# Patient Record
Sex: MALE | Race: WHITE | Employment: OTHER | URBAN - NONMETROPOLITAN AREA
[De-identification: names, ages, dates, MRNs, and addresses within clinical notes are randomized per-mention and may not be internally consistent; named-entity substitution may affect disease eponyms.]

---

## 2017-07-28 ENCOUNTER — DOCTOR'S OFFICE (OUTPATIENT)
Dept: URBAN - NONMETROPOLITAN AREA CLINIC 1 | Facility: CLINIC | Age: 72
Setting detail: OPHTHALMOLOGY
End: 2017-07-28
Payer: COMMERCIAL

## 2017-07-28 DIAGNOSIS — H01.001: ICD-10-CM

## 2017-07-28 DIAGNOSIS — H43.812: ICD-10-CM

## 2017-07-28 DIAGNOSIS — H01.002: ICD-10-CM

## 2017-07-28 DIAGNOSIS — H01.004: ICD-10-CM

## 2017-07-28 DIAGNOSIS — Z96.1: ICD-10-CM

## 2017-07-28 DIAGNOSIS — H35.363: ICD-10-CM

## 2017-07-28 DIAGNOSIS — H26.493: ICD-10-CM

## 2017-07-28 DIAGNOSIS — H01.005: ICD-10-CM

## 2017-07-28 PROCEDURE — 92014 COMPRE OPH EXAM EST PT 1/>: CPT | Performed by: OPHTHALMOLOGY

## 2017-07-28 PROCEDURE — 92134 CPTRZ OPH DX IMG PST SGM RTA: CPT | Performed by: OPHTHALMOLOGY

## 2017-07-28 PROCEDURE — 92250 FUNDUS PHOTOGRAPHY W/I&R: CPT | Performed by: OPHTHALMOLOGY

## 2017-07-28 ASSESSMENT — REFRACTION_MANIFEST
OS_VA3: 20/
OD_SPHERE: +0.75
OS_SPHERE: +1.75
OD_VA3: 20/
OD_VA3: 20/
OS_CYLINDER: -2.00
OS_ADD: +2.50
OD_VA1: 20/
OS_VA3: 20/
OS_AXIS: 085
OS_VA2: 20/
OD_VA2: 20/30+2
OD_VA2: 20/
OU_VA: 20/
OU_VA: 20/
OD_ADD: +2.50
OD_CYLINDER: -2.00
OS_VA1: 20/
OU_VA: 20/
OD_VA1: 20/
OS_VA1: 20/
OD_AXIS: 096
OS_VA2: 20/30+2
OD_VA1: 20/30+2
OD_VA3: 20/
OS_VA3: 20/
OD_VA2: 20/
OS_VA2: 20/
OS_VA1: 20/30+2

## 2017-07-28 ASSESSMENT — REFRACTION_CURRENTRX
OS_SPHERE: +1.75
OD_ADD: +2.75
OD_VPRISM_DIRECTION: BF
OD_OVR_VA: 20/
OD_OVR_VA: 20/
OS_OVR_VA: 20/
OD_CYLINDER: -2.25
OS_CYLINDER: -2.00
OS_OVR_VA: 20/
OD_AXIS: 93
OS_VPRISM_DIRECTION: BF
OS_OVR_VA: 20/
OD_OVR_VA: 20/
OS_AXIS: 84
OS_ADD: +2.75
OD_SPHERE: +0.75

## 2017-07-28 ASSESSMENT — SPHEQUIV_DERIVED
OS_SPHEQUIV: 0.75
OD_SPHEQUIV: -0.125
OS_SPHEQUIV: 0.375
OD_SPHEQUIV: -0.25

## 2017-07-28 ASSESSMENT — REFRACTION_AUTOREFRACTION
OD_CYLINDER: -1.75
OD_AXIS: 96
OS_SPHERE: +1.75
OS_AXIS: 85
OS_CYLINDER: -2.75
OD_SPHERE: +0.75

## 2017-07-28 ASSESSMENT — VISUAL ACUITY
OD_BCVA: 20/20
OS_BCVA: 20/20-1

## 2017-07-28 ASSESSMENT — CONFRONTATIONAL VISUAL FIELD TEST (CVF)
OD_FINDINGS: FULL
OS_FINDINGS: FULL

## 2017-08-09 ENCOUNTER — DOCTOR'S OFFICE (OUTPATIENT)
Dept: URBAN - NONMETROPOLITAN AREA CLINIC 1 | Facility: CLINIC | Age: 72
Setting detail: OPHTHALMOLOGY
End: 2017-08-09
Payer: COMMERCIAL

## 2017-08-09 DIAGNOSIS — Z96.1: ICD-10-CM

## 2017-08-09 DIAGNOSIS — H43.813: ICD-10-CM

## 2017-08-09 PROCEDURE — 92014 COMPRE OPH EXAM EST PT 1/>: CPT | Performed by: OPHTHALMOLOGY

## 2017-08-09 ASSESSMENT — REFRACTION_MANIFEST
OD_SPHERE: +0.75
OS_ADD: +2.50
OS_VA1: 20/
OS_VA3: 20/
OD_VA1: 20/
OU_VA: 20/
OD_VA2: 20/
OS_VA3: 20/
OD_CYLINDER: -2.00
OS_VA2: 20/
OD_VA3: 20/
OU_VA: 20/
OS_AXIS: 085
OD_VA3: 20/
OD_ADD: +2.50
OS_VA1: 20/30+2
OS_VA3: 20/
OS_VA2: 20/30+2
OD_VA1: 20/
OD_VA2: 20/
OD_VA3: 20/
OD_AXIS: 096
OS_CYLINDER: -2.00
OD_VA2: 20/30+2
OS_VA1: 20/
OU_VA: 20/
OD_VA1: 20/30+2
OS_VA2: 20/
OS_SPHERE: +1.75

## 2017-08-09 ASSESSMENT — REFRACTION_AUTOREFRACTION
OS_AXIS: 85
OS_CYLINDER: -2.75
OS_SPHERE: +1.75
OD_SPHERE: +0.75
OD_CYLINDER: -1.75
OD_AXIS: 96

## 2017-08-09 ASSESSMENT — SPHEQUIV_DERIVED
OD_SPHEQUIV: -0.125
OS_SPHEQUIV: 0.75
OD_SPHEQUIV: -0.25
OS_SPHEQUIV: 0.375

## 2017-08-09 ASSESSMENT — REFRACTION_CURRENTRX
OS_OVR_VA: 20/
OD_OVR_VA: 20/
OD_CYLINDER: -2.25
OD_OVR_VA: 20/
OS_VPRISM_DIRECTION: BF
OS_OVR_VA: 20/
OD_ADD: +2.75
OS_OVR_VA: 20/
OS_SPHERE: +1.75
OD_AXIS: 93
OS_CYLINDER: -2.00
OD_VPRISM_DIRECTION: BF
OD_SPHERE: +0.75
OS_AXIS: 84
OS_ADD: +2.75
OD_OVR_VA: 20/

## 2017-08-09 ASSESSMENT — VISUAL ACUITY
OD_BCVA: 20/25
OS_BCVA: 20/20

## 2017-08-09 ASSESSMENT — CONFRONTATIONAL VISUAL FIELD TEST (CVF)
OD_FINDINGS: FULL
OS_FINDINGS: FULL

## 2017-12-02 ENCOUNTER — OFFICE VISIT (OUTPATIENT)
Dept: URGENT CARE | Facility: CLINIC | Age: 72
End: 2017-12-02
Payer: MEDICARE

## 2017-12-02 ENCOUNTER — TRANSCRIBE ORDERS (OUTPATIENT)
Dept: ADMINISTRATIVE | Facility: HOSPITAL | Age: 72
End: 2017-12-02

## 2017-12-02 ENCOUNTER — APPOINTMENT (OUTPATIENT)
Dept: LAB | Facility: HOSPITAL | Age: 72
End: 2017-12-02
Attending: FAMILY MEDICINE
Payer: MEDICARE

## 2017-12-02 DIAGNOSIS — R19.7 DIARRHEA, UNSPECIFIED TYPE: ICD-10-CM

## 2017-12-02 DIAGNOSIS — R19.7 DIARRHEA, UNSPECIFIED TYPE: Primary | ICD-10-CM

## 2017-12-02 PROCEDURE — 99203 OFFICE O/P NEW LOW 30 MIN: CPT

## 2017-12-02 PROCEDURE — G0463 HOSPITAL OUTPT CLINIC VISIT: HCPCS

## 2017-12-02 PROCEDURE — 87505 NFCT AGENT DETECTION GI: CPT

## 2017-12-03 LAB
CAMPYLOBACTER DNA SPEC NAA+PROBE: NORMAL
SALMONELLA DNA SPEC QL NAA+PROBE: NORMAL
SHIGA TOXIN STX GENE SPEC NAA+PROBE: NORMAL
SHIGELLA DNA SPEC QL NAA+PROBE: NORMAL

## 2017-12-26 ENCOUNTER — GENERIC CONVERSION - ENCOUNTER (OUTPATIENT)
Dept: OTHER | Facility: OTHER | Age: 72
End: 2017-12-26

## 2018-01-23 VITALS
DIASTOLIC BLOOD PRESSURE: 70 MMHG | HEIGHT: 70 IN | RESPIRATION RATE: 20 BRPM | SYSTOLIC BLOOD PRESSURE: 122 MMHG | WEIGHT: 177 LBS | BODY MASS INDEX: 25.34 KG/M2 | HEART RATE: 92 BPM | OXYGEN SATURATION: 99 % | TEMPERATURE: 98.6 F

## 2018-01-23 NOTE — RESULT NOTES
Verified Results  (1) STOOL ENTERIC BACTERIAL PATHOGENS PANEL BY PCR [id number] 12:58PM [facility]     Test Name Result Flag Reference   SHIGA TOXIN 1/SHIGA TOXIN 2 GENES PCR None Detected  None Detected   CAMPYLOBACTER SP (JEJUNI AND COLI) PCR None Detected  None Detected   SHIGELLA SP /ENTEROINVASIVE E  COLI (EIEC) PCR None Detected  None Detected   SALMONELLA SP PCR None Detected  None Detected

## 2018-01-24 NOTE — PROGRESS NOTES
Assessment   1  Diarrhea (787 91) (R19 7)  2  Diarrhea of presumed infectious origin (009 3) (A09)    Plan  Diarrhea of presumed infectious origin    · Start: Ciprofloxacin HCl - 500 MG Oral Tablet; TAKE 1 TABLET TWICE DAILY    Discussion/Summary  Discussion Summary:   Patient will be treated for presumptive infectious diarrhea due to his history he will have stool for PCR for infectious pathogens and will get the specimen today  He was asked to stay off no products he is continuing on Gatorade  He will present to the emergency room if symptoms continue into the morning  ADDENDUM; STOOL FOR PCR RESULTED AND IS NEGATIVE, I CALLED PT AT 10:50 TO MAKE HIM AWARE OF THE NEGATIVE PCR, HE STILL CONTINUES WITH WATERY DIARRHEA X5-6 DAILY (THIS IS DAY #7 PER PATIENT)  I ASKED PATIENT TO PRESENT TO ED (HE CHOSE [facility]) AND WILL GO THERE NOW,  HE ASKED ME TO SPEAK TO ED DR LETTING HIM KNOW HX AND WHAT CULTURES WERE    I CALLED [facility] AND SPOKE TO PHYSICAN ABOUT SAME    [email or web address]    Counseling Documentation With Imm: The patient was counseled regarding diagnostic results, instructions for management, patient and family education  Follow Up Instructions: Follow Up with your Primary Care Provider in 1-2 days  If your symptoms worsen, go to the nearest [place] [address] Emergency Department  1 Amended By: Yessenia Callaway; Dec 04 2017 10:54 AM EST    Chief Complaint   1  Diarrhea  Chief Complaint Free Text Note Form: Reports eating ring bologna on Monday and since then has had loose stools  History of Present Illness  HPI: Patient is a 25-year-old gentleman who 8 ring below-knee and by the end of the day I will had diarrhea and vomiting  He continues with diarrhea for the last 5 days  And at this time he is able to keep up with his fluids only by taking Gatorade  His appetite is decreased  He initially had a low-grade fever but does not have any fever at the present time   There is no blood in his stools per se other than 5-6 bouts of watery diarrhea the patient feels relatively well  Hospital Based Practices Required Assessment:   Pain Assessment   the patient states they do not have pain  (on a scale of 0 to 10, the patient rates the pain at 0 )   Abuse And Domestic Violence Screen    Yes, the patient is safe at home  The patient states no one is hurting them  Depression And Suicide Screen  No, the patient has not had thoughts of hurting themself  No, the patient has not felt depressed in the past 7 days  Prefered Language is  [place]  Primary Language is  English  Readiness To Learn: Receptive  Barriers To Learning: none  Preferred Learning: verbal   Education Completed: equipment/supplies   Teaching Method: verbal   Person Taught: patient   Evaluation Of Learning: verbalized/demonstrated understanding      Review of Systems  Focused-Male:   Constitutional: as noted in HPI  Cardiovascular: no complaints of slow or fast heart rate, no chest pain, no palpitations, no leg claudication or lower extremity edema  Respiratory: no complaints of shortness of breath, no wheezing or cough, no dyspnea on exertion, no orthopnea or PND  Gastrointestinal: as noted in HPI  Musculoskeletal: no complaints of arthralgia, no myalgia, no joint swelling or stiffness, no limb pain or swelling  Integumentary: no complaints of skin rash or lesion, no itching or dry skin, no skin wounds  Neurological: no complaints of headache, no confusion, no numbness or tingling, no dizziness or fainting  ROS Reviewed:   ROS reviewed  Past Medical History   1  History of depression (V11 8) (Z86 59)  2  History of Spinal stenosis, multilevel (724 00) (M48 00)  Active Problems And Past Medical History Reviewed: The active problems and past medical history were reviewed and updated today  Family History  Mother   1  No pertinent family history  Family History Reviewed: The family history was reviewed and updated today  Social History    · Denied: History of Drug use   · Former smoker (V15 82) (O95 415)   · Denied: History of Social alcohol use  Social History Reviewed: The social history was reviewed and updated today  Surgical History  Surgical History Reviewed: The surgical history was reviewed and updated today  Current Meds  1  Advil TABS; Therapy: (Recorded:[id number]) to Recorded  2  MS Contin 60 MG Oral Tablet Extended Release; Therapy: (Recorded:[id number]) to Recorded  3  OxyCONTIN 20 MG TB12;   Therapy: (Recorded:[id number]) to Recorded  4  Sertraline HCl - 50 MG Oral Tablet; Therapy: (Recorded:33Mls4135) to Recorded    Allergies   1  No Known Drug Allergies    Vitals  Signs   Recorded: 02Dec2017 10:13AM   Temperature: 98 6 F, Tympanic  Heart Rate: 92  Respiration: 20  Systolic: 993  Diastolic: 70  Height: 5 ft 10 in  Weight: 177 lb   BMI Calculated: 25 4  BSA Calculated: 1 98  O2 Saturation: 99, RA  Pain Scale: 0/10    Physical Exam    Constitutional   General appearance: Abnormal   Patient appears pale  Eyes   Conjunctiva and lids: No swelling, erythema, or discharge  Ears, Nose, Mouth, and Throat   External inspection of ears and nose: Normal     Otoscopic examination: Tympanic membrance translucent with normal light reflex  Canals patent without erythema  Nasal mucosa, septum, and turbinates: Normal without edema or erythema  Oropharynx: Normal with no erythema, edema, exudate or lesions  Pulmonary   Auscultation of lungs: Clear to auscultation  Cardiovascular   Auscultation of heart: Normal rate and rhythm, normal S1 and S2, without murmurs  Abdomen   Abdomen: Non-tender, no masses  patient examined in the supine position shows mild bloating but no tenderness guarding rigidity bowel sounds are hyperactive there are no masses appreciated  Skin   Skin and subcutaneous tissue: Normal without rashes or lesions      Psychiatric   Orientation to person, place and time: Normal  Mood and affect: Normal        Signatures   Electronically signed by : Mark Wayne DO; Dec  2 2017  5:15PM EST                       (Author)    Electronically signed by : Mark Wayne DO; Dec  4 2017 10:56AM EST                       (Author)

## 2018-03-29 LAB
ALBUMIN SERPL BCP-MCNC: 3.6 G/DL (ref 3.5–5.7)
ALP SERPL-CCNC: 90 IU/L (ref 55–165)
ALT SERPL W P-5'-P-CCNC: 20 IU/L (ref 7–29)
ANION GAP SERPL CALCULATED.3IONS-SCNC: 12.4 MM/L
AST SERPL W P-5'-P-CCNC: 23 U/L (ref 8–27)
BACTERIA UR QL AUTO: ABNORMAL
BASOPHILS # BLD AUTO: 0.1 X3/UL (ref 0–0.3)
BASOPHILS # BLD AUTO: 0.8 % (ref 0–2)
BILIRUB SERPL-MCNC: 0.4 MG/DL (ref 0.3–1)
BILIRUB UR QL STRIP: NEGATIVE
BUN SERPL-MCNC: 28 MG/DL (ref 7–25)
CALCIUM SERPL-MCNC: 9.2 MG/DL (ref 8.6–10.5)
CHLORIDE SERPL-SCNC: 100 MM/L (ref 98–107)
CK SERPL-CCNC: 183 IU/L (ref 30–223)
CK-MB (HISTORICAL): 6.6 NG/ML (ref 0.6–6.3)
CLARITY UR: CLEAR
CO2 SERPL-SCNC: 28 MM/L (ref 21–31)
COLOR UR: YELLOW
CREAT SERPL-MCNC: 1.3 MG/DL (ref 0.7–1.3)
CREATININE, RANDOM URINE (HISTORICAL): 93.4 MG/DL
DEPRECATED RDW RBC AUTO: 13.6 % (ref 11.5–14.5)
EGFR (HISTORICAL): 54 GFR
EGFR AFRICAN AMERICAN (HISTORICAL): > 60 GFR
EOSINOPHIL # BLD AUTO: 0.2 X3/UL (ref 0–0.5)
EOSINOPHIL NFR BLD AUTO: 2 % (ref 0–5)
GLUCOSE (HISTORICAL): 115 MG/DL (ref 65–99)
GLUCOSE UR STRIP-MCNC: NEGATIVE MG/DL
HCT VFR BLD AUTO: 32.5 % (ref 42–52)
HGB BLD-MCNC: 11.1 G/DL (ref 14–18)
HGB UR QL STRIP.AUTO: ABNORMAL
KETONES UR STRIP-MCNC: NEGATIVE MG/DL
LEUKOCYTE ESTERASE UR QL STRIP: ABNORMAL
LYMPHOCYTES # BLD AUTO: 1.7 X3/UL (ref 1.2–4.2)
LYMPHOCYTES NFR BLD AUTO: 19.5 % (ref 20.5–51.1)
MCH RBC QN AUTO: 31.7 PG (ref 26–34)
MCHC RBC AUTO-ENTMCNC: 34.1 G/DL (ref 31–36)
MCV RBC AUTO: 92.8 FL (ref 81–99)
MONOCYTES # BLD AUTO: 0.8 X3/UL (ref 0–1)
MONOCYTES NFR BLD AUTO: 8.7 % (ref 1.7–12)
MUCUS THREADS (HISTORICAL): ABNORMAL /HPF
NEUTROPHILS # BLD AUTO: 6 X3/UL (ref 1.4–6.5)
NEUTS SEG NFR BLD AUTO: 69 % (ref 42.2–75.2)
NITRITE UR QL STRIP: NEGATIVE
NON-SQ EPI CELLS URNS QL MICRO: ABNORMAL /HPF
OSMOLALITY, SERUM (HISTORICAL): 278 MOSM (ref 262–291)
OTHER CELLS (HISTORICAL): ABNORMAL
PH UR STRIP.AUTO: 6 [PH] (ref 4.5–8)
PLATELET # BLD AUTO: 350 X3/UL (ref 130–400)
PMV BLD AUTO: 8.5 FL (ref 8.6–11.7)
POTASSIUM SERPL-SCNC: 4.4 MM/L (ref 3.5–5.5)
PROT UR STRIP-MCNC: NEGATIVE MG/DL
PROT UR-MCNC: 23 MG/DL
PROT/CREAT UR: 0.2 MG/MG
RBC # BLD AUTO: 3.5 X6/UL (ref 4.3–5.9)
RBC #/AREA URNS AUTO: ABNORMAL /HPF
SODIUM SERPL-SCNC: 136 MM/L (ref 134–143)
SP GR UR STRIP.AUTO: 1.01 (ref 1–1.03)
TOTAL PROTEIN (HISTORICAL): 6.2 G/DL (ref 6.4–8.9)
URIC ACID (HISTORICAL): 4.4 MG/DL (ref 2.3–7.6)
UROBILINOGEN UR QL STRIP.AUTO: 0.2 EU/DL (ref 0.2–8)
WBC # BLD AUTO: 8.7 X3/UL (ref 4.8–10.8)
WBC #/AREA URNS AUTO: ABNORMAL /HPF

## 2018-04-02 LAB — BETA-2 MICROGLOBULIN, URINE (HISTORICAL): 96 UG/L (ref 0–300)

## 2018-04-03 LAB
CK BB (HISTORICAL): 0 %
CK MM (HISTORICAL): 100 % (ref 97–100)
CK SERPL-CCNC: 195 U/L (ref 24–204)
CK-MB (HISTORICAL): 0 % (ref 0–3)
Lab: 0 %
Lab: 0 %

## 2018-04-04 ENCOUNTER — APPOINTMENT (OUTPATIENT)
Dept: RADIOLOGY | Facility: CLINIC | Age: 73
End: 2018-04-04
Payer: MEDICARE

## 2018-04-04 ENCOUNTER — OFFICE VISIT (OUTPATIENT)
Dept: URGENT CARE | Facility: CLINIC | Age: 73
End: 2018-04-04
Payer: MEDICARE

## 2018-04-04 VITALS
DIASTOLIC BLOOD PRESSURE: 75 MMHG | TEMPERATURE: 98.1 F | HEART RATE: 68 BPM | RESPIRATION RATE: 18 BRPM | OXYGEN SATURATION: 95 % | SYSTOLIC BLOOD PRESSURE: 153 MMHG

## 2018-04-04 DIAGNOSIS — M25.571 ACUTE RIGHT ANKLE PAIN: Primary | ICD-10-CM

## 2018-04-04 DIAGNOSIS — M25.571 ACUTE RIGHT ANKLE PAIN: ICD-10-CM

## 2018-04-04 PROCEDURE — 73610 X-RAY EXAM OF ANKLE: CPT

## 2018-04-04 PROCEDURE — G0463 HOSPITAL OUTPT CLINIC VISIT: HCPCS | Performed by: NURSE PRACTITIONER

## 2018-04-04 PROCEDURE — 99213 OFFICE O/P EST LOW 20 MIN: CPT | Performed by: NURSE PRACTITIONER

## 2018-04-04 RX ORDER — MORPHINE SULFATE 15 MG/1
15 TABLET ORAL 4 TIMES DAILY
Refills: 0 | Status: ON HOLD | COMMUNITY
Start: 2018-03-24 | End: 2019-09-06 | Stop reason: CLARIF

## 2018-04-04 RX ORDER — GABAPENTIN 300 MG/1
400 CAPSULE ORAL 2 TIMES DAILY
Refills: 0 | Status: ON HOLD | COMMUNITY
Start: 2018-03-23 | End: 2019-09-06 | Stop reason: CLARIF

## 2018-04-04 RX ORDER — SULFAMETHOXAZOLE AND TRIMETHOPRIM 800; 160 MG/1; MG/1
TABLET ORAL
Refills: 0 | COMMUNITY
Start: 2018-03-22 | End: 2018-08-17

## 2018-04-04 RX ORDER — AMLODIPINE BESYLATE 5 MG/1
5 TABLET ORAL DAILY
Refills: 0 | COMMUNITY
Start: 2018-03-14 | End: 2018-08-17 | Stop reason: CLARIF

## 2018-04-04 NOTE — PATIENT INSTRUCTIONS

## 2018-04-04 NOTE — PROGRESS NOTES
[facility]        NAME: Cathy Mcgill is a 74 y o  male  : 1945    MRN: [id number]  DATE: 2018  TIME: 8:25 PM    Assessment and Plan   Acute right ankle pain [M25 571]  1  Acute right ankle pain  XR ankle 3+ vw right         Patient Instructions       Follow up with PCP in 3-5 days  Proceed to  ER if symptoms worsen  Chief Complaint     Chief Complaint   Patient presents with    Ankle Pain         History of Present Illness       Ankle Pain    The incident occurred 6 to 12 hours ago  The injury mechanism was a fall, an inversion injury and a twisting injury  The pain is present in the right ankle  The pain is at a severity of 7/10  The pain is moderate  The pain has been constant since onset  Associated symptoms include a loss of motion  Associated symptoms comments: Pain with weight bearing          Review of Systems   Review of Systems   Constitutional: Negative  HENT: Negative  Eyes: Negative  Respiratory: Negative  Cardiovascular: Negative  Gastrointestinal: Negative  Endocrine: Negative  Genitourinary: Negative  Musculoskeletal: Positive for arthralgias (right ankle  )  Neurological: Negative  Psychiatric/Behavioral: Negative            Current Medications       Current Outpatient Prescriptions:     amLODIPine (NORVASC) 5 mg tablet, Take 5 mg by mouth daily, Disp: , Rfl: 0    gabapentin (NEURONTIN) 300 mg capsule, Take 300 mg by mouth 2 (two) times a day, Disp: , Rfl: 0    morphine (MSIR) 15 mg tablet, , Disp: , Rfl: 0    sertraline (ZOLOFT) 50 mg tablet, , Disp: , Rfl: 0    sulfamethoxazole-trimethoprim (BACTRIM DS) 800-160 mg per tablet, take 1 tablet by mouth twice a day --STARTING 3/27/18 IN THE EVENING, Disp: , Rfl: 0    Current Allergies     Allergies as of 2018    (No Known Allergies)            The following portions of the patient's history were reviewed and updated as appropriate: allergies, current medications, past family history, past medical history, past social history, past surgical history and problem list      No past medical history on file  No past surgical history on file  No family history on file  Medications have been verified  Objective   /75 (BP Location: Left arm, Patient Position: Sitting)   Pulse 68   Temp 98 1 °F (36 7 °C) (Tympanic)   Resp 18   SpO2 95%        Physical Exam     Physical Exam   Constitutional: He is oriented to person, place, and time  He appears well-developed and well-nourished  No distress  HENT:   Head: Normocephalic and atraumatic  Right Ear: External ear normal    Left Ear: External ear normal    Nose: Nose normal    Mouth/Throat: Oropharynx is clear and moist    Eyes: Conjunctivae and EOM are normal  Pupils are equal, round, and reactive to light  Neck: Normal range of motion  Neck supple  Cardiovascular: Normal rate, regular rhythm and normal heart sounds  Pulmonary/Chest: Effort normal and breath sounds normal    Abdominal: Soft  Bowel sounds are normal    Musculoskeletal:        Right ankle: He exhibits decreased range of motion and swelling  He exhibits no deformity  Tenderness  Lateral malleolus and AITFL tenderness found  Lymphadenopathy:     He has no cervical adenopathy  Neurological: He is alert and oriented to person, place, and time  He has normal reflexes  Skin: Skin is warm and dry  No rash noted  He is not diaphoretic  Psychiatric: He has a normal mood and affect  Nursing note and vitals reviewed  I advised him to follow the RICE protocol regarding the right ankle pain  He may use ibuprofen as needed

## 2018-06-22 ENCOUNTER — OFFICE VISIT (OUTPATIENT)
Dept: URGENT CARE | Facility: CLINIC | Age: 73
End: 2018-06-22
Payer: MEDICARE

## 2018-06-22 VITALS
DIASTOLIC BLOOD PRESSURE: 60 MMHG | HEART RATE: 82 BPM | WEIGHT: 177 LBS | TEMPERATURE: 100 F | OXYGEN SATURATION: 95 % | BODY MASS INDEX: 25.4 KG/M2 | SYSTOLIC BLOOD PRESSURE: 117 MMHG | RESPIRATION RATE: 16 BRPM

## 2018-06-22 DIAGNOSIS — T83.098A BLOCKED URINARY CATHETER, INITIAL ENCOUNTER (HCC): Primary | ICD-10-CM

## 2018-06-22 PROCEDURE — 99211 OFF/OP EST MAY X REQ PHY/QHP: CPT | Performed by: FAMILY MEDICINE

## 2018-06-22 PROCEDURE — G0463 HOSPITAL OUTPT CLINIC VISIT: HCPCS | Performed by: FAMILY MEDICINE

## 2018-07-13 ENCOUNTER — TRANSCRIBE ORDERS (OUTPATIENT)
Dept: ADMINISTRATIVE | Facility: HOSPITAL | Age: 73
End: 2018-07-13

## 2018-07-13 DIAGNOSIS — M54.14 THORACIC NEURITIS: Primary | ICD-10-CM

## 2018-07-19 ENCOUNTER — HOSPITAL ENCOUNTER (OUTPATIENT)
Dept: MRI IMAGING | Facility: HOSPITAL | Age: 73
Discharge: HOME | End: 2018-07-19
Attending: ANESTHESIOLOGY
Payer: MEDICARE

## 2018-07-19 DIAGNOSIS — M54.14 THORACIC NEURITIS: ICD-10-CM

## 2018-07-19 PROCEDURE — 72146 MRI CHEST SPINE W/O DYE: CPT

## 2018-08-09 ENCOUNTER — TRANSCRIBE ORDERS (OUTPATIENT)
Dept: ADMINISTRATIVE | Facility: HOSPITAL | Age: 73
End: 2018-08-09

## 2018-08-09 ENCOUNTER — LAB (OUTPATIENT)
Dept: LAB | Facility: HOSPITAL | Age: 73
End: 2018-08-09
Attending: UROLOGY
Payer: MEDICARE

## 2018-08-09 DIAGNOSIS — R31.9 HEMATURIA, UNSPECIFIED TYPE: ICD-10-CM

## 2018-08-09 DIAGNOSIS — N39.0 URINARY TRACT INFECTION WITHOUT HEMATURIA, SITE UNSPECIFIED: ICD-10-CM

## 2018-08-09 DIAGNOSIS — I10 HYPERTENSION, UNSPECIFIED TYPE: ICD-10-CM

## 2018-08-09 DIAGNOSIS — R53.83 OTHER FATIGUE: ICD-10-CM

## 2018-08-09 DIAGNOSIS — N39.0 URINARY TRACT INFECTION WITHOUT HEMATURIA, SITE UNSPECIFIED: Primary | ICD-10-CM

## 2018-08-09 LAB
ALBUMIN SERPL BCP-MCNC: 4.2 G/DL (ref 3.5–5.7)
ALP SERPL-CCNC: 83 U/L (ref 55–165)
ALT SERPL W P-5'-P-CCNC: 18 U/L (ref 7–52)
ANION GAP SERPL CALCULATED.3IONS-SCNC: 9 MMOL/L (ref 4–13)
APTT PPP: 29 SECONDS (ref 24–36)
AST SERPL W P-5'-P-CCNC: 21 U/L (ref 13–39)
ATRIAL RATE: 63 BPM
BACTERIA UR QL AUTO: ABNORMAL /HPF
BILIRUB SERPL-MCNC: 0.4 MG/DL (ref 0.2–1)
BILIRUB UR QL STRIP: NEGATIVE
BUN SERPL-MCNC: 22 MG/DL (ref 7–25)
CALCIUM SERPL-MCNC: 9.3 MG/DL (ref 8.6–10.5)
CHLORIDE SERPL-SCNC: 108 MMOL/L (ref 98–107)
CHOLEST SERPL-MCNC: 138 MG/DL (ref 0–200)
CLARITY UR: ABNORMAL
CO2 SERPL-SCNC: 24 MMOL/L (ref 21–31)
COLOR UR: YELLOW
CREAT SERPL-MCNC: 0.97 MG/DL (ref 0.7–1.3)
ERYTHROCYTE [DISTWIDTH] IN BLOOD BY AUTOMATED COUNT: 13 % (ref 11.5–14.5)
GFR SERPL CREATININE-BSD FRML MDRD: 76 ML/MIN/1.73SQ M
GLUCOSE P FAST SERPL-MCNC: 113 MG/DL (ref 65–99)
GLUCOSE UR STRIP-MCNC: NEGATIVE MG/DL
HCT VFR BLD AUTO: 35 % (ref 36.5–49.3)
HDLC SERPL-MCNC: 46 MG/DL (ref 40–60)
HGB BLD-MCNC: 11.9 G/DL (ref 14–18)
HGB UR QL STRIP.AUTO: ABNORMAL
INR PPP: 1.08 (ref 0.9–1.5)
KETONES UR STRIP-MCNC: NEGATIVE MG/DL
LDLC SERPL CALC-MCNC: 82 MG/DL (ref 75–193)
LEUKOCYTE ESTERASE UR QL STRIP: ABNORMAL
MCH RBC QN AUTO: 32.2 PG (ref 26–34)
MCHC RBC AUTO-ENTMCNC: 34 G/DL (ref 31–37)
MCV RBC AUTO: 95 FL (ref 81–99)
MUCOUS THREADS UR QL AUTO: ABNORMAL
NITRITE UR QL STRIP: POSITIVE
NON-SQ EPI CELLS URNS QL MICRO: ABNORMAL /HPF
NONHDLC SERPL-MCNC: 92 MG/DL
P AXIS: 89 DEGREES
PH UR STRIP.AUTO: 8.5 [PH] (ref 5–8)
PLATELET # BLD AUTO: 251 THOUSANDS/UL (ref 149–390)
PMV BLD AUTO: 9 FL (ref 8.6–11.7)
POTASSIUM SERPL-SCNC: 3.6 MMOL/L (ref 3.5–5.5)
PR INTERVAL: 184 MS
PROT SERPL-MCNC: 6.6 G/DL (ref 6.4–8.9)
PROT UR STRIP-MCNC: ABNORMAL MG/DL
PROTHROMBIN TIME: 12.6 SECONDS (ref 10.1–12.9)
QRS AXIS: 26 DEGREES
QRSD INTERVAL: 88 MS
QT INTERVAL: 376 MS
QTC INTERVAL: 384 MS
RBC # BLD AUTO: 3.7 MILLION/UL (ref 4.3–5.9)
RBC #/AREA URNS AUTO: ABNORMAL /HPF
SODIUM SERPL-SCNC: 141 MMOL/L (ref 134–143)
SP GR UR STRIP.AUTO: 1.02 (ref 1–1.03)
T WAVE AXIS: 13 DEGREES
TRIGL SERPL-MCNC: 51 MG/DL (ref 44–166)
UROBILINOGEN UR QL STRIP.AUTO: 0.2 E.U./DL
VENTRICULAR RATE: 63 BPM
WBC # BLD AUTO: 6.5 THOUSAND/UL (ref 4.8–10.8)
WBC #/AREA URNS AUTO: ABNORMAL /HPF

## 2018-08-09 PROCEDURE — 85610 PROTHROMBIN TIME: CPT

## 2018-08-09 PROCEDURE — 36415 COLL VENOUS BLD VENIPUNCTURE: CPT

## 2018-08-09 PROCEDURE — 87186 SC STD MICRODIL/AGAR DIL: CPT

## 2018-08-09 PROCEDURE — 80053 COMPREHEN METABOLIC PANEL: CPT

## 2018-08-09 PROCEDURE — 87086 URINE CULTURE/COLONY COUNT: CPT

## 2018-08-09 PROCEDURE — 87147 CULTURE TYPE IMMUNOLOGIC: CPT

## 2018-08-09 PROCEDURE — 81001 URINALYSIS AUTO W/SCOPE: CPT | Performed by: UROLOGY

## 2018-08-09 PROCEDURE — 85730 THROMBOPLASTIN TIME PARTIAL: CPT

## 2018-08-09 PROCEDURE — 93005 ELECTROCARDIOGRAM TRACING: CPT

## 2018-08-09 PROCEDURE — 85027 COMPLETE CBC AUTOMATED: CPT

## 2018-08-09 PROCEDURE — 80061 LIPID PANEL: CPT

## 2018-08-09 PROCEDURE — 93010 ELECTROCARDIOGRAM REPORT: CPT | Performed by: INTERNAL MEDICINE

## 2018-08-13 LAB
BACTERIA UR CULT: ABNORMAL

## 2018-08-17 ENCOUNTER — HOSPITAL ENCOUNTER (EMERGENCY)
Facility: HOSPITAL | Age: 73
Discharge: HOME | End: 2018-08-17
Attending: EMERGENCY MEDICINE | Admitting: EMERGENCY MEDICINE
Payer: MEDICARE

## 2018-08-17 ENCOUNTER — ANESTHESIA EVENT (OUTPATIENT)
Dept: PERIOP | Facility: HOSPITAL | Age: 73
End: 2018-08-17
Payer: MEDICARE

## 2018-08-17 VITALS
HEIGHT: 70 IN | SYSTOLIC BLOOD PRESSURE: 118 MMHG | DIASTOLIC BLOOD PRESSURE: 82 MMHG | RESPIRATION RATE: 20 BRPM | BODY MASS INDEX: 26.63 KG/M2 | TEMPERATURE: 98.3 F | WEIGHT: 186 LBS | OXYGEN SATURATION: 100 % | HEART RATE: 76 BPM

## 2018-08-17 DIAGNOSIS — Z46.6 URINARY CATHETER (FOLEY) CHANGE REQUIRED: ICD-10-CM

## 2018-08-17 DIAGNOSIS — T83.9XXA PROBLEM WITH FOLEY CATHETER, INITIAL ENCOUNTER (HCC): Primary | ICD-10-CM

## 2018-08-17 PROCEDURE — 99283 EMERGENCY DEPT VISIT LOW MDM: CPT

## 2018-08-17 RX ORDER — NITROFURANTOIN MACROCRYSTALS 100 MG/1
100 CAPSULE ORAL 2 TIMES DAILY
Status: ON HOLD | COMMUNITY
End: 2019-08-16 | Stop reason: CLARIF

## 2018-08-17 RX ORDER — IBUPROFEN 200 MG
200 TABLET ORAL
Status: ON HOLD | COMMUNITY
End: 2018-08-20

## 2018-08-17 RX ORDER — FINASTERIDE 5 MG/1
5 TABLET, FILM COATED ORAL DAILY
COMMUNITY

## 2018-08-17 NOTE — ED PROVIDER NOTES
History  Chief Complaint   Patient presents with    Urinary Catheter Problem     "cath blocked" needs to be changed  Was placed by Dr Tommie Garza about 6 weeks ago  Told me to come to ER to have it changed because its not draining     Patient with history of chronic indwelling Diallo catheter follows with Dr Tommie Garza for urology has appointment with him for Diallo care coming up this week presents to the ED due to obstruction of Diallo catheter requesting to be changed out he reports he did contact Dr Tommie Garza her advised he come to the ED to have his catheter exchanged  Patient denies any fever chills or blood in the urine  Prior to Admission Medications   Prescriptions Last Dose Informant Patient Reported? Taking?   finasteride (PROSCAR) 5 mg tablet   Yes No   Sig: Take 5 mg by mouth daily   gabapentin (NEURONTIN) 300 mg capsule Unknown at Unknown time  Yes No   Sig: Take 300 mg by mouth daily at bedtime     ibuprofen (MOTRIN) 200 mg tablet Unknown at Unknown time  Yes No   Sig: Take 200 mg by mouth daily at bedtime   morphine (MSIR) 15 mg tablet Unknown at Unknown time  Yes No   Sig: Take 15 mg by mouth 4 (four) times a day     nitrofurantoin (MACRODANTIN) 100 mg capsule Unknown at Unknown time  Yes No   Sig: Take 100 mg by mouth 2 (two) times a day   sertraline (ZOLOFT) 50 mg tablet Unknown at Unknown time  Yes No   Sig: Take 50 mg by mouth daily        Facility-Administered Medications: None       Past Medical History:   Diagnosis Date    Depression     Diallo catheter in place        Past Surgical History:   Procedure Laterality Date    ARTHROSCOPY KNEE      BACK SURGERY      L 4 or 5    CATARACT EXTRACTION Bilateral        History reviewed  No pertinent family history  I have reviewed and agree with the history as documented      Social History   Substance Use Topics    Smoking status: Former Smoker    Smokeless tobacco: Never Used    Alcohol use No        Review of Systems   Constitutional: Negative for activity change, appetite change, chills, fatigue and fever  HENT: Negative for congestion, ear pain, rhinorrhea and sore throat  Eyes: Negative for discharge, redness and visual disturbance  Respiratory: Negative for cough, chest tightness, shortness of breath and wheezing  Cardiovascular: Negative for chest pain and palpitations  Gastrointestinal: Negative for abdominal pain, constipation, diarrhea, nausea and vomiting  Endocrine: Negative for polydipsia and polyuria  Genitourinary: Positive for difficulty urinating  Negative for dysuria, frequency, hematuria and urgency  Diallo catheter obstruction   Musculoskeletal: Negative for arthralgias and myalgias  Skin: Negative for color change, pallor and rash  Neurological: Negative for dizziness, weakness, light-headedness, numbness and headaches  Hematological: Negative for adenopathy  Does not bruise/bleed easily  All other systems reviewed and are negative  Physical Exam  Physical Exam   Constitutional: He is oriented to person, place, and time  He appears well-developed and well-nourished  HENT:   Head: Normocephalic and atraumatic  Right Ear: External ear normal    Left Ear: External ear normal    Nose: Nose normal    Mouth/Throat: Oropharynx is clear and moist    Eyes: Conjunctivae and EOM are normal  Pupils are equal, round, and reactive to light  Neck: Normal range of motion  Neck supple  Cardiovascular: Normal rate, regular rhythm, normal heart sounds and intact distal pulses  Pulmonary/Chest: Effort normal and breath sounds normal  No respiratory distress  He has no wheezes  He has no rales  He exhibits no tenderness  Abdominal: Soft  Bowel sounds are normal  He exhibits no distension  There is no tenderness  There is no guarding  Musculoskeletal: Normal range of motion  Neurological: He is alert and oriented to person, place, and time  No cranial nerve deficit or sensory deficit     Skin: Skin is warm and dry  Psychiatric: He has a normal mood and affect  Nursing note and vitals reviewed  Vital Signs  ED Triage Vitals [08/17/18 1949]   Temperature Pulse Respirations Blood Pressure SpO2   98 3 °F (36 8 °C) 76 20 118/82 100 %      Temp Source Heart Rate Source Patient Position - Orthostatic VS BP Location FiO2 (%)   Temporal Monitor Lying Left arm --      Pain Score       No Pain           Vitals:    08/17/18 1949   BP: 118/82   Pulse: 76   Patient Position - Orthostatic VS: Lying       Visual Acuity      ED Medications  Medications - No data to display    Diagnostic Studies  Results Reviewed     None                 No orders to display              Procedures  Procedures       Phone Contacts  ED Phone Contact    ED Course                               MDM  Number of Diagnoses or Management Options  Problem with Diallo catheter, initial encounter [facility]): new and does not require workup  Urinary catheter (Diallo) change required: new and does not require workup  Diagnosis management comments: Patient is stable nontoxic well-appearing in the emergency department after catheters exchanged is draining normally any feels well advised prompt follow-up with primary care physician and urologist for further evaluation treatment return precautions and anticipatory guidance discussed  Risk of Complications, Morbidity, and/or Mortality  Presenting problems: low  Management options: low    Patient Progress  Patient progress: stable    CritCare Time    Disposition  Final diagnoses:   Problem with Diallo catheter, [facility] )   Urinary catheter (Diallo) change required     Time reflects when diagnosis was documented in both MDM as applicable and the Disposition within this note     Time User Action Codes Description Comment    8/17/2018  8:12 PM Dilip Schmidt Add [T83  9XXA] Problem with Diallo catheter, initial encounter ([facility] )     8/17/2018  8:12 PM Angelina Newsome Add [Z46 6] Urinary catheter (Diallo) change required       ED Disposition     ED Disposition Condition Comment    Discharge  Li Sanchez discharge to home/self care  Condition at discharge: Stable        Follow-up Information     Follow up With Specialties Details Why [address] [facility], Emergency Medicine Schedule an appointment as soon as possible for a visit in 3 days  Chetan Moreau [address] [facility]      Margaret Castellanos MD Urology Call in 1 day  [address] [place] [address]  [phone]            Patient's Medications   Discharge Prescriptions    No medications on file     No discharge procedures on file      ED Provider  Electronically Signed by           Josephine Arevalo DO  08/17/18 2023

## 2018-08-18 NOTE — ED NOTES
Patient came to ER at advice of Dr Kurt Garcia who placed cantor about 6 weeks ago to have cantor changed because "it wasn't draining  No drainage in cantor patient arrived with at this time  Removed old 16F cantor without difficulty and inserted new 16F cantor  Patient tolerated well  Immediate return of slightly pink tinged urine, cloudy  Patient states he is on an antibiotic already that was started by Dr Kurt Garcia  ED dr jones, patient to continue same       Aubrie Gamboa RN  08/17/18 2029

## 2018-08-20 ENCOUNTER — ANESTHESIA (OUTPATIENT)
Dept: PERIOP | Facility: HOSPITAL | Age: 73
End: 2018-08-20
Payer: MEDICARE

## 2018-08-20 ENCOUNTER — HOSPITAL ENCOUNTER (OUTPATIENT)
Facility: HOSPITAL | Age: 73
Setting detail: OUTPATIENT SURGERY
Discharge: HOME | End: 2018-08-22
Attending: UROLOGY | Admitting: UROLOGY
Payer: MEDICARE

## 2018-08-20 DIAGNOSIS — N40.0 ENLARGED PROSTATE WITHOUT LOWER URINARY TRACT SYMPTOMS (LUTS): ICD-10-CM

## 2018-08-20 DIAGNOSIS — R33.9 RETENTION OF URINE: ICD-10-CM

## 2018-08-20 PROBLEM — N21.0 BLADDER STONES: Status: ACTIVE | Noted: 2018-08-20

## 2018-08-20 PROCEDURE — 82360 CALCULUS ASSAY QUANT: CPT | Performed by: UROLOGY

## 2018-08-20 PROCEDURE — 88300 SURGICAL PATH GROSS: CPT | Performed by: PATHOLOGY

## 2018-08-20 PROCEDURE — 88305 TISSUE EXAM BY PATHOLOGIST: CPT | Performed by: PATHOLOGY

## 2018-08-20 RX ORDER — SODIUM CHLORIDE 9 MG/ML
100 INJECTION, SOLUTION INTRAVENOUS CONTINUOUS
Status: DISCONTINUED | OUTPATIENT
Start: 2018-08-20 | End: 2018-08-20

## 2018-08-20 RX ORDER — ONDANSETRON 2 MG/ML
4 INJECTION INTRAMUSCULAR; INTRAVENOUS ONCE AS NEEDED
Status: DISCONTINUED | OUTPATIENT
Start: 2018-08-20 | End: 2018-08-20 | Stop reason: HOSPADM

## 2018-08-20 RX ORDER — SODIUM CHLORIDE, SODIUM LACTATE, POTASSIUM CHLORIDE, CALCIUM CHLORIDE 600; 310; 30; 20 MG/100ML; MG/100ML; MG/100ML; MG/100ML
100 INJECTION, SOLUTION INTRAVENOUS CONTINUOUS
Status: DISCONTINUED | OUTPATIENT
Start: 2018-08-20 | End: 2018-08-21

## 2018-08-20 RX ORDER — PROPOFOL 10 MG/ML
INJECTION, EMULSION INTRAVENOUS AS NEEDED
Status: DISCONTINUED | OUTPATIENT
Start: 2018-08-20 | End: 2018-08-20 | Stop reason: SURG

## 2018-08-20 RX ORDER — GABAPENTIN 300 MG/1
300 CAPSULE ORAL
Status: DISCONTINUED | OUTPATIENT
Start: 2018-08-20 | End: 2018-08-22 | Stop reason: HOSPADM

## 2018-08-20 RX ORDER — SODIUM CHLORIDE, SODIUM LACTATE, POTASSIUM CHLORIDE, CALCIUM CHLORIDE 600; 310; 30; 20 MG/100ML; MG/100ML; MG/100ML; MG/100ML
60 INJECTION, SOLUTION INTRAVENOUS CONTINUOUS
Status: DISCONTINUED | OUTPATIENT
Start: 2018-08-20 | End: 2018-08-21

## 2018-08-20 RX ORDER — GLYCINE 1.5 G/100ML
SOLUTION IRRIGATION AS NEEDED
Status: DISCONTINUED | OUTPATIENT
Start: 2018-08-20 | End: 2018-08-20 | Stop reason: HOSPADM

## 2018-08-20 RX ORDER — ONDANSETRON 2 MG/ML
INJECTION INTRAMUSCULAR; INTRAVENOUS AS NEEDED
Status: DISCONTINUED | OUTPATIENT
Start: 2018-08-20 | End: 2018-08-20 | Stop reason: SURG

## 2018-08-20 RX ORDER — FENTANYL CITRATE/PF 50 MCG/ML
25 SYRINGE (ML) INJECTION
Status: DISCONTINUED | OUTPATIENT
Start: 2018-08-20 | End: 2018-08-20 | Stop reason: HOSPADM

## 2018-08-20 RX ORDER — MAGNESIUM HYDROXIDE 1200 MG/15ML
LIQUID ORAL AS NEEDED
Status: DISCONTINUED | OUTPATIENT
Start: 2018-08-20 | End: 2018-08-20 | Stop reason: HOSPADM

## 2018-08-20 RX ORDER — FUROSEMIDE 10 MG/ML
INJECTION INTRAMUSCULAR; INTRAVENOUS AS NEEDED
Status: DISCONTINUED | OUTPATIENT
Start: 2018-08-20 | End: 2018-08-20 | Stop reason: SURG

## 2018-08-20 RX ORDER — MORPHINE SULFATE 15 MG/1
15 TABLET ORAL 4 TIMES DAILY
Status: DISCONTINUED | OUTPATIENT
Start: 2018-08-20 | End: 2018-08-22 | Stop reason: HOSPADM

## 2018-08-20 RX ORDER — SODIUM CHLORIDE, SODIUM LACTATE, POTASSIUM CHLORIDE, CALCIUM CHLORIDE 600; 310; 30; 20 MG/100ML; MG/100ML; MG/100ML; MG/100ML
125 INJECTION, SOLUTION INTRAVENOUS CONTINUOUS
Status: DISCONTINUED | OUTPATIENT
Start: 2018-08-20 | End: 2018-08-20

## 2018-08-20 RX ORDER — MIDAZOLAM HYDROCHLORIDE 1 MG/ML
INJECTION INTRAMUSCULAR; INTRAVENOUS AS NEEDED
Status: DISCONTINUED | OUTPATIENT
Start: 2018-08-20 | End: 2018-08-20 | Stop reason: SURG

## 2018-08-20 RX ORDER — FINASTERIDE 5 MG/1
5 TABLET, FILM COATED ORAL DAILY
Status: DISCONTINUED | OUTPATIENT
Start: 2018-08-20 | End: 2018-08-22 | Stop reason: HOSPADM

## 2018-08-20 RX ORDER — BUPIVACAINE HYDROCHLORIDE 7.5 MG/ML
INJECTION, SOLUTION INTRASPINAL AS NEEDED
Status: DISCONTINUED | OUTPATIENT
Start: 2018-08-20 | End: 2018-08-20 | Stop reason: SURG

## 2018-08-20 RX ORDER — FENTANYL CITRATE 50 UG/ML
INJECTION, SOLUTION INTRAMUSCULAR; INTRAVENOUS AS NEEDED
Status: DISCONTINUED | OUTPATIENT
Start: 2018-08-20 | End: 2018-08-20 | Stop reason: SURG

## 2018-08-20 RX ORDER — LEVOFLOXACIN 5 MG/ML
500 INJECTION, SOLUTION INTRAVENOUS ONCE
Status: COMPLETED | OUTPATIENT
Start: 2018-08-20 | End: 2018-08-20

## 2018-08-20 RX ORDER — NITROFURANTOIN 25; 75 MG/1; MG/1
100 CAPSULE ORAL 2 TIMES DAILY WITH MEALS
Status: DISCONTINUED | OUTPATIENT
Start: 2018-08-20 | End: 2018-08-22 | Stop reason: HOSPADM

## 2018-08-20 RX ADMIN — PROPOFOL 20 MG: 10 INJECTION, EMULSION INTRAVENOUS at 12:56

## 2018-08-20 RX ADMIN — FUROSEMIDE 10 MG: 10 INJECTION, SOLUTION INTRAVENOUS at 13:40

## 2018-08-20 RX ADMIN — SODIUM CHLORIDE, POTASSIUM CHLORIDE, SODIUM LACTATE AND CALCIUM CHLORIDE 125 ML/HR: 600; 310; 30; 20 INJECTION, SOLUTION INTRAVENOUS at 15:29

## 2018-08-20 RX ADMIN — PROPOFOL 20 MG: 10 INJECTION, EMULSION INTRAVENOUS at 13:45

## 2018-08-20 RX ADMIN — GABAPENTIN 300 MG: 300 CAPSULE ORAL at 21:01

## 2018-08-20 RX ADMIN — SODIUM CHLORIDE, POTASSIUM CHLORIDE, SODIUM LACTATE AND CALCIUM CHLORIDE 60 ML/HR: 600; 310; 30; 20 INJECTION, SOLUTION INTRAVENOUS at 16:10

## 2018-08-20 RX ADMIN — ONDANSETRON HYDROCHLORIDE 4 MG: 2 INJECTION, SOLUTION INTRAVENOUS at 12:20

## 2018-08-20 RX ADMIN — FENTANYL CITRATE 35 MCG: 50 INJECTION INTRAMUSCULAR; INTRAVENOUS at 12:32

## 2018-08-20 RX ADMIN — PROPOFOL 20 MG: 10 INJECTION, EMULSION INTRAVENOUS at 13:49

## 2018-08-20 RX ADMIN — LEVOFLOXACIN: 5 INJECTION, SOLUTION INTRAVENOUS at 12:09

## 2018-08-20 RX ADMIN — SERTRALINE HYDROCHLORIDE 50 MG: 50 TABLET ORAL at 16:10

## 2018-08-20 RX ADMIN — PROPOFOL 20 MG: 10 INJECTION, EMULSION INTRAVENOUS at 13:57

## 2018-08-20 RX ADMIN — PROPOFOL 30 MG: 10 INJECTION, EMULSION INTRAVENOUS at 12:43

## 2018-08-20 RX ADMIN — NITROFURANTOIN (MONOHYDRATE/MACROCRYSTALS) 100 MG: 75; 25 CAPSULE ORAL at 17:56

## 2018-08-20 RX ADMIN — PROPOFOL 20 MG: 10 INJECTION, EMULSION INTRAVENOUS at 13:34

## 2018-08-20 RX ADMIN — FENTANYL CITRATE 15 MCG: 50 INJECTION INTRAMUSCULAR; INTRAVENOUS at 12:40

## 2018-08-20 RX ADMIN — SODIUM CHLORIDE, POTASSIUM CHLORIDE, SODIUM LACTATE AND CALCIUM CHLORIDE: 600; 310; 30; 20 INJECTION, SOLUTION INTRAVENOUS at 12:41

## 2018-08-20 RX ADMIN — MORPHINE SULFATE 15 MG: 15 TABLET ORAL at 21:01

## 2018-08-20 RX ADMIN — SODIUM CHLORIDE, POTASSIUM CHLORIDE, SODIUM LACTATE AND CALCIUM CHLORIDE 125 ML/HR: 600; 310; 30; 20 INJECTION, SOLUTION INTRAVENOUS at 09:15

## 2018-08-20 RX ADMIN — MORPHINE SULFATE 15 MG: 15 TABLET ORAL at 17:56

## 2018-08-20 RX ADMIN — CEFAZOLIN SODIUM 2000 MG: 2 SOLUTION INTRAVENOUS at 12:17

## 2018-08-20 RX ADMIN — FENTANYL CITRATE 50 MCG: 50 INJECTION INTRAMUSCULAR; INTRAVENOUS at 12:26

## 2018-08-20 RX ADMIN — PROPOFOL 20 MG: 10 INJECTION, EMULSION INTRAVENOUS at 13:26

## 2018-08-20 RX ADMIN — LIDOCAINE HYDROCHLORIDE 60 MG: 20 INJECTION, SOLUTION INTRAVENOUS at 12:43

## 2018-08-20 RX ADMIN — FINASTERIDE 5 MG: 5 TABLET, FILM COATED ORAL at 16:10

## 2018-08-20 RX ADMIN — PROPOFOL 20 MG: 10 INJECTION, EMULSION INTRAVENOUS at 13:17

## 2018-08-20 RX ADMIN — MIDAZOLAM HYDROCHLORIDE 2 MG: 1 INJECTION, SOLUTION INTRAMUSCULAR; INTRAVENOUS at 12:13

## 2018-08-20 RX ADMIN — BUPIVACAINE HYDROCHLORIDE IN DEXTROSE 1.65 ML: 7.5 INJECTION, SOLUTION SUBARACHNOID at 12:40

## 2018-08-20 NOTE — ANESTHESIA POSTPROCEDURE EVALUATION
Post-Op Assessment Note      CV Status:  Stable    Mental Status:  Alert and awake    Hydration Status:  Stable    PONV Controlled:  Controlled    Airway Patency:  Patent    Post Op Vitals Reviewed: Yes          Staff: CRNA           BP   99/57   Temp   97 2   Pulse  50   Resp   16   SpO2   98

## 2018-08-20 NOTE — OP NOTE
OPERATIVE REPORT  PATIENT NAME: Lam Hutson    :  1945  MRN: [id number]  Pt Location: [facility] OR ROOM 03    SURGERY DATE: 2018    Surgeon(s) and Role:     * George Guerrero MD - Primary    Preop Diagnosis:  Retention of urine [R33 9]  Enlarged prostate without lower urinary tract symptoms (luts) [N40 0]    Post-Op Diagnosis Codes:     * Retention of urine [R33 9]     * Enlarged prostate without lower urinary tract symptoms (luts) [N40 0]    Procedure(s) (LRB):  CYSTOSCOPY; TURP (N/A)  LITHOTRISPY HOLMIUM LASER of bladder stones (N/A)    Specimen(s):  ID Type Source Tests Collected by Time Destination   1 : smooth bladder stones for chemical analysis Tissue Urinary Bladder TISSUE EXAM George Guerrero MD 2018 1305    2 : bladder stones jacks for chemical analysis Tissue Urinary Bladder TISSUE EXAM George Guerrero MD 2018 1307    3 : prostate tissue Tissue Prostate TISSUE EXAM George Guerrero MD 2018 1311        Estimated Blood Loss:   Minimal    Drains:  Urethral Catheter Latex; Three way 24 Fr  (Active)   Site Assessment Clean;Skin intact 2018  1:55 PM   Collection Container Standard drainage bag 2018  1:55 PM   Securement Method Securing device (Describe) 2018  1:55 PM   Number of days: 0       Anesthesia Type:   General    Operative Indications:  Retention of urine [R33 9]  Enlarged prostate without lower urinary tract symptoms (luts) [N40 0]  The patient has a history of urinary retention which has failed conservative management  He also had 2 small bladder stones seen on outpatient cystoscopy  Options, procedures, benefits and risks were discussed and he agreed to the planned procedures  Operative Findings:    Enlarged and obstructing prostate  Two small bladder stones, 1 smooth and 1 a jackstone      Complications:   None    Procedure and Technique:    The patient was properly identified in the operating room and after adequate spinal anesthesia,  He was placed in the lithotomy position  The lower abdomen and genitalia were prepped and draped in the usual fashion  A 28 Angolan resectoscope was inserted with a 27 Western Kimmy cutting loop and a 30 degree lens  The anterior urethra was normal   The prostate had moderate trilobar enlargement with visual obstruction  There was a prominent posterior median lobe  The ureteral orifices were normal bilaterally and well away from the bladder neck  The bladder had large capacity but was smooth without masses  There was a small smooth stone as well as a small black Greg stone  The small smooth stone was removed through the resectoscope  However, the jackstone was too irregular  Therefore, a holmium laser was utilized with a 365 micron fiber  The arms of the jackstone were removed with the laser and then all pieces were flushed free with an Ellik evacuator  No residual stones remained  The prostate was then resected between the level of the bladder neck and veru circumferentially beginning with the median lobe and then progressing to the left lateral lobe, anterior lobe and right lateral lobe  All obstructing adenoma was resected down to a smooth base  All prostate tissue was irrigated free with an Ellik evacuator  Hemostasis was meticulously achieved with electrocautery  Upon completion, the external sphincter, ureteral orifices and veru were intact  The resectoscope was removed  A 3 way 24 Angolan Diallo catheter was placed with 60 cc of water in the balloon  The catheter irrigated well with clear drainage  The patient tolerated the procedure well and left the operating room in good condition     I was present for the entire procedure    Patient Disposition:  PACU     SIGNATURE: Jessica Chairez MD  DATE: August 20, 2018  TIME: 2:40 PM

## 2018-08-20 NOTE — INTERIM OP NOTE
CYSTOSCOPY; TURP, LITHOTRISPY HOLMIUM LASER of bladder stones  Postoperative Note  PATIENT NAME: Lashaun Huang  : 1945  MRN: [id number]  [facility] OR ROOM 03    Surgery Date: 2018    Preop Diagnosis:  Retention of urine [R33 9]  Enlarged prostate without lower urinary tract symptoms (luts) [N40 0]    Post-Op Diagnosis Codes:     * Retention of urine [R33 9]     * Enlarged prostate without lower urinary tract symptoms (luts) [N40 0]    Procedure(s) (LRB):  CYSTOSCOPY; TURP (N/A)  LITHOTRISPY HOLMIUM LASER of bladder stones    Surgeon(s) and Role:     * Vickie Neewll MD - Primary    Specimens:  ID Type Source Tests Collected by Time Destination   1 : smooth bladder stones for chemical analysis Tissue Urinary Bladder TISSUE EXAM Vickie Newell MD 2018 1305    2 : bladder stones jacks for chemical analysis Tissue Urinary Bladder TISSUE EXAM Vickie Newell MD 2018 1307    3 : prostate tissue Tissue Prostate TISSUE EXAM Vickie Newell MD 2018 1311        Estimated Blood Loss:   * Case end time is documented earlier than case start time so the value cannot be calculated  *    Anesthesia Type:   General     Findings:    BPH withenlarged and obstructing prostate    Small smooth bladder stone and small Greg bladder stone  Complications:   None    SIGNATURE: Vickie Newell MD   DATE: 2018   TIME: 2:14 PM

## 2018-08-20 NOTE — ANESTHESIA PROCEDURE NOTES
Spinal Block    Patient location during procedure: OR  Start time: 8/20/2018 12:20 PM  End time: 8/20/2018 12:40 PM  Staffing  Anesthesiologist: Maximo Castellano  Other anesthesia staff: Herberth Arechiga  Performed: anesthesiologist   Preanesthetic Checklist  Completed: patient identified, site marked, surgical consent, pre-op evaluation, timeout performed, IV checked and risks and benefits discussed  Spinal Block  Patient position: sitting  Prep: Betadine  Patient monitoring: heart rate, cardiac monitor, continuous pulse ox and frequent blood pressure checks  Location: L2-3  Injection technique: single-shot  Needle  Needle type: Quincke   Needle gauge: 25 G  Needle length: 10 cm  Assessment  Sensory level: T10  Events: paresthesia  Injection Assessment:  positive aspiration for clear CSF and negative aspiration for heme  Additional Notes  Mild paresthesia which resolved immediately    Left para median L2-L3

## 2018-08-21 ENCOUNTER — LAB REQUISITION (OUTPATIENT)
Dept: LAB | Facility: HOSPITAL | Age: 73
End: 2018-08-21
Payer: MEDICARE

## 2018-08-21 DIAGNOSIS — R33.9 RETENTION OF URINE: ICD-10-CM

## 2018-08-21 LAB
ANION GAP SERPL CALCULATED.3IONS-SCNC: 6 MMOL/L (ref 4–13)
BUN SERPL-MCNC: 16 MG/DL (ref 7–25)
CALCIUM SERPL-MCNC: 8.8 MG/DL (ref 8.6–10.5)
CHLORIDE SERPL-SCNC: 105 MMOL/L (ref 98–107)
CO2 SERPL-SCNC: 29 MMOL/L (ref 21–31)
CREAT SERPL-MCNC: 0.96 MG/DL (ref 0.7–1.3)
ERYTHROCYTE [DISTWIDTH] IN BLOOD BY AUTOMATED COUNT: 13.3 % (ref 11.5–14.5)
GFR SERPL CREATININE-BSD FRML MDRD: 77 ML/MIN/1.73SQ M
GLUCOSE SERPL-MCNC: 92 MG/DL (ref 65–99)
HCT VFR BLD AUTO: 30.7 % (ref 36.5–49.3)
HGB BLD-MCNC: 10.7 G/DL (ref 14–18)
MCH RBC QN AUTO: 32.8 PG (ref 26–34)
MCHC RBC AUTO-ENTMCNC: 34.8 G/DL (ref 31–37)
MCV RBC AUTO: 94 FL (ref 81–99)
PLATELET # BLD AUTO: 207 THOUSANDS/UL (ref 149–390)
PMV BLD AUTO: 8.4 FL (ref 8.6–11.7)
POTASSIUM SERPL-SCNC: 3.8 MMOL/L (ref 3.5–5.5)
RBC # BLD AUTO: 3.25 MILLION/UL (ref 4.3–5.9)
SODIUM SERPL-SCNC: 140 MMOL/L (ref 134–143)
WBC # BLD AUTO: 7.7 THOUSAND/UL (ref 4.8–10.8)

## 2018-08-21 PROCEDURE — 80048 BASIC METABOLIC PNL TOTAL CA: CPT | Performed by: UROLOGY

## 2018-08-21 PROCEDURE — 85027 COMPLETE CBC AUTOMATED: CPT | Performed by: UROLOGY

## 2018-08-21 RX ADMIN — SERTRALINE HYDROCHLORIDE 50 MG: 50 TABLET ORAL at 08:35

## 2018-08-21 RX ADMIN — MORPHINE SULFATE 15 MG: 15 TABLET ORAL at 11:52

## 2018-08-21 RX ADMIN — NITROFURANTOIN (MONOHYDRATE/MACROCRYSTALS) 100 MG: 75; 25 CAPSULE ORAL at 08:35

## 2018-08-21 RX ADMIN — FINASTERIDE 5 MG: 5 TABLET, FILM COATED ORAL at 08:34

## 2018-08-21 RX ADMIN — CEFAZOLIN SODIUM 1000 MG: 1 SOLUTION INTRAVENOUS at 11:52

## 2018-08-21 RX ADMIN — CEFAZOLIN SODIUM 1000 MG: 1 SOLUTION INTRAVENOUS at 23:30

## 2018-08-21 RX ADMIN — MORPHINE SULFATE 15 MG: 15 TABLET ORAL at 18:16

## 2018-08-21 RX ADMIN — GABAPENTIN 300 MG: 300 CAPSULE ORAL at 22:00

## 2018-08-21 RX ADMIN — CEFAZOLIN SODIUM 1000 MG: 1 SOLUTION INTRAVENOUS at 00:15

## 2018-08-21 RX ADMIN — MORPHINE SULFATE 15 MG: 15 TABLET ORAL at 08:34

## 2018-08-21 RX ADMIN — SODIUM CHLORIDE, POTASSIUM CHLORIDE, SODIUM LACTATE AND CALCIUM CHLORIDE 60 ML/HR: 600; 310; 30; 20 INJECTION, SOLUTION INTRAVENOUS at 05:27

## 2018-08-21 RX ADMIN — MORPHINE SULFATE 15 MG: 15 TABLET ORAL at 22:01

## 2018-08-21 RX ADMIN — NITROFURANTOIN (MONOHYDRATE/MACROCRYSTALS) 100 MG: 75; 25 CAPSULE ORAL at 16:10

## 2018-08-21 NOTE — PLAN OF CARE
DISCHARGE PLANNING     Discharge to home or other facility with appropriate resources Progressing        DISCHARGE PLANNING - CARE MANAGEMENT     Discharge to post-acute care or home with appropriate resources Progressing        GENITOURINARY - ADULT     Maintains or returns to baseline urinary function Progressing     Absence of urinary retention Progressing     Urinary catheter remains patent Progressing        INFECTION - ADULT     Absence or prevention of progression during hospitalization Progressing     Absence of fever/infection during neutropenic period Progressing        Knowledge Deficit     Patient/family/caregiver demonstrates understanding of disease process, treatment plan, medications, and discharge instructions Progressing        PAIN - ADULT     Verbalizes/displays adequate comfort level or baseline comfort level Progressing        Potential for Falls     Patient will remain free of falls Progressing        Prexisting or High Potential for Compromised Skin Integrity     Skin integrity is maintained or improved Progressing        SAFETY ADULT     Maintain or return to baseline ADL function Progressing     Maintain or return mobility status to optimal level Progressing

## 2018-08-21 NOTE — CASE MANAGEMENT
Spoke with patient in his hospital room  Aware of outpatient status as overnight spu  States he expects to return home Wednesday afternoon  Daughter will transport him home  Lives in 1 story home with 3 steps to enter  Uses a cane to ambulate and a walker around home at times  He is a retired , now at home, daughter lives with him  Does drive  Has cantor catheter since April and follows with Dr Jasmine Zapien  Does not have and does not want home care  CM reviewed d/c planning process including the following: identifying help at home, patient preference for d/c planning needs, availability of treatment team to discuss questions or concerns patient and/or family may have regarding understanding medications and recognizing signs and symptoms once discharged  CM also encouraged patient to follow up with all recommended appointments after discharge  Patient advised of importance for patient and family to participate in managing patients medical well being

## 2018-08-21 NOTE — SOCIAL WORK
Case discussed at discharge planning meeting  Plan remains to go home with daughter Florentino Vieira

## 2018-08-21 NOTE — PROGRESS NOTES
Progress Note - Sheila Donis 74 y o  male MRN: [id number]    Unit/Bed#: -01 Encounter: [id number]      Assessment:  Postop day 1 status post TURP  Feels well  Urine is clear on slow continuous irrigation  Low-grade temperature 100 2°  Hemoglobin 10 7  Plan:  Discontinue the CBI  Hand irrigate as necessary  Out of bed to chair  Continue current antibiotics  Hep-Lock IV  Follow temperature  Repeat CBC in the morning  Subjective:   Feels well without complaints  Sitting up in bed eating breakfast well this morning  Objective:     Vitals: Blood pressure 116/66, pulse (!) 49, temperature 100 2 °F (37 9 °C), temperature source Tympanic, resp  rate 16, height 5' 10" (1 778 m), weight 84 4 kg (186 lb), SpO2 96 %  ,Body mass index is 26 69 kg/m²  Intake/Output Summary (Last 24 hours) at 08/21/18 0813  Last data filed at 08/21/18 2232   Gross per 24 hour   Intake             3340 ml   Output             1900 ml   Net             1440 ml       Physical Exam: Male genitalia: normal   Abdomen soft nontender  Extremities nontender  Diallo draining clear urine with slow continuous irrigation  Invasive Devices     Peripheral Intravenous Line            Peripheral IV 08/20/18 Left Forearm less than 1 day          Drain            Continuous Bladder Irrigation -- days    Urethral Catheter Latex; Three way 24 Fr  less than 1 day                Lab, Imaging and other studies: I have personally reviewed pertinent reports      VTE Pharmacologic Prophylaxis: Sequential compression device (Venodyne)   VTE Mechanical Prophylaxis: sequential compression device

## 2018-08-21 NOTE — PLAN OF CARE
Problem: DISCHARGE PLANNING - CARE MANAGEMENT  Goal: Discharge to post-acute care or home with appropriate resources  INTERVENTIONS:  - Conduct assessment to determine patient/family and health care team treatment goals, and need for post-acute services based on payer coverage, community resources, and patient preferences, and barriers to discharge  - Address psychosocial, clinical, and financial barriers to discharge as identified in assessment in conjunction with the patient/family and health care team  - Arrange appropriate level of post-acute services according to patient's   needs and preference and payer coverage in collaboration with the physician and health care team  - Communicate with and update the patient/family, physician, and health care team regarding progress on the discharge plan  - Arrange appropriate transportation to post-acute venues  Denies need for services on discharge   Daughter assists as needed, will transport patient home  Outcome: Progressing

## 2018-08-21 NOTE — PLAN OF CARE
Problem: Nutrition/Hydration-ADULT  Goal: Nutrient/Hydration intake appropriate for improving, restoring or maintaining nutritional needs  Monitor and assess patient's nutrition/hydration status for malnutrition (ex- brittle hair, bruises, dry skin, pale skin and conjunctiva, muscle wasting, smooth red tongue, and disorientation)  Collaborate with interdisciplinary team and initiate plan and interventions as ordered  Monitor patient's weight and dietary intake as ordered or per policy  Utilize nutrition screening tool and intervene per policy  Determine patient's food preferences and provide high-protein, high-caloric foods as appropriate  INTERVENTIONS:  - Monitor oral intake, urinary output, labs, and treatment plans  - Assess nutrition and hydration status and recommend course of action  - Evaluate amount of meals eaten  - Assist patient with eating if necessary   - Allow adequate time for meals  - Recommend/ encourage appropriate diets, oral nutritional supplements, and vitamin/mineral supplements  - Order, calculate, and assess calorie counts as needed  - Recommend, monitor, and adjust tube feedings and TPN/PPN based on assessed needs  - Assess need for intravenous fluids  - Provide specific nutrition/hydration education as appropriate  - Include patient/family/caregiver in decisions related to nutrition  Outcome: Adequate for Discharge  Pt is consuming 100% of meals and reports good appetite  Pt BMI normal for age  Pt has no PMHx that warrants dietary restrictions  Nutrition to sign off

## 2018-08-21 NOTE — SOCIAL WORK
Patient wants rehab for benzo abuse  Per patient , she came to er for medical clearance to go to [facility]  Same called, no record in admissions, they transferred me to someone else and message left  Dr Dhaval Saab aware

## 2018-08-22 ENCOUNTER — APPOINTMENT (OUTPATIENT)
Dept: ULTRASOUND IMAGING | Facility: HOSPITAL | Age: 73
End: 2018-08-22
Payer: MEDICARE

## 2018-08-22 VITALS
WEIGHT: 186 LBS | DIASTOLIC BLOOD PRESSURE: 80 MMHG | HEART RATE: 64 BPM | HEIGHT: 70 IN | TEMPERATURE: 98.6 F | BODY MASS INDEX: 26.63 KG/M2 | RESPIRATION RATE: 16 BRPM | OXYGEN SATURATION: 97 % | SYSTOLIC BLOOD PRESSURE: 128 MMHG

## 2018-08-22 LAB
BASOPHILS # BLD AUTO: 0.1 THOUSANDS/ΜL (ref 0–0.1)
BASOPHILS NFR BLD AUTO: 1 % (ref 0–2)
EOSINOPHIL # BLD AUTO: 0.4 THOUSAND/ΜL (ref 0–0.61)
EOSINOPHIL NFR BLD AUTO: 4 % (ref 0–5)
ERYTHROCYTE [DISTWIDTH] IN BLOOD BY AUTOMATED COUNT: 13.1 % (ref 11.5–14.5)
HCT VFR BLD AUTO: 32 % (ref 36.5–49.3)
HGB BLD-MCNC: 11.2 G/DL (ref 14–18)
LYMPHOCYTES # BLD AUTO: 2.2 THOUSANDS/ΜL (ref 0.6–4.47)
LYMPHOCYTES NFR BLD AUTO: 23 % (ref 21–51)
MCH RBC QN AUTO: 33.3 PG (ref 26–34)
MCHC RBC AUTO-ENTMCNC: 35 G/DL (ref 31–37)
MCV RBC AUTO: 95 FL (ref 81–99)
MONOCYTES # BLD AUTO: 1 THOUSAND/ΜL (ref 0.17–1.22)
MONOCYTES NFR BLD AUTO: 11 % (ref 2–12)
NEUTROPHILS # BLD AUTO: 5.8 THOUSANDS/ΜL (ref 1.4–6.5)
NEUTS SEG NFR BLD AUTO: 61 % (ref 42–75)
NRBC BLD AUTO-RTO: 0 /100 WBCS
PLATELET # BLD AUTO: 220 THOUSANDS/UL (ref 149–390)
PMV BLD AUTO: 8.7 FL (ref 8.6–11.7)
RBC # BLD AUTO: 3.37 MILLION/UL (ref 4.3–5.9)
WBC # BLD AUTO: 9.5 THOUSAND/UL (ref 4.8–10.8)

## 2018-08-22 PROCEDURE — 85025 COMPLETE CBC W/AUTO DIFF WBC: CPT | Performed by: UROLOGY

## 2018-08-22 PROCEDURE — 51798 US URINE CAPACITY MEASURE: CPT

## 2018-08-22 RX ADMIN — CEFAZOLIN: 1 INJECTION, POWDER, FOR SOLUTION INTRAMUSCULAR; INTRAVENOUS at 12:23

## 2018-08-22 RX ADMIN — FINASTERIDE 5 MG: 5 TABLET, FILM COATED ORAL at 08:00

## 2018-08-22 RX ADMIN — MORPHINE SULFATE 15 MG: 15 TABLET ORAL at 08:00

## 2018-08-22 RX ADMIN — MORPHINE SULFATE 15 MG: 15 TABLET ORAL at 12:23

## 2018-08-22 RX ADMIN — NITROFURANTOIN (MONOHYDRATE/MACROCRYSTALS) 100 MG: 75; 25 CAPSULE ORAL at 08:00

## 2018-08-22 RX ADMIN — NITROFURANTOIN (MONOHYDRATE/MACROCRYSTALS) 100 MG: 75; 25 CAPSULE ORAL at 15:34

## 2018-08-22 RX ADMIN — SERTRALINE HYDROCHLORIDE 50 MG: 50 TABLET ORAL at 08:00

## 2018-08-22 NOTE — SOCIAL WORK
Discussed at discharge planning meeting  Visit withpatient in his hospital room  Patient sitting in chair  Diallo now out  Waiting to void  Denies need for home care  Family will transport him home

## 2018-08-22 NOTE — DISCHARGE INSTRUCTIONS
Benign Prostatic Hypertrophy   WHAT YOU NEED TO KNOW:   Benign prostatic hypertrophy (BPH) is a condition that causes your prostate gland to grow larger than normal  The prostate gland is the male sex gland that produces a fluid that is part of semen  It is about the size of a walnut and it is located under the bladder  As the prostate grows, it can squeeze the urethra  This can block urine flow and cause urinary problems  DISCHARGE INSTRUCTIONS:   Medicines:   · Alpha blockers: This medicine relaxes the muscles in your prostate and bladder  It may help you urinate more easily  · 5 alpha reductase inhibitors: These medicines block the production of a hormone that causes the prostate to get larger  It may help slow the growth of the prostate or shrink the prostate  · Take your medicine as directed  Contact your healthcare provider if you think your medicine is not helping or if you have side effects  Tell him or her if you are allergic to any medicine  Keep a list of the medicines, vitamins, and herbs you take  Include the amounts, and when and why you take them  Bring the list or the pill bottles to follow-up visits  Carry your medicine list with you in case of an emergency  Follow up with your healthcare provider as directed:  Write down your questions so you remember to ask them during your visits  Manage BPH:   · Do not let your bladder get too full before you empty it  Urinate when you feel the urge  · Limit alcohol  Do not drink large amounts of any liquid at one time  · Decrease the amount of salt you eat  Examples of salty foods are chips, cured meats, and canned soups  Do not use table salt  · Healthcare providers may tell you not to eat spicy foods such as chilli peppers  This may help you find out if spicy food makes your BPH symptoms worse  · You may have sex if you feel well  Contact your healthcare provider if:   · There is a large amount of blood in your urine  · Your signs and symptoms get worse  · You have a fever  · You have questions or concerns about your condition or care  Seek care immediately if:   · You are unable to urinate  · Your bladder feels very full and painful  © 2017 [employer] Information is for End User's use only and may not be sold, redistributed or otherwise used for commercial purposes  All illustrations and images included in CareNotes® are the copyrighted property of [employer] M [employer]  or [employer]baum  The above information is an  only  It is not intended as medical advice for individual conditions or treatments  Talk to your doctor, nurse or pharmacist before following any medical regimen to see if it is safe and effective for you

## 2018-08-22 NOTE — PROGRESS NOTES
Pt continue to have issues with void, voiding very small amounts unable to measure in urinal  Blood tinge color  PT Schedule for US after 2 PM will inform Rigoberto Whiting of results

## 2018-08-22 NOTE — PLAN OF CARE
Problem: Potential for Falls  Goal: Patient will remain free of falls  INTERVENTIONS:  - Assess patient frequently for physical needs  -  Identify cognitive and physical deficits and behaviors that affect risk of falls    -  [place] fall precautions as indicated by assessment   - Educate patient/family on patient safety including physical limitations  - Instruct patient to call for assistance with activity based on assessment  - Modify environment to reduce risk of injury  - Consider OT/PT consult to assist with strengthening/mobility   Outcome: Progressing

## 2018-08-22 NOTE — PLAN OF CARE
Problem: GENITOURINARY - ADULT  Goal: Urinary catheter remains patent  INTERVENTIONS:  - Assess patency of urinary catheter  - If patient has a chronic cantor, consider changing catheter if non-functioning  - Follow guidelines for intermittent irrigation of non-functioning urinary catheter   Outcome: Progressing

## 2018-08-22 NOTE — PLAN OF CARE
Problem: GENITOURINARY - ADULT  Goal: Absence of urinary retention  INTERVENTIONS:  - Assess patient's ability to void and empty bladder  - Monitor I/O  - Bladder scan as needed  - Discuss with physician/AP medications to alleviate retention as needed  - Discuss catheterization for long term situations as appropriate   Outcome: Progressing

## 2018-08-22 NOTE — CASE MANAGEMENT
Initial Clinical Review    Age/Sex: 74 y o  male    Surgery Date: 8/20    Procedure: CYSTOSCOPY; TURP (N/A)  LITHOTRISPY HOLMIUM LASER of bladder stones (N/A)    Anesthesia: general     Admission Orders: Date/Time/Statement: 8/20/18 1543 Outpatient no charge bed        Vital Signs: /60 (BP Location: Left arm)   Pulse 60   Temp 97 9 °F (36 6 °C) (Tympanic)   Resp 18   Ht 5' 10" (1 778 m)   Wt 84 4 kg (186 lb)   SpO2 97%   BMI 26 69 kg/m²      08/21/18 1115  100 2 °F   49  --  116/96  96 %   --    08/21/18 0737  100 2 °F   49  16  116/66  96 %             Diet:        Diet Orders            Start     Ordered    08/20/18 1544  Diet Regular; Regular House  Diet effective now     Question Answer Comment   Diet Type Regular    Regular Regular House    RD to adjust diet per protocol? No        08/20/18 1543          Mobility: OOB     DVT Prophylaxis: SCD's    Pain Control:   Pain Medications             gabapentin (NEURONTIN) 300 mg capsule Take 300 mg by mouth daily at bedtime      morphine (MSIR) 15 mg tablet Take 15 mg by mouth 4 (four) times a day      sertraline (ZOLOFT) 50 mg tablet Take 50 mg by mouth daily          8/22 H&H 11 2/32 0 (from 11 9/35 0)     Temp 100 2 yesterday  Afebrile today  Patient will be discharged home this afternoon

## 2018-08-22 NOTE — NURSING NOTE
PT DC home today, IV removed, Dc instructions reviewed and given to PT  PT instructed to continue to take all meds as prescibed and to follow up with all MD appointments, pt verbalized understanding

## 2018-08-22 NOTE — PLAN OF CARE
DISCHARGE PLANNING     Discharge to home or other facility with appropriate resources Progressing        DISCHARGE PLANNING - CARE MANAGEMENT     Discharge to post-acute care or home with appropriate resources Progressing        GENITOURINARY - ADULT     Maintains or returns to baseline urinary function Progressing     Absence of urinary retention Progressing     Urinary catheter remains patent Progressing        INFECTION - ADULT     Absence or prevention of progression during hospitalization Progressing        Knowledge Deficit     Patient/family/caregiver demonstrates understanding of disease process, treatment plan, medications, and discharge instructions Progressing        Nutrition/Hydration-ADULT     Nutrient/Hydration intake appropriate for improving, restoring or maintaining nutritional needs Progressing        PAIN - ADULT     Verbalizes/displays adequate comfort level or baseline comfort level Progressing        Potential for Falls     Patient will remain free of falls Progressing        Prexisting or High Potential for Compromised Skin Integrity     Skin integrity is maintained or improved Progressing        SAFETY ADULT     Maintain or return to baseline ADL function Progressing     Maintain or return mobility status to optimal level Progressing

## 2018-08-22 NOTE — PLAN OF CARE
Problem: GENITOURINARY - ADULT  Goal: Maintains or returns to baseline urinary function  INTERVENTIONS:  - Assess urinary function  - Encourage oral fluids to ensure adequate hydration  - Administer IV fluids as ordered to ensure adequate hydration  - Administer ordered medications as needed  - Offer frequent toileting  - Follow urinary retention protocol if ordered   Outcome: Progressing  CBI  DISCONTINUED  TODAY  AND  IS  ONLY  ALMONTE  TO  STRAIGHT  DRAINAGE, WHICH IS CLEAR , WITH ADEQ  AMTS

## 2018-08-28 LAB
CA PHOS MFR STONE: 10 %
CA PHOS MFR STONE: 5 %
CALCIUM OXALATE DIHYDRATE MFR STONE IR: 10 %
CALCIUM OXALATE DIHYDRATE MFR STONE IR: 20 %
COLOR STONE: NORMAL
COLOR STONE: NORMAL
COM MFR STONE: 75 %
COM MFR STONE: 80 %
COMMENT-STONE3: NORMAL
COMMENT-STONE3: NORMAL
COMPOSITION: NORMAL
COMPOSITION: NORMAL
LABORATORY COMMENT REPORT: NORMAL
LABORATORY COMMENT REPORT: NORMAL
NIDUS STONE QL: NORMAL
NIDUS STONE QL: NORMAL
PHOTO: NORMAL
PHOTO: NORMAL
SIZE STONE: NORMAL MM
SIZE STONE: NORMAL MM
STONE ANALYSIS-IMP: NORMAL
WT STONE: 269.5 MG
WT STONE: 402.1 MG

## 2018-09-08 ENCOUNTER — OFFICE VISIT (OUTPATIENT)
Dept: URGENT CARE | Facility: CLINIC | Age: 73
End: 2018-09-08
Payer: MEDICARE

## 2018-09-08 ENCOUNTER — APPOINTMENT (OUTPATIENT)
Dept: RADIOLOGY | Facility: CLINIC | Age: 73
End: 2018-09-08
Payer: MEDICARE

## 2018-09-08 VITALS
DIASTOLIC BLOOD PRESSURE: 72 MMHG | TEMPERATURE: 99.7 F | OXYGEN SATURATION: 97 % | SYSTOLIC BLOOD PRESSURE: 135 MMHG | RESPIRATION RATE: 16 BRPM | HEART RATE: 77 BPM

## 2018-09-08 DIAGNOSIS — S62.174A CLOSED NONDISPLACED FRACTURE OF TRAPEZIUM OF RIGHT WRIST, INITIAL ENCOUNTER: Primary | ICD-10-CM

## 2018-09-08 DIAGNOSIS — S69.91XA INJURY OF RIGHT THUMB, INITIAL ENCOUNTER: ICD-10-CM

## 2018-09-08 PROCEDURE — 73130 X-RAY EXAM OF HAND: CPT

## 2018-09-08 PROCEDURE — 99213 OFFICE O/P EST LOW 20 MIN: CPT | Performed by: PHYSICIAN ASSISTANT

## 2018-09-08 PROCEDURE — G0463 HOSPITAL OUTPT CLINIC VISIT: HCPCS | Performed by: PHYSICIAN ASSISTANT

## 2018-09-08 PROCEDURE — 73110 X-RAY EXAM OF WRIST: CPT

## 2018-09-08 RX ORDER — ASPIRIN 325 MG
325 TABLET ORAL DAILY
COMMUNITY
End: 2019-09-07 | Stop reason: HOSPADM

## 2018-09-08 NOTE — PATIENT INSTRUCTIONS
Rest, ice, elevate the affected limb  Take over-the-counter pain medication for symptom relief  Follow up with Orthopedics this week  Call Shilo Caputo to schedule an appointment: [phone]  Go to ER if new or worsening symptoms occur  Hand Fracture   WHAT YOU NEED TO KNOW:   A hand fracture is a break in one of the bones in your hand  This includes the bones in the wrist and fingers, and those that connect the wrist to the fingers  A hand fracture may be caused by twisting or bending the hand in the wrong way  It may also be caused by a fall, a crush injury, or a sports injury  DISCHARGE INSTRUCTIONS:   Return to the emergency department if:   · Your have severe pain that does not get better, even with pain medicine  · Your injured hand or forearm is cold, numb, or pale  · Your cast or splint gets wet, damaged, or comes off  · Your arm feels warm, tender, and painful  It may look swollen and red  Contact your healthcare provider if:   · You have new sores around your brace, cast, or splint  · You notice a bad smell coming from under your cast     · You have questions or concerns about your condition or care  Medicines:   · NSAIDs , such as ibuprofen, help decrease swelling, pain, and fever  This medicine is available with or without a doctor's order  NSAIDs can cause stomach bleeding or kidney problems in certain people  If you take blood thinner medicine, always ask your healthcare provider if NSAIDs are safe for you  Always read the medicine label and follow directions  · Acetaminophen  decreases pain and fever  It is available without a doctor's order  Ask how much to take and how often to take it  Follow directions  Acetaminophen can cause liver damage if not taken correctly  · Prescription pain medicine  may be given  Ask how to take this medicine safely  · Take your medicine as directed    Contact your healthcare provider if you think your medicine is not helping or if you have side effects  Tell him or her if you are allergic to any medicine  Keep a list of the medicines, vitamins, and herbs you take  Include the amounts, and when and why you take them  Bring the list or the pill bottles to follow-up visits  Carry your medicine list with you in case of an emergency  Follow up with your healthcare provider or hand specialist as directed: You may need to return to have your cast, splint, or stitches removed  Write down your questions so you remember to ask them during your visits  Manage your symptoms:   · Wear your splint as directed  Do not remove the splint until you follow up with your healthcare provider or hand specialist      · Apply ice  on your hand for 15 to 20 minutes every hour or as directed  Use an ice pack, or put crushed ice in a plastic bag  Cover it with a towel before you apply it to your skin  Ice helps prevent tissue damage and decreases swelling and pain  · Elevate  your hand above the level of his or her heart as often as you can  This will help decrease swelling and pain  Prop your hand on pillows or blankets to keep it elevated comfortably  · Go to physical therapy as directed  A physical therapist teaches you exercises to help improve movement and strength and to decrease pain  Bathing with a cast or splint:  Do not let your cast or splint get wet  Before bathing, cover the cast or splint with a plastic bag  Tape the bag to your skin above the cast or splint to seal out the water  Keep your hand out of the water in case the bag leaks  Follow instructions about when it is okay to take a bath or shower  Cast or splint care:   · Check the skin around the cast or splint for redness or sores every day  · Do not push down or lean on any part of the cast or splint because it may break  · Do not use a sharp or pointed object to scratch your skin under the cast or splint  Activity:  You may not be able to drive for up to 2 weeks   Ask when it is safe for you to drive and return to other activities such as sports  © 2017 [employer] Information is for End User's use only and may not be sold, redistributed or otherwise used for commercial purposes  All illustrations and images included in CareNotes® are the copyrighted property of [employer]  or [employer] Angel Rosenbaum  The above information is an  only  It is not intended as medical advice for individual conditions or treatments  Talk to your doctor, nurse or pharmacist before following any medical regimen to see if it is safe and effective for you

## 2018-09-08 NOTE — PROGRESS NOTES
[facility]        NAME: Sher Higginbotham is a 74 y o  male  : 1945    MRN: [id number]  DATE: 2018  TIME: 11:33 AM    Assessment and Plan   Closed nondisplaced fracture of trapezium of right wrist, initial encounter [S62 174A]  1  Closed nondisplaced fracture of trapezium of right wrist, initial encounter  XR hand 3+ vw right    XR wrist 3+ vw right    Ambulatory referral to Orthopedic Surgery     Thumb spica applied  Referred to Ortho  Patient agrees to follow up and understands the importance of follow-up  Patient Instructions       Rest, ice, elevate the affected limb  Take over-the-counter pain medication for symptom relief  Follow up with Orthopedics this week  Call Norma Hill to schedule an appointment: [phone]  Go to ER if new or worsening symptoms occur  Chief Complaint     Chief Complaint   Patient presents with    Hand Pain     right hand around thumb  P/s he "jammed it last week on a banister coming downstairs"         History of Present Illness       Hand Pain    Incident onset: One week ago patient jammed right thumb on the banister  Patient has been resting and icing it but swelling and pain have gotten worse  The incident occurred at home  The injury mechanism was a direct blow  Pain location: Left thumb  First metacarpal  The quality of the pain is described as aching  The pain does not radiate  The pain is at a severity of 7/10  The pain is moderate  The pain has been worsening since the incident  Pertinent negatives include no chest pain, muscle weakness, numbness or tingling  The symptoms are aggravated by movement, lifting and palpation  He has tried ice for the symptoms  The treatment provided mild relief  Review of Systems   Review of Systems   Constitutional: Negative  HENT: Negative  Eyes: Negative  Respiratory: Negative  Cardiovascular: Negative  Negative for chest pain and palpitations     Gastrointestinal: Negative  Endocrine: Negative  Genitourinary: Negative  Negative for dysuria  Musculoskeletal: Negative for back pain and neck pain  Skin: Negative  Negative for color change, rash and wound  Neurological: Negative for dizziness, tingling, weakness, light-headedness, numbness and headaches  Hematological: Negative  Psychiatric/Behavioral: Negative            Current Medications       Current Outpatient Prescriptions:     aspirin 325 mg tablet, Take 325 mg by mouth daily, Disp: , Rfl:     finasteride (PROSCAR) 5 mg tablet, Take 5 mg by mouth daily, Disp: , Rfl:     gabapentin (NEURONTIN) 300 mg capsule, Take 300 mg by mouth daily at bedtime  , Disp: , Rfl: 0    morphine (MSIR) 15 mg tablet, Take 15 mg by mouth 4 (four) times a day  , Disp: , Rfl: 0    sertraline (ZOLOFT) 50 mg tablet, Take 50 mg by mouth daily  , Disp: , Rfl: 0    nitrofurantoin (MACRODANTIN) 100 mg capsule, Take 100 mg by mouth 2 (two) times a day, Disp: , Rfl:     Current Allergies     Allergies as of 09/08/2018    (No Known Allergies)            The following portions of the patient's history were reviewed and updated as appropriate: allergies, current medications, past family history, past medical history, past social history, past surgical history and problem list      Past Medical History:   Diagnosis Date    Depression     Diallo catheter in place     Hypertension     Prostate enlargement     Urinary tract infection        Past Surgical History:   Procedure Laterality Date    ARTHROSCOPY KNEE      BACK SURGERY      L 4 or 5    CATARACT EXTRACTION Bilateral     CYSTOSCOPY W/ LASER LITHOTRIPSY N/A 8/20/2018    Procedure: LITHOTRISPY HOLMIUM LASER of bladder stones;  Surgeon: Saintclair Collier, MD;  Location: [address] [place] [address];  Service: Urology    AK TRANSURETHRAL ELEC-SURG PROSTATECTOM N/A 8/20/2018    Procedure: CYSTOSCOPY; TURP;  Surgeon: Saintclair Collier, MD;  Location: [address] [place] [address];  Service: Urology       No family history on file       Medications have been verified  Objective   /72   Pulse 77   Temp 99 7 °F (37 6 °C) (Tympanic)   Resp 16   SpO2 97%        Physical Exam     Physical Exam   Constitutional: He appears well-developed and well-nourished  No distress  HENT:   Head: Normocephalic and atraumatic  Cardiovascular: Normal rate, regular rhythm, normal heart sounds and intact distal pulses  Pulmonary/Chest: Effort normal and breath sounds normal  No respiratory distress  He has no wheezes  He has no rales  Musculoskeletal:        Right hand: He exhibits tenderness and swelling  He exhibits normal capillary refill and no deformity  Normal sensation noted  Normal strength noted  Hands:  Skin: Skin is warm  No rash noted  He is not diaphoretic  Nursing note and vitals reviewed

## 2018-09-08 NOTE — DISCHARGE SUMMARY
Discharge Summary - Zev Hung 74 y o  male MRN: [id number]    Unit/Bed#: -01 Encounter: [id number]    Admission Date:     Admitting Diagnosis: Retention of urine [R33 9]  Enlarged prostate without lower urinary tract symptoms (luts) [N40 0]    HPI:   The patient presented with urinary retention which was refractory to medical management  He also had 2 small bladder stones discovered on outpatient cystoscopy  He has been managed with an indwelling Diallo catheter  Options and risks were discussed and he had elected to undergo trans urethral resection of the prostate with removal of the small bladder stones  Procedures Performed: No orders of the defined types were placed in this encounter  Summary of Hospital Course: The patient was taken to the operating room where he underwent cystoscopy with trans urethral resection of the prostate and removal of 1 small bladder stone and laser lithotripsy of another small bladder stone  Postoperatively he was managed with an indwelling Diallo catheter on continuous bladder irrigation  The irrigation was stopped and the urine remained clear  He did have a low-grade fever of 100 2  He was maintained on Ancef and Levaquin  He became afebrile  Laboratory studies were stable  The Diallo catheter was removed and he voided well with clear urine  Ultrasound showed very minimal residual urine  He was then discharged home in good condition with complete instructions detail  He was to continue his Macrodantin as an outpatient  Significant Findings, Care, Treatment and Services Provided:   Urinary retention secondary to BPH, bladder stones, cystoscopy with trans urethral resection of the prostate, removal of bladder stones and laser lithotripsy      Complications:   None    Discharge Diagnosis:   Urinary retention, BPH, bladder stones    Resolved Problems  Date Reviewed: 8/20/2018    None          Condition at Discharge: good         Discharge instructions/Information to patient and family:   See after visit summary for information provided to patient and family  Provisions for Follow-Up Care:  See after visit summary for information related to follow-up care and any pertinent home health orders  PCP: Bashir Cristina DO    Disposition: Home    Planned Readmission: No    Discharge Statement   I spent 30 minutes discharging the patient  This time was spent on the day of discharge  I had direct contact with the patient on the day of discharge  Additional documentation is required if more than 30 minutes were spent on discharge  Discharge Medications:  See after visit summary for reconciled discharge medications provided to patient and family

## 2018-09-12 ENCOUNTER — LAB REQUISITION (OUTPATIENT)
Dept: LAB | Facility: HOSPITAL | Age: 73
End: 2018-09-12
Payer: MEDICARE

## 2018-09-12 DIAGNOSIS — N39.0 URINARY TRACT INFECTION: ICD-10-CM

## 2018-09-12 LAB
BACTERIA UR QL AUTO: ABNORMAL /HPF
BILIRUB UR QL STRIP: NEGATIVE
CAOX CRY URNS QL MICRO: ABNORMAL /HPF
CLARITY UR: ABNORMAL
COLOR UR: YELLOW
GLUCOSE UR STRIP-MCNC: NEGATIVE MG/DL
HGB UR QL STRIP.AUTO: ABNORMAL
KETONES UR STRIP-MCNC: NEGATIVE MG/DL
LEUKOCYTE ESTERASE UR QL STRIP: ABNORMAL
NITRITE UR QL STRIP: NEGATIVE
NON-SQ EPI CELLS URNS QL MICRO: ABNORMAL /HPF
PH UR STRIP.AUTO: 5.5 [PH] (ref 5–8)
PROT UR STRIP-MCNC: ABNORMAL MG/DL
RBC #/AREA URNS AUTO: ABNORMAL /HPF
SP GR UR STRIP.AUTO: 1.02 (ref 1–1.03)
UROBILINOGEN UR QL STRIP.AUTO: 0.2 E.U./DL
WBC #/AREA URNS AUTO: ABNORMAL /HPF

## 2018-09-12 PROCEDURE — 81003 URINALYSIS AUTO W/O SCOPE: CPT | Performed by: UROLOGY

## 2018-09-12 PROCEDURE — 87086 URINE CULTURE/COLONY COUNT: CPT | Performed by: UROLOGY

## 2018-09-12 PROCEDURE — 81001 URINALYSIS AUTO W/SCOPE: CPT | Performed by: UROLOGY

## 2018-09-13 LAB — BACTERIA UR CULT: NORMAL

## 2018-09-14 ENCOUNTER — OFFICE VISIT (OUTPATIENT)
Dept: OBGYN CLINIC | Facility: CLINIC | Age: 73
End: 2018-09-14
Payer: MEDICARE

## 2018-09-14 VITALS
WEIGHT: 175 LBS | DIASTOLIC BLOOD PRESSURE: 70 MMHG | BODY MASS INDEX: 25.92 KG/M2 | HEIGHT: 69 IN | SYSTOLIC BLOOD PRESSURE: 120 MMHG | HEART RATE: 76 BPM

## 2018-09-14 DIAGNOSIS — M18.11 ARTHRITIS OF CARPOMETACARPAL (CMC) JOINT OF RIGHT THUMB: ICD-10-CM

## 2018-09-14 DIAGNOSIS — S63.641A SPRAIN OF METACARPOPHALANGEAL JOINT OF RIGHT THUMB, INITIAL ENCOUNTER: Primary | ICD-10-CM

## 2018-09-14 PROCEDURE — 99204 OFFICE O/P NEW MOD 45 MIN: CPT | Performed by: FAMILY MEDICINE

## 2018-09-14 RX ORDER — NAPROXEN 500 MG/1
500 TABLET ORAL 2 TIMES DAILY WITH MEALS
Qty: 15 TABLET | Refills: 0 | Status: ON HOLD | OUTPATIENT
Start: 2018-09-14 | End: 2019-08-16 | Stop reason: CLARIF

## 2018-09-14 NOTE — PROGRESS NOTES
Assessment/Plan:  Assessment/Plan   Diagnoses and all orders for this visit:    Sprain of metacarpophalangeal joint of right thumb, initial encounter  -     naproxen (NAPROSYN) 500 mg tablet; Take 1 tablet (500 mg total) by mouth 2 (two) times a day with meals    Arthritis of carpometacarpal (CMC) joint of right thumb      67-year-old right-hand-dominant male with right thumb pain after injury 3 weeks ago  Discussed with patient physical exam, radiographs, impression, and plan  X-rays of the right hand and wrist are noted for significant CMC arthritis of the right thumb and chronic deformity of the head of the 2nd metacarpal   His physical exam is noted for tenderness along the dorsal and ulnar aspect of the MCP joint and there is no reproducible pain with varus and valgus testing of the MCP joint  There is no CMC tenderness or pain with CMC grind  Clinical impression is sprain of the right thumb  I recommend that he continue with wearing thumb spica brace intermittently, particularly when outdoors, but when inside he may remove the splint  He is to take naproxen 500 mg twice daily with food consistently for 7 days and take tumeric supplement once daily  He will follow up with me in 4 weeks at which point he will be re-evaluated  Subjective:   Patient ID: Bettie Sites is a 74 y o  male  Chief Complaint   Patient presents with    Right Hand - Pain         67-year-old right-hand-dominant male presents for evaluation of right thumb pain of 3 weeks duration  While walking outside his home he jammed his thumb with axial load on the railing  He immediately had pain that was described as sudden in onset, sharp, localized to the base of the thumb, radiating to the distal aspect of the thumb, associated swelling, and worse with movement of the thumb  He iced the thumb and took Advil intermittently which he states would help with the swelling    Pain persisted so he presented to Urgent Care 6 days ago at which point x-rays are noted for significant CMC joint arthritis of the right thumb and old deformity of the head of the 2nd metacarpal  He was given thumb spica splint and advised to follow up with orthopedic care  Today reports still having pain with movement thumb and swelling, which is improved with ice and Advil  He does report history of repetitive injury to the right hand with repeatedly punching the wall after watching football games  Hand Injury   This is a new problem  The current episode started 1 to 4 weeks ago  The problem occurs constantly  The problem has been gradually improving  Associated symptoms include arthralgias and joint swelling  Pertinent negatives include no abdominal pain, chest pain, chills, fever, numbness, rash, sore throat or weakness  Exacerbated by: Thumb use, gripping  He has tried rest, NSAIDs, ice and immobilization for the symptoms  The treatment provided mild relief  The following portions of the patient's history were reviewed and updated as appropriate: He  has a past medical history of Depression; Diallo catheter in place; Hypertension; Prostate enlargement; and Urinary tract infection  He  has a past surgical history that includes Cataract extraction (Bilateral); ARTHROSCOPY KNEE; Back surgery; pr transurethral elec-surg prostatectom (N/A, 8/20/2018); and Cystoscopy w/ laser lithotripsy (N/A, 8/20/2018)  His family history is not on file  He  reports that he has quit smoking  He has never used smokeless tobacco  He reports that he does not drink alcohol or use drugs  He has No Known Allergies       Review of Systems   Constitutional: Negative for chills and fever  HENT: Negative for sore throat  Eyes: Negative for visual disturbance  Respiratory: Negative for shortness of breath  Cardiovascular: Negative for chest pain  Gastrointestinal: Negative for abdominal pain  Genitourinary: Negative for flank pain     Musculoskeletal: Positive for arthralgias and joint swelling  Skin: Negative for rash and wound  Neurological: Negative for weakness and numbness  Hematological: Does not bruise/bleed easily  Psychiatric/Behavioral: Negative for self-injury  Objective:  Vitals:    09/14/18 0821   BP: 120/70   Pulse: 76   Weight: 79 4 kg (175 lb)   Height: 5' 9" (1 753 m)     Right Hand Exam     Muscle Strength   The patient has normal right wrist strength  Tests   Sampson Bodily: negative          Observations     Additional Observation Details    Right hand  -generalized swelling dorsal aspect of the hand    Tenderness     Right Wrist/Hand   No tenderness in the first dorsal compartment, carpometacarpal joint, scaphoid, lunate and trapezium  Additional Tenderness Details   Right thumb  -dorsal and ulnar aspects of MCP joint    Active Range of Motion     Right Wrist   Wrist flexion: 30 degrees   Wrist extension: 60 degrees     Additional Active Range of Motion Details   Right hand  -normal range of motion of fingers    Strength/Myotome Testing     Right Wrist/Hand   Normal wrist strength    Additional Strength Details    Right hand  -generalized swelling dorsal aspect of the hand    Tests     Right Wrist/Hand   Negative CMC grind, Finkelstein's, RCL varus stress and UCL valgus stress  Physical Exam   Constitutional: He is oriented to person, place, and time  He appears well-developed  No distress  HENT:   Head: Normocephalic and atraumatic  Eyes: Conjunctivae are normal    Neck: No tracheal deviation present  Cardiovascular: Normal rate  Pulmonary/Chest: Effort normal  No respiratory distress  Abdominal: He exhibits no distension  Neurological: He is alert and oriented to person, place, and time  Skin: Skin is warm and dry  Psychiatric: He has a normal mood and affect  His behavior is normal    Nursing note and vitals reviewed        I have personally reviewed pertinent films in PACS and my interpretation is Significant CMC joint arthritis of the right thumb, old deformity of the head of the 2nd metatarsal

## 2018-10-05 ENCOUNTER — OFFICE VISIT (OUTPATIENT)
Dept: OBGYN CLINIC | Facility: CLINIC | Age: 73
End: 2018-10-05
Payer: MEDICARE

## 2018-10-05 VITALS
SYSTOLIC BLOOD PRESSURE: 125 MMHG | HEART RATE: 89 BPM | HEIGHT: 69 IN | DIASTOLIC BLOOD PRESSURE: 75 MMHG | BODY MASS INDEX: 25.62 KG/M2 | WEIGHT: 173 LBS

## 2018-10-05 DIAGNOSIS — M18.11 ARTHRITIS OF CARPOMETACARPAL (CMC) JOINT OF RIGHT THUMB: Primary | ICD-10-CM

## 2018-10-05 DIAGNOSIS — M19.031 ARTHRITIS OF WRIST, RIGHT: ICD-10-CM

## 2018-10-05 PROCEDURE — 20600 DRAIN/INJ JOINT/BURSA W/O US: CPT | Performed by: FAMILY MEDICINE

## 2018-10-05 PROCEDURE — 99214 OFFICE O/P EST MOD 30 MIN: CPT | Performed by: FAMILY MEDICINE

## 2018-10-05 RX ORDER — NAPROXEN 500 MG/1
TABLET ORAL
COMMUNITY
End: 2019-01-11 | Stop reason: SDUPTHER

## 2018-10-05 RX ORDER — MELOXICAM 15 MG/1
15 TABLET ORAL DAILY
Qty: 30 TABLET | Refills: 2 | Status: ON HOLD | OUTPATIENT
Start: 2018-10-05 | End: 2019-08-16 | Stop reason: CLARIF

## 2018-10-05 RX ORDER — TRIAMCINOLONE ACETONIDE 40 MG/ML
20 INJECTION, SUSPENSION INTRA-ARTICULAR; INTRAMUSCULAR
Status: COMPLETED | OUTPATIENT
Start: 2018-10-05 | End: 2018-10-05

## 2018-10-05 RX ORDER — BUPIVACAINE HYDROCHLORIDE 2.5 MG/ML
1 INJECTION, SOLUTION INFILTRATION; PERINEURAL
Status: COMPLETED | OUTPATIENT
Start: 2018-10-05 | End: 2018-10-05

## 2018-10-05 RX ORDER — BUPIVACAINE HYDROCHLORIDE 2.5 MG/ML
0.5 INJECTION, SOLUTION INFILTRATION; PERINEURAL
Status: COMPLETED | OUTPATIENT
Start: 2018-10-05 | End: 2018-10-05

## 2018-10-05 RX ADMIN — BUPIVACAINE HYDROCHLORIDE 0.5 ML: 2.5 INJECTION, SOLUTION INFILTRATION; PERINEURAL at 09:19

## 2018-10-05 RX ADMIN — BUPIVACAINE HYDROCHLORIDE 1 ML: 2.5 INJECTION, SOLUTION INFILTRATION; PERINEURAL at 09:19

## 2018-10-05 RX ADMIN — TRIAMCINOLONE ACETONIDE 20 MG: 40 INJECTION, SUSPENSION INTRA-ARTICULAR; INTRAMUSCULAR at 09:19

## 2018-10-05 NOTE — PROGRESS NOTES
Assessment/Plan:  Assessment/Plan   Diagnoses and all orders for this visit:    Arthritis of carpometacarpal [place]) joint of right thumb  -     Small joint arthrocentesis  -     meloxicam (MOBIC) 15 mg tablet; Take 1 tablet (15 mg total) by mouth daily  -     Turmeric Curcumin 500 MG CAPS; Take 1 capsule (500 mg total) by mouth 2 (two) times a day    Arthritis of wrist, right  -     meloxicam (MOBIC) 15 mg tablet; Take 1 tablet (15 mg total) by mouth daily  -     Turmeric Curcumin 500 MG CAPS; Take 1 capsule (500 mg total) by mouth 2 (two) times a day    Other orders  -     naproxen (NAPROSYN) 500 mg tablet; naproxen 500 mg tablet  -     Cancel: Hand/upper extremity injection        47-year-old right-hand-dominant male with persistent right thumb pain  Discussed with patient physical exam, review of imaging, impression, and plan  X-rays noted for significant CMC arthritis of the right thumb, with also radiocarpal arthritis and chondrocalcinosis of the TFCC  He is noted to have tenderness of the [facility] joint, radiocarpal joint, and limited range of motion with extension to 20° and normal flexion  I discussed treatment option of corticosteroid injection to which he agreed  I administered mixture of 0 5 cc 0 25% bupivacaine and 0 5 cc Kenalog to the right thumb CMC joint without complication, and upon reassessment demonstrated range of motion of the [facility] joint of thumb without pain  He has [facility] brace at home, and I recommend he discontinue thumb spica splint and resume wearing CMC brace  He is to start taking meloxicam 15 mg once daily with food, start taking tumeric 500 mg twice daily  He will follow up with me in 3 months in this regard  Subjective:   Patient ID: Selene Garcia is a 74 y o  male  Chief Complaint   Patient presents with    Right Hand - Pain       47-year-old right-hand-dominant male following up for right thumb pain from injury at home 6 weeks ago    He was last seen 3 weeks ago at which point clinical impression was sprain of the MCP joint of the thumb and was also assessed to have Aia 16 joint arthritis of the right thumb  He was advised to continue with wearing thumb spica splint and was prescribed course of naproxen  Today reports still having pain at the base of the right thumb described as constant, achy and sometimes sharp, radiates from the base of the thumb to the distal aspect, worse with movement of the thumb and gripping activities, and improved with rest and immobilization  He completed course naproxen as directed and has been wearing thumb spica splint  Hand Pain   This is a new problem  The current episode started more than 1 month ago  The problem occurs constantly  The problem has been unchanged  Associated symptoms include arthralgias and joint swelling  Pertinent negatives include no numbness or weakness  Exacerbated by: Thumb use, hand gripping  He has tried rest, NSAIDs and immobilization for the symptoms  The treatment provided no relief  Review of Systems   Musculoskeletal: Positive for arthralgias and joint swelling  Neurological: Negative for weakness and numbness  Objective:  Vitals:    10/05/18 0849   BP: 125/75   Pulse: 89   Weight: 78 5 kg (173 lb)   Height: 5' 9" (1 753 m)     Right Hand Exam     Muscle Strength   Wrist Extension: 5/5   Wrist Flexion: 5/5           Observations     Additional Observation Details  Right thumb  -CMC joint swelling    Tenderness     Right Wrist/Hand   Tenderness in the first dorsal compartment and carpometacarpal joint (Thumb)  No tenderness in the scaphoid and lunate       Additional Tenderness Details  Right wrist  -radiocarpal joint    Active Range of Motion     Right Wrist   Wrist flexion: WFL and with pain  Wrist extension: 20 degrees with pain    Additional Active Range of Motion Details  Normal range of motion right hand of fingers    Strength/Myotome Testing     Right Wrist/Hand   Wrist extension: 5  Wrist flexion: 5     (2nd hand position)     Trial 1: 5    Additional Strength Details  Right thumb  -CMC joint swelling    Tests     Right Wrist/Hand   Positive CMC grind  Physical Exam   Constitutional: He is oriented to person, place, and time  He appears well-developed  No distress  HENT:   Head: Normocephalic and atraumatic  Eyes: Conjunctivae are normal    Neck: No tracheal deviation present  Cardiovascular: Normal rate  Pulmonary/Chest: Effort normal  No respiratory distress  Abdominal: He exhibits no distension  Musculoskeletal: He exhibits tenderness  He exhibits no edema or deformity  Decreased range of motion of right wrist   Neurological: He is alert and oriented to person, place, and time  He exhibits normal muscle tone  Coordination normal    Skin: Skin is warm and dry  No rash noted  No pallor  Psychiatric: He has a normal mood and affect  His behavior is normal  Thought content normal    Nursing note and vitals reviewed  I have personally reviewed pertinent films in PACS and my interpretation is Significant arthritis of the right thumb CMC joint      Small joint arthrocentesis  Date/Time: 10/5/2018 9:19 AM  Consent given by: patient  Site marked: site marked  Timeout: Immediately prior to procedure a time out was called to verify the correct patient, procedure, equipment, support staff and site/side marked as required   Supporting Documentation  Indications: pain and joint swelling   Procedure Details  Location: thumb - R thumb CMC  Preparation: Patient was prepped and draped in the usual sterile fashion  Needle size: 25 G  Ultrasound guidance: no  Approach: dorsal  Medications administered: 0 5 mL bupivacaine 0 25 %; 1 mL bupivacaine 0 25 %; 20 mg triamcinolone acetonide 40 mg/mL    Patient tolerance: patient tolerated the procedure well with no immediate complications  Dressing:  Sterile dressing applied

## 2018-12-13 ENCOUNTER — OFFICE VISIT (OUTPATIENT)
Dept: URGENT CARE | Facility: CLINIC | Age: 73
End: 2018-12-13
Payer: MEDICARE

## 2018-12-13 ENCOUNTER — APPOINTMENT (OUTPATIENT)
Dept: RADIOLOGY | Facility: CLINIC | Age: 73
End: 2018-12-13
Payer: MEDICARE

## 2018-12-13 VITALS
HEART RATE: 92 BPM | RESPIRATION RATE: 16 BRPM | TEMPERATURE: 99.6 F | SYSTOLIC BLOOD PRESSURE: 134 MMHG | OXYGEN SATURATION: 97 % | DIASTOLIC BLOOD PRESSURE: 92 MMHG

## 2018-12-13 DIAGNOSIS — R07.81 RIB PAIN ON RIGHT SIDE: ICD-10-CM

## 2018-12-13 DIAGNOSIS — S20.211A CONTUSION OF RIB ON RIGHT SIDE, INITIAL ENCOUNTER: Primary | ICD-10-CM

## 2018-12-13 PROCEDURE — 71101 X-RAY EXAM UNILAT RIBS/CHEST: CPT

## 2018-12-13 PROCEDURE — G0463 HOSPITAL OUTPT CLINIC VISIT: HCPCS | Performed by: PHYSICIAN ASSISTANT

## 2018-12-13 PROCEDURE — 99213 OFFICE O/P EST LOW 20 MIN: CPT | Performed by: PHYSICIAN ASSISTANT

## 2019-01-11 ENCOUNTER — OFFICE VISIT (OUTPATIENT)
Dept: OBGYN CLINIC | Facility: CLINIC | Age: 74
End: 2019-01-11
Payer: MEDICARE

## 2019-01-11 ENCOUNTER — APPOINTMENT (OUTPATIENT)
Dept: RADIOLOGY | Facility: CLINIC | Age: 74
End: 2019-01-11
Payer: MEDICARE

## 2019-01-11 VITALS
WEIGHT: 173 LBS | HEART RATE: 59 BPM | SYSTOLIC BLOOD PRESSURE: 168 MMHG | HEIGHT: 69 IN | DIASTOLIC BLOOD PRESSURE: 74 MMHG | RESPIRATION RATE: 14 BRPM | BODY MASS INDEX: 25.62 KG/M2

## 2019-01-11 DIAGNOSIS — R29.898 WEAKNESS OF SHOULDER: ICD-10-CM

## 2019-01-11 DIAGNOSIS — G89.29 CHRONIC RIGHT SHOULDER PAIN: ICD-10-CM

## 2019-01-11 DIAGNOSIS — M75.121 COMPLETE TEAR OF RIGHT ROTATOR CUFF: ICD-10-CM

## 2019-01-11 DIAGNOSIS — M75.41 SUBACROMIAL IMPINGEMENT OF RIGHT SHOULDER: Primary | ICD-10-CM

## 2019-01-11 DIAGNOSIS — M25.611 SHOULDER STIFFNESS, RIGHT: ICD-10-CM

## 2019-01-11 DIAGNOSIS — M25.511 CHRONIC RIGHT SHOULDER PAIN: ICD-10-CM

## 2019-01-11 DIAGNOSIS — M19.011 GLENOHUMERAL ARTHRITIS, RIGHT: ICD-10-CM

## 2019-01-11 PROCEDURE — 99214 OFFICE O/P EST MOD 30 MIN: CPT | Performed by: FAMILY MEDICINE

## 2019-01-11 PROCEDURE — 73030 X-RAY EXAM OF SHOULDER: CPT

## 2019-01-11 RX ORDER — MELOXICAM 15 MG/1
15 TABLET ORAL DAILY
Qty: 30 TABLET | Refills: 2 | Status: ON HOLD | OUTPATIENT
Start: 2019-01-11 | End: 2019-08-16 | Stop reason: CLARIF

## 2019-01-11 NOTE — PROGRESS NOTES
Assessment/Plan:  Assessment/Plan   Diagnoses and all orders for this visit:    Subacromial impingement of right shoulder  -     Ambulatory referral to Pain Management; Future    Complete tear of right rotator cuff  -     Ambulatory referral to Pain Management; Future    Glenohumeral arthritis, right  -     meloxicam (MOBIC) 15 mg tablet; Take 1 tablet (15 mg total) by mouth daily  -     Ambulatory referral to Pain Management; Future    Weakness of shoulder    Shoulder stiffness, right    Chronic right shoulder pain  -     XR shoulder 2+ vw right  -     Ambulatory referral to Pain Management; Future      13-year-old right-hand-dominant male with right shoulder pain of more than 1 year duration  Discussed with patient physical exam, radiographs, impression and plan  X-rays of the shoulder noted for significant proximal migration of the humeral head abutting the undersurface of the acromion causing arthritic changes of the acromial humeral articulation  Physical exam noted for tenderness anterior aspect the shoulder as well as lateral aspect of the subacromial area  He has limited range of motion with active forward flexion limited to 60° and passively to 90°, with abduction limited to 50° and passively 90°, with internal rotation to the sacrum  He has 3/5 strength external rotation, 4/5 strength supraspinatus, 4+/5 strength internal rotation, and 4/5 strength abduction  I discussed with patient that given the severity of his impingement and chronic rotator cuff tear, he likely has muscle atrophy  I discussed that he may likely need shoulder replacement to regain range of motion and function of the arm  He declines surgical intervention so I discussed with him option of corticosteroid injection  Given that there is significant subacromial impingement I will refer her for fluoroscopic guided injection    He will follow up me after receiving injection at which time we will consider referral to physical therapy  Subjective:   Patient ID: Sanna Brock is a 74 y o  male  Chief Complaint   Patient presents with    Right Shoulder - Pain       68-year-old right-hand-dominant male presents for evaluation of right shoulder pain of more than 1 year duration  He denies any trauma or inciting event  Pain described as generalized to the shoulder but worse at the anterior and lateral aspects, constant, achy and sometimes sharp, nonradiating, fluctuating in intensity, worse with movement of the shoulder, and associated with weakness and limited range of motion  He has difficulty with elevating the arm even to the level of the shoulder  He reports being previously evaluated by orthopedic, however had not followed up for treatment  He denies any numbness/tingling  Shoulder Pain   This is a chronic problem  The current episode started more than 1 year ago  The problem occurs constantly  The problem has been gradually worsening  Associated symptoms include arthralgias and weakness  Pertinent negatives include no numbness  Exacerbated by: Arm movement  He has tried rest for the symptoms  The treatment provided mild relief  Review of Systems   Musculoskeletal: Positive for arthralgias  Neurological: Positive for weakness  Negative for numbness  Objective:    Vitals:    01/11/19 0855   BP: 168/74   BP Location: Left arm   Patient Position: Sitting   Cuff Size: Standard   Pulse: 59   Resp: 14   Weight: 78 5 kg (173 lb)   Height: 5' 9" (1 753 m)        Right Elbow Exam     Tenderness   The patient is experiencing no tenderness  Range of Motion   The patient has normal right elbow ROM  Muscle Strength   The patient has normal right elbow strength        Right Shoulder Exam     Tenderness   Right shoulder tenderness location: Anterior glenohumeral aspect, lateral subacromial     Range of Motion   Active Abduction: 50   Passive Abduction: 90   Forward Flexion: 60 (Passive 90°)   Internal Rotation 0 degrees: Sacrum     Muscle Strength   Abduction: 4/5   External Rotation: 3/5   Supraspinatus: 4/5     Tests   Hawkin's test: positive    Comments:  4+/5 strength internal rotation  Unable to do push-off            Physical Exam   Constitutional: He is oriented to person, place, and time  He appears well-developed  No distress  HENT:   Head: Normocephalic and atraumatic  Eyes: Conjunctivae are normal    Neck: No tracheal deviation present  Cardiovascular: Normal rate  Pulmonary/Chest: Effort normal  No respiratory distress  Abdominal: He exhibits no distension  Musculoskeletal: He exhibits tenderness  He exhibits no edema or deformity  Decreased range of motion of right shoulder   Neurological: He is alert and oriented to person, place, and time  He exhibits normal muscle tone  Coordination normal    Skin: Skin is warm and dry  No rash noted  No pallor  Psychiatric: He has a normal mood and affect  His behavior is normal  Judgment and thought content normal    Nursing note and vitals reviewed

## 2019-01-28 ENCOUNTER — TELEPHONE (OUTPATIENT)
Dept: PAIN MEDICINE | Facility: CLINIC | Age: 74
End: 2019-01-28

## 2019-01-28 NOTE — TELEPHONE ENCOUNTER
I called and spoke with patient, he confirmed that he did want to cancel his procedure at [facility] on 2/7/19   I cancelled in EPIC

## 2019-01-28 NOTE — TELEPHONE ENCOUNTER
Patient left voice message on Toppr machine, wants to confirm his appt on 2/7/19 procedure has been cancelled; please call pt

## 2019-02-07 ENCOUNTER — APPOINTMENT (OUTPATIENT)
Dept: LAB | Facility: HOSPITAL | Age: 74
End: 2019-02-07
Attending: ANESTHESIOLOGY
Payer: MEDICARE

## 2019-02-07 ENCOUNTER — TRANSCRIBE ORDERS (OUTPATIENT)
Dept: ADMINISTRATIVE | Facility: HOSPITAL | Age: 74
End: 2019-02-07

## 2019-02-07 DIAGNOSIS — M54.17 LUMBOSACRAL RADICULOPATHY: Primary | ICD-10-CM

## 2019-02-07 DIAGNOSIS — M54.17 LUMBOSACRAL RADICULOPATHY: ICD-10-CM

## 2019-02-07 LAB
ALBUMIN SERPL BCP-MCNC: 3.9 G/DL (ref 3.5–5.7)
ALP SERPL-CCNC: 93 U/L (ref 55–165)
ALT SERPL W P-5'-P-CCNC: 10 U/L (ref 7–52)
ANION GAP SERPL CALCULATED.3IONS-SCNC: 8 MMOL/L (ref 4–13)
AST SERPL W P-5'-P-CCNC: 19 U/L (ref 13–39)
BILIRUB SERPL-MCNC: 0.4 MG/DL (ref 0.2–1)
BUN SERPL-MCNC: 26 MG/DL (ref 7–25)
CALCIUM SERPL-MCNC: 9.2 MG/DL (ref 8.6–10.5)
CHLORIDE SERPL-SCNC: 102 MMOL/L (ref 98–107)
CO2 SERPL-SCNC: 28 MMOL/L (ref 21–31)
CREAT SERPL-MCNC: 1.09 MG/DL (ref 0.7–1.3)
ERYTHROCYTE [DISTWIDTH] IN BLOOD BY AUTOMATED COUNT: 12.8 % (ref 11.5–14.5)
GFR SERPL CREATININE-BSD FRML MDRD: 66 ML/MIN/1.73SQ M
GLUCOSE P FAST SERPL-MCNC: 125 MG/DL (ref 65–99)
HCT VFR BLD AUTO: 37.5 % (ref 36.5–49.3)
HGB BLD-MCNC: 12.7 G/DL (ref 14–18)
MCH RBC QN AUTO: 32.7 PG (ref 26–34)
MCHC RBC AUTO-ENTMCNC: 33.8 G/DL (ref 31–37)
MCV RBC AUTO: 97 FL (ref 81–99)
PLATELET # BLD AUTO: 239 THOUSANDS/UL (ref 149–390)
PMV BLD AUTO: 9 FL (ref 8.6–11.7)
POTASSIUM SERPL-SCNC: 4 MMOL/L (ref 3.5–5.5)
PROT SERPL-MCNC: 6.7 G/DL (ref 6.4–8.9)
RBC # BLD AUTO: 3.87 MILLION/UL (ref 4.3–5.9)
SODIUM SERPL-SCNC: 138 MMOL/L (ref 134–143)
WBC # BLD AUTO: 6.9 THOUSAND/UL (ref 4.8–10.8)

## 2019-02-07 PROCEDURE — 80053 COMPREHEN METABOLIC PANEL: CPT

## 2019-02-07 PROCEDURE — 85027 COMPLETE CBC AUTOMATED: CPT

## 2019-02-07 PROCEDURE — 36415 COLL VENOUS BLD VENIPUNCTURE: CPT

## 2019-04-02 ENCOUNTER — HOSPITAL ENCOUNTER (OUTPATIENT)
Dept: RADIOLOGY | Facility: HOSPITAL | Age: 74
Discharge: HOME | End: 2019-04-02
Attending: ANESTHESIOLOGY
Payer: MEDICARE

## 2019-04-02 ENCOUNTER — TRANSCRIBE ORDERS (OUTPATIENT)
Dept: ADMINISTRATIVE | Facility: HOSPITAL | Age: 74
End: 2019-04-02

## 2019-04-02 DIAGNOSIS — M25.551 RIGHT HIP PAIN: ICD-10-CM

## 2019-04-02 DIAGNOSIS — M25.551 RIGHT HIP PAIN: Primary | ICD-10-CM

## 2019-04-02 PROCEDURE — 73502 X-RAY EXAM HIP UNI 2-3 VIEWS: CPT

## 2019-07-06 ENCOUNTER — HOSPITAL ENCOUNTER (EMERGENCY)
Facility: HOSPITAL | Age: 74
Discharge: HOME | End: 2019-07-06
Attending: EMERGENCY MEDICINE | Admitting: EMERGENCY MEDICINE
Payer: MEDICARE

## 2019-07-06 VITALS
BODY MASS INDEX: 25.92 KG/M2 | TEMPERATURE: 97.5 F | RESPIRATION RATE: 16 BRPM | WEIGHT: 175 LBS | OXYGEN SATURATION: 96 % | HEART RATE: 64 BPM | HEIGHT: 69 IN

## 2019-07-06 DIAGNOSIS — T83.9XXA PROBLEM WITH FOLEY CATHETER, INITIAL ENCOUNTER (HCC): Primary | ICD-10-CM

## 2019-07-06 PROCEDURE — 99283 EMERGENCY DEPT VISIT LOW MDM: CPT

## 2019-07-06 NOTE — ED PROVIDER NOTES
History  Chief Complaint   Patient presents with    Urinary Catheter Problem     catheter is not draining a lot today      Patient has a Diallo catheter and chronically, states it has been 4 weeks and has been changed and there was minimal drainage since last night  Patient states that usually has 700 or so cc to dispose of in the morning however there has been minimal drainage overnight into today  Patient also complaining of suprapubic pressure that is moderate to severe  History provided by:  Patient  Urinary Catheter Problem   Location:  Diallo  Quality:  Blocked  Severity:  Severe  Onset quality:  Gradual  Duration:  1 day  Timing:  Constant  Progression:  Worsening  Chronicity:  Recurrent  Associated symptoms: abdominal pain    Associated symptoms: no chest pain, no congestion, no cough, no diarrhea, no fever, no headaches, no nausea, no rash, no rhinorrhea, no shortness of breath, no sore throat and no vomiting        Prior to Admission Medications   Prescriptions Last Dose Informant Patient Reported? Taking?    Turmeric Curcumin 500 MG CAPS   No No   Sig: Take 1 capsule (500 mg total) by mouth 2 (two) times a day   aspirin 325 mg tablet   Yes No   Sig: Take 325 mg by mouth daily   finasteride (PROSCAR) 5 mg tablet   Yes No   Sig: Take 5 mg by mouth daily   gabapentin (NEURONTIN) 300 mg capsule   Yes No   Sig: Take 300 mg by mouth daily at bedtime     meloxicam (MOBIC) 15 mg tablet   No No   Sig: Take 1 tablet (15 mg total) by mouth daily   meloxicam (MOBIC) 15 mg tablet   No No   Sig: Take 1 tablet (15 mg total) by mouth daily   morphine (MSIR) 15 mg tablet   Yes No   Sig: Take 15 mg by mouth 4 (four) times a day     naproxen (NAPROSYN) 500 mg tablet   No No   Sig: Take 1 tablet (500 mg total) by mouth 2 (two) times a day with meals   nitrofurantoin (MACRODANTIN) 100 mg capsule   Yes No   Sig: Take 100 mg by mouth 2 (two) times a day   sertraline (ZOLOFT) 50 mg tablet   Yes No   Sig: Take 50 mg by mouth daily        Facility-Administered Medications: None       Past Medical History:   Diagnosis Date    Depression     Diallo catheter in place     Hypertension     Prostate enlargement     Urinary tract infection        Past Surgical History:   Procedure Laterality Date    ARTHROSCOPY KNEE      BACK SURGERY      L 4 or 5    CATARACT EXTRACTION Bilateral     CYSTOSCOPY W/ LASER LITHOTRIPSY N/A 8/20/2018    Procedure: LITHOTRISPY HOLMIUM LASER of bladder stones;  Surgeon: Mulugeta Currie MD;  Location: [facility] MAIN OR;  Service: Urology    DC TRANSURETHRAL ELEC-SURG [address]use N/A 8/20/2018    Procedure: [place]; TURP;  Surgeon: Mulugeta Currie MD;  Location: [facility] MAIN OR;  Service: Urology       History reviewed  No pertinent family history  I have reviewed and agree with the history as documented  Social History     Tobacco Use    Smoking status: Former Smoker    Smokeless tobacco: Never Used   Substance Use Topics    Alcohol use: No    Drug use: No        Review of Systems   Constitutional: Negative for chills and fever  HENT: Negative for congestion, rhinorrhea and sore throat  Eyes: Negative for visual disturbance  Respiratory: Negative for cough and shortness of breath  Cardiovascular: Negative for chest pain  Gastrointestinal: Positive for abdominal distention and abdominal pain  Negative for diarrhea, nausea and vomiting  Genitourinary: Positive for difficulty urinating and dysuria  Negative for flank pain and hematuria  Musculoskeletal: Negative for back pain  Skin: Negative for rash  Neurological: Negative for headaches  Physical Exam  Physical Exam   Constitutional: He is oriented to person, place, and time  He appears well-developed  HENT:   Head: Normocephalic and atraumatic     Right Ear: External ear normal    Left Ear: External ear normal    Nose: Nose normal    Mouth/Throat: Oropharynx is clear and moist    Eyes: Conjunctivae and EOM are normal    Neck: Normal range of motion  Neck supple  Cardiovascular: Normal rate, regular rhythm and normal heart sounds  Pulmonary/Chest: Effort normal and breath sounds normal    Abdominal: Soft  Bowel sounds are normal  He exhibits distension  There is tenderness (suprapubic)  Neurological: He is alert and oriented to person, place, and time  Skin: Skin is warm and dry  Capillary refill takes less than 2 seconds  Psychiatric: He has a normal mood and affect  His behavior is normal    Nursing note and vitals reviewed  Vital Signs  ED Triage Vitals [07/06/19 1733]   Temperature Pulse Respirations BP SpO2   97 5 °F (36 4 °C) 64 16 -- 96 %      Temp Source Heart Rate Source Patient Position - Orthostatic VS BP Location FiO2 (%)   Temporal Monitor -- -- --      Pain Score       8           Vitals:    07/06/19 1733   Pulse: 64         Visual Acuity      ED Medications  Medications - No data to display    Diagnostic Studies  Results Reviewed     None                 No orders to display              Procedures  Procedures       ED Course  ED Course as of Jul 06 1924   Sat Jul 06, 2019 1922  Nursing replace Diallo and reported 800 cc of drainage almost immediately  MDM    Disposition  Final diagnoses:   Problem with Diallo catheter, initial encounter [facility])     Time reflects when diagnosis was documented in both MDM as applicable and the Disposition within this note     Time User Action Codes Description Comment    7/6/2019  7:24 PM Giovana White Add [T83  9XXA] Problem with Diallo catheter, initial encounter [facility])       ED Disposition     ED Disposition Condition Date/Time Comment    Discharge Stable Sat Jul 6, 2019  7:24 PM Erven Layman discharge to home/self care              Follow-up Information     Follow up With Specialties Details Why Robert Medeiros MD Urology Call   [address] [place] [address]  [phone]            Patient's Medications   Discharge Prescriptions    No medications on file     No discharge procedures on file      ED Provider  Electronically Signed by           Cleveland Alvarez PA-C  07/06/19 1492

## 2019-08-02 ENCOUNTER — HOSPITAL ENCOUNTER (EMERGENCY)
Facility: HOSPITAL | Age: 74
Discharge: HOME | End: 2019-08-02
Payer: MEDICARE

## 2019-08-02 VITALS
HEIGHT: 69 IN | SYSTOLIC BLOOD PRESSURE: 126 MMHG | BODY MASS INDEX: 26.66 KG/M2 | TEMPERATURE: 98.3 F | RESPIRATION RATE: 18 BRPM | DIASTOLIC BLOOD PRESSURE: 73 MMHG | HEART RATE: 73 BPM | OXYGEN SATURATION: 97 % | WEIGHT: 180 LBS

## 2019-08-02 DIAGNOSIS — Z46.6 URINARY CATHETER (FOLEY) CHANGE REQUIRED: ICD-10-CM

## 2019-08-02 DIAGNOSIS — T83.091D OBSTRUCTION OF FOLEY CATHETER, SUBSEQUENT ENCOUNTER: Primary | ICD-10-CM

## 2019-08-02 PROBLEM — T83.091A OBSTRUCTION OF FOLEY CATHETER (HCC): Status: ACTIVE | Noted: 2019-08-02

## 2019-08-02 PROCEDURE — 99283 EMERGENCY DEPT VISIT LOW MDM: CPT

## 2019-08-02 NOTE — ED NOTES
15 CC WATER REMOVED FROM ALMONTE THAT WAS PRESENT ON ARRIVAL  NEW 16 FR ALMONTE KIT USED FRO NEW PLACEMENT WITH EASY INSERTION AND URINE RETURN        Magdaleno Armstrong RN  08/02/19 0824

## 2019-08-02 NOTE — ED PROVIDER NOTES
History  Chief Complaint   Patient presents with    Urinary Catheter Problem     Arturo Calvo is a 57-year-old male who came to the emergency department due to blocked Diallo catheter noted by the patient today  Patient denies hematuria  He denies fever or chills  Patient called his urologist who subsequently requested him to proceed to the emergency department for replacement of his Diallo catheter  History provided by:  Patient   used: No    Urinary Catheter Problem   Quality:  Blocked Diallo catheter  Severity:  Severe  Onset quality:  Sudden  Duration: Today  Timing:  Constant  Progression:  Unchanged  Chronicity:  Recurrent  Associated symptoms: no abdominal pain, no chest pain, no congestion, no cough, no diarrhea, no ear pain, no fatigue, no fever, no headaches, no loss of consciousness, no myalgias, no nausea, no rash, no rhinorrhea, no shortness of breath, no sore throat, no vomiting and no wheezing        Prior to Admission Medications   Prescriptions Last Dose Informant Patient Reported? Taking?    Turmeric Curcumin 500 MG CAPS 8/2/2019 at Unknown time  No Yes   Sig: Take 1 capsule (500 mg total) by mouth 2 (two) times a day   aspirin 325 mg tablet 8/2/2019 at Unknown time  Yes Yes   Sig: Take 325 mg by mouth daily   finasteride (PROSCAR) 5 mg tablet 8/2/2019 at Unknown time  Yes Yes   Sig: Take 5 mg by mouth daily   gabapentin (NEURONTIN) 300 mg capsule 8/2/2019 at Unknown time  Yes Yes   Sig: Take 300 mg by mouth daily at bedtime     meloxicam (MOBIC) 15 mg tablet 8/2/2019 at Unknown time  No Yes   Sig: Take 1 tablet (15 mg total) by mouth daily   meloxicam (MOBIC) 15 mg tablet 8/2/2019 at Unknown time  No Yes   Sig: Take 1 tablet (15 mg total) by mouth daily   morphine (MSIR) 15 mg tablet 8/2/2019 at Unknown time  Yes Yes   Sig: Take 15 mg by mouth 4 (four) times a day     naproxen (NAPROSYN) 500 mg tablet 8/2/2019 at Unknown time  No Yes   Sig: Take 1 tablet (500 mg total) by mouth 2 (two) times a day with meals   nitrofurantoin (MACRODANTIN) 100 mg capsule 8/2/2019 at Unknown time  Yes Yes   Sig: Take 100 mg by mouth 2 (two) times a day   sertraline (ZOLOFT) 50 mg tablet 8/2/2019 at Unknown time  Yes Yes   Sig: Take 50 mg by mouth daily        Facility-Administered Medications: None       Past Medical History:   Diagnosis Date    Depression     Diallo catheter in place     Hypertension     Prostate enlargement     Psychiatric disorder     Urinary tract infection        Past Surgical History:   Procedure Laterality Date    ARTHROSCOPY KNEE      BACK SURGERY      L 4 or 5    CATARACT EXTRACTION Bilateral     CYSTOSCOPY W/ LASER LITHOTRIPSY N/A 8/20/2018    Procedure: LITHOTRISPY HOLMIUM LASER of bladder stones;  Surgeon: Freddy Fairchild MD;  Location: [address] [place] [address] OR;  Service: Urology    [facility] TRANSURETHRAL ELEC-SURG 8 [facility] N/A 8/20/2018    Procedure: Gary Alaniz; TURP;  Surgeon: Freddy Fairchild MD;  Location: [address] [place] [address] OR;  Service: Urology       History reviewed  No pertinent family history  I have reviewed and agree with the history as documented  Social History     Tobacco Use    Smoking status: Former Smoker    Smokeless tobacco: Never Used   Substance Use Topics    Alcohol use: No    Drug use: No        Review of Systems   Constitutional: Negative for fatigue and fever  HENT: Negative for congestion, ear pain, rhinorrhea and sore throat  Eyes: Negative  Respiratory: Negative for cough, shortness of breath and wheezing  Cardiovascular: Negative for chest pain  Gastrointestinal: Negative for abdominal pain, diarrhea, nausea and vomiting  Endocrine: Negative  Genitourinary: Negative  Musculoskeletal: Negative for myalgias  Skin: Negative for rash  Allergic/Immunologic: Negative  Neurological: Negative for loss of consciousness and headaches  Hematological: Negative  Psychiatric/Behavioral: Negative          Physical Exam  Physical Exam Constitutional: He is oriented to person, place, and time  He appears well-developed and well-nourished  No distress  HENT:   Head: Normocephalic and atraumatic  Right Ear: External ear normal    Left Ear: External ear normal    Nose: Nose normal    Mouth/Throat: Oropharynx is clear and moist  No oropharyngeal exudate  Eyes: Pupils are equal, round, and reactive to light  Conjunctivae and EOM are normal  Right eye exhibits no discharge  Left eye exhibits no discharge  No scleral icterus  Neck: Normal range of motion  Neck supple  No tracheal deviation present  No thyromegaly present  Cardiovascular: Normal rate, regular rhythm and normal heart sounds  Pulmonary/Chest: Effort normal and breath sounds normal  No stridor  No respiratory distress  Abdominal: Soft  Bowel sounds are normal  He exhibits no distension  There is no tenderness  Musculoskeletal: Normal range of motion  He exhibits no edema, tenderness or deformity  Lymphadenopathy:     He has no cervical adenopathy  Neurological: He is alert and oriented to person, place, and time  No cranial nerve deficit or sensory deficit  He exhibits normal muscle tone  Coordination normal    Skin: Skin is warm and dry  No rash noted  He is not diaphoretic  No erythema  No pallor  Psychiatric: He has a normal mood and affect  His behavior is normal  Judgment and thought content normal    Nursing note and vitals reviewed        Vital Signs  ED Triage Vitals   Temperature Pulse Respirations Blood Pressure SpO2   08/02/19 1914 08/02/19 1914 08/02/19 1914 08/02/19 1916 08/02/19 1914   98 3 °F (36 8 °C) 73 18 126/73 97 %      Temp Source Heart Rate Source Patient Position - Orthostatic VS BP Location FiO2 (%)   08/02/19 1914 08/02/19 1914 08/02/19 1914 08/02/19 1914 --   Temporal Monitor Sitting Left arm       Pain Score       08/02/19 1914       No Pain           Vitals:    08/02/19 1914 08/02/19 1916   BP:  126/73   Pulse: 73    Patient Position - Orthostatic VS: Sitting          Visual Acuity      ED Medications  Medications - No data to display    Diagnostic Studies  Results Reviewed     None                 No orders to display              Procedures  Procedures       ED Course  ED Course as of Aug 02 1929   Fri Aug 02, 2019   1926 Diallo catheter was replaced successfully by the nurse  Patient felt better  Urine is draining to the urine collection bag effectively  MDM  Number of Diagnoses or Management Options  Obstruction of Diallo catheter, subsequent encounter: minor  Urinary catheter (Diallo) change required: minor     Amount and/or Complexity of Data Reviewed  Decide to obtain previous medical records or to obtain history from someone other than the patient: yes  Review and summarize past medical records: yes    Risk of Complications, Morbidity, and/or Mortality  Presenting problems: minimal  Management options: minimal    Patient Progress  Patient progress: improved      Disposition  Final diagnoses:   Obstruction of Diallo catheter, subsequent encounter   Urinary catheter (Diallo) change required     Time reflects when diagnosis was documented in both MDM as applicable and the Disposition within this note     Time User Action Codes Description Comment    8/2/2019  7:27 PM Mary Carmen Jiang Add [T83 091D] Obstruction of Diallo catheter, subsequent encounter     8/2/2019  7:27 PM Mary Carmen Jiang Add [T83 091A] Obstruction of Diallo catheter, initial encounter ([facility] )     8/2/2019  7:27 PM Mary Carmen Jiang Remove [Z81 813L] Obstruction of Diallo catheter, initial encounter ([facility] )     8/2/2019  7:27 PM Mary Carmen Jiang Add [Z46 6] Urinary catheter (Diallo) change required       ED Disposition     ED Disposition Condition Date/Time Comment    Discharge Stable Fri Aug 2, 2019  7:27 PM Lashaun Huang discharge to home/self care              Follow-up Information     Follow up With Specialties Details Why Josue Rand MD Urology Schedule an appointment as soon as possible for a visit   [address] [place] [address]  [phone]            Patient's Medications   Discharge Prescriptions    No medications on file     No discharge procedures on file      ED Provider  Electronically Signed by           Lobo Campos MD  08/02/19 0182

## 2019-08-02 NOTE — ED TRIAGE NOTES
Patient has a chronic cantor due to retention  Patient has episodes where his catheter becomes blocked, he called his urologist unable to see him today instructed him to go to the ED

## 2019-08-02 NOTE — ED NOTES
1800 CC OUTPUT  CHANGED TO HIS OWN LEG BACK  DENIED QUESTIONS OR NEEDS        Renea Juarez RN  08/02/19 1739

## 2019-08-16 ENCOUNTER — APPOINTMENT (INPATIENT)
Dept: ULTRASOUND IMAGING | Facility: HOSPITAL | Age: 74
DRG: 698 | End: 2019-08-16
Payer: MEDICARE

## 2019-08-16 ENCOUNTER — APPOINTMENT (EMERGENCY)
Dept: RADIOLOGY | Facility: HOSPITAL | Age: 74
DRG: 698 | End: 2019-08-16
Payer: MEDICARE

## 2019-08-16 ENCOUNTER — HOSPITAL ENCOUNTER (INPATIENT)
Facility: HOSPITAL | Age: 74
LOS: 5 days | DRG: 698 | End: 2019-08-21
Admitting: INTERNAL MEDICINE
Payer: MEDICARE

## 2019-08-16 ENCOUNTER — APPOINTMENT (EMERGENCY)
Dept: CT IMAGING | Facility: HOSPITAL | Age: 74
DRG: 698 | End: 2019-08-16
Payer: MEDICARE

## 2019-08-16 DIAGNOSIS — A41.9 SEPSIS (HCC): Primary | ICD-10-CM

## 2019-08-16 DIAGNOSIS — R77.8 ELEVATED TROPONIN: ICD-10-CM

## 2019-08-16 DIAGNOSIS — Z97.8 CHRONIC INDWELLING FOLEY CATHETER: Chronic | ICD-10-CM

## 2019-08-16 DIAGNOSIS — N17.9 AKI (ACUTE KIDNEY INJURY) (HCC): ICD-10-CM

## 2019-08-16 PROBLEM — R79.89 ELEVATED TROPONIN: Chronic | Status: ACTIVE | Noted: 2019-08-16

## 2019-08-16 PROBLEM — N30.01 ACUTE CYSTITIS WITH HEMATURIA: Chronic | Status: ACTIVE | Noted: 2019-08-16

## 2019-08-16 PROBLEM — R41.82 ALTERED MENTAL STATUS: Chronic | Status: ACTIVE | Noted: 2019-08-16
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
## 2019-08-16 PROBLEM — G89.4 CHRONIC PAIN SYNDROME: Chronic | Status: ACTIVE | Noted: 2019-08-16

## 2019-08-16 PROBLEM — N30.01 ACUTE CYSTITIS WITH HEMATURIA: Status: ACTIVE | Noted: 2019-08-16

## 2019-08-16 PROBLEM — R79.89 ELEVATED TROPONIN: Status: ACTIVE | Noted: 2019-08-16
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
## 2019-08-16 PROBLEM — G92.8 TOXIC METABOLIC ENCEPHALOPATHY: Status: ACTIVE | Noted: 2019-08-16

## 2019-08-16 PROBLEM — R41.82 ALTERED MENTAL STATUS: Status: ACTIVE | Noted: 2019-08-16

## 2019-08-16 LAB
ALBUMIN SERPL BCP-MCNC: 3.1 G/DL (ref 3.5–5.7)
ALP SERPL-CCNC: 76 U/L (ref 55–165)
ALT SERPL W P-5'-P-CCNC: 62 U/L (ref 7–52)
AMYLASE SERPL-CCNC: 22 IU/L (ref 29–103)
ANION GAP SERPL CALCULATED.3IONS-SCNC: 8 MMOL/L (ref 4–13)
APTT PPP: 34 SECONDS (ref 23–37)
AST SERPL W P-5'-P-CCNC: 171 U/L (ref 13–39)
BACTERIA UR QL AUTO: ABNORMAL /HPF
BASOPHILS # BLD AUTO: 0.1 THOUSANDS/ΜL (ref 0–0.1)
BASOPHILS NFR BLD AUTO: 0 % (ref 0–2)
BILIRUB SERPL-MCNC: 0.5 MG/DL (ref 0.2–1)
BILIRUB UR QL STRIP: NEGATIVE
BUN SERPL-MCNC: 40 MG/DL (ref 7–25)
CALCIUM SERPL-MCNC: 8.3 MG/DL (ref 8.6–10.5)
CAOX CRY URNS QL MICRO: ABNORMAL /HPF
CHLORIDE SERPL-SCNC: 103 MMOL/L (ref 98–107)
CLARITY UR: ABNORMAL
CO2 SERPL-SCNC: 24 MMOL/L (ref 21–31)
COLOR UR: YELLOW
CREAT SERPL-MCNC: 2.17 MG/DL (ref 0.7–1.3)
EOSINOPHIL # BLD AUTO: 0 THOUSAND/ΜL (ref 0–0.61)
EOSINOPHIL NFR BLD AUTO: 0 % (ref 0–5)
ERYTHROCYTE [DISTWIDTH] IN BLOOD BY AUTOMATED COUNT: 13.5 % (ref 11.5–14.5)
EST. AVERAGE GLUCOSE BLD GHB EST-MCNC: 108 MG/DL
GFR SERPL CREATININE-BSD FRML MDRD: 29 ML/MIN/1.73SQ M
GLUCOSE SERPL-MCNC: 132 MG/DL (ref 65–99)
GLUCOSE SERPL-MCNC: 147 MG/DL (ref 65–140)
GLUCOSE UR STRIP-MCNC: NEGATIVE MG/DL
HBA1C MFR BLD: 5.4 % (ref 4.2–6.3)
HCT VFR BLD AUTO: 33.5 % (ref 42–47)
HGB BLD-MCNC: 11.1 G/DL (ref 14–18)
HGB UR QL STRIP.AUTO: ABNORMAL
HYALINE CASTS #/AREA URNS LPF: ABNORMAL /LPF
INR PPP: 1.37 (ref 0.9–1.5)
KETONES UR STRIP-MCNC: ABNORMAL MG/DL
LACTATE SERPL-SCNC: 1.7 MMOL/L (ref 0.5–2)
LACTATE SERPL-SCNC: 2.6 MMOL/L (ref 0.5–2)
LACTATE SERPL-SCNC: 5.2 MMOL/L (ref 0.5–2)
LEUKOCYTE ESTERASE UR QL STRIP: ABNORMAL
LIPASE SERPL-CCNC: <10 U/L (ref 11–82)
LYMPHOCYTES # BLD AUTO: 1.2 THOUSANDS/ΜL (ref 0.6–4.47)
LYMPHOCYTES NFR BLD AUTO: 6 % (ref 21–51)
MCH RBC QN AUTO: 31.9 PG (ref 26–34)
MCHC RBC AUTO-ENTMCNC: 33.2 G/DL (ref 31–37)
MCV RBC AUTO: 96 FL (ref 81–99)
MONOCYTES # BLD AUTO: 1 THOUSAND/ΜL (ref 0.17–1.22)
MONOCYTES NFR BLD AUTO: 5 % (ref 2–12)
NEUTROPHILS # BLD AUTO: 17.2 THOUSANDS/ΜL (ref 1.4–6.5)
NEUTS SEG NFR BLD AUTO: 89 % (ref 42–75)
NITRITE UR QL STRIP: NEGATIVE
NON-SQ EPI CELLS URNS QL MICRO: ABNORMAL /HPF
OSMOLALITY UR: 454 MMOL/KG
PH UR STRIP.AUTO: 6 [PH]
PLATELET # BLD AUTO: 172 THOUSANDS/UL (ref 149–390)
PLATELET # BLD AUTO: 214 THOUSANDS/UL (ref 149–390)
PMV BLD AUTO: 9 FL (ref 8.6–11.7)
POTASSIUM SERPL-SCNC: 4.4 MMOL/L (ref 3.5–5.5)
PROCALCITONIN SERPL-MCNC: 169.09 NG/ML
PROCALCITONIN SERPL-MCNC: 87.94 NG/ML
PROT SERPL-MCNC: 5.7 G/DL (ref 6.4–8.9)
PROT UR STRIP-MCNC: ABNORMAL MG/DL
PROTHROMBIN TIME: 16 SECONDS (ref 10.2–13)
RBC # BLD AUTO: 3.49 MILLION/UL (ref 4.3–5.9)
RBC #/AREA URNS AUTO: ABNORMAL /HPF
SODIUM 24H UR-SCNC: 23 MOL/L
SODIUM SERPL-SCNC: 135 MMOL/L (ref 134–143)
SP GR UR STRIP.AUTO: 1.02 (ref 1–1.03)
TROPONIN I SERPL-MCNC: 2.19 NG/ML
TROPONIN I SERPL-MCNC: 2.5 NG/ML
TROPONIN I SERPL-MCNC: 4.69 NG/ML
UROBILINOGEN UR QL STRIP.AUTO: 0.2 E.U./DL
WBC # BLD AUTO: 19.4 THOUSAND/UL (ref 4.8–10.8)
WBC #/AREA URNS AUTO: ABNORMAL /HPF

## 2019-08-16 PROCEDURE — 96365 THER/PROPH/DIAG IV INF INIT: CPT

## 2019-08-16 PROCEDURE — 80053 COMPREHEN METABOLIC PANEL: CPT | Performed by: INTERNAL MEDICINE

## 2019-08-16 PROCEDURE — 85730 THROMBOPLASTIN TIME PARTIAL: CPT | Performed by: INTERNAL MEDICINE

## 2019-08-16 PROCEDURE — 93005 ELECTROCARDIOGRAM TRACING: CPT

## 2019-08-16 PROCEDURE — 82043 UR ALBUMIN QUANTITATIVE: CPT | Performed by: INTERNAL MEDICINE

## 2019-08-16 PROCEDURE — 84484 ASSAY OF TROPONIN QUANT: CPT | Performed by: INTERNAL MEDICINE

## 2019-08-16 PROCEDURE — 76770 US EXAM ABDO BACK WALL COMP: CPT

## 2019-08-16 PROCEDURE — 96361 HYDRATE IV INFUSION ADD-ON: CPT

## 2019-08-16 PROCEDURE — 83036 HEMOGLOBIN GLYCOSYLATED A1C: CPT | Performed by: NURSE PRACTITIONER

## 2019-08-16 PROCEDURE — 82948 REAGENT STRIP/BLOOD GLUCOSE: CPT

## 2019-08-16 PROCEDURE — 84145 PROCALCITONIN (PCT): CPT | Performed by: NURSE PRACTITIONER

## 2019-08-16 PROCEDURE — 83605 ASSAY OF LACTIC ACID: CPT | Performed by: INTERNAL MEDICINE

## 2019-08-16 PROCEDURE — 81003 URINALYSIS AUTO W/O SCOPE: CPT | Performed by: INTERNAL MEDICINE

## 2019-08-16 PROCEDURE — 83690 ASSAY OF LIPASE: CPT | Performed by: NURSE PRACTITIONER

## 2019-08-16 PROCEDURE — 70450 CT HEAD/BRAIN W/O DYE: CPT

## 2019-08-16 PROCEDURE — 87040 BLOOD CULTURE FOR BACTERIA: CPT | Performed by: INTERNAL MEDICINE

## 2019-08-16 PROCEDURE — 84145 PROCALCITONIN (PCT): CPT | Performed by: INTERNAL MEDICINE

## 2019-08-16 PROCEDURE — 87186 SC STD MICRODIL/AGAR DIL: CPT | Performed by: INTERNAL MEDICINE

## 2019-08-16 PROCEDURE — 83605 ASSAY OF LACTIC ACID: CPT | Performed by: PHYSICIAN ASSISTANT

## 2019-08-16 PROCEDURE — 87086 URINE CULTURE/COLONY COUNT: CPT | Performed by: INTERNAL MEDICINE

## 2019-08-16 PROCEDURE — 85049 AUTOMATED PLATELET COUNT: CPT | Performed by: NURSE PRACTITIONER

## 2019-08-16 PROCEDURE — 82570 ASSAY OF URINE CREATININE: CPT | Performed by: INTERNAL MEDICINE

## 2019-08-16 PROCEDURE — 99223 1ST HOSP IP/OBS HIGH 75: CPT | Performed by: PHYSICIAN ASSISTANT

## 2019-08-16 PROCEDURE — 84484 ASSAY OF TROPONIN QUANT: CPT | Performed by: NURSE PRACTITIONER

## 2019-08-16 PROCEDURE — 87077 CULTURE AEROBIC IDENTIFY: CPT | Performed by: INTERNAL MEDICINE

## 2019-08-16 PROCEDURE — 36415 COLL VENOUS BLD VENIPUNCTURE: CPT | Performed by: INTERNAL MEDICINE

## 2019-08-16 PROCEDURE — 84484 ASSAY OF TROPONIN QUANT: CPT | Performed by: PHYSICIAN ASSISTANT

## 2019-08-16 PROCEDURE — 84156 ASSAY OF PROTEIN URINE: CPT | Performed by: INTERNAL MEDICINE

## 2019-08-16 PROCEDURE — 71045 X-RAY EXAM CHEST 1 VIEW: CPT

## 2019-08-16 PROCEDURE — 94760 N-INVAS EAR/PLS OXIMETRY 1: CPT

## 2019-08-16 PROCEDURE — 84300 ASSAY OF URINE SODIUM: CPT | Performed by: NURSE PRACTITIONER

## 2019-08-16 PROCEDURE — 94664 DEMO&/EVAL PT USE INHALER: CPT

## 2019-08-16 PROCEDURE — 81001 URINALYSIS AUTO W/SCOPE: CPT | Performed by: INTERNAL MEDICINE

## 2019-08-16 PROCEDURE — 85025 COMPLETE CBC W/AUTO DIFF WBC: CPT | Performed by: INTERNAL MEDICINE

## 2019-08-16 PROCEDURE — 85610 PROTHROMBIN TIME: CPT | Performed by: INTERNAL MEDICINE

## 2019-08-16 PROCEDURE — 83935 ASSAY OF URINE OSMOLALITY: CPT | Performed by: NURSE PRACTITIONER

## 2019-08-16 PROCEDURE — 99285 EMERGENCY DEPT VISIT HI MDM: CPT

## 2019-08-16 PROCEDURE — 82150 ASSAY OF AMYLASE: CPT | Performed by: NURSE PRACTITIONER

## 2019-08-16 RX ORDER — FERROUS SULFATE 325(65) MG
325 TABLET ORAL 2 TIMES DAILY
Status: DISCONTINUED | OUTPATIENT
Start: 2019-08-16 | End: 2019-08-21 | Stop reason: HOSPADM

## 2019-08-16 RX ORDER — MELATONIN
2000 DAILY
Status: DISCONTINUED | OUTPATIENT
Start: 2019-08-17 | End: 2019-08-21 | Stop reason: HOSPADM

## 2019-08-16 RX ORDER — ACETAMINOPHEN 325 MG/1
975 TABLET ORAL ONCE
Status: COMPLETED | OUTPATIENT
Start: 2019-08-16 | End: 2019-08-16

## 2019-08-16 RX ORDER — VANCOMYCIN HYDROCHLORIDE 1 G/200ML
1000 INJECTION, SOLUTION INTRAVENOUS ONCE
Status: COMPLETED | OUTPATIENT
Start: 2019-08-16 | End: 2019-08-16

## 2019-08-16 RX ORDER — HEPARIN SODIUM 5000 [USP'U]/ML
5000 INJECTION, SOLUTION INTRAVENOUS; SUBCUTANEOUS EVERY 8 HOURS SCHEDULED
Status: DISCONTINUED | OUTPATIENT
Start: 2019-08-16 | End: 2019-08-21 | Stop reason: HOSPADM

## 2019-08-16 RX ORDER — FERROUS SULFATE 325(65) MG
325 TABLET ORAL 2 TIMES DAILY
Status: ON HOLD | COMMUNITY
End: 2019-09-06 | Stop reason: CLARIF

## 2019-08-16 RX ORDER — DOCUSATE SODIUM 100 MG/1
400 CAPSULE, LIQUID FILLED ORAL DAILY
Status: ON HOLD | COMMUNITY
End: 2019-09-06 | Stop reason: CLARIF

## 2019-08-16 RX ORDER — ONDANSETRON 2 MG/ML
4 INJECTION INTRAMUSCULAR; INTRAVENOUS EVERY 6 HOURS PRN
Status: DISCONTINUED | OUTPATIENT
Start: 2019-08-16 | End: 2019-08-21 | Stop reason: HOSPADM

## 2019-08-16 RX ORDER — MELATONIN
2000 DAILY
Status: ON HOLD | COMMUNITY
End: 2019-09-06 | Stop reason: CLARIF

## 2019-08-16 RX ORDER — VANCOMYCIN HYDROCHLORIDE 1 G/200ML
1000 INJECTION, SOLUTION INTRAVENOUS EVERY 24 HOURS
Status: DISCONTINUED | OUTPATIENT
Start: 2019-08-17 | End: 2019-08-16

## 2019-08-16 RX ORDER — FINASTERIDE 5 MG/1
5 TABLET, FILM COATED ORAL DAILY
Status: DISCONTINUED | OUTPATIENT
Start: 2019-08-17 | End: 2019-08-21 | Stop reason: HOSPADM

## 2019-08-16 RX ORDER — ACETAMINOPHEN 325 MG/1
650 TABLET ORAL EVERY 6 HOURS PRN
Status: DISCONTINUED | OUTPATIENT
Start: 2019-08-16 | End: 2019-08-17

## 2019-08-16 RX ORDER — GABAPENTIN 300 MG/1
300 CAPSULE ORAL
Status: DISCONTINUED | OUTPATIENT
Start: 2019-08-16 | End: 2019-08-16

## 2019-08-16 RX ORDER — ACETAMINOPHEN 325 MG/1
1300 TABLET ORAL DAILY
Status: ON HOLD | COMMUNITY
End: 2019-09-06 | Stop reason: CLARIF

## 2019-08-16 RX ORDER — DOCUSATE SODIUM 100 MG/1
400 CAPSULE, LIQUID FILLED ORAL DAILY
Status: DISCONTINUED | OUTPATIENT
Start: 2019-08-17 | End: 2019-08-21 | Stop reason: HOSPADM

## 2019-08-16 RX ORDER — CEFEPIME HYDROCHLORIDE 2 G/50ML
2000 INJECTION, SOLUTION INTRAVENOUS ONCE
Status: COMPLETED | OUTPATIENT
Start: 2019-08-16 | End: 2019-08-16

## 2019-08-16 RX ORDER — ASCORBIC ACID 500 MG
500 TABLET ORAL DAILY
Status: ON HOLD | COMMUNITY
End: 2019-09-06 | Stop reason: CLARIF

## 2019-08-16 RX ORDER — CEFEPIME HYDROCHLORIDE 2 G/50ML
2000 INJECTION, SOLUTION INTRAVENOUS EVERY 24 HOURS
Status: DISCONTINUED | OUTPATIENT
Start: 2019-08-17 | End: 2019-08-19

## 2019-08-16 RX ORDER — BISACODYL 10 MG
10 SUPPOSITORY, RECTAL RECTAL DAILY PRN
Status: DISCONTINUED | OUTPATIENT
Start: 2019-08-16 | End: 2019-08-21 | Stop reason: HOSPADM

## 2019-08-16 RX ORDER — ASPIRIN 325 MG
325 TABLET ORAL DAILY
Status: DISCONTINUED | OUTPATIENT
Start: 2019-08-17 | End: 2019-08-21 | Stop reason: HOSPADM

## 2019-08-16 RX ORDER — SODIUM CHLORIDE 9 MG/ML
75 INJECTION, SOLUTION INTRAVENOUS CONTINUOUS
Status: DISCONTINUED | OUTPATIENT
Start: 2019-08-16 | End: 2019-08-21 | Stop reason: HOSPADM

## 2019-08-16 RX ORDER — MORPHINE SULFATE 15 MG/1
15 TABLET ORAL 4 TIMES DAILY
Status: DISCONTINUED | OUTPATIENT
Start: 2019-08-16 | End: 2019-08-21 | Stop reason: HOSPADM

## 2019-08-16 RX ORDER — FOLIC ACID/MULTIVIT,IRON,MINER .4-18-35
1 TABLET,CHEWABLE ORAL DAILY
Status: ON HOLD | COMMUNITY
End: 2019-09-06 | Stop reason: CLARIF

## 2019-08-16 RX ORDER — CHLORHEXIDINE GLUCONATE 0.12 MG/ML
15 RINSE ORAL EVERY 12 HOURS SCHEDULED
Status: DISCONTINUED | OUTPATIENT
Start: 2019-08-16 | End: 2019-08-21 | Stop reason: HOSPADM

## 2019-08-16 RX ORDER — ASCORBIC ACID 500 MG
500 TABLET ORAL DAILY
Status: DISCONTINUED | OUTPATIENT
Start: 2019-08-17 | End: 2019-08-21 | Stop reason: HOSPADM

## 2019-08-16 RX ADMIN — HEPARIN SODIUM 5000 UNITS: 5000 INJECTION, SOLUTION INTRAVENOUS; SUBCUTANEOUS at 21:01

## 2019-08-16 RX ADMIN — SODIUM CHLORIDE 125 ML/HR: 9 INJECTION, SOLUTION INTRAVENOUS at 20:03

## 2019-08-16 RX ADMIN — CHLORHEXIDINE GLUCONATE 0.12% ORAL RINSE 15 ML: 1.2 LIQUID ORAL at 21:00

## 2019-08-16 RX ADMIN — ACETAMINOPHEN 975 MG: 325 TABLET ORAL at 18:13

## 2019-08-16 RX ADMIN — SODIUM CHLORIDE 1500 ML: 0.9 INJECTION, SOLUTION INTRAVENOUS at 17:39

## 2019-08-16 RX ADMIN — FAMOTIDINE 20 MG: 10 INJECTION, SOLUTION INTRAVENOUS at 21:00

## 2019-08-16 RX ADMIN — MORPHINE SULFATE 15 MG: 15 TABLET ORAL at 21:01

## 2019-08-16 RX ADMIN — SODIUM CHLORIDE 1000 ML: 0.9 INJECTION, SOLUTION INTRAVENOUS at 16:51

## 2019-08-16 RX ADMIN — GABAPENTIN 300 MG: 300 CAPSULE ORAL at 21:01

## 2019-08-16 RX ADMIN — FERROUS SULFATE TAB 325 MG (65 MG ELEMENTAL FE) 325 MG: 325 (65 FE) TAB at 22:09

## 2019-08-16 RX ADMIN — CEFEPIME HYDROCHLORIDE 2000 MG: 2 INJECTION, SOLUTION INTRAVENOUS at 17:39

## 2019-08-16 RX ADMIN — VANCOMYCIN HYDROCHLORIDE 1000 MG: 1 INJECTION, SOLUTION INTRAVENOUS at 18:38

## 2019-08-16 NOTE — ED NOTES
Weston Hollins NP hospitalist  Made aware of pts vital signs  Dr Hanna Xiong made aware of pts status and vital signs        Sachi Velez RN  08/16/19 1924

## 2019-08-16 NOTE — ASSESSMENT & PLAN NOTE
· Present time is turned secondary to UTI with chronic Diallo  · Noted by fever of 101, heart rate 132, respiratory rate 29, leukocytosis 19 K, lactic acidosis

## 2019-08-16 NOTE — ASSESSMENT & PLAN NOTE
· Renal ultrasound ordered  · Will request Diallo change in the a m    · Urology consult has been requested

## 2019-08-16 NOTE — ED CARE HANDOFF
Emergency Department Sign Out Note        Sign out and transfer of care from Dr Yesenia Davis  See Separate Emergency Department note  The patient, Janice Gordon, was evaluated by the previous provider for urosepsis  Workup Completed:  Labs Reviewed   CBC AND DIFFERENTIAL - Abnormal       Result Value Ref Range Status    WBC 19 40 (*) 4 80 - 10 80 Thousand/uL Final    RBC 3 49 (*) 4 30 - 5 90 Million/uL Final    Hemoglobin 11 1 (*) 14 0 - 18 0 g/dL Final    Hematocrit 33 5 (*) 42 0 - 47 0 % Final    MCV 96  81 - 99 fL Final    MCH 31 9  26 0 - 34 0 pg Final    MCHC 33 2  31 0 - 37 0 g/dL Final    RDW 13 5  11 5 - 14 5 % Final    MPV 9 0  8 6 - 11 7 fL Final    Platelets 482  539 - 390 Thousands/uL Final    Neutrophils Relative 89 (*) 42 - 75 % Final    Lymphocytes Relative 6 (*) 21 - 51 % Final    Monocytes Relative 5  2 - 12 % Final    Eosinophils Relative 0  0 - 5 % Final    Basophils Relative 0  0 - 2 % Final    Neutrophils Absolute 17 20 (*) 1 40 - 6 50 Thousands/µL Final    Lymphocytes Absolute 1 20  0 60 - 4 47 Thousands/µL Final    Monocytes Absolute 1 00  0 17 - 1 22 Thousand/µL Final    Eosinophils Absolute 0 00  0 00 - 0 61 Thousand/µL Final    Basophils Absolute 0 10  0 00 - 0 10 Thousands/µL Final   COMPREHENSIVE METABOLIC PANEL - Abnormal    Sodium 135  134 - 143 mmol/L Final    Potassium 4 4  3 5 - 5 5 mmol/L Final    Chloride 103  98 - 107 mmol/L Final    CO2 24  21 - 31 mmol/L Final    ANION GAP 8  4 - 13 mmol/L Final    BUN 40 (*) 7 - 25 mg/dL Final    Creatinine 2 17 (*) 0 70 - 1 30 mg/dL Final    Comment: Standardized to IDMS reference method    Glucose 132 (*) 65 - 99 mg/dL Final    Comment:   If the patient is fasting, the ADA then defines impaired fasting glucose as > 100 mg/dL and diabetes as > or equal to 123 mg/dL  Specimen collection should occur prior to Sulfasalazine administration due to the potential for falsely depressed results   Specimen collection should occur prior to Sulfapyridine administration due to the potential for falsely elevated results  Calcium 8 3 (*) 8 6 - 10 5 mg/dL Final     (*) 13 - 39 U/L Final    Comment:   Specimen collection should occur prior to Sulfasalazine administration due to the potential for falsely depressed results  ALT 62 (*) 7 - 52 U/L Final    Comment:   Specimen collection should occur prior to Sulfasalazine administration due to the potential for falsely depressed results  Alkaline Phosphatase 76  55 - 165 U/L Final    Total Protein 5 7 (*) 6 4 - 8 9 g/dL Final    Albumin 3 1 (*) 3 5 - 5 7 g/dL Final    Total Bilirubin 0 50  0 20 - 1 00 mg/dL Final    eGFR 29  ml/min/1 73sq m Final    Narrative:     [place] guidelines for Chronic Kidney Disease (CKD):     Stage 1 with normal or high GFR (GFR > 90 mL/min/1 73 square meters)    Stage 2 Mild CKD (GFR = 60-89 mL/min/1 73 square meters)    Stage 3A Moderate CKD (GFR = 45-59 mL/min/1 73 square meters)    Stage 3B Moderate CKD (GFR = 30-44 mL/min/1 73 square meters)    Stage 4 Severe CKD (GFR = 15-29 mL/min/1 73 square meters)    Stage 5 End Stage CKD (GFR <15 mL/min/1 73 square meters)  Note: GFR calculation is accurate only with a steady state creatinine   LACTIC ACID, PLASMA - Abnormal    LACTIC ACID 2 6 (*) 0 5 - 2 0 mmol/L Final    Narrative:     Result may be elevated if tourniquet was used during collection  Result may be elevated if tourniquet was used during collection     PROTIME-INR - Abnormal    Protime 16 0 (*) 10 2 - 13 0 seconds Final    Comment:      INR 1 37  0 90 - 1 50 Final    Comment:     UA W REFLEX TO MICROSCOPIC WITH REFLEX TO CULTURE - Abnormal    Color, UA Yellow  Yellow, Straw Final    Clarity, UA Cloudy (*) Hazy, Clear Final    Specific Gravity, UA 1 025  >1 005-<1 030 Final    pH, UA 6 0  5 0, 5 5, 6 0, 6 5, 7 0, 7 5 Final    Leukocytes, UA 2+ (*) Negative Final    Nitrite, UA Negative  Negative Final    Protein, UA 2+ (*) Negative, Interference- unable to analyze mg/dl Final    Glucose, UA Negative  Negative mg/dl Final    Ketones, UA Trace (*) Negative mg/dl Final    Urobilinogen, UA 0 2  0 2, 1 0 E U /dl E U /dl Final    Bilirubin, UA Negative  Negative Final    Blood, UA 3+ (*) Negative Final   TROPONIN I - Abnormal    Troponin I 2 19 (*) <=0 03 ng/mL Final   URINE MICROSCOPIC - Abnormal    RBC, UA 4-10 (*) None Seen, 0-5 /hpf Final    WBC, UA Innumerable (*) None Seen, 0-5, 5-55, 5-65 /hpf Final    Epithelial Cells Occasional  None Seen, Occasional /hpf Final    Bacteria, UA Innumerable (*) None Seen, Occasional /hpf Final    Hyaline Casts, UA 1-2 (*) (none) /lpf Final    Ca Oxalate Celia, UA Occasional (*) None Seen /hpf Final   APTT - Normal    PTT 34  23 - 37 seconds Final    Comment: Therapeutic Heparin Range =  60-90 seconds   BLOOD CULTURE   BLOOD CULTURE   URINE CULTURE   LACTIC ACID, PLASMA   PROCALCITONIN TEST   TROPONIN I         ED Course / Workup Pending (followup):    1900 - care assumed from Dr Geraline Goodell pending patient transferred to the floor for urosepsis  My only involvement in this case was abuse for review of the patient's clinical status and workup in the ED prior to his transfer to the  ICU due to nursing and hospitalist concerns  Care in the ED was reviewed patient has been treated with broad-spectrum antibiotics and has received 2 5 L of normal saline in the ED which equals greater than 30 cc/kilos bolus  Patient is breathing normally in the ED although does have persistent rigors and chills he is  Alert and does not require emergent intubation at this time  hospitalist requested a discussion with Cardiology given elevated troponin secondary to sepsis  7:26 PM   spoke with Dr Ricardo Dominguez  Covering for cardiology reviewed case and findings in the emergency department he route recommends admitting the patient here in treating for sepsis and is aware of the elevated troponin results  ED Course as of Aug 16 1926   Fri Aug 16, 2019   1900 Hospitalist requesting cardiology consult for elevated troponin results in the setting of sepsis without chest pain or acute ischemia on EKG  Voicemail left for Dr Brie Soriano on-call for Cardiology  Procedures  MDM    Disposition  Final diagnoses:   Sepsis ([facility] )   [facility]   Elevated troponin     Time reflects when diagnosis was documented in both MDM as applicable and the Disposition within this note     Time User Action Codes Description Comment    8/16/2019  6:17 PM Louanna Figures Add [A41 9] Sepsis ([facility] )     8/16/2019  6:17 PM Roldan Shafferer Add [N17 9] RUTHANN (acute kidney injury) ([facility] )     8/16/2019  6:17 PM Louanna Figures Add [R74 8] Elevated troponin     8/16/2019  6:37 PM Brittnee Holloway Add [Z96 0] Chronic indwelling Diallo catheter     8/16/2019  6:37 PM Racheal Encinas Modify [Z96 0] Chronic indwelling Diallo catheter       ED Disposition     ED Disposition Condition Date/Time Comment    Admit Stable Fri Aug 16, 2019  6:16 PM Case was discussed with the Hospitalist on call and the patient's admission status was agreed to be Admission Status: inpatient status to the service of Dr Nathalia Juarez   Follow-up Information    None       Patient's Medications   Discharge Prescriptions    No medications on file     No discharge procedures on file         ED Provider  Electronically Signed by     Zofia Lerner DO  08/16/19 1926

## 2019-08-16 NOTE — ED PROCEDURE NOTE
PROCEDURE  CriticalCare Time  Performed by: Sanju Hester MD  Authorized by: Sanju Hester MD     Critical care provider statement:     Critical care time (minutes):  65    Critical care start time:  8/16/2019 5:10 PM    Critical care end time:  8/16/2019 6:19 PM    Critical care time was exclusive of:  Separately billable procedures and treating other patients    Critical care was necessary to treat or prevent imminent or life-threatening deterioration of the following conditions:  Sepsis    Critical care was time spent personally by me on the following activities:  Blood draw for specimens, obtaining history from patient or surrogate, development of treatment plan with patient or surrogate, evaluation of patient's response to treatment, examination of patient, ordering and performing treatments and interventions, ordering and review of laboratory studies, ordering and review of radiographic studies, re-evaluation of patient's condition and review of old charts    I assumed direction of critical care for this patient from another provider in my specialty: yes           Sanju Hester MD  08/16/19 5654

## 2019-08-16 NOTE — ED PROVIDER NOTES
History  No chief complaint on file  57-year-old white male significant past medical history for advanced degenerative joint disease with multiple surgeries on spine and shoulders presents after a fall and weakness  Describes generalized weakness over last 24-48 hours  A low-grade fever as well  Denies any diarrhea or vomiting  Denies chest pain  Feels a little winded  Poor appetite  Has had dark stools but does take iron  He has chronic urinary retention and chronic indwelling Diallo which was manipulated 3 days ago  He apparently felt weak, was walking outside, when his right leg gave way (chronic orthopedic issues) and he fell to the ground  He was unable to get himself up which is unusual for him, his daughter was able to get him to his feet, and bring him to the hospital   He is also describes some change in mental status with some confusion and disorientation  Cannot display prior to admission medications because the patient has not been admitted in this contact  Past Medical History:   Diagnosis Date    Depression     Diallo catheter in place     Hypertension     Prostate enlargement     Psychiatric disorder     Urinary tract infection        Past Surgical History:   Procedure Laterality Date    ARTHROSCOPY KNEE      BACK SURGERY      L 4 or 5    CATARACT EXTRACTION Bilateral     CYSTOSCOPY W/ LASER LITHOTRIPSY N/A 8/20/2018    Procedure: LITHOTRISPY HOLMIUM LASER of bladder stones;  Surgeon: Humaira Valentin MD;  Location: [address] [place] [address] OR;  Service: Urology    [place] TRANSURETHRAL ELEC-SURG [facility] N/A 8/20/2018    Procedure: Francia Al; TURP;  Surgeon: Humaira Valentin MD;  Location: [address] [place] [address] OR;  Service: Urology       No family history on file  I have reviewed and agree with the history as documented      Social History     Tobacco Use    Smoking status: Former Smoker    Smokeless tobacco: Never Used   Substance Use Topics    Alcohol use: No    Drug use: No        Review of Systems   Constitutional: Positive for fatigue and fever  Negative for chills  HENT: Negative for rhinorrhea and sore throat  Eyes: Negative for visual disturbance  Respiratory: Positive for shortness of breath  Negative for cough  Cardiovascular: Negative for chest pain and leg swelling  Gastrointestinal: Negative for abdominal pain, diarrhea, nausea and vomiting  Dark stools   Genitourinary: Negative for dysuria  Musculoskeletal: Positive for back pain and myalgias  Skin: Positive for pallor  Negative for rash  Neurological: Positive for dizziness and light-headedness  Negative for headaches  Psychiatric/Behavioral: Negative for confusion  All other systems reviewed and are negative  Physical Exam  Physical Exam   Constitutional: He is oriented to person, place, and time  He appears lethargic  He appears ill  HENT:   Nose: Nose normal    Mouth/Throat: No oropharyngeal exudate  Dry mucosa   Eyes: Pupils are equal, round, and reactive to light  Conjunctivae and EOM are normal  No scleral icterus  Pale conjunctiva   Neck: Normal range of motion  Neck supple  No JVD present  No tracheal deviation present  Cardiovascular: Normal rate, regular rhythm and normal heart sounds  No murmur heard  Pulmonary/Chest: Effort normal  No respiratory distress  He has no wheezes  He has no rales  Few basilar crackles   Abdominal: Soft  Bowel sounds are normal  There is no tenderness  There is no guarding  Musculoskeletal: He exhibits no edema or tenderness  Advanced degenerative disease of both shoulders hips and knees  Diminished range of motion of all joints splint on the right ankle   Neurological: He is oriented to person, place, and time  He appears lethargic  No cranial nerve deficit or sensory deficit  He exhibits normal muscle tone  5/5 motor, nl sens   Skin: Skin is warm  There is pallor  Poor skin turgor   Psychiatric: He has a normal mood and affect   His behavior is normal    Nursing note and vitals reviewed  Vital Signs  ED Triage Vitals   Temp Pulse Resp BP SpO2   -- -- -- -- --      Temp src Heart Rate Source Patient Position - Orthostatic VS BP Location FiO2 (%)   -- -- -- -- --      Pain Score       --           There were no vitals filed for this visit  Visual Acuity      ED Medications  Medications - No data to display    Diagnostic Studies  Results Reviewed     None                 No orders to display              Procedures  ECG 12 Lead Documentation Only  Date/Time: 8/16/2019 4:46 PM  Performed by: Cordell Meek DO  Authorized by: Cordell Meek DO     Indications / Diagnosis:  Weakness  ECG reviewed by me, the ED Provider: yes    Patient location:  ED  Previous ECG:     Previous ECG:  Unavailable  Interpretation:     Interpretation: normal    Rate:     ECG rate:  76    ECG rate assessment: normal    Rhythm:     Rhythm: sinus rhythm    Ectopy:     Ectopy: PAC    QRS:     QRS axis:  Normal  Conduction:     Conduction: normal    ST segments:     ST segments:  Normal  T waves:     T waves: normal             ED Course  ED Course as of Aug 18 1401   Fri Aug 16, 2019   1712 Patient signed out to Dr Yamile Butt                                  MDM    Disposition  Final diagnoses:   None     ED Disposition     None      Follow-up Information    None         Patient's Medications   Discharge Prescriptions    No medications on file     No discharge procedures on file      ED Provider  Electronically Signed by           Cordell Meek DO  08/16/19 [address] [place] [address],   08/16/19 [address] [place] [address],   08/18/19 1401

## 2019-08-16 NOTE — ED NOTES
Pt found to have rigors and a hr of 140  Pt placed on o2  Dr Piter Theodore made aware  EKG repeated        Isauro Taevras RN  08/16/19 1922

## 2019-08-16 NOTE — ASSESSMENT & PLAN NOTE
· Will request cardiology consult  · Reviewed w/ ER attending and cardiology, ok to keep, likely from infection

## 2019-08-17 ENCOUNTER — APPOINTMENT (INPATIENT)
Dept: RADIOLOGY | Facility: HOSPITAL | Age: 74
DRG: 698 | End: 2019-08-17
Payer: MEDICARE

## 2019-08-17 PROBLEM — R78.81 BACTEREMIA DUE TO GRAM-NEGATIVE BACTERIA: Status: ACTIVE | Noted: 2019-08-17

## 2019-08-17 LAB
ALBUMIN SERPL BCP-MCNC: 2.7 G/DL (ref 3.5–5.7)
ALP SERPL-CCNC: 77 U/L (ref 55–165)
ALT SERPL W P-5'-P-CCNC: 57 U/L (ref 7–52)
ANION GAP SERPL CALCULATED.3IONS-SCNC: 7 MMOL/L (ref 4–13)
AST SERPL W P-5'-P-CCNC: 130 U/L (ref 13–39)
BACTERIA UR CULT: NORMAL
BILIRUB SERPL-MCNC: 0.4 MG/DL (ref 0.2–1)
BUN SERPL-MCNC: 36 MG/DL (ref 7–25)
CALCIUM SERPL-MCNC: 7.5 MG/DL (ref 8.6–10.5)
CHLORIDE SERPL-SCNC: 110 MMOL/L (ref 98–107)
CO2 SERPL-SCNC: 20 MMOL/L (ref 21–31)
CREAT SERPL-MCNC: 1.8 MG/DL (ref 0.7–1.3)
CREAT UR-MCNC: 298 MG/DL
CREAT UR-MCNC: 298 MG/DL
ERYTHROCYTE [DISTWIDTH] IN BLOOD BY AUTOMATED COUNT: 13.8 % (ref 11.5–14.5)
GFR SERPL CREATININE-BSD FRML MDRD: 36 ML/MIN/1.73SQ M
GLUCOSE SERPL-MCNC: 119 MG/DL (ref 65–140)
GLUCOSE SERPL-MCNC: 120 MG/DL (ref 65–140)
GLUCOSE SERPL-MCNC: 120 MG/DL (ref 65–99)
GLUCOSE SERPL-MCNC: 122 MG/DL (ref 65–140)
GLUCOSE SERPL-MCNC: 127 MG/DL (ref 65–140)
HCT VFR BLD AUTO: 28.3 % (ref 42–47)
HGB BLD-MCNC: 9.2 G/DL (ref 14–18)
LYMPHOCYTES # BLD AUTO: 0.8 THOUSAND/UL (ref 0.6–4.47)
LYMPHOCYTES # BLD AUTO: 4 % (ref 20–51)
MAGNESIUM SERPL-MCNC: 1.6 MG/DL (ref 1.9–2.7)
MCH RBC QN AUTO: 31.7 PG (ref 26–34)
MCHC RBC AUTO-ENTMCNC: 32.4 G/DL (ref 31–37)
MCV RBC AUTO: 98 FL (ref 81–99)
MICROALBUMIN UR-MCNC: 870 MG/L (ref 0–20)
MICROALBUMIN/CREAT 24H UR: 292 MG/G CREATININE (ref 0–30)
MONOCYTES # BLD AUTO: 0.6 THOUSAND/UL (ref 0–1.22)
MONOCYTES NFR BLD AUTO: 3 % (ref 4–12)
NEUTS SEG # BLD: 18.51 THOUSAND/UL (ref 1.81–6.82)
NEUTS SEG NFR BLD AUTO: 93 % (ref 42–75)
PLATELET # BLD AUTO: 157 THOUSANDS/UL (ref 149–390)
PLATELET BLD QL SMEAR: ADEQUATE
PMV BLD AUTO: 9.5 FL (ref 8.6–11.7)
POTASSIUM SERPL-SCNC: 3.7 MMOL/L (ref 3.5–5.5)
PROCALCITONIN SERPL-MCNC: 125.83 NG/ML
PROT SERPL-MCNC: 5.1 G/DL (ref 6.4–8.9)
PROT UR-MCNC: 218 MG/DL
PROT/CREAT UR: 0.73 MG/G{CREAT} (ref 0–0.1)
RBC # BLD AUTO: 2.9 MILLION/UL (ref 4.3–5.9)
RBC MORPH BLD: NORMAL
SODIUM SERPL-SCNC: 137 MMOL/L (ref 134–143)
TOTAL CELLS COUNTED SPEC: 100
TROPONIN I SERPL-MCNC: 3.08 NG/ML
WBC # BLD AUTO: 19.9 THOUSAND/UL (ref 4.8–10.8)

## 2019-08-17 PROCEDURE — G8978 MOBILITY CURRENT STATUS: HCPCS

## 2019-08-17 PROCEDURE — G8987 SELF CARE CURRENT STATUS: HCPCS

## 2019-08-17 PROCEDURE — 84484 ASSAY OF TROPONIN QUANT: CPT | Performed by: PHYSICIAN ASSISTANT

## 2019-08-17 PROCEDURE — G8979 MOBILITY GOAL STATUS: HCPCS

## 2019-08-17 PROCEDURE — 73522 X-RAY EXAM HIPS BI 3-4 VIEWS: CPT

## 2019-08-17 PROCEDURE — 97163 PT EVAL HIGH COMPLEX 45 MIN: CPT

## 2019-08-17 PROCEDURE — 80053 COMPREHEN METABOLIC PANEL: CPT | Performed by: NURSE PRACTITIONER

## 2019-08-17 PROCEDURE — 85027 COMPLETE CBC AUTOMATED: CPT | Performed by: NURSE PRACTITIONER

## 2019-08-17 PROCEDURE — G8988 SELF CARE GOAL STATUS: HCPCS

## 2019-08-17 PROCEDURE — 99232 SBSQ HOSP IP/OBS MODERATE 35: CPT | Performed by: HOSPITALIST

## 2019-08-17 PROCEDURE — 85007 BL SMEAR W/DIFF WBC COUNT: CPT | Performed by: NURSE PRACTITIONER

## 2019-08-17 PROCEDURE — 84145 PROCALCITONIN (PCT): CPT | Performed by: NURSE PRACTITIONER

## 2019-08-17 PROCEDURE — 83735 ASSAY OF MAGNESIUM: CPT | Performed by: NURSE PRACTITIONER

## 2019-08-17 PROCEDURE — 82948 REAGENT STRIP/BLOOD GLUCOSE: CPT

## 2019-08-17 PROCEDURE — 97167 OT EVAL HIGH COMPLEX 60 MIN: CPT

## 2019-08-17 PROCEDURE — 93005 ELECTROCARDIOGRAM TRACING: CPT

## 2019-08-17 RX ORDER — ACETAMINOPHEN 325 MG/1
650 TABLET ORAL EVERY 4 HOURS PRN
Status: DISCONTINUED | OUTPATIENT
Start: 2019-08-17 | End: 2019-08-21 | Stop reason: HOSPADM

## 2019-08-17 RX ADMIN — Medication 1 TABLET: at 08:28

## 2019-08-17 RX ADMIN — FAMOTIDINE 20 MG: 10 INJECTION, SOLUTION INTRAVENOUS at 17:45

## 2019-08-17 RX ADMIN — SODIUM CHLORIDE 75 ML/HR: 9 INJECTION, SOLUTION INTRAVENOUS at 06:30

## 2019-08-17 RX ADMIN — OXYCODONE HYDROCHLORIDE AND ACETAMINOPHEN 500 MG: 500 TABLET ORAL at 08:31

## 2019-08-17 RX ADMIN — ASPIRIN 325 MG: 325 TABLET, FILM COATED ORAL at 08:28

## 2019-08-17 RX ADMIN — FERROUS SULFATE TAB 325 MG (65 MG ELEMENTAL FE) 325 MG: 325 (65 FE) TAB at 17:43

## 2019-08-17 RX ADMIN — SODIUM CHLORIDE 75 ML/HR: 9 INJECTION, SOLUTION INTRAVENOUS at 19:02

## 2019-08-17 RX ADMIN — ACETAMINOPHEN 650 MG: 325 TABLET ORAL at 21:06

## 2019-08-17 RX ADMIN — MORPHINE SULFATE 15 MG: 15 TABLET ORAL at 17:43

## 2019-08-17 RX ADMIN — HEPARIN SODIUM 5000 UNITS: 5000 INJECTION, SOLUTION INTRAVENOUS; SUBCUTANEOUS at 21:03

## 2019-08-17 RX ADMIN — MORPHINE SULFATE 15 MG: 15 TABLET ORAL at 08:28

## 2019-08-17 RX ADMIN — MORPHINE SULFATE 15 MG: 15 TABLET ORAL at 11:44

## 2019-08-17 RX ADMIN — ACETAMINOPHEN 650 MG: 325 TABLET ORAL at 11:44

## 2019-08-17 RX ADMIN — MORPHINE SULFATE 15 MG: 15 TABLET ORAL at 21:03

## 2019-08-17 RX ADMIN — CEFEPIME HYDROCHLORIDE 2000 MG: 2 INJECTION, SOLUTION INTRAVENOUS at 08:27

## 2019-08-17 RX ADMIN — GABAPENTIN 400 MG: 300 CAPSULE ORAL at 17:44

## 2019-08-17 RX ADMIN — FINASTERIDE 5 MG: 5 TABLET, FILM COATED ORAL at 08:28

## 2019-08-17 RX ADMIN — HEPARIN SODIUM 5000 UNITS: 5000 INJECTION, SOLUTION INTRAVENOUS; SUBCUTANEOUS at 16:39

## 2019-08-17 RX ADMIN — DOCUSATE SODIUM 400 MG: 100 CAPSULE, LIQUID FILLED ORAL at 08:27

## 2019-08-17 RX ADMIN — VITAMIN D, TAB 1000IU (100/BT) 2000 UNITS: 25 TAB at 08:28

## 2019-08-17 RX ADMIN — FAMOTIDINE 20 MG: 10 INJECTION, SOLUTION INTRAVENOUS at 08:27

## 2019-08-17 RX ADMIN — GABAPENTIN 400 MG: 300 CAPSULE ORAL at 08:28

## 2019-08-17 RX ADMIN — CHLORHEXIDINE GLUCONATE 0.12% ORAL RINSE 15 ML: 1.2 LIQUID ORAL at 08:27

## 2019-08-17 RX ADMIN — FERROUS SULFATE TAB 325 MG (65 MG ELEMENTAL FE) 325 MG: 325 (65 FE) TAB at 08:28

## 2019-08-17 RX ADMIN — CHLORHEXIDINE GLUCONATE 0.12% ORAL RINSE 15 ML: 1.2 LIQUID ORAL at 20:43

## 2019-08-17 RX ADMIN — HEPARIN SODIUM 5000 UNITS: 5000 INJECTION, SOLUTION INTRAVENOUS; SUBCUTANEOUS at 06:14

## 2019-08-17 RX ADMIN — ACETAMINOPHEN 650 MG: 325 TABLET ORAL at 16:31

## 2019-08-17 RX ADMIN — SERTRALINE HYDROCHLORIDE 50 MG: 50 TABLET ORAL at 08:28

## 2019-08-17 NOTE — H&P
H&P- Arturo Calvo 1945, 66 y o  male MRN: [id number]    Unit/Bed#: ICU 06 Encounter: [id number]    Primary Care Provider: Jonathan Alves DO   Date and time admitted to hospital: 8/16/2019  4:31 PM        * Toxic metabolic encephalopathy  Assessment & Plan  · Secondary to UTI/sepsis  · Patient with increased confusion, weakness, fall  · Supportive care    Acute cystitis with hematuria  Assessment & Plan  · Continue vanco and cefepime  · IVF  · Follow up blood and urine cultures    Sepsis ([facility] )  Assessment & Plan  · Present time is turned secondary to UTI with chronic Diallo  · Noted by fever of 101, heart rate 132, respiratory rate 29, leukocytosis 19 K, lactic acidosis    Chronic pain syndrome  Assessment & Plan  · With continuous opioid dependence  · Continue home meds    Fall  Assessment & Plan  · PT/OT evals for d/c needs    Elevated troponin  Assessment & Plan  · Will request cardiology consult  · Reviewed w/ ER attending and cardiology, ok to keep, likely from infection    RUTHANN (acute kidney injury) ([facility] )  Assessment & Plan  · IV hydration  · Nephrology consult  · Renal US  · Creat baseline 1 1    Chronic indwelling Diallo catheter  Assessment & Plan  · Renal ultrasound ordered  · Will request Diallo change in the a m  · Urology consult has been requested        VTE Prophylaxis: Heparin  Code Status: full code  POLST: There is no POLST form on file for this patient (pre-hospital)  Discussion with family: daughter and son in law    Anticipated Length of Stay:  Patient will be admitted on an Inpatient basis with an anticipated length of stay of  > 2 midnights  Justification for Hospital Stay: IV antibiotics, follow up cultures    Chief Complaint:   Confusion, fever, fall    History of Present Illness:    Arturo Calvo is a 66 y o  male who presents with confusion, fever, fall    Patient with PMHx of shoulder surgery in May 2019 with stay in hospital and at Short term rehab, HTN, chronic pain with continuous opioid use, constipation,  Chronic cantor, sees Dr Webster Mildred, daughter reports cantor was changed on Monday reports that yesterday he started with weakness and leg and back pain  His daughter noted increased confusion today and had a fall at home  He had difficulty getting up and daughter brought him to the hospital   In the ER he was noted to be lethargic, when I saw him he was awake and alert and responsive to questions  In the ER he was given 975 mg Tylenol, Cefepime 2000 mg, 2500 ml IVF, vancomycin 1000 mg  Review of Systems:  Review of Systems   Constitutional: Positive for chills and fever  HENT: Negative for sore throat and trouble swallowing  Eyes: Negative for discharge and redness  Respiratory: Positive for shortness of breath  Negative for cough  Cardiovascular: Negative for chest pain and leg swelling  Gastrointestinal: Negative for abdominal pain, diarrhea, nausea and vomiting  Genitourinary:        Chronic cantor, decreased output, foul smelling   Musculoskeletal: Positive for back pain and myalgias  Skin: Positive for wound (right thigh)  Negative for rash  Neurological: Positive for weakness  Negative for dizziness and headaches  Psychiatric/Behavioral: Positive for confusion         Past Medical and Surgical History:   Past Medical History:   Diagnosis Date    Depression     Cantor catheter in place     Hypertension     Prostate enlargement     Psychiatric disorder     Urinary tract infection        Past Surgical History:   Procedure Laterality Date    ARTHROSCOPY KNEE      BACK SURGERY      L 4 or 5    CATARACT EXTRACTION Bilateral     CYSTOSCOPY W/ LASER LITHOTRIPSY N/A 8/20/2018    Procedure: LITHOTRISPY HOLMIUM LASER of bladder stones;  Surgeon: Estefany Boo MD;  Location: [facility] MAIN OR;  Service: Urology    [place] TRANSURETHRAL ELEC-SURG 508 Evelyne Kruse N/A 8/20/2018    Procedure: Purvi Banda; TURP;  Surgeon: Estefany Boo MD;  Location: [facility] MAIN OR;  Service: Urology  SHOULDER SURGERY Right 05/31/2019       Meds/Allergies:  Prior to Admission medications    Medication Sig Start Date End Date Taking?  Authorizing Provider   acetaminophen (TYLENOL) 325 mg tablet Take 1,300 mg by mouth daily   Yes Historical Provider, MD   ascorbic acid (VITAMIN C) 500 mg tablet Take 500 mg by mouth daily   Yes Historical Provider, MD   b complex vitamins tablet Take 1 tablet by mouth daily   Yes Historical Provider, MD   cholecalciferol (VITAMIN D3) 1,000 units tablet Take 2,000 Units by mouth daily   Yes Historical Provider, MD   docusate sodium (COLACE) 100 mg capsule Take 400 mg by mouth daily   Yes Historical Provider, MD   ferrous sulfate 325 (65 Fe) mg tablet Take 325 mg by mouth 2 (two) times a day   Yes Historical Provider, MD   multivitamin-iron-minerals-folic acid (CENTRUM) chewable tablet Chew 1 tablet daily   Yes Historical Provider, MD   aspirin 325 mg tablet Take 325 mg by mouth daily    Historical Provider, MD   finasteride (PROSCAR) 5 mg tablet Take 5 mg by mouth daily    Historical Provider, MD   gabapentin (NEURONTIN) 300 mg capsule Take 400 mg by mouth 2 (two) times a day  3/23/18   Historical Provider, MD   morphine (MSIR) 15 mg tablet Take 15 mg by mouth 4 (four) times a day   3/24/18   Historical Provider, MD   sertraline (ZOLOFT) 50 mg tablet Take 50 mg by mouth daily   1/26/18   Historical Provider, MD   meloxicam (MOBIC) 15 mg tablet Take 1 tablet (15 mg total) by mouth daily  Patient not taking: Reported on 8/16/2019 10/5/18 8/16/19  Taisha Cartagena DO   meloxicam (MOBIC) 15 mg tablet Take 1 tablet (15 mg total) by mouth daily  Patient not taking: Reported on 8/16/2019 1/11/19 8/16/19  Taisha Cartagena DO   naproxen (NAPROSYN) 500 mg tablet Take 1 tablet (500 mg total) by mouth 2 (two) times a day with meals  Patient not taking: Reported on 8/16/2019 9/14/18 8/16/19  Taisha Cartagena DO   nitrofurantoin (MACRODANTIN) 100 mg capsule Take 100 mg by mouth 2 (two) times a day  8/16/19  Historical Provider, MD   Turmeric Curcumin 500 MG CAPS Take 1 capsule (500 mg total) by mouth 2 (two) times a day  Patient not taking: Reported on 8/16/2019 10/5/18 8/16/19  Taisha Cartagena DO     I have reviewed home medications with patient family member  Allergies: No Known Allergies    Social History:  Marital Status:    Occupation: retired  Patient Pre-hospital Living Situation: home with daughter  Patient Pre-hospital Level of Mobility: cane as needed  Patient Pre-hospital Diet Restrictions: none  Substance Use History:     Social History     Substance and Sexual Activity   Alcohol Use No     Social History     Tobacco Use   Smoking Status Former Smoker    Packs/day: 1 00   Smokeless Tobacco Never Used   Tobacco Comment    quit 50 years ago     Social History     Substance and Sexual Activity   Drug Use No       Family History:  I have reviewed the patients family history    Physical Exam:   Vitals:   Blood Pressure: 134/85 (08/16/19 1900)  Pulse: (!) 132 (08/16/19 1900)  Temperature: (!) 103 5 °F (39 7 °C) (08/16/19 2011)  Temp Source: Temporal (08/16/19 2011)  Respirations: (!) 29 (08/16/19 1900)  Height: 5' 9" (175 3 cm) (08/16/19 1632)  Weight - Scale: 82 6 kg (182 lb 1 6 oz) (08/16/19 1948)  SpO2: 94 % (08/16/19 1900)    Physical Exam   Constitutional: He is oriented to person, place, and time  He appears well-developed and well-nourished  Pleasant elderly male   HENT:   Head: Normocephalic and atraumatic  Dry mucosa   Eyes: Conjunctivae and EOM are normal  Right eye exhibits no discharge  Left eye exhibits no discharge  Glasses in place   Neck: Normal range of motion  No tracheal deviation present  Cardiovascular: Normal rate, regular rhythm and normal heart sounds  Exam reveals no gallop and no friction rub  No murmur heard  Pulmonary/Chest: Effort normal and breath sounds normal  No respiratory distress  He has no wheezes  He has no rales     Decreased BS lateral bases   Abdominal: Soft  Bowel sounds are normal  He exhibits distension  He exhibits no mass  There is no tenderness  There is no guarding  Musculoskeletal: Normal range of motion  He exhibits tenderness (right knee)  He exhibits no edema or deformity  Neurological: He is alert and oriented to person, place, and time  Skin: Skin is warm and dry  Capillary refill takes less than 2 seconds  No rash noted  No erythema  There is pallor  Scab on right thigh   Psychiatric: He has a normal mood and affect  His behavior is normal    Nursing note and vitals reviewed  Additional Data:   Lab Results: I have personally reviewed pertinent reports  Results from last 7 days   Lab Units 08/16/19 1958 08/16/19  1643   WBC Thousand/uL  --  19 40*   HEMOGLOBIN g/dL  --  11 1*   HEMATOCRIT %  --  33 5*   PLATELETS Thousands/uL 172 214   NEUTROS PCT %  --  89*   LYMPHS PCT %  --  6*   MONOS PCT %  --  5   EOS PCT %  --  0     Results from last 7 days   Lab Units 08/16/19  1740   POTASSIUM mmol/L 4 4   CHLORIDE mmol/L 103   CO2 mmol/L 24   BUN mg/dL 40*   CREATININE mg/dL 2 17*   CALCIUM mg/dL 8 3*   ALK PHOS U/L 76   ALT U/L 62*   AST U/L 171*     Results from last 7 days   Lab Units 08/16/19  1643   INR  1 37     Results from last 7 days   Lab Units 08/16/19  2018   POC GLUCOSE mg/dl 147*       Imaging: I have personally reviewed pertinent reports  US kidney and bladder   Final Result by Zain Esquivel MD (08/16 2128)         1  9 mm nonobstructing calculus within the lower pole the right kidney  2  Simple cyst within the left kidney  No hydronephrosis  Workstation performed: [id number]         CT head without contrast   Final Result by Armida Felix MD (08/16 1753)      No acute intracranial abnormality                    Workstation performed: [id number]         XR chest 1 view   ED Interpretation by Guille Paige MD (08/16 1728)   No acute disease       by Suly Henson (27/60 1932) [employer]/[facility] Records Reviewed: Yes     ** Please Note: This note has been constructed using a voice recognition system   **

## 2019-08-17 NOTE — SOCIAL WORK
Chart reviewed by case management, pt remains in ICU, I did place a call to his daughter, pt lives with his daughter, she states that he is independnet, she helps with the cooking ,cleaning and his medications, the pt drives, he has [facility] after his reab stay at [facility], she is nit sure about rehab, she feels he is too sick to make that decision now, pt does not have home oxygen, pt has a quad cane, walker and commode at home, he lives in a 1 story home, basement steps and he does not use them, pt has 4 outside steps, pt has [facility] rx plan at [facility] and he goes for outpt therapy at the [address] [place] [address] in the [place], cm will continue to follow and assess for any additional d/c needs, Patient/caregiver received discharge checklist   Content reviewed  Patient/caregiver encouraged to participate in discharge plan of care prior to discharge home  CM reviewed d/c planning process including the following: identifying help at home, patient preference for d/c planning needs, availability of treatment team to discuss questions or concerns patient and/or family may have regarding understanding medications and recognizing signs and symptoms once discharged  CM also encouraged patient to follow up with all recommended appointments after discharge  Patient advised of importance for patient and family to participate in managing patients medical well being

## 2019-08-17 NOTE — CONSULTS
The patient's vancomycin therapy has been discontinued  Pharmacy will sign off  now  Thank you for this consult

## 2019-08-17 NOTE — UTILIZATION REVIEW
Initial Clinical Review    Admission: Date/Time/Statement: 8/16/19 @ 1818     Orders Placed This Encounter   Procedures    Inpatient Admission (expected length of stay for this patient Order details is greater than two midnights)     Standing Status:   Standing     Number of Occurrences:   1     Order Specific Question:   Admitting Physician     Answer:   Nomi Lynch [[id number]]     Order Specific Question:   Level of Care     Answer:   Critical Care [15]     Order Specific Question:   Estimated length of stay     Answer:   More than 2 Midnights     Order Specific Question:   Certification     Answer:   I certify that inpatient services are medically necessary for this patient for a duration of greater than two midnights  See H&P and MD Progress Notes for additional information about the patient's course of treatment  ED Arrival Information     Expected Arrival Acuity Means of Arrival Escorted By Service Admission Type    - 8/16/2019 16:27 Emergent Walk-In Family [facility] General Medicine Emergency    Arrival Complaint    weakness, dizziness, confusion        Chief Complaint   Patient presents with    Altered Mental Status    Weakness - Generalized     Assessment/Plan:   Supriya Condon is a 66 y o  male who presents with confusion, fever, fall  Patient with PMHx of shoulder surgery in May 2019 with stay in hospital and at Short term rehab, HTN, chronic pain with continuous opioid use, constipation,  Chronic devaughn, [employer], daughter reports cantor was changed on Monday reports that yesterday he started with weakness and leg and back pain  His daughter noted increased confusion today and had a fall at home  He had difficulty getting up and daughter brought him to the hospital   In the ER he was noted to be lethargic, when I saw him he was awake and alert and responsive to questions  In the ER he was given 975 mg Tylenol, Cefepime 2000 mg, 2500 ml IVF, vancomycin 1000 mg    HE IS ADMITTED TO INPATIENT STATUS WITH ACUTE TOXIC METABOLIC ENCEPHALOPATHY  PT REMAINS FEBRILE TODAY 8/17      * Toxic metabolic encephalopathy  Assessment & Plan  · Secondary to UTI/sepsis  · Patient with increased confusion, weakness, fall  · Supportive care     Acute cystitis with hematuria  Assessment & Plan  · Continue vanco and cefepime  · IVF  · Follow up blood and urine cultures     Sepsis ([facility] [address] )  Assessment & Plan  · Present time is turned secondary to UTI with chronic Diallo  · Noted by fever of 101, heart rate 132, respiratory rate 29, leukocytosis 19 K, lactic acidosis     Chronic pain syndrome  Assessment & Plan  · With continuous opioid dependence  · Continue home meds     Fall  Assessment & Plan  · PT/OT evals for d/c needs     Elevated troponin  Assessment & Plan  · Will request cardiology consult  · Reviewed w/ ER attending and cardiology, ok to keep, likely from infection     RUTHANN (acute kidney injury) ([facility] [address] )  Assessment & Plan  · IV hydration  · Nephrology consult  · Renal US  · Creat baseline 1 1     Chronic indwelling Diallo catheter  Assessment & Plan  · Renal ultrasound ordered  · Will request Diallo change in the a m    · Urology consult has been requested      VTE Prophylaxis: Heparin  Code Status: full code    ED Triage Vitals   Temperature Pulse Respirations Blood Pressure SpO2   08/16/19 1632 08/16/19 1632 08/16/19 1632 08/16/19 1636 08/16/19 1632   99 3 °F (37 4 °C) 89 18 109/67 92 %      Temp Source Heart Rate Source Patient Position - Orthostatic VS BP Location FiO2 (%)   08/16/19 1632 08/16/19 1930 08/16/19 1636 08/16/19 1636 --   Temporal Monitor Lying Left arm       Pain Score       08/16/19 1632       5        Wt Readings from Last 1 Encounters:   08/17/19 82 kg (180 lb 12 4 oz)     Additional Vital Signs:   08/17/19 1631  102 8 °F (39 3 °C)Abnormal                08/17/19 1300  101 3 °F (38 5 °C)Abnormal   89  25Abnormal   113/55  73  94 %  Nasal cannula   08/17/19 1200    83  27Abnormal 126/58  83  95 %  Nasal cannula   08/17/19 1147  101 7 °F (38 7 °C)Abnormal   80  26Abnormal   117/64  84  96 %  Nasal cannula   08/17/19 1100    87  23Abnormal   109/59  82  94 %  Nasal cannula   08/17/19 1000    83  21  111/65  81  94 %  Nasal cannula   08/17/19 0800  100 °F (37 8 °C)  75  19  118/63  86  96 %  Nasal cannula   08/17/19 0700    72  25Abnormal   107/58  77  95 %  Nasal cannula   08/17/19 0600    75  17  109/57  77  94 %  Nasal cannula   08/17/19 0500    74  18  131/70  92  96 %  Nasal cannula   08/17/19 0430    78  20  136/69  95  92 %     08/17/19 0400    70  16  125/60  82  94 %  Nasal cannula   08/17/19 0330    69  14  109/58  77  95 %     08/17/19 0300    69  15  100/59  75  94 %  Nasal cannula   08/17/19 0230    72  15  94/55  69  94 %     08/17/19 0200    73  15  92/50  66  94 %  Nasal cannula   08/17/19 0130    76  16  93/54  68  93 %     08/17/19 0100    78  16  88/50Abnormal   63  93 %  Nasal cannula   08/17/19 0030    82  15  92/51  68  92 %     08/17/19 0018  99 5 °F (37 5 °C)               08/17/19 0000    82  17  82/50Abnormal   60  84 %Abnormal   Nasal cannula   08/16/19 2330    84  17  81/51Abnormal   61  93 %  Nasal cannula   08/16/19 2300    87  24Abnormal   89/54Abnormal   69  94 %  Nasal cannula   08/16/19 2258  100 9 °F (38 3 °C)Abnormal                08/16/19 2230    94  23Abnormal   105/51  70  94 %  Nasal cannula   08/16/19 2200    91  22  81/47Abnormal   61  92 %  Nasal cannula   08/16/19 2130    89  27Abnormal   96/52  69  91 %  Nasal cannula   08/16/19 2100    95    108/62  73       08/16/19 2045    99    104/51  74  91 %  Nasal cannula   08/16/19 2034    100  18      92 %  Nasal cannula   08/16/19 2030    103  32Abnormal   118/65  86  90 %  Nasal cannula   08/16/19 2015    107Abnormal   25Abnormal   111/56  79  90 %  Nasal cannula   08/16/19 2011  103 5 °F (39 7 °C)Abnormal                08/16/19 2000    112Abnormal 25Abnormal   123/60  86  90 %  Nasal cannula   08/16/19 1945    112Abnormal   25Abnormal   137/60  87       08/16/19 1930  105 9 °F (41 1 °C)Abnormal   118Abnormal   24Abnormal            08/16/19 1900  101 6 °F (38 7 °C)Abnormal   132Abnormal   29Abnormal   134/85    94 %  Nasal cannula   08/16/19 1830    120Abnormal   36Abnormal   119/81    70 %Abnormal      08/16/19 1745  102 8 °F (39 3 °C)Abnormal   92  18  111/63           Pertinent Labs/Diagnostic Test Results:     8/16 CXR - Mild pulmonary vascular congestion  No consolidation or pneumothorax  8/16 CT HEAD - NO ACUTE DISEASE    8/16 US KIDNEY, BLADDER - 1  9 mm nonobstructing calculus within the lower pole the right kidney  2  Simple cyst within the left kidney  No hydronephrosis       8/17 XRAY BILAT HIPS, PELVIS - PENDING     8/16 ECG - NSR    Results from last 7 days   Lab Units 08/17/19  0430 08/16/19  1958 08/16/19  1643   WBC Thousand/uL 19 90*  --  19 40*   HEMOGLOBIN g/dL 9 2*  --  11 1*   HEMATOCRIT % 28 3*  --  33 5*   PLATELETS Thousands/uL 157 172 214   NEUTROS ABS Thousands/µL  --   --  17 20*   TOTAL NEUT ABS Thousand/uL 18 51*  --   --      Results from last 7 days   Lab Units 08/17/19  0430 08/16/19  1740   SODIUM mmol/L 137 135   POTASSIUM mmol/L 3 7 4 4   CHLORIDE mmol/L 110* 103   CO2 mmol/L 20* 24   ANION GAP mmol/L 7 8   BUN mg/dL 36* 40*   CREATININE mg/dL 1 80* 2 17*   EGFR ml/min/1 73sq m 36 29   CALCIUM mg/dL 7 5* 8 3*   MAGNESIUM mg/dL 1 6*  --      Results from last 7 days   Lab Units 08/17/19  0430 08/16/19  1740   AST U/L 130* 171*   ALT U/L 57* 62*   ALK PHOS U/L 77 76   TOTAL PROTEIN g/dL 5 1* 5 7*   ALBUMIN g/dL 2 7* 3 1*   TOTAL BILIRUBIN mg/dL 0 40 0 50     Results from last 7 days   Lab Units 08/17/19  1139 08/17/19  0809 08/16/19  2018   POC GLUCOSE mg/dl 120 122 147*     Results from last 7 days   Lab Units 08/17/19  0430 08/16/19  1740   GLUCOSE RANDOM mg/dL 120* 132*     Results from last 7 days   Lab Units 08/16/19 1958   HEMOGLOBIN A1C % 5 4   EAG mg/dl 108     Results from last 7 days   Lab Units 08/17/19  0430 08/16/19  2317 08/16/19  1948 08/16/19  1643   TROPONIN I ng/mL 3 08* 4 69* 2 50* 2 19*     Results from last 7 days   Lab Units 08/16/19  1643   PROTIME seconds 16 0*   INR  1 37   PTT seconds 34     Results from last 7 days   Lab Units 08/16/19 1958 08/16/19  1643   PROCALCITONIN ng/ml 169 09* 87 94*     Results from last 7 days   Lab Units 08/16/19  2206 08/16/19  1856 08/16/19  1643   LACTIC ACID mmol/L 1 7 5 2* 2 6*     Results from last 7 days   Lab Units 08/16/19 1958   LIPASE u/L <10*   AMYLASE IU/L 22*     Results from last 7 days   Lab Units 08/16/19 1958 08/16/19  1643   CLARITY UA   --  Cloudy*   COLOR UA   --  Yellow   SPEC GRAV UA   --  1 025   PH UA   --  6 0   GLUCOSE UA mg/dl  --  Negative   KETONES UA mg/dl  --  Trace*   BLOOD UA   --  3+*   PROTEIN UA mg/dl  --  2+*   NITRITE UA   --  Negative   BILIRUBIN UA   --  Negative   UROBILINOGEN UA E U /dl  --  0 2   LEUKOCYTES UA   --  2+*   WBC UA /hpf  --  Innumerable*   RBC UA /hpf  --  4-10*   BACTERIA UA /hpf  --  Innumerable*   EPITHELIAL CELLS WET PREP /hpf  --  Occasional   SODIUM UR  23  --    CREATININE UR mg/dL 298 0  298 0  --    PROTEIN UR mg/dL 218  --    PROT/CREAT RATIO UR  0 73*  --      Results from last 7 days   Lab Units 08/16/19  1643   GRAM STAIN RESULT  Gram negative rods*  Gram negative rods*   URINE CULTURE  >100,000 cfu/ml      Results from last 7 days   Lab Units 08/17/19  0430   TOTAL COUNTED  100     ED Treatment:   Medication Administration from 08/16/2019 1627 to 08/16/2019 1933    Date/Time Order Dose Route Action   08/16/2019 1651 sodium chloride 0 9 % bolus 1,000 mL 1,000 mL Intravenous New Bag   08/16/2019 1739 sodium chloride 0 9 % bolus 1,500 mL 1,500 mL Intravenous New Bag   08/16/2019 1838 vancomycin (VANCOCIN) IVPB (premix) 1,000 mg 1,000 mg Intravenous New Bag   08/16/2019 1730 cefepime (MAXIPIME) IVPB (premix) 2,000 mg 2,000 mg Intravenous New Bag   08/16/2019 1813 acetaminophen (TYLENOL) tablet 975 mg 975 mg Oral Given        Past Medical History:   Diagnosis Date    Depression     Diallo catheter in place     Hypertension     Prostate enlargement     Psychiatric disorder     Urinary tract infection      Present on Admission:   Sepsis ([facility] [address] )   RUTHANN (acute kidney injury) ([facility] )   Elevated troponin   Toxic metabolic encephalopathy   Fall   Acute cystitis with hematuria   Chronic pain syndrome    Admitting Diagnosis: Altered mental status [R41 82]  Elevated troponin [R74 8]  RUTHANN (acute kidney injury) ([facility] [address] ) [N17 9]  Chronic indwelling Diallo catheter [Z96 0]  [facility] [A41 9]     Age/Sex: 66 y o  male     Admission Orders:    Current Facility-Administered Medications:  acetaminophen 650 mg Oral Q4H PRN  X 2  8/17   ascorbic acid 500 mg Oral Daily     aspirin 325 mg Oral Daily     bisacodyl 10 mg Rectal Daily PRN     cefepime 2,000 mg Intravenous Q24H Last Rate: 2,000 mg (08/17/19 0827)    chlorhexidine 15 mL Swish & Spit Q12H [facility]     cholecalciferol 2,000 Units Oral Daily     docusate sodium 400 mg Oral Daily     famotidine 20 mg Intravenous BID     ferrous sulfate 325 mg Oral BID     finasteride 5 mg Oral Daily     gabapentin 400 mg Oral BID     heparin (porcine) 5,000 Units Subcutaneous Q8H [facility]     insulin lispro 1-5 Units Subcutaneous TID AC     insulin lispro 1-5 Units Subcutaneous HS     morphine 15 mg Oral 4x Daily     multivitamin-minerals 1 tablet Oral Daily     ondansetron 4 mg Intravenous Q6H PRN     sertraline 50 mg Oral Daily     sodium chloride 75 mL/hr Intravenous Continuous Last Rate: 75 mL/hr (08/17/19 0630) INFUSING         IP CONSULT TO CASE MANAGEMENT  IP CONSULT TO NEPHROLOGY  IP CONSULT TO UROLOGY  IP CONSULT TO PHARMACY  IP CONSULT TO CARDIOLOGY    Network Utilization Review Department  Phone: [phone];  Fax [phone]  [email or web address]  ATTENTION: Please call with any questions or concerns to [phone]  and carefully listen to the prompts so that you are directed to the right person  Send all requests for admission clinical reviews, approved or denied determinations and any other requests to fax [phone]   All voicemails are confidential

## 2019-08-17 NOTE — NURSING NOTE
Patient awakened for assessment  Remains orientated to person only  Low grade temp; 99 6 temporal  Denying any pain but did express that he had some back discomfort  Patient repositioned to right side lying position which helped in alleviating back discomfort  Will monitor  Patient denying need for pain medication at present  Ice packs previously placed on admission to assist in bringing down temperature were removed at this time

## 2019-08-17 NOTE — PLAN OF CARE
Problem: PHYSICAL THERAPY ADULT  Goal: Performs mobility at highest level of function for planned discharge setting  See evaluation for individualized goals  Description  Treatment/Interventions: Functional transfer training, LE strengthening/ROM, Elevations, Therapeutic exercise, Endurance training, Bed mobility, Gait training  Equipment Recommended: Hassan Shone       See flowsheet documentation for full assessment, interventions and recommendations  Outcome: Progressing  Note:   Prognosis: Fair  Problem List: Decreased strength, Decreased endurance, Impaired balance, Decreased mobility, Impaired judgement, Decreased safety awareness, Pain  Assessment: Pt is 66 y o  male seen for PT evaluation s/p admit to [address] on 9/29/6530 w/ Toxic metabolic encephalopathy  PT consulted to assess pt's functional mobility and d/c needs  Order placed for PT eval and tx, w/ up as tolerated order  Comorbidities affecting pt's physical performance at time of assessment include: sepsis, chronic R shoulder pain, fall and depression  PTA, pt was independent w/ all functional mobility w/ cane, has 3 NOEMY, lives w/ daughter in 1 level house and retired  Personal factors affecting pt at time of IE include: ambulating w/ assistive device, stairs to enter home, decreased cognition, positive fall history and depression  Please find objective findings from PT assessment regarding body systems outlined above with impairments and limitations including weakness, impaired balance, decreased endurance, pain, decreased functional mobility tolerance, decreased safety awareness, impaired judgement and fall risk  The following objective measures performed on IE also reveal limitations: Barthel Index: 35/100  Pt's clinical presentation is currently unstable/unpredictable seen in pt's presentation of pain dropping O2 with activity/talking and decreased memroy   Pt to benefit from continued PT tx to address deficits as defined above and maximize level of functional independent mobility and consistency  From PT/mobility standpoint, recommendation at time of d/c would be STR pending progress in order to facilitate return to PLOF  Barriers to Discharge: Inaccessible home environment     Recommendation: Short-term skilled PT     PT - OK to Discharge: No    See flowsheet documentation for full assessment        Edra Olszewski, PT

## 2019-08-17 NOTE — PROGRESS NOTES
Vancomycin Assessment    Martin Tang is a 66 y o  male who is currently receiving vancomycin 1G IV Q24 HOURS for other UTI   Relevant clinical data and objective history reviewed:  Creatinine   Date Value Ref Range Status   08/16/2019 2 17 (H) 0 70 - 1 30 mg/dL Final     Comment:     Standardized to IDMS reference method   02/07/2019 1 09 0 70 - 1 30 mg/dL Final     Comment:     Standardized to IDMS reference method   08/21/2018 0 96 0 70 - 1 30 mg/dL Final     Comment:     Standardized to IDMS reference method   06/22/2018 1 58 (H) 0 7 - 1 3 MG/DL Final     Comment:     IDMS method performed  The drugs Metamizole, Sulfasalazine, and Sulfapyridine may interfere and  result in false low results  03/29/2018 1 30 0 7 - 1 3 MG/DL Final     Comment:     IDMS method performed  The drugs Metamizole, Sulfasalazine, and Sulfapyridine may interfere and  result in false low results  /85   Pulse (!) 132   Temp (!) 103 5 °F (39 7 °C) (Temporal)   Resp (!) 29   Ht 5' 9" (1 753 m)   Wt 82 6 kg (182 lb 1 6 oz)   SpO2 94%   BMI 26 89 kg/m²   I/O last 3 completed shifts:   In: 1050 [IV Piggyback:1050]  Out: -   Lab Results   Component Value Date/Time    BUN 40 (H) 08/16/2019 05:40 PM    BUN 26 (H) 06/22/2018 06:53 PM    WBC 19 40 (H) 08/16/2019 04:43 PM    WBC 11 9 (H) 06/22/2018 06:53 PM    HGB 11 1 (L) 08/16/2019 04:43 PM    HGB 11 4 (L) 06/22/2018 06:53 PM    HCT 33 5 (L) 08/16/2019 04:43 PM    HCT 34 0 (L) 06/22/2018 06:53 PM    MCV 96 08/16/2019 04:43 PM    MCV 94 1 06/22/2018 06:53 PM     08/16/2019 07:58 PM     06/22/2018 06:53 PM     Temp Readings from Last 3 Encounters:   08/16/19 (!) 103 5 °F (39 7 °C) (Temporal)   08/02/19 98 3 °F (36 8 °C) (Temporal)   07/06/19 97 5 °F (36 4 °C) (Temporal)     Vancomycin Days of Therapy: 1    Assessment/Plan  The patient is currently on vancomycin utilizing scheduled dosing based on actual body weight  Baseline risks associated with therapy include: pre-existing renal impairment and advanced age  The patient is currently receiving 1G IV Q24 HOURS and after clinical evaluation will be changed to 1250MG IV Q24 HOURS  Pharmacy will also follow closely for s/sx of nephrotoxicity, infusion reactions and appropriateness of therapy  BMP and CBC will be ordered per protocol  Plan for trough as patient approaches steady state, prior to the 4th  dose at approximately 1800 on 8/19/19  Due to infection severity, will target a trough of 15-20 (appropriate for most indications)   Pharmacy will continue to follow the patients culture results and clinical progress daily      Shaye Astudillo

## 2019-08-17 NOTE — ASSESSMENT & PLAN NOTE
· Renal ultrasound results reviewed - results as follows:1  9 mm nonobstructing calculus within the lower pole the right kidney  2  Simple cyst within the left kidney   No hydronephrosis  · Urology services are following-further management as per Urology

## 2019-08-17 NOTE — PROGRESS NOTES
Vancomycin IV Pharmacy-to-Dose Consultation    Supriya Condon is a 66 y o  male who is currently receiving Vancomycin IV with management by the Pharmacy Consult service  Assessment/Plan:  The patient was reviewed  Renal function is improving and no signs or symptoms of nephrotoxicity and/or infusion reactions were documented in the chart  Based on todays assessment, continue current vancomycin (day # 2) dosing of 1250mg iv q24h, with a plan for trough to be drawn at 1800 on 8/19/19  We will continue to follow the patients culture results and clinical progress daily      Yulissa Yi, Pharmacist

## 2019-08-17 NOTE — PHYSICAL THERAPY NOTE
Physical Therapy Evaluation     Patient's Name: Martin Tang    Admitting Diagnosis  Altered mental status [R41 82]  Elevated troponin [R74 8]  RUTHANN (acute kidney injury) ([facility] [address] ) [N17 9]  Chronic indwelling Diallo catheter [Z96 0]  Sepsis ([address] ) [A41 9]    Problem List  Patient Active Problem List   Diagnosis    Bladder stones    Impingement syndrome of right shoulder    Complete tear of right rotator cuff    Chronic right shoulder pain    Arthritis of right shoulder region    Obstruction of Diallo catheter ([facility] )    Chronic indwelling Diallo catheter    Sepsis ([facility] )    RUTHANN (acute kidney injury) ([facility] [address] )    Elevated troponin    Toxic metabolic encephalopathy    Fall    Acute cystitis with hematuria    Chronic pain syndrome       Past Medical History  Past Medical History:   Diagnosis Date    Depression     Diallo catheter in place     Hypertension     Prostate enlargement     Psychiatric disorder     Urinary tract infection        Past Surgical History  Past Surgical History:   Procedure Laterality Date    ARTHROSCOPY KNEE      BACK SURGERY      L 4 or 5    CATARACT EXTRACTION Bilateral     CYSTOSCOPY W/ LASER LITHOTRIPSY N/A 8/20/2018    Procedure: LITHOTRISPY HOLMIUM LASER of bladder stones;  Surgeon: Mulugeta Currie MD;  Location: [facility] MAIN OR;  Service: Urology    NE TRANSURETHRAL ELEC-SURG [facility] N/A 8/20/2018    Procedure: Saulo Santiago; TURP;  Surgeon: Mulugeta Currie MD;  Location: [facility] MAIN OR;  Service: Urology    SHOULDER SURGERY Right 05/31/2019 08/17/19 1005   Note Type   Note type Eval only   Pain Assessment   Pain Assessment 0-10   Pain Score 5   Pain Location Back; Abdomen  (low back and B sides)   Pain Descriptors Aching   Home Living   Type of [address] [facility] One level;Stairs to enter with rails  (3 NOEMY)   Bathroom Shower/Tub Tub/shower unit   Bathroom Toilet Raised   Bathroom Equipment Shower chair; Toilet raiser   [address] [facility] [place]   Prior Function   Level of [place] Independent with ADLs and functional mobility   Lives With Daughter   Receives Help From Family   ADL Assistance Independent   IADLs Needs assistance   Falls in the last 6 months 1 to 4   Vocational Retired   Restrictions/Precautions   Other Precautions Contact/isolation;O2;Fall Risk;Pain;Telemetry;Multiple lines   General   Family/Caregiver Present No   Cognition   Overall Cognitive Status WFL   Arousal/Participation Arousable   Orientation Level Oriented X4   Memory Decreased short term memory   Following Commands Follows one step commands without difficulty   Comments focused on talking about previous rehab stay   RLE Assessment   RLE Assessment X   Strength RLE   RLE Overall Strength 3+/5   LLE Assessment   LLE Assessment X   Strength LLE   LLE Overall Strength 3+/5   Coordination   Sensation WFL   Bed Mobility   Supine to Sit 4  Minimal assistance   Additional items Assist x 1;HOB elevated;Verbal cues;LE management   Sit to Supine 4  Minimal assistance   Additional items Assist x 1;LE management;Verbal cues;HOB elevated   Transfers   Sit to Stand 4  Minimal assistance   Additional items Assist x 2; Increased time required;Verbal cues   Stand to Sit 4  Minimal assistance   Additional items Assist x 2; Increased time required;Verbal cues   Additional Comments stood side of bed x 6 minutes with BUE support and Min A   Balance   Static Sitting Fair +   Dynamic Sitting Fair   Static Standing Fair   Dynamic Standing Fair -   Endurance Deficit   Endurance Deficit Yes   Endurance Deficit Description with talking SaO2 decreased to ~ 86% and required correct breathign technique cues to increase SaO2 to in the 90%   Activity Tolerance   Activity Tolerance Patient limited by fatigue   Assessment   Prognosis Fair   Problem List Decreased strength;Decreased endurance; Impaired balance;Decreased mobility; Impaired judgement;Decreased safety awareness;Pain   Assessment Pt is 66 y o  male seen for PT evaluation s/p admit to [address] [facility] on 4/55/5599 w/ Toxic metabolic encephalopathy  PT consulted to assess pt's functional mobility and d/c needs  Order placed for PT eval and tx, w/ up as tolerated order  Comorbidities affecting pt's physical performance at time of assessment include: sepsis, chronic R shoulder pain, fall and depression  PTA, pt was independent w/ all functional mobility w/ cane, has 3 NOEMY, lives w/ daughter in 1 level house and retired  Personal factors affecting pt at time of IE include: ambulating w/ assistive device, stairs to enter home, decreased cognition, positive fall history and depression  Please find objective findings from PT assessment regarding body systems outlined above with impairments and limitations including weakness, impaired balance, decreased endurance, pain, decreased functional mobility tolerance, decreased safety awareness, impaired judgement and fall risk  The following objective measures performed on IE also reveal limitations: Barthel Index: 35/100  Pt's clinical presentation is currently unstable/unpredictable seen in pt's presentation of pain dropping O2 with activity/talking and decreased memroy  Pt to benefit from continued PT tx to address deficits as defined above and maximize level of functional independent mobility and consistency  From PT/mobility standpoint, recommendation at time of d/c would be STR pending progress in order to facilitate return to PLOF  Barriers to Discharge Inaccessible home environment   Goals   Patient Goals tog o to rehab   LTG Expiration Date 08/24/19   Long Term Goal #1 Pt will: Perform bed mobility tasks to modified I to improve ease of bed mobility  Perform transfers to modified I to improve ease of transfers  Perform ambulation with Supervision and RW for 125 ft to    increase Indep in home environment  Increase BLE strength to 4/5 to improve functional mobility    Increase OOB activity tolerance to 10 mins without s/s of exertion to decrease fall risk  Increase dynamic standing balance to F to decrease fall risk  Navigate up and down 3 steps so pt can get into and out of his home   Treatment Day 0   Plan   Treatment/Interventions Functional transfer training;LE strengthening/ROM; Elevations; Therapeutic exercise; Endurance training;Bed mobility;Gait training   PT Frequency 5x/wk   Recommendation   Recommendation Short-term skilled PT   Equipment Recommended Walker   PT - OK to Discharge No   Barthel Index   Feeding 10   Bathing 0   Grooming Score 0   Dressing Score 0   Bladder Score 5   Bowels Score 5   Toilet Use Score 5   Transfers (Bed/Chair) Score 10   Mobility (Level Surface) Score 0   Stairs Score 0   Barthel Index Score 35           Janay Carcamo, PT

## 2019-08-17 NOTE — ASSESSMENT & PLAN NOTE
· Most likely pre renal in etiology  · Creatinine is down from 2 17 to 1 80  · Continue IV fluids  · Formal nephrology consultation is pending

## 2019-08-17 NOTE — PROGRESS NOTES
Progress Note - Arjun Castellanos 1945, 66 y o  male MRN: [id number]    Unit/Bed#: ICU 06 Encounter: [id number]    Primary Care Provider: Aziza Lawton DO   Date and time admitted to hospital: 8/16/2019  4:31 PM        * Toxic metabolic encephalopathy  Assessment & Plan  · Secondary to UTI/sepsis  · Significantly improved  · Patient is currently alert awake oriented x2 almost 3  · CT brain was grossly within normal limits  · Anticipate further improvement with continued antibiotic therapy    Sepsis ([facility] )  Assessment & Plan  · Present on admission  · Secondary to a urinary tract infection in the setting of having a chronic indwelling Diallo catheter was  · Initial sepsis parameters included a fever of 101, heart rate 132, respiratory rate 29, leukocytosis 19 K, and a lactic acidosis  · T-max since admission has been 105 9, heart rate in respiratory rate are now normal, leukocytosis is about status quo at 19 90  · 2/2 blood cultures are positive for gram-negative rods  · DC vancomycin  · Continue cefepime optimize accordingly in regards to the final culture results with subsequent susceptibilities    Acute cystitis with hematuria  Assessment & Plan  · Continue IV antibiotics-IV cefepime  · See the outline above    Chronic indwelling Diallo catheter  Assessment & Plan  · Renal ultrasound results reviewed - results as follows:1  9 mm nonobstructing calculus within the lower pole the right kidney  2  Simple cyst within the left kidney   No hydronephrosis  · Urology services are following-further management as per Urology    Elevated troponin  Assessment & Plan  · Most likely  non myocardial infarction elevated troponins secondary to sepsis  · Formal cardiology evaluation is pending for medication optimization    RUTHANN (acute kidney injury) ([facility] [address] [address] )  Assessment & Plan  · Most likely pre renal in etiology  · Creatinine is down from 2 17 to 1 80  · Continue IV fluids  · Formal nephrology consultation is pending    Fall  Assessment & Plan  · PT/OT evals for d/c needs    Chronic pain syndrome  Assessment & Plan  · With continuous opioid dependence  · Continue home meds        VTE Pharmacologic Prophylaxis: Pharmacologic: Heparin    Patient Centered Rounds: I have performed bedside rounds with nursing staff today  Discussions with Specialists or Other Care Team Provider:  Case management, nursing and pharmacy  Education and Discussions with Family / Patient:  Patient was brought up to par with the plan of care for today, all questions answered to his satisfaction    Current Length of Stay: 1 day(s)    Current Patient Status: Inpatient   Certification Statement: The patient will continue to require additional inpatient hospital stay due to The need for continued IV antibiotics    Discharge Plan:  Discharge planning will commence once the patient is medically stable    Code Status: Level 1 - Full Code    Subjective:   Patient seen and examined  Currently the patient is alert awake oriented x2 almost 3  Patient complains of bilateral hip pain  Is otherwise comfortable  Patient does not recall the full details as to how he ended up  here in the hospital     Objective:     Vitals:   Temp (24hrs), Av 9 °F (38 8 °C), Min:99 3 °F (37 4 °C), Max:105 9 °F (41 1 °C)    Temp:  [99 3 °F (37 4 °C)-105 9 °F (41 1 °C)] 99 5 °F (37 5 °C)  HR:  [] 75  Resp:  [14-36] 17  BP: ()/(47-85) 109/57  SpO2:  [70 %-96 %] 94 %  Body mass index is 26 7 kg/m²  Input and Output Summary (last 24 hours): Intake/Output Summary (Last 24 hours) at 2019 0934  Last data filed at 2019 0630  Gross per 24 hour   Intake 1987 08 ml   Output 775 ml   Net 1212 08 ml       Physical Exam:     Physical Exam   Constitutional: He appears well-developed and well-nourished  HENT:   Head: Normocephalic and atraumatic     Nose: Nose normal    Mouth/Throat: Oropharynx is clear and moist    Eyes: Pupils are equal, round, and reactive to light  Conjunctivae and EOM are normal    Neck: Normal range of motion  Neck supple  No JVD present  No thyromegaly present  Cardiovascular: Normal rate, regular rhythm and intact distal pulses  Exam reveals no gallop and no friction rub  No murmur heard  Pulmonary/Chest: Effort normal and breath sounds normal  No respiratory distress  Abdominal: Soft  Bowel sounds are normal  He exhibits no distension and no mass  There is no tenderness  There is no guarding  Musculoskeletal: Normal range of motion  He exhibits no edema  Lymphadenopathy:     He has no cervical adenopathy  Neurological: He is alert  No cranial nerve deficit  AAO x2 almost 3-person and place slightly off to time  No gross focal neurological deficits are identified   Skin: Skin is warm  No rash noted  No erythema  Psychiatric: He has a normal mood and affect  His behavior is normal    Vitals reviewed  Additional Data:     Labs:    Results from last 7 days   Lab Units 08/17/19  0430  08/16/19  1643   WBC Thousand/uL 19 90*  --  19 40*   HEMOGLOBIN g/dL 9 2*  --  11 1*   HEMATOCRIT % 28 3*  --  33 5*   PLATELETS Thousands/uL 157   < > 214   NEUTROS PCT %  --   --  89*   LYMPHS PCT %  --   --  6*   LYMPHO PCT % 4*  --   --    MONOS PCT %  --   --  5   MONO PCT % 3*  --   --    EOS PCT %  --   --  0    < > = values in this interval not displayed  Results from last 7 days   Lab Units 08/17/19  0430   POTASSIUM mmol/L 3 7   CHLORIDE mmol/L 110*   CO2 mmol/L 20*   BUN mg/dL 36*   CREATININE mg/dL 1 80*   CALCIUM mg/dL 7 5*   ALK PHOS U/L 77   ALT U/L 57*   AST U/L 130*     Results from last 7 days   Lab Units 08/16/19  1643   INR  1 37     Results from last 7 days   Lab Units 08/17/19  0809 08/16/19  2018   POC GLUCOSE mg/dl 122 147*     Results from last 7 days   Lab Units 08/16/19  1958   HEMOGLOBIN A1C % 5 4       * I Have Reviewed All Lab Data Listed Above  * Additional Pertinent Lab Tests Reviewed:  All Labs For Current Hospital Admission  Reviewed    Imaging:  Imaging Reports Reviewed Today Include:  CT head - no acute pathology    Recent Cultures (last 7 days):     Results from last 7 days   Lab Units 08/16/19  1643   GRAM STAIN RESULT  Gram negative rods*  Gram negative rods*       Last 24 Hours Medication List:     Current Facility-Administered Medications:  acetaminophen 650 mg Oral Q6H PRN UBALDO Harkins    ascorbic acid 500 mg Oral Daily Lilly Alvarado PA-C    aspirin 325 mg Oral Daily UBALDO Harkins    bisacodyl 10 mg Rectal Daily PRN Llily Alvarado PA-C    cefepime 2,000 mg Intravenous Q24H UBALDO Harkins Last Rate: 2,000 mg (08/17/19 0827)   chlorhexidine 15 mL Swish & Spit Q12H [address] UBALDO Harkins    cholecalciferol 2,000 Units Oral Daily Lilly Alvarado PA-C    docusate sodium 400 mg Oral Daily Lilly Alvarado PA-C    famotidine 20 mg Intravenous BID UBALDO Harkins    ferrous sulfate 325 mg Oral BID Lilly Alvarado PA-C    finasteride 5 mg Oral Daily Noemy Holloway, UBALDO    gabapentin 400 mg Oral BID Lilly Alvarado PA-C    heparin (porcine) 5,000 Units Subcutaneous Q8H [address] Noemy oHlloway, UBALDO    insulin lispro 1-5 Units Subcutaneous TID AC Noemy Holloway, CRNP    insulin lispro 1-5 Units Subcutaneous HS UBALDO Harkins    morphine 15 mg Oral 4x Daily UBALDO Harkins    multivitamin-minerals 1 tablet Oral Daily Korin Zuleta PA-C    ondansetron 4 mg Intravenous Q6H PRN Noemy Holloway, UBALDO    sertraline 50 mg Oral Daily UBALDO Harkins    sodium chloride 75 mL/hr Intravenous Continuous Lilly Alvarado PA-C Last Rate: 75 mL/hr (08/17/19 0630)   vancomycin 15 mg/kg Intravenous Q24H Héctor Woodruff MD         Today, Patient Was Seen By: Héctor Woodruff MD    ** Please Note: Dictation voice to text software may have been used in the creation of this document   **

## 2019-08-17 NOTE — OCCUPATIONAL THERAPY NOTE
Occupational Therapy Evaluation      Cathy Mcgill    8/17/2019    Patient Active Problem List   Diagnosis    Bladder stones    Impingement syndrome of right shoulder    Complete tear of right rotator cuff    Chronic right shoulder pain    Arthritis of right shoulder region    Obstruction of Diallo catheter (HCC)    Chronic indwelling Diallo catheter    Sepsis ([facility] )    RUTHANN (acute kidney injury) ([facility] [address] )    Elevated troponin    Toxic metabolic encephalopathy    Fall    Acute cystitis with hematuria    Chronic pain syndrome       Past Medical History:   Diagnosis Date    Depression     Diallo catheter in place     Hypertension     Prostate enlargement     Psychiatric disorder     Urinary tract infection        Past Surgical History:   Procedure Laterality Date    ARTHROSCOPY KNEE      BACK SURGERY      L 4 or 5    CATARACT EXTRACTION Bilateral     CYSTOSCOPY W/ LASER LITHOTRIPSY N/A 8/20/2018    Procedure: LITHOTRISPY HOLMIUM LASER of bladder stones;  Surgeon: Mian Paula MD;  Location: [facility] MAIN OR;  Service: Urology    MI TRANSURETHRAL ELEC-SURG [address] Kruse N/A 8/20/2018    Procedure: Jeison Hobbs; TURP;  Surgeon: Mian Paula MD;  Location: [facility] MAIN OR;  Service: Urology    SHOULDER SURGERY Right 05/31/2019 08/17/19 1031   Note Type   Note type Eval only   Restrictions/Precautions   Other Precautions Contact/isolation;O2;Fall Risk;Pain;Telemetry;Multiple lines   Pain Assessment   Pain Assessment 0-10   Pain Score 5   Pain Type Chronic pain   Pain Location Back; Abdomen   Pain Descriptors Aching   Pain Frequency Constant/continuous   Pain Onset Ongoing   Hospital Pain Intervention(s) Medication (See MAR); Repositioned; Ambulation/increased activity   Home Living   Type of [address] [place] [address] One level;Stairs to enter with rails; Performs ADLs on one level; Able to live on main level with bedroom/bathroom  (3 NOEMY)   Bathroom Shower/Tub Tub/shower unit   [employer] Raised   Bathroom Equipment Shower chair; Toilet raiser   Bathroom Accessibility Accessible   Home Equipment Cane   Additional Comments Ambulated with cane    Prior Function   Level of [place] Independent with ADLs and functional mobility   Lives With Daughter   Receives Help From Family   ADL Assistance Independent   IADLs Needs assistance   Falls in the last 6 months 1 to 4  (1- in living room )   Vocational Retired   Lifestyle   Autonomy patient reporting independent with ADLs, ambulatory with SPC, and lives with daughter in a one story house with 3 NOEMY  Reciprocal Relationships family    ADL   Eating Assistance 5  Supervision/Setup   Grooming Assistance 5  Supervision/Setup   UB Bathing Assistance 4  Minimal Assistance   LB Bathing Assistance 3  Moderate Assistance   UB Dressing Assistance 3  Moderate Assistance   LB Dressing Assistance 2  Maximal [address]  3  Moderate Assistance   Bed Mobility   Supine to Sit 4  Minimal assistance   Additional items Assist x 1;HOB elevated; Bedrails; Increased time required;Verbal cues;LE management   Sit to Supine 4  Minimal assistance   Additional items Assist x 1;HOB elevated; Bedrails; Increased time required;Verbal cues;LE management   Transfers   Sit to Stand 4  Minimal assistance   Additional items Assist x 2; Increased time required;Verbal cues   Stand to Sit 4  Minimal assistance   Additional items Increased time required;Verbal cues; Assist x 1   Functional Mobility   Functional Mobility 4  Minimal assistance  (with RW)   Balance   Static Sitting Fair +   Dynamic Sitting Fair   Static Standing Fair   Dynamic Standing Fair -   Activity Tolerance   Activity Tolerance Patient limited by pain   RUE Assessment   RUE Assessment WFL  (3/5)   LUE Assessment   LUE Assessment WFL  (3/5)   Hand Function   Gross Motor Coordination Functional   Fine Motor Coordination Functional   Cognition   Overall Cognitive Status Impaired   Arousal/Participation Alert; Cooperative Attention Attends with cues to redirect   Orientation Level Oriented X4   Memory Within functional limits   Following Commands Follows one step commands without difficulty   Comments Mini-Cog assessment performed today  Version 1 was given  Patient able to recall 2/3 words  Patient able to draw an abnormal clock with the incorrect time  Total score of test was 2/5 indicating  further screening of cognition is recommended  Cognition Assessment Tools   (mini-cog)   Score 2   Assessment   Limitation Decreased ADL status; Decreased UE strength;Decreased Safe judgement during ADL;Decreased cognition;Decreased endurance;Decreased self-care trans;Decreased high-level ADLs   Assessment Pt is a 66 y o  male who was admitted to [address] [place] [address] on 8/16/2019 with Toxic metabolic encephalopathy  At this time, patient is also affected by the comorbidities of: shoulder surgery in May 2019, HTN, chronic pain with continuous opioid use, constipation, and chronic cantor  Additionally, patient is affected by the following personal factors:steps to enter environment, difficulty performing ADLS and difficulty performing IADLS   Mitzy Lightning Prior to admission, patient reporting independent with ADLs, ambulatory with SPC, and lives with daughter in a one story house with 3 NOEMY  Upon OT evaluation, patient requires minimum assist and moderate assist for UB ADLs and moderate assist and maximum assist for LB ADLs  Occupational performance is affected by the following deficits: decreased strength, decreased balance, decreased tolerance, impaired attention, impaired sequencing, decreased safety awareness and increased pain  Based on the following information, patient would benefit from continued skilled OT treatment while in the hospital to address deficits and maximize level of functional independence with ADL's and functional mobility   Occupational Performance areas to address include: grooming, bathing/shower, toilet hygiene, dressing, functional mobility and clothing management  From OT standpoint, recommendation at time of d/c would be short term rehab   Goals   Patient Goals to have less pain    Plan   Treatment Interventions ADL retraining;Functional transfer training;UE strengthening/ROM; Endurance training;Cognitive reorientation;Equipment evaluation/education; Compensatory technique education; Energy conservation; Activityengagement   Goal Expiration Date 08/27/19   Treatment Day 0   OT Frequency 3-5x/wk   Recommendation   OT Discharge Recommendation Short Term Rehab   OT - OK to Discharge Yes  (Once medically cleared )   Barthel Index   Feeding 5   Bathing 0   Grooming Score 0   Dressing Score 5   Bladder Score 0   Bowels Score 5   Toilet Use Score 5   Transfers (Bed/Chair) Score 10   Mobility (Level Surface) Score 0   Stairs Score 0   Barthel Index Score 30     GOALS:    Pt will achieve the following within specified time frame: STG  Pt will achieve the following goals within 5 days    *ADL transfers with (S) for inc'd independence with ADLs/purposeful tasks    *UB ADL with (S) for inc'd independence with self cares    *LB ADL with (S) using AE prn for inc'd independence with self cares    *Toileting with (S) for clothing management and hygiene for return to PLOF with personal care    *Increase static stand balance to fair+ and dyn stand balance to fair+ for inc'd safety with standing purposeful tasks    *Increase stand tolerance x8 m for inc'd tolerance with standing purposeful tasks    *Participate in 10m UE therex to increase overall stamina/activity tolerance for purposeful tasks    *Bed mobility- (S) for inc'd independence to manage own comfort and initiate EOB & OOB purposeful tasks    *Patient will verbalize 3 safety awareness/ principles to prevent falls in the home setting       *Patient will verbalize and demonstrate use of energy conservation/deep breathing techniques and work simplification skills during functional activities with no verbal cues  Pt will achieve the following within specified time frame: LTG  Pt will achieve the following goals within 10 days    *ADL transfers with (I) for inc'd independence with ADLs/purposeful tasks    *Self Feeding- (I) for inc'd independence with providing self nourishment    *UB ADL with (I) for inc'd independence with self cares    *LB ADL with (I) using AE prn for inc'd independence with self cares    *Toileting with (I) for clothing management and hygiene for return to PLOF with personal care    *Increase static stand balance to normal and dyn stand balance to good for inc'd safety with standing purposeful tasks    *Increase stand tolerance x10 m for inc'd tolerance with standing purposeful tasks    *Bed mobility- (I) for inc'd independence to manage own comfort and initiate EOB & OOB purposeful tasks    *Patient will increase OOB/sitting tolerance to 2-4 hours per day to increase participation in self-care and leisure tasks with no s/s of exertion           Shola Shirley MS, OTR/L

## 2019-08-17 NOTE — NURSING NOTE
Flow of O2 increased from 2 liters to 4 liters via n/c r/t oxygenation level being 88% on the 2 liters while the patient was sleeping  Will monitor

## 2019-08-17 NOTE — ASSESSMENT & PLAN NOTE
· Most likely  non myocardial infarction elevated troponins secondary to sepsis  · Formal cardiology evaluation is pending for medication optimization

## 2019-08-17 NOTE — ASSESSMENT & PLAN NOTE
· Present on admission  · Secondary to a urinary tract infection in the setting of having a chronic indwelling Diallo catheter was  · Initial sepsis parameters included a fever of 101, heart rate 132, respiratory rate 29, leukocytosis 19 K, and a lactic acidosis  · T-max since admission has been 105 9, heart rate in respiratory rate are now normal, leukocytosis is about status quo at 19 90  · 2/2 blood cultures are positive for gram-negative rods  · DC vancomycin  · Continue cefepime optimize accordingly in regards to the final culture results with subsequent susceptibilities

## 2019-08-17 NOTE — SEPSIS NOTE
Sepsis Note   Zev Hung 66 y o  male MRN: [id number]  Unit/Bed#: ICU 06 Encounter: [id number]      qSOFA     Row Name 08/17/19 0600 08/17/19 0500 08/17/19 0445 08/17/19 0430 08/17/19 0400    Altered mental status GCS < 15      1        Respiratory Rate > / =22  0  0    0  0    Systolic BP < / =144  0  0    0  0    Q Sofa Score  1  1  1  0  0    Row Name 08/17/19 0330 08/17/19 0300 08/17/19 0230 08/17/19 0200 08/17/19 0130    Altered mental status GCS < 15              Respiratory Rate > / =22  0  0  0  0  0    Systolic BP < / =961  0  1  1  1  1    Q Sofa Score  1  2  2  2  2    Row Name 08/17/19 0100 08/17/19 0030 08/17/19 0000 08/16/19 2333 08/16/19 2330    Altered mental status GCS < 15        1      Respiratory Rate > / =22  0  0  0    0    Systolic BP < / =528  1  1  1    1    Q Sofa Score  2  2  2  2  1    Row Name 08/16/19 2300 08/16/19 2230 08/16/19 2200 08/16/19 2130 08/16/19 2100    Altered mental status GCS < 15              Respiratory Rate > / =22  1  1  1  1      Systolic BP < / =187  1  0  1  1  0    Q Sofa Score  3  2  3  3  2    Row Name 08/16/19 2045 08/16/19 2030 08/16/19 2015 08/16/19 2000 08/16/19 1945    Altered mental status GCS < 15              Respiratory Rate > / =22    1  1  1  1    Systolic BP < / =656  0  0  0  0  0    Q Sofa Score  2  2  2  2  2    Row Name 08/16/19 1930 08/16/19 1920 08/16/19 1900 08/16/19 1830 08/16/19 1745    Altered mental status GCS < 15    1          Respiratory Rate > / =22  1    1  1  0    Systolic BP < / =868      0  0  0    Q Sofa Score  2  2  1  1  0    Row Name 08/16/19 1636 08/16/19 1635 08/16/19 1632          Altered mental status GCS < 15    0        Respiratory Rate > / =22      0      Systolic BP < / =322  0          Q Sofa Score  0              Initial Sepsis Screening     Row Name 08/16/19 1951 08/16/19 5320             Is the patient's history suggestive of a new or worsening infection?   (!) Yes (Proceed)  -AI  (!) Yes (Proceed)  -NE       Suspected source of infection  urinary tract infection  -AI  urinary tract infection  -NE       Are two or more of the following signs & symptoms of infection both present and new to the patient? (!) Yes (Proceed)  -AI  (!) Yes (Proceed)  -NE       Indicate SIRS criteria  Tachycardia > 90 bpm;Hyperthemia > 38 3C (100 9F); Tachypnea > 20 resp per min;Leukocytosis (WBC > 97699 IJL); Altered mental status  -AI  Hyperthemia > 38 3C (100 9F); Leukocytosis (WBC > 22642 IJL); Altered mental status  -NE       If the answer is yes to both questions, suspicion of sepsis is present  [de-identified]         If severe sepsis is present AND tissue hypoperfusion perists in the hour after fluid resuscitation or lactate > 4, the patient meets criteria for SEPTIC SHOCK           Are any of the following organ dysfunction criteria present within 6 hours of suspected infection and SIRS criteria that are NOT considered to be chronic conditions?   (!) Yes  -NE       Organ dysfunction  Lactate >/equal 4 0 mmol/L;Creatinine > 2 0 mg/dL  -AI  Creatinine > 0 5 mg/dl ABOVE BASELINE;Lactate > 2 0 mmol/L  -NE       Date of presentation of severe sepsis  08/16/19  -AI  08/16/19  -NE       Time of presentation of severe sepsis  1957  -AI         Tissue hypoperfusion persists in the hour after crystalloid fluid administration, evidenced, by either:  * OR * Lactate level is greater to or equal 4 mmol/dL ( ___ mmol/dL in comment field)  -AI         Was hypotension present within one hour of the conclusion of crystalloid fluid administration?   Yes  -AI         Date of presentation of septic shock           Time of presentation of septic shock             User Key  (r) = Recorded By, (t) = Taken By, (c) = Cosigned By    [address] Name Provider Ceci Ordonez PA-C Physician Assistant    Robbi Portillo MD Physician               Default Flowsheet Data (last 720 hours)      Sepsis Reassess     Row Name 08/17/19 0155 08/16/19 2257                Repeat Volume Status and Tissue Perfusion Assessment Performed    Repeat Volume Status and Tissue Perfusion Assessment Performed  Yes  -AI  Yes  -AI          Volume Status and Tissue Perfusion Post Fluid Resuscitation * Must Document All *    Vital Signs Reviewed (HR, RR, BP, T)  Yes HR 73, 93/54 rr15, temp 99 5  -AI  Yes HR 88, RR 22, 96/54 T100 9  -AI       Shock Index Reviewed           Arterial Oxygen Saturation Reviewed (POx, SaO2 or SpO2)  Yes (comment %) 92% 2L  -AI   92% on 2L  -AI       Cardio  Normal S1/S2  -AI  Normal S1/S2  -AI       Pulmonary  Normal effort  -AI  Normal effort  -AI       Capillary Refill  Brisk  -AI  Brisk  -AI       Peripheral Pulses    Radial;Dorsalis Pedis  -AI       Peripheral Pulse  +2  -AI  +2  -AI       Dorsalis Pedis  +2  -AI  +2  -AI       Skin  Warm  -AI  Warm  -AI       Urine output assessed  Adequate  -AI  Adequate  -AI          *OR*   Intensive Monitoring- Must Document One of the Following Four *:    Vital Signs Reviewed           * Central Venous Pressure (CVP or RAP)           * Central Venous Oxygen (SVO2, ScvO2 or Oxygen saturation via central catheter)           * Bedside Cardiovascular US in IVC diameter and % collapse           * Passive Leg Raise OR Crystalloid Challenge             User Key  (r) = Recorded By, (t) = Taken By, (c) = Cosigned By    Initials Name Provider Type    GRACIE Jaime PA-C Physician Assistant

## 2019-08-17 NOTE — CONSULTS
Consultation - Urology   Linda Rosales 66 y o  male MRN: [id number]  Unit/Bed#: ICU 06 Encounter: [id number]      Assessment/Plan      Assessment:  Urosepsis secondary to recent change of chronic Diallo catheter  Plan:  Continue current antibiotics pending culture results  As long as he continues to improve, the current Diallo catheter may remain in place, as it was just changed a few days ago  History of Present Illness   Attending: Geovani An MD  Reason for Consult / Principal Problem:  Urosepsis with chronic Diallo  HPI: Linda Rosales is a 66y o  year old male who presents with increasing confusion and fall at home  He has a chronic Diallo catheter that was changed emergently in the office on August 12th 2019 due to blockage  He was treated with several days of Macrobid afterward  However, he became increasingly confused with lethargy and fell at home today  He was admitted through the emergency room and begun on cefepime and vancomycin  He currently is improving and fever was as high as 105 9 and currently is down to 99 5  He is awake and alert but still somewhat confused  The Diallo catheter has been draining well with slightly cloudy yellow urine  He denies any abdominal or flank pain  He had a renal ultrasound that showed no hydronephrosis  There was a nonobstructing stone in the right kidney  He also has acute renal injury with creatinine up to 2 17      Consults    Review of Systems    Historical Information   Past Medical History:   Diagnosis Date    Depression     Diallo catheter in place     Hypertension     Prostate enlargement     Psychiatric disorder     Urinary tract infection      Past Surgical History:   Procedure Laterality Date    ARTHROSCOPY KNEE      BACK SURGERY      L 4 or 5    CATARACT EXTRACTION Bilateral     CYSTOSCOPY W/ LASER LITHOTRIPSY N/A 8/20/2018    Procedure: LITHOTRISPY HOLMIUM LASER of bladder stones;  Surgeon: Chantal Victor MD;  Location: [address] [place] [address] OR;  Service: Urology    MN TRANSURETHRAL ELEC-SURG PROSTATECTOM N/A 8/20/2018    Procedure: CYSTOSCOPY; TURP;  Surgeon: Amy Gleason MD;  Location: [address] [place] [address] MAIN OR;  Service: Urology    SHOULDER SURGERY Right 05/31/2019     Social History   Social History     Substance and Sexual Activity   Alcohol Use No     Social History     Substance and Sexual Activity   Drug Use No     Social History     Tobacco Use   Smoking Status Former Smoker    Packs/day: 1 00   Smokeless Tobacco Never Used   Tobacco Comment    quit 50 years ago     Family History: non-contributory    Meds/Allergies   current meds:   Current Facility-Administered Medications   Medication Dose Route Frequency    acetaminophen (TYLENOL) tablet 650 mg  650 mg Oral Q6H PRN    ascorbic acid (VITAMIN C) tablet 500 mg  500 mg Oral Daily    aspirin tablet 325 mg  325 mg Oral Daily    bisacodyl (DULCOLAX) rectal suppository 10 mg  10 mg Rectal Daily PRN    cefepime (MAXIPIME) IVPB (premix) 2,000 mg  2,000 mg Intravenous Q24H    chlorhexidine (PERIDEX) 0 12 % oral rinse 15 mL  15 mL Swish & Spit Q12H [facility]    cholecalciferol (VITAMIN D3) tablet 2,000 Units  2,000 Units Oral Daily    docusate sodium (COLACE) capsule 400 mg  400 mg Oral Daily    famotidine (PEPCID) injection 20 mg  20 mg Intravenous BID    ferrous sulfate tablet 325 mg  325 mg Oral BID    finasteride (PROSCAR) tablet 5 mg  5 mg Oral Daily    gabapentin (NEURONTIN) capsule 400 mg  400 mg Oral BID    heparin (porcine) subcutaneous injection 5,000 Units  5,000 Units Subcutaneous Q8H [facility]    insulin lispro (HumaLOG) 100 units/mL subcutaneous injection 1-5 Units  1-5 Units Subcutaneous TID AC    insulin lispro (HumaLOG) 100 units/mL subcutaneous injection 1-5 Units  1-5 Units Subcutaneous HS    morphine (MSIR) IR tablet 15 mg  15 mg Oral 4x Daily    multivitamin-minerals (CENTRUM) tablet 1 tablet  1 tablet Oral Daily    ondansetron (ZOFRAN) injection 4 mg  4 mg Intravenous Q6H PRN    sertraline (ZOLOFT) tablet 50 mg  50 mg Oral Daily    sodium chloride 0 9 % infusion  75 mL/hr Intravenous Continuous    vancomycin (VANCOCIN) 1,250 mg in sodium chloride 0 9 % 250 mL IVPB  15 mg/kg Intravenous Q24H     No Known Allergies    Objective   Vitals: Blood pressure (!) 81/51, pulse 84, temperature 99 5 °F (37 5 °C), temperature source Temporal, resp  rate 17, height 5' 9" (1 753 m), weight 82 6 kg (182 lb 1 6 oz), SpO2 93 %  I/O last 24 hours: In: 1050 [IV Piggyback:1050]  Out: 500 [Urine:500]    Invasive Devices     Peripheral Intravenous Line            Peripheral IV 08/16/19 Left Forearm less than 1 day    Peripheral IV 08/16/19 Left;Ventral (anterior) Forearm less than 1 day          Drain            Urethral Catheter Latex 16 Fr  41 days                Physical Exam   Abdominal: Soft  He exhibits no distension  There is no tenderness  Genitourinary: Penis normal     Normal testicles and spermatic cords bilaterally with no swelling or tenderness  Diallo catheter in place draining slightly cloudy yellow urine      Lab Results:   CBC:   Lab Results   Component Value Date    WBC 19 40 (H) 08/16/2019    HGB 11 1 (L) 08/16/2019    HCT 33 5 (L) 08/16/2019    MCV 96 08/16/2019     08/16/2019    MCH 31 9 08/16/2019    MCHC 33 2 08/16/2019    RDW 13 5 08/16/2019    MPV 9 0 08/16/2019     CMP:   Lab Results   Component Value Date    SODIUM 135 08/16/2019     08/16/2019    CO2 24 08/16/2019    BUN 40 (H) 08/16/2019    CREATININE 2 17 (H) 08/16/2019    CALCIUM 8 3 (L) 08/16/2019     (H) 08/16/2019    ALT 62 (H) 08/16/2019    ALKPHOS 76 08/16/2019    EGFR 29 08/16/2019     Urinalysis:   Lab Results   Component Value Date    COLORU Yellow 08/16/2019    CLARITYU Cloudy (A) 08/16/2019    SPECGRAV 1 025 08/16/2019    PHUR 6 0 08/16/2019    LEUKOCYTESUR 2+ (A) 08/16/2019    NITRITE Negative 08/16/2019    GLUCOSEU Negative 08/16/2019    KETONESU Trace (A) 08/16/2019    BILIRUBINUR Negative 08/16/2019    BLOODU 3+ (A) 08/16/2019     Urine Culture: No results found for: URINECX  Imaging Studies: I have personally reviewed pertinent reports  EKG, Pathology, and Other Studies: I have personally reviewed pertinent reports  VTE Prophylaxis: Sequential compression device (Venodyne)     Code Status: Level 1 - Full Code  Advance Directive and Living Will:      Power of :    POLST:      Counseling / Coordination of Care  Total floor / unit time spent today 30 minutes  Greater than 50% of total time was spent with the patient and / or family counseling and / or coordination of care

## 2019-08-17 NOTE — PLAN OF CARE
Problem: OCCUPATIONAL THERAPY ADULT  Goal: Performs self-care activities at highest level of function for planned discharge setting  See evaluation for individualized goals  Description  Treatment Interventions: ADL retraining, Functional transfer training, UE strengthening/ROM, Endurance training, Cognitive reorientation, Equipment evaluation/education, Compensatory technique education, Energy conservation, Activityengagement          See flowsheet documentation for full assessment, interventions and recommendations  Note:   Limitation: Decreased ADL status, Decreased UE strength, Decreased Safe judgement during ADL, Decreased cognition, Decreased endurance, Decreased self-care trans, Decreased high-level ADLs     Assessment: Pt is a 66 y o  male who was admitted to [address] [place] [address] on 8/16/2019 with Toxic metabolic encephalopathy  At this time, patient is also affected by the comorbidities of: shoulder surgery in May 2019, HTN, chronic pain with continuous opioid use, constipation, and chronic cantor  Additionally, patient is affected by the following personal factors:steps to enter environment, difficulty performing ADLS and difficulty performing IADLS   Snyderjosette Aguilera Prior to admission, patient reporting independent with ADLs, ambulatory with SPC, and lives with daughter in a one story house with 3 NOEMY  Upon OT evaluation, patient requires minimum assist and moderate assist for UB ADLs and moderate assist and maximum assist for LB ADLs  Occupational performance is affected by the following deficits: decreased strength, decreased balance, decreased tolerance, impaired attention, impaired sequencing, decreased safety awareness and increased pain  Based on the following information, patient would benefit from continued skilled OT treatment while in the hospital to address deficits and maximize level of functional independence with ADL's and functional mobility   Occupational Performance areas to address include: grooming, bathing/shower, toilet hygiene, dressing, functional mobility and clothing management  From OT standpoint, recommendation at time of d/c would be short term rehab        OT Discharge Recommendation: Short Term Rehab  OT - OK to Discharge: Yes(Once medically cleared )     Concepción Gutierrez, OT

## 2019-08-17 NOTE — ASSESSMENT & PLAN NOTE
· Secondary to UTI/sepsis  · Significantly improved  · Patient is currently alert awake oriented x2 almost 3  · CT brain was grossly within normal limits  · Anticipate further improvement with continued antibiotic therapy

## 2019-08-17 NOTE — CONSULTS
Consultation - Nephrology   Erica Hutchins 66 y o  male MRN: [id number]  Unit/Bed#: ICU 06 Encounter: [id number]      A/P:  1  Acute kidney injury: due to volume depletion and acute febrile illness with a temp to 101  Will check urine electrolytes  However, his urine specific gravity was concentrated and he is responding to IVF  He has normal kidneys on ultrasound without evidence for obstructive uropathy  He has a small nonobstructing stone in the left kidney  Continue IV cefepime and IVF  He has an indwelling cantor  I have reviewed his outpatient medications for potential nephrotoxins  2  Sepsis: due to urinary source  Continue IVF as above  3  Chronic pain syndrome: he states his back and legs hurt  He is receiving opioids  4  DM- insulin requiring  Check a urine micro albumin:creatinine ratio  ,         Thank you for allowing us to participate in the care of your patient  Please feel free to contact us regarding the care of this patient, or any other questions/concerns that may be applicable  Patient Active Problem List   Diagnosis    Bladder stones    Impingement syndrome of right shoulder    Complete tear of right rotator cuff    Chronic right shoulder pain    Arthritis of right shoulder region    Obstruction of Cantor catheter (HCC)    Chronic indwelling Cantor catheter    Sepsis ([address]    RUTHANN (acute kidney injury) ([address] )    Elevated troponin    Toxic metabolic encephalopathy    Fall    Acute cystitis with hematuria    Chronic pain syndrome       History of Present Illness   Physician Requesting Consult: Jose J Pizarro MD  Reason for Consult / Principal Problem: acute kidney injury  Hx and PE limited by:   HPI: Erica Hutchins is a 66y o  year old male who presents with an elevated creatinine  He has a chronic indwelling cantor and yesterday he began to be weak and had increasing leg and back pain  He had a fall at home   He was found to have acute kidney injury with a creatinine of 2 17  He had decreased urine output and was given IVF  And cefepime and vancomycin with improvement  He had a baseline creatinine of 1 09 in February  He had his cantor changed on Monday  He has chronic back and leg pain and opioid dependence    History obtained from chart review and the patient    Review of Systems - Negative except as mentioned above in HPI, more specifics as mentioned below    Review of Systems - General ROS: positive for  - fatigue  Ophthalmic ROS: negative  ENT ROS: negative  Respiratory ROS: no cough, shortness of breath, or wheezing  Cardiovascular ROS: no chest pain or dyspnea on exertion  Gastrointestinal ROS: no abdominal pain, change in bowel habits, or black or bloody stools  Genito-Urinary ROS: positive for - indwelling cantor and foul smelling urine with dec output  Musculoskeletal ROS: positive for - gait disturbance, muscle pain and muscular weakness  Neurological ROS: no TIA or stroke symptoms  Dermatological ROS: negative    Historical Information   Past Medical History:   Diagnosis Date    Depression     Cantor catheter in place     Hypertension     Prostate enlargement     Psychiatric disorder     Urinary tract infection      Past Surgical History:   Procedure Laterality Date    ARTHROSCOPY KNEE      BACK SURGERY      L 4 or 5    CATARACT EXTRACTION Bilateral     CYSTOSCOPY W/ LASER LITHOTRIPSY N/A 8/20/2018    Procedure: LITHOTRISPY HOLMIUM LASER of bladder stones;  Surgeon: George Guerrero MD;  Location: [facility] MAIN OR;  Service: Urology    NV TRANSURETHRAL ELEC-SURG PROSTATECTOM N/A 8/20/2018    Procedure: CYSTOSCOPY; TURP;  Surgeon: George Guerrero MD;  Location: [facility] MAIN OR;  Service: Urology    SHOULDER SURGERY Right 05/31/2019     Social History   Social History     Substance and Sexual Activity   Alcohol Use No     Social History     Substance and Sexual Activity   Drug Use No     Social History     Tobacco Use   Smoking Status Former Smoker    Packs/day: 1 00 Smokeless Tobacco Never Used   Tobacco Comment    quit 50 years ago     Family History   Problem Relation Age of Onset    Cancer Mother     Diabetes Father        Meds/Allergies   all current active meds have been reviewed, current meds:   Current Facility-Administered Medications   Medication Dose Route Frequency    acetaminophen (TYLENOL) tablet 650 mg  650 mg Oral Q6H PRN    ascorbic acid (VITAMIN C) tablet 500 mg  500 mg Oral Daily    aspirin tablet 325 mg  325 mg Oral Daily    bisacodyl (DULCOLAX) rectal suppository 10 mg  10 mg Rectal Daily PRN    cefepime (MAXIPIME) IVPB (premix) 2,000 mg  2,000 mg Intravenous Q24H    chlorhexidine (PERIDEX) 0 12 % oral rinse 15 mL  15 mL Swish & Spit Q12H [facility]    cholecalciferol (VITAMIN D3) tablet 2,000 Units  2,000 Units Oral Daily    docusate sodium (COLACE) capsule 400 mg  400 mg Oral Daily    famotidine (PEPCID) injection 20 mg  20 mg Intravenous BID    ferrous sulfate tablet 325 mg  325 mg Oral BID    finasteride (PROSCAR) tablet 5 mg  5 mg Oral Daily    gabapentin (NEURONTIN) capsule 400 mg  400 mg Oral BID    heparin (porcine) subcutaneous injection 5,000 Units  5,000 Units Subcutaneous Q8H [facility]    insulin lispro (HumaLOG) 100 units/mL subcutaneous injection 1-5 Units  1-5 Units Subcutaneous TID AC    insulin lispro (HumaLOG) 100 units/mL subcutaneous injection 1-5 Units  1-5 Units Subcutaneous HS    morphine (MSIR) IR tablet 15 mg  15 mg Oral 4x Daily    multivitamin-minerals (CENTRUM) tablet 1 tablet  1 tablet Oral Daily    ondansetron (ZOFRAN) injection 4 mg  4 mg Intravenous Q6H PRN    sertraline (ZOLOFT) tablet 50 mg  50 mg Oral Daily    sodium chloride 0 9 % infusion  75 mL/hr Intravenous Continuous    and PTA meds:    Medications Prior to Admission   Medication    acetaminophen (TYLENOL) 325 mg tablet    ascorbic acid (VITAMIN C) 500 mg tablet    b complex vitamins tablet    cholecalciferol (VITAMIN D3) 1,000 units tablet    docusate sodium (COLACE) 100 mg capsule    ferrous sulfate 325 (65 Fe) mg tablet    multivitamin-iron-minerals-folic acid (CENTRUM) chewable tablet    aspirin 325 mg tablet    finasteride (PROSCAR) 5 mg tablet    gabapentin (NEURONTIN) 300 mg capsule    morphine (MSIR) 15 mg tablet    sertraline (ZOLOFT) 50 mg tablet         No Known Allergies    Objective     Intake/Output Summary (Last 24 hours) at 8/17/2019 1102  Last data filed at 8/17/2019 0630  Gross per 24 hour   Intake 1987 08 ml   Output 775 ml   Net 1212 08 ml       Invasive Devices:   Urethral Catheter Latex 16 Fr  (Active)       Urethral Catheter (Active)   Output (mL) 275 mL 8/17/2019  6:30 AM       Physical Exam      I/O last 3 completed shifts: In: 1987 1 [I V :937 1; IV Piggyback:1050]  Out: 775 [Urine:775]    Vitals:    08/17/19 1000   BP: 111/65   Pulse: 83   Resp: 21   Temp:    SpO2: 94%       Gen: in NAD, Alert/Awake  HEENT: no sclerous icterus, MMM, neck supple  CV: +S1/S2, RRR  Lungs: CTA bilaterally  Abd: +BS, soft NT/ND  Ext: all four extremities are warm  Skin: no rashes noted  Neuro: CN II-XII intact    Current Weight: Weight - Scale: 82 kg (180 lb 12 4 oz)  First Weight: Weight - Scale: 81 6 kg (179 lb 14 3 oz)    Lab Results:  I have personally reviewed pertinent labs      CBC:   Lab Results   Component Value Date    WBC 19 90 (H) 08/17/2019    HGB 9 2 (L) 08/17/2019    HCT 28 3 (L) 08/17/2019    MCV 98 08/17/2019     08/17/2019    MCH 31 7 08/17/2019    MCHC 32 4 08/17/2019    RDW 13 8 08/17/2019    MPV 9 5 08/17/2019     CMP:   Lab Results   Component Value Date    K 3 7 08/17/2019     (H) 08/17/2019    CO2 20 (L) 08/17/2019    BUN 36 (H) 08/17/2019    CREATININE 1 80 (H) 08/17/2019    CALCIUM 7 5 (L) 08/17/2019     (H) 08/17/2019    ALT 57 (H) 08/17/2019    ALKPHOS 77 08/17/2019    EGFR 36 08/17/2019     Phosphorus: No results found for: PHOS  Magnesium:   Lab Results   Component Value Date    MG 1 6 (L) 08/17/2019 Urinalysis:   Lab Results   Component Value Date    COLORU Yellow 08/16/2019    CLARITYU Cloudy (A) 08/16/2019    SPECGRAV 1 025 08/16/2019    PHUR 6 0 08/16/2019    LEUKOCYTESUR 2+ (A) 08/16/2019    NITRITE Negative 08/16/2019    GLUCOSEU Negative 08/16/2019    KETONESU Trace (A) 08/16/2019    BILIRUBINUR Negative 08/16/2019    BLOODU 3+ (A) 08/16/2019     Ionized Calcium: No results found for: CAION  Coagulation:   Lab Results   Component Value Date    INR 1 37 08/16/2019     Troponin:   Lab Results   Component Value Date    TROPONINI 3 08 (H) 08/17/2019     ABG: No results found for: PHART, YOW4YTF, PO2ART, HCC3SJW, V8NZBPYZ, BEART, SOURCE    Results from last 7 days   Lab Units 08/17/19  0430 08/16/19  1740   POTASSIUM mmol/L 3 7 4 4   CHLORIDE mmol/L 110* 103   CO2 mmol/L 20* 24   BUN mg/dL 36* 40*   CREATININE mg/dL 1 80* 2 17*   CALCIUM mg/dL 7 5* 8 3*   ALK PHOS U/L 77 76   ALT U/L 57* 62*   AST U/L 130* 171*       Radiology review:  Procedure: Ct Head Without Contrast    Result Date: 8/16/2019  Narrative: CT BRAIN - WITHOUT CONTRAST INDICATION:   Confusion/delirium, altered LOC, unexplained  COMPARISON:  3/9/2018  TECHNIQUE:  CT examination of the brain was performed  In addition to axial images, coronal 2D reformatted images were created and submitted for interpretation  Radiation dose length product (DLP) for this visit:  1008 6 mGy-cm   This examination, like all CT scans performed in the [facility], was performed utilizing techniques to minimize radiation dose exposure, including the use of iterative  reconstruction and automated exposure control  IMAGE QUALITY:  Diagnostic  FINDINGS: PARENCHYMA: Decreased attenuation is noted in periventricular and subcortical white matter demonstrating an appearance that is statistically most likely to represent mild microangiopathic change  No CT signs of acute infarction  No intracranial mass, mass effect or midline shift    No acute parenchymal hemorrhage  VENTRICLES AND EXTRA-AXIAL SPACES:  Normal for the patient's age  VISUALIZED ORBITS AND PARANASAL SINUSES:  Unremarkable  CALVARIUM AND EXTRACRANIAL SOFT TISSUES:  Normal      Impression: No acute intracranial abnormality  Workstation performed: [id number]     Procedure: Xr Chest 1 View    Result Date: 8/17/2019  Narrative: CHEST INDICATION:   Fever and confusion  COMPARISON:  December 13, 2018 EXAM PERFORMED/VIEWS:  XR CHEST 1 VIEW FINDINGS: Cardiomediastinal silhouette appears unremarkable  Mild pulmonary vascular congestion  Osseous structures appear within normal limits for patient age  Impression: Mild pulmonary vascular congestion  No consolidation or pneumothorax  Workstation performed: [id number]     Procedure: Us Kidney And Bladder    Result Date: 8/16/2019  Narrative: RENAL ULTRASOUND INDICATION:   , acute renal failue  COMPARISON: None TECHNIQUE:   Ultrasound of the retroperitoneum was performed with a curvilinear transducer utilizing volumetric sweeps and still imaging techniques  FINDINGS: KIDNEYS: Symmetric and normal size  Right kidney: cm  Left kidney: cm  Right kidney Normal echogenicity and contour  No suspicious masses detected  No hydronephrosis  9 mm calculus is present in the lower pole  No perinephric fluid collections  Left kidney Normal echogenicity and contour  No suspicious masses detected  2 0 x 1 6 x 1 9 cm simple cyst is present  No hydronephrosis  No shadowing calculi  No perinephric fluid collections  URETERS: Nonvisualized  BLADDER: A cantor catheter is in place, decompressing the bladder and limiting its evaluation  Impression: 1  9 mm nonobstructing calculus within the lower pole the right kidney  2  Simple cyst within the left kidney  No hydronephrosis  Workstation performed: [id number]         EKG, Pathology, and Other Studies: reviewed      Counseling / Coordination of Care  Total ADDITIONAL floor / unit time spent today 30 minutes   Greater than 50% of total time was spent with the patient and / or family counseling and / or coordination of care   A description of the counseling / coordination of care: follows    Eileen Christie MD

## 2019-08-17 NOTE — PLAN OF CARE
Problem: DISCHARGE PLANNING  Goal: Discharge to home or other facility with appropriate resources  Description  INTERVENTIONS:  - Identify barriers to discharge w/patient and caregiver  - Arrange for needed discharge resources and transportation as appropriate  - Identify discharge learning needs (meds, wound care, etc )  - Arrange for interpretive services to assist at discharge as needed  - Refer to Case Management Department for coordinating discharge planning if the patient needs post-hospital services based on physician/advanced practitioner order or complex needs related to functional status, cognitive ability, or social support system  Outcome: Progressing

## 2019-08-18 LAB
ANION GAP SERPL CALCULATED.3IONS-SCNC: 7 MMOL/L (ref 4–13)
ATRIAL RATE: 92 BPM
BASOPHILS # BLD AUTO: 0 THOUSANDS/ΜL (ref 0–0.1)
BASOPHILS NFR BLD AUTO: 0 % (ref 0–2)
BUN SERPL-MCNC: 35 MG/DL (ref 7–25)
CALCIUM SERPL-MCNC: 8.3 MG/DL (ref 8.6–10.5)
CHLORIDE SERPL-SCNC: 108 MMOL/L (ref 98–107)
CO2 SERPL-SCNC: 21 MMOL/L (ref 21–31)
CREAT SERPL-MCNC: 1.61 MG/DL (ref 0.7–1.3)
EOSINOPHIL # BLD AUTO: 0 THOUSAND/ΜL (ref 0–0.61)
EOSINOPHIL NFR BLD AUTO: 0 % (ref 0–5)
ERYTHROCYTE [DISTWIDTH] IN BLOOD BY AUTOMATED COUNT: 13.7 % (ref 11.5–14.5)
GFR SERPL CREATININE-BSD FRML MDRD: 41 ML/MIN/1.73SQ M
GLUCOSE SERPL-MCNC: 104 MG/DL (ref 65–140)
GLUCOSE SERPL-MCNC: 107 MG/DL (ref 65–140)
GLUCOSE SERPL-MCNC: 112 MG/DL (ref 65–99)
HCT VFR BLD AUTO: 27.8 % (ref 42–47)
HGB BLD-MCNC: 9.2 G/DL (ref 14–18)
LYMPHOCYTES # BLD AUTO: 0.3 THOUSANDS/ΜL (ref 0.6–4.47)
LYMPHOCYTES NFR BLD AUTO: 3 % (ref 21–51)
MAGNESIUM SERPL-MCNC: 2.4 MG/DL (ref 1.9–2.7)
MCH RBC QN AUTO: 31.8 PG (ref 26–34)
MCHC RBC AUTO-ENTMCNC: 32.9 G/DL (ref 31–37)
MCV RBC AUTO: 97 FL (ref 81–99)
MONOCYTES # BLD AUTO: 0.3 THOUSAND/ΜL (ref 0.17–1.22)
MONOCYTES NFR BLD AUTO: 4 % (ref 2–12)
NEUTROPHILS # BLD AUTO: 8.8 THOUSANDS/ΜL (ref 1.4–6.5)
NEUTS SEG NFR BLD AUTO: 93 % (ref 42–75)
P AXIS: 48 DEGREES
PLATELET # BLD AUTO: 144 THOUSANDS/UL (ref 149–390)
PMV BLD AUTO: 9.2 FL (ref 8.6–11.7)
POTASSIUM SERPL-SCNC: 3.9 MMOL/L (ref 3.5–5.5)
PR INTERVAL: 160 MS
PROCALCITONIN SERPL-MCNC: 99.32 NG/ML
QRS AXIS: 20 DEGREES
QRSD INTERVAL: 90 MS
QT INTERVAL: 364 MS
QTC INTERVAL: 450 MS
RBC # BLD AUTO: 2.88 MILLION/UL (ref 4.3–5.9)
SODIUM SERPL-SCNC: 136 MMOL/L (ref 134–143)
T WAVE AXIS: 16 DEGREES
VENTRICULAR RATE: 92 BPM
WBC # BLD AUTO: 9.5 THOUSAND/UL (ref 4.8–10.8)

## 2019-08-18 PROCEDURE — 93010 ELECTROCARDIOGRAM REPORT: CPT | Performed by: INTERNAL MEDICINE

## 2019-08-18 PROCEDURE — 83735 ASSAY OF MAGNESIUM: CPT | Performed by: PHYSICIAN ASSISTANT

## 2019-08-18 PROCEDURE — 82948 REAGENT STRIP/BLOOD GLUCOSE: CPT

## 2019-08-18 PROCEDURE — 85025 COMPLETE CBC W/AUTO DIFF WBC: CPT | Performed by: PHYSICIAN ASSISTANT

## 2019-08-18 PROCEDURE — 99221 1ST HOSP IP/OBS SF/LOW 40: CPT | Performed by: INTERNAL MEDICINE

## 2019-08-18 PROCEDURE — 87040 BLOOD CULTURE FOR BACTERIA: CPT | Performed by: PHYSICIAN ASSISTANT

## 2019-08-18 PROCEDURE — 99233 SBSQ HOSP IP/OBS HIGH 50: CPT | Performed by: HOSPITALIST

## 2019-08-18 PROCEDURE — 80048 BASIC METABOLIC PNL TOTAL CA: CPT | Performed by: PHYSICIAN ASSISTANT

## 2019-08-18 PROCEDURE — 84145 PROCALCITONIN (PCT): CPT | Performed by: PHYSICIAN ASSISTANT

## 2019-08-18 RX ORDER — METHOCARBAMOL 500 MG/1
1000 TABLET, FILM COATED ORAL ONCE
Status: COMPLETED | OUTPATIENT
Start: 2019-08-18 | End: 2019-08-18

## 2019-08-18 RX ORDER — MAGNESIUM SULFATE HEPTAHYDRATE 40 MG/ML
2 INJECTION, SOLUTION INTRAVENOUS ONCE
Status: COMPLETED | OUTPATIENT
Start: 2019-08-18 | End: 2019-08-18

## 2019-08-18 RX ORDER — OXYCODONE HYDROCHLORIDE AND ACETAMINOPHEN 5; 325 MG/1; MG/1
1 TABLET ORAL EVERY 4 HOURS PRN
Status: DISCONTINUED | OUTPATIENT
Start: 2019-08-18 | End: 2019-08-21 | Stop reason: HOSPADM

## 2019-08-18 RX ADMIN — MORPHINE SULFATE 15 MG: 15 TABLET ORAL at 13:12

## 2019-08-18 RX ADMIN — MORPHINE SULFATE 15 MG: 15 TABLET ORAL at 08:35

## 2019-08-18 RX ADMIN — FINASTERIDE 5 MG: 5 TABLET, FILM COATED ORAL at 08:35

## 2019-08-18 RX ADMIN — SERTRALINE HYDROCHLORIDE 50 MG: 50 TABLET ORAL at 08:34

## 2019-08-18 RX ADMIN — HEPARIN SODIUM 5000 UNITS: 5000 INJECTION, SOLUTION INTRAVENOUS; SUBCUTANEOUS at 21:07

## 2019-08-18 RX ADMIN — MORPHINE SULFATE 15 MG: 15 TABLET ORAL at 21:03

## 2019-08-18 RX ADMIN — SODIUM CHLORIDE 75 ML/HR: 9 INJECTION, SOLUTION INTRAVENOUS at 08:36

## 2019-08-18 RX ADMIN — GABAPENTIN 400 MG: 300 CAPSULE ORAL at 08:35

## 2019-08-18 RX ADMIN — Medication 1 TABLET: at 08:35

## 2019-08-18 RX ADMIN — DOCUSATE SODIUM 400 MG: 100 CAPSULE, LIQUID FILLED ORAL at 08:35

## 2019-08-18 RX ADMIN — MORPHINE SULFATE 15 MG: 15 TABLET ORAL at 17:06

## 2019-08-18 RX ADMIN — FERROUS SULFATE TAB 325 MG (65 MG ELEMENTAL FE) 325 MG: 325 (65 FE) TAB at 08:35

## 2019-08-18 RX ADMIN — MORPHINE SULFATE 2 MG: 2 INJECTION, SOLUTION INTRAMUSCULAR; INTRAVENOUS at 01:18

## 2019-08-18 RX ADMIN — ACETAMINOPHEN 650 MG: 325 TABLET ORAL at 13:15

## 2019-08-18 RX ADMIN — FERROUS SULFATE TAB 325 MG (65 MG ELEMENTAL FE) 325 MG: 325 (65 FE) TAB at 17:06

## 2019-08-18 RX ADMIN — FAMOTIDINE 20 MG: 10 INJECTION, SOLUTION INTRAVENOUS at 17:05

## 2019-08-18 RX ADMIN — VITAMIN D, TAB 1000IU (100/BT) 2000 UNITS: 25 TAB at 08:35

## 2019-08-18 RX ADMIN — OXYCODONE HYDROCHLORIDE AND ACETAMINOPHEN 500 MG: 500 TABLET ORAL at 08:35

## 2019-08-18 RX ADMIN — CEFEPIME HYDROCHLORIDE 2000 MG: 2 INJECTION, SOLUTION INTRAVENOUS at 08:34

## 2019-08-18 RX ADMIN — SODIUM CHLORIDE 75 ML/HR: 9 INJECTION, SOLUTION INTRAVENOUS at 20:08

## 2019-08-18 RX ADMIN — HEPARIN SODIUM 5000 UNITS: 5000 INJECTION, SOLUTION INTRAVENOUS; SUBCUTANEOUS at 06:21

## 2019-08-18 RX ADMIN — HEPARIN SODIUM 5000 UNITS: 5000 INJECTION, SOLUTION INTRAVENOUS; SUBCUTANEOUS at 13:13

## 2019-08-18 RX ADMIN — OXYCODONE HYDROCHLORIDE AND ACETAMINOPHEN 1 TABLET: 5; 325 TABLET ORAL at 01:44

## 2019-08-18 RX ADMIN — GABAPENTIN 400 MG: 300 CAPSULE ORAL at 17:05

## 2019-08-18 RX ADMIN — MAGNESIUM SULFATE IN WATER 2 G: 40 INJECTION, SOLUTION INTRAVENOUS at 01:34

## 2019-08-18 RX ADMIN — CHLORHEXIDINE GLUCONATE 0.12% ORAL RINSE 15 ML: 1.2 LIQUID ORAL at 20:08

## 2019-08-18 RX ADMIN — CHLORHEXIDINE GLUCONATE 0.12% ORAL RINSE 15 ML: 1.2 LIQUID ORAL at 08:34

## 2019-08-18 RX ADMIN — FAMOTIDINE 20 MG: 10 INJECTION, SOLUTION INTRAVENOUS at 08:35

## 2019-08-18 RX ADMIN — ASPIRIN 325 MG: 325 TABLET, FILM COATED ORAL at 08:35

## 2019-08-18 RX ADMIN — METHOCARBAMOL TABLETS 1000 MG: 500 TABLET, COATED ORAL at 01:43

## 2019-08-18 RX ADMIN — ACETAMINOPHEN 650 MG: 325 TABLET ORAL at 08:39

## 2019-08-18 NOTE — ASSESSMENT & PLAN NOTE
Likely type 2 spill  Not c/w primary ACS event  No angina  In SR  No evidence for CHF  Troponinemia likely due to sepsis  Agree with ASA  Echo ordered to assess EF  Borderline BP prohibits empiric BB use  Lipids around 1 year ago excellent, will hold off on statins-fear shock liver  Tele reviewed, artifact not VT suspected  Will follow up after Echo and likely arrange out patient ischemic workup after recovers from acute event here

## 2019-08-18 NOTE — ASSESSMENT & PLAN NOTE
· Resolved - with antibiotic use  · Secondary to UTI/sepsis  · Patient is currently AAO x3  · CT brain was grossly within normal limits

## 2019-08-18 NOTE — PROGRESS NOTES
Progress Note - Nephrology   Cathy Mcgill 66 y o  male MRN: [id number]  Unit/Bed#: ICU 06 Encounter: [id number]    A/P:  1  Acute kidney injury: resolving with IVF  His fractional excretion of sodium was 0 12% indicating volume depletion and he has improved with IVF  He was febrile and septic with increased insensible water loss  2  Gram negative bacteremia: on IV antibiotics with improvement  3  Chronic indwelling cantor: has chronic cantor and developed sepsis after cantor change  4  Chronic pain syndrome: on opioids at home and here        Follow up reason for today's visit: Acute kidney injury    Toxic metabolic encephalopathy    Patient Active Problem List   Diagnosis    Bladder stones    Impingement syndrome of right shoulder    Complete tear of right rotator cuff    Chronic right shoulder pain    Arthritis of right shoulder region    Obstruction of Cantor catheter (HCC)    Chronic indwelling Cantor catheter    [address] [address]    RUTHANN (acute kidney injury) ([facility] [address]    Elevated troponin    Toxic metabolic encephalopathy    Fall    Acute cystitis with hematuria    Chronic pain syndrome    Bacteremia due to Gram-negative bacteria         Subjective:   Feels better - no dyspnea, chest pain or abdominal pain    Objective:     Vitals: Blood pressure 109/56, pulse 71, temperature 99 2 °F (37 3 °C), temperature source Tympanic, resp  rate (!) 25, height 5' 9" (1 753 m), weight 85 kg (187 lb 6 3 oz), SpO2 94 %  ,Body mass index is 27 67 kg/m²  Weight (last 2 days)     Date/Time   Weight    08/18/19 0600   85 (187 39)    08/17/19 0600   82 (180 78)    08/16/19 1948   82 6 (182 1)    08/16/19 1632   81 6 (179 9)                Intake/Output Summary (Last 24 hours) at 8/18/2019 1024  Last data filed at 8/18/2019 0836  Gross per 24 hour   Intake 3007 5 ml   Output 550 ml   Net 2457 5 ml     I/O last 3 completed shifts: In: 4200 8 [P O :1300; I V :2700 8;  IV Piggyback:200]  Out: 1325 [Urine:1325]    Urethral Catheter (Active)   Output (mL) 275 mL 8/17/2019  6:30 AM       Physical Exam: /56 (BP Location: Right arm)   Pulse 71   Temp 99 2 °F (37 3 °C) (Tympanic)   Resp (!) 25   Ht 5' 9" (1 753 m)   Wt 85 kg (187 lb 6 3 oz)   SpO2 94%   BMI 27 67 kg/m²     General Appearance:    Alert, cooperative, no distress, appears stated age   Head:    Normocephalic, without obvious abnormality, atraumatic   Eyes:    Conjunctiva/corneas clear   Ears:    Normal external ears   Nose:   Nares normal, septum midline, mucosa normal, no drainage    or sinus tenderness   Throat:   Lips, mucosa, and tongue normal; teeth and gums normal   Neck:   Supple, symmetrical, trachea midline, no adenopathy;        thyroid:  No enlargement/tenderness/nodules; no carotid    bruit or JVD   Back:     Symmetric, no curvature, ROM normal, no CVA tenderness   Lungs:      Decr to auscultation bilaterally, respirations unlabored   Chest wall:    No tenderness or deformity   Heart:    Regular rate and rhythm, S1 and S2 normal, no murmur, rub   or gallop   Abdomen:     Soft, non-tender, bowel sounds active   Extremities:   Extremities normal, atraumatic, no cyanosis or edema   Skin:   Skin color, texture, turgor normal, no rashes or lesions   Lymph nodes:   Cervical normal   Neurologic:   CNII-XII intact            Lab, Imaging and other studies: I have personally reviewed pertinent labs  CBC:   Lab Results   Component Value Date    WBC 9 50 08/18/2019    HGB 9 2 (L) 08/18/2019    HCT 27 8 (L) 08/18/2019    MCV 97 08/18/2019     (L) 08/18/2019    MCH 31 8 08/18/2019    MCHC 32 9 08/18/2019    RDW 13 7 08/18/2019    MPV 9 2 08/18/2019     CMP:   Lab Results   Component Value Date    K 3 9 08/18/2019     (H) 08/18/2019    CO2 21 08/18/2019    BUN 35 (H) 08/18/2019    CREATININE 1 61 (H) 08/18/2019    CALCIUM 8 3 (L) 08/18/2019    EGFR 41 08/18/2019           Results from last 7 days   Lab Units 08/18/19  0438 08/17/19  0430 08/16/19  6120 POTASSIUM mmol/L 3 9 3 7 4 4   CHLORIDE mmol/L 108* 110* 103   CO2 mmol/L 21 20* 24   BUN mg/dL 35* 36* 40*   CREATININE mg/dL 1 61* 1 80* 2 17*   CALCIUM mg/dL 8 3* 7 5* 8 3*   ALK PHOS U/L  --  77 76   ALT U/L  --  57* 62*   AST U/L  --  130* 171*         Phosphorus: No results found for: PHOS  Magnesium:   Lab Results   Component Value Date    MG 2 4 08/18/2019     Urinalysis: No results found for: COLORU, CLARITYU, SPECGRAV, PHUR, LEUKOCYTESUR, NITRITE, PROTEINUA, GLUCOSEU, KETONESU, BILIRUBINUR, BLOODU  Ionized Calcium: No results found for: CAION  Coagulation: No results found for: PT, INR, APTT  Troponin: No results found for: TROPONINI  ABG: No results found for: PHART, SJN9TUT, PO2ART, SLH8NLE, J4VIEALL, BEART, SOURCE  Radiology review:     IMAGING  Procedure: Ct Head Without Contrast    Result Date: 8/16/2019  Narrative: CT BRAIN - WITHOUT CONTRAST INDICATION:   Confusion/delirium, altered LOC, unexplained  COMPARISON:  3/9/2018  TECHNIQUE:  CT examination of the brain was performed  In addition to axial images, coronal 2D reformatted images were created and submitted for interpretation  Radiation dose length product (DLP) for this visit:  1008 6 mGy-cm   This examination, like all CT scans performed in the [facility], was performed utilizing techniques to minimize radiation dose exposure, including the use of iterative  reconstruction and automated exposure control  IMAGE QUALITY:  Diagnostic  FINDINGS: PARENCHYMA: Decreased attenuation is noted in periventricular and subcortical white matter demonstrating an appearance that is statistically most likely to represent mild microangiopathic change  No CT signs of acute infarction  No intracranial mass, mass effect or midline shift  No acute parenchymal hemorrhage  VENTRICLES AND EXTRA-AXIAL SPACES:  Normal for the patient's age  VISUALIZED ORBITS AND PARANASAL SINUSES:  Unremarkable   CALVARIUM AND EXTRACRANIAL SOFT TISSUES:  Normal  Impression: No acute intracranial abnormality  Workstation performed: [id number]     Procedure: Xr Chest 1 View    Result Date: 8/17/2019  Narrative: CHEST INDICATION:   Fever and confusion  COMPARISON:  December 13, 2018 EXAM PERFORMED/VIEWS:  XR CHEST 1 VIEW FINDINGS: Cardiomediastinal silhouette appears unremarkable  Mild pulmonary vascular congestion  Osseous structures appear within normal limits for patient age  Impression: Mild pulmonary vascular congestion  No consolidation or pneumothorax  Workstation performed: [id number]     Procedure: Us Kidney And Bladder    Result Date: 8/16/2019  Narrative: RENAL ULTRASOUND INDICATION:   , acute renal failue  COMPARISON: None TECHNIQUE:   Ultrasound of the retroperitoneum was performed with a curvilinear transducer utilizing volumetric sweeps and still imaging techniques  FINDINGS: KIDNEYS: Symmetric and normal size  Right kidney: cm  Left kidney: cm  Right kidney Normal echogenicity and contour  No suspicious masses detected  No hydronephrosis  9 mm calculus is present in the lower pole  No perinephric fluid collections  Left kidney Normal echogenicity and contour  No suspicious masses detected  2 0 x 1 6 x 1 9 cm simple cyst is present  No hydronephrosis  No shadowing calculi  No perinephric fluid collections  URETERS: Nonvisualized  BLADDER: A cantor catheter is in place, decompressing the bladder and limiting its evaluation  Impression: 1  9 mm nonobstructing calculus within the lower pole the right kidney  2  Simple cyst within the left kidney  No hydronephrosis  Workstation performed: [id number]     Procedure: Xr Hips Bilateral 3-4 Vw W Pelvis If Performed    Result Date: 8/18/2019  Narrative: BILATERAL HIPS AND PELVIS INDICATION:   Status post fall with hip pain  COMPARISON:  None VIEWS:  XR HIPS BILATERAL 3-4 VW W PELVIS IF PERFORMED  FINDINGS: The bony pelvis appears intact  Degenerative changes visualized lower lumbar spine   LEFT HIP: Mild left hip osteoarthritis is seen  Joint space alignment is maintained  Soft tissues are unremarkable  RIGHT HIP: Moderate right hip osteoarthritis is seen  Joint space alignment is maintained  Soft tissues are unremarkable  Impression: No acute osseous abnormality  Degenerative changes as described   Workstation performed: [id number]       Current Facility-Administered Medications   Medication Dose Route Frequency    acetaminophen (TYLENOL) tablet 650 mg  650 mg Oral Q4H PRN    ascorbic acid (VITAMIN C) tablet 500 mg  500 mg Oral Daily    aspirin tablet 325 mg  325 mg Oral Daily    bisacodyl (DULCOLAX) rectal suppository 10 mg  10 mg Rectal Daily PRN    cefepime (MAXIPIME) IVPB (premix) 2,000 mg  2,000 mg Intravenous Q24H    chlorhexidine (PERIDEX) 0 12 % oral rinse 15 mL  15 mL Swish & Spit Q12H [facility]    cholecalciferol (VITAMIN D3) tablet 2,000 Units  2,000 Units Oral Daily    docusate sodium (COLACE) capsule 400 mg  400 mg Oral Daily    famotidine (PEPCID) injection 20 mg  20 mg Intravenous BID    ferrous sulfate tablet 325 mg  325 mg Oral BID    finasteride (PROSCAR) tablet 5 mg  5 mg Oral Daily    gabapentin (NEURONTIN) capsule 400 mg  400 mg Oral BID    heparin (porcine) subcutaneous injection 5,000 Units  5,000 Units Subcutaneous Q8H [facility]    insulin lispro (HumaLOG) 100 units/mL subcutaneous injection 1-5 Units  1-5 Units Subcutaneous TID AC    insulin lispro (HumaLOG) 100 units/mL subcutaneous injection 1-5 Units  1-5 Units Subcutaneous HS    morphine (MSIR) IR tablet 15 mg  15 mg Oral 4x Daily    multivitamin-minerals (CENTRUM) tablet 1 tablet  1 tablet Oral Daily    ondansetron (ZOFRAN) injection 4 mg  4 mg Intravenous Q6H PRN    oxyCODONE-acetaminophen (PERCOCET) 5-325 mg per tablet 1 tablet  1 tablet Oral Q4H PRN    sertraline (ZOLOFT) tablet 50 mg  50 mg Oral Daily    sodium chloride 0 9 % infusion  75 mL/hr Intravenous Continuous     Medications Discontinued During This Encounter Medication Reason    vancomycin (VANCOCIN) IVPB (premix) 1,000 mg     meloxicam (MOBIC) 15 mg tablet Error    meloxicam (MOBIC) 15 mg tablet Error    naproxen (NAPROSYN) 500 mg tablet Error    nitrofurantoin (MACRODANTIN) 100 mg capsule Error    Turmeric Curcumin 500 MG CAPS Error    gabapentin (NEURONTIN) capsule 300 mg     b complex vitamins tablet 1 tablet     vancomycin (VANCOCIN) 1,250 mg in sodium chloride 0 9 % 250 mL IVPB     acetaminophen (TYLENOL) tablet 650 mg        Sofie Durand MD

## 2019-08-18 NOTE — NURSING NOTE
@ [phone], patient alarming  Patient noted to be in ventricular tachycardia with rates in the 180-200's  Patient sleeping at the time  Patient awakened  Denied any chest pain/chest discomfort  Patient offered no complaints  VS otherwise stable  Song MARTÍNEZ made aware of same  Instructed top attempt to get an EKG should it happen again   No other new orders received

## 2019-08-18 NOTE — CONSULTS
Consult- [facility] 1945, 66 y o  male MRN: [id number]    Unit/Bed#: ICU 06 Encounter: [id number]    Primary Care Provider: Digna Porras DO   Date and time admitted to hospital: 8/16/2019  4:31 PM      Inpatient consult to Cardiology  Consult performed by: Andra Morrell DO  Consult ordered by: Anais Mckeon PA-C        Chief Complaint:  Elevated troponin   History of Present Illness:  20-year-old white male with HTN, Psychiatric disorder, chronic pain, UTIs, indwelling cantor admitted with UTI/GNR sepsis, troponins incidentally found to be elevated and cardiac consult ordered  Documented temps were over 104  Family present at bedside in ICU  No reports of any cardiac symptoms of late  Family reports confusion, lethargy, shaking, sweats, mental status change preceeding admission  No reports of CP, SOB, palpitations  No prior cardiac history  No h/o rheumatic fever, MI, CHF, arrhythmia known  Patient currently sleeping comfortably  Review of Systems: a 12 point review of systems was conducted and is negative except for as mentioned in the HPI or as below  Review of Systems   Reason unable to perform ROS: Patient sleeping  See H&P  See HPI per family at bedside         Past Medical and Surgical History:  Past Medical History:   Diagnosis Date    Depression     Cantor catheter in place     Hypertension     Prostate enlargement     Psychiatric disorder     Urinary tract infection      Past Surgical History:   Procedure Laterality Date    ARTHROSCOPY KNEE      BACK SURGERY      L 4 or 5    CATARACT EXTRACTION Bilateral     CYSTOSCOPY W/ LASER LITHOTRIPSY N/A 8/20/2018    Procedure: LITHOTRISPY HOLMIUM LASER of bladder stones;  Surgeon: Luca Wood MD;  Location: [facility] MAIN OR;  Service: Urology    MA TRANSURETHRAL ELEC-SURG [address] N/A 8/20/2018    Procedure: Thurnell Livers; TURP;  Surgeon: Luca Wood MD;  Location: [facility] MAIN OR;  Service: Urology   Rosalino Elizabeth SHOULDER SURGERY Right 05/31/2019     Social History     Substance and Sexual Activity   Alcohol Use Yes    Frequency: Monthly or less    Drinks per session: 1 or 2    Binge frequency: Less than monthly     Social History     Substance and Sexual Activity   Drug Use No     Social History     Tobacco Use   Smoking Status Former Smoker    Packs/day: 1 00   Smokeless Tobacco Never Used   Tobacco Comment    quit 50 years ago       Family History:  Family History   Problem Relation Age of Onset    Cancer Mother     Diabetes Father        Medication:  Medications Prior to Admission   Medication    acetaminophen (TYLENOL) 325 mg tablet    ascorbic acid (VITAMIN C) 500 mg tablet    b complex vitamins tablet    cholecalciferol (VITAMIN D3) 1,000 units tablet    docusate sodium (COLACE) 100 mg capsule    ferrous sulfate 325 (65 Fe) mg tablet    multivitamin-iron-minerals-folic acid (CENTRUM) chewable tablet    aspirin 325 mg tablet    finasteride (PROSCAR) 5 mg tablet    gabapentin (NEURONTIN) 300 mg capsule    morphine (MSIR) 15 mg tablet    sertraline (ZOLOFT) 50 mg tablet      Current Facility-Administered Medications   Medication Dose Route Frequency Provider Last Rate Last Dose    acetaminophen (TYLENOL) tablet 650 mg  650 mg Oral Q4H PRN Jose J Pizarro MD   650 mg at 08/18/19 0839    ascorbic acid (VITAMIN C) tablet 500 mg  500 mg Oral Daily Canelo Montoya PA-C   500 mg at 08/18/19 0835    aspirin tablet 325 mg  325 mg Oral Daily UBALDO Harkins   325 mg at 08/18/19 0835    bisacodyl (DULCOLAX) rectal suppository 10 mg  10 mg Rectal Daily PRN Canelo Montoya PA-C        cefepime (MAXIPIME) IVPB (premix) 2,000 mg  2,000 mg Intravenous Q24H UBALDO Harkins 100 mL/hr at 08/18/19 0834 2,000 mg at 08/18/19 0834    chlorhexidine (PERIDEX) 0 12 % oral rinse 15 mL  15 mL Swish & Spit Q12H [address] UBALDO Harkins   15 mL at 08/18/19 0834    cholecalciferol (VITAMIN D3) tablet 2,000 Units 2,000 Units Oral Daily Home Cazares PA-C   2,000 Units at 08/18/19 1079    docusate sodium (COLACE) capsule 400 mg  400 mg Oral Daily Home Cazares PA-C   400 mg at 08/18/19 0835    famotidine (PEPCID) injection 20 mg  20 mg Intravenous BID UBALDO Harkins   20 mg at 08/18/19 1220    ferrous sulfate tablet 325 mg  325 mg Oral BID Home Cazares PA-C   325 mg at 08/18/19 0835    finasteride (PROSCAR) tablet 5 mg  5 mg Oral Daily UBALDO Harkins   5 mg at 08/18/19 0835    gabapentin (NEURONTIN) capsule 400 mg  400 mg Oral BID Home Cazares PA-C   400 mg at 08/18/19 0835    heparin (porcine) subcutaneous injection 5,000 Units  5,000 Units Subcutaneous [facility] UBALDO Harkins   5,000 Units at 08/18/19 6265    insulin lispro (HumaLOG) 100 units/mL subcutaneous injection 1-5 Units  1-5 Units Subcutaneous TID AC UBALDO Harkins        insulin lispro (HumaLOG) 100 units/mL subcutaneous injection 1-5 Units  1-5 Units Subcutaneous HS UBALDO Harkins        morphine (MSIR) IR tablet 15 mg  15 mg Oral 4x Daily UBALDO Harkins   15 mg at 08/18/19 0835    multivitamin-minerals (CENTRUM) tablet 1 tablet  1 tablet Oral Daily Home Cazares PA-C   1 tablet at 08/18/19 0835    ondansetron (ZOFRAN) injection 4 mg  4 mg Intravenous Q6H PRN UBALDO Harkins        oxyCODONE-acetaminophen (PERCOCET) 5-325 mg per tablet 1 tablet  1 tablet Oral Q4H PRN Home Cazares PA-C   1 tablet at 08/18/19 0144    sertraline (ZOLOFT) tablet 50 mg  50 mg Oral Daily UBALDO Harkins   50 mg at 08/18/19 0834    sodium chloride 0 9 % infusion  75 mL/hr Intravenous Continuous Home Cazares PA-C 75 mL/hr at 08/18/19 0836 75 mL/hr at 08/18/19 0836       Allergies:  No Known Allergies    Physical Exam:  Vitals: Blood pressure 109/56, pulse 71, temperature 99 2 °F (37 3 °C), temperature source Tympanic, resp   rate (!) 25, height 5' 9" (1 753 m), weight 85 kg (187 lb 6 3 oz), SpO2 94 %  , Body mass index is 27 67 kg/m² ,   Orthostatic Blood Pressures      Most Recent Value   Blood Pressure  109/56 filed at 08/18/2019 1000   Patient Position - Orthostatic VS  Lying filed at 08/18/2019 9364        Systolic (29PPZ), CVR:738 , Min:104 , KPS:509   , Diastolic (93DUH), TCR:02, Min:55, Max:85    Physical Exam   Constitutional: He is oriented to person, place, and time  He appears well-developed and well-nourished  No distress  HENT:   Head: Normocephalic and atraumatic  Eyes: Pupils are equal, round, and reactive to light  EOM are normal  No scleral icterus  Neck: Normal range of motion  Neck supple  No JVD present  No tracheal deviation present  No thyromegaly present  Cardiovascular: Normal rate, regular rhythm and normal heart sounds  Exam reveals no gallop and no friction rub  No murmur heard  Pulmonary/Chest: Effort normal and breath sounds normal  No stridor  No respiratory distress  He has no wheezes  He has no rales  Abdominal: Soft  Bowel sounds are normal  He exhibits no distension and no mass  There is no tenderness  Musculoskeletal: Normal range of motion  He exhibits no edema or deformity  Neurological: He is alert and oriented to person, place, and time  Coordination normal    Skin: Skin is warm  He is diaphoretic  No erythema  No pallor  Psychiatric:   Sleeping, not assessed  It nursing reports appropriate answers when awake  Most Recent Cardiac Imaging: None    EKG: Sinus rhythm with Premature supraventricular complexes  RSR' pattern in V1  Otherwise normal ECG  When compared with ECG of 09-AUG-2018 08:43,  Premature supraventricular complexes are now Present  QT has lengthened  Confirmed by Albert Neff ([id number]) on 8/18/2019 11:25:12 AM    "NSVT" reported  Tele reviewed in detail, highly suspicious for artifact as SR seemed to march thru suspected VT  Doubt VT      Lab Results:   Troponins:   Results from last 7 days   Lab Units 08/17/19  0370 08/16/19  2317 08/16/19  1948   TROPONIN I ng/mL 3 08* 4 69* 2 50*     CBC with diff:   Results from last 7 days   Lab Units 08/18/19 0439 08/17/19  0430 08/16/19 1958 08/16/19  1643   WBC Thousand/uL 9 50 19 90*  --  19 40*   HEMOGLOBIN g/dL 9 2* 9 2*  --  11 1*   HEMATOCRIT % 27 8* 28 3*  --  33 5*   MCV fL 97 98  --  96   PLATELETS Thousands/uL 144* 157 172 214   MCH pg 31 8 31 7  --  31 9   MCHC g/dL 32 9 32 4  --  33 2   RDW % 13 7 13 8  --  13 5   MPV fL 9 2 9 5  --  9 0     CMP:  Results from last 7 days   Lab Units 08/18/19 0438 08/17/19 0430 08/16/19  1740   POTASSIUM mmol/L 3 9 3 7 4 4   CHLORIDE mmol/L 108* 110* 103   CO2 mmol/L 21 20* 24   BUN mg/dL 35* 36* 40*   CREATININE mg/dL 1 61* 1 80* 2 17*   CALCIUM mg/dL 8 3* 7 5* 8 3*   AST U/L  --  130* 171*   ALT U/L  --  57* 62*   ALK PHOS U/L  --  77 76   EGFR ml/min/1 73sq m 41 36 29     Lipid Profile:       Elevated troponin  Assessment & Plan  Likely type 2 spill  Not c/w primary ACS event  No angina  In SR  No evidence for CHF  Troponinemia likely due to sepsis  Agree with ASA  Echo ordered to assess EF  Borderline BP prohibits empiric BB use  Lipids around 1 year ago excellent, will hold off on statins-fear shock liver  Tele reviewed, artifact not VT suspected  Will follow up after Echo and likely arrange out patient ischemic workup after recovers from acute event here  Bacteremia due to Gram-negative bacteria  Assessment & Plan  Per SLIM  RUTHANN (acute kidney injury) [facility])  Assessment & Plan  Per Nephro  Likely confounding Troponin specificity as well

## 2019-08-18 NOTE — NURSING NOTE
MAURICIO lord de aware that patient has had no relief in his pain with the morphine administration  Order for muscle relaxer received and IV magnesium infusing

## 2019-08-18 NOTE — SPEECH THERAPY NOTE
Speech Language/Pathology  Speech/Language Pathology  Assessment    Patient Name: Pasha Hollins  Today's Date: 8/18/2019     Problem List  Patient Active Problem List   Diagnosis    Bladder stones    Impingement syndrome of right shoulder    Complete tear of right rotator cuff    Chronic right shoulder pain    Arthritis of right shoulder region    Obstruction of Diallo catheter (HCC)    Chronic indwelling Diallo catheter    Sepsis ([facility] )    RUTHANN (acute kidney injury) ([facility] )    Elevated troponin    Toxic metabolic encephalopathy    Fall    Acute cystitis with hematuria    Chronic pain syndrome    Bacteremia due to Gram-negative bacteria     Past Medical History  Past Medical History:   Diagnosis Date    Depression     Diallo catheter in place     Hypertension     Prostate enlargement     Psychiatric disorder     Urinary tract infection      Past Surgical History  Past Surgical History:   Procedure Laterality Date    ARTHROSCOPY KNEE      BACK SURGERY      L 4 or 5    CATARACT EXTRACTION Bilateral     CYSTOSCOPY W/ LASER LITHOTRIPSY N/A 8/20/2018    Procedure: LITHOTRISPY HOLMIUM LASER of bladder stones;  Surgeon: Leanne Rivero MD;  Location: [facility] MAIN OR;  Service: Urology    [place] TRANSURETHRAL ELEC-SURG [address] [place] [address] N/A 8/20/2018    Procedure: CYSTOSCOPY; TURP;  Surgeon: Leanne Rivero MD;  Location: [facility] MAIN OR;  Service: Urology    SHOULDER SURGERY Right 05/31/2019   Order received and appreciated  Pt placed on regular solids/thin liquids by physician  Called placed to [facility]) and discussed via telephone  She reports patient is tolerating diet well and taking meds whole with liquid without difficulty  This service will f/u for full evaluation 8/19

## 2019-08-18 NOTE — ASSESSMENT & PLAN NOTE
· Present on admission  · Secondary to a urinary tract infection in the setting of having a chronic indwelling Diallo catheter  · T-max over 24 hours was 104°, currently he is afebrile  · White blood cell count has resolved-were WBC today is 9 50  · Sepsis parameters have currently resolved  · Initially the patient received vancomycin and cefepime  · Blood cultures positive 2/2 gram negative rods - vancomycin was discontinued  · Continue cefepime-day 2, source is most likely a UTI, will consult ID  · Continue present management here in the ICU for now

## 2019-08-18 NOTE — ASSESSMENT & PLAN NOTE
· Most likely pre renal in etiology  · Creatinine is further down to 1 61  · Improved with IV fluids  · Appreciate Nephrology input

## 2019-08-18 NOTE — PROGRESS NOTES
Progress Note - Grazyna Moeller 1945, 66 y o  male MRN: [id number]    Unit/Bed#: ICU 06 Encounter: [id number]    Primary Care Provider: Marni Wallis DO   Date and time admitted to hospital: 8/16/2019  4:31 PM        * Toxic metabolic encephalopathy  Assessment & Plan  · Resolved - with antibiotic use  · Secondary to UTI/sepsis  · Patient is currently AAO x3  · CT brain was grossly within normal limits      Sepsis [facility])  Assessment & Plan  · Present on admission  · Secondary to a urinary tract infection in the setting of having a chronic indwelling Diallo catheter  · T-max over 24 hours was 104°, currently he is afebrile  · White blood cell count has resolved-were WBC today is 9 50  · Sepsis parameters have currently resolved  · Initially the patient received vancomycin and cefepime  · Blood cultures positive 2/2 gram negative rods - vancomycin was discontinued  · Continue cefepime-day 2, source is most likely a UTI, will consult ID  · Continue present management here in the ICU for now    Acute cystitis with hematuria  Assessment & Plan  · Continue IV antibiotics-IV cefepime  · See the outline above    Chronic indwelling Diallo catheter  Assessment & Plan  · Renal ultrasound results reviewed - results as follows:1  9 mm nonobstructing calculus within the lower pole the right kidney  2  Simple cyst within the left kidney   No hydronephrosis  · Urology services are following-further management as per Urology    Elevated troponin  Assessment & Plan  · Most likely  non myocardial infarction elevated troponins secondary to sepsis  · Formal cardiology evaluation is pending for medication optimization    RUTHANN (acute kidney injury) ([facility] )  Assessment & Plan  · Most likely pre renal in etiology  · Creatinine is further down to 1 61  · Improved with IV fluids  · Appreciate Nephrology input    [address]  · PT/OT evals for d/c needs    Chronic pain syndrome  Assessment & Plan  · With continuous opioid dependence  · Continue home meds    Bacteremia due to Gram-negative bacteria  Assessment & Plan  · Continue IV antibiotics  · Follow up final cultures  VTE Pharmacologic Prophylaxis: Pharmacologic: Heparin    Patient Centered Rounds: I have performed bedside rounds with nursing staff today  Discussions with Specialists or Other Care Team Provider:  Nephrology, case management, nursing and pharmacy  Education and Discussions with Family / Patient:  Patient was brought up to par with the plan of care for today, all questions answered to his satisfaction    Current Length of Stay: 2 day(s)    Current Patient Status: Inpatient   Certification Statement: The patient will continue to require additional inpatient hospital stay due to The need for continued IV antibiotics    Discharge Plan:  Discharge planning will commence once the patient is medically stable    Code Status: Level 1 - Full Code    Subjective:   Patient seen and examined  Looks and feels a lot better than prior to admission  Denies pain or discomfort  Feels tired  Is remarkably alert awake oriented x3    Objective:     Vitals:   Temp (24hrs), Av °F (38 3 °C), Min:99 °F (37 2 °C), Max:104 °F (40 °C)    Temp:  [99 °F (37 2 °C)-104 °F (40 °C)] 99 2 °F (37 3 °C)  HR:  [] 67  Resp:  [15-41] 17  BP: (104-178)/(55-85) 113/70  SpO2:  [73 %-96 %] 73 %  Body mass index is 27 67 kg/m²  Input and Output Summary (last 24 hours): Intake/Output Summary (Last 24 hours) at 2019 0824  Last data filed at 2019 0601  Gross per 24 hour   Intake 3263 75 ml   Output 550 ml   Net 2713 75 ml       Physical Exam:     Physical Exam   Constitutional: He is oriented to person, place, and time  He appears well-developed and well-nourished  HENT:   Head: Normocephalic and atraumatic  Nose: Nose normal    Mouth/Throat: Oropharynx is clear and moist    Eyes: Pupils are equal, round, and reactive to light   Conjunctivae and EOM are normal  Neck: Normal range of motion  Neck supple  No JVD present  No thyromegaly present  Cardiovascular: Normal rate, regular rhythm and intact distal pulses  Exam reveals no gallop and no friction rub  No murmur heard  Pulmonary/Chest: Effort normal and breath sounds normal  No respiratory distress  Abdominal: Soft  Bowel sounds are normal  He exhibits no distension and no mass  There is no tenderness  There is no guarding  Musculoskeletal: Normal range of motion  He exhibits no edema  Lymphadenopathy:     He has no cervical adenopathy  Neurological: He is alert and oriented to person, place, and time  No cranial nerve deficit  Skin: Skin is warm  No rash noted  No erythema  Psychiatric: He has a normal mood and affect  His behavior is normal    Vitals reviewed  Additional Data:     Labs:    Results from last 7 days   Lab Units 08/18/19  0439   WBC Thousand/uL 9 50   HEMOGLOBIN g/dL 9 2*   HEMATOCRIT % 27 8*   PLATELETS Thousands/uL 144*   NEUTROS PCT % 93*   LYMPHS PCT % 3*   MONOS PCT % 4   EOS PCT % 0     Results from last 7 days   Lab Units 08/18/19  0438 08/17/19  0430   POTASSIUM mmol/L 3 9 3 7   CHLORIDE mmol/L 108* 110*   CO2 mmol/L 21 20*   BUN mg/dL 35* 36*   CREATININE mg/dL 1 61* 1 80*   CALCIUM mg/dL 8 3* 7 5*   ALK PHOS U/L  --  77   ALT U/L  --  57*   AST U/L  --  130*     Results from last 7 days   Lab Units 08/16/19  1643   INR  1 37     Results from last 7 days   Lab Units 08/18/19  0656 08/17/19  2102 08/17/19  1642 08/17/19  1139 08/17/19  0809 08/16/19  2018   POC GLUCOSE mg/dl 104 127 119 120 122 147*     Results from last 7 days   Lab Units 08/16/19  1958   HEMOGLOBIN A1C % 5 4       * I Have Reviewed All Lab Data Listed Above  * Additional Pertinent Lab Tests Reviewed:  [address] Admission  Reviewed    Imaging:  Imaging Reports Reviewed Today Include:  None    Recent Cultures (last 7 days):     Results from last 7 days   Lab Units 08/16/19  [facility] STAIN RESULT  Gram negative rods*  Gram negative rods*   URINE CULTURE  >100,000 cfu/ml        Last 24 Hours Medication List:     Current Facility-Administered Medications:  acetaminophen 650 mg Oral Q4H PRN Billy Martinez MD    ascorbic acid 500 mg Oral Daily Robb MARIAN Lee    aspirin 325 mg Oral Daily UBALDO Harkins    bisacodyl 10 mg Rectal Daily PRN Robbaurelia Lee PA-C    cefepime 2,000 mg Intravenous Q24H UBALDO Harkins Last Rate: 2,000 mg (08/17/19 0827)   chlorhexidine 15 mL Swish & Spit Q12H [address] UBALDO Harkins    cholecalciferol 2,000 Units Oral Daily Robb MARIAN Lee    docusate sodium 400 mg Oral Daily Robb MARIAN Lee    famotidine 20 mg Intravenous BID UBALDO Harkins    ferrous sulfate 325 mg Oral BID Robb Lee PA-C    finasteride 5 mg Oral Daily UBALDO Harkins    gabapentin 400 mg Oral BID Robbaurelia Lee PA-C    heparin (porcine) 5,000 Units Subcutaneous Q8H [address] UBALDO Harkins    insulin lispro 1-5 Units Subcutaneous TID AC UBALDO Harkins    insulin lispro 1-5 Units Subcutaneous HS UBALDO Harkins    morphine 15 mg Oral 4x Daily UBALDO Harkins    multivitamin-minerals 1 tablet Oral Daily Korin Zuleta PA-C    ondansetron 4 mg Intravenous Q6H PRN UBALDO Harkins    oxyCODONE-acetaminophen 1 tablet Oral Q4H PRN Robb Lee PA-C    sertraline 50 mg Oral Daily UBALDO Harkins    sodium chloride 75 mL/hr Intravenous Continuous Robb Lee PA-C Last Rate: 75 mL/hr (08/17/19 1902)        Today, Patient Was Seen By: Billy Martinez MD    ** Please Note: Dictation voice to text software may have been used in the creation of this document   **

## 2019-08-18 NOTE — NURSING NOTE
Patient awoke with excruciating pain which he is rating a 10/10  Difficult to obtain any information regarding his pain other than it is in both lower extremities and that "he wouldn't wish it on anyone"  PA made aware of same  Order for morphine 2 mg IV was received and administered @ 0118  Patient is expressing that he has not had any relief in his pain since receiving the 2 mg of morphine  Labs reviewed by PA and order for 2 gram of Magnesium was received  Awaiting medication

## 2019-08-19 ENCOUNTER — APPOINTMENT (INPATIENT)
Dept: NON INVASIVE DIAGNOSTICS | Facility: HOSPITAL | Age: 74
DRG: 698 | End: 2019-08-19
Payer: MEDICARE

## 2019-08-19 LAB
ANION GAP SERPL CALCULATED.3IONS-SCNC: 5 MMOL/L (ref 4–13)
ATRIAL RATE: 51 BPM
ATRIAL RATE: 78 BPM
BACTERIA BLD CULT: ABNORMAL
BACTERIA BLD CULT: ABNORMAL
BASOPHILS # BLD AUTO: 0 THOUSANDS/ΜL (ref 0–0.1)
BASOPHILS NFR BLD AUTO: 0 % (ref 0–2)
BUN SERPL-MCNC: 29 MG/DL (ref 7–25)
CALCIUM SERPL-MCNC: 8.6 MG/DL (ref 8.6–10.5)
CHLORIDE SERPL-SCNC: 108 MMOL/L (ref 98–107)
CO2 SERPL-SCNC: 24 MMOL/L (ref 21–31)
CREAT SERPL-MCNC: 1.45 MG/DL (ref 0.7–1.3)
EOSINOPHIL # BLD AUTO: 0.1 THOUSAND/ΜL (ref 0–0.61)
EOSINOPHIL NFR BLD AUTO: 1 % (ref 0–5)
ERYTHROCYTE [DISTWIDTH] IN BLOOD BY AUTOMATED COUNT: 13.9 % (ref 11.5–14.5)
GFR SERPL CREATININE-BSD FRML MDRD: 46 ML/MIN/1.73SQ M
GLUCOSE SERPL-MCNC: 98 MG/DL (ref 65–99)
GRAM STN SPEC: ABNORMAL
GRAM STN SPEC: ABNORMAL
HCT VFR BLD AUTO: 28.5 % (ref 42–47)
HGB BLD-MCNC: 9.6 G/DL (ref 14–18)
LYMPHOCYTES # BLD AUTO: 0.6 THOUSANDS/ΜL (ref 0.6–4.47)
LYMPHOCYTES NFR BLD AUTO: 10 % (ref 21–51)
MCH RBC QN AUTO: 32.2 PG (ref 26–34)
MCHC RBC AUTO-ENTMCNC: 33.6 G/DL (ref 31–37)
MCV RBC AUTO: 96 FL (ref 81–99)
MONOCYTES # BLD AUTO: 0.6 THOUSAND/ΜL (ref 0.17–1.22)
MONOCYTES NFR BLD AUTO: 10 % (ref 2–12)
NEUTROPHILS # BLD AUTO: 4.6 THOUSANDS/ΜL (ref 1.4–6.5)
NEUTS SEG NFR BLD AUTO: 78 % (ref 42–75)
P AXIS: 55 DEGREES
PLATELET # BLD AUTO: 155 THOUSANDS/UL (ref 149–390)
PMV BLD AUTO: 9.5 FL (ref 8.6–11.7)
POTASSIUM SERPL-SCNC: 4 MMOL/L (ref 3.5–5.5)
PR INTERVAL: 184 MS
QRS AXIS: 37 DEGREES
QRS AXIS: 61 DEGREES
QRSD INTERVAL: 74 MS
QRSD INTERVAL: 92 MS
QT INTERVAL: 324 MS
QT INTERVAL: 392 MS
QTC INTERVAL: 446 MS
QTC INTERVAL: 489 MS
RBC # BLD AUTO: 2.97 MILLION/UL (ref 4.3–5.9)
SODIUM SERPL-SCNC: 137 MMOL/L (ref 134–143)
T WAVE AXIS: 33 DEGREES
T WAVE AXIS: 34 DEGREES
VENTRICULAR RATE: 137 BPM
VENTRICULAR RATE: 78 BPM
WBC # BLD AUTO: 5.8 THOUSAND/UL (ref 4.8–10.8)

## 2019-08-19 PROCEDURE — 85025 COMPLETE CBC W/AUTO DIFF WBC: CPT | Performed by: HOSPITALIST

## 2019-08-19 PROCEDURE — 93010 ELECTROCARDIOGRAM REPORT: CPT | Performed by: INTERNAL MEDICINE

## 2019-08-19 PROCEDURE — 80048 BASIC METABOLIC PNL TOTAL CA: CPT | Performed by: HOSPITALIST

## 2019-08-19 PROCEDURE — G0427 INPT/ED TELECONSULT70: HCPCS | Performed by: INTERNAL MEDICINE

## 2019-08-19 PROCEDURE — G8996 SWALLOW CURRENT STATUS: HCPCS

## 2019-08-19 PROCEDURE — 93306 TTE W/DOPPLER COMPLETE: CPT | Performed by: INTERNAL MEDICINE

## 2019-08-19 PROCEDURE — 97530 THERAPEUTIC ACTIVITIES: CPT

## 2019-08-19 PROCEDURE — G8997 SWALLOW GOAL STATUS: HCPCS

## 2019-08-19 PROCEDURE — 93306 TTE W/DOPPLER COMPLETE: CPT

## 2019-08-19 PROCEDURE — G8998 SWALLOW D/C STATUS: HCPCS

## 2019-08-19 PROCEDURE — 99232 SBSQ HOSP IP/OBS MODERATE 35: CPT | Performed by: INTERNAL MEDICINE

## 2019-08-19 PROCEDURE — 92610 EVALUATE SWALLOWING FUNCTION: CPT

## 2019-08-19 PROCEDURE — NC001 PR NO CHARGE: Performed by: INTERNAL MEDICINE

## 2019-08-19 PROCEDURE — 97116 GAIT TRAINING THERAPY: CPT

## 2019-08-19 RX ORDER — CEFDINIR 300 MG/1
300 CAPSULE ORAL EVERY 12 HOURS SCHEDULED
Status: DISCONTINUED | OUTPATIENT
Start: 2019-08-19 | End: 2019-08-20

## 2019-08-19 RX ORDER — CEFEPIME HYDROCHLORIDE 2 G/50ML
2000 INJECTION, SOLUTION INTRAVENOUS EVERY 12 HOURS
Status: DISCONTINUED | OUTPATIENT
Start: 2019-08-19 | End: 2019-08-19

## 2019-08-19 RX ADMIN — OXYCODONE HYDROCHLORIDE AND ACETAMINOPHEN 1 TABLET: 5; 325 TABLET ORAL at 05:40

## 2019-08-19 RX ADMIN — CHLORHEXIDINE GLUCONATE 0.12% ORAL RINSE 15 ML: 1.2 LIQUID ORAL at 22:02

## 2019-08-19 RX ADMIN — FAMOTIDINE 20 MG: 10 INJECTION, SOLUTION INTRAVENOUS at 17:49

## 2019-08-19 RX ADMIN — GABAPENTIN 400 MG: 300 CAPSULE ORAL at 17:21

## 2019-08-19 RX ADMIN — CEFDINIR 300 MG: 300 CAPSULE ORAL at 16:25

## 2019-08-19 RX ADMIN — Medication 1 TABLET: at 08:41

## 2019-08-19 RX ADMIN — SERTRALINE HYDROCHLORIDE 50 MG: 50 TABLET ORAL at 08:41

## 2019-08-19 RX ADMIN — ASPIRIN 325 MG: 325 TABLET, FILM COATED ORAL at 08:41

## 2019-08-19 RX ADMIN — CEFEPIME HYDROCHLORIDE 2000 MG: 2 INJECTION, SOLUTION INTRAVENOUS at 08:41

## 2019-08-19 RX ADMIN — MORPHINE SULFATE 15 MG: 15 TABLET ORAL at 11:53

## 2019-08-19 RX ADMIN — GABAPENTIN 400 MG: 300 CAPSULE ORAL at 08:40

## 2019-08-19 RX ADMIN — SODIUM CHLORIDE 75 ML/HR: 9 INJECTION, SOLUTION INTRAVENOUS at 21:59

## 2019-08-19 RX ADMIN — HEPARIN SODIUM 5000 UNITS: 5000 INJECTION, SOLUTION INTRAVENOUS; SUBCUTANEOUS at 14:37

## 2019-08-19 RX ADMIN — HEPARIN SODIUM 5000 UNITS: 5000 INJECTION, SOLUTION INTRAVENOUS; SUBCUTANEOUS at 05:40

## 2019-08-19 RX ADMIN — CHLORHEXIDINE GLUCONATE 0.12% ORAL RINSE 15 ML: 1.2 LIQUID ORAL at 08:42

## 2019-08-19 RX ADMIN — DOCUSATE SODIUM 400 MG: 100 CAPSULE, LIQUID FILLED ORAL at 08:40

## 2019-08-19 RX ADMIN — HEPARIN SODIUM 5000 UNITS: 5000 INJECTION, SOLUTION INTRAVENOUS; SUBCUTANEOUS at 22:02

## 2019-08-19 RX ADMIN — FERROUS SULFATE TAB 325 MG (65 MG ELEMENTAL FE) 325 MG: 325 (65 FE) TAB at 17:21

## 2019-08-19 RX ADMIN — OXYCODONE HYDROCHLORIDE AND ACETAMINOPHEN 500 MG: 500 TABLET ORAL at 08:41

## 2019-08-19 RX ADMIN — MORPHINE SULFATE 15 MG: 15 TABLET ORAL at 08:41

## 2019-08-19 RX ADMIN — MORPHINE SULFATE 15 MG: 15 TABLET ORAL at 17:21

## 2019-08-19 RX ADMIN — FERROUS SULFATE TAB 325 MG (65 MG ELEMENTAL FE) 325 MG: 325 (65 FE) TAB at 08:41

## 2019-08-19 RX ADMIN — FAMOTIDINE 20 MG: 10 INJECTION, SOLUTION INTRAVENOUS at 10:21

## 2019-08-19 RX ADMIN — VITAMIN D, TAB 1000IU (100/BT) 2000 UNITS: 25 TAB at 08:41

## 2019-08-19 RX ADMIN — MORPHINE SULFATE 15 MG: 15 TABLET ORAL at 22:02

## 2019-08-19 RX ADMIN — FINASTERIDE 5 MG: 5 TABLET, FILM COATED ORAL at 08:40

## 2019-08-19 NOTE — OCCUPATIONAL THERAPY NOTE
Occupational Therapy Treatment Note        Patient Name: Pasha Hollins  NUPMX'Y Date: 8/19/2019 08/19/19 1013   Pain Assessment   Pain Assessment 0-10   Pain Score 5   Pain Location Hip   Pain Orientation Left   Pain Radiating Towards down towards thigh   Pain Descriptors Aching   Pain Frequency Constant/continuous   Hospital Pain Intervention(s) Medication (See MAR); Ambulation/increased activity;Repositioned   Bed Mobility   Supine to Sit 2  Maximal assistance   Additional items Assist x 1;HOB elevated; Bedrails; Increased time required;LE management;Verbal cues   Sit to Supine 2  Maximal assistance   Additional items Assist x 1;HOB elevated; Increased time required;Verbal cues;LE management; Bedrails   Transfers   Sit to Stand 3  Moderate assistance   Additional items Assist x 2; Increased time required;Verbal cues   Stand to Sit 3  Moderate assistance   Additional items Assist x 2; Increased time required;Verbal cues   Additional Comments Pt required verbal cues for hand placement on RW prior to sit>stand transfer   Cognition   Overall Cognitive Status Impaired   Arousal/Participation Alert; Cooperative   Attention Attends with cues to redirect   Orientation Level Oriented to person;Oriented to place; Disoriented to time;Disoriented to situation   Memory Within functional limits   Following Commands Follows one step commands without difficulty   Comments pt agreeable to OT session    Assessment   Assessment Patient participated in Skilled OT session this date with interventions consisting of  therapeutic activities to: increase activity tolerance, elicit righting and equilibrium reactions for improved postural control and alignment during transitional movements, increase dynamic sit/ stand balance during functional activity  and increase OOB/ sitting tolerance   Patient agreeable to OT treatment session, upon arrival patient was found supine in bed and in no apparent distress   Patient requiring verbal cues for safety, verbal cues for correct technique, one step directives and frequent rest periods  Patient continues to be functioning below baseline level, occupational performance remains limited secondary to factors listed above and increased risk for falls and injury  From OT standpoint, recommendation at time of d/c would be Short Term Rehab  Patient to benefit from continued Occupational Therapy treatment while in the hospital to address deficits as defined above and maximize level of functional independence with ADLs and functional mobility  Plan   Treatment Interventions ADL retraining;Functional transfer training;UE strengthening/ROM; Endurance training;Cognitive reorientation;Equipment evaluation/education; Compensatory technique education; Energy conservation; Activityengagement   Goal Expiration Date 08/27/19   Treatment Day 1   OT Frequency 3-5x/wk   Recommendation   OT Discharge Recommendation Short Term Rehab   OT - OK to Discharge Yes  (Once medically cleared)     Guanaco Chang, OT

## 2019-08-19 NOTE — ASSESSMENT & PLAN NOTE
· Most likely  non myocardial infarction elevated troponins secondary to sepsis  · Appreciate Cardiology input  · Echocardiogram read pending

## 2019-08-19 NOTE — TELEMEDICINE
REQUIRED DOCUMENTATION:     1  This service was provided via Telemedicine  2  Provider located at [facility]  3  TeleMed provider: Agus Partida MD   4  Identify all parties in room with patient during tele consult:  Angelic HONG and patient's family   5  After connecting through [employer], patient was identified by name and date of birth and assistant checked wristband  Patient was then informed that this was a Telemedicine visit and that the exam was being conducted confidentially over secure lines  Patient acknowledged consent and understanding of privacy and security of the Telemedicine visit, and gave us permission to have the assistant stay in the room in order to assist with the history and to conduct the exam   I informed the patient that I have reviewed their record in Epic and presented the opportunity for them to ask any questions regarding the visit today  The patient agreed to participate  TeleConsultation - Infectious Disease   Grazyna Moeller 66 y o  male MRN: [id number]  Unit/Bed#: ICU 06 Encounter: [id number]      IMPRESSION & RECOMMENDATIONS:   1  Sepsis, POA  Patient presented on admission with high fever along with leukocytosis and tachycardia  This is likely due to problems 2/3  No other obvious sources after review of his chart and on exam   Patient has also clinically improved on current therapy  Antibiotic therapy as below  Continue to trend fever curve/vitals  Repeat CBC and chemistry tomorrow  Follow up pending culture data  Additional care as per primary    2  Gram-negative bacteremia  Blood cultures later returned positive with E coli likely from urinary source given recent catheter exchange and suprapubic pain on initial evaluations  Patient has clinically improved on current antibiotic therapy  Repeat cultures pending    Will transition patient to oral cefdinir  Continue to trend fever curve/vitals  Repeat CBC/chemistry tomorrow  Follow up pending blood culture, assure without growth for 24 hours  Plan to treat with antibiotic for total of 10 days through 8/26  Additional care as per primary    3  Complicated urinary tract infection  Patient with chronic Diallo catheter and recent exchange leading to the above  Renal ultrasound with nonobstructing stones and no signs of obstruction otherwise  Urine cultures with multiple organisms  Antibiotics as above  Continue to trend fever curve/vitals  Ongoing follow-up by urology  Additional care as per primary    4  Acute kidney injury  Patient presented with acute elevation in his creatinine likely due to the above  Creatinine has since down trended  Dose adjust antibiotics as needed  Ongoing follow-up by Nephrology  Additional care as per primary    5  Toxic metabolic encephalopathy  Patient presented acutely confused on admission  It seems his mentation is slowly improving  Reports of recent hallucinations may be related to cefepime and prolonged hospital stay  Change antibiotics  Continue to monitor mental status  Additional care as per primary      6  Chronic indwelling Diallo catheter  Ongoing follow-up and outpatient follow-up as per Urology  7   Lower extremity weakness and reported hip pain  Patient without any hardware in his back however he is chronically on pain medication for his back  Hip x-ray unremarkable  Suspect overall weakness is due to patient's infection and hospitalization  He is without spinal tenderness to palpation  Does not seem to have new pain from baseline  Serial back and leg exams  Formal evaluation by physical therapy  Antibiotics as above  Additional care as per primary    Above plan was discussed in detail earlier with primary team     Above plan discussed in detail with the patient and his family at bedside  ID consult service will follow peripherally  Please call if any additional questions or concerns      HISTORY OF PRESENT ILLNESS:  Reason for Consult:  Gram-negative bacteremia    HPI: Sigrid Simmons is a 66y o  year old male with past medical history significant for depression, Diallo catheter, prostate enlargement along with previously documented surgeries in the knee as well as the back and shoulder  Patient presented to the emergency department on 08/16 for confusion along with fever and fall  The patient chronically follows with Urology for ongoing chronic Diallo catheter needs and apparently had his catheter changed on Monday  It was subsequently after this change that he started to developed weakness along with leg and back pain  Daughter also noted increasing confusion and then episode of fall at home  Patient was evaluated in the ER and was given broad-spectrum antibiotics  On initial evaluations he was noted to have decreased urine output along with foul-smelling urine  He was noted to be tachycardic as well as febrile and tachypneic in the ER  White count was elevated to 19 4  Creatinine was elevated at 2 1  Liver enzymes mildly elevated as well  Patient had CT of the head which was negative and also had x-ray of the chest which was unremarkable  Renal ultrasound showed a simple cyst on the left kidney and nonobstructing stone in the right kidney  Patient was ultimately admitted for metabolic encephalopathy due to acute cystitis with hematuria  He was left on broad-spectrum antibiotics  He was also noted to have acute kidney injury which was thought to be due to obstruction  Urology was also consulted  Patient was seen by Urology the next day and recommended to maintain current Diallo catheter  Patient was also seen by Nephrology  Blood cultures later returned positive  On review from labs today the patient's white blood cell count has normalized  His creatinine continues to improve  As per primary service note the patient has clinically also improved  He has had no fevers overnight  Blood cultures from 08/16 noted    Blood cultures from 08/18 pending  Urine cultures with mixed contaminant x3 greater than 100,000  X-ray of the hip done recently unremarkable  Patient's other vitals are stable  Procalcitonin has also down trended  We are consulted at this time for further assistance in management given this patient's presentation of gram-negative bacteremia in the setting of a presumed urinary source  On evaluation, patient is a very pleasant 57-year-old gentleman  On discussing with his family he reportedly seemed more confused when he was being admitted and he reported having lower abdominal pain and pressure on presentation  At baseline he is able to drive a car and he is able to take care of himself at home even though he recently had shoulder surgery  He does not have any prosthetic material in his back after his previous back surgery  At baseline however he has chronic back pain but this does not seem to have changed from his baseline  The patient overall however is debilitated after this episode of coming into the hospital   He seems to be unsteady on his feet and is planned to be working with physical therapy today  He has no other obvious focal deficits to report  Diallo catheter remains in place and is draining you light yellow urine  He continues to be followed by Urology  He currently denies having any nausea, vomiting, chest pain or shortness of breath  He is currently tolerating antibiotic without acute issue  He is plan for possible placement rehab  REVIEW OF SYSTEMS:  A complete 12 point system-based review of systems is negative other than that noted in the HPI      PAST MEDICAL HISTORY:  Past Medical History:   Diagnosis Date    Depression     Diallo catheter in place     Hypertension     Prostate enlargement     Psychiatric disorder     Urinary tract infection      Past Surgical History:   Procedure Laterality Date    ARTHROSCOPY KNEE      BACK SURGERY      L 4 or 5    CATARACT EXTRACTION Bilateral     CYSTOSCOPY W/ LASER LITHOTRIPSY N/A 2018    Procedure: LITHOTRISPY HOLMIUM LASER of bladder stones;  Surgeon: Mulugeta Currie MD;  Location: [facility] OR;  Service: Urology    NC TRANSURETHRAL ELEC-SURG PROSTATECTOM N/A 2018    Procedure: Saulo Santiago; TURP;  Surgeon: Mulugeta Currie MD;  Location: [facility] MAIN OR;  Service: Urology    SHOULDER SURGERY Right 2019       FAMILY HISTORY:  Non-contributory    SOCIAL HISTORY:  Social History   Social History     Substance and Sexual Activity   Alcohol Use Yes    Frequency: Monthly or less    Drinks per session: 1 or 2    Binge frequency: Less than monthly     Social History     Substance and Sexual Activity   Drug Use No     Social History     Tobacco Use   Smoking Status Former Smoker    Packs/day: 1 00   Smokeless Tobacco Never Used   Tobacco Comment    quit 50 years ago       ALLERGIES:  No Known Allergies    MEDICATIONS:  All current active medications have been reviewed  PHYSICAL EXAM:  Temp:  [98 °F (36 7 °C)-100 3 °F (37 9 °C)] 98 6 °F (37 °C)  HR:  [56-81] 76  Resp:  [8-32] 17  BP: (103-154)/(55-94) 111/79  SpO2:  [92 %-98 %] 93 %  Temp (24hrs), Av 2 °F (37 3 °C), Min:98 °F (36 7 °C), Max:100 3 °F (37 9 °C)  Current: Temperature: 98 6 °F (37 °C)    Intake/Output Summary (Last 24 hours) at 2019 1257  Last data filed at 2019 0545  Gross per 24 hour   Intake 2036 25 ml   Output 1450 ml   Net 586 25 ml        Physical exam has been done by Angelic HONG    General Appearance:  Appearing well, nontoxic, and in no distress; elderly appearing gentleman  Chronically debilitated     Head:  Normocephalic, without obvious abnormality, atraumatic   Eyes:  Conjunctiva pink and sclera anicteric, both eyes   Neck: Supple, symmetrical, no adenopathy   Back:   Symmetric, no curvature, patient has difficulty sitting forward due to ongoing weakness, no CVA tenderness; he has no spinal or paraspinal muscle tenderness to palpation on exam   Lungs:   Decreased breath sounds throughout, respirations unlabored on nasal cannula   Chest Wall:  No tenderness or deformity   Heart:  RRR; no murmur, rub or gallop appreciated   Abdomen:   Soft, non-tender, non-distended, positive bowel sounds, suprapubic tenderness on exam   Extremities: No cyanosis, clubbing or edema appreciated   Skin: No rashes or lesions  No draining wounds noted  Lymph nodes: Cervical, supraclavicular nodes normal   Neurologic: Patient is awake and alert and oriented to person, place and partially to time  He is able to recognize his family present in the room and is able to carry on a conversation  He seems to have full range of motion his upper extremities  He is able to flex and extend at the knee very easily in the left leg but some limitations in the right  He does seem to have good range of motion from the hip  LABS, IMAGING, & OTHER STUDIES:  Lab Results:  I have personally reviewed pertinent labs  Results from last 7 days   Lab Units 08/19/19  0451 08/18/19  0439 08/17/19  0430   WBC Thousand/uL 5 80 9 50 19 90*   HEMOGLOBIN g/dL 9 6* 9 2* 9 2*   PLATELETS Thousands/uL 155 144* 157     Results from last 7 days   Lab Units 08/19/19  0451  08/17/19  0430 08/16/19  1740   POTASSIUM mmol/L 4 0   < > 3 7 4 4   CHLORIDE mmol/L 108*   < > 110* 103   CO2 mmol/L 24   < > 20* 24   BUN mg/dL 29*   < > 36* 40*   CREATININE mg/dL 1 45*   < > 1 80* 2 17*   EGFR ml/min/1 73sq m 46   < > 36 29   CALCIUM mg/dL 8 6   < > 7 5* 8 3*   AST U/L  --   --  130* 171*   ALT U/L  --   --  57* 62*   ALK PHOS U/L  --   --  77 76    < > = values in this interval not displayed  Results from last 7 days   Lab Units 08/16/19  1643   BLOOD CULTURE  Escherichia coli*  Escherichia coli*   GRAM STAIN RESULT  Gram negative rods*  Gram negative rods*   URINE CULTURE  >100,000 cfu/ml        Imaging Studies:   I have personally reviewed pertinent imaging study reports and images in PACS        Other Studies:   I have personally reviewed pertinent reports

## 2019-08-19 NOTE — PHYSICAL THERAPY NOTE
Physical Therapy Treatment Session Note     Patient's Name: Cathy Mcgill    Admitting Diagnosis  Altered mental status [R41 82]  Elevated troponin [R74 8]  RUTHANN (acute kidney injury) ([facility] [address] ) [N17 9]  Chronic indwelling Diallo catheter [Z96 0]  Sepsis ([address] ) [A41 9]    Problem List  Patient Active Problem List   Diagnosis    Bladder stones    Impingement syndrome of right shoulder    Complete tear of right rotator cuff    Chronic right shoulder pain    Arthritis of right shoulder region    Obstruction of Diallo catheter ([facility] [address] )    Chronic indwelling Diallo catheter    Sepsis ([facility] [address] )    RUTHANN (acute kidney injury) ([facility] [address] )    Elevated troponin    Toxic metabolic encephalopathy    Fall    Acute cystitis with hematuria    Chronic pain syndrome    Bacteremia due to Gram-negative bacteria       Past Medical History  Past Medical History:   Diagnosis Date    Depression     Diallo catheter in place     Hypertension     Prostate enlargement     Psychiatric disorder     Urinary tract infection        Past Surgical History  Past Surgical History:   Procedure Laterality Date    ARTHROSCOPY KNEE      BACK SURGERY      L 4 or 5    CATARACT EXTRACTION Bilateral     CYSTOSCOPY W/ LASER LITHOTRIPSY N/A 8/20/2018    Procedure: LITHOTRISPY HOLMIUM LASER of bladder stones;  Surgeon: Mian Paula MD;  Location: [address] [place] [address] OR;  Service: Urology    MS TRANSURETHRAL ELEC-SURG PROSTATECTOM N/A 8/20/2018    Procedure: Jeison Hobbs; TURP;  Surgeon: Mian Paula MD;  Location: [address] [place] [address] OR;  Service: Urology    SHOULDER SURGERY Right 05/31/2019 08/19/19 1051   Pain Assessment   Pain Assessment No/denies pain   Pain Score 8   Pain Type Chronic pain   Pain Location Hip   Pain Orientation Left   Pain Descriptors Aching   Pain Frequency Constant/continuous   Pain Onset Ongoing   Hospital Pain Intervention(s) Medication (See MAR); Repositioned; Ambulation/increased activity   Restrictions/Precautions   Weight Bearing Precautions Per Order No   Other Precautions Contact/isolation;Cognitive;Multiple lines;Telemetry;O2;Fall Risk   General   Chart Reviewed Yes   Response to Previous Treatment Patient unable to report, no changes reported from family or staff   Family/Caregiver Present Yes  (daughter upon session conclusion)   Cognition   Overall Cognitive Status Impaired   Arousal/Participation Alert; Cooperative   Attention Attends with cues to redirect   Orientation Level Oriented to person;Oriented to place; Disoriented to time;Disoriented to situation   Memory Decreased recall of precautions;Decreased recall of recent events   Following Commands Follows one step commands with increased time or repetition   Subjective   Subjective "everything is messed up today"   Bed Mobility   Supine to Sit 2  Maximal assistance   Additional items Assist x 1;HOB elevated; Bedrails; Increased time required;Verbal cues;LE management   Sit to Supine 2  Maximal assistance   Additional items Assist x 2;HOB elevated; Bedrails; Increased time required;Verbal cues;LE management   Additional Comments Pt  required consistent verbal & tactile cues of 1-2 to achieve static sitting balance at bedside  Transfers   Sit to Stand 3  Moderate assistance   Additional items Assist x 2; Increased time required;Verbal cues  (reiterated VC's for appropriate hand placement)   Stand to Sit 3  Moderate assistance   Additional items Assist x 2; Increased time required;Verbal cues   Stand pivot Unable to assess  (2/2 safety concerns)   Ambulation/Elevation   Gait pattern Improper Weight shift; Forward Flexion;Decreased foot clearance; Inconsistent stephen; Short stride   Gait Assistance 3  Moderate assist   Additional items Assist x 2;Verbal cues; Tactile cues  (consistently )   Assistive Device Rolling walker   Distance 5 feet toward [facility]   Stair Management Assistance Not tested   Balance   Static Sitting Fair   Dynamic Sitting Fair -   Static Standing Poor +   Dynamic Standing Poor   Ambulatory Poor   Endurance Deficit   Endurance Deficit Yes   Endurance Deficit Description Pt  easily fatigued w/WB tasks  Activity Tolerance   Activity Tolerance Patient limited by pain; Patient limited by fatigue  (GELY Atwood present for tx session)   Assessment   Prognosis Fair   Problem List Decreased strength;Decreased endurance; Impaired balance;Decreased mobility; Decreased cognition; Impaired judgement;Decreased safety awareness;Pain   Assessment Pt seen for PT treatment session this date with interventions consisting of gait training w/ emphasis on improving pt's ability to ambulate level surfaces x 5 feet with mod A of 2 provided by therapist with RW and therapeutic activity consisting of training: bed mobility, supine<>sit transfers, sit<>stand transfers, static sitting tolerance at EOB for 5 minutes w/ B UE support, static standing tolerance for 3 minutes w/ B UE support, vc and tactile cues for static sitting posture faciliation and vc and tactile cues for static standing posture faciliation  Pt agreeable to PT treatment session upon arrival, pt found supine in bed w/ HOB elevated, in no apparent distress and A&O x 2  In comparison to previous session, pt with no improvements as evidenced by required assist level, balance, pain  Post session: pt returned BTB, all needs in reach and RN notified of session findings/recommendations Continue to recommend STR at time of d/c in order to maximize pt's functional independence and safety w/ mobility  Pt continues to be functioning below baseline level, and remains limited 2* factors listed above and including weakness, impaired balance, gait dysfunction, decreased endurance, activity intolerance, decline from PLOF  PT will continue to see pt while here in order to address the deficits listed above and provide interventions consistent w/ POC in effort to achieve LTGs     Barriers to Discharge Inaccessible home environment   Goals   Patient Goals have less hip pain   LTG Expiration Date 08/24/19   Long Term Goal #1 LTGs remain appropriate   Treatment Day 1   Plan   Treatment/Interventions Functional transfer training;LE strengthening/ROM; Therapeutic exercise; Endurance training;Patient/family training;Equipment eval/education; Bed mobility;Gait training;Spoke to nursing;OT  (&neuro re-education w/balance training)   Progress No functional improvements   PT Frequency 5x/wk   Recommendation   Recommendation Short-term skilled PT   Equipment Recommended Walker   PT - OK to Discharge Yes  (if medically stable to STR)   Additional Comments Upon conclusion, pt  was resting in bed w/all needs within reach & SCD's active         Domingo Hurd PT

## 2019-08-19 NOTE — PROGRESS NOTES
Progress Note - Pasha Hollins 1945, 66 y o  male MRN: [id number]    Unit/Bed#: ICU 06 Encounter: [id number]    Primary Care Provider: Piedad Briscoe DO   Date and time admitted to hospital: 8/16/2019  4:31 PM        Bacteremia due to Gram-negative bacteria  Assessment & Plan  · Continue IV antibiotics  · Follow up final cultures  Acute cystitis with hematuria  Assessment & Plan  · Continue IV antibiotics-IV cefepime  · See the outline above    Elevated troponin  Assessment & Plan  · Most likely  non myocardial infarction elevated troponins secondary to sepsis  · Appreciate Cardiology input  · Echocardiogram read pending    RUTHANN (acute kidney injury) ([facility] [address] )  Assessment & Plan  · Most likely pre renal in etiology  · Creatinine trending down  · Improved with IV fluids  · Appreciate Nephrology input    Sepsis [facility])  Assessment & Plan  · Present on admission  · Secondary to a urinary tract infection in the setting of having a chronic indwelling Diallo catheter  · T-max over 24 hours was 104°, currently he is afebrile  · White blood cell count has resolved-were WBC today is 9 50  · Sepsis parameters have currently resolved  · Initially the patient received vancomycin and cefepime  · Blood cultures positive 2/2 gram negative rods - vancomycin was discontinued  · Continue cefepime-day 3, source is most likely a UTI, will consult ID  · Continue present management here in the ICU for now    Chronic indwelling Diallo catheter  Assessment & Plan  · Renal ultrasound results reviewed - results as follows:1  9 mm nonobstructing calculus within the lower pole the right kidney  2  Simple cyst within the left kidney   No hydronephrosis  · Urology services are following-further management as per Urology    * Toxic metabolic encephalopathy  Assessment & Plan  · Resolved - with antibiotic use  · Secondary to UTI/sepsis  · Patient is currently at baseline  · CT brain unremarkable      VTE Pharmacologic Prophylaxis: Pharmacologic: Heparin    Patient Centered Rounds: I have performed bedside rounds with nursing staff today  Discussions with Specialists or Other Care Team Provider: nephrology  Education and Discussions with Family / Patient: patient    Current Length of Stay: 3 day(s)    Current Patient Status: Inpatient   Certification Statement: The patient will continue to require additional inpatient hospital stay due to sepsis    Discharge Plan: pending hospital course    Code Status: Level 1 - Full Code    Subjective:   Patient seen examined  No acute events overnight  Feeling much better    Objective:     Vitals:   Temp (24hrs), Av 2 °F (37 3 °C), Min:97 9 °F (36 6 °C), Max:100 3 °F (37 9 °C)    Temp:  [97 9 °F (36 6 °C)-100 3 °F (37 9 °C)] 100 1 °F (37 8 °C)  HR:  [56-81] 76  Resp:  [8-32] 32  BP: (103-154)/(55-94) 154/94  SpO2:  [92 %-97 %] 95 %  Body mass index is 27 84 kg/m²  Input and Output Summary (last 24 hours): Intake/Output Summary (Last 24 hours) at 2019 0834  Last data filed at 2019 0545  Gross per 24 hour   Intake 2930 ml   Output 1450 ml   Net 1480 ml       Physical Exam:     Physical Exam   Constitutional: He is oriented to person, place, and time  He appears well-developed and well-nourished  HENT:   Head: Normocephalic and atraumatic  Poor dentition   Eyes: Pupils are equal, round, and reactive to light  EOM are normal    Neck: Normal range of motion  Neck supple  Cardiovascular: Normal rate and regular rhythm  Pulmonary/Chest: Effort normal and breath sounds normal    Abdominal: Soft  Bowel sounds are normal    Genitourinary:   Genitourinary Comments: Diallo in place   Musculoskeletal: Normal range of motion  Neurological: He is alert and oriented to person, place, and time  Skin: Skin is warm  Capillary refill takes less than 2 seconds  Psychiatric: He has a normal mood and affect   His behavior is normal  Judgment and thought content normal    Nursing note and vitals reviewed  Additional Data:     Labs:    Results from last 7 days   Lab Units 08/19/19  0451   WBC Thousand/uL 5 80   HEMOGLOBIN g/dL 9 6*   HEMATOCRIT % 28 5*   PLATELETS Thousands/uL 155   NEUTROS PCT % 78*   LYMPHS PCT % 10*   MONOS PCT % 10   EOS PCT % 1     Results from last 7 days   Lab Units 08/19/19  0451  08/17/19  0430   POTASSIUM mmol/L 4 0   < > 3 7   CHLORIDE mmol/L 108*   < > 110*   CO2 mmol/L 24   < > 20*   BUN mg/dL 29*   < > 36*   CREATININE mg/dL 1 45*   < > 1 80*   CALCIUM mg/dL 8 6   < > 7 5*   ALK PHOS U/L  --   --  77   ALT U/L  --   --  57*   AST U/L  --   --  130*    < > = values in this interval not displayed  Results from last 7 days   Lab Units 08/16/19  1643   INR  1 37     Results from last 7 days   Lab Units 08/18/19  1121 08/18/19  0656 08/17/19  2102 08/17/19  1642 08/17/19  1139 08/17/19  0809 08/16/19  2018   POC GLUCOSE mg/dl 107 104 127 119 120 122 147*     Results from last 7 days   Lab Units 08/16/19  1958   HEMOGLOBIN A1C % 5 4       * I Have Reviewed All Lab Data Listed Above  * Additional Pertinent Lab Tests Reviewed:  [address] Admission  Reviewed    Imaging:  Imaging Reports Reviewed Today Include: n/a    Recent Cultures (last 7 days):     Results from last 7 days   Lab Units 08/16/19  1643   BLOOD CULTURE  Escherichia coli*  Escherichia coli*   GRAM STAIN RESULT  Gram negative rods*  Gram negative rods*   URINE CULTURE  >100,000 cfu/ml        Last 24 Hours Medication List:     Current Facility-Administered Medications:  acetaminophen 650 mg Oral Q4H PRN Justyna Hood MD    ascorbic acid 500 mg Oral Daily Yuli Wray PA-C    aspirin 325 mg Oral Daily UBALDO Harkins    bisacodyl 10 mg Rectal Daily PRN Yuli Wray PA-C    cefepime 2,000 mg Intravenous Q24H UBALDO Harkins Last Rate: 2,000 mg (08/18/19 0834)   chlorhexidine 15 mL Swish & Spit Q12H [facility] UBALDO Harkins    cholecalciferol 2,000 Units Oral Daily Yuli Wray PA-C    docusate sodium 400 mg Oral Daily Yuli Wray PA-C    famotidine 20 mg Intravenous BID UBALDO Harkins    ferrous sulfate 325 mg Oral BID Yuli Wray PA-C    finasteride 5 mg Oral Daily Noemy Holloway, UBALDO    gabapentin 400 mg Oral BID Yuli Wray PA-C    heparin (porcine) 5,000 Units Subcutaneous Q8H [address] NoemyUBALDO Pickering    morphine 15 mg Oral 4x Daily UBALDO Harkins    multivitamin-minerals 1 tablet Oral Daily Korin Zuleta PA-C    ondansetron 4 mg Intravenous Q6H PRN UBALDO Harkins    oxyCODONE-acetaminophen 1 tablet Oral Q4H PRN Yuli Wray PA-C    sertraline 50 mg Oral Daily UBALDO Harkins    sodium chloride 75 mL/hr Intravenous Continuous Yuli Wray PA-C Last Rate: 75 mL/hr (08/18/19 2008)        Today, Patient Was Seen By: Ced Melissa MD    ** Please Note: Dictation voice to text software may have been used in the creation of this document   **

## 2019-08-19 NOTE — PLAN OF CARE
Problem: PHYSICAL THERAPY ADULT  Goal: Performs mobility at highest level of function for planned discharge setting  See evaluation for individualized goals  Description  Treatment/Interventions: Functional transfer training, LE strengthening/ROM, Elevations, Therapeutic exercise, Endurance training, Bed mobility, Gait training  Equipment Recommended: Jyoti Hawley       See flowsheet documentation for full assessment, interventions and recommendations  Note:   Prognosis: Fair  Problem List: Decreased strength, Decreased endurance, Impaired balance, Decreased mobility, Decreased cognition, Impaired judgement, Decreased safety awareness, Pain  Assessment: Pt seen for PT treatment session this date with interventions consisting of gait training w/ emphasis on improving pt's ability to ambulate level surfaces x 5 feet with mod A of 2 provided by therapist with RW and therapeutic activity consisting of training: bed mobility, supine<>sit transfers, sit<>stand transfers, static sitting tolerance at EOB for 5 minutes w/ B UE support, static standing tolerance for 3 minutes w/ B UE support, vc and tactile cues for static sitting posture faciliation and vc and tactile cues for static standing posture faciliation  Pt agreeable to PT treatment session upon arrival, pt found supine in bed w/ HOB elevated, in no apparent distress and A&O x 2  In comparison to previous session, pt with no improvements as evidenced by required assist level, balance, pain  Post session: pt returned BTB, all needs in reach and RN notified of session findings/recommendations Continue to recommend STR at time of d/c in order to maximize pt's functional independence and safety w/ mobility  Pt continues to be functioning below baseline level, and remains limited 2* factors listed above and including weakness, impaired balance, gait dysfunction, decreased endurance, activity intolerance, decline from PLOF   PT will continue to see pt while here in order to address the deficits listed above and provide interventions consistent w/ POC in effort to achieve LTGs  Barriers to Discharge: Inaccessible home environment     Recommendation: Short-term skilled PT     PT - OK to Discharge: Yes(if medically stable to STR)    See flowsheet documentation for full assessment     Jessica Raines, PT

## 2019-08-19 NOTE — ASSESSMENT & PLAN NOTE
· Resolved - with antibiotic use  · Secondary to UTI/sepsis  · Patient is currently at baseline  · CT brain unremarkable

## 2019-08-19 NOTE — PLAN OF CARE
Problem: OCCUPATIONAL THERAPY ADULT  Goal: Performs self-care activities at highest level of function for planned discharge setting  See evaluation for individualized goals  Description  Treatment Interventions: ADL retraining, Functional transfer training, UE strengthening/ROM, Endurance training, Cognitive reorientation, Equipment evaluation/education, Compensatory technique education, Energy conservation, Activityengagement          See flowsheet documentation for full assessment, interventions and recommendations  Note:   Limitation: Decreased ADL status, Decreased UE strength, Decreased Safe judgement during ADL, Decreased cognition, Decreased endurance, Decreased self-care trans, Decreased high-level ADLs     Assessment: Patient participated in Skilled OT session this date with interventions consisting of  therapeutic activities to: increase activity tolerance, elicit righting and equilibrium reactions for improved postural control and alignment during transitional movements, increase dynamic sit/ stand balance during functional activity  and increase OOB/ sitting tolerance   Patient agreeable to OT treatment session, upon arrival patient was found supine in bed and in no apparent distress  Patient requiring verbal cues for safety, verbal cues for correct technique, one step directives and frequent rest periods  Patient continues to be functioning below baseline level, occupational performance remains limited secondary to factors listed above and increased risk for falls and injury  From OT standpoint, recommendation at time of d/c would be Short Term Rehab  Patient to benefit from continued Occupational Therapy treatment while in the hospital to address deficits as defined above and maximize level of functional independence with ADLs and functional mobility        OT Discharge Recommendation: Short Term Rehab  OT - OK to Discharge: Yes(Once medically cleared)     El Maxwell OT

## 2019-08-19 NOTE — ASSESSMENT & PLAN NOTE
· Present on admission  · Secondary to a urinary tract infection in the setting of having a chronic indwelling Diallo catheter  · T-max over 24 hours was 104°, currently he is afebrile  · White blood cell count has resolved-were WBC today is 9 50  · Sepsis parameters have currently resolved  · Initially the patient received vancomycin and cefepime  · Blood cultures positive 2/2 gram negative rods - vancomycin was discontinued  · Continue cefepime-day 3, source is most likely a UTI, will consult ID  · Continue present management here in the ICU for now

## 2019-08-19 NOTE — PROGRESS NOTES
Progress Note - Dena Brooks 66 y o  male MRN: [id number]    Unit/Bed#: ICU 06 Encounter: [id number]      Assessment:  Urinary tract infections status post Diallo catheter change  Currently improving with intravenous antibiotics  Renal function improved to creatinine 1 45  Maximum temperature 100 3° and currently afebrile  Plan:  Continue Diallo catheter  Continue antibiotics per Infectious Disease consult  Subjective:   Feels better  No complaints  Tolerating Diallo well  Objective:     Vitals: Blood pressure 117/59, pulse 66, temperature 98 °F (36 7 °C), temperature source Tympanic, resp  rate (!) 25, height 5' 9" (1 753 m), weight 85 5 kg (188 lb 7 9 oz), SpO2 95 %  ,Body mass index is 27 84 kg/m²  Intake/Output Summary (Last 24 hours) at 8/19/2019 1539  Last data filed at 8/19/2019 0545  Gross per 24 hour   Intake 2036 25 ml   Output 1450 ml   Net 586 25 ml       Physical Exam: Male genitalia: normal   Diallo draining clear urine  Invasive Devices     Peripheral Intravenous Line            Peripheral IV 08/16/19 Left Forearm 2 days    Peripheral IV 08/16/19 Left;Ventral (anterior) Forearm 2 days          Drain            Urethral Catheter 2 days                Lab, Imaging and other studies: I have personally reviewed pertinent reports      VTE Pharmacologic Prophylaxis: Sequential compression device (Venodyne)   VTE Mechanical Prophylaxis: sequential compression device

## 2019-08-19 NOTE — ASSESSMENT & PLAN NOTE
· Most likely pre renal in etiology  · Creatinine trending down  · Improved with IV fluids  · Appreciate Nephrology input

## 2019-08-19 NOTE — NURSING NOTE
Patient had both legs out of bed, confused at this time  Oriented to self only, questioned where patient was going patient states "to answer the phone "  Redirected patient at this time  Bed alarm applied

## 2019-08-19 NOTE — NURSING NOTE
Report received from ICU RN Jesse  Pt received to med surg  Pt drowsy, oriented to self only  Ate 10% dinner, pocketing food  Denies pain, denies shortness of breath, on 5L NC, vss  Personal needs and call bell within reach, will continue to monitor

## 2019-08-19 NOTE — SPEECH THERAPY NOTE
Speech-Language Pathology Bedside Swallow Evaluation      Patient Name: Lam Hutson    Today's Date: 8/19/2019     Problem List  Patient Active Problem List   Diagnosis    Bladder stones    Impingement syndrome of right shoulder    Complete tear of right rotator cuff    Chronic right shoulder pain    Arthritis of right shoulder region    Obstruction of Diallo catheter (HCC)    Chronic indwelling Diallo catheter    Sepsis ([facility] [address] )    RUTHANN (acute kidney injury) ([facility] [address] )    Elevated troponin    Toxic metabolic encephalopathy    Fall    Acute cystitis with hematuria    Chronic pain syndrome    Bacteremia due to Gram-negative bacteria       Past Medical History  Past Medical History:   Diagnosis Date    Depression     Diallo catheter in place     Hypertension     Prostate enlargement     Psychiatric disorder     Urinary tract infection        Past Surgical History  Past Surgical History:   Procedure Laterality Date    ARTHROSCOPY KNEE      BACK SURGERY      L 4 or 5    CATARACT EXTRACTION Bilateral     CYSTOSCOPY W/ LASER LITHOTRIPSY N/A 8/20/2018    Procedure: LITHOTRISPY HOLMIUM LASER of bladder stones;  Surgeon: George Guerrero MD;  Location: [facility] MAIN OR;  Service: Urology    OR TRANSURETHRAL ELEC-SURG [address] [place] [address] N/A 8/20/2018    Procedure: CYSTOSCOPY; TURP;  Surgeon: George Guerrero MD;  Location: [facility] MAIN OR;  Service: Urology    SHOULDER SURGERY Right 05/31/2019       Summary   Pt presented with functional appearing oral and pharyngeal stage swallowing skills with all materials administered today      Risk for Aspiration: not observed today     Recommendations: regular diet and thin liquids      Intermittent supervision/assistance w/ meals as needed d/t confusion  Recommended Form of Meds: whole with puree     Aspiration precautions / intermittent supervision d/t confusion    Current Medical Status per admitting H&p:  Pt is a 66 y o  male who presented to [employer] with confusion, fever, fall   Patient with PMHx of shoulder surgery in May 2019 with stay in hospital and at Short term rehab, HTN, chronic pain with continuous opioid use, constipation,  Chronic devaughn, salma Mercado  Jose Juan Thomas, daughter reports cantor was changed on Monday reports that yesterday he started with weakness and leg and back pain  His daughter noted increased confusion today and had a fall at home  He had difficulty getting up and daughter brought him to the hospital   In the ER he was noted to be lethargic, when I saw him he was awake and alert and responsive to questions  Swallow consult requested d/t confusion to assess swallow function  And  determine aspiration risk  Pt had a throat clear at baseline    Current Precautions:  Fall and contact, on 2 liters O2  Past medical history:  Please see H&P for details    Special Studies:  CT head 8/16/19: No acute intracranial abnormality      Social/Education/Vocational Hx:  Pt lives with family    Swallow Information   Current Risks for Dysphagia & Aspiration: AMS     Current Symptoms/Concerns: r/o dysphagia/ aspiration risk    Current Diet: regular diet and thin liquids      Baseline Diet: regular diet and thin liquids      Baseline Assessment   Behavior/Cognition: alert and disoriented to place  And time  Speech/Language Status: able to participate in basic conversation and able to follow commands    Patient Positioning: upright in bed    Pain Status/Interventions/Response to Interventions:  Reports of top of leg and back pain  Swallow Mechanism Exam   Oral-motor structures and function are WNL for symmetry, strength, ROM & coordination  Pt wears new upper and lower dentures, voice and cough/throat clear are adequate  Consistencies Assessed and Performance   Consistencies Administered: thin liquids, puree and soft solids  Materials administered included eggs, oatmeal, coffee and juice via cup and straw      Oral Stage: WFL  Mastication was adequate with the materials administered today  Bolus formation and transfer were functional with no significant oral residue noted  No overt s/s reduced oral control  Pharyngeal Stage: WFL    Swallow Mechanics:  Swallowing initiation appeared prompt  Laryngeal rise was palpated and judged to be within functional limits  No coughing, throat clearing, change in vocal quality or respiratory status noted today  Throat clear at times, but not related to boluses being swallowed, suspect PND versus possible GERD  Esophageal Concerns: none reported    Strategies and Efficacy: Swallow appears w [facility]      Summary and Recommendations (see above)    Results Reviewed with: patient and RN     Jonnie Dawson, CCC/SLP  [id number]  [email or web address]  Available via tiger text

## 2019-08-20 LAB
ALBUMIN SERPL BCP-MCNC: 2.6 G/DL (ref 3.5–5.7)
ALP SERPL-CCNC: 154 U/L (ref 55–165)
ALT SERPL W P-5'-P-CCNC: 70 U/L (ref 7–52)
ANION GAP SERPL CALCULATED.3IONS-SCNC: 6 MMOL/L (ref 4–13)
AST SERPL W P-5'-P-CCNC: 62 U/L (ref 13–39)
BILIRUB SERPL-MCNC: 0.5 MG/DL (ref 0.2–1)
BUN SERPL-MCNC: 25 MG/DL (ref 7–25)
CALCIUM SERPL-MCNC: 8.5 MG/DL (ref 8.6–10.5)
CHLORIDE SERPL-SCNC: 109 MMOL/L (ref 98–107)
CO2 SERPL-SCNC: 22 MMOL/L (ref 21–31)
CREAT SERPL-MCNC: 1.32 MG/DL (ref 0.7–1.3)
ERYTHROCYTE [DISTWIDTH] IN BLOOD BY AUTOMATED COUNT: 13.8 % (ref 11.5–14.5)
GFR SERPL CREATININE-BSD FRML MDRD: 52 ML/MIN/1.73SQ M
GLUCOSE SERPL-MCNC: 95 MG/DL (ref 65–99)
HCT VFR BLD AUTO: 29.3 % (ref 42–47)
HGB BLD-MCNC: 9.8 G/DL (ref 14–18)
INR PPP: 1.14 (ref 0.9–1.5)
MCH RBC QN AUTO: 32 PG (ref 26–34)
MCHC RBC AUTO-ENTMCNC: 33.6 G/DL (ref 31–37)
MCV RBC AUTO: 95 FL (ref 81–99)
PLATELET # BLD AUTO: 172 THOUSANDS/UL (ref 149–390)
PMV BLD AUTO: 8.7 FL (ref 8.6–11.7)
POTASSIUM SERPL-SCNC: 3.9 MMOL/L (ref 3.5–5.5)
PROCALCITONIN SERPL-MCNC: 32.7 NG/ML
PROT SERPL-MCNC: 5.1 G/DL (ref 6.4–8.9)
PROTHROMBIN TIME: 13.3 SECONDS (ref 10.2–13)
RBC # BLD AUTO: 3.08 MILLION/UL (ref 4.3–5.9)
SODIUM SERPL-SCNC: 137 MMOL/L (ref 134–143)
WBC # BLD AUTO: 7.5 THOUSAND/UL (ref 4.8–10.8)

## 2019-08-20 PROCEDURE — 97116 GAIT TRAINING THERAPY: CPT

## 2019-08-20 PROCEDURE — 84145 PROCALCITONIN (PCT): CPT | Performed by: INTERNAL MEDICINE

## 2019-08-20 PROCEDURE — 97530 THERAPEUTIC ACTIVITIES: CPT

## 2019-08-20 PROCEDURE — 85610 PROTHROMBIN TIME: CPT | Performed by: INTERNAL MEDICINE

## 2019-08-20 PROCEDURE — 97110 THERAPEUTIC EXERCISES: CPT

## 2019-08-20 PROCEDURE — 97535 SELF CARE MNGMENT TRAINING: CPT

## 2019-08-20 PROCEDURE — 80053 COMPREHEN METABOLIC PANEL: CPT | Performed by: INTERNAL MEDICINE

## 2019-08-20 PROCEDURE — 99232 SBSQ HOSP IP/OBS MODERATE 35: CPT | Performed by: INTERNAL MEDICINE

## 2019-08-20 PROCEDURE — 85027 COMPLETE CBC AUTOMATED: CPT | Performed by: INTERNAL MEDICINE

## 2019-08-20 RX ORDER — CEFDINIR 300 MG/1
300 CAPSULE ORAL EVERY 12 HOURS SCHEDULED
Status: DISCONTINUED | OUTPATIENT
Start: 2019-08-20 | End: 2019-08-21 | Stop reason: HOSPADM

## 2019-08-20 RX ORDER — FAMOTIDINE 20 MG/1
20 TABLET, FILM COATED ORAL 2 TIMES DAILY
Status: DISCONTINUED | OUTPATIENT
Start: 2019-08-20 | End: 2019-08-21 | Stop reason: HOSPADM

## 2019-08-20 RX ADMIN — FAMOTIDINE 20 MG: 20 TABLET ORAL at 17:28

## 2019-08-20 RX ADMIN — SODIUM CHLORIDE 75 ML/HR: 9 INJECTION, SOLUTION INTRAVENOUS at 09:38

## 2019-08-20 RX ADMIN — GABAPENTIN 400 MG: 300 CAPSULE ORAL at 09:44

## 2019-08-20 RX ADMIN — CHLORHEXIDINE GLUCONATE 0.12% ORAL RINSE 15 ML: 1.2 LIQUID ORAL at 09:44

## 2019-08-20 RX ADMIN — OXYCODONE HYDROCHLORIDE AND ACETAMINOPHEN 500 MG: 500 TABLET ORAL at 09:45

## 2019-08-20 RX ADMIN — FERROUS SULFATE TAB 325 MG (65 MG ELEMENTAL FE) 325 MG: 325 (65 FE) TAB at 17:28

## 2019-08-20 RX ADMIN — FINASTERIDE 5 MG: 5 TABLET, FILM COATED ORAL at 09:45

## 2019-08-20 RX ADMIN — MORPHINE SULFATE 15 MG: 15 TABLET ORAL at 17:28

## 2019-08-20 RX ADMIN — HEPARIN SODIUM 5000 UNITS: 5000 INJECTION, SOLUTION INTRAVENOUS; SUBCUTANEOUS at 05:31

## 2019-08-20 RX ADMIN — FERROUS SULFATE TAB 325 MG (65 MG ELEMENTAL FE) 325 MG: 325 (65 FE) TAB at 09:45

## 2019-08-20 RX ADMIN — MORPHINE SULFATE 15 MG: 15 TABLET ORAL at 21:46

## 2019-08-20 RX ADMIN — GABAPENTIN 400 MG: 300 CAPSULE ORAL at 17:28

## 2019-08-20 RX ADMIN — CEFDINIR 300 MG: 300 CAPSULE ORAL at 10:53

## 2019-08-20 RX ADMIN — HEPARIN SODIUM 5000 UNITS: 5000 INJECTION, SOLUTION INTRAVENOUS; SUBCUTANEOUS at 13:17

## 2019-08-20 RX ADMIN — CEFDINIR 300 MG: 300 CAPSULE ORAL at 21:46

## 2019-08-20 RX ADMIN — VITAMIN D, TAB 1000IU (100/BT) 2000 UNITS: 25 TAB at 09:45

## 2019-08-20 RX ADMIN — SERTRALINE HYDROCHLORIDE 50 MG: 50 TABLET ORAL at 09:45

## 2019-08-20 RX ADMIN — CHLORHEXIDINE GLUCONATE 0.12% ORAL RINSE 15 ML: 1.2 LIQUID ORAL at 21:46

## 2019-08-20 RX ADMIN — DOCUSATE SODIUM 400 MG: 100 CAPSULE, LIQUID FILLED ORAL at 09:45

## 2019-08-20 RX ADMIN — HEPARIN SODIUM 5000 UNITS: 5000 INJECTION, SOLUTION INTRAVENOUS; SUBCUTANEOUS at 21:47

## 2019-08-20 RX ADMIN — ASPIRIN 325 MG: 325 TABLET, FILM COATED ORAL at 09:45

## 2019-08-20 RX ADMIN — MORPHINE SULFATE 15 MG: 15 TABLET ORAL at 09:44

## 2019-08-20 RX ADMIN — FAMOTIDINE 20 MG: 20 TABLET ORAL at 09:45

## 2019-08-20 RX ADMIN — MORPHINE SULFATE 15 MG: 15 TABLET ORAL at 13:18

## 2019-08-20 RX ADMIN — Medication 1 TABLET: at 09:45

## 2019-08-20 NOTE — ASSESSMENT & PLAN NOTE
· Present on admission  · Secondary to a urinary tract infection in the setting of having a chronic indwelling Diallo catheter  · T-max over 24 hours was 104°, currently he is afebrile  · Leukocytosis resolved  · Sepsis parameters have currently resolved  · Initially the patient received vancomycin and cefepime  · Blood cultures positive 2/2 gram negative rods - vancomycin was discontinued  · Appreciate ID input, switched to cefdinir, anticipate antibiotic course through August 26th

## 2019-08-20 NOTE — PLAN OF CARE
Problem: Potential for Falls  Goal: Patient will remain free of falls  Description  INTERVENTIONS:  - Assess patient frequently for physical needs  -  Identify cognitive and physical deficits and behaviors that affect risk of falls    -  [place] fall precautions as indicated by assessment   - Educate patient/family on patient safety including physical limitations  - Instruct patient to call for assistance with activity based on assessment  - Modify environment to reduce risk of injury  - Consider OT/PT consult to assist with strengthening/mobility  Outcome: Progressing     Problem: Prexisting or High Potential for Compromised Skin Integrity  Goal: Skin integrity is maintained or improved  Description  INTERVENTIONS:  - Identify patients at risk for skin breakdown  - Assess and monitor skin integrity  - Assess and monitor nutrition and hydration status  - Monitor labs   - Assess for incontinence   - Turn and reposition patient  - Assist with mobility/ambulation  - Relieve pressure over bony prominences  - Avoid friction and shearing  - Provide appropriate hygiene as needed including keeping skin clean and dry  - Evaluate need for skin moisturizer/barrier cream  - Collaborate with interdisciplinary team   - Patient/family teaching  - Consider wound care consult   Outcome: Progressing     Problem: INFECTION - ADULT  Goal: Absence or prevention of progression during hospitalization  Description  INTERVENTIONS:  - Assess and monitor for signs and symptoms of infection  - Monitor lab/diagnostic results  - Monitor all insertion sites, i e  indwelling lines, tubes, and drains  - [place] appropriate cooling/warming therapies per order  - Administer medications as ordered  - Instruct and encourage patient and family to use good hand hygiene technique  - Identify and instruct in appropriate isolation precautions for identified infection/condition   Outcome: Progressing     Problem: DISCHARGE PLANNING  Goal: Discharge to home or other facility with appropriate resources  Description  INTERVENTIONS:  - Identify barriers to discharge w/patient and caregiver  - Arrange for needed discharge resources and transportation as appropriate  - Identify discharge learning needs (meds, wound care, etc )  - Arrange for interpretive services to assist at discharge as needed  - Refer to Case Management Department for coordinating discharge planning if the patient needs post-hospital services based on physician/advanced practitioner order or complex needs related to functional status, cognitive ability, or social support system  Outcome: Progressing     Problem: Knowledge Deficit  Goal: Patient/family/caregiver demonstrates understanding of disease process, treatment plan, medications, and discharge instructions  Description  Complete learning assessment and assess knowledge base    Interventions:  - Provide teaching at level of understanding  - Provide teaching via preferred learning methods  Outcome: Progressing     Problem: DISCHARGE PLANNING - CARE MANAGEMENT  Goal: Discharge to post-acute care or home with appropriate resources  Description  INTERVENTIONS:  - Conduct assessment to determine patient/family and health care team treatment goals, and need for post-acute services based on payer coverage, community resources, and patient preferences, and barriers to discharge  - Address psychosocial, clinical, and financial barriers to discharge as identified in assessment in conjunction with the patient/family and health care team  - Arrange appropriate level of post-acute services according to patient's   needs and preference and payer coverage in collaboration with the physician and health care team  - Communicate with and update the patient/family, physician, and health care team regarding progress on the discharge plan  - Arrange appropriate transportation to post-acute venues    Need to reassess d/c plan   Outcome: Progressing     Problem: Nutrition/Hydration-ADULT  Goal: Nutrient/Hydration intake appropriate for improving, restoring or maintaining nutritional needs  Description  Monitor and assess patient's nutrition/hydration status for malnutrition  Collaborate with interdisciplinary team and initiate plan and interventions as ordered  Monitor patient's weight and dietary intake as ordered or per policy  Utilize nutrition screening tool and intervene as necessary  Determine patient's food preferences and provide high-protein, high-caloric foods as appropriate  INTERVENTIONS:  - Monitor oral intake, urinary output, labs, and treatment plans  - Assess nutrition and hydration status and recommend course of action  - Evaluate amount of meals eaten  - Assist patient with eating if necessary   - Allow adequate time for meals  - Recommend/ encourage appropriate diets, oral nutritional supplements, and vitamin/mineral supplements  - Order, calculate, and assess calorie counts as needed  - Recommend, monitor, and adjust tube feedings and TPN/PPN based on assessed needs  - Assess need for intravenous fluids  - Provide specific nutrition/hydration education as appropriate  - Include patient/family/caregiver in decisions related to nutrition  Outcome: Progressing     Problem: NEUROSENSORY - ADULT  Goal: Achieves stable or improved neurological status  Description  INTERVENTIONS  - Monitor and report changes in neurological status  - Monitor vital signs such as temperature, blood pressure, glucose, and any other labs ordered    Outcome: Progressing  Goal: Achieves maximal functionality and self care  Description  INTERVENTIONS  - Monitor swallowing and airway patency with patient fatigue and changes in neurological status  - Encourage and assist patient to increase activity and self care     - Encourage visually impaired, hearing impaired and aphasic patients to use assistive/communication devices  Outcome: Progressing     Problem: CARDIOVASCULAR - ADULT  Goal: Maintains optimal cardiac output and hemodynamic stability  Description  INTERVENTIONS:  - Monitor I/O, vital signs and rhythm  - Monitor for S/S and trends of decreased cardiac output  - Assess quality of pulses, skin color and temperature  - Assess for signs of decreased coronary artery perfusion  - Instruct patient to report change in severity of symptoms   Outcome: Progressing  Goal: Absence of cardiac dysrhythmias or at baseline rhythm  Description  INTERVENTIONS:  - Continuous cardiac monitoring, vital signs, obtain 12 lead EKG if ordered  - Administer antiarrhythmic and heart rate control medications as ordered  - Monitor electrolytes and administer replacement therapy as ordered  Outcome: Progressing     Problem: METABOLIC, FLUID AND ELECTROLYTES - ADULT  Goal: Electrolytes maintained within normal limits  Description  INTERVENTIONS:  - Monitor labs and assess patient for signs and symptoms of electrolyte imbalances  - Administer electrolyte replacement as ordered  - Monitor response to electrolyte replacements, including repeat lab results as appropriate  - Instruct patient on fluid and nutrition as appropriate  Outcome: Progressing  Goal: Fluid balance maintained  Description  INTERVENTIONS:  - Monitor labs   - Monitor I/O and WT  - Instruct patient on fluid and nutrition as appropriate  - Assess for signs & symptoms of volume excess or deficit  Outcome: Progressing     Problem: SKIN/TISSUE INTEGRITY - ADULT  Goal: Skin integrity remains intact  Description  INTERVENTIONS  - Identify patients at risk for skin breakdown  - Assess and monitor skin integrity  - Assess and monitor nutrition and hydration status  - Monitor labs (i e  albumin)  - Assess for incontinence   - Turn and reposition patient  - Assist with mobility/ambulation  - Relieve pressure over bony prominences  - Avoid friction and shearing  - Provide appropriate hygiene as needed including keeping skin clean and dry  - Evaluate need for skin moisturizer/barrier cream  - Collaborate with interdisciplinary team (i e  Nutrition, Rehabilitation, etc )   - Patient/family teaching  Outcome: Progressing     Problem: MUSCULOSKELETAL - ADULT  Goal: Maintain or return mobility to safest level of function  Description  INTERVENTIONS:  - Assess patient's ability to carry out ADLs; assess patient's baseline for ADL function and identify physical deficits which impact ability to perform ADLs (bathing, care of mouth/teeth, toileting, grooming, dressing, etc )  - Assess/evaluate cause of self-care deficits   - Assess range of motion  - Assess patient's mobility  - Assess patient's need for assistive devices and provide as appropriate  - Encourage maximum independence but intervene and supervise when necessary  - Involve family in performance of ADLs  - Assess for home care needs following discharge   - Consider OT consult to assist with ADL evaluation and planning for discharge  - Provide patient education as appropriate  Outcome: Progressing     Problem: GENITOURINARY - ADULT  Goal: Urinary catheter remains patent  Description  INTERVENTIONS:  - Assess patency of urinary catheter  - If patient has a chronic cantor, consider changing catheter if non-functioning  - Follow guidelines for intermittent irrigation of non-functioning urinary catheter  Outcome: Progressing

## 2019-08-20 NOTE — ASSESSMENT & PLAN NOTE
· Most likely  non myocardial infarction elevated troponins secondary to sepsis  · Appreciate Cardiology input  · Echocardiogram showed no regional wall motion abnormalities, it did demonstrate diastolic dysfunction

## 2019-08-20 NOTE — PROGRESS NOTES
Progress Note - Sher Higginbotham 1945, 66 y o  male MRN: [id number]    Unit/Bed#: -02 Encounter: [id number]    Primary Care Provider: Mackenzie Hui DO   Date and time admitted to hospital: 8/16/2019  4:31 PM        Bacteremia due to Gram-negative bacteria  Assessment & Plan  · Continue antibiotics per ID recommendations    Chronic pain syndrome  Assessment & Plan  · With continuous opioid dependence  · Continue home meds    Acute cystitis with hematuria  Assessment & Plan  · Continue antibiotics as recommended by ID    Elevated troponin  Assessment & Plan  · Most likely  non myocardial infarction elevated troponins secondary to sepsis  · Appreciate Cardiology input  · Echocardiogram showed no regional wall motion abnormalities, it did demonstrate diastolic dysfunction    RUTHANN (acute kidney injury) ([facility] [address] )  Assessment & Plan  · Most likely pre renal in etiology  · Creatinine trending down  · Improved with IV fluids  · Appreciate Nephrology input    Sepsis [facility])  Assessment & Plan  · Present on admission  · Secondary to a urinary tract infection in the setting of having a chronic indwelling Diallo catheter  · T-max over 24 hours was 104°, currently he is afebrile  · Leukocytosis resolved  · Sepsis parameters have currently resolved  · Initially the patient received vancomycin and cefepime  · Blood cultures positive 2/2 gram negative rods - vancomycin was discontinued  · Appreciate ID input, switched to cefdinir, anticipate antibiotic course through August 26th    Chronic indwelling Diallo catheter  Assessment & Plan  · Renal ultrasound results reviewed - results as follows:1  9 mm nonobstructing calculus within the lower pole the right kidney  2  Simple cyst within the left kidney   No hydronephrosis  · Urology services are following-further management as per Urology    * Toxic metabolic encephalopathy  Assessment & Plan  · Resolved - with antibiotic use  · Secondary to UTI/sepsis  · Patient is currently at baseline  · CT brain unremarkable        VTE Pharmacologic Prophylaxis: Pharmacologic: Heparin    Patient Centered Rounds: I have performed bedside rounds with nursing staff today  Discussions with Specialists or Other Care Team Provider: ID  Education and Discussions with Family / Patient: patient    Current Length of Stay: 4 day(s)    Current Patient Status: Inpatient   Certification Statement: The patient will continue to require additional inpatient hospital stay due to sepsis    Discharge Plan:  Anticipate discharge tomorrow    Code Status: Level 1 - Full Code    Subjective:   Patient seen examined  No acute events overnight  Feels much better    Objective:     Vitals:   Temp (24hrs), Av 2 °F (36 8 °C), Min:96 4 °F (35 8 °C), Max:99 6 °F (37 6 °C)    Temp:  [96 4 °F (35 8 °C)-99 6 °F (37 6 °C)] 96 4 °F (35 8 °C)  HR:  [62-76] 74  Resp:  [16-25] 20  BP: (107-158)/(54-83) 158/83  SpO2:  [93 %-100 %] 98 %  Body mass index is 28 26 kg/m²  Input and Output Summary (last 24 hours): Intake/Output Summary (Last 24 hours) at 2019 0932  Last data filed at 2019 1966  Gross per 24 hour   Intake 1360 ml   Output 2875 ml   Net -1515 ml       Physical Exam:     Physical Exam   Constitutional: He is oriented to person, place, and time  He appears well-developed and well-nourished  HENT:   Head: Normocephalic and atraumatic  Eyes: Pupils are equal, round, and reactive to light  EOM are normal    Neck: Normal range of motion  Neck supple  Cardiovascular: Normal rate and regular rhythm  Pulmonary/Chest: Effort normal and breath sounds normal    Abdominal: Soft  Bowel sounds are normal    Genitourinary:   Genitourinary Comments: Diallo   Musculoskeletal: Normal range of motion  He exhibits no edema  Neurological: He is alert and oriented to person, place, and time  Skin: Skin is warm  Capillary refill takes less than 2 seconds  Psychiatric: He has a normal mood and affect   His behavior is normal  Judgment and thought content normal    Nursing note and vitals reviewed  Additional Data:     Labs:    Results from last 7 days   Lab Units 08/20/19  0534 08/19/19  0451   WBC Thousand/uL 7 50 5 80   HEMOGLOBIN g/dL 9 8* 9 6*   HEMATOCRIT % 29 3* 28 5*   PLATELETS Thousands/uL 172 155   NEUTROS PCT %  --  78*   LYMPHS PCT %  --  10*   MONOS PCT %  --  10   EOS PCT %  --  1     Results from last 7 days   Lab Units 08/20/19  0534   POTASSIUM mmol/L 3 9   CHLORIDE mmol/L 109*   CO2 mmol/L 22   BUN mg/dL 25   CREATININE mg/dL 1 32*   CALCIUM mg/dL 8 5*   ALK PHOS U/L 154   ALT U/L 70*   AST U/L 62*     Results from last 7 days   Lab Units 08/20/19  0534   INR  1 14     Results from last 7 days   Lab Units 08/18/19  1121 08/18/19  0656 08/17/19  2102 08/17/19  1642 08/17/19  1139 08/17/19  0809 08/16/19  2018   POC GLUCOSE mg/dl 107 104 127 119 120 122 147*     Results from last 7 days   Lab Units 08/16/19  1958   HEMOGLOBIN A1C % 5 4       * I Have Reviewed All Lab Data Listed Above  * Additional Pertinent Lab Tests Reviewed: [address] Admission  Reviewed    Imaging:  Imaging Reports Reviewed Today Include: n/a    Recent Cultures (last 7 days):     Results from last 7 days   Lab Units 08/18/19  0440 08/18/19  0439 08/16/19  1643   BLOOD CULTURE  No Growth at 24 hrs  No Growth at 24 hrs   Escherichia coli*  Escherichia coli*   GRAM STAIN RESULT   --   --  Gram negative rods*  Gram negative rods*   URINE CULTURE   --   --  >100,000 cfu/ml        Last 24 Hours Medication List:     Current Facility-Administered Medications:  acetaminophen 650 mg Oral Q4H PRN Devonte Holguin MD    ascorbic acid 500 mg Oral Daily Devonte Holguin MD    aspirin 325 mg Oral Daily Devonte Holguin MD    bisacodyl 10 mg Rectal Daily PRN Devonte Holguin MD    cefdinir 300 mg Oral Q12H Roxy Parks MD    chlorhexidine 15 mL Swish & Spit Q12H Roxy Parks MD    cholecalciferol 2,000 Units Oral Daily Devonte Holguin MD docusate sodium 400 mg Oral Daily Tri Vallejo MD    famotidine 20 mg Oral BID Tri Vallejo MD    ferrous sulfate 325 mg Oral BID Tri Vallejo MD    finasteride 5 mg Oral Daily Tri Vallejo MD    gabapentin 400 mg Oral BID Tri Vallejo MD    heparin (porcine) 5,000 Units Subcutaneous [facility] Tri Vallejo MD    morphine 15 mg Oral 4x Daily Tri Vallejo MD    multivitamin-minerals 1 tablet Oral Daily Tri Vallejo MD    ondansetron 4 mg Intravenous Q6H PRN Tri Vallejo MD    oxyCODONE-acetaminophen 1 tablet Oral Q4H PRN Tri Vallejo MD    sertraline 50 mg Oral Daily Tri Vallejo MD    sodium chloride 75 mL/hr Intravenous Continuous Tri Vallejo MD Last Rate: 75 mL/hr (08/19/19 6575)        Today, Patient Was Seen By: Tri Vallejo MD    ** Please Note: Dictation voice to text software may have been used in the creation of this document   **

## 2019-08-20 NOTE — PROGRESS NOTES
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   Avoid nonsteroidal anti-inflammatory medications in the outpatient as well as inpatient setting  4  Urinary retention   Continue management according to urology  5  E coli septicemia    Continue treatment according to Infectious Disease specialist      Follow up reason for today's visit:  Acute kidney injury/chronic kidney disease    Toxic metabolic encephalopathy    Patient Active Problem List   Diagnosis    Bladder stones    Impingement syndrome of right shoulder    Complete tear of right rotator cuff    Chronic right shoulder pain    Arthritis of right shoulder region    Obstruction of Diallo catheter (HCC)    Chronic indwelling Diallo catheter    Sepsis ([facility] [address] )    RUTHANN (acute kidney injury) ([facility] [address] )    Elevated troponin    Toxic metabolic encephalopathy    Fall    Acute cystitis with hematuria    Chronic pain syndrome    Bacteremia due to Gram-negative bacteria         Subjective:   No Acute events overnight    Objective:     Vitals: Blood pressure 158/83, pulse 74, temperature (!) 96 4 °F (35 8 °C), temperature source Tympanic, resp  rate 20, height 5' 9" (1 753 m), weight 86 8 kg (191 lb 5 8 oz), SpO2 98 %  ,Body mass index is 28 26 kg/m²  Weight (last 2 days)     Date/Time   Weight    08/20/19 0600   86 8 (191 36)    08/19/19 0546   85 5 (188 49)    08/18/19 0600   85 (187 39)                Intake/Output Summary (Last 24 hours) at 8/20/2019 0815  Last data filed at 8/20/2019 0610  Gross per 24 hour   Intake 1000 ml   Output 2875 ml   Net -1875 ml     I/O last 3 completed shifts:   In: 2018 8 [I V :2018 8]  Out: 3725 [Urine:3725]    Urethral Catheter (Active)   Output (mL) 1200 mL 8/20/2019  6:10 AM       Physical Exam: /83 (BP Location: Left arm)   Pulse 74   Temp (!) 96 4 °F (35 8 °C) (Tympanic)   Resp 20   Ht 5' 9" (1 753 m)   Wt 86 8 kg (191 lb 5 8 oz)   SpO2 98%   BMI 28 26 kg/m²     General Appearance:    Alert, cooperative, no distress, appears stated age   Head:    Normocephalic, without obvious abnormality, atraumatic   Eyes:    Conjunctiva/corneas clear   Ears:    Normal external ears   Nose:   Nares normal, septum midline, mucosa normal, no drainage    or sinus tenderness   Throat:   Lips, mucosa, and tongue normal; teeth and gums normal   Neck:   Supple   Back:     Symmetric, no curvature, ROM normal, no CVA tenderness   Lungs:     Clear to auscultation bilaterally, respirations unlabored   Chest wall:    No tenderness or deformity   Heart:    Regular rate and rhythm, S1 and S2 normal, no murmur, rub   or gallop   Abdomen:     Soft, non-tender, bowel sounds active   Extremities:   Extremities normal, atraumatic, no cyanosis or edema   Skin:   Skin color, texture, turgor normal, no rashes or lesions   Lymph nodes:   Cervical normal   Neurologic:   CNII-XII intact            Lab, Imaging and other studies: I have personally reviewed pertinent labs  CBC:   Lab Results   Component Value Date    WBC 7 50 08/20/2019    HGB 9 8 (L) 08/20/2019    HCT 29 3 (L) 08/20/2019    MCV 95 08/20/2019     08/20/2019    MCH 32 0 08/20/2019    MCHC 33 6 08/20/2019    RDW 13 8 08/20/2019    MPV 8 7 08/20/2019     CMP:   Lab Results   Component Value Date    K 3 9 08/20/2019     (H) 08/20/2019    CO2 22 08/20/2019    BUN 25 08/20/2019    CREATININE 1 32 (H) 08/20/2019    CALCIUM 8 5 (L) 08/20/2019    AST 62 (H) 08/20/2019    ALT 70 (H) 08/20/2019    ALKPHOS 154 08/20/2019    EGFR 52 08/20/2019           Results from last 7 days   Lab Units 08/20/19  0534 08/19/19  0459 08/18/19  0438 08/17/19  0430 08/16/19  1740   POTASSIUM mmol/L 3 9 4 0 3 9 3 7 4 4   CHLORIDE mmol/L 109* 108* 108* 110* 103   CO2 mmol/L 22 24 21 20* 24   BUN mg/dL 25 29* 35* 36* 40*   CREATININE mg/dL 1 32* 1 45* 1 61* 1 80* 2 17*   CALCIUM mg/dL 8 5* 8 6 8 3* 7 5* 8 3*   ALK PHOS U/L 154  --   --  77 76   ALT U/L 70*  --   --  57* 62*   AST U/L 62*  --   --  130* 171*         Phosphorus: No results found for: PHOS  Magnesium: No results found for: MG  Urinalysis: No results found for: COLORU, CLARITYU, SPECGRAV, PHUR, LEUKOCYTESUR, NITRITE, PROTEINUA, GLUCOSEU, KETONESU, BILIRUBINUR, BLOODU  Ionized Calcium: No results found for: CAION  Coagulation:   Lab Results   Component Value Date    INR 1 14 08/20/2019     Troponin: No results found for: TROPONINI  ABG: No results found for: PHART, SZA8POB, PO2ART, KSJ2ZOS, D7ZMUGVH, BEART, SOURCE  Radiology review:     IMAGING  Procedure: Xr Hips Bilateral 3-4 Vw W Pelvis If Performed    Result Date: 8/18/2019  Narrative: BILATERAL HIPS AND PELVIS INDICATION:   Status post fall with hip pain  COMPARISON:  None VIEWS:  XR HIPS BILATERAL 3-4 VW W PELVIS IF PERFORMED  FINDINGS: The bony pelvis appears intact  Degenerative changes visualized lower lumbar spine  LEFT HIP: Mild left hip osteoarthritis is seen  Joint space alignment is maintained  Soft tissues are unremarkable  RIGHT HIP: Moderate right hip osteoarthritis is seen  Joint space alignment is maintained  Soft tissues are unremarkable  Impression: No acute osseous abnormality  Degenerative changes as described   Workstation performed: [id number]       Current Facility-Administered Medications   Medication Dose Route Frequency    acetaminophen (TYLENOL) tablet 650 mg  650 mg Oral Q4H PRN    ascorbic acid (VITAMIN C) tablet 500 mg  500 mg Oral Daily    aspirin tablet 325 mg  325 mg Oral Daily    bisacodyl (DULCOLAX) rectal suppository 10 mg  10 mg Rectal Daily PRN    [MAR Hold] cefdinir (OMNICEF) capsule 300 mg 300 mg Oral Q12H [facility]    chlorhexidine (PERIDEX) 0 12 % oral rinse 15 mL  15 mL Swish & Spit Q12H [facility]    cholecalciferol (VITAMIN D3) tablet 2,000 Units  2,000 Units Oral Daily    docusate sodium (COLACE) capsule 400 mg  400 mg Oral Daily    famotidine (PEPCID) injection 20 mg  20 mg Intravenous BID    ferrous sulfate tablet 325 mg  325 mg Oral BID    finasteride (PROSCAR) tablet 5 mg  5 mg Oral Daily    gabapentin (NEURONTIN) capsule 400 mg  400 mg Oral BID    heparin (porcine) subcutaneous injection 5,000 Units  5,000 Units Subcutaneous Q8H [facility]    morphine (MSIR) IR tablet 15 mg  15 mg Oral 4x Daily    multivitamin-minerals (CENTRUM) tablet 1 tablet  1 tablet Oral Daily    ondansetron (ZOFRAN) injection 4 mg  4 mg Intravenous Q6H PRN    oxyCODONE-acetaminophen (PERCOCET) 5-325 mg per tablet 1 tablet  1 tablet Oral Q4H PRN    sertraline (ZOLOFT) tablet 50 mg  50 mg Oral Daily    sodium chloride 0 9 % infusion  75 mL/hr Intravenous Continuous     Medications Discontinued During This Encounter   Medication Reason    vancomycin (VANCOCIN) IVPB (premix) 1,000 mg     meloxicam (MOBIC) 15 mg tablet Error    meloxicam (MOBIC) 15 mg tablet Error    naproxen (NAPROSYN) 500 mg tablet Error    nitrofurantoin (MACRODANTIN) 100 mg capsule Error    Turmeric Curcumin 500 MG CAPS Error    gabapentin (NEURONTIN) capsule 300 mg     b complex vitamins tablet 1 tablet     vancomycin (VANCOCIN) 1,250 mg in sodium chloride 0 9 % 250 mL IVPB     acetaminophen (TYLENOL) tablet 650 mg     insulin lispro (HumaLOG) 100 units/mL subcutaneous injection 1-5 Units     insulin lispro (HumaLOG) 100 units/mL subcutaneous injection 1-5 Units     cefepime (MAXIPIME) IVPB (premix) 2,000 mg     cefepime (MAXIPIME) IVPB (premix) 2,000 mg        Lori Correia DO

## 2019-08-20 NOTE — PLAN OF CARE
Problem: PHYSICAL THERAPY ADULT  Goal: Performs mobility at highest level of function for planned discharge setting  See evaluation for individualized goals  Description  Treatment/Interventions: Functional transfer training, LE strengthening/ROM, Elevations, Therapeutic exercise, Endurance training, Bed mobility, Gait training  Equipment Recommended: Hassan Shone       See flowsheet documentation for full assessment, interventions and recommendations  Outcome: Progressing  Note:   Prognosis: Fair  Problem List: Decreased strength, Decreased endurance, Impaired balance, Decreased mobility, Decreased coordination, Impaired judgement, Decreased safety awareness  Assessment: Pt  found sitting in stationary chair with Minna Simon assisting pt  with needs  Pt  agreeable to participate with therapy as long as he can use the commode  Pt  assisted from sit to stand with min Ax1 and cues to push up from the arms of the chair and SPS to bedside commode with RW and min Ax1 with cues required to reach back for the arms of the commode prior to sitting  Pt  then had a BM and was dependent with posterior hygiene  Pt  then transfered back into the stationary chair where he performed ther ex on BLE x 15 reps AROM as per treatment flow sheet  Pt tolerated ther ex without difficulty  Pt  then agreeable to ambulate with therapy  Pt  transfered from sit to stand with min Ax1 again needing cues to push up from the chair to help improve sit to stand transfer  Pt  then ambulated 40 ft with min Ax1 and RW with cues for safety and direction  Pt  then assisted back into chair and positioned for comfort with all needs in reach  SCD's re-applied  Pt  denied any pain with this session and tolerated treatment well  Pt  would benifit from further PT treatment to help improve functional mobility and endurance to be able to safely return home with family support and home health care  Family very appreciative of the therapy session today  Barriers to Discharge: Inaccessible home environment     Recommendation: Short-term skilled PT     PT - OK to Discharge: Yes    See flowsheet documentation for full assessment     Jen Kirkpatrick, PTA

## 2019-08-20 NOTE — PLAN OF CARE
Problem: Potential for Falls  Goal: Patient will remain free of falls  Description  INTERVENTIONS:  - Assess patient frequently for physical needs  -  Identify cognitive and physical deficits and behaviors that affect risk of falls    -  Elizabeth fall precautions as indicated by assessment   - Educate patient/family on patient safety including physical limitations  - Instruct patient to call for assistance with activity based on assessment  - Modify environment to reduce risk of injury  - Consider OT/PT consult to assist with strengthening/mobility  Outcome: Progressing     Problem: Prexisting or High Potential for Compromised Skin Integrity  Goal: Skin integrity is maintained or improved  Description  INTERVENTIONS:  - Identify patients at risk for skin breakdown  - Assess and monitor skin integrity  - Assess and monitor nutrition and hydration status  - Monitor labs   - Assess for incontinence   - Turn and reposition patient  - Assist with mobility/ambulation  - Relieve pressure over bony prominences  - Avoid friction and shearing  - Provide appropriate hygiene as needed including keeping skin clean and dry  - Evaluate need for skin moisturizer/barrier cream  - Collaborate with interdisciplinary team   - Patient/family teaching  - Consider wound care consult   Outcome: Progressing     Problem: INFECTION - ADULT  Goal: Absence or prevention of progression during hospitalization  Description  INTERVENTIONS:  - Assess and monitor for signs and symptoms of infection  - Monitor lab/diagnostic results  - Monitor all insertion sites, i e  indwelling lines, tubes, and drains  - Monitor endotracheal if appropriate and nasal secretions for changes in amount and color  - Elizabeth appropriate cooling/warming therapies per order  - Administer medications as ordered  - Instruct and encourage patient and family to use good hand hygiene technique  - Identify and instruct in appropriate isolation precautions for identified infection/condition  8/20/2019 0410 by Wm Maguire RN  Outcome: Progressing  8/20/2019 0409 by Wm Maguire RN  Outcome: Not Progressing     Problem: DISCHARGE PLANNING  Goal: Discharge to home or other facility with appropriate resources  Description  INTERVENTIONS:  - Identify barriers to discharge w/patient and caregiver  - Arrange for needed discharge resources and transportation as appropriate  - Identify discharge learning needs (meds, wound care, etc )  - Arrange for interpretive services to assist at discharge as needed  - Refer to Case Management Department for coordinating discharge planning if the patient needs post-hospital services based on physician/advanced practitioner order or complex needs related to functional status, cognitive ability, or social support system  Outcome: Progressing     Problem: Knowledge Deficit  Goal: Patient/family/caregiver demonstrates understanding of disease process, treatment plan, medications, and discharge instructions  Description  Complete learning assessment and assess knowledge base    Interventions:  - Provide teaching at level of understanding  - Provide teaching via preferred learning methods  Outcome: Progressing     Problem: DISCHARGE PLANNING - CARE MANAGEMENT  Goal: Discharge to post-acute care or home with appropriate resources  Description  INTERVENTIONS:  - Conduct assessment to determine patient/family and health care team treatment goals, and need for post-acute services based on payer coverage, community resources, and patient preferences, and barriers to discharge  - Address psychosocial, clinical, and financial barriers to discharge as identified in assessment in conjunction with the patient/family and health care team  - Arrange appropriate level of post-acute services according to patient's   needs and preference and payer coverage in collaboration with the physician and health care team  - Communicate with and update the patient/family, physician, and health care team regarding progress on the discharge plan  - Arrange appropriate transportation to post-acute venues    Need to reassess d/c plan   Outcome: Progressing     Problem: Nutrition/Hydration-ADULT  Goal: Nutrient/Hydration intake appropriate for improving, restoring or maintaining nutritional needs  Description  Monitor and assess patient's nutrition/hydration status for malnutrition  Collaborate with interdisciplinary team and initiate plan and interventions as ordered  Monitor patient's weight and dietary intake as ordered or per policy  Utilize nutrition screening tool and intervene as necessary  Determine patient's food preferences and provide high-protein, high-caloric foods as appropriate       INTERVENTIONS:  - Monitor oral intake, urinary output, labs, and treatment plans  - Assess nutrition and hydration status and recommend course of action  - Evaluate amount of meals eaten  - Assist patient with eating if necessary   - Allow adequate time for meals  - Recommend/ encourage appropriate diets, oral nutritional supplements, and vitamin/mineral supplements  - Order, calculate, and assess calorie counts as needed  - Recommend, monitor, and adjust tube feedings and TPN/PPN based on assessed needs  - Assess need for intravenous fluids  - Provide specific nutrition/hydration education as appropriate  - Include patient/family/caregiver in decisions related to nutrition  Outcome: Progressing

## 2019-08-20 NOTE — OCCUPATIONAL THERAPY NOTE
08/20/19 1122   Restrictions/Precautions   Weight Bearing Precautions Per Order No   Other Precautions Contact/isolation;Multiple lines; Fall Risk   Pain Assessment   Pain Assessment No/denies pain  (did report some discomfort of legs )   ADL   UB Bathing Assistance 4  Minimal Assistance   UB Bathing Deficit Setup;Verbal cueing;Supervision/safety; Increased time to complete   LB Bathing Assistance 3  Moderate Assistance   LB Bathing Deficit Setup;Verbal cueing;Supervision/safety; Increased time to complete   UB Dressing Comments required Assist with doff/don hosp  gown   LB Dressing Comments patient able to reach to feet for self care   Transfers   Additional Comments please see PT note   Coordination   Gross Motor WFL  (* recent R shoulder replacement / current OP therapy)   Dexterity WFL   Cognition   Overall Cognitive Status Impaired   Arousal/Participation Responsive; Cooperative   Comments required prompts, one-step direction throughout session   Activity Tolerance   Activity Tolerance Patient tolerated treatment well  (treatment shortened secondary to family and lunch arriving)   Assessment   Assessment Patient participated in Skilled OT session this date with interventions consisting of ADL re training with the use of correct body mechnaics   Patient agreeable to OT treatment session, upon arrival patient was found seated OOB to Chair  (NOTE: he had not remembered being bathed by CNA earlier/mid-morning today )  Patient requiring cognitive assistance to anticipate next step, one step directives and ocassional safety reminders, and Assistance as noted in flow sheet  Patient continues to be functioning below baseline level, occupational performance remains limited secondary to factors listed above and increased risk for falls and injury  From OT standpoint, recommendation at time of d/c would be Short Term Rehab to maximize Indep  And safety and further assess cognition    Patient to benefit from continued Occupational Therapy treatment while in the hospital to address deficits as defined above and maximize level of functional independence with ADLs and functional mobility  Plan   Treatment Interventions ADL retraining; Activityengagement   Goal Expiration Date 08/27/19   Treatment Day [address] DACIA [place]/L

## 2019-08-20 NOTE — PLAN OF CARE
Problem: OCCUPATIONAL THERAPY ADULT  Goal: Performs self-care activities at highest level of function for planned discharge setting  See evaluation for individualized goals  Description  Treatment Interventions: ADL retraining, Functional transfer training, UE strengthening/ROM, Endurance training, Cognitive reorientation, Equipment evaluation/education, Compensatory technique education, Energy conservation, Activityengagement          See flowsheet documentation for full assessment, interventions and recommendations  Outcome: Progressing  Note:   Limitation: Decreased ADL status, Decreased UE strength, Decreased Safe judgement during ADL, Decreased cognition, Decreased endurance, Decreased self-care trans, Decreased high-level ADLs     Assessment: Patient participated in Skilled OT session this date with interventions consisting of ADL re training with the use of correct body mechnaics   Patient agreeable to OT treatment session, upon arrival patient was found seated OOB to Chair  (NOTE: he had not remembered being bathed by CNA earlier/mid-morning today )  Patient requiring cognitive assistance to anticipate next step, one step directives and ocassional safety reminders, and Assistance as noted in flow sheet  Patient continues to be functioning below baseline level, occupational performance remains limited secondary to factors listed above and increased risk for falls and injury  From OT standpoint, recommendation at time of d/c would be Short Term Rehab to maximize Indep  And safety and further assess cognition  Patient to benefit from continued Occupational Therapy treatment while in the hospital to address deficits as defined above and maximize level of functional independence with ADLs and functional mobility        OT Discharge Recommendation: Short Term Rehab  OT - OK to Discharge: Yes(Once medically cleared)     DACIA Cabrera/SAVAGE

## 2019-08-20 NOTE — SOCIAL WORK
Pt was assessed by [facility] and they will make a decision tomorrow, pt was evelyn a list of snf's, pt does not want to go to a skilled facility for rehab, if he is accepted tomorrow to [facility] he will go there and if not accepted he wants to go home with [facility], A post acute care recommendation was made by your care team for [facility] rehab and the pt wants [facility]   Discussed Freedom of Choice with patient  Choice is to make a new referral to [facility], pt had [facility] in the past and he requested [facility], referral was made , cm will continue to follow, d/c plan was discussed at care coordination rounds today, tentative d/c for tomorrow, I did meet with the pt's son this morning to discuss d/c plan

## 2019-08-20 NOTE — PHYSICAL THERAPY NOTE
08/20/19 1051   Pain Assessment   Pain Assessment No/denies pain   Pain Score No Pain   Restrictions/Precautions   Weight Bearing Precautions Per Order No   Other Precautions Contact/isolation; Chair Alarm;Multiple lines; Fall Risk   General   Chart Reviewed Yes   Additional Pertinent History R shoulder replacement and back surgery in past   Response to Previous Treatment Patient with no complaints from previous session  Family/Caregiver Present Yes   Cognition   Overall Cognitive Status WFL   Arousal/Participation Alert; Cooperative   Attention Attends with cues to redirect   Orientation Level Oriented to person;Oriented to place   Memory Decreased short term memory   Following Commands Follows one step commands without difficulty   Subjective   Subjective " I want to get better"  Transfers   Sit to Stand 4  Minimal assistance   Additional items Assist x 1; Impulsive;Verbal cues;Armrests   Stand to Sit 4  Minimal assistance   Additional items Armrests; Verbal cues; Increased time required   Stand pivot 4  Minimal assistance   Additional items Assist x 1;Verbal cues; Increased time required   Toilet transfer 4  Minimal assistance   Additional items Assist x 1; Increased time required;Armrests;Commode;Verbal cues   Ambulation/Elevation   Gait pattern Improper Weight shift; Forward Flexion;Decreased foot clearance; Short stride; Inconsistent stephen   Gait Assistance 4  Minimal assist   Additional items Assist x 1;Verbal cues; Tactile cues   Assistive Device Rolling walker   Distance 40 ft   Balance   Static Sitting Fair +   Dynamic Sitting Fair   Static Standing Fair   Dynamic Standing Fair -   Ambulatory Poor   Endurance Deficit   Endurance Deficit Yes   Endurance Deficit Description generalized weakness   Activity Tolerance   Activity Tolerance Patient limited by fatigue   Exercises   Quad Sets Sitting;15 reps;AROM; Bilateral   Hip Flexion Sitting;15 reps;AROM; Bilateral   Hip Abduction Sitting;AROM; Bilateral;15 reps Hip Adduction Sitting;15 reps;AROM; Bilateral   Knee AROM Long Arc Quad Sitting;15 reps;AROM; Bilateral   Ankle Pumps Sitting;15 reps;AROM; Bilateral   Assessment   Prognosis Fair   Problem List Decreased strength;Decreased endurance; Impaired balance;Decreased mobility; Decreased coordination; Impaired judgement;Decreased safety awareness   Assessment Pt  found sitting in stationary chair with [facility] assisting pt  with needs  Pt  agreeable to participate with therapy as long as he can use the commode  Pt  assisted from sit to stand with min Ax1 and cues to push up from the arms of the chair and SPS to bedside commode with RW and min Ax1 with cues required to reach back for the arms of the commode prior to sitting  Pt  then had a BM and was dependent with posterior hygiene  Pt  then transfered back into the stationary chair where he performed ther ex on BLE x 15 reps AROM as per treatment flow sheet  Pt tolerated ther ex without difficulty  Pt  then agreeable to ambulate with therapy  Pt  transfered from sit to stand with min Ax1 again needing cues to push up from the chair to help improve sit to stand transfer  Pt  then ambulated 40 ft with min Ax1 and RW with cues for safety and direction  Pt  then assisted back into chair and positioned for comfort with all needs in reach  SCD's re-applied  Pt  denied any pain with this session and tolerated treatment well  Pt  would benifit from further PT treatment to help improve functional mobility and endurance to be able to safely return home with family support and home health care  Family very appreciative of the therapy session today  Goals   Patient Goals to get moving better   Plan   Treatment/Interventions Functional transfer training;LE strengthening/ROM; Therapeutic exercise; Endurance training;Patient/family training;Bed mobility; Equipment eval/education;Gait training   Progress Progressing toward goals   PT Frequency 5x/wk   Recommendation   Recommendation Short-term skilled PT   Equipment Recommended Walker   PT - OK to Discharge Yes   Additional Comments when medically stable    Elnita Iron, PTA

## 2019-08-21 ENCOUNTER — HOSPITAL ENCOUNTER (INPATIENT)
Facility: HOSPITAL | Age: 74
LOS: 17 days | Discharge: HOME HEALTH | DRG: 947 | End: 2019-09-07
Attending: PHYSICAL MEDICINE & REHABILITATION | Admitting: PHYSICAL MEDICINE & REHABILITATION
Payer: MEDICARE

## 2019-08-21 VITALS
BODY MASS INDEX: 28.77 KG/M2 | WEIGHT: 194.22 LBS | DIASTOLIC BLOOD PRESSURE: 66 MMHG | RESPIRATION RATE: 16 BRPM | SYSTOLIC BLOOD PRESSURE: 124 MMHG | HEIGHT: 69 IN | HEART RATE: 80 BPM | OXYGEN SATURATION: 94 % | TEMPERATURE: 98.4 F

## 2019-08-21 DIAGNOSIS — B99.9 INFECTION: Primary | ICD-10-CM

## 2019-08-21 DIAGNOSIS — R78.81 BACTEREMIA: ICD-10-CM

## 2019-08-21 DIAGNOSIS — G89.4 CHRONIC PAIN SYNDROME: Chronic | ICD-10-CM

## 2019-08-21 DIAGNOSIS — R79.89 ELEVATED LFTS: ICD-10-CM

## 2019-08-21 DIAGNOSIS — D75.839 THROMBOCYTOSIS: Chronic | ICD-10-CM

## 2019-08-21 DIAGNOSIS — D64.9 ANEMIA: Chronic | ICD-10-CM

## 2019-08-21 PROBLEM — R77.8 ELEVATED TROPONIN: Chronic | Status: RESOLVED | Noted: 2019-08-16 | Resolved: 2019-08-21

## 2019-08-21 PROBLEM — N17.9 AKI (ACUTE KIDNEY INJURY) (HCC): Chronic | Status: RESOLVED | Noted: 2019-08-16 | Resolved: 2019-08-21

## 2019-08-21 PROBLEM — F32.A DEPRESSION: Status: ACTIVE | Noted: 2019-08-21

## 2019-08-21 PROBLEM — M54.9 CHRONIC BACK PAIN: Status: ACTIVE | Noted: 2019-08-16

## 2019-08-21 PROBLEM — N18.9 CKD (CHRONIC KIDNEY DISEASE): Status: ACTIVE | Noted: 2019-08-21

## 2019-08-21 PROBLEM — R74.01 TRANSAMINITIS: Status: ACTIVE | Noted: 2019-08-21

## 2019-08-21 PROBLEM — N30.01 ACUTE CYSTITIS WITH HEMATURIA: Chronic | Status: RESOLVED | Noted: 2019-08-16 | Resolved: 2019-08-21

## 2019-08-21 PROBLEM — R77.8 ELEVATED TROPONIN: Status: ACTIVE | Noted: 2019-08-21

## 2019-08-21 PROBLEM — G92.8 TOXIC METABOLIC ENCEPHALOPATHY: Status: RESOLVED | Noted: 2019-08-16 | Resolved: 2019-08-21

## 2019-08-21 PROBLEM — A41.9 SEPSIS (HCC): Chronic | Status: RESOLVED | Noted: 2019-08-16 | Resolved: 2019-08-21

## 2019-08-21 PROBLEM — G89.29 CHRONIC BACK PAIN: Status: ACTIVE | Noted: 2019-08-16

## 2019-08-21 LAB
ANION GAP SERPL CALCULATED.3IONS-SCNC: 7 MMOL/L (ref 4–13)
BUN SERPL-MCNC: 21 MG/DL (ref 7–25)
CALCIUM SERPL-MCNC: 8.4 MG/DL (ref 8.6–10.5)
CHLORIDE SERPL-SCNC: 110 MMOL/L (ref 98–107)
CO2 SERPL-SCNC: 23 MMOL/L (ref 21–31)
CREAT SERPL-MCNC: 1.21 MG/DL (ref 0.7–1.3)
ERYTHROCYTE [DISTWIDTH] IN BLOOD BY AUTOMATED COUNT: 13.4 % (ref 11.5–14.5)
GFR SERPL CREATININE-BSD FRML MDRD: 58 ML/MIN/1.73SQ M
GLUCOSE SERPL-MCNC: 100 MG/DL (ref 65–99)
HCT VFR BLD AUTO: 29.7 % (ref 42–47)
HGB BLD-MCNC: 9.9 G/DL (ref 14–18)
MCH RBC QN AUTO: 31.6 PG (ref 26–34)
MCHC RBC AUTO-ENTMCNC: 33.4 G/DL (ref 31–37)
MCV RBC AUTO: 95 FL (ref 81–99)
PLATELET # BLD AUTO: 195 THOUSANDS/UL (ref 149–390)
PMV BLD AUTO: 9.1 FL (ref 8.6–11.7)
POTASSIUM SERPL-SCNC: 3.5 MMOL/L (ref 3.5–5.5)
PROCALCITONIN SERPL-MCNC: 17.84 NG/ML
RBC # BLD AUTO: 3.14 MILLION/UL (ref 4.3–5.9)
SODIUM SERPL-SCNC: 140 MMOL/L (ref 134–143)
WBC # BLD AUTO: 7.5 THOUSAND/UL (ref 4.8–10.8)

## 2019-08-21 PROCEDURE — 99239 HOSP IP/OBS DSCHRG MGMT >30: CPT | Performed by: INTERNAL MEDICINE

## 2019-08-21 PROCEDURE — 99223 1ST HOSP IP/OBS HIGH 75: CPT | Performed by: PHYSICAL MEDICINE & REHABILITATION

## 2019-08-21 PROCEDURE — 84145 PROCALCITONIN (PCT): CPT | Performed by: INTERNAL MEDICINE

## 2019-08-21 PROCEDURE — 97530 THERAPEUTIC ACTIVITIES: CPT

## 2019-08-21 PROCEDURE — 97110 THERAPEUTIC EXERCISES: CPT

## 2019-08-21 PROCEDURE — 1124F ACP DISCUSS-NO DSCNMKR DOCD: CPT | Performed by: PHYSICAL MEDICINE & REHABILITATION

## 2019-08-21 PROCEDURE — 80048 BASIC METABOLIC PNL TOTAL CA: CPT | Performed by: INTERNAL MEDICINE

## 2019-08-21 PROCEDURE — 97535 SELF CARE MNGMENT TRAINING: CPT

## 2019-08-21 PROCEDURE — 85027 COMPLETE CBC AUTOMATED: CPT | Performed by: INTERNAL MEDICINE

## 2019-08-21 RX ORDER — MELATONIN
2000 DAILY
Status: CANCELLED | OUTPATIENT
Start: 2019-08-22

## 2019-08-21 RX ORDER — GABAPENTIN 300 MG/1
300 CAPSULE ORAL
Status: DISCONTINUED | OUTPATIENT
Start: 2019-08-21 | End: 2019-09-07 | Stop reason: HOSPADM

## 2019-08-21 RX ORDER — ASCORBIC ACID 500 MG
500 TABLET ORAL DAILY
Status: CANCELLED | OUTPATIENT
Start: 2019-08-22

## 2019-08-21 RX ORDER — CEFDINIR 300 MG/1
300 CAPSULE ORAL EVERY 12 HOURS SCHEDULED
Status: CANCELLED | OUTPATIENT
Start: 2019-08-21 | End: 2019-08-26

## 2019-08-21 RX ORDER — ACETAMINOPHEN 325 MG/1
650 TABLET ORAL EVERY 4 HOURS PRN
Status: CANCELLED | OUTPATIENT
Start: 2019-08-21

## 2019-08-21 RX ORDER — CEFDINIR 300 MG/1
300 CAPSULE ORAL EVERY 12 HOURS SCHEDULED
Status: COMPLETED | OUTPATIENT
Start: 2019-08-21 | End: 2019-08-26

## 2019-08-21 RX ORDER — ONDANSETRON 2 MG/ML
4 INJECTION INTRAMUSCULAR; INTRAVENOUS EVERY 6 HOURS PRN
Status: CANCELLED | OUTPATIENT
Start: 2019-08-21

## 2019-08-21 RX ORDER — HEPARIN SODIUM 5000 [USP'U]/ML
5000 INJECTION, SOLUTION INTRAVENOUS; SUBCUTANEOUS EVERY 8 HOURS SCHEDULED
Status: DISCONTINUED | OUTPATIENT
Start: 2019-08-21 | End: 2019-09-07 | Stop reason: HOSPADM

## 2019-08-21 RX ORDER — DOCUSATE SODIUM 100 MG/1
400 CAPSULE, LIQUID FILLED ORAL DAILY
Status: CANCELLED | OUTPATIENT
Start: 2019-08-22

## 2019-08-21 RX ORDER — CHLORHEXIDINE GLUCONATE 0.12 MG/ML
15 RINSE ORAL EVERY 12 HOURS SCHEDULED
Status: CANCELLED | OUTPATIENT
Start: 2019-08-21

## 2019-08-21 RX ORDER — OXYCODONE HYDROCHLORIDE AND ACETAMINOPHEN 5; 325 MG/1; MG/1
1 TABLET ORAL EVERY 4 HOURS PRN
Status: CANCELLED | OUTPATIENT
Start: 2019-08-21

## 2019-08-21 RX ORDER — FERROUS SULFATE 325(65) MG
325 TABLET ORAL 2 TIMES DAILY
Status: DISCONTINUED | OUTPATIENT
Start: 2019-08-21 | End: 2019-09-06

## 2019-08-21 RX ORDER — ASPIRIN 325 MG
325 TABLET ORAL DAILY
Status: CANCELLED | OUTPATIENT
Start: 2019-08-22

## 2019-08-21 RX ORDER — ASPIRIN 325 MG
325 TABLET ORAL DAILY
Status: DISCONTINUED | OUTPATIENT
Start: 2019-08-22 | End: 2019-08-21

## 2019-08-21 RX ORDER — MORPHINE SULFATE 15 MG/1
15 TABLET ORAL EVERY 6 HOURS
Status: DISCONTINUED | OUTPATIENT
Start: 2019-08-21 | End: 2019-09-07 | Stop reason: HOSPADM

## 2019-08-21 RX ORDER — MELATONIN
2000 DAILY
Status: DISCONTINUED | OUTPATIENT
Start: 2019-08-22 | End: 2019-09-06

## 2019-08-21 RX ORDER — BISACODYL 10 MG
10 SUPPOSITORY, RECTAL RECTAL DAILY PRN
Status: CANCELLED | OUTPATIENT
Start: 2019-08-21

## 2019-08-21 RX ORDER — MORPHINE SULFATE 15 MG/1
15 TABLET ORAL 4 TIMES DAILY
Status: CANCELLED | OUTPATIENT
Start: 2019-08-21

## 2019-08-21 RX ORDER — FINASTERIDE 5 MG/1
5 TABLET, FILM COATED ORAL DAILY
Status: CANCELLED | OUTPATIENT
Start: 2019-08-22

## 2019-08-21 RX ORDER — DOCUSATE SODIUM 100 MG/1
400 CAPSULE, LIQUID FILLED ORAL DAILY
Status: DISCONTINUED | OUTPATIENT
Start: 2019-08-22 | End: 2019-08-25

## 2019-08-21 RX ORDER — CHLORHEXIDINE GLUCONATE 0.12 MG/ML
15 RINSE ORAL EVERY 12 HOURS SCHEDULED
Status: DISCONTINUED | OUTPATIENT
Start: 2019-08-21 | End: 2019-09-06

## 2019-08-21 RX ORDER — BISACODYL 10 MG
10 SUPPOSITORY, RECTAL RECTAL DAILY PRN
Status: DISCONTINUED | OUTPATIENT
Start: 2019-08-21 | End: 2019-09-06

## 2019-08-21 RX ORDER — FERROUS SULFATE 325(65) MG
325 TABLET ORAL 2 TIMES DAILY
Status: CANCELLED | OUTPATIENT
Start: 2019-08-21

## 2019-08-21 RX ORDER — GABAPENTIN 400 MG/1
400 CAPSULE ORAL 2 TIMES DAILY
Status: DISCONTINUED | OUTPATIENT
Start: 2019-08-21 | End: 2019-08-21

## 2019-08-21 RX ORDER — FINASTERIDE 5 MG/1
5 TABLET, FILM COATED ORAL DAILY
Status: DISCONTINUED | OUTPATIENT
Start: 2019-08-22 | End: 2019-09-07 | Stop reason: HOSPADM

## 2019-08-21 RX ORDER — HEPARIN SODIUM 5000 [USP'U]/ML
5000 INJECTION, SOLUTION INTRAVENOUS; SUBCUTANEOUS EVERY 8 HOURS SCHEDULED
Status: CANCELLED | OUTPATIENT
Start: 2019-08-21

## 2019-08-21 RX ADMIN — CEFDINIR 300 MG: 300 CAPSULE ORAL at 21:17

## 2019-08-21 RX ADMIN — DOCUSATE SODIUM 400 MG: 100 CAPSULE, LIQUID FILLED ORAL at 08:21

## 2019-08-21 RX ADMIN — FERROUS SULFATE TAB 325 MG (65 MG ELEMENTAL FE) 325 MG: 325 (65 FE) TAB at 08:21

## 2019-08-21 RX ADMIN — FAMOTIDINE 20 MG: 20 TABLET ORAL at 08:21

## 2019-08-21 RX ADMIN — OXYCODONE HYDROCHLORIDE AND ACETAMINOPHEN 500 MG: 500 TABLET ORAL at 08:21

## 2019-08-21 RX ADMIN — GABAPENTIN 400 MG: 300 CAPSULE ORAL at 08:21

## 2019-08-21 RX ADMIN — FERROUS SULFATE TAB 325 MG (65 MG ELEMENTAL FE) 325 MG: 325 (65 FE) TAB at 17:38

## 2019-08-21 RX ADMIN — SERTRALINE HYDROCHLORIDE 50 MG: 50 TABLET ORAL at 08:21

## 2019-08-21 RX ADMIN — MORPHINE SULFATE 15 MG: 15 TABLET ORAL at 08:21

## 2019-08-21 RX ADMIN — MORPHINE SULFATE 15 MG: 15 TABLET ORAL at 11:52

## 2019-08-21 RX ADMIN — MORPHINE SULFATE 15 MG: 15 TABLET ORAL at 17:38

## 2019-08-21 RX ADMIN — HEPARIN SODIUM 5000 UNITS: 5000 INJECTION, SOLUTION INTRAVENOUS; SUBCUTANEOUS at 05:01

## 2019-08-21 RX ADMIN — SODIUM CHLORIDE 75 ML/HR: 9 INJECTION, SOLUTION INTRAVENOUS at 05:00

## 2019-08-21 RX ADMIN — CHLORHEXIDINE GLUCONATE 0.12% ORAL RINSE 15 ML: 1.2 LIQUID ORAL at 21:17

## 2019-08-21 RX ADMIN — ASPIRIN 325 MG: 325 TABLET, FILM COATED ORAL at 08:21

## 2019-08-21 RX ADMIN — CHLORHEXIDINE GLUCONATE 0.12% ORAL RINSE 15 ML: 1.2 LIQUID ORAL at 08:22

## 2019-08-21 RX ADMIN — CEFDINIR 300 MG: 300 CAPSULE ORAL at 08:22

## 2019-08-21 RX ADMIN — MORPHINE SULFATE 15 MG: 15 TABLET ORAL at 23:23

## 2019-08-21 RX ADMIN — FINASTERIDE 5 MG: 5 TABLET, FILM COATED ORAL at 08:21

## 2019-08-21 RX ADMIN — VITAMIN D, TAB 1000IU (100/BT) 2000 UNITS: 25 TAB at 08:21

## 2019-08-21 RX ADMIN — GABAPENTIN 300 MG: 300 CAPSULE ORAL at 21:17

## 2019-08-21 RX ADMIN — Medication 1 TABLET: at 08:22

## 2019-08-21 RX ADMIN — HEPARIN SODIUM 5000 UNITS: 5000 INJECTION, SOLUTION INTRAVENOUS; SUBCUTANEOUS at 21:17

## 2019-08-21 NOTE — H&P
PHYSICAL MEDICINE AND REHABILITATION H&P/ADMISSION NOTE  Scott Mcintosh 66 y o  male MRN: [id number]  Unit/Bed#: [facility] 213-01 Encounter: [id number]     Rehab Diagnosis: debility     History of Present Illness:   Scott Mcintosh is a 66 y o  male who presented to the [facility] [address] with AMS 2/2 to urosepsis which was treated with abx  Course complicated by RF which was treated with IVF and elevated troponins which cardiology attributed to sepsis  Subjective: patient without complaint currently     Review of Systems: A 10-point review of systems was performed  Negative except as listed above      Plan:     Transaminitis  Assessment & Plan  - likely 2/2 to sepsis   - AST trending down and ALT stable  - will d/w IM     Anemia  Assessment & Plan  -appears to be chronic going back to at least 3/2018 per EMR with a baseline of 10-11  - Hg currently 9 9  -IM monitoring     CKD (chronic kidney disease)  Assessment & Plan  - per renal baseline likely 1 0-1 1  -Cr currently 1 21  - patient appears to have been taking meloxicam and naproxen as OP, advised patient to stop all NSAIDs  -IM monitoring     Depression  Assessment & Plan  - on home zoloft 50 mg qd     Elevated troponin  Assessment & Plan  - evaluated by cardiology and felt to be 2/2 to sepsis rather than a true coronary event and they recommend FU as OP for ischemic SEGUNDO     Bacteremia  Assessment & Plan  - in acute care felt to be 2/2 to urinary tract infection in the setting of recent change of chronic indwelling cantor catheter  - abx will be completed on 8/26 per ID recs  - blood cx x 2 of 8/18 not yet finalized   - current cantor to remain in place per urology recs     Chronic indwelling Cantor catheter  Assessment & Plan  - h/o TURP  - on home proscar 5 mg qd   - changed once per month (recently changed)   - current cantor to remain in place per urology recs   - follows with Dr Gal Covington     * Chronic pain  Assessment & Plan  - on home neurontin 300 HS    - on home MS IR 15 mg q6  - per PAPDMP fairly regular prescription for MS IR 15 mg last filled on 8/1/19 for 120 tabs with 0 refills        Drug regimen reviewed, all potential adverse effects identified and addressed:    Scheduled Meds:  Current Facility-Administered Medications:  bisacodyl 10 mg Rectal Daily PRN Curtis Zhang MD   cefdinir 300 mg Oral Q12H Елена Sanchez MD   chlorhexidine 15 mL Swish & Spit Q12H Елена Sanchez MD   [START ON 8/22/2019] cholecalciferol 2,000 Units Oral Daily MD Yusef Parish ON 8/22/2019] docusate sodium 400 mg Oral Daily Curtis Zhang MD   ferrous sulfate 325 mg Oral BID MD Yusef Parish ON 8/22/2019] finasteride 5 mg Oral Daily Curtis Zhang MD   gabapentin 300 mg Oral HS Curtis Zhang MD   heparin (porcine) 5,000 Units Subcutaneous Q8H [address] Curtis Zhang MD   morphine 15 mg Oral Q6H Curtis Zhang MD   [START ON 8/22/2019] sertraline 50 mg Oral Daily Curtis Zhang MD        Incidental findings:  1) low Amylase/lipase: per IM unlikely to be of clinical significance and recommend OP FU with PCP with further testing/treatment and/or specialist referral at PCP's discretion   2) grade 1 DD: OP FU with cardiology  3) sinus arrhythmia: OP FU with cardiology   4) right non-obstructing renal stone: OP FU with Dr Clancy Session  5) left kidney cyst: OP FU with Dr Clancy Session    Note: per patient and family he is on  mg qd at home not because he was advised to take ASA by a physician but rather because he decided on his own to take ASA    DVT ppx: HSQ                Functional History - Prior to Admission:      I PTA     Functional Status Upon Admission to [facility]:  Mobility: min  Transfers: min  ADLs: brenda Baeza lives with their daughter    He lives in a(n) single family home  The living area: can live on one level    There 3 steps to enter the home          Physical Exam:    Vitals:    08/21/19 1537   BP: 130/66   Pulse: 68   Resp: 16   Temp: 97 8 °F (36 6 °C) SpO2: 93%         General: alert, no apparent distress, cooperative and comfortable  HEENT:  Head: Normal, normocephalic, atraumatic  CARDIAC:  +S1/2  LUNGS:  respirations unlabored   ABDOMEN:  soft NT   EXTREMITIES:  volume status currently stable   NEURO:   cranial nerves 2-12 intact, muscle tone and strength normal and symmetric, sensation grossly normal and finger to nose and cerebellar exam normal  PSYCH:  mood/affect currently stable       Laboratory:    Results from last 7 days   Lab Units 08/21/19  0501 08/20/19  0534 08/19/19  0451   HEMOGLOBIN g/dL 9 9* 9 8* 9 6*   HEMATOCRIT % 29 7* 29 3* 28 5*   WBC Thousand/uL 7 50 7 50 5 80     Results from last 7 days   Lab Units 08/21/19  0501 08/20/19  0534 08/19/19  0451  08/17/19  0430 08/16/19  1740   BUN mg/dL 21 25 29*   < > 36* 40*   SODIUM mmol/L 140 137 137   < > 137 135   POTASSIUM mmol/L 3 5 3 9 4 0   < > 3 7 4 4   CHLORIDE mmol/L 110* 109* 108*   < > 110* 103   CREATININE mg/dL 1 21 1 32* 1 45*   < > 1 80* 2 17*   AST U/L  --  62*  --   --  130* 171*   ALT U/L  --  70*  --   --  57* 62*    < > = values in this interval not displayed       Results from last 7 days   Lab Units 08/20/19  0534 08/16/19  1643   PROTIME seconds 13 3* 16 0*   INR  1 14 1 37                Past Medical History:   Past Surgical History:   Family History:   Social history:   Past Medical History:   Diagnosis Date    Depression     Diallo catheter in place     Hypertension     Prostate enlargement     Psychiatric disorder     Urinary tract infection     Past Surgical History:   Procedure Laterality Date    ARTHROSCOPY KNEE      BACK SURGERY      L 4 or 5    CATARACT EXTRACTION Bilateral     CYSTOSCOPY W/ LASER LITHOTRIPSY N/A 8/20/2018    Procedure: LITHOTRISPY HOLMIUM LASER of bladder stones;  Surgeon: Farhad Rios MD;  Location: [facility] MAIN OR;  Service: Urology    MN TRANSURETHRAL ELEC-SURG [facility] Evelyne Kruse N/A 8/20/2018    Procedure: Neil Gonsales; TURP;  Surgeon: Farhad Rios MD;  Location: [address] [place] [address] MAIN OR;  Service: Urology    SHOULDER SURGERY Right 05/31/2019     Family History   Problem Relation Age of Onset    Cancer Mother     Diabetes Father       Social History     Socioeconomic History    Marital status:       Spouse name: Not on file    Number of children: Not on file    Years of education: Not on file    Highest education level: Not on file   Occupational History    Not on file   Social Needs    Financial resource strain: Not on file    Food insecurity:     Worry: Not on file     Inability: Not on file    Transportation needs:     Medical: Not on file     Non-medical: Not on file   Tobacco Use    Smoking status: Former Smoker     Packs/day: 1 00    Smokeless tobacco: Never Used    Tobacco comment: quit 50 years ago   Substance and Sexual Activity    Alcohol use: Yes     Frequency: Monthly or less     Drinks per session: 1 or 2     Binge frequency: Less than monthly    Drug use: No    Sexual activity: Not on file   Lifestyle    Physical activity:     Days per week: Not on file     Minutes per session: Not on file    Stress: Not on file   Relationships    Social connections:     Talks on phone: Not on file     Gets together: Not on file     Attends Anabaptist service: Not on file     Active member of club or organization: Not on file     Attends meetings of clubs or organizations: Not on file     Relationship status: Not on file    Intimate partner violence:     Fear of current or ex partner: Not on file     Emotionally abused: Not on file     Physically abused: Not on file     Forced sexual activity: Not on file   Other Topics Concern    Not on file   Social History Narrative    Not on file          Current Medical Diagnosis Allergies   Patient Active Problem List   Diagnosis    Chronic indwelling Diallo catheter    Chronic pain    Bacteremia    Elevated troponin    CKD (chronic kidney disease)    Anemia    Transaminitis    Depression    No Known Allergies        Medical Necessity Criteria for [facility] Admission: CKD  In addition, the preadmission screen, post-admission physical evaluation, overall plan of care and admissions order demonstrate a reasonable expectation that the following criteria were met at the time of admission to the [facility]  1  The patient requires active and ongoing therapeutic intervention of multiple therapy disciplines (physical therapy, occupational therapy, speech-language pathology, or prosthetics/orthotics), one of which is physical or occupational therapy  2  Patient requires an intensive rehabilitation therapy program, as defined in Chapter 1, section 110 2 2 of the CMS Medicare Policy Manual  This intensive rehabilitation therapy program will consist of at least 3 hours of therapy per day at least 5 days per week or at least 15 hours of intensive rehabilitation therapy within a 7 consecutive day period, beginning with the date of admission to the [facility]  3  The patient is reasonably expected to actively participate in, and benefit significantly from, the intensive rehabilitation therapy program as defined in Chapter 1, section 110 2 2 of the CMS Medicare Policy Manual at this time of admission to the [facility]  He can reasonably be expected to make measurable improvement (that will be of practical value to improve the patients functional capacity or adaptation to impairments) as a result of the rehabilitation treatment, as defined in section 110 3, and such improvement can be expected to be made within the prescribed period of time  As noted in the CMS Medicare Policy Manual, the patient need not be expected to achieve complete independence in the domain of self-care nor be expected to return to his or her prior level of functioning in order to meet this standard  4  The patient must require physician supervision by a rehabilitation physician   As such, a rehabilitation physician will conduct face-to-face visits with the patient at least 3 days per week throughout the patients stay in the [facility] to assess the patient both medically and functionally, as well as to modify the course of treatment as needed to maximize the patients capacity to benefit from the rehabilitation process  5  The patient requires an intensive and coordinated interdisciplinary approach to providing rehabilitation, as defined in Chapter 1, section 110 2 5 of the CMS Medicare Policy Manual  This will be achieved through periodic team conferences, conducted at least once in a 7-day period, and comprising of an interdisciplinary team of medical professionals consisting of: a rehabilitation physician, registered nurse,  and/or , and a licensed/certified therapist from each therapy discipline involved in treating the patient  Changes Since Pre-admission Assessment: None -This patient's participation in rehab continues to be reasonable, necessary and appropriate  CMS Required Post-Admission Physician Evaluation Elements  History and Physical, including medical history, functional history and active comorbidities as in above text      PostAdmission Physician Evaluation:  The patient has the potential to make improvement and is in need of physical, occupational, and/or therapy services  The patient may also need nutritional services  Given the patient's complex medical condition and risk of further medical complications, rehabilitative services cannot be safely provided at a lower level of care, such as a skilled nursing facility  I have reviewed the patient's functional and medical status at the time of the preadmission screening and they are the same as on the day of this admission  I acknowledge that I have personally performed a full physical examination on this patient within 24 hours of admission   The patient and/or family demonstrated understanding the rehabilitation program and the discharge process after we discussed them      Agree in entirety: yes  Minor adaptions: none    Major changes: none     Anil Garrison MD  Physical Medicine and Rehabilitation

## 2019-08-21 NOTE — ASSESSMENT & PLAN NOTE
- on home neurontin 300 HS    - on home MS IR 15 mg q6  - per PAPDMP fairly regular prescription for MS IR 15 mg last filled on 8/1/19 for 120 tabs with 0 refills

## 2019-08-21 NOTE — ASSESSMENT & PLAN NOTE
- per renal baseline likely 1 0-1 1  -Cr currently 1 02  - patient appears to have been taking meloxicam and naproxen as OP, advised patient to stop all NSAIDs  -IM monitoring and have cleared patient for dc

## 2019-08-21 NOTE — ASSESSMENT & PLAN NOTE
- evaluated by cardiology and felt to be 2/2 to sepsis rather than a true coronary event and they recommend FU as OP for ischemic SEGUNDO

## 2019-08-21 NOTE — PHYSICAL THERAPY NOTE
08/21/19 0810   Pain Assessment   Pain Assessment No/denies pain   Pain Score No Pain   Restrictions/Precautions   Weight Bearing Precautions Per Order No   Other Precautions Contact/isolation   General   Chart Reviewed Yes   Additional Pertinent History history of R shoulder repalcement and back surgery   Response to Previous Treatment Patient with no complaints from previous session  Family/Caregiver Present No   Cognition   Overall Cognitive Status Impaired   Arousal/Participation Alert; Cooperative   Attention Attends with cues to redirect   Orientation Level Oriented to person;Oriented to place; Disoriented to time;Disoriented to situation   Memory Decreased short term memory   Following Commands Follows one step commands with increased time or repetition   Subjective   Subjective "I need new batteries for my hearinds, I can't hear"   Bed Mobility   Supine to Sit 3  Moderate assistance   Additional items Assist x 1;HOB elevated;Verbal cues;LE management   Additional Comments Pt sat at EOB with UE support to perform ther ex   Transfers   Sit to Stand 4  Minimal assistance   Additional items Assist x 1; Increased time required;Verbal cues;Armrests   Stand to Sit 4  Minimal assistance   Additional items Assist x 1;Verbal cues; Increased time required   Stand pivot 4  Minimal assistance   Additional items Assist x 1; Increased time required;Verbal cues   Ambulation/Elevation   Gait pattern Improper Weight shift; Short stride; Inconsistent stephen   Gait Assistance 4  Minimal assist   Additional items Assist x 1;Verbal cues; Tactile cues   Assistive Device Rolling walker   Distance 5ft to chair   Balance   Static Sitting Fair +   Dynamic Sitting Fair   Static Standing Fair   Dynamic Standing Fair   Ambulatory Fair -   Endurance Deficit   Endurance Deficit Yes   Endurance Deficit Description fatigue post treatment   Activity Tolerance   Activity Tolerance Patient limited by fatigue   Exercises   Quad Sets Sitting;15 reps;AROM; Bilateral   Hip Flexion Sitting;15 reps;AROM; Bilateral   Hip Abduction Sitting;15 reps;AROM; Bilateral   Hip Adduction Sitting;15 reps;AROM; Bilateral   Knee AROM Long Arc Quad Sitting;15 reps;AROM; Bilateral   Ankle Pumps Sitting;15 reps;AROM; Bilateral   Marching Sitting;15 reps;AROM; Bilateral   Assessment   Prognosis Fair   Problem List Decreased strength;Decreased endurance; Impaired balance;Decreased mobility; Decreased coordination; Impaired judgement;Decreased safety awareness; Impaired hearing   Assessment Pt seen for PT treatment session this date with interventions consisting of Therapeutic exercise consisting of: AROM 15 reps B LE in sitting position and therapeutic activity consisting of training: bed mobility, supine<>sit transfers, sit<>stand transfers and static sitting tolerance at EOB for 12 minutes w/ min UE support  Pt agreeable to PT treatment session upon arrival, pt found supine in bed w/ HOB elevated, in no apparent distress and responsive  In comparison to previous session, pt with improvements in decreased assistance required for bed mobility  Post session: chair alarm engaged and all needs in reach Continue to recommend STR at time of d/c in order to maximize pt's functional independence and safety w/ mobility  Pt continues to be functioning below baseline level, and remains limited 2* factors listed above and including decreased strength, endurance and balance, fall risk    PT will continue to see pt while here in order to address the deficits listed above and provide interventions consistent w/ POC in effort to achieve STGs  Barriers to Discharge Inaccessible home environment   Goals   Patient Goals to go to rehab   Treatment Day 2   Plan   Treatment/Interventions Functional transfer training;LE strengthening/ROM; Therapeutic exercise; Endurance training;Cognitive reorientation; Bed mobility;Gait training   Progress Slow progress, decreased activity tolerance   PT Frequency 5x/wk Recommendation   Recommendation Short-term skilled PT   Equipment Recommended Walker   PT - OK to Discharge Yes   Additional Comments Upon conclusion, pt is oob in chair with PCA present to take vitals   Delmi Pinedo, PTA

## 2019-08-21 NOTE — PLAN OF CARE
Problem: PHYSICAL THERAPY ADULT  Goal: Performs mobility at highest level of function for planned discharge setting  See evaluation for individualized goals  Description  Treatment/Interventions: Functional transfer training, LE strengthening/ROM, Elevations, Therapeutic exercise, Endurance training, Bed mobility, Gait training  Equipment Recommended: Alejandra Herrera       See flowsheet documentation for full assessment, interventions and recommendations  Note:   Prognosis: Fair  Problem List: Decreased strength, Decreased endurance, Impaired balance, Decreased mobility, Decreased coordination, Impaired judgement, Decreased safety awareness, Impaired hearing  Assessment: Pt seen for PT treatment session this date with interventions consisting of Therapeutic exercise consisting of: AROM 15 reps B LE in sitting position and therapeutic activity consisting of training: bed mobility, supine<>sit transfers, sit<>stand transfers and static sitting tolerance at EOB for 12 minutes w/ min UE support  Pt agreeable to PT treatment session upon arrival, pt found supine in bed w/ HOB elevated, in no apparent distress and responsive  In comparison to previous session, pt with improvements in decreased assistance required for bed mobility  Post session: chair alarm engaged and all needs in reach Continue to recommend STR at time of d/c in order to maximize pt's functional independence and safety w/ mobility  Pt continues to be functioning below baseline level, and remains limited 2* factors listed above and including decreased strength, endurance and balance, fall risk    PT will continue to see pt while here in order to address the deficits listed above and provide interventions consistent w/ POC in effort to achieve STGs  Barriers to Discharge: Inaccessible home environment     Recommendation: Short-term skilled PT     PT - OK to Discharge: Yes  Delmi Xiong, PTA    See flowsheet documentation for full assessment

## 2019-08-21 NOTE — PROGRESS NOTES
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   Avoid nonsteroidal anti-inflammatory medications in the outpatient as well as inpatient setting  4  Urinary retention   Continue management according to urology  5  E coli septicemia    Continue treatment according to Infectious Disease specialist   Patient remains afebrile    Follow up reason for today's visit:  Acute kidney injury/chronic kidney disease    Toxic metabolic encephalopathy    Patient Active Problem List   Diagnosis    Bladder stones    Impingement syndrome of right shoulder    Complete tear of right rotator cuff    Chronic right shoulder pain    Arthritis of right shoulder region    Obstruction of Diallo catheter (HCC)    Chronic indwelling Diallo catheter    [facility]epsis ([facility] [address] [facility]    RUTHANN (acute kidney injury) ([facility] [address] )    Elevated troponin    Toxic metabolic encephalopathy    Fall    Acute cystitis with hematuria    Chronic pain syndrome    Bacteremia due to Gram-negative bacteria         Subjective:   No Acute events overnight    Objective:     Vitals: Blood pressure 134/76, pulse 71, temperature 99 3 °F (37 4 °C), temperature source Temporal, resp  rate 18, height 5' 9" (1 753 m), weight 88 1 kg (194 lb 3 6 oz), SpO2 90 %  ,Body mass index is 28 68 kg/m²      Weight (last 2 days)     Date/Time   Weight    08/21/19 0600   88 1 (194 23)    08/20/19 0600   86 8 (191 36)    08/19/19 0546   85 5 (188 49)                Intake/Output Summary (Last 24 hours) at 8/21/2019 0734  Last data filed at 8/21/2019 0643  Gross per 24 hour   Intake 720 ml   Output 2575 ml   Net -1855 ml     I/O last 3 completed shifts: In: 8573 [P O :720; I V :1000]  Out: 4450 [Urine:4450]    Urethral Catheter (Active)   Output (mL) 1200 mL 8/20/2019  6:10 AM       Physical Exam: /76 (BP Location: Left arm)   Pulse 71   Temp 99 3 °F (37 4 °C) (Temporal)   Resp 18   Ht 5' 9" (1 753 m)   Wt 88 1 kg (194 lb 3 6 oz)   SpO2 90%   BMI 28 68 kg/m²     General Appearance:    Alert, cooperative, no distress, appears stated age   Head:    Normocephalic, without obvious abnormality, atraumatic   Eyes:    Conjunctiva/corneas clear   Ears:    Normal external ears   Nose:   Nares normal, septum midline, mucosa normal, no drainage    or sinus tenderness   Throat:   Lips, mucosa, and tongue normal; teeth and gums normal   Neck:   Supple   Back:     Symmetric, no curvature, ROM normal, no CVA tenderness   Lungs:     Clear to auscultation bilaterally, respirations unlabored   Chest wall:    No tenderness or deformity   Heart:    Regular rate and rhythm, S1 and S2 normal, no murmur, rub   or gallop   Abdomen:     Soft, non-tender, bowel sounds active   Extremities:   Extremities normal, atraumatic, no cyanosis or edema   Skin:   Skin color, texture, turgor normal, no rashes or lesions   Lymph nodes:   Cervical normal   Neurologic:   CNII-XII intact            Lab, Imaging and other studies: I have personally reviewed pertinent labs  CBC:   Lab Results   Component Value Date    WBC 7 50 08/21/2019    HGB 9 9 (L) 08/21/2019    HCT 29 7 (L) 08/21/2019    MCV 95 08/21/2019     08/21/2019    MCH 31 6 08/21/2019    MCHC 33 4 08/21/2019    RDW 13 4 08/21/2019    MPV 9 1 08/21/2019     CMP:   No results found for: NA, K, CL, CO2, ANIONGAP, BUN, CREATININE, GLUCOSE, CALCIUM, AST, ALT, ALKPHOS, PROT, BILITOT, EGFR        Results from last 7 days   Lab Units 08/20/19  0534 08/19/19  0451 08/18/19  0438 08/17/19  0430 08/16/19  1740   POTASSIUM mmol/L 3 9 4 0 3 9 3 7 4 4   CHLORIDE mmol/L 109* 108* 108* 110* 103   CO2 mmol/L 22 24 21 20* 24   BUN mg/dL 25 29* 35* 36* 40*   CREATININE mg/dL 1 32* 1 45* 1 61* 1 80* 2 17*   CALCIUM mg/dL 8 5* 8 6 8 3* 7 5* 8 3*   ALK PHOS U/L 154  --   --  77 76   ALT U/L 70*  --   --  57* 62*   AST U/L 62*  --   --  130* 171*         Phosphorus: No results found for: PHOS  Magnesium: No results found for: MG  Urinalysis: No results found for: COLORU, CLARITYU, SPECGRAV, PHUR, LEUKOCYTESUR, NITRITE, PROTEINUA, GLUCOSEU, KETONESU, BILIRUBINUR, BLOODU  Ionized Calcium: No results found for: CAION  Coagulation:   No results found for: PT, INR, APTT  Troponin: No results found for: TROPONINI  ABG: No results found for: PHART, BCL2HXT, PO2ART, NZR9GKW, K0QAUHJD, BEART, SOURCE  Radiology review:     IMAGING  Procedure: Xr Hips Bilateral 3-4 Vw W Pelvis If Performed    Result Date: 8/18/2019  Narrative: BILATERAL HIPS AND PELVIS INDICATION:   Status post fall with hip pain  COMPARISON:  None VIEWS:  XR HIPS BILATERAL 3-4 VW W PELVIS IF PERFORMED  FINDINGS: The bony pelvis appears intact  Degenerative changes visualized lower lumbar spine  LEFT HIP: Mild left hip osteoarthritis is seen  Joint space alignment is maintained  Soft tissues are unremarkable  RIGHT HIP: Moderate right hip osteoarthritis is seen  Joint space alignment is maintained  Soft tissues are unremarkable  Impression: No acute osseous abnormality  Degenerative changes as described   Workstation performed: [id number]       Current Facility-Administered Medications   Medication Dose Route Frequency    acetaminophen (TYLENOL) tablet 650 mg  650 mg Oral Q4H PRN    ascorbic acid (VITAMIN C) tablet 500 mg  500 mg Oral Daily    aspirin tablet 325 mg  325 mg Oral Daily    bisacodyl (DULCOLAX) rectal suppository 10 mg  10 mg Rectal Daily PRN    cefdinir (OMNICEF) capsule 300 mg  300 mg Oral Q12H [address]    chlorhexidine (PERIDEX) 0 12 % oral rinse 15 mL  15 mL Swish & Spit Q12H [address]    cholecalciferol (VITAMIN D3) tablet 2,000 Units  2,000 Units Oral Daily    docusate sodium (COLACE) capsule 400 mg  400 mg Oral Daily    famotidine (PEPCID) tablet 20 mg  20 mg Oral BID    ferrous sulfate tablet 325 mg  325 mg Oral BID    finasteride (PROSCAR) tablet 5 mg  5 mg Oral Daily    gabapentin (NEURONTIN) capsule 400 mg  400 mg Oral BID    heparin (porcine) subcutaneous injection 5,000 Units  5,000 Units Subcutaneous Q8H [address]    morphine (MSIR) IR tablet 15 mg  15 mg Oral 4x Daily    multivitamin-minerals (CENTRUM) tablet 1 tablet  1 tablet Oral Daily    ondansetron (ZOFRAN) injection 4 mg  4 mg Intravenous Q6H PRN    oxyCODONE-acetaminophen (PERCOCET) 5-325 mg per tablet 1 tablet  1 tablet Oral Q4H PRN    sertraline (ZOLOFT) tablet 50 mg  50 mg Oral Daily    sodium chloride 0 9 % infusion  75 mL/hr Intravenous Continuous     Medications Discontinued During This Encounter   Medication Reason    vancomycin (VANCOCIN) IVPB (premix) 1,000 mg     meloxicam (MOBIC) 15 mg tablet Error    meloxicam (MOBIC) 15 mg tablet Error    naproxen (NAPROSYN) 500 mg tablet Error    nitrofurantoin (MACRODANTIN) 100 mg capsule Error    Turmeric Curcumin 500 MG CAPS Error    gabapentin (NEURONTIN) capsule 300 mg     b complex vitamins tablet 1 tablet     vancomycin (VANCOCIN) 1,250 mg in sodium chloride 0 9 % 250 mL IVPB     acetaminophen (TYLENOL) tablet 650 mg     insulin lispro (HumaLOG) 100 units/mL subcutaneous injection 1-5 Units     insulin lispro (HumaLOG) 100 units/mL subcutaneous injection 1-5 Units     cefepime (MAXIPIME) IVPB (premix) 2,000 mg     cefepime (MAXIPIME) IVPB (premix) 2,000 mg     famotidine (PEPCID) injection 20 mg Route change    cefdinir (OMNICEF) capsule 300 mg        Melony Friend, DO

## 2019-08-21 NOTE — ASSESSMENT & PLAN NOTE
-appears to be chronic going back to at least 3/2018 per EMR with a baseline of 10-11  - Hg trending up to 9 6  -IM monitoring and have cleared patient for dc with scrip for CBC with results to PCP

## 2019-08-21 NOTE — OCCUPATIONAL THERAPY NOTE
08/21/19 0913   Restrictions/Precautions   Weight Bearing Precautions Per Order No   Other Precautions Contact/isolation; Chair Alarm; Fall Risk;Hard of hearing   Pain Assessment   Pain Assessment No/denies pain  (but states "i'm so weak" )   ADL   Where Assessed Chair   Grooming Comments patient deferred mouth care at this time  (made cell phone call to daughter re hearing aide batteries)   UB Bathing Assistance 5  Supervision/Setup   UB Bathing Deficit Setup   UB Bathing Comments applied deodorant   LB Bathing Assistance 3  Moderate Assistance   LB Bathing Deficit Setup;Verbal cueing; Increased time to complete;Perineal area; Buttocks;Right lower leg including foot; Left lower leg including foot   UB Dressing Comments assisted with fasteners of hospital gown   Transfers   Additional Comments NOTE:  patient was unable to raise self from chair At All, utilizing both arms and legs   Coordination   Gross Motor WFL  (he uses R shoulder as able )   Dexterity WFL   Cognition   Overall Cognitive Status Impaired   Arousal/Participation Responsive; Cooperative   Activity Tolerance   Activity Tolerance Patient tolerated treatment well   Assessment   Assessment Patient participated in Skilled OT session this date with interventions consisting of ADL re training with the use of correct body mechnaics and  therapeutic activities to: increase activity tolerance   Patient agreeable to OT treatment session, upon arrival patient was found seated OOB to Chair  In comparison to previous session, patient with minimal improvements in self-initiation and sequencing in bathing process  Patient requiring cognitive assistance to anticipate next step and ocassional safety reminders, and ADL assistance as noted in flow sheet  Patient continues to be functioning below baseline level, occupational performance remains limited secondary to factors listed above and increased risk for falls and injury     From OT standpoint, recommendation at time of d/c would be Short Term Rehab  Patient to benefit from continued Occupational Therapy treatment while in the hospital to address deficits as defined above and maximize level of functional independence with ADLs and functional mobility  Plan   Treatment Interventions ADL retraining; Activityengagement   Goal Expiration Date 08/27/19   Treatment Day [address] Lebron/SAVAGE

## 2019-08-21 NOTE — PLAN OF CARE
Problem: Potential for Falls  Goal: Patient will remain free of falls  Description  INTERVENTIONS:  - Assess patient frequently for physical needs  -  Identify cognitive and physical deficits and behaviors that affect risk of falls    -  [place] fall precautions as indicated by assessment   - Educate patient/family on patient safety including physical limitations  - Instruct patient to call for assistance with activity based on assessment  - Modify environment to reduce risk of injury  - Consider OT/PT consult to assist with strengthening/mobility  Outcome: Progressing     Problem: Prexisting or High Potential for Compromised Skin Integrity  Goal: Skin integrity is maintained or improved  Description  INTERVENTIONS:  - Identify patients at risk for skin breakdown  - Assess and monitor skin integrity  - Assess and monitor nutrition and hydration status  - Monitor labs   - Assess for incontinence   - Turn and reposition patient  - Assist with mobility/ambulation  - Relieve pressure over bony prominences  - Avoid friction and shearing  - Provide appropriate hygiene as needed including keeping skin clean and dry  - Evaluate need for skin moisturizer/barrier cream  - Collaborate with interdisciplinary team   - Patient/family teaching  - Consider wound care consult   Outcome: Progressing     Problem: INFECTION - ADULT  Goal: Absence or prevention of progression during hospitalization  Description  INTERVENTIONS:  - Assess and monitor for signs and symptoms of infection  - Monitor lab/diagnostic results  - Monitor all insertion sites, i e  indwelling lines, tubes, and drains  - [place] appropriate cooling/warming therapies per order  - Administer medications as ordered  - Instruct and encourage patient and family to use good hand hygiene technique  - Identify and instruct in appropriate isolation precautions for identified infection/condition   Outcome: Progressing     Problem: DISCHARGE PLANNING  Goal: Discharge to home or other facility with appropriate resources  Description  INTERVENTIONS:  - Identify barriers to discharge w/patient and caregiver  - Arrange for needed discharge resources and transportation as appropriate  - Identify discharge learning needs (meds, wound care, etc )  - Arrange for interpretive services to assist at discharge as needed  - Refer to Case Management Department for coordinating discharge planning if the patient needs post-hospital services based on physician/advanced practitioner order or complex needs related to functional status, cognitive ability, or social support system  Outcome: Progressing     Problem: Knowledge Deficit  Goal: Patient/family/caregiver demonstrates understanding of disease process, treatment plan, medications, and discharge instructions  Description  Complete learning assessment and assess knowledge base    Interventions:  - Provide teaching at level of understanding  - Provide teaching via preferred learning methods  Outcome: Progressing     Problem: DISCHARGE PLANNING - CARE MANAGEMENT  Goal: Discharge to post-acute care or home with appropriate resources  Description  INTERVENTIONS:  - Conduct assessment to determine patient/family and health care team treatment goals, and need for post-acute services based on payer coverage, community resources, and patient preferences, and barriers to discharge  - Address psychosocial, clinical, and financial barriers to discharge as identified in assessment in conjunction with the patient/family and health care team  - Arrange appropriate level of post-acute services according to patient's   needs and preference and payer coverage in collaboration with the physician and health care team  - Communicate with and update the patient/family, physician, and health care team regarding progress on the discharge plan  - Arrange appropriate transportation to post-acute venues    Need to reassess d/c plan   Outcome: Progressing     Problem: Nutrition/Hydration-ADULT  Goal: Nutrient/Hydration intake appropriate for improving, restoring or maintaining nutritional needs  Description  Monitor and assess patient's nutrition/hydration status for malnutrition  Collaborate with interdisciplinary team and initiate plan and interventions as ordered  Monitor patient's weight and dietary intake as ordered or per policy  Utilize nutrition screening tool and intervene as necessary  Determine patient's food preferences and provide high-protein, high-caloric foods as appropriate  INTERVENTIONS:  - Monitor oral intake, urinary output, labs, and treatment plans  - Assess nutrition and hydration status and recommend course of action  - Evaluate amount of meals eaten  - Assist patient with eating if necessary   - Allow adequate time for meals  - Recommend/ encourage appropriate diets, oral nutritional supplements, and vitamin/mineral supplements  - Order, calculate, and assess calorie counts as needed  - Recommend, monitor, and adjust tube feedings and TPN/PPN based on assessed needs  - Assess need for intravenous fluids  - Provide specific nutrition/hydration education as appropriate  - Include patient/family/caregiver in decisions related to nutrition  Outcome: Progressing     Problem: NEUROSENSORY - ADULT  Goal: Achieves stable or improved neurological status  Description  INTERVENTIONS  - Monitor and report changes in neurological status  - Monitor vital signs such as temperature, blood pressure, glucose, and any other labs ordered    Outcome: Progressing  Goal: Achieves maximal functionality and self care  Description  INTERVENTIONS  - Monitor swallowing and airway patency with patient fatigue and changes in neurological status  - Encourage and assist patient to increase activity and self care     - Encourage visually impaired, hearing impaired and aphasic patients to use assistive/communication devices  Outcome: Progressing     Problem: CARDIOVASCULAR - ADULT  Goal: Maintains optimal cardiac output and hemodynamic stability  Description  INTERVENTIONS:  - Monitor I/O, vital signs and rhythm  - Monitor for S/S and trends of decreased cardiac output  - Assess quality of pulses, skin color and temperature  - Assess for signs of decreased coronary artery perfusion  - Instruct patient to report change in severity of symptoms   Outcome: Progressing  Goal: Absence of cardiac dysrhythmias or at baseline rhythm  Description  INTERVENTIONS:  - Continuous cardiac monitoring, vital signs, obtain 12 lead EKG if ordered  - Administer antiarrhythmic and heart rate control medications as ordered  - Monitor electrolytes and administer replacement therapy as ordered  Outcome: Progressing     Problem: METABOLIC, FLUID AND ELECTROLYTES - ADULT  Goal: Electrolytes maintained within normal limits  Description  INTERVENTIONS:  - Monitor labs and assess patient for signs and symptoms of electrolyte imbalances  - Administer electrolyte replacement as ordered  - Monitor response to electrolyte replacements, including repeat lab results as appropriate  - Instruct patient on fluid and nutrition as appropriate  Outcome: Progressing  Goal: Fluid balance maintained  Description  INTERVENTIONS:  - Monitor labs   - Monitor I/O and WT  - Instruct patient on fluid and nutrition as appropriate  - Assess for signs & symptoms of volume excess or deficit  Outcome: Progressing     Problem: SKIN/TISSUE INTEGRITY - ADULT  Goal: Skin integrity remains intact  Description  INTERVENTIONS  - Identify patients at risk for skin breakdown  - Assess and monitor skin integrity  - Assess and monitor nutrition and hydration status  - Monitor labs (i e  albumin)  - Assess for incontinence   - Turn and reposition patient  - Assist with mobility/ambulation  - Relieve pressure over bony prominences  - Avoid friction and shearing  - Provide appropriate hygiene as needed including keeping skin clean and dry  - Evaluate need for skin moisturizer/barrier cream  - Collaborate with interdisciplinary team (i e  Nutrition, Rehabilitation, etc )   - Patient/family teaching  Outcome: Progressing     Problem: MUSCULOSKELETAL - ADULT  Goal: Maintain or return mobility to safest level of function  Description  INTERVENTIONS:  - Assess patient's ability to carry out ADLs; assess patient's baseline for ADL function and identify physical deficits which impact ability to perform ADLs (bathing, care of mouth/teeth, toileting, grooming, dressing, etc )  - Assess/evaluate cause of self-care deficits   - Assess range of motion  - Assess patient's mobility  - Assess patient's need for assistive devices and provide as appropriate  - Encourage maximum independence but intervene and supervise when necessary  - Involve family in performance of ADLs  - Assess for home care needs following discharge   - Consider OT consult to assist with ADL evaluation and planning for discharge  - Provide patient education as appropriate  Outcome: Progressing     Problem: GENITOURINARY - ADULT  Goal: Urinary catheter remains patent  Description  INTERVENTIONS:  - Assess patency of urinary catheter  - If patient has a chronic cantor, consider changing catheter if non-functioning  - Follow guidelines for intermittent irrigation of non-functioning urinary catheter  Outcome: Progressing

## 2019-08-21 NOTE — ASSESSMENT & PLAN NOTE
· Most likely  non myocardial infarction elevated troponins secondary to sepsis  · Appreciate Cardiology input  · Echocardiogram showed no regional wall motion abnormalities, it did demonstrate diastolic dysfunction  · Follow up with Cardiology as an outpatient

## 2019-08-21 NOTE — ASSESSMENT & PLAN NOTE
· Renal ultrasound results reviewed - results as follows:1  9 mm nonobstructing calculus within the lower pole the right kidney  2  Simple cyst within the left kidney   No hydronephrosis  · Follow-up with Urology as an outpatient

## 2019-08-21 NOTE — ASSESSMENT & PLAN NOTE
- in acute care felt to be 2/2 to urinary tract infection in the setting of recent change of chronic indwelling cantor catheter  - abx will be completed on 8/26 per ID recs  - blood cx x 2 of 8/18 not yet finalized   - current cantor to remain in place per urology recs

## 2019-08-21 NOTE — PLAN OF CARE
Problem: OCCUPATIONAL THERAPY ADULT  Goal: Performs self-care activities at highest level of function for planned discharge setting  See evaluation for individualized goals  Description  Treatment Interventions: ADL retraining, Functional transfer training, UE strengthening/ROM, Endurance training, Cognitive reorientation, Equipment evaluation/education, Compensatory technique education, Energy conservation, Activityengagement          See flowsheet documentation for full assessment, interventions and recommendations  Outcome: Progressing  Note:   Limitation: Decreased ADL status, Decreased UE strength, Decreased Safe judgement during ADL, Decreased cognition, Decreased endurance, Decreased self-care trans, Decreased high-level ADLs     Assessment: Patient participated in Skilled OT session this date with interventions consisting of ADL re training with the use of correct body mechnaics and  therapeutic activities to: increase activity tolerance   Patient agreeable to OT treatment session, upon arrival patient was found seated OOB to Chair  In comparison to previous session, patient with minimal improvements in self-initiation and sequencing in bathing process  Patient requiring cognitive assistance to anticipate next step and ocassional safety reminders, and ADL assistance as noted in flow sheet  Patient continues to be functioning below baseline level, occupational performance remains limited secondary to factors listed above and increased risk for falls and injury  From OT standpoint, recommendation at time of d/c would be Short Term Rehab  Patient to benefit from continued Occupational Therapy treatment while in the hospital to address deficits as defined above and maximize level of functional independence with ADLs and functional mobility        OT Discharge Recommendation: Short Term Rehab  OT - OK to Discharge: Yes(Once medically cleared)  KEZIA Holloway

## 2019-08-21 NOTE — PLAN OF CARE
Problem: DISCHARGE PLANNING - CARE MANAGEMENT  Goal: Discharge to post-acute care or home with appropriate resources  Description  INTERVENTIONS:  - Conduct assessment to determine patient/family and health care team treatment goals, and need for post-acute services based on payer coverage, community resources, and patient preferences, and barriers to discharge  - Address psychosocial, clinical, and financial barriers to discharge as identified in assessment in conjunction with the patient/family and health care team  - Arrange appropriate level of post-acute services according to patient's   needs and preference and payer coverage in collaboration with the physician and health care team  - Communicate with and update the patient/family, physician, and health care team regarding progress on the discharge plan  - Arrange appropriate transportation to post-acute venues    D/c to aru   Outcome: Completed

## 2019-08-21 NOTE — PLAN OF CARE
Problem: PAIN - ADULT  Goal: Verbalizes/displays adequate comfort level or baseline comfort level  Description  Interventions:  - Encourage patient to monitor pain and request assistance  - Assess pain using appropriate pain scale  - Administer analgesics based on type and severity of pain and evaluate response  - Implement non-pharmacological measures as appropriate and evaluate response  - Consider cultural and social influences on pain and pain management  - Notify physician/advanced practitioner if interventions unsuccessful or patient reports new pain  Outcome: Progressing     Problem: INFECTION - ADULT  Goal: Absence or prevention of progression during hospitalization  Description  INTERVENTIONS:  - Assess and monitor for signs and symptoms of infection  - Monitor lab/diagnostic results  - Monitor all insertion sites, i e  indwelling lines  - [place] appropriate cooling/warming therapies per order  - Administer medications as ordered  - Instruct and encourage patient and family to use good hand hygiene technique  - Identify and instruct in appropriate isolation precautions for identified infection/condition   Outcome: Progressing     Problem: SAFETY ADULT  Goal: Patient will remain free of falls  Description  INTERVENTIONS:  - Assess patient frequently for physical needs  -  Identify cognitive and physical deficits and behaviors that affect risk of falls    -  [place] fall precautions as indicated by assessment   - Educate patient/family on patient safety including physical limitations  - Instruct patient to call for assistance with activity based on assessment  - Modify environment to reduce risk of injury  -OT/PT consult to assist with strengthening/mobility   Outcome: Progressing  Goal: Maintain or return mobility status to optimal level  Description  INTERVENTIONS:  - Assess patient's baseline mobility status (ambulation, transfers, stairs, etc )    - Identify cognitive and physical deficits and behaviors that affect mobility  - Identify mobility aids required to assist with transfers and/or ambulation (gait belt, sit-to-stand, lift, walker, cane, etc )  - [place] fall precautions as indicated by assessment  - Record patient progress and toleration of activity level on Mobility SBAR; progress patient to next Phase/Stage  - Instruct patient to call for assistance with activity based on assessment     Outcome: Progressing     Problem: DISCHARGE PLANNING  Goal: Discharge to home or other facility with appropriate resources  Description  INTERVENTIONS:  - Identify barriers to discharge w/patient and caregiver  - Arrange for needed discharge resources and transportation as appropriate  - Identify discharge learning needs (meds, wound care, etc )  - Arrange for interpretive services to assist at discharge as needed  - Refer to Case Management Department for coordinating discharge planning if the patient needs post-hospital services based on physician/advanced practitioner order or complex needs related to functional status, cognitive ability, or social support system  Outcome: Progressing     Problem: GASTROINTESTINAL - ADULT  Goal: Maintains or returns to baseline bowel function  Description  INTERVENTIONS:  - Assess bowel function  - Encourage oral fluids to ensure adequate hydration  - Administer IV fluids if ordered to ensure adequate hydration  - Administer ordered medications as needed  - Encourage mobilization and activity  - Consider nutritional services referral to assist patient with adequate nutrition and appropriate food choices  Outcome: Progressing  Goal: Maintains adequate nutritional intake  Description  INTERVENTIONS:  - Monitor percentage of each meal consumed  - Identify factors contributing to decreased intake, treat as appropriate  - Assist with meals as needed  - Monitor I&O, weight, and lab values if indicated  - Obtain nutrition services referral as needed  Outcome: Progressing Problem: GENITOURINARY - ADULT  Goal: Urinary catheter remains patent  Description  INTERVENTIONS:  - Assess patency of urinary catheter  - If patient has a chronic cantor, consider changing catheter if non-functioning  - Follow guidelines for intermittent irrigation of non-functioning urinary catheter  Outcome: Progressing     Problem: METABOLIC, FLUID AND ELECTROLYTES - ADULT  Goal: Electrolytes maintained within normal limits  Description  INTERVENTIONS:  - Monitor labs and assess patient for signs and symptoms of electrolyte imbalances  - Administer electrolyte replacement as ordered  - Monitor response to electrolyte replacements, including repeat lab results as appropriate  - Instruct patient on fluid and nutrition as appropriate  Outcome: Progressing     Problem: SKIN/TISSUE INTEGRITY - ADULT  Goal: Incision(s), wounds(s) or drain site(s) healing without S/S of infection  Description  INTERVENTIONS  - Assess and document risk factors for skin impairment   - Assess and document dressing, incision, wound bed, drain sites and surrounding tissue  - Consider nutrition services referral as needed  - Oral mucous membranes remain intact  - Provide patient/ family education  Outcome: Progressing     Problem: HEMATOLOGIC - ADULT  Goal: Maintains hematologic stability  Description  INTERVENTIONS  - Assess for signs and symptoms of bleeding or hemorrhage  - Monitor labs  - Administer supportive blood products/factors as ordered and appropriate  Outcome: Progressing     Problem: MUSCULOSKELETAL - ADULT  Goal: Maintain or return mobility to safest level of function  Description  INTERVENTIONS:  - Assess patient's ability to carry out ADLs; assess patient's baseline for ADL function and identify physical deficits which impact ability to perform ADLs (bathing, care of mouth/teeth, toileting, grooming, dressing, etc )  - Assess/evaluate cause of self-care deficits   - Assess range of motion  - Assess patient's mobility  - Assess patient's need for assistive devices and provide as appropriate  - Encourage maximum independence but intervene and supervise when necessary  - Involve family in performance of ADLs  - Assess for home care needs following discharge   - OT consult to assist with ADL evaluation and planning for discharge  - Provide patient education as appropriate   Outcome: Progressing

## 2019-08-21 NOTE — SOCIAL WORK
Chart reviewed by case management, d/c order written,pt was seen by therapy, pt was accepted to [facility], I met with the pt and he is in agreement wit [facility] and he does not want a snf, I met with the son yesterday and he was in agreement with [facility], I called Candice his daughter at [phone] and made her aware that the pt was accepted to [facility], she is in agreement with the d/c plan, pt to be d/c today, rn will call report to 80, daughter was given the phone# to the unit, pt and family are in agreement with the d/c and d/c plan to [facility] today

## 2019-08-21 NOTE — ASSESSMENT & PLAN NOTE
- h/o TURP  - on home proscar 5 mg qd   - changed once per month at Dr Candice Garcia office (recently changed PTA)   - current cantor to remain in place per urology recs   - follows with Dr Lavinia Rene

## 2019-08-21 NOTE — DISCHARGE SUMMARY
Discharge- Mehdi Rodriguez 1945, 66 y o  male MRN: [id number]    Unit/Bed#: -02 Encounter: [id number]    Primary Care Provider: Kellie Mcwilliams DO   Date and time admitted to hospital: 8/16/2019  4:31 PM        Chronic pain syndrome  Assessment & Plan  · With continuous opioid dependence  · Continue home meds    Chronic indwelling Diallo catheter  Assessment & Plan  · Renal ultrasound results reviewed - results as follows:1  9 mm nonobstructing calculus within the lower pole the right kidney  2  Simple cyst within the left kidney   No hydronephrosis  · Follow-up with Urology as an outpatient    Bacteremia due to Gram-negative bacteriaresolved as of 8/21/2019  Assessment & Plan  · E coli bacteremia  · Continue Omnicef per ID recommendations through August 26th    Acute cystitis with hematuriaresolved as of 8/21/2019  Assessment & Plan  · Continue antibiotics as recommended by ID    Elevated troponinresolved as of 8/21/2019  Assessment & Plan  · Most likely  non myocardial infarction elevated troponins secondary to sepsis  · Appreciate Cardiology input  · Echocardiogram showed no regional wall motion abnormalities, it did demonstrate diastolic dysfunction  · Follow up with Cardiology as an outpatient    RUTHANN (acute kidney injury) (HCC)resolved as of 8/21/2019  Assessment & Plan  · Most likely pre renal in etiology  · Creatinine trending down  · Improved with IV fluids  · Appreciate Nephrology input    Sepsis (HCC)resolved as of 8/21/2019  Assessment & Plan  · Present on admission  · Secondary to a urinary tract infection in the setting of having a chronic indwelling Diallo catheter  · T-max over 24 hours was 104°, currently he is afebrile  · Leukocytosis resolved  · Sepsis parameters have currently resolved  · Initially the patient received vancomycin and cefepime  · E coli bacteremia  · Appreciate ID input, switched to cefdinir, anticipate antibiotic course through August 26th    * Toxic metabolic encephalopathyresolved as of 8/21/2019  Assessment & Plan  · Resolved - with antibiotic use  · Secondary to UTI/sepsis  · Patient is currently at baseline  · CT brain unremarkable          Discharging Physician / Practitioner: Catrachita Madden MD  PCP: Melisa Lua DO  Admission Date:   Admission Orders (From admission, onward)     Ordered        08/16/19 1818  Inpatient Admission (expected length of stay for this patient Order details is greater than two midnights)  Once                   Discharge Date: 08/21/19    Disposition:      [address]  at [address] [place] [address]    For Discharges to [facility] SNF:   · Not Applicable to this Patient - Not Applicable to this Patient    Reason for Admission:  Fever and confusion    Discharge Diagnoses:     Please see assessment and plan section above for further details regarding discharge diagnoses  Resolved Problems  Date Reviewed: 8/21/2019          Resolved    Sepsis ([facility] [address] ) 8/21/2019     Resolved by  Catrachita Madden MD    RUTHANN (acute kidney injury) ([facility] [address] ) 8/21/2019     Resolved by  Catrachita Madden MD    Elevated troponin 8/21/2019     Resolved by  Catrachita Madden MD    * (Principal) Toxic metabolic encephalopathy 2/32/0766     Resolved by  Catrachita Madden MD    Acute cystitis with hematuria 8/21/2019     Resolved by  Catrachita Madden MD    Bacteremia due to Gram-negative bacteria 8/21/2019     Resolved by  Catrachita Madden MD          Consultations During Hospital Stay:  · Urology  · ID  · Cardiology    Procedures Performed:   · n/a     Medication Adjustments and Discharge Medications:  · Summary of Medication Adjustments made as a result of this hospitalization: Antibiotics  · Medication Dosing Tapers - Please refer to Discharge Medication List for details on any medication dosing tapers (if applicable to patient)  · Medications being temporarily held (include recommended restart time): n/a  · Discharge Medication List: See after visit summary for reconciled discharge medications  Wound Care Recommendations:  When applicable, please see wound care section of After Visit Summary  Diet Recommendations at Discharge:  Diet -        Diet Orders   (From admission, onward)             Start     Ordered    08/16/19 2254  Diet Regular; Regular House  Diet effective now     Question Answer Comment   Diet Type Regular    Regular Regular House    RD to adjust diet per protocol? Yes        08/16/19 2254                Instructions for any Catheters / Lines Present at Discharge (including removal date, if applicable): Diallo management per Urology    Significant Findings / Test Results:     XR hips bilateral 3-4 vw w pelvis if performed   Final Result by Julia Mcclelland DO (08/18 4788)      No acute osseous abnormality  Degenerative changes as described  Workstation performed: [id number]         US kidney and bladder   Final Result by Javan Coleman MD (08/16 2128)         1  9 mm nonobstructing calculus within the lower pole the right kidney  2  Simple cyst within the left kidney  No hydronephrosis  Workstation performed: [id number]         CT head without contrast   Final Result by Alina Najera MD (08/16 0634)      No acute intracranial abnormality  Workstation performed: [id number]         XR chest 1 view   ED Interpretation by Presley William MD (08/16 1728)   No acute disease      Final Result by Julia Mcclelland DO (08/17 1599)      Mild pulmonary vascular congestion  No consolidation or pneumothorax  Workstation performed: [id number]             Incidental Findings:   · n/a     Test Results Pending at Discharge (will require follow up):   n/a     Outpatient Tests Requested:  · n/a    Complications:    · none    Hospital Course:     Adi Urrutia is a 66 y o  male patient who originally presented to the hospital on 8/16/2019 due to fever and confusion    Patient was found to be septic and bacteremic, and found to have E coli bacteremia likely with urinary source  Patient was treated empirically with broad-spectrum antibiotics, seen by Infectious Disease and transitioned to [address]  with a course to finish on August 26th  Patient was also seen in consultation by Urology for his chronic Diallo which was changed, along with Cardiology for a modestly elevated troponin  As the patient's chronic problems appear to be well controlled, he was deemed stable for discharge to acute rehab unit with close outpatient follow-up with Urology, his PCP, and Cardiology    Condition at Discharge: fair     Discharge Day Visit / Exam:     Subjective:  Patient seen examined  No acute events were noted  Feels well  Vitals: Blood Pressure: (!) 177/86 (08/21/19 0806)  Pulse: 64 (08/21/19 0806)  Temperature: 98 9 °F (37 2 °C) (08/21/19 0806)  Temp Source: Tympanic (08/21/19 0806)  Respirations: 16 (08/21/19 0806)  Height: 5' 9" (175 3 cm) (08/16/19 1632)  Weight - Scale: 88 1 kg (194 lb 3 6 oz) (08/21/19 0600)  SpO2: 96 % (08/21/19 0806)  Exam:   Physical Exam   Constitutional: He is oriented to person, place, and time  He appears well-developed and well-nourished  HENT:   Head: Normocephalic and atraumatic  Eyes: Pupils are equal, round, and reactive to light  EOM are normal    Neck: Normal range of motion  Neck supple  Cardiovascular: Normal rate and regular rhythm  Pulmonary/Chest: Effort normal and breath sounds normal    Abdominal: Soft  Bowel sounds are normal    Genitourinary:   Genitourinary Comments: Diallo in place   Musculoskeletal: Normal range of motion  He exhibits no edema  Neurological: He is alert and oriented to person, place, and time  Skin: Skin is warm  Capillary refill takes less than 2 seconds  Psychiatric: He has a normal mood and affect  His behavior is normal  Judgment and thought content normal    Nursing note and vitals reviewed        Discussion with Family: n/a    Goals of Care Discussions:  · Code Status at Discharge: Level 1 - Full Code  · Were there any Goals of Care Discussions during Hospitalization?: Yes  · Results of any General Goals of Care Discussions: Full code   · POLST Completed: No  · If POLST Completed, Summary of POLST Agreement Provided Here: n/a   · OK to Rehospitalize if Needed? Yes    Discharge instructions/Information to patient and family:   See after visit summary section titled Discharge Instructions for information provided to patient and family  Planned Readmission: [facility]      Discharge Statement:  I spent 36 minutes discharging the patient  This time was spent on the day of discharge  I had direct contact with the patient on the day of discharge  Greater than 50% of the total time was spent examining patient, answering all patient questions, arranging and discussing plan of care with patient as well as directly providing post-discharge instructions  Additional time then spent on discharge activities      ** Please Note: This note has been constructed using a voice recognition system **

## 2019-08-21 NOTE — ASSESSMENT & PLAN NOTE
· Present on admission  · Secondary to a urinary tract infection in the setting of having a chronic indwelling Diallo catheter  · T-max over 24 hours was 104°, currently he is afebrile  · Leukocytosis resolved  · Sepsis parameters have currently resolved  · Initially the patient received vancomycin and cefepime  · E coli bacteremia  · Appreciate ID input, switched to cefdinir, anticipate antibiotic course through August 26th

## 2019-08-22 PROCEDURE — 97530 THERAPEUTIC ACTIVITIES: CPT

## 2019-08-22 PROCEDURE — 97110 THERAPEUTIC EXERCISES: CPT

## 2019-08-22 PROCEDURE — 99232 SBSQ HOSP IP/OBS MODERATE 35: CPT | Performed by: PHYSICAL MEDICINE & REHABILITATION

## 2019-08-22 PROCEDURE — 97535 SELF CARE MNGMENT TRAINING: CPT

## 2019-08-22 PROCEDURE — 97163 PT EVAL HIGH COMPLEX 45 MIN: CPT

## 2019-08-22 PROCEDURE — 97116 GAIT TRAINING THERAPY: CPT

## 2019-08-22 PROCEDURE — 97167 OT EVAL HIGH COMPLEX 60 MIN: CPT

## 2019-08-22 RX ADMIN — MORPHINE SULFATE 15 MG: 15 TABLET ORAL at 11:40

## 2019-08-22 RX ADMIN — SERTRALINE HYDROCHLORIDE 50 MG: 50 TABLET ORAL at 09:38

## 2019-08-22 RX ADMIN — CHLORHEXIDINE GLUCONATE 0.12% ORAL RINSE 15 ML: 1.2 LIQUID ORAL at 09:39

## 2019-08-22 RX ADMIN — FINASTERIDE 5 MG: 5 TABLET, FILM COATED ORAL at 09:39

## 2019-08-22 RX ADMIN — DOCUSATE SODIUM 400 MG: 100 CAPSULE, LIQUID FILLED ORAL at 09:38

## 2019-08-22 RX ADMIN — HEPARIN SODIUM 5000 UNITS: 5000 INJECTION, SOLUTION INTRAVENOUS; SUBCUTANEOUS at 05:42

## 2019-08-22 RX ADMIN — GABAPENTIN 300 MG: 300 CAPSULE ORAL at 21:55

## 2019-08-22 RX ADMIN — VITAMIN D, TAB 1000IU (100/BT) 2000 UNITS: 25 TAB at 09:38

## 2019-08-22 RX ADMIN — CHLORHEXIDINE GLUCONATE 0.12% ORAL RINSE 15 ML: 1.2 LIQUID ORAL at 21:55

## 2019-08-22 RX ADMIN — CEFDINIR 300 MG: 300 CAPSULE ORAL at 09:37

## 2019-08-22 RX ADMIN — FERROUS SULFATE TAB 325 MG (65 MG ELEMENTAL FE) 325 MG: 325 (65 FE) TAB at 17:36

## 2019-08-22 RX ADMIN — MORPHINE SULFATE 15 MG: 15 TABLET ORAL at 17:35

## 2019-08-22 RX ADMIN — HEPARIN SODIUM 5000 UNITS: 5000 INJECTION, SOLUTION INTRAVENOUS; SUBCUTANEOUS at 21:55

## 2019-08-22 RX ADMIN — FERROUS SULFATE TAB 325 MG (65 MG ELEMENTAL FE) 325 MG: 325 (65 FE) TAB at 09:38

## 2019-08-22 RX ADMIN — CEFDINIR 300 MG: 300 CAPSULE ORAL at 21:55

## 2019-08-22 RX ADMIN — MORPHINE SULFATE 15 MG: 15 TABLET ORAL at 23:31

## 2019-08-22 RX ADMIN — MORPHINE SULFATE 15 MG: 15 TABLET ORAL at 05:41

## 2019-08-22 RX ADMIN — HEPARIN SODIUM 5000 UNITS: 5000 INJECTION, SOLUTION INTRAVENOUS; SUBCUTANEOUS at 14:26

## 2019-08-22 NOTE — TREATMENT PLAN
Individualized Plan of [address] 66 y o  male MRN: [id number]  Unit/Bed#: -01 Encounter: [id number]     PATIENT INFORMATION  ADMISSION DATE: 8/21/2019  3:10 PM FERNY CATEGORY: debility    ADMISSION DIAGNOSIS: Debility [R53 81]  EXPECTED LOS: 1-2 wks      MEDICAL/FUNCTIONAL PROGNOSIS  Based on my assessment of the patient's medical conditions and current functional status, the prognosis for attaining medical and functional goals or the IRF stay is:  Fair     Medical Goals: infxn control     ANTICIPATED DISCHARGE DISPOSITION AND SERVICES  COMMUNITY SETTING: home     ANTICIPATED FOLLOW-UP SERVICE:   Outpatient Therapy Services: PT/OT     DISCIPLINE SPECIFIC PLANS:  Required Disciplines & Services: PT/OT     REQUIRED THERAPY:  Therapy Hours per Day Days per Week Total Days   Physical Therapy 1 5 5 10   Occupational Therapy 1 5 5 10   Speech/Language Therapy 0 0 0   NOTE: Additional therapy time(s) may be added as appropriate to meet patient needs and to achieve functional goals        ANTICIPATED FUNCTIONAL OUTCOMES:  ADL:   Patient will maximize level for ADLs with least restrictive device upon completion of the rehab program     Bladder/Bowel: Patient will maximize level for bladder/bowel management and educate family/caregiver to decrease burden of care   Transfers:   Patient will maximize level for transfers with least restrictive device upon completion of the rehab program     Mobility:   Patient will maximize level for mobility with least restrictive device upon completion of the rehab program     Cognitive:   Patient will maximize level for cognitive tasks upon completion of the rehab program       DISCHARGE PLANNING NEEDS  Equipment needs: tbd       REHAB ANTICIPATED PARTICIPATION RESTRICTIONS:  None

## 2019-08-22 NOTE — PCC CARE MANAGEMENT
Met with Pt & phone contact with daughter, whom resides with Pt  Pt's daughter works [employer] & will not be able to provide 24 hr supervision  One story home, 3 steps in with 2 HR  SW will continue to monitor & assist as needed with [address]     8/27/19 - Tx team rec reviewed with Pt & Pt's daughter  Target DC date 9/4 with [facility] (PT, OT, Nsg)  Pt anxious to be [address] home  SW will continue to monitor & assist as needed with Tx & DC planning  9/3/19 - Tx team recommendations reviewed with patient & family   Pt's DC to be moved from 9/4/19 to 9/7/19 with [facility] (Nsg, PT, OT)  SW will continue to monitor & assist as needed with Tx & DC planning

## 2019-08-22 NOTE — PROGRESS NOTES
08/22/19 1205   Patient Data   Rehab Impairment Debility with weakness   Etiologic Diagnosis urosepsis   Support System   Name Johanna Ríos Daughter   Home Setup   Type of Home Single Level   Method of Entry Stairs;Hand Rail Bilateral   Number of Stairs   (3)   First Floor Bathroom Combo; Full   First Floor Bathroom Accessibility Shower chair;Raised toilet seat   First Floor Setup Available Yes   Available Equipment Single Point Cane;Roller Rosaura Less; Shower Chair   Baseline Information   Prior Device(s) Used Roller Walker;Single Point Cane   Prior IADL Participation   Meal Preparation Partial Participation   Laundry   (daughter completes )   Home Cleaning   (daughter completes )   Prior Level of Function   Self-Care 3  Independent - Patient completed the activities by him/herself, with or without an assistive device, with no assistance from a helper  Indoor-Mobility (Ambulation) 3  Independent - Patient completed the activities by him/herself, with or without an assistive device, with no assistance from a helper  Stairs 3  Independent - Patient completed the activities by him/herself, with or without an assistive device, with no assistance from a helper  Functional Cognition 3  Independent - Patient completed the activities by him/herself, with or without an assistive device, with no assistance from a helper  Prior Device Used D   Walker  [facility])   Patient Preference   Nickname (Patient Preference) Elver   Psychosocial   Psychosocial (WDL) WDL   Restrictions/Precautions   Precautions Fall Risk;Contact/isolation;Pain;Limb alert  (Recent R shoulder sx, 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)   Braces or Orthoses AFO  (Pt has AFO for R LE)   Pain Assessment   Pain Assessment 0-10   Pain Score 5   Pain Type Acute pain   Pain Location Shoulder   Pain Orientation Left   QI: Roll Left and Right   Assistance Needed Physical assistance   Assistance Provided by [place] 50%-74%   Roll Left and Right CARE Score 2   QI: Sit to Lying Assistance Needed Physical assistance   Assistance Provided by [place] 50%-74%   Sit to Lying CARE Score 2   QI: Lying to Sitting on Side of Bed   Assistance Needed Physical assistance   Assistance Provided by [place] 50%-74%   Lying to Sitting on Side of Bed CARE Score 2   QI: Sit to Stand   Assistance Needed Physical assistance   Assistance Provided by [place] 50%-74%   Sit to Stand CARE Score 2   QI: Chair/Bed-to-Chair Transfer   Assistance Needed Physical assistance   Assistance Provided by [place] 50%-74%   Chair/Bed-to-Chair Transfer CARE Score 2   QI: Car Transfer   Reason if not Attempted Safety concerns   Car Transfer CARE Score 88   Transfer Bed/Chair/Wheelchair   Limitations Noted In Balance; Coordination; Endurance;LE Strength;UE Strength   Adaptive Equipment Roller Walker   Stand Pivot Moderate Assist;Assist x 1   Sit to Stand Moderate Assist;Assist x 1   Stand to Sit Moderate Assist;Assist x 1   Supine to Sit Moderate Assist;Assist x 1   Sit to Supine Moderate Assist;Assist x 1   Bed, Chair, Wheelchair Transfer (FIM) 2 - Patient completes 25 - 49% of all tasks   QI: Walk 10 Feet   Assistance Needed Incidental touching   Walk 10 Feet CARE Score 4   QI: Walk 50 Feet with Two Turns   Reason if not Attempted Safety concerns   Walk 50 Feet with Two Turns CARE Score 88   QI: Walk 150 Feet   Reason if not Attempted Safety concerns   Walk 150 Feet CARE Score 88   QI: Walking 10 Feet on Uneven Surfaces   Reason if not Attempted Safety concerns   Walking 10 Feet on Uneven Surfaces CARE Score 88   Ambulation   Does the patient walk? 2  Yes   Primary Discharge Mode of Locomotion Walk   Walk Assist Level Contact Guard   Gait Pattern Slow [place]; Inconsistant [place];Decreased foot clearance; Forward Flexion   Assist Device Roller Walker   Distance Walked (feet)   (10' 11')   Limitations Noted In Balance; Endurance;Posture; Safety;Speed;Strength   Walking (FIM) 1 - Patient ambulates less than 49 feet regardless of assist/device/set up   QI: Wheel 50 Feet with Two [address] Needed Physical assistance   Assistance Provided by [place] Less than 25%   Wheel 50 Feet with Two Turns CARE Score 3   QI: Wheel 150 Feet   Assistance Needed Physical assistance   Assistance Provided by [place] Less than 25%   Wheel 150 Feet CARE Score 3   Wheelchair mobility   QI: Does the patient use a wheelchair? 1  Yes   Wheelchair Assist Level Minimum Assist   Method Right lower extremity; Left lower extremity   Needs Assist With Obstacles; Locking Brakes   Distance Level Surface (feet)   (150')   Distance Wheeled 3% Grade   (30')   Wheelchair (FIM) 3 - Patient completes 50 - 74% of all tasks, needs more than incidental assist AND wheels distance 150 feet or more, no rest   QI: 1 Step (Curb)   Reason if not Attempted Safety concerns   1 Step (Curb) CARE Score 88   QI: 4 Steps   Reason if not Attempted Safety concerns   4 Steps CARE Score 88   QI: 12 Steps   Reason if not Attempted Safety concerns   12 Steps CARE Score 88   Comprehension   Comprehension (FIM) 5 - Understands basic directions and conversation   Expression   Expression (FIM) 5 - Needs help/cues only RARELY (< 10% of the time)   Social Interaction   Social Interaction (FIM) 6 - Interacts appropriately with others BUT requires extra  time   Problem Solving   Problem solving (FIM) 5 - Solves basic problems 90% of time   Memory   Memory (FIM) 5 - Needs cueing reminders <10%   RLE Assessment   RLE Assessment X  (decreased hip flexion/  ankle DF AROM)   Strength RLE   R Hip Flexion 2+/5   R Hip ABduction 3+/5   R Hip ADduction 3+/5   R Knee Flexion 3+/5   R Knee Extension 3+/5   R Ankle Dorsiflexion 2+/5   R Ankle Plantar Flexion 3+/5   LLE Assessment   LLE Assessment   (decreased hip flexion/  ankle DF AROM)   Strength LLE   L Hip Flexion 2+/5   L Hip ABduction 3+/5   L Hip ADduction 3+/5   L Knee Flexion 3+/5   L Knee Extension 3+/5   L Ankle Dorsiflexion 2+/5   L Ankle Plantar Flexion 3+/5   Coordination   Movements are Fluid and Coordinated 1   Sensation   Light Touch No apparent deficits   Cognition   Overall Cognitive Status WFL   Orientation Level Oriented X4   Discharge Information   Vocational Plan Retired/not working  (goal: to get to the bathroom by myself)   Patient's Rehab Expectations " to go home"    Impressions Pt presents to [facility] due to urosepsis with debility  Pt requires assistance with functional mobility tasks secondary to impaired balance, strength, endurance, increased pain, and poor safety  Pt will benefit from skilled PT services to maximize independence and safety with functional mobility tasks, and meet Pt's goal of returning home with his daughter      PT Therapy Minutes   PT Time In 6560   PT Time Out 1243   PT Total Time (minutes) 38   PT Mode of treatment - Individual (minutes) 38   PT Mode of treatment - Concurrent (minutes) 0   PT Mode of treatment - Group (minutes) 0   PT Mode of treatment - Co-treat (minutes) 0   PT Mode of Teatment - Total time(minutes) 38 minutes

## 2019-08-22 NOTE — PROGRESS NOTES
Physical Medicine and Rehabilitation Progress Note  Dorothea Campa 66 y o  male MRN: [id number]  Unit/Bed#: -01 Encounter: [id number]    HPI: Dorothea Campa is a 66 y o  male who presented to the [facility] with AMS 2/2 to urosepsis which was treated with abx  Course complicated by RF which was treated with IVF and elevated troponins which cardiology attributed to sepsis         Chief Complaint: sepsis     Interval/subjective: d/w patient rehab plan     ROS: A 10 point ROS was performed; negative except as noted above       Assessment/Plan:      Transaminitis  Assessment & Plan  - likely 2/2 to sepsis   - AST trending down and ALT stable  - will d/w IM     Anemia  Assessment & Plan  -appears to be chronic going back to at least 3/2018 per EMR with a baseline of 10-11  - Hg currently 9 9  -IM monitoring     CKD (chronic kidney disease)  Assessment & Plan  - per renal baseline likely 1 0-1 1  -Cr currently 1 21  - patient appears to have been taking meloxicam and naproxen as OP, advised patient to stop all NSAIDs  -IM monitoring     Depression  Assessment & Plan  - on home zoloft 50 mg qd     Elevated troponin  Assessment & Plan  - evaluated by cardiology and felt to be 2/2 to sepsis rather than a true coronary event and they recommend FU as OP for ischemic SEGUNDO     Bacteremia  Assessment & Plan  - in acute care felt to be 2/2 to urinary tract infection in the setting of recent change of chronic indwelling cantor catheter  - abx will be completed on 8/26 per ID recs  - blood cx x 2 of 8/18 not yet finalized   - current cantor to remain in place per urology recs     Chronic indwelling Cantor catheter  Assessment & Plan  - h/o TURP  - on home proscar 5 mg qd   - changed once per month (recently changed)   - current cantor to remain in place per urology recs   - follows with Dr Mulugeta Patel     * Chronic pain  Assessment & Plan  - on home neurontin 300 HS    - on home MS IR 15 mg q6  - per PAPD[facility] fairly regular prescription for MS IR 15 mg last filled on 8/1/19 for 120 tabs with 0 refills        Scheduled Meds:  Current Facility-Administered Medications:  bisacodyl 10 mg Rectal Daily PRN Charles Sr MD   cefdinir 300 mg Oral Q12H [address] Charles Sr MD   chlorhexidine 15 mL Swish & Spit Q12H [address] Charles Sr MD   cholecalciferol 2,000 Units Oral Daily Charles Sr MD   docusate sodium 400 mg Oral Daily Charles Sr MD   ferrous sulfate 325 mg Oral BID Charles Sr MD   finasteride 5 mg Oral Daily Charles Sr MD   gabapentin 300 mg Oral HS Charles Sr MD   heparin (porcine) 5,000 Units Subcutaneous Q8H [address] Charles Sr MD   morphine 15 mg Oral Q6H Charles Sr MD   sertraline 50 mg Oral Daily Charles Sr MD          Incidental findings:  1) low Amylase/lipase: per IM unlikely to be of clinical significance and recommend OP FU with PCP with further testing/treatment and/or specialist referral at PCP's discretion   2) grade 1 DD: OP FU with cardiology  3) sinus arrhythmia: OP FU with cardiology   4) right non-obstructing renal stone: OP FU with Dr Jefry Gloria  5) left kidney cyst: OP FU with Dr Jefry Gloria     Note: per patient and family he is on  mg qd at home not because he was advised to take ASA by a physician but rather because he decided on his own to take ASA     DVT ppx: HSQ        Objective:    Functional Update:  Mobility: PENDING  Transfers: PENDING  ADLs: mod-max         Physical Exam:          General: alert, no apparent distress, cooperative and comfortable  HEENT:  Head: Normal, normocephalic, atraumatic    CARDIAC:  +S1/2   LUNGS:  respirations unlabored   ABDOMEN:  soft NT   EXTREMITIES:  volume status currently stable   NEURO:   awake, alert, appropriately answering questions   PSYCH:  mood/affect currently stable       Laboratory:   Results from last 7 days   Lab Units 08/21/19  0501 08/20/19  0534 08/19/19  0451   HEMOGLOBIN g/dL 9 9* 9 8* 9 6*   HEMATOCRIT % 29 7* 29 3* 28 5*   WBC Thousand/uL 7 50 7 50 5 80     Results from last 7 days   Lab Units 08/21/19  0501 08/20/19  0534 08/19/19  0451  08/17/19  0430 08/16/19  1740   BUN mg/dL 21 25 29*   < > 36* 40*   SODIUM mmol/L 140 137 137   < > 137 135   POTASSIUM mmol/L 3 5 3 9 4 0   < > 3 7 4 4   CHLORIDE mmol/L 110* 109* 108*   < > 110* 103   CREATININE mg/dL 1 21 1 32* 1 45*   < > 1 80* 2 17*   AST U/L  --  62*  --   --  130* 171*   ALT U/L  --  70*  --   --  57* 62*    < > = values in this interval not displayed       Results from last 7 days   Lab Units 08/20/19  0534 08/16/19  1643   PROTIME seconds 13 3* 16 0*   INR  1 14 1 37        Patient Active Problem List   Diagnosis    Chronic indwelling Diallo catheter    Chronic pain    Bacteremia    Elevated troponin    CKD (chronic kidney disease)    Anemia    Transaminitis    Depression

## 2019-08-22 NOTE — PROGRESS NOTES
08/21/19 2200   Charting Type   Charting Type Shift assessment   Neurological   Level of Consciousness Alert/awake   Orientation Level Oriented X4   Cognition Follows commands   Speech Clear   Swallow Able to swallow solids and liquids without difficulty   R Hand  Strong   L Hand  Strong   RUE Muscle Strength 5- Normal strength   LUE Muscle Strength 5- Normal strength   RLE Muscle Strength 5- Normal strength   LLE Muscle Strength 5- Normal strength   Neuro Symptoms Forgetful   [place] Coma Scale   Eye Opening 4   Best Verbal Response 5   Best Motor Response 6   Abhijit Coma Scale Score 15   HEENT   Vision - Corrective Lenses (at bedside) Glasses   R Ear Hearing aid   L Ear Hearing aid   Teeth Dentures upper;Dentures lower   Respiratory   Respiratory (WDL) WDL   Cardiac   Cardiac (WDL) WDL   Pain Assessment   Pain Score No Pain   Peripheral Vascular   Cyanosis None   Edema Right lower extremity; Left lower extremity   RLE Edema +1   LLE Edema +1   Integumentary   Skin Color Appropriate for ethnicity   Skin Condition/Temp Warm;Dry   Skin Integrity Bruising   Skin Location Right hip/ribs   David Scale   Sensory Perceptions 4   Moisture 4   Activity 3   Mobility 3   Nutrition 3   Friction and Shear 2   David Scale Score 19   Wound 08/21/19 Pressure Injury Sacrum   Date First Assessed/Time First Assessed: 08/21/19 1600   Pre-Existing Wound: Yes  Primary Wound Type: Pressure Injury  Location: Sacrum   Wound Description GAL   Dressing Foam, Silicon (eg   Allevyn, etc)   Dressing Status Intact   Musculoskeletal   RUE Full movement   LUE Full movement   RLE Full movement   LLE Full movement   Gastrointestinal   Gastrointestinal (WDL) WDL   Genitourinary   Genitourinary (WDL) X   Genitourinary Symptoms Indwelling catheter   Urine Assessment   Urine Color Yellow/straw   Urine Appearance Cloudy   Psychosocial   Psychosocial (WDL) WDL

## 2019-08-22 NOTE — PROGRESS NOTES
Before dinner pt was found  By CNA's with cantor intact but tubing disconnected   When asked pt not knowledgeable of taking apart  Reconnected aseptic technique  Confused to time and who is president and oriented to person and place but not sure why he is here  Explained reason why he is here and reason for cantor and will be taken care of by staff  Reinforced not to disconnect same  Reorient prn  Non complaining  Reviewed safety and medication teaching with side effects reinforced  Callbell within reach, safety tab maintained and frequent checks

## 2019-08-22 NOTE — PROGRESS NOTES
08/22/19 1400   Pain Assessment   Pain Assessment 0-10   Pain Score 5   Pain Type Chronic pain   Pain Location Shoulder   Pain Orientation Left;Posterior   Restrictions/Precautions   Precautions Fall Risk;Contact/isolation;Pain;Limb alert  (recent R shoulder sx, Hx VRE urine)   Cognition   Arousal/Participation Alert; Cooperative   Orientation Level Oriented X4   Following Commands Follows one step commands with increased time or repetition   Transfer Bed/Chair/Wheelchair   Limitations Noted In Balance; Endurance;LE Strength;UE Strength;Pain Management   Adaptive Equipment Roller Walker   Sit to Stand Minimal Assist;Moderate Assist;Assist x 1   Stand to Sit Minimal Assist;Moderate Assist;Assist x 1   All Transfer Moderate Assist;Assist x 1   Bed, Chair, Wheelchair Transfer (FIM) 3 - Patient completes 50 - 74% of all tasks   Ambulation   Primary Discharge Mode of Locomotion Walk   Walk Assist Level Contact Guard   Assist Device Roller Walker   Distance Walked (feet)   (15' 10')   Limitations Noted In Balance; Endurance;Posture; Safety;Speed;Strength   Walking (FIM) 1 - Patient ambulates less than 49 feet regardless of assist/device/set up   Toilet Transfer   Surface Assessed Raised Toilet   Toilet Transfer (FIM) 3 - Patient completes 50 - 74% of all tasks   Therapeutic Interventions   Strengthening Seated TE 3 x 10    Other Transfer training, gait training    Assessment   Treatment Assessment Pt agreeable to Pt session  At start if therapy assisted Pt with ambulating into the bathroom for toilet transfer  Pt was mod A  X 1 to complete sit/stand transfer from the toilet, and min A x 1 from the w/c  Completed seated TE for remaining time in therapy with good tolerance  Needs cueing to maintain focus on exercises  Pt participated in concurrent therapy to increase encouragement, motivation, and socialization during  therapy session   Pt will benefit from continued skilled PT to increased independence with functional mobility tasks  PT Barriers   Physical Impairment Decreased strength;Decreased endurance; Impaired balance;Decreased mobility;Pain   Functional Limitation Standing;Transfers; Walking; Wheelchair management   Plan   Treatment/Interventions Functional transfer training;LE strengthening/ROM; Therapeutic exercise; Endurance training;Patient/family training;Bed mobility;Gait training; Compensatory technique education   Progress Progressing toward goals   PT Therapy Minutes   PT Time In 1400   PT Time Out 1501   PT Total Time (minutes) 61   PT Mode of treatment - Individual (minutes) 17   PT Mode of treatment - Concurrent (minutes) 44   PT Mode of treatment - Group (minutes) 0   PT Mode of treatment - Co-treat (minutes) 0   PT Mode of Teatment - Total time(minutes) 61 minutes   Therapy Time missed   Time missed?  No

## 2019-08-22 NOTE — CASE MANAGEMENT
Initial assessment & orientation to ARC with Pt & phone contact with daughter, whom resides with Pt  Pt's daughter works FT & will not be able to provide 24 hr supervision  One story home, 3 steps in with 2 HR  SW will continue to monitor & assist as needed with [address]  IMM reviewed, signed & submitted

## 2019-08-22 NOTE — PROGRESS NOTES
OT eval and ADL note     08/22/19 9774   Patient Data   Rehab Impairment Debility with weakness   Etiologic Diagnosis urosepsis   Support System   Name Johanna Ríos Daughter   Home Setup   Type of Home Single Level   Method of Entry Stairs   Number of Stairs 3   First Floor Bathroom Combo; Full   First Floor Bathroom Accessibility Shower chair;Raised toilet seat   Available Equipment Single Point Cane;Roller Sidney Erichsen; Shower Chair   Baseline Information   Prior Device(s) Used Escobar Olivares   Prior IADL Participation   Meal Preparation Partial Participation   Laundry   (daughter completes)   Home Cleaning   (daughter completes)   Prior Level of Function   Self-Care 3  Independent - Patient completed the activities by him/herself, with or without an assistive device, with no assistance from a helper  Functional Cognition 3  Independent - Patient completed the activities by him/herself, with or without an assistive device, with no assistance from a helper  Patient Preference   Nickname (Patient Preference) Elver   Restrictions/Precautions   Precautions Contact/isolation;Limb alert; Fall Risk  (hx VRE urine, R shoulder sx recently)   Pain Assessment   Pain Assessment 0-10   Pain Score 5   Pain Type Chronic pain   Pain Location Shoulder   Pain Orientation Left   QI: Eating   Assistance Needed Independent   Eating CARE Score 6   QI: Oral Hygiene   Assistance Needed Set-up / clean-up   Oral Hygiene CARE Score 5   Grooming   Able To Initiate Tasks;Comb/Brush Hair;Wash/Dry Face;Brush/Clean Teeth;Wash/Dry Hands   Limitation Noted In Safety;Strength   Grooming (FIM) 5 - [place] sets up supplies or applies device   QI: Shower/Bathe Self   Assistance Needed Physical assistance   Assistance Provided by [place] 25%-49%   Shower/Bathe Self CARE Score 3   Bathing   Assessed Bath Style Sponge Bath   Anticipated D/C Bath Style Sponge Bath; Shower   Able to [place] No   Able to [employer] Temperature No   Able to Wash/Rinse/Dry (body part) Left Arm;Right Arm;L Upper Leg;R Upper Leg;Chest;Abdomen;Perineal Area   Limitations Noted in Balance; Endurance; Safety;Strength   Positioning Seated;Standing   Bathing (FIM) 3 - Patient completes 5/10  6/10 or 7/10 parts   QI: Upper Body Dressing   Assistance Needed Set-up / clean-up   Upper Body Dressing CARE Score 5   QI: Lower Body Dressing   Assistance Needed Physical assistance   Assistance Provided by Bernard 50%-74%   Lower Body Dressing CARE Score 2   QI: Putting On/Taking Off Footwear   Assistance Needed Physical assistance   Assistance Provided by [place] 75% or more   Putting On/Taking Off Footwear CARE Score 2   QI: Picking Up Object   Assistance Needed Physical assistance   Assistance Provided by Bernard 75% or more   Picking Up Object CARE Score 2   Dressing/Undressing Clothing   Remove UB Clothes Pullover Shirt   Remove LB Clothes Undergarment;Socks   [address]; Undergarment;Socks; Shoes   Limitations Noted In Balance; Endurance; Safety;Strength;ROM   Positioning Supported Sit;Standing   UB Dressing (FIM) 3 - Patient completes  50-74% of all tasks   LB Dressing (FIM) 2 - Patient completes 25-49% of all tasks   QI: [address] Needed Physical assistance   Assistance Provided by [place] 75% or more   Toileting Hygiene CARE Score 2   Transfer Bed/Chair/Wheelchair   Limitations Noted In Balance; Endurance;LE Strength;UE Strength   Stand Pivot Minimal Assist   Sit to Stand Minimal   Stand to Sit Minimal   Bed, Chair, Wheelchair Transfer (FIM) 4 - Patient completes 75% of all tasks   QI: Toilet Transfer   Reason if not Attempted Refused to perform   Toilet Transfer CARE Score 7   Toilet Transfer   Findings transfers NT due to catheter and no present need to have a BM   Tub/Shower Transfer   Not Assessed Sponge Bath   RUE Assessment   RUE Assessment X  (ROM and strength WFL except R sh 75% AROM, WFL PROM)   LUE Assessment LUE Assessment WFL  (4/5 MMT sh to hand grossly)   Coordination   Movements are Fluid and Coordinated 1   Sensation   Light Touch No apparent deficits   Propioception No apparent deficits   Cognition   Overall Cognitive Status WFL   Arousal/Participation Alert; Responsive; Cooperative   Attention Within functional limits   Orientation Level Oriented to person;Oriented to place;Oriented to time;Oriented to situation   Discharge [address] Retired/not working   Patient's Rehab Expectations "To get home "   Impressions Pt presenst following hospitalization due to Urosepsis with debility  Pt requires assist due to decreased balance, safety, endurance, generalized strength and pain back and Right shoulder  Pt will benefit from skilled OT services to increase independence with daily tasks with plan to return home with daughter     OT Therapy Minutes   OT Time In 0831   OT Time Out 0930   OT Total Time (minutes) 59   OT Mode of treatment - Individual (minutes) 59

## 2019-08-22 NOTE — PROGRESS NOTES
08/22/19 1133   Pain Assessment   Pain Assessment 0-10   Pain Score 4   Pain Type Chronic pain   Pain Location Back   Restrictions/Precautions   Precautions Fall Risk;Contact/isolation;Pain;Limb alert  (recent R wsh sx, hx VRE urine)   Exercise Tools   Exercise Tools Yes   Other Exercise Tool 1 gripper with pegs B hands   Other Exercise Tool 2 functional reacher use LUE for retrieval of pegs from floor   Other Exercise Tool 3 1# wt elbow flexion and extension BUE   Cognition   Overall Cognitive Status WFL   Assessment   Treatment Assessment Pt agreeable to OT for UB therex during 31 concurrent treatment focusing on similar goals as another to increase independence with daily tasks  Pt will benefit from continued skilled OT services to increase independence with daily tasks  Problem List Decreased strength;Decreased range of motion;Decreased endurance; Impaired balance;Decreased safety awareness;Pain   Plan   Treatment/Interventions ADL retraining;Functional transfer training; Therapeutic exercise; Endurance training;Patient/family training; Compensatory technique education   Progress Progressing toward goals   OT Therapy Minutes   OT Time In 1133   OT Time Out 1204   OT Total Time (minutes) 31   OT Mode of treatment - Concurrent (minutes) 31   Therapy Time missed   Time missed?  No

## 2019-08-23 LAB
BACTERIA BLD CULT: NORMAL
BACTERIA BLD CULT: NORMAL

## 2019-08-23 PROCEDURE — 97530 THERAPEUTIC ACTIVITIES: CPT

## 2019-08-23 PROCEDURE — 99232 SBSQ HOSP IP/OBS MODERATE 35: CPT | Performed by: PHYSICAL MEDICINE & REHABILITATION

## 2019-08-23 PROCEDURE — 97110 THERAPEUTIC EXERCISES: CPT

## 2019-08-23 PROCEDURE — 97542 WHEELCHAIR MNGMENT TRAINING: CPT

## 2019-08-23 PROCEDURE — 97116 GAIT TRAINING THERAPY: CPT

## 2019-08-23 RX ADMIN — MORPHINE SULFATE 15 MG: 15 TABLET ORAL at 05:51

## 2019-08-23 RX ADMIN — FINASTERIDE 5 MG: 5 TABLET, FILM COATED ORAL at 08:53

## 2019-08-23 RX ADMIN — FERROUS SULFATE TAB 325 MG (65 MG ELEMENTAL FE) 325 MG: 325 (65 FE) TAB at 08:53

## 2019-08-23 RX ADMIN — CHLORHEXIDINE GLUCONATE 0.12% ORAL RINSE 15 ML: 1.2 LIQUID ORAL at 08:53

## 2019-08-23 RX ADMIN — HEPARIN SODIUM 5000 UNITS: 5000 INJECTION, SOLUTION INTRAVENOUS; SUBCUTANEOUS at 13:43

## 2019-08-23 RX ADMIN — CEFDINIR 300 MG: 300 CAPSULE ORAL at 22:11

## 2019-08-23 RX ADMIN — HEPARIN SODIUM 5000 UNITS: 5000 INJECTION, SOLUTION INTRAVENOUS; SUBCUTANEOUS at 22:11

## 2019-08-23 RX ADMIN — SERTRALINE HYDROCHLORIDE 50 MG: 50 TABLET ORAL at 08:52

## 2019-08-23 RX ADMIN — FERROUS SULFATE TAB 325 MG (65 MG ELEMENTAL FE) 325 MG: 325 (65 FE) TAB at 18:26

## 2019-08-23 RX ADMIN — MORPHINE SULFATE 15 MG: 15 TABLET ORAL at 18:26

## 2019-08-23 RX ADMIN — VITAMIN D, TAB 1000IU (100/BT) 2000 UNITS: 25 TAB at 08:53

## 2019-08-23 RX ADMIN — GABAPENTIN 300 MG: 300 CAPSULE ORAL at 22:11

## 2019-08-23 RX ADMIN — HEPARIN SODIUM 5000 UNITS: 5000 INJECTION, SOLUTION INTRAVENOUS; SUBCUTANEOUS at 05:51

## 2019-08-23 RX ADMIN — CHLORHEXIDINE GLUCONATE 0.12% ORAL RINSE 15 ML: 1.2 LIQUID ORAL at 22:11

## 2019-08-23 RX ADMIN — CEFDINIR 300 MG: 300 CAPSULE ORAL at 08:52

## 2019-08-23 RX ADMIN — DOCUSATE SODIUM 400 MG: 100 CAPSULE, LIQUID FILLED ORAL at 08:52

## 2019-08-23 RX ADMIN — MORPHINE SULFATE 15 MG: 15 TABLET ORAL at 12:31

## 2019-08-23 NOTE — PROGRESS NOTES
08/23/19 0954   Pain Assessment   Pain Assessment 0-10   Pain Score 6   Pain Type Chronic pain   Pain Location Shoulder   Pain Orientation Bilateral   Restrictions/Precautions   Precautions Fall Risk;Contact/isolation;Limb alert;Pain   Weight Bearing Restrictions No   ROM Restrictions   (shoulder replacement RUE, use precautions)   Braces or Orthoses AFO   Eating Assessment   Findings drank water during session   Transfer Bed/Chair/Wheelchair   Limitations Noted In Balance; Endurance;Pain Management;UE Strength;LE Strength   Stand Pivot Minimal Assist   Sit to Stand Minimal   Stand to Sit Minimal   Sit to Supine Moderate Assist   Bed, Chair, Wheelchair Transfer (FIM) 3 - Patient completes 50 - 74% of all tasks   Functional Standing Tolerance   Time approx 6m30s during clothespin pinching activity   Therapeutic Exercise - ROM   UE-ROM Yes   ROM- Right Upper Extremities   RUE ROM Comment pushed weighted wooden box x10 shoulder protraction/retraction; reached to hang and remove clothespins from rods   ROM - Left Upper Extremities    LUE ROM Comment pushed weighted wooden box x10 shoulder protraction/retraction; reached to hang and remove clothespins from rods   Therapeutic Excerise-Strength   UE Strength Yes   Right Upper Extremity- Strength   RUE Strength Comment searched for and retrieved small objects in yellow theraputty; pinched clothespins to hang and remove from rods   Left Upper Extremity-Strength   LUE Strength Comment searched for and retrieved small objects in yellow theraputty; pinched clothespins to hang and remove from rods   Cognition   Overall Cognitive Status Impaired   Arousal/Participation Alert; Cooperative;Lethargic   Attention Attends with cues to redirect   Orientation Level Oriented X4   Memory Decreased short term memory;Decreased recall of recent events   Following Commands Follows one step commands with increased time or repetition   Activity Tolerance   Activity Tolerance Patient limited by fatigue   Assessment   Treatment Assessment Pt required encouragement to participate in OT session this AM, reporting feeling extremely tired; agreeable to session after OT's explanation of rehab necessity  Received sitting upright in w/c  UE ROM/strength exercise details listed in respective sections  Rest breaks taken frequently 2* fatigue and bilat shoulder pain  Pt required consistent encouragment and physical and verbal cues during the session to keep attending to each task  Transferred sit to stand with Preethi and verbal cues for safety and hand placement, stand pivot Preethi with verbal cues for sequencing  ModA sit to supine  Pt would benefit from continued skilled OT services in order to maximize functional transfers  ROM/strength, and activity tolerance  Prognosis Good   Problem List Decreased strength;Decreased range of motion;Decreased endurance; Impaired balance;Decreased mobility; Decreased cognition;Pain   Plan   Treatment/Interventions ADL retraining;Functional transfer training;LE strengthening/ROM; Therapeutic exercise; Endurance training;Patient/family training;Equipment eval/education; Compensatory technique education;OT   Progress Slow progress, decreased activity tolerance   OT Therapy Minutes   OT Time In 0954   OT Time Out 1133   OT Total Time (minutes) 99   OT Mode of treatment - Individual (minutes) 99   Therapy Time missed   Time missed? No     Pt with 23 minutes of concurrent therapy and 76 minutes of individual, NOT 99  Minutes of individual as listed in this note

## 2019-08-23 NOTE — PROGRESS NOTES
08/22/19 2218   Charting Type   Charting Type Shift assessment   Neurological   Level of Consciousness Alert/awake   Orientation Level Oriented X4   Cognition Follows commands   Speech Clear   Swallow Able to swallow solids and liquids without difficulty   R Hand  Strong   L Hand  Strong   RUE Muscle Strength 5- Normal strength   LUE Muscle Strength 5- Normal strength   RLE Muscle Strength 5- Normal strength   LLE Muscle Strength 5- Normal strength   Neuro Symptoms Forgetful   [place] Coma Scale   Eye Opening 4   Best Verbal Response 5   Best Motor Response 6   Abhijit Coma Scale Score 15   HEENT   Vision - Corrective Lenses (at bedside) Glasses   R Ear Hearing aid   L Ear Hearing aid   Teeth Dentures upper;Dentures lower   Respiratory   Respiratory (WDL) WDL   Cardiac   Cardiac (WDL) WDL   Peripheral Vascular   Cyanosis None   Edema Right lower extremity; Left lower extremity   RLE Edema +1   LLE Edema +1   Integumentary   Skin Color Appropriate for ethnicity   Skin Condition/Temp Warm;Dry   Skin Integrity Bruising   Skin Location Right hip/ribs   Wound 08/21/19 Pressure Injury Sacrum   Date First Assessed/Time First Assessed: 08/21/19 1600   Pre-Existing Wound: Yes  Primary Wound Type: Pressure Injury  Location: Sacrum   Wound Description GAL   Dressing Foam, Silicon (eg   Allevyn, etc)   Dressing Status Intact   Musculoskeletal   Level of Assistance Minimal assist, patient does 75% or more   Assistive Device Front wheel walker   RUE Full movement   LUE Full movement   RLE Full movement   LLE Full movement   Gastrointestinal   Gastrointestinal (WDL) WDL   Genitourinary   Genitourinary (WDL) X   Genitourinary Symptoms Indwelling catheter   Urine Assessment   Urine Color Yellow/straw   Urine Appearance Cloudy   Psychosocial   Psychosocial (WDL) WDL

## 2019-08-23 NOTE — PROGRESS NOTES
Physical Medicine and Rehabilitation Progress Note  Johanna Vogt 66 y o  male MRN: [id number]  Unit/Bed#: -01 Encounter: [id number]    HPI: Johanna Vogt is a 66 y o  male who presented to the [facility] with AMS 2/2 to urosepsis which was treated with abx  Course complicated by RF which was treated with IVF and elevated troponins which cardiology attributed to sepsis         Chief Complaint: sepsis     Interval/subjective: abdominal discomfort this afternoon  Had BM yesterday  No nausea or vomiting  Low appetite today as a result  Otherwise no complaints  ROS: A 10 point ROS was performed; negative except as noted above       Assessment/Plan:      Depression  Assessment & Plan  - on home zoloft 50 mg qd     Transaminitis  Assessment & Plan  - likely 2/2 to sepsis   - AST trending down and ALT stable  - will d/w IM     Anemia  Assessment & Plan  -appears to be chronic going back to at least 3/2018 per EMR with a baseline of 10-11  - Hg currently 9 9  -IM monitoring     CKD (chronic kidney disease)  Assessment & Plan  - per renal baseline likely 1 0-1 1  -Cr currently 1 21  - patient appears to have been taking meloxicam and naproxen as OP, advised patient to stop all NSAIDs  -IM monitoring     Elevated troponin  Assessment & Plan  - evaluated by cardiology and felt to be 2/2 to sepsis rather than a true coronary event and they recommend FU as OP for ischemic SEGUNDO     Bacteremia  Assessment & Plan  - in acute care felt to be 2/2 to urinary tract infection in the setting of recent change of chronic indwelling cantor catheter  - abx will be completed on 8/26 per ID recs  - blood cx x 2 of 8/18 not yet finalized   - current cantor to remain in place per urology recs     Chronic indwelling Cantor catheter  Assessment & Plan  - h/o TURP  - on home proscar 5 mg qd   - changed once per month (recently changed)   - current cantor to remain in place per urology recs   - follows with Dr Lisa Montiel * Chronic pain  Assessment & Plan  - on home neurontin 300 HS    - on home MS IR 15 mg q6  - per PAPDMP fairly regular prescription for MS IR 15 mg last filled on 8/1/19 for 120 tabs with 0 refills      Scheduled Meds:    Current Facility-Administered Medications:  bisacodyl 10 mg Rectal Daily PRN Cordell Subramanian MD   cefdinir 300 mg Oral Q12H [address] uSbramanian MD   chlorhexidine 15 mL Swish & Spit Q12H [address] Cordell Subramanian MD   cholecalciferol 2,000 Units Oral Daily Cordell Subramanian MD   docusate sodium 400 mg Oral Daily Cordell Subramanian MD   ferrous sulfate 325 mg Oral BID Cordell Subramanian MD   finasteride 5 mg Oral Daily Cordell Subramanian MD   gabapentin 300 mg Oral HS Cordell Subramanian MD   heparin (porcine) 5,000 Units Subcutaneous Q8H [address] Cordell Subramanian MD   morphine 15 mg Oral Q6H Cordell Subramanian MD   sertraline 50 mg Oral Daily Cordell Subramanian MD          Incidental findings:  1) low Amylase/lipase: per IM unlikely to be of clinical significance and recommend OP FU with PCP with further testing/treatment and/or specialist referral at PCP's discretion   2) grade 1 DD: OP FU with cardiology  3) sinus arrhythmia: OP FU with cardiology   4) right non-obstructing renal stone: OP FU with Dr Bala Cottrell  5) left kidney cyst: OP FU with Dr Bala Cottrell     Note: per patient and family he is on  mg qd at home not because he was advised to take ASA by a physician but rather because he decided on his own to take ASA     DVT ppx: HSQ        Objective:    Functional Update:  Mobility: min assist transfers  Transfers: min assist  ADLs: mod-max     Physical Exam:  /80 (BP Location: Right arm)   Pulse 76   Temp (!) 100 6 °F (38 1 °C) (Tympanic)   Resp 16   Ht 5' 9" (1 753 m)   Wt 88 2 kg (194 lb 8 oz)   SpO2 92%   BMI 28 72 kg/m²      GEN: NAD, sitting comfortably in bed  Head: NCAT, no gross lesions  Eyes: PERRL, EOMI  Throat: clear, no thrush, MMM  Pulm: CTAB, no rales/wheezes  CV: RRR, normal s1/s2  Abd: soft, mildly distended, nontender, +BS  Ext: no pedal edema bilaterally, distal extremities warm and well perfused  Psych: normal affect, no agitation  Skin: no observable rashes; cantor in place, bag with clear urine without sediment  Neuro: A+Ox3, fluent speech, follows commands, moving all extremities spontaneously     Laboratory:   Results from last 7 days   Lab Units 08/21/19  0501 08/20/19  0534 08/19/19  0451   HEMOGLOBIN g/dL 9 9* 9 8* 9 6*   HEMATOCRIT % 29 7* 29 3* 28 5*   WBC Thousand/uL 7 50 7 50 5 80     Results from last 7 days   Lab Units 08/21/19  0501 08/20/19  0534 08/19/19  0451  08/17/19  0430 08/16/19  1740   BUN mg/dL 21 25 29*   < > 36* 40*   SODIUM mmol/L 140 137 137   < > 137 135   POTASSIUM mmol/L 3 5 3 9 4 0   < > 3 7 4 4   CHLORIDE mmol/L 110* 109* 108*   < > 110* 103   CREATININE mg/dL 1 21 1 32* 1 45*   < > 1 80* 2 17*   AST U/L  --  62*  --   --  130* 171*   ALT U/L  --  70*  --   --  57* 62*    < > = values in this interval not displayed       Results from last 7 days   Lab Units 08/20/19  0534 08/16/19  1643   PROTIME seconds 13 3* 16 0*   INR  1 14 1 37        Patient Active Problem List   Diagnosis    Chronic indwelling Cantor catheter    Chronic pain    Bacteremia    Elevated troponin    CKD (chronic kidney disease)    Anemia    Transaminitis    Depression

## 2019-08-23 NOTE — NURSING NOTE
Pt confused, when asked to rate his pain on a scale of 1-10 he replied, ":Yeah that's about right a one to ten in my back"  Oriented to person and place only  Reoriented to situation and time with each rounding  Medication education and side effects reviewed with pt  Will continue to monitor and follow plan of care  Call bell within reach and all needs are met at this time

## 2019-08-23 NOTE — PROGRESS NOTES
08/23/19 1345   Pain Assessment   Pain Assessment 0-10  (notes improvement in back pain )   Pain Score 8   Pain Location Shoulder   Pain Orientation Left   Restrictions/Precautions   Precautions Fall Risk;Contact/isolation;Limb alert;Pain  (VRE urine, R shoulder sx)   Cognition   Overall Cognitive Status Impaired   Arousal/Participation Alert   Attention Attends with cues to redirect   Orientation Level Disoriented to place; Disoriented to time;Oriented to situation   Memory Decreased recall of recent events;Decreased short term memory   Following Commands Follows one step commands inconsistently   Comments   (Pt halucinating during therapy; states he see's a mouse )   Transfer Bed/Chair/Wheelchair   Limitations Noted In Balance; Endurance;Problem Solving;UE Strength;LE Strength   Adaptive Equipment Roller Walker   Stand Pivot Assist x 1;Moderate Assist;Assist x 2;Minimal Assist   Sit to Stand Moderate Assist;Assist x 1;Assist x 2;Minimal Assist   Stand to Sit Moderate Assist;Assist x 1   Supine to Sit Moderate Assist;Assist x 1   All Transfer Moderate Assist;Assist x 2;Minimal Assist   Findings   (Pt needed mod A x  1 - min A x 2 )   Bed, Chair, Wheelchair Transfer (FIM) 1 - Patient requires assist of two people   Ambulation   Primary Discharge Mode of Locomotion Walk;Wheelchair   Walk Assist Level Minimum Assist;Assist x 1;Chair Follow  (1-2)   Gait Pattern Inconsistant [place]; Slow [place];Decreased foot clearance; Forward Flexion; Shuffle   Assist Device Roller Walker   Distance Walked (feet)   (12' 10' 8')   Limitations Noted In Balance; Endurance;Posture; Safety;Speed;Strength   Walking (FIM) 1 - Patient ambulates less than 49 feet regardless of assist/device/set up   Wheelchair mobility   Wheelchair Assist Level Maximum Assist   Method Right upper extremity   Distance Level Surface (feet)   (115')   Wheelchair (FIM) 2 - Patient wheels between 50 - 149 feet regardless of assist/set up   Therapeutic Interventions Strengthening Seated TE with JESSICA B/L LE's at times    Other gait training, transfer training, w/c mobility    Assessment   Treatment Assessment Pt is more confused today, oriented only to the situation, disoriented to time and place  Pt was hallucinating during therapy stating "I see a mouse running by my feet " Had poor tolerance for all mobility activities today requiring max verbal and tactile cueing to complete tasks  Completed 3 ambulation trials with min A 1-2 people at times  Required frequent and long rest breaks due to fatigue and poor endurance  Pt was easily distracted and needed directions repeated multiple times with poor understanding of the tasks  Informed nursing about change in mental status at the start of therapy  Pt will benefit from continued skilled PT to increased independence with functional mobility tasks  PT Barriers   Physical Impairment Decreased strength;Decreased range of motion;Decreased endurance; Impaired balance;Decreased mobility; Decreased coordination; Impaired judgement;Decreased safety awareness;Decreased cognition   Functional Limitation Standing;Transfers; Walking; Wheelchair management   Plan   Treatment/Interventions Functional transfer training;LE strengthening/ROM; Therapeutic exercise; Endurance training;Cognitive reorientation; Bed mobility;Gait training; Compensatory technique education;Equipment eval/education;Patient/family training   Progress Slow progress, cognitive deficits   PT Therapy Minutes   PT Time In 1345   PT Time Out 1517   PT Total Time (minutes) 92   PT Mode of treatment - Individual (minutes) 47   PT Mode of treatment - Concurrent (minutes) 45   PT Mode of treatment - Group (minutes) 0   PT Mode of treatment - Co-treat (minutes) 0   PT Mode of Teatment - Total time(minutes) 92 minutes   Therapy Time missed   Time missed?  No

## 2019-08-24 ENCOUNTER — APPOINTMENT (INPATIENT)
Dept: RADIOLOGY | Facility: HOSPITAL | Age: 74
DRG: 947 | End: 2019-08-24
Payer: MEDICARE

## 2019-08-24 PROBLEM — G89.4 CHRONIC PAIN SYNDROME: Status: ACTIVE | Noted: 2019-08-16

## 2019-08-24 PROBLEM — R50.9 FEVER: Status: ACTIVE | Noted: 2019-08-24

## 2019-08-24 PROCEDURE — 71045 X-RAY EXAM CHEST 1 VIEW: CPT

## 2019-08-24 PROCEDURE — 97530 THERAPEUTIC ACTIVITIES: CPT

## 2019-08-24 PROCEDURE — 97112 NEUROMUSCULAR REEDUCATION: CPT

## 2019-08-24 PROCEDURE — 97110 THERAPEUTIC EXERCISES: CPT

## 2019-08-24 PROCEDURE — 87040 BLOOD CULTURE FOR BACTERIA: CPT | Performed by: INTERNAL MEDICINE

## 2019-08-24 PROCEDURE — 97535 SELF CARE MNGMENT TRAINING: CPT

## 2019-08-24 PROCEDURE — 99232 SBSQ HOSP IP/OBS MODERATE 35: CPT | Performed by: NURSE PRACTITIONER

## 2019-08-24 RX ORDER — ACETAMINOPHEN 325 MG/1
650 TABLET ORAL EVERY 6 HOURS PRN
Status: DISCONTINUED | OUTPATIENT
Start: 2019-08-24 | End: 2019-08-24

## 2019-08-24 RX ORDER — ACETAMINOPHEN 325 MG/1
650 TABLET ORAL EVERY 6 HOURS PRN
Status: DISCONTINUED | OUTPATIENT
Start: 2019-08-24 | End: 2019-09-07 | Stop reason: HOSPADM

## 2019-08-24 RX ADMIN — HEPARIN SODIUM 5000 UNITS: 5000 INJECTION, SOLUTION INTRAVENOUS; SUBCUTANEOUS at 06:08

## 2019-08-24 RX ADMIN — GABAPENTIN 300 MG: 300 CAPSULE ORAL at 22:07

## 2019-08-24 RX ADMIN — HEPARIN SODIUM 5000 UNITS: 5000 INJECTION, SOLUTION INTRAVENOUS; SUBCUTANEOUS at 14:50

## 2019-08-24 RX ADMIN — DOCUSATE SODIUM 400 MG: 100 CAPSULE, LIQUID FILLED ORAL at 08:58

## 2019-08-24 RX ADMIN — VITAMIN D, TAB 1000IU (100/BT) 2000 UNITS: 25 TAB at 08:57

## 2019-08-24 RX ADMIN — ACETAMINOPHEN 650 MG: 325 TABLET ORAL at 15:26

## 2019-08-24 RX ADMIN — SERTRALINE HYDROCHLORIDE 50 MG: 50 TABLET ORAL at 09:01

## 2019-08-24 RX ADMIN — ACETAMINOPHEN 650 MG: 325 TABLET ORAL at 22:06

## 2019-08-24 RX ADMIN — MORPHINE SULFATE 15 MG: 15 TABLET ORAL at 00:28

## 2019-08-24 RX ADMIN — CEFDINIR 300 MG: 300 CAPSULE ORAL at 09:00

## 2019-08-24 RX ADMIN — FINASTERIDE 5 MG: 5 TABLET, FILM COATED ORAL at 09:00

## 2019-08-24 RX ADMIN — HEPARIN SODIUM 5000 UNITS: 5000 INJECTION, SOLUTION INTRAVENOUS; SUBCUTANEOUS at 22:07

## 2019-08-24 RX ADMIN — CEFDINIR 300 MG: 300 CAPSULE ORAL at 22:07

## 2019-08-24 RX ADMIN — CHLORHEXIDINE GLUCONATE 0.12% ORAL RINSE 15 ML: 1.2 LIQUID ORAL at 09:05

## 2019-08-24 RX ADMIN — MORPHINE SULFATE 15 MG: 15 TABLET ORAL at 17:43

## 2019-08-24 RX ADMIN — FERROUS SULFATE TAB 325 MG (65 MG ELEMENTAL FE) 325 MG: 325 (65 FE) TAB at 17:43

## 2019-08-24 RX ADMIN — CHLORHEXIDINE GLUCONATE 0.12% ORAL RINSE 15 ML: 1.2 LIQUID ORAL at 22:07

## 2019-08-24 RX ADMIN — ACETAMINOPHEN 650 MG: 325 TABLET ORAL at 08:58

## 2019-08-24 RX ADMIN — FERROUS SULFATE TAB 325 MG (65 MG ELEMENTAL FE) 325 MG: 325 (65 FE) TAB at 08:58

## 2019-08-24 RX ADMIN — MORPHINE SULFATE 15 MG: 15 TABLET ORAL at 06:08

## 2019-08-24 NOTE — ASSESSMENT & PLAN NOTE
· Renal ultrasound result appreciated  · Patient will need to follow up with Urology as an outpatient

## 2019-08-24 NOTE — PROGRESS NOTES
This a m  Lethargic , responds to questions appropriately  Temp was 102 8 F , cool washrag to forehead  Contacted Dr Bora Hassan who ordered Blood cultures x 2  Obtained and were sent to lab  Tylenol then given and effectual for temp reduction to 99 F  Pt asymptomatic, pain free and vs with daughter at this time

## 2019-08-24 NOTE — PROGRESS NOTES
Progress Note - Valorie Calhoun 1945, 66 y o  male MRN: [id number]    Unit/Bed#: -01 Encounter: [id number]    Primary Care Provider: Dania Mc DO   Date and time admitted to hospital: 2019  3:10 PM        * Chronic pain syndrome  Assessment & Plan  · Continue with home regimen    Fever  Assessment & Plan  · Unclear etiology  Tmax of 102 8F  · Blood cultures- pending  · Check chest x-ray  · Will continue with Omnicef for now    CKD (chronic kidney disease)  Assessment & Plan  · The patient with recent history of RUTHANN  ·  Monitor renal function closely  · Avoid nephrotoxins    Bacteremia  Assessment & Plan  · E coli bacteremia  · Per ID recommendation- continue with Omnicef through     Chronic indwelling Diallo catheter  Assessment & Plan  · Renal ultrasound result appreciated  · Patient will need to follow up with Urology as an outpatient        VTE Pharmacologic Prophylaxis: Pharmacologic: Heparin    Patient Centered Rounds: I have performed bedside rounds with nursing staff today  Discussions with Specialists or Other Care Team Provider:  Physicist, nursing   Education and Discussions with Family / Patient: patient    Current Length of Stay: 3 day(s)    Current Patient Status: Inpatient Rehab     Discharge Plan: per primary team     Code Status: Level 3 - DNAR and DNI    Subjective:   Patient is a poor historian unable to obtain  Objective:     Vitals:   Temp (24hrs), Av 4 °F (38 °C), Min:99 °F (37 2 °C), Max:102 8 °F (39 3 °C)    Temp:  [99 °F (37 2 °C)-102 8 °F (39 3 °C)] 100 8 °F (38 2 °C)  HR:  [76-91] 88  Resp:  [18-20] 20  BP: (140-160)/(60-82) 160/60  SpO2:  [90 %-93 %] 91 %  Body mass index is 28 72 kg/m²  Input and Output Summary (last 24 hours):        Intake/Output Summary (Last 24 hours) at 2019 1553  Last data filed at 2019 1254  Gross per 24 hour   Intake 240 ml   Output 1000 ml   Net -760 ml       Physical Exam:     Physical Exam Constitutional: He appears well-developed  No distress  Chronically ill and frail appearing   HENT:   Head: Normocephalic and atraumatic  Mouth/Throat: Oropharynx is clear and moist    Eyes: Pupils are equal, round, and reactive to light  Conjunctivae and EOM are normal    Neck: Normal range of motion  Neck supple  No thyromegaly present  Cardiovascular: Normal rate, regular rhythm, normal heart sounds and intact distal pulses  Pulmonary/Chest: Effort normal  No respiratory distress  He has no wheezes  Coarse     Abdominal: Soft  Bowel sounds are normal  He exhibits no distension  There is no tenderness  Musculoskeletal: Normal range of motion  He exhibits no edema or deformity  Neurological: He is alert  He has normal reflexes  No cranial nerve deficit  AAOx2     Skin: Skin is warm and dry  No erythema  Psychiatric: He has a normal mood and affect  His behavior is normal    Vitals reviewed  Additional Data:     Labs:    Results from last 7 days   Lab Units 08/21/19  0501  08/19/19  0451   WBC Thousand/uL 7 50   < > 5 80   HEMOGLOBIN g/dL 9 9*   < > 9 6*   HEMATOCRIT % 29 7*   < > 28 5*   PLATELETS Thousands/uL 195   < > 155   NEUTROS PCT %  --   --  78*   LYMPHS PCT %  --   --  10*   MONOS PCT %  --   --  10   EOS PCT %  --   --  1    < > = values in this interval not displayed  Results from last 7 days   Lab Units 08/21/19  0501 08/20/19  0534   POTASSIUM mmol/L 3 5 3 9   CHLORIDE mmol/L 110* 109*   CO2 mmol/L 23 22   BUN mg/dL 21 25   CREATININE mg/dL 1 21 1 32*   CALCIUM mg/dL 8 4* 8 5*   ALK PHOS U/L  --  154   ALT U/L  --  70*   AST U/L  --  62*     Results from last 7 days   Lab Units 08/20/19  0534   INR  1 14     Results from last 7 days   Lab Units 08/18/19  1121 08/18/19  0656 08/17/19  2102 08/17/19  1642   POC GLUCOSE mg/dl 107 104 127 119           * I Have Reviewed All Lab Data Listed Above  * Additional Pertinent Lab Tests Reviewed:  Tressa Chaney Admission  Reviewed    Imaging:  Imaging Reports Reviewed Today Include: cxr pending     Recent Cultures (last 7 days):     Results from last 7 days   Lab Units 08/18/19  0440 08/18/19  0439   BLOOD CULTURE  No Growth After 5 Days  No Growth After 5 Days  Last 24 Hours Medication List:     Current Facility-Administered Medications:  acetaminophen 650 mg Oral Q6H PRN Gillian Hutson MD   bisacodyl 10 mg Rectal Daily PRN Amie Dewey MD   cefdinir 300 mg Oral Q12H Mumtaz Grover MD   chlorhexidine 15 mL Swish & Spit Q12H Mumtaz Grover MD   cholecalciferol 2,000 Units Oral Daily Amie Dewey MD   docusate sodium 400 mg Oral Daily Amie Dewey MD   ferrous sulfate 325 mg Oral BID Amie Dewey MD   finasteride 5 mg Oral Daily Amie Dewey MD   gabapentin 300 mg Oral HS Amie Dewey MD   heparin (porcine) 5,000 Units Subcutaneous [facility] Amie Dewey MD   morphine 15 mg Oral Pepe Hill MD   sertraline 50 mg Oral Daily Amie Dewey MD        Today, Patient Was Seen By: UBALDO Mittal    ** Please Note: Dictation voice to text software may have been used in the creation of this document   **

## 2019-08-24 NOTE — PROGRESS NOTES
08/24/19 0900   Charting Type   Charting Type Shift assessment   Neurological   Neuro (WDL) X   Level of Consciousness Lethargic   Orientation Level Oriented to person;Oriented to place; Disoriented to time   [place] commands; Short term memory loss   Extraocular Movements Full   Facial Symmetry Symmetrical   R Pupil Size (mm) 3   L Pupil Size (mm) 3   RUE Muscle Strength 4- Movement against gravity and limited resistance   LUE Sensation Full sensation   LUE Muscle Strength 5- Normal strength   RLE Sensation Full sensation   RLE Muscle Strength 4- Movement against gravity and limited resistance   LLE Sensation Full sensation   LLE Muscle Strength 5- Normal strength   Neuro Symptoms Drowsiness; Forgetful   Relieved by Rest   Neuro Additional Assessments No   Abhijit Coma Scale   Eye Opening 4   Best Verbal Response 5   Best Motor Response 6   [place] Coma Scale Score 15   HEENT   HEENT (WDL) X   Head and Face Symmetrical   R Eye Mildly impaired vision   L Eye Mildly impaired vision   Vision - Corrective Lenses (at bedside) Glasses   R Ear Hearing aid   L Ear Hearing aid   Teeth Dentures upper;Dentures lower   Respiratory   Respiratory (WDL) WDL   Respiratory Pattern Dyspnea with exertion   Chest Assessment Chest expansion symmetrical   Bilateral Breath Sounds Clear;Diminished   Respiratory Interventions   Respiratory Interventions Cough and deep breathe;Incentive spirometry   Cough and Deep Breathe   Cough and Deep Breathe Yes   Cardiac   Cardiac (WDL) WDL   Cardiac Symptoms None   Pacemaker/ICD No   Peripheral Vascular   Peripheral Vascular (WDL) X   RLE Edema +1   LLE Edema +1   PVS Additional Assessments No   RUE Neurovascular Assessment   R Radial Pulse +2   LUE Neurovascular Assessment   L Radial Pulse +2   RLE Neurovascular Assessment   R Pedal Pulse +1   LLE Neurovascular Assessment   L Pedal Pulse +1   Integumentary   Integumentary (WDL) X   Skin Condition/Temp Warm; Hot   Skin Integrity Bruising David Scale   Sensory Perceptions 3   Moisture 4   Activity 2   Mobility 2   Nutrition 2   Friction and Shear 2   David Scale Score 15   Wound 08/21/19 Pressure Injury Sacrum   Date First Assessed/Time First Assessed: 08/21/19 1600   Pre-Existing Wound: Yes  Primary Wound Type: Pressure Injury  Location: Sacrum   Staging Stage II   Dressing Foam, Silicon (eg  Allevyn, etc)   Wound packed? No   Dressing Status Clean;Dry; Intact   Musculoskeletal   Musculoskeletal (WDL) X   Level of Assistance Moderate assist, patient does 50-74%   Assistive Device Front wheel walker   RLE Limited movement   LLE Limited movement   Musculoskeletal Additional Assessments No   Gastrointestinal   Gastrointestinal (WDL) WDL   GI Symptoms None   Genitourinary   Genitourinary (WDL) X   Genitourinary Symptoms Indwelling catheter   Urine Assessment   Urinary Incontinence No   Urine Color Yellow/straw   Urine Appearance Clear   Genitalia   Male Genitalia Intact   Genitourinary Additional Assessments   Genitourinary Additional Assessments No   Anal/Rectal   Anal/Rectal (WDL) WDL   Psychosocial   Psychosocial (WDL) X   Patient Behaviors/Mood Calm;Flat affect   Needs Expressed Denies   Ability to Express Feelings Needs assistance   Ability to Express Needs Needs assistance   Ability to Express Thoughts Needs assistance   Ability to Understand Others Sometimes understands   Cough   Cough None

## 2019-08-24 NOTE — PROGRESS NOTES
08/24/19 0930   Pain Assessment   Pain Assessment Shelley-Baker FACES   Shelley-Baker FACES Pain Rating 2   Pain Type Acute pain   Pain Location Hand   Pain Orientation Left   Cognition   Overall Cognitive Status Impaired   Arousal/Participation Responsive   Attention Attends with cues to redirect   Orientation Level Disoriented to situation;Disoriented to time   Memory Decreased recall of recent events;Decreased short term memory   Following Commands Follows one step commands inconsistently   Bed Mobility   Able to Roll Right to Left   Adaptive Equipment Side Rails   Positioning Concerns Skin Integrity;Cognition   Findings Requires mod A/ Max X 1 to complete bed mobility activities with Max VCs   Transfer Bed/Chair/Wheelchair   Limitations Noted In Balance; Endurance;UE Strength;LE Strength;Problem Solving   Adaptive Equipment Roller Walker   Stand Pivot Moderate Assist;Assist x 1   Sit to Stand Moderate Assist;Assist x 1   Stand to Sit Moderate Assist;Assist x 1   Findings Mod A x 1 to complete transfers with Max VCs to utilize UEs to assist with completion of transfers  and to move feet and walked with SPT   Bed, Chair, Wheelchair Transfer (FIM) 3 - Patient completes 50 - 74% of all tasks   Ambulation   Primary Discharge Mode of Locomotion Walk   Walk Assist Level Moderate Assist;Assist x 1   Gait Pattern Inconsistant [place]; Forward Flexion; Shuffle;Narrow KAREEM; Improper weight shift   Assist Device Kavitaren Teixeira Walked (feet) 5 ft   Limitations Noted In Balance; Coordination;Device Management; Endurance;Posture; Safety; Sequencing;Speed;Strength   Findings Requires Max VCs to move B Feet and sequence movement of RW and Feet      Walking (FIM) 1 - Patient ambulates less than 49 feet regardless of assist/device/set up   Toilet Transfer   Surface Assessed Bedside Commode   Findings Requires Max VCs to sit onto commode, difficulty processing sequencing of tasks   Toilet Transfer (FIM) 3 - [place] needs to lift, boost or assist to stand OR sit   Therapeutic Interventions   Strengthening AAROM Bed exercises completed to increase ROM and reduce joint tightness from positioning in bed  Requires increased VCs throughout session to attempt with active movement   Balance able to maintain sit balance at EOB for 5 minutes   Assessment   Treatment Assessment Patient limited with activity this AM, had increased fever prior to treatment that decreased to 99 degrees when checked prior to patient being treated  Had increased fatigue throughout session, requires education to attempt active participation in activities  Patient challenged with processing cues for completion of activities  Patient does require Mod A for completion of all transfers and needs education through VCs and MCS multiple times before understanding  Problem List Decreased strength;Decreased range of motion;Decreased endurance; Impaired balance;Decreased mobility; Decreased cognition;Pain;Decreased coordination   Barriers to Discharge Inaccessible home environment   PT Barriers   Physical Impairment Decreased strength;Decreased range of motion;Decreased endurance; Impaired balance;Decreased mobility; Decreased coordination; Impaired judgement;Decreased safety awareness;Decreased cognition   Functional Limitation Standing;Transfers   Plan   Treatment/Interventions Functional transfer training;LE strengthening/ROM; Therapeutic exercise;Patient/family training;Bed mobility   Progress Slow progress, cognitive deficits   PT Therapy Minutes   PT Time In 0930   PT Time Out 1030   PT Total Time (minutes) 60   PT Mode of treatment - Individual (minutes) 0   PT Mode of treatment - Concurrent (minutes) 60   PT Mode of treatment - Group (minutes) 0   PT Mode of treatment - Co-treat (minutes) 0   PT Mode of Teatment - Total time(minutes) 60 minutes   Therapy Time missed   Time missed?  No

## 2019-08-24 NOTE — ASSESSMENT & PLAN NOTE
· Unclear etiology   Tmax of 102 8F  · Blood cultures- pending  · Check chest x-ray  · Will continue with [employer] for now

## 2019-08-24 NOTE — PROGRESS NOTES
08/24/19 1030   Pain Assessment   Pain Assessment 0-10   Pain Score Worst Possible Pain   Pain Type Acute pain   Pain Location Hand   Pain Orientation Left   Pain Frequency Constant/continuous   Pain Onset Gradual   Clinical Progression Not changed   Effect of Pain on Daily Activities   (limited mobility)   Transfer Bed/Chair/Wheelchair   Sit to Stand Maximum Assist;Assist x 1   Stand to Sit Moderate Assist;Assist x 2   Bed, Chair, Wheelchair Transfer (FIM) 1 - Patient requires assist of two people   Toileting   Able to Pull Clothing down no, up no  Able to Manage Clothing Closures No   Manage Hygiene Bladder; Bowel   Limitations Noted In   (Processing and mobility)   Adaptive Equipment   (RW)   Toileting (FIM) 1 - Patient requires two helpers   Toilet Transfer   Surface Assessed Standard Commode   Transfer Technique Stand Pivot   Limitations Noted In Endurance;Problem Solving; Sequencing   Findings   (Pt limited with mobility 2' decreased processing and pain )   Toilet Transfer (FIM) 3 - [place] needs to lift, boost or assist to stand OR sit   Exercise Tools   Exercise Tools Yes   Other Exercise Tool 1 towel slides 30 x ; 3 planes w/increased time   Additional Activities   Additional Activities   (Card match - increased time for processing)   Activity Tolerance   Activity Tolerance   (Pt limited by pain in L hand and decreased processing )   Medical Staff Made Aware nursing aware   Other Comments   Assessment Pt limited by decreased processing and pain  Pt required mod-max prompting to push through tx      Recommendation   OT Discharge Recommendation Short Term Rehab   OT Therapy Minutes   OT Time In 1030   OT Time Out 1230   OT Total Time (minutes) 120   OT Mode of treatment - Individual (minutes) 0   OT Mode of treatment - Concurrent (minutes) 120   OT Mode of treatment - Group (minutes) 0   OT Mode of treatment - Co-treat (minutes) 0   OT Mode of Teatment - Total time(minutes) 120 minutes   Therapy Time missed Time missed?  No

## 2019-08-24 NOTE — PLAN OF CARE
Problem: PAIN - ADULT  Goal: Verbalizes/displays adequate comfort level or baseline comfort level  Description  Interventions:  - Encourage patient to monitor pain and request assistance  - Assess pain using appropriate pain scale  - Administer analgesics based on type and severity of pain and evaluate response  - Implement non-pharmacological measures as appropriate and evaluate response  - Consider cultural and social influences on pain and pain management  - Notify physician/advanced practitioner if interventions unsuccessful or patient reports new pain  Outcome: Progressing     Problem: INFECTION - ADULT  Goal: Absence or prevention of progression during hospitalization  Description  INTERVENTIONS:  - Assess and monitor for signs and symptoms of infection  - Monitor lab/diagnostic results  - Monitor all insertion sites, i e  indwelling lines  - [place] appropriate cooling/warming therapies per order  - Administer medications as ordered  - Instruct and encourage patient and family to use good hand hygiene technique  - Identify and instruct in appropriate isolation precautions for identified infection/condition   Outcome: Progressing     Problem: SAFETY ADULT  Goal: Patient will remain free of falls  Description  INTERVENTIONS:  - Assess patient frequently for physical needs  -  Identify cognitive and physical deficits and behaviors that affect risk of falls    -  [place] fall precautions as indicated by assessment   - Educate patient/family on patient safety including physical limitations  - Instruct patient to call for assistance with activity based on assessment  - Modify environment to reduce risk of injury  -OT/PT consult to assist with strengthening/mobility   Outcome: Progressing  Goal: Maintain or return mobility status to optimal level  Description  INTERVENTIONS:  - Assess patient's baseline mobility status (ambulation, transfers, stairs, etc )    - Identify cognitive and physical deficits and behaviors that affect mobility  - Identify mobility aids required to assist with transfers and/or ambulation (gait belt, sit-to-stand, lift, walker, cane, etc )  - [place] fall precautions as indicated by assessment  - Record patient progress and toleration of activity level on Mobility SBAR; progress patient to next Phase/Stage  - Instruct patient to call for assistance with activity based on assessment     Outcome: Progressing     Problem: DISCHARGE PLANNING  Goal: Discharge to home or other facility with appropriate resources  Description  INTERVENTIONS:  - Identify barriers to discharge w/patient and caregiver  - Arrange for needed discharge resources and transportation as appropriate  - Identify discharge learning needs (meds, wound care, etc )  - Arrange for interpretive services to assist at discharge as needed  - Refer to Case Management Department for coordinating discharge planning if the patient needs post-hospital services based on physician/advanced practitioner order or complex needs related to functional status, cognitive ability, or social support system  Outcome: Progressing     Problem: GASTROINTESTINAL - ADULT  Goal: Maintains or returns to baseline bowel function  Description  INTERVENTIONS:  - Assess bowel function  - Encourage oral fluids to ensure adequate hydration  - Administer IV fluids if ordered to ensure adequate hydration  - Administer ordered medications as needed  - Encourage mobilization and activity  - Consider nutritional services referral to assist patient with adequate nutrition and appropriate food choices  Outcome: Progressing  Goal: Maintains adequate nutritional intake  Description  INTERVENTIONS:  - Monitor percentage of each meal consumed  - Identify factors contributing to decreased intake, treat as appropriate  - Assist with meals as needed  - Monitor I&O, weight, and lab values if indicated  - Obtain nutrition services referral as needed  Outcome: Progressing Problem: GENITOURINARY - ADULT  Goal: Urinary catheter remains patent  Description  INTERVENTIONS:  - Assess patency of urinary catheter  - If patient has a chronic cantor, consider changing catheter if non-functioning  - Follow guidelines for intermittent irrigation of non-functioning urinary catheter  Outcome: Progressing     Problem: METABOLIC, FLUID AND ELECTROLYTES - ADULT  Goal: Electrolytes maintained within normal limits  Description  INTERVENTIONS:  - Monitor labs and assess patient for signs and symptoms of electrolyte imbalances  - Administer electrolyte replacement as ordered  - Monitor response to electrolyte replacements, including repeat lab results as appropriate  - Instruct patient on fluid and nutrition as appropriate  Outcome: Progressing     Problem: SKIN/TISSUE INTEGRITY - ADULT  Goal: Incision(s), wounds(s) or drain site(s) healing without S/S of infection  Description  INTERVENTIONS  - Assess and document risk factors for skin impairment   - Assess and document dressing, incision, wound bed, drain sites and surrounding tissue  - Consider nutrition services referral as needed  - Oral mucous membranes remain intact  - Provide patient/ family education  Outcome: Progressing     Problem: HEMATOLOGIC - ADULT  Goal: Maintains hematologic stability  Description  INTERVENTIONS  - Assess for signs and symptoms of bleeding or hemorrhage  - Monitor labs  - Administer supportive blood products/factors as ordered and appropriate  Outcome: Progressing     Problem: MUSCULOSKELETAL - ADULT  Goal: Maintain or return mobility to safest level of function  Description  INTERVENTIONS:  - Assess patient's ability to carry out ADLs; assess patient's baseline for ADL function and identify physical deficits which impact ability to perform ADLs (bathing, care of mouth/teeth, toileting, grooming, dressing, etc )  - Assess/evaluate cause of self-care deficits   - Assess range of motion  - Assess patient's mobility  - Assess patient's need for assistive devices and provide as appropriate  - Encourage maximum independence but intervene and supervise when necessary  - Involve family in performance of ADLs  - Assess for home care needs following discharge   - OT consult to assist with ADL evaluation and planning for discharge  - Provide patient education as appropriate   Outcome: Progressing     Problem: Potential for Falls  Goal: Patient will remain free of falls  Description  INTERVENTIONS:  - Assess patient frequently for physical needs  -  Identify cognitive and physical deficits and behaviors that affect risk of falls    -  [place] fall precautions as indicated by assessment   - Educate patient/family on patient safety including physical limitations  - Instruct patient to call for assistance with activity based on assessment  - Modify environment to reduce risk of injury  - Consider OT/PT consult to assist with strengthening/mobility  Outcome: Progressing     Problem: Prexisting or High Potential for Compromised Skin Integrity  Goal: Skin integrity is maintained or improved  Description  INTERVENTIONS:  - Identify patients at risk for skin breakdown  - Assess and monitor skin integrity  - Assess and monitor nutrition and hydration status  - Monitor labs   - Assess for incontinence   - Turn and reposition patient  - Assist with mobility/ambulation  - Relieve pressure over bony prominences  - Avoid friction and shearing  - Provide appropriate hygiene as needed including keeping skin clean and dry  - Evaluate need for skin moisturizer/barrier cream  - Collaborate with interdisciplinary team   - Patient/family teaching  - Consider wound care consult   Outcome: Progressing

## 2019-08-24 NOTE — PROGRESS NOTES
See alternating temps throughout shift  Cool washrag to forehead  Tylenol prn as ordered for temps  Fluids encouraged  Hospitalist aware of situation  Awaiting Blood culture results  Sitting out of bed in chair at this time  Temp 98 6 F  Continue to monitor  Non complaining

## 2019-08-25 LAB
AMORPH URATE CRY URNS QL MICRO: ABNORMAL /HPF
ANION GAP SERPL CALCULATED.3IONS-SCNC: 9 MMOL/L (ref 4–13)
BACTERIA UR QL AUTO: ABNORMAL /HPF
BASOPHILS # BLD AUTO: 0 THOUSANDS/ΜL (ref 0–0.1)
BASOPHILS NFR BLD AUTO: 0 % (ref 0–2)
BILIRUB UR QL STRIP: NEGATIVE
BUN SERPL-MCNC: 18 MG/DL (ref 7–25)
CALCIUM SERPL-MCNC: 8.6 MG/DL (ref 8.6–10.5)
CHLORIDE SERPL-SCNC: 103 MMOL/L (ref 98–107)
CLARITY UR: ABNORMAL
CO2 SERPL-SCNC: 26 MMOL/L (ref 21–31)
COLOR UR: YELLOW
CREAT SERPL-MCNC: 1.01 MG/DL (ref 0.7–1.3)
EOSINOPHIL # BLD AUTO: 0 THOUSAND/ΜL (ref 0–0.61)
EOSINOPHIL NFR BLD AUTO: 0 % (ref 0–5)
ERYTHROCYTE [DISTWIDTH] IN BLOOD BY AUTOMATED COUNT: 13.4 % (ref 11.5–14.5)
GFR SERPL CREATININE-BSD FRML MDRD: 72 ML/MIN/1.73SQ M
GLUCOSE P FAST SERPL-MCNC: 101 MG/DL (ref 65–99)
GLUCOSE SERPL-MCNC: 101 MG/DL (ref 65–99)
GLUCOSE UR STRIP-MCNC: NEGATIVE MG/DL
HCT VFR BLD AUTO: 29.4 % (ref 42–47)
HGB BLD-MCNC: 9.7 G/DL (ref 14–18)
HGB UR QL STRIP.AUTO: ABNORMAL
KETONES UR STRIP-MCNC: NEGATIVE MG/DL
LEUKOCYTE ESTERASE UR QL STRIP: NEGATIVE
LYMPHOCYTES # BLD AUTO: 1.9 THOUSANDS/ΜL (ref 0.6–4.47)
LYMPHOCYTES NFR BLD AUTO: 12 % (ref 21–51)
MCH RBC QN AUTO: 31.4 PG (ref 26–34)
MCHC RBC AUTO-ENTMCNC: 33.1 G/DL (ref 31–37)
MCV RBC AUTO: 95 FL (ref 81–99)
MONOCYTES # BLD AUTO: 0.9 THOUSAND/ΜL (ref 0.17–1.22)
MONOCYTES NFR BLD AUTO: 6 % (ref 2–12)
NEUTROPHILS # BLD AUTO: 12.9 THOUSANDS/ΜL (ref 1.4–6.5)
NEUTS SEG NFR BLD AUTO: 82 % (ref 42–75)
NITRITE UR QL STRIP: NEGATIVE
NON-SQ EPI CELLS URNS QL MICRO: ABNORMAL /HPF
PH UR STRIP.AUTO: 5.5 [PH]
PLATELET # BLD AUTO: 377 THOUSANDS/UL (ref 149–390)
PMV BLD AUTO: 8.6 FL (ref 8.6–11.7)
POTASSIUM SERPL-SCNC: 3.2 MMOL/L (ref 3.5–5.5)
PROCALCITONIN SERPL-MCNC: 1.75 NG/ML
PROT UR STRIP-MCNC: ABNORMAL MG/DL
RBC # BLD AUTO: 3.1 MILLION/UL (ref 4.3–5.9)
RBC #/AREA URNS AUTO: ABNORMAL /HPF
SODIUM SERPL-SCNC: 138 MMOL/L (ref 134–143)
SP GR UR STRIP.AUTO: 1.02 (ref 1–1.03)
UROBILINOGEN UR QL STRIP.AUTO: 0.2 E.U./DL
WBC # BLD AUTO: 15.8 THOUSAND/UL (ref 4.8–10.8)
WBC #/AREA URNS AUTO: ABNORMAL /HPF

## 2019-08-25 PROCEDURE — 87086 URINE CULTURE/COLONY COUNT: CPT | Performed by: NURSE PRACTITIONER

## 2019-08-25 PROCEDURE — 81001 URINALYSIS AUTO W/SCOPE: CPT | Performed by: NURSE PRACTITIONER

## 2019-08-25 PROCEDURE — 84460 ALANINE AMINO (ALT) (SGPT): CPT | Performed by: PHYSICAL MEDICINE & REHABILITATION

## 2019-08-25 PROCEDURE — 87186 SC STD MICRODIL/AGAR DIL: CPT | Performed by: NURSE PRACTITIONER

## 2019-08-25 PROCEDURE — 81003 URINALYSIS AUTO W/O SCOPE: CPT | Performed by: NURSE PRACTITIONER

## 2019-08-25 PROCEDURE — 85025 COMPLETE CBC W/AUTO DIFF WBC: CPT | Performed by: NURSE PRACTITIONER

## 2019-08-25 PROCEDURE — 80048 BASIC METABOLIC PNL TOTAL CA: CPT | Performed by: NURSE PRACTITIONER

## 2019-08-25 PROCEDURE — 84450 TRANSFERASE (AST) (SGOT): CPT | Performed by: PHYSICAL MEDICINE & REHABILITATION

## 2019-08-25 PROCEDURE — 84145 PROCALCITONIN (PCT): CPT | Performed by: NURSE PRACTITIONER

## 2019-08-25 PROCEDURE — 87077 CULTURE AEROBIC IDENTIFY: CPT | Performed by: NURSE PRACTITIONER

## 2019-08-25 RX ORDER — POTASSIUM CHLORIDE 20 MEQ/1
40 TABLET, EXTENDED RELEASE ORAL ONCE
Status: COMPLETED | OUTPATIENT
Start: 2019-08-25 | End: 2019-08-25

## 2019-08-25 RX ADMIN — CEFDINIR 300 MG: 300 CAPSULE ORAL at 09:14

## 2019-08-25 RX ADMIN — CHLORHEXIDINE GLUCONATE 0.12% ORAL RINSE 15 ML: 1.2 LIQUID ORAL at 09:14

## 2019-08-25 RX ADMIN — GABAPENTIN 300 MG: 300 CAPSULE ORAL at 22:08

## 2019-08-25 RX ADMIN — MORPHINE SULFATE 15 MG: 15 TABLET ORAL at 23:58

## 2019-08-25 RX ADMIN — CHLORHEXIDINE GLUCONATE 0.12% ORAL RINSE 15 ML: 1.2 LIQUID ORAL at 22:07

## 2019-08-25 RX ADMIN — SERTRALINE HYDROCHLORIDE 50 MG: 50 TABLET ORAL at 09:14

## 2019-08-25 RX ADMIN — MORPHINE SULFATE 15 MG: 15 TABLET ORAL at 00:05

## 2019-08-25 RX ADMIN — FINASTERIDE 5 MG: 5 TABLET, FILM COATED ORAL at 09:14

## 2019-08-25 RX ADMIN — HEPARIN SODIUM 5000 UNITS: 5000 INJECTION, SOLUTION INTRAVENOUS; SUBCUTANEOUS at 22:08

## 2019-08-25 RX ADMIN — HEPARIN SODIUM 5000 UNITS: 5000 INJECTION, SOLUTION INTRAVENOUS; SUBCUTANEOUS at 06:11

## 2019-08-25 RX ADMIN — MORPHINE SULFATE 15 MG: 15 TABLET ORAL at 06:10

## 2019-08-25 RX ADMIN — FERROUS SULFATE TAB 325 MG (65 MG ELEMENTAL FE) 325 MG: 325 (65 FE) TAB at 09:14

## 2019-08-25 RX ADMIN — POTASSIUM CHLORIDE 40 MEQ: 1500 TABLET, EXTENDED RELEASE ORAL at 06:10

## 2019-08-25 RX ADMIN — HEPARIN SODIUM 5000 UNITS: 5000 INJECTION, SOLUTION INTRAVENOUS; SUBCUTANEOUS at 14:20

## 2019-08-25 RX ADMIN — ACETAMINOPHEN 650 MG: 325 TABLET ORAL at 15:24

## 2019-08-25 RX ADMIN — CEFDINIR 300 MG: 300 CAPSULE ORAL at 22:08

## 2019-08-25 RX ADMIN — FERROUS SULFATE TAB 325 MG (65 MG ELEMENTAL FE) 325 MG: 325 (65 FE) TAB at 17:31

## 2019-08-25 RX ADMIN — VITAMIN D, TAB 1000IU (100/BT) 2000 UNITS: 25 TAB at 09:14

## 2019-08-25 RX ADMIN — ACETAMINOPHEN 650 MG: 325 TABLET ORAL at 09:22

## 2019-08-25 NOTE — NURSING NOTE
Called and paged hospitalist on call regarding elevated temp  Not due for tylenol until 2126  Per hospitalist to continue to monitor temperature and give next dose when due  Continuing to monitor pt

## 2019-08-25 NOTE — PROGRESS NOTES
PAC called for new order for Infectious disease consult ordered by maryam Oliver    [facility] stated it will be done tomorrow

## 2019-08-25 NOTE — PROGRESS NOTES
More alert today, denies pain and need for pain meds  MS contin held  Loose BM x 2 and elevated WBC , Hospitalist was made aware and urine C and S was obtained from cantor and sent to lab earlier  See results  Continue same plan of care

## 2019-08-25 NOTE — PLAN OF CARE
Problem: PAIN - ADULT  Goal: Verbalizes/displays adequate comfort level or baseline comfort level  Description  Interventions:  - Encourage patient to monitor pain and request assistance  - Assess pain using appropriate pain scale  - Administer analgesics based on type and severity of pain and evaluate response  - Implement non-pharmacological measures as appropriate and evaluate response  - Consider cultural and social influences on pain and pain management  - Notify physician/advanced practitioner if interventions unsuccessful or patient reports new pain  Outcome: Progressing     Problem: INFECTION - ADULT  Goal: Absence or prevention of progression during hospitalization  Description  INTERVENTIONS:  - Assess and monitor for signs and symptoms of infection  - Monitor lab/diagnostic results  - Monitor all insertion sites, i e  indwelling lines  - [place] appropriate cooling/warming therapies per order  - Administer medications as ordered  - Instruct and encourage patient and family to use good hand hygiene technique  - Identify and instruct in appropriate isolation precautions for identified infection/condition   Outcome: Progressing     Problem: SAFETY ADULT  Goal: Patient will remain free of falls  Description  INTERVENTIONS:  - Assess patient frequently for physical needs  -  Identify cognitive and physical deficits and behaviors that affect risk of falls    -  [place] fall precautions as indicated by assessment   - Educate patient/family on patient safety including physical limitations  - Instruct patient to call for assistance with activity based on assessment  - Modify environment to reduce risk of injury  -OT/PT consult to assist with strengthening/mobility   Outcome: Progressing  Goal: Maintain or return mobility status to optimal level  Description  INTERVENTIONS:  - Assess patient's baseline mobility status (ambulation, transfers, stairs, etc )    - Identify cognitive and physical deficits and behaviors that affect mobility  - Identify mobility aids required to assist with transfers and/or ambulation (gait belt, sit-to-stand, lift, walker, cane, etc )  - [place] fall precautions as indicated by assessment  - Record patient progress and toleration of activity level on Mobility SBAR; progress patient to next Phase/Stage  - Instruct patient to call for assistance with activity based on assessment     Outcome: Progressing     Problem: DISCHARGE PLANNING  Goal: Discharge to home or other facility with appropriate resources  Description  INTERVENTIONS:  - Identify barriers to discharge w/patient and caregiver  - Arrange for needed discharge resources and transportation as appropriate  - Identify discharge learning needs (meds, wound care, etc )  - Arrange for interpretive services to assist at discharge as needed  - Refer to Case Management Department for coordinating discharge planning if the patient needs post-hospital services based on physician/advanced practitioner order or complex needs related to functional status, cognitive ability, or social support system  Outcome: Progressing     Problem: GASTROINTESTINAL - ADULT  Goal: Maintains or returns to baseline bowel function  Description  INTERVENTIONS:  - Assess bowel function  - Encourage oral fluids to ensure adequate hydration  - Administer IV fluids if ordered to ensure adequate hydration  - Administer ordered medications as needed  - Encourage mobilization and activity  - Consider nutritional services referral to assist patient with adequate nutrition and appropriate food choices  Outcome: Progressing  Goal: Maintains adequate nutritional intake  Description  INTERVENTIONS:  - Monitor percentage of each meal consumed  - Identify factors contributing to decreased intake, treat as appropriate  - Assist with meals as needed  - Monitor I&O, weight, and lab values if indicated  - Obtain nutrition services referral as needed  Outcome: Progressing Problem: GENITOURINARY - ADULT  Goal: Urinary catheter remains patent  Description  INTERVENTIONS:  - Assess patency of urinary catheter  - If patient has a chronic cantor, consider changing catheter if non-functioning  - Follow guidelines for intermittent irrigation of non-functioning urinary catheter  Outcome: Progressing     Problem: METABOLIC, FLUID AND ELECTROLYTES - ADULT  Goal: Electrolytes maintained within normal limits  Description  INTERVENTIONS:  - Monitor labs and assess patient for signs and symptoms of electrolyte imbalances  - Administer electrolyte replacement as ordered  - Monitor response to electrolyte replacements, including repeat lab results as appropriate  - Instruct patient on fluid and nutrition as appropriate  Outcome: Progressing     Problem: SKIN/TISSUE INTEGRITY - ADULT  Goal: Incision(s), wounds(s) or drain site(s) healing without S/S of infection  Description  INTERVENTIONS  - Assess and document risk factors for skin impairment   - Assess and document dressing, incision, wound bed, drain sites and surrounding tissue  - Consider nutrition services referral as needed  - Oral mucous membranes remain intact  - Provide patient/ family education  Outcome: Progressing     Problem: HEMATOLOGIC - ADULT  Goal: Maintains hematologic stability  Description  INTERVENTIONS  - Assess for signs and symptoms of bleeding or hemorrhage  - Monitor labs  - Administer supportive blood products/factors as ordered and appropriate  Outcome: Progressing     Problem: MUSCULOSKELETAL - ADULT  Goal: Maintain or return mobility to safest level of function  Description  INTERVENTIONS:  - Assess patient's ability to carry out ADLs; assess patient's baseline for ADL function and identify physical deficits which impact ability to perform ADLs (bathing, care of mouth/teeth, toileting, grooming, dressing, etc )  - Assess/evaluate cause of self-care deficits   - Assess range of motion  - Assess patient's mobility  - Assess patient's need for assistive devices and provide as appropriate  - Encourage maximum independence but intervene and supervise when necessary  - Involve family in performance of ADLs  - Assess for home care needs following discharge   - OT consult to assist with ADL evaluation and planning for discharge  - Provide patient education as appropriate   Outcome: Progressing     Problem: Potential for Falls  Goal: Patient will remain free of falls  Description  INTERVENTIONS:  - Assess patient frequently for physical needs  -  Identify cognitive and physical deficits and behaviors that affect risk of falls    -  [place] fall precautions as indicated by assessment   - Educate patient/family on patient safety including physical limitations  - Instruct patient to call for assistance with activity based on assessment  - Modify environment to reduce risk of injury  - Consider OT/PT consult to assist with strengthening/mobility  Outcome: Progressing     Problem: Prexisting or High Potential for Compromised Skin Integrity  Goal: Skin integrity is maintained or improved  Description  INTERVENTIONS:  - Identify patients at risk for skin breakdown  - Assess and monitor skin integrity  - Assess and monitor nutrition and hydration status  - Monitor labs   - Assess for incontinence   - Turn and reposition patient  - Assist with mobility/ambulation  - Relieve pressure over bony prominences  - Avoid friction and shearing  - Provide appropriate hygiene as needed including keeping skin clean and dry  - Evaluate need for skin moisturizer/barrier cream  - Collaborate with interdisciplinary team   - Patient/family teaching  - Consider wound care consult   Outcome: Progressing

## 2019-08-25 NOTE — QUICK NOTE
Tmax over the past 24 hours 100 3F  CXR- no suspected pneumonia/atelectasis  Currently on cefdinir 300 mg BID to finish course tomorrow  WBC 7s--> 15s today  Repeated blood culture 8/24/2019-pending  Will re-consult ID  Repeat CBC in AM  Check UA and culture  Repeat procalcitonin is pending

## 2019-08-25 NOTE — PROGRESS NOTES
08/25/19 0846   Charting Type   Charting Type Shift assessment   Neurological   Neuro (WDL) X   Level of Consciousness Alert/awake   Orientation Level Oriented to place;Oriented to person;Oriented to situation;Disoriented to time   Cognition Follows commands   Extraocular Movements Full   Facial Symmetry Symmetrical   Swallow Able to swallow solids and liquids without difficulty   R Pupil Size (mm) 3   L Pupil Size (mm) 3   RUE Muscle Strength 4- Movement against gravity and limited resistance   LUE Muscle Strength 4- Movement against gravity and limited resistance   Neuro Symptoms Fatigue; Forgetful   Relieved by Rest   Abhijit Coma Scale   Eye Opening 4   Best Verbal Response 5   Best Motor Response 6   [place] Coma Scale Score 15   HEENT   HEENT (WDL) X   R Eye Mildly impaired vision   L Eye Mildly impaired vision   R Ear Hearing aid   L Ear Hearing aid   Teeth Dentures upper;Dentures lower   Respiratory   Respiratory (WDL) WDL   Respiratory Additional Assessments No   Respiratory Interventions   Respiratory Interventions Cough and deep breathe   Cough and Deep Breathe   Cough and Deep Breathe Yes   Cardiac   Cardiac (WDL) WDL   Cardiac Symptoms None   Chest Pain Present No   Pacemaker/ICD No   Peripheral Vascular   Peripheral Vascular (WDL) X   RLE Edema +1   LLE Edema +1   PVS Additional Assessments No   RUE Neurovascular Assessment   R Radial Pulse +2   LUE Neurovascular Assessment   L Radial Pulse +2   RLE Neurovascular Assessment   R Pedal Pulse +1   LLE Neurovascular Assessment   L Pedal Pulse +1   Integumentary   Integumentary (WDL) X   Skin Integrity Bruising   Skin Location rt hip , ribs   David Scale   Sensory Perceptions 3   Moisture 4   Activity 2   Mobility 3   Nutrition 3   Friction and Shear 2   David Scale Score 17   Wound 08/21/19 Pressure Injury Sacrum   Date First Assessed/Time First Assessed: 08/21/19 1600   Pre-Existing Wound: Yes  Primary Wound Type: Pressure Injury  Location: Sacrum Drainage Amount None   Dressing Foam, Silicon (eg  Allevyn, etc)   Dressing Status Clean; Intact;Dry   Musculoskeletal   Musculoskeletal (WDL) X   Level of Assistance Moderate assist, patient does 50-74%   Assistive Device Front wheel walker   RUE Limited movement   LUE Limited movement   Musculoskeletal Additional Assessments No   Gastrointestinal   Gastrointestinal (WDL) WDL   Last BM Date 08/25/19   GI Symptoms None   Gastrointestinal Additional Assessments No   Bowel Shift Assessment   Bowel Incontinence No   Stool Assessment   Bowel Incontinence No   Bladder Shift Assessment   Bladder Device Other (Comment)   Bladder Incontinence No;Use of device (Comment)   Bladder Management Other (Comment)   Bladder Management Deficit Emptied by staff   Urine Assessment   Urinary Incontinence No   Genitalia   Male Genitalia Intact   Genitourinary Additional Assessments   Genitourinary Additional Assessments No   Anal/Rectal   Anal/Rectal (WDL) WDL   Psychosocial   Psychosocial (WDL) X   Patient Behaviors/Mood Calm; Cooperative   Needs Expressed Physical   Ability to Express Feelings Able to express   Ability to Express Needs Needs assistance   Ability to Express Thoughts Needs assistance   Ability to Understand Others Usually understands   Cough   Cough None

## 2019-08-26 ENCOUNTER — APPOINTMENT (INPATIENT)
Dept: CT IMAGING | Facility: HOSPITAL | Age: 74
DRG: 947 | End: 2019-08-26
Payer: MEDICARE

## 2019-08-26 PROBLEM — D75.839 THROMBOCYTOSIS: Status: ACTIVE | Noted: 2019-08-26

## 2019-08-26 PROBLEM — D72.829 LEUKOCYTOSIS: Status: ACTIVE | Noted: 2019-08-24

## 2019-08-26 PROBLEM — E87.6 HYPOKALEMIA: Status: ACTIVE | Noted: 2019-08-26

## 2019-08-26 LAB
ALT SERPL W P-5'-P-CCNC: 59 U/L (ref 12–78)
ANION GAP SERPL CALCULATED.3IONS-SCNC: 8 MMOL/L (ref 4–13)
AST SERPL W P-5'-P-CCNC: 31 U/L (ref 5–45)
BASOPHILS # BLD AUTO: 0.1 THOUSANDS/ΜL (ref 0–0.1)
BASOPHILS NFR BLD AUTO: 1 % (ref 0–2)
BUN SERPL-MCNC: 17 MG/DL (ref 7–25)
CALCIUM SERPL-MCNC: 8.5 MG/DL (ref 8.6–10.5)
CHLORIDE SERPL-SCNC: 107 MMOL/L (ref 98–107)
CO2 SERPL-SCNC: 28 MMOL/L (ref 21–31)
CREAT SERPL-MCNC: 1.12 MG/DL (ref 0.7–1.3)
EOSINOPHIL # BLD AUTO: 0.1 THOUSAND/ΜL (ref 0–0.61)
EOSINOPHIL NFR BLD AUTO: 1 % (ref 0–5)
ERYTHROCYTE [DISTWIDTH] IN BLOOD BY AUTOMATED COUNT: 13.8 % (ref 11.5–14.5)
GFR SERPL CREATININE-BSD FRML MDRD: 63 ML/MIN/1.73SQ M
GLUCOSE SERPL-MCNC: 101 MG/DL (ref 65–99)
HCT VFR BLD AUTO: 27.3 % (ref 42–47)
HGB BLD-MCNC: 9 G/DL (ref 14–18)
LYMPHOCYTES # BLD AUTO: 2 THOUSANDS/ΜL (ref 0.6–4.47)
LYMPHOCYTES NFR BLD AUTO: 15 % (ref 21–51)
MCH RBC QN AUTO: 31.1 PG (ref 26–34)
MCHC RBC AUTO-ENTMCNC: 33 G/DL (ref 31–37)
MCV RBC AUTO: 94 FL (ref 81–99)
MONOCYTES # BLD AUTO: 0.7 THOUSAND/ΜL (ref 0.17–1.22)
MONOCYTES NFR BLD AUTO: 6 % (ref 2–12)
NEUTROPHILS # BLD AUTO: 10.5 THOUSANDS/ΜL (ref 1.4–6.5)
NEUTS SEG NFR BLD AUTO: 78 % (ref 42–75)
PLATELET # BLD AUTO: 402 THOUSANDS/UL (ref 149–390)
PMV BLD AUTO: 8.9 FL (ref 8.6–11.7)
POTASSIUM SERPL-SCNC: 3.3 MMOL/L (ref 3.5–5.5)
RBC # BLD AUTO: 2.89 MILLION/UL (ref 4.3–5.9)
SODIUM SERPL-SCNC: 143 MMOL/L (ref 134–143)
WBC # BLD AUTO: 13.4 THOUSAND/UL (ref 4.8–10.8)

## 2019-08-26 PROCEDURE — 85025 COMPLETE CBC W/AUTO DIFF WBC: CPT | Performed by: NURSE PRACTITIONER

## 2019-08-26 PROCEDURE — G0515 COGNITIVE SKILLS DEVELOPMENT: HCPCS

## 2019-08-26 PROCEDURE — 74176 CT ABD & PELVIS W/O CONTRAST: CPT

## 2019-08-26 PROCEDURE — 97535 SELF CARE MNGMENT TRAINING: CPT

## 2019-08-26 PROCEDURE — 97116 GAIT TRAINING THERAPY: CPT

## 2019-08-26 PROCEDURE — 80048 BASIC METABOLIC PNL TOTAL CA: CPT | Performed by: NURSE PRACTITIONER

## 2019-08-26 PROCEDURE — 97530 THERAPEUTIC ACTIVITIES: CPT

## 2019-08-26 PROCEDURE — G0427 INPT/ED TELECONSULT70: HCPCS | Performed by: INTERNAL MEDICINE

## 2019-08-26 PROCEDURE — 96125 COGNITIVE TEST BY HC PRO: CPT

## 2019-08-26 PROCEDURE — 99232 SBSQ HOSP IP/OBS MODERATE 35: CPT | Performed by: PHYSICAL MEDICINE & REHABILITATION

## 2019-08-26 PROCEDURE — 97537 COMMUNITY/WORK REINTEGRATION: CPT

## 2019-08-26 PROCEDURE — 97110 THERAPEUTIC EXERCISES: CPT

## 2019-08-26 RX ORDER — CEFDINIR 300 MG/1
300 CAPSULE ORAL EVERY 12 HOURS SCHEDULED
Status: DISCONTINUED | OUTPATIENT
Start: 2019-08-26 | End: 2019-08-26

## 2019-08-26 RX ORDER — POTASSIUM CHLORIDE 20 MEQ/1
40 TABLET, EXTENDED RELEASE ORAL 2 TIMES DAILY
Status: COMPLETED | OUTPATIENT
Start: 2019-08-26 | End: 2019-08-27

## 2019-08-26 RX ORDER — CEFTRIAXONE 2 G/50ML
2000 INJECTION, SOLUTION INTRAVENOUS EVERY 24 HOURS
Status: DISCONTINUED | OUTPATIENT
Start: 2019-08-26 | End: 2019-08-28

## 2019-08-26 RX ADMIN — CHLORHEXIDINE GLUCONATE 0.12% ORAL RINSE 15 ML: 1.2 LIQUID ORAL at 22:10

## 2019-08-26 RX ADMIN — CEFDINIR 300 MG: 300 CAPSULE ORAL at 09:13

## 2019-08-26 RX ADMIN — HEPARIN SODIUM 5000 UNITS: 5000 INJECTION, SOLUTION INTRAVENOUS; SUBCUTANEOUS at 22:10

## 2019-08-26 RX ADMIN — MORPHINE SULFATE 15 MG: 15 TABLET ORAL at 05:26

## 2019-08-26 RX ADMIN — POTASSIUM CHLORIDE 40 MEQ: 1500 TABLET, EXTENDED RELEASE ORAL at 22:44

## 2019-08-26 RX ADMIN — FERROUS SULFATE TAB 325 MG (65 MG ELEMENTAL FE) 325 MG: 325 (65 FE) TAB at 17:35

## 2019-08-26 RX ADMIN — GABAPENTIN 300 MG: 300 CAPSULE ORAL at 22:10

## 2019-08-26 RX ADMIN — MORPHINE SULFATE 15 MG: 15 TABLET ORAL at 17:35

## 2019-08-26 RX ADMIN — CHLORHEXIDINE GLUCONATE 0.12% ORAL RINSE 15 ML: 1.2 LIQUID ORAL at 09:13

## 2019-08-26 RX ADMIN — SERTRALINE HYDROCHLORIDE 50 MG: 50 TABLET ORAL at 09:13

## 2019-08-26 RX ADMIN — FERROUS SULFATE TAB 325 MG (65 MG ELEMENTAL FE) 325 MG: 325 (65 FE) TAB at 09:12

## 2019-08-26 RX ADMIN — HEPARIN SODIUM 5000 UNITS: 5000 INJECTION, SOLUTION INTRAVENOUS; SUBCUTANEOUS at 05:26

## 2019-08-26 RX ADMIN — VITAMIN D, TAB 1000IU (100/BT) 2000 UNITS: 25 TAB at 09:13

## 2019-08-26 RX ADMIN — CEFTRIAXONE 2000 MG: 2 INJECTION, SOLUTION INTRAVENOUS at 22:10

## 2019-08-26 RX ADMIN — FINASTERIDE 5 MG: 5 TABLET, FILM COATED ORAL at 09:13

## 2019-08-26 RX ADMIN — ACETAMINOPHEN 650 MG: 325 TABLET ORAL at 09:13

## 2019-08-26 NOTE — PROGRESS NOTES
08/26/19 1032   Pain Assessment   Pain Assessment No/denies pain   Pain Score No Pain   Restrictions/Precautions   Precautions Fall Risk;Contact/isolation;Limb alert  (VRE urine hx, recent R sh sx)   Grooming   Able To Initiate Tasks;Comb/Brush Hair;Wash/Dry Face;Brush/Clean Teeth;Wash/Dry Hands   Limitation Noted In Problem Solving; Safety   Grooming (FIM) 5 - Patient requires supervision/monitoring   Bathing   Assessed Bath Style Shower   Anticipated D/C Bath Style Shower;Sponge Bath   Able to Giovanny Nickolas No   Able to [employer] Temperature No   Able to Wash/Rinse/Dry (body part) Left Arm;Right Arm;L Upper Leg;R Upper Leg;L Lower Leg/Foot;R Lower Leg/Foot;Chest;Abdomen;Perineal Area   Limitations Noted in Balance; Endurance;ROM;Safety;Strength   Bathing (FIM) 4 - Patient completes 8/10 or 9/10 parts   Tub/Shower Transfer   Limitations Noted In Balance; Endurance;ROM;Safety;LE Strength   Adaptive Equipment Transfer Bench   Shower Transfer (FIM) 4 - Patient completes 75% of all tasks   Dressing/Undressing Clothing   Remove UB Clothes Pullover Shirt   Remove LB Clothes Pants; Undergarment;Socks   Don UB [address] [place] [address]; Undergarment;Socks; Shoes   Limitations Noted In Balance; Endurance;Problem Solving; Safety;ROM;Strength   Findings assist to thread catheter even though patient uses at home   UB Dressing (FIM) 4 - Patient completes 75% of all tasks   LB Dressing (FIM) 4 - Patient completes 75% of all tasks   Transfer Bed/Chair/Wheelchair   Limitations Noted In Balance; Endurance;LE Strength;Problem Solving   Stand Pivot Minimal Assist   Sit to Stand Supervision   Stand to Sit Supervision   Bed, Chair, Wheelchair Transfer (FIM) 4 - Patient completes 75% of all tasks   Cognition   Arousal/Participation Alert; Responsive; Cooperative   Attention Within functional limits   Orientation Level Oriented X4   Assessment   Treatment Assessment Pt participates in ADLs and transfers this morning agreeing to a shower  Pt requires assist due to decreased balance, safety, endurance and generalized strength  Pt requires assist to manage the catheter during dressing  Pt is making progress towards goals and will benefit from continued skilled OT services to increase independence with daily tasks  Pt has more confusion on Friday and over weekend with medication changes completed  11 minutes concurrent treatment completed during similar tasks as another to increase independence with daily tasks through improved strength and endurance  Problem List Decreased strength;Decreased range of motion;Decreased endurance; Impaired balance;Decreased safety awareness   Plan   Treatment/Interventions ADL retraining;Functional transfer training; Therapeutic exercise; Endurance training;Patient/family training; Compensatory technique education   Progress Progressing toward goals   OT Therapy Minutes   OT Time In 1032   OT Time Out 1132   OT Total Time (minutes) 60   OT Mode of treatment - Individual (minutes) 49   OT Mode of treatment - Concurrent (minutes) 11   Therapy Time missed   Time missed?  No

## 2019-08-26 NOTE — PROGRESS NOTES
08/26/19 0921   Charting Type   Charting Type Shift assessment   Neurological   Neuro (WDL) X   Level of Consciousness Alert/awake   Orientation Level Oriented X4   Cognition Follows commands   RUE Muscle Strength 5- Normal strength   LUE Muscle Strength 5- Normal strength   RLE Muscle Strength 5- Normal strength   LLE Muscle Strength 5- Normal strength   Neuro Symptoms Forgetful   Relieved by Rest   Neuro Additional Assessments No   [place] Coma Scale   Eye Opening 4   Best Verbal Response 5   Best Motor Response 6   Abhijit Coma Scale Score 15   HEENT   HEENT (WDL) X   Head and Face Symmetrical   R Eye Mildly impaired vision   L Eye Mildly impaired vision   Vision - Corrective Lenses (at bedside) Glasses   R Ear Hearing aid   L Ear Hearing aid   Teeth Dentures upper;Dentures lower   Respiratory   Respiratory (WDL) WDL   Bilateral Breath Sounds Clear   Respiratory Additional Assessments Yes   Respiratory Interventions   Respiratory Interventions Cough and deep breathe;Incentive spirometry   Cough and Deep Breathe   Cough and Deep Breathe Yes   Cardiac   Cardiac (WDL) WDL   Jugular Venous Distention (JVD) No   Cardiac Symptoms None   Chest Pain Present No   Pacemaker/ICD No   Peripheral Vascular   Peripheral Vascular (WDL) X   Edema   (+2 edema both feet and ankles)   RLE Edema Trace   LLE Edema Trace   PVS Additional Assessments No   RUE Neurovascular Assessment   R Radial Pulse +2   LUE Neurovascular Assessment   L Radial Pulse +2   RLE Neurovascular Assessment   R Pedal Pulse +1   LLE Neurovascular Assessment   L Pedal Pulse +1   Integumentary   Integumentary (WDL) X   Skin Integrity Bruising   Skin Location scattered   Wound 08/21/19 Pressure Injury Sacrum   Date First Assessed/Time First Assessed: 08/21/19 1600   Pre-Existing Wound: Yes  Primary Wound Type: Pressure Injury  Location: Sacrum   Wound Description Clean;Dry;Pink   Staging Stage II  (healed)   Cindy-wound Assessment Pink   Drainage Amount None Dressing Foam, Silicon (eg  Allevyn, etc)   Dressing Changed New  (applied after shower)   Patient Tolerance Tolerated well   Dressing Status Clean;Dry; Intact   Musculoskeletal   Musculoskeletal (WDL) X   Level of Assistance Contact guard assist, steadying assist   RUE Limited movement   LUE Limited movement   Musculoskeletal Additional Assessments No   Gastrointestinal   Gastrointestinal (WDL) WDL   Last BM Date 08/26/19   Gastrointestinal Additional Assessments No   Bowel Shift Assessment   Bowel Incontinence No   Stool Assessment   Bowel Incontinence No   Genitourinary   Genitourinary (WDL) X   Genitourinary Symptoms Indwelling catheter   Bladder Shift Assessment   Bladder Device Other (Comment)  (cantor cath)   Bladder Incontinence No;Use of device (Comment)   Urine Assessment   Urinary Incontinence No   Urine Color Yellow/straw   Genitalia   Male Genitalia Intact   Genitourinary Additional Assessments   Genitourinary Additional Assessments No   Anal/Rectal   Anal/Rectal (WDL) WDL   Psychosocial   Psychosocial (WDL) WDL   Patient Behaviors/Mood Brightens with approach; Cooperative;Calm   Needs Expressed Denies   Ability to Express Feelings Able to express   Ability to Express Needs Needs assistance   Ability to Express Thoughts Needs assistance   Ability to Understand Others Usually understands   Cough   Cough None

## 2019-08-26 NOTE — PCC OCCUPATIONAL THERAPY
8/26/19: Pt participates in ADLs, transfers and BUE therex  Pt requires assist due to decreased balance, safety, endurance and BUE strength with a recent R shoulder surgery  Pt demonstrates confusion over the weekend however is able to recall orientation questions and attends to tasks without difficult this day  Pt's current LOF as listed above and will benefit from continued skilled OT services to increase independence with daily tasks  Total assist for toileting due to catheter use with assist to manage  9/3/19:  Pt participates in ADLs, transfers and BUE therex  Pt requires assist due to decreased balance, safety, endurance and BUE strength with a recent R shoulder surgery and B hand pain  Pt is making gains towards goals and current LOF as listed above and will benefit from continued skilled OT services to increase independence with daily tasks  Daughter is able to provide assist as needed

## 2019-08-26 NOTE — PCC PHYSICAL THERAPY
8/26/19: Pt is making good progress towards PT goals  Current level of function includes min A for bed mobility & transfers, CG while ambulating 248' with RW, and CGA while navigating 10 steps with B/L HR  Pt remains limited in strength, endurance, balance, cognition at times, and decreased safety  Pt will continue to benefit from skilled physical therapy to improve overall independence with functional mobility tasks  9/2/19: Pt is making good progress towards PT goals  Current level of function includes S for bed mobility & transfers, Close S while ambulating 155' with RW, and CGA while navigating 15 steps with B/L HR  Pt remains limited in strength, endurance, balance, cognition at times, and decreased safety  Pt will continue to benefit from skilled physical therapy to improve overall independence with functional mobility tasks

## 2019-08-26 NOTE — PROGRESS NOTES
08/26/19 0925   Pain Assessment   Pain Assessment No/denies pain   Pain Score No Pain   Restrictions/Precautions   Precautions Contact/isolation;Limb alert; Fall Risk  (hx VRE urine, recent R shoulder sx )   Cognition   Arousal/Participation Alert; Cooperative   Attention Within functional limits   Following Commands Follows one step commands without difficulty   Transfer Bed/Chair/Wheelchair   Limitations Noted In Balance; Endurance;LE Strength;UE Strength   Adaptive Equipment Roller Walker   Sit to Stand Minimal Assist   Stand to Sit Minimal Assist   Supine to Sit Minimal Assist;Assist x 1   Sit to Supine Minimal Assist;Assist x 1   All Transfer Minimal Assist   Bed, Chair, Wheelchair Transfer (FIM) 4 - Patient completes 75% of all tasks   Ambulation   Primary Discharge Mode of Locomotion Walk   Walk Assist Level Contact Guard   Gait Pattern Slow [place]; Forward Flexion   Assist Device Roller Susan Teixeira Walked (feet)   (248')   Limitations Noted In Balance; Endurance; Safety;Posture;Speed;Strength   Findings   (cueing for correct spacing with RW)   Walking (FIM) 4 - Patient requires steadying assist or light touching AND distance 150 feet or more, no rest   Stairs   Type Stairs   # of Steps   (10)   Assist Devices Bilateral Rail   Findings   (CGA)   Stairs (FIM) 2 - Patient goes up and down 4 - 11 stairs regardless of assist/device/setup   Therapeutic Interventions   Strengthening Seated TE 3 x 10    Other Gait training, transfer training, community re-integration    Assessment   Treatment Assessment Pt is making good progress towards therapy goals  Completed therapy consisting of transfer training, gait training, community re-integration, and seated TE with focus on improving general LE strength, balance, endurance, attention to tasks, and safety with functional mobility activities  Completed gait training for improved distance of 248' with RW and CGA   Completed 10 steps today with CGA using B/L HR with good balance and no incident  Assisted Pt with toilet transfer during the therapy session with min A x 1  Pt will benefit from continued skilled PT to increased independence with functional mobility tasks  PT Barriers   Physical Impairment Decreased strength;Decreased endurance; Impaired balance;Decreased mobility;Pain;Decreased safety awareness   Functional Limitation Standing;Transfers; Walking;Stair negotiation   Plan   Treatment/Interventions Functional transfer training;LE strengthening/ROM; Therapeutic exercise; Endurance training;Bed mobility;Gait training;Equipment eval/education;Patient/family training; Compensatory technique education   Progress Progressing toward goals   PT Therapy Minutes   PT Time In 0925   PT Time Out 1029   PT Total Time (minutes) 64   PT Mode of treatment - Individual (minutes) 64   PT Mode of treatment - Concurrent (minutes) 0   PT Mode of treatment - Group (minutes) 0   PT Mode of treatment - Co-treat (minutes) 0   PT Mode of Teatment - Total time(minutes) 64 minutes   Therapy Time missed   Time missed?  No

## 2019-08-26 NOTE — PROGRESS NOTES
08/25/19 2015   Charting Type   Charting Type Shift assessment   Neurological   Level of Consciousness Alert/awake   Orientation Level Oriented X4   Cognition Follows commands   Speech Clear   R Hand  Strong   L Hand  Strong   RUE Muscle Strength 5- Normal strength   LUE Muscle Strength 5- Normal strength   RLE Muscle Strength 5- Normal strength   LLE Muscle Strength 5- Normal strength   Neuro Symptoms Forgetful   [place] Coma Scale   Eye Opening 4   Best Verbal Response 5   Best Motor Response 6   [place] Coma Scale Score 15   HEENT   R Ear Hearing aid   L Ear Hearing aid   Teeth Dentures upper;Dentures lower   Respiratory   Respiratory (WDL) WDL   Cardiac   Cardiac (WDL) WDL   Peripheral Vascular   Cyanosis None   Edema Right lower extremity; Left lower extremity   RLE Edema +1   LLE Edema +1   Integumentary   Skin Color Appropriate for ethnicity   Skin Condition/Temp Warm;Dry   Skin Integrity Bruising   Skin Location Right hip/ribs   Wound 08/21/19 Pressure Injury Sacrum   Date First Assessed/Time First Assessed: 08/21/19 1600   Pre-Existing Wound: Yes  Primary Wound Type: Pressure Injury  Location: Sacrum   Wound Description AGL   Dressing Foam, Silicon (eg   Allevyn, etc)   Dressing Status Intact   Musculoskeletal   RUE Full movement   LUE Full movement   RLE Full movement   LLE Full movement   Gastrointestinal   Gastrointestinal (WDL) WDL   Genitourinary   Genitourinary (WDL) X   Genitourinary Symptoms Indwelling catheter   Urine Assessment   Urine Color Yellow/straw   Urine Appearance Cloudy   Psychosocial   Psychosocial (WDL) WDL

## 2019-08-26 NOTE — TELEMEDICINE
Consultation - Infectious Disease   Dillon Arredondo 66 y o  male MRN: [id number]  Unit/Bed#: [facility] 213-01 Encounter: [id number]      Inpatient consult to Infectious Diseases  Consult performed by: Loida Parry MD  Consult ordered by: UBALDO George            REQUIRED DOCUMENTATION:     1  This service was provided via Telemedicine  2  Provider located at Home  3  TeleMed provider: Loida Parry MD   4  Identify all parties in room with patient during tele consult:  RN  5  After connecting through [employer], patient was identified by name and date of birth and assistant checked wristband  Patient was then informed that this was a Telemedicine visit and that the exam was being conducted confidentially over secure lines  My office door was closed  No one else was in the room  Patient acknowledged consent and understanding of privacy and security of the Telemedicine visit, and gave us permission to have the assistant stay in the room in order to assist with the history and to conduct the exam   I informed the patient that I have reviewed their record in Epic and presented the opportunity for them to ask any questions regarding the visit today  The patient agreed to participate  Assessment/Recommendations     57-year-old male with the BPH, chronic indwelling presented with sepsis, E coli bacteremia secondary to suspected complicated UTI  Hospital course complicated by fever  1  Recent sepsis, E coli bacteremia  - patient admitted on 8/15 with sepsis, noted to have E coli bacteremia  - source of bacteremia presumed to be from urinary source given recent catheter change, suprapubic pain, pyuria  Urine culture grew mixed contaminants and E coli was not specifically speciated  - clinically improved on discharge, subsequently developed recurrent fever, leukocytosis  Repeat blood cultures negative till date, afebrile today with improving white count     · Patient has completed oral cefdinir  Recommend switching to iv Ceftriaxone 2g q24h pending additional tests  · Follow repeat blood and urine cultures  · Recommend checking TTE and repeat CT abdomen/pelvis    2  Fever  - Patient developed fever to 102 8 on day 10 of antibiotics  Had no other localizing symptoms  - Repeat blood cultures are negative  - Would need to rule out relapsed bacteremia from a protected focus of infection or a hospital acquired infection including recurrent UTI  Blood cultures are negative till date    · Management as above    3  Complicated UTI, non obstructing renal stone, BPH with indwelling Cantor  - Patient presented with symptoms of complicated UTI/bacteremia/sepsis following recent catheter change  Renal US noted a non obstructing renal stone  - Now with recurrent fever, abdominal discomfort    · Management as above, monitor clinical course will    Thank you for involving me in the care of your patient  Please call with questions, change in clinical status or if tests recommended above are abnormal      Discussed with the primary service  History     Reason for Consult: Bacteremia  HPI: Johanna Vogt is a 66y o  year old male with BPH, indwelling cantor  He presented to the ER on 8/15 with confusion, fever and fall after a routine cantor change  Blood cultures from admission grew E coli, urine cultures grew multiple spp (mixed contaminants)  Renal ultrasound showed no evidence of obstruction but noted a non obstructing R renal stone  He was started on broad spectrum antibiotics and subsequently transitioned to oral cefdinir for a total of 10 days and discharged to an acute rehab unit on 8/21  He was doing relatively well until 8/24 when he developed a fever to 102 8 with new leukocytosis to 15 8  He was continued on cefdinir  Repeat cultures were checked and blood cultures have been negative till date  He has subsequently been afebrile  He offers no complaints today    Denies nausea, vomiting, diarrhea or rash and is tolerating antibiotics well  Infectious disease is being consulted for diagnostic work up and antibiotic management  Review of Systems  A [id number] point system-based review of systems is otherwise negative  PAST MEDICAL HISTORY:  Past Medical History:   Diagnosis Date    Depression     Diallo catheter in place     Hypertension     Prostate enlargement     Psychiatric disorder     Urinary tract infection      Past Surgical History:   Procedure Laterality Date    ARTHROSCOPY KNEE      BACK SURGERY      L 4 or 5    CATARACT EXTRACTION Bilateral     CYSTOSCOPY W/ LASER LITHOTRIPSY N/A 2018    Procedure: LITHOTRISPY HOLMIUM LASER of bladder stones;  Surgeon: Enrique Dixon MD;  Location: [facility] MAIN OR;  Service: Urology    WI TRANSURETHRAL ELEC-SURG PROSTATECTOM N/A 2018    Procedure: Yvone Collie; TURP;  Surgeon: Enrique Dixon MD;  Location: [facility] MAIN OR;  Service: Urology    SHOULDER SURGERY Right 2019       FAMILY HISTORY:  Non-contributory    SOCIAL HISTORY:  Social History     Social History     Substance and Sexual Activity   Alcohol Use Yes    Frequency: Monthly or less    Drinks per session: 1 or 2    Binge frequency: Less than monthly     Social History     Substance and Sexual Activity   Drug Use No     Social History     Tobacco Use   Smoking Status Former Smoker    Packs/day: 1 00   Smokeless Tobacco Never Used   Tobacco Comment    quit 50 years ago       ALLERGIES:  No Known Allergies    MEDICATIONS:  All current active medications have been reviewed      Physical Exam     Temp:  [98 1 °F (36 7 °C)-99 9 °F (37 7 °C)] 98 1 °F (36 7 °C)  HR:  [69-80] 69  Resp:  [18-20] 18  BP: (110-120)/(60-74) 120/60  SpO2:  [90 %-92 %] 92 %  Temp (24hrs), Av 9 °F (37 2 °C), Min:98 1 °F (36 7 °C), Max:99 9 °F (37 7 °C)  Current: Temperature: 98 1 °F (36 7 °C)    Intake/Output Summary (Last 24 hours) at 2019 1007  Last data filed at 2019 0853  Gross per 24 hour   Intake 440 ml Output 1501 ml   Net -1061 ml     Physical exam findings reported by bedside and primary medical team staff    General Appearance:  Appearing well, nontoxic, and in no distress, appears stated age   Head:  Normocephalic, without obvious abnormality, atraumatic   Eyes:  PERRL, conjunctiva pink and sclera anicteric, both eyes   Nose: Nares normal, mucosa normal, no drainage   Throat: Oropharynx moist without lesions; lips, mucosa, and tongue normal; teeth and gums normal   Neck: Supple, symmetrical, trachea midline, no adenopathy, no tenderness/mass/nodules   Back:   Symmetric, no curvature, ROM normal, no CVA tenderness   Lungs:   Clear to auscultation bilaterally, no audible wheezes, rhonchi and rales, respirations unlabored   Chest Wall:  No tenderness or deformity   Heart:  Regular rate and rhythm, S1, S2 normal, no murmur, rub or gallop   Abdomen:   Soft, non-tender, non-distended, positive bowel sounds, no masses, no organomegaly    No CVA tenderness  Diallo in place draining cloudy urine   Extremities: Extremities normal, atraumatic, no cyanosis, clubbing or edema   Skin: Skin color, texture, turgor normal, no rashes or lesions  No draining wounds noted  Lymph nodes: Cervical, supraclavicular, and axillary nodes normal   Neurologic: Alert and oriented times 3, extremity strength 5/5 and symmetric       Invasive Devices:   Urethral Catheter (Active)   Reasons to continue Urinary Catheter  Chronic urinary catheter 8/24/2019 11:55 PM   Site Assessment Clean;Skin intact 8/20/2019 10:02 AM   Collection Container Standard drainage bag 8/24/2019 11:55 PM   Securement Method Securing device (Describe) 8/24/2019 11:55 PM   Output (mL) 700 mL 8/26/2019  5:00 AM       Labs, Imaging, & Other Studies     Lab Results:    I have personally reviewed pertinent labs      Results from last 7 days   Lab Units 08/26/19  0433 08/25/19  0438 08/21/19  0501   WBC Thousand/uL 13 40* 15 80* 7 50   HEMOGLOBIN g/dL 9 0* 9 7* 9 9* PLATELETS Thousands/uL 402* 377 195     Results from last 7 days   Lab Units 08/26/19  0433  08/20/19  0534   POTASSIUM mmol/L 3 3*   < > 3 9   CHLORIDE mmol/L 107   < > 109*   CO2 mmol/L 28   < > 22   BUN mg/dL 17   < > 25   CREATININE mg/dL 1 12   < > 1 32*   EGFR ml/min/1 73sq m 63   < > 52   CALCIUM mg/dL 8 5*   < > 8 5*   AST U/L  --   --  62*   ALT U/L  --   --  70*   ALK PHOS U/L  --   --  154    < > = values in this interval not displayed  Results from last 7 days   Lab Units 08/24/19  0831 08/24/19  0830   BLOOD CULTURE  No Growth at 24 hrs  No Growth at 24 hrs  Imaging Studies:   I have personally reviewed pertinent imaging study reports and images in PACS  EKG, Pathology, and Other Studies:   I have personally reviewed pertinent reports  Counseling/Coordination of care: Total 70 minutes communication with the patient via telehealth  Labs, medical tests and imaging studies were independently reviewed by me as noted above in HPI and old records were obtained and summarized as noted above in HPI

## 2019-08-26 NOTE — PROGRESS NOTES
ID consult was done early afternoon , see all new orders  Tolerating therapy  Continue same plan of care  Contact precautions maintained

## 2019-08-27 ENCOUNTER — APPOINTMENT (INPATIENT)
Dept: NON INVASIVE DIAGNOSTICS | Facility: HOSPITAL | Age: 74
DRG: 947 | End: 2019-08-27
Payer: MEDICARE

## 2019-08-27 LAB — BACTERIA UR CULT: ABNORMAL

## 2019-08-27 PROCEDURE — 97530 THERAPEUTIC ACTIVITIES: CPT

## 2019-08-27 PROCEDURE — 93308 TTE F-UP OR LMTD: CPT | Performed by: INTERNAL MEDICINE

## 2019-08-27 PROCEDURE — 93325 DOPPLER ECHO COLOR FLOW MAPG: CPT | Performed by: INTERNAL MEDICINE

## 2019-08-27 PROCEDURE — 97535 SELF CARE MNGMENT TRAINING: CPT

## 2019-08-27 PROCEDURE — 99233 SBSQ HOSP IP/OBS HIGH 50: CPT | Performed by: PHYSICAL MEDICINE & REHABILITATION

## 2019-08-27 PROCEDURE — 97110 THERAPEUTIC EXERCISES: CPT

## 2019-08-27 PROCEDURE — 97537 COMMUNITY/WORK REINTEGRATION: CPT

## 2019-08-27 PROCEDURE — 93308 TTE F-UP OR LMTD: CPT

## 2019-08-27 PROCEDURE — 97116 GAIT TRAINING THERAPY: CPT

## 2019-08-27 PROCEDURE — 93321 DOPPLER ECHO F-UP/LMTD STD: CPT | Performed by: INTERNAL MEDICINE

## 2019-08-27 PROCEDURE — G0515 COGNITIVE SKILLS DEVELOPMENT: HCPCS

## 2019-08-27 RX ADMIN — SERTRALINE HYDROCHLORIDE 50 MG: 50 TABLET ORAL at 09:39

## 2019-08-27 RX ADMIN — CEFTRIAXONE 2000 MG: 2 INJECTION, SOLUTION INTRAVENOUS at 21:31

## 2019-08-27 RX ADMIN — HEPARIN SODIUM 5000 UNITS: 5000 INJECTION, SOLUTION INTRAVENOUS; SUBCUTANEOUS at 05:55

## 2019-08-27 RX ADMIN — FINASTERIDE 5 MG: 5 TABLET, FILM COATED ORAL at 09:39

## 2019-08-27 RX ADMIN — MORPHINE SULFATE 15 MG: 15 TABLET ORAL at 12:24

## 2019-08-27 RX ADMIN — HEPARIN SODIUM 5000 UNITS: 5000 INJECTION, SOLUTION INTRAVENOUS; SUBCUTANEOUS at 21:15

## 2019-08-27 RX ADMIN — GABAPENTIN 300 MG: 300 CAPSULE ORAL at 21:15

## 2019-08-27 RX ADMIN — MORPHINE SULFATE 15 MG: 15 TABLET ORAL at 23:57

## 2019-08-27 RX ADMIN — MORPHINE SULFATE 15 MG: 15 TABLET ORAL at 05:55

## 2019-08-27 RX ADMIN — VITAMIN D, TAB 1000IU (100/BT) 2000 UNITS: 25 TAB at 09:39

## 2019-08-27 RX ADMIN — CHLORHEXIDINE GLUCONATE 0.12% ORAL RINSE 15 ML: 1.2 LIQUID ORAL at 09:39

## 2019-08-27 RX ADMIN — HEPARIN SODIUM 5000 UNITS: 5000 INJECTION, SOLUTION INTRAVENOUS; SUBCUTANEOUS at 14:11

## 2019-08-27 RX ADMIN — FERROUS SULFATE TAB 325 MG (65 MG ELEMENTAL FE) 325 MG: 325 (65 FE) TAB at 17:24

## 2019-08-27 RX ADMIN — CHLORHEXIDINE GLUCONATE 0.12% ORAL RINSE 15 ML: 1.2 LIQUID ORAL at 21:15

## 2019-08-27 RX ADMIN — FERROUS SULFATE TAB 325 MG (65 MG ELEMENTAL FE) 325 MG: 325 (65 FE) TAB at 09:39

## 2019-08-27 RX ADMIN — MORPHINE SULFATE 15 MG: 15 TABLET ORAL at 00:38

## 2019-08-27 RX ADMIN — POTASSIUM CHLORIDE 40 MEQ: 1500 TABLET, EXTENDED RELEASE ORAL at 09:39

## 2019-08-27 RX ADMIN — MORPHINE SULFATE 15 MG: 15 TABLET ORAL at 17:24

## 2019-08-27 NOTE — SPEECH THERAPY NOTE
Narciso Wheat is a 66 y o  male who presented to the [facility] with AMS 2/2 to urosepsis which was treated with abx  Course complicated by RF which was treated with IVF and elevated troponins which cardiology attributed to sepsis  Prior to admission, patient was essentially independent with mobility, transfers and ADL's  Blu Emery lives with his daughter in a single family home  First floor set up is available and there are 3 steps to enter the home  Skilled speech assessment was completed  Patient presents with essentially adequate oral motor function for speech/communication skills and management of regular solids with thin liquids  Speech comprehension and expression at the multi step level for understanding directions and verbal sequencing of procedures and events is supervision level  Cognitively, patient presents as supervision for memory with new information and reasoning through problems taking into account his level of necessary assistance as well as reasoning his current situation  Reasoning also affects self feed and PO intake as patient currently benefits from encouragement and a dining room setting to ensure adequate nutritional needs are met  Short term skilled Speech is indicated focusing on the above  Expected outcome is Mod I to Independent    Patient has participated in skilled speech therapy focusing on speech cognitive communication skills  Current appropriate diet texture is regular with thin liquids in a self feed setting  Speech comprehension for directions at the multi step level is Mod I  Speech expression to convey complex ideas and verbal sequencing is Mod I with familiar event and tasks  Cognitively, patient presents as supervision to Mod I level of memory with new information and reasoning the current level of assistance for understanding when he needs to ask for help

## 2019-08-27 NOTE — ASSESSMENT & PLAN NOTE
- WBC now WNL   - however did have fever but has been afebrile since 8/25  - in acute care had bacteremia which was felt to be 2/2 to urinary tract infection in the setting of recent change of chronic indwelling cantor catheter therefore UA repeated on 8/25 which was unimpressive for UTI and UCx grew 9905-2346 cfu/ml serratia sp d/w Dr Sergio Vincent of ID and does not feel this is a clinically significant finding  - chest XR negative for PNA  - original recommendation in acute care was to leave current cantor to remain in place per urology recs as it was changed prior to acute care admission and also d/w ID if cantor should be changed based on UCx of only 0500-6928 cfu/ml serratia sp and they feel that amount of growth does not warrant a cantor change at this time beyond routine cantor change that patient has done regularly once a month as OP with Dr Kelsey Epley   - blood cx x 2 of 8/24 finalized as no growth  - per Dr Edita Vaughn ID consult recommended CT A/P which was unrevealing and TTE which did not show any valvular vegetation therefore d/w Dr Sergio Vincent has recommended no further abx at this time

## 2019-08-27 NOTE — NURSING NOTE
Patient alert and oriented this shift  Medicated for pain in back as ordered with relief noted  Transfers with assist times one with walker  IV flushes to left forearm without difficulty  Patient continues on contact precautions  Cantor catheter draining yellow urine  Education provided about cantor cath care at home  Compliant with SCDs  Will continue to monitor

## 2019-08-27 NOTE — PROGRESS NOTES
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; recent R shoulder sx )   Cognition   Overall Cognitive Status WFL   Following Commands Follows all commands and directions without difficulty   Transfer Bed/Chair/Wheelchair   Limitations Noted In Balance; Endurance;UE Strength;LE Strength   Adaptive Equipment Roller Walker   Stand Pivot Minimal Assist   Sit to Stand Minimal Assist   Stand to Sit Minimal Assist   All Transfer Minimal Assist   Bed, Chair, Wheelchair Transfer (FIM) 4 - Patient completes 75% of all tasks   Ambulation   Primary Discharge Mode of Locomotion Walk   Walk Assist Level Contact Guard   Gait Pattern Slow [place]; Forward Flexion   Assist Device Roller Walker   Distance Walked (feet)   (711' 163' )   Limitations Noted In Balance; Endurance;Posture; Safety;Speed;Strength   Walking (FIM) 4 - Patient requires steadying assist or light touching AND distance 150 feet or more, no rest   Stairs   Type Stairs; Ramp   # of Steps   (10)   Assist Devices Bilateral Rail   Findings   (CGA)   Stairs (FIM) 2 - Patient goes up and down 4 - 11 stairs regardless of assist/device/setup   Therapeutic Interventions   Strengthening Standing TE each performed to fatigue   Other gait training, transfer training, community re-integration    Assessment   Treatment Assessment Pt agreeable to participate in therapy this AM  Completed gait training, transfer training, community re-integration, and standing/sitting TE with focus on improving strength/ROM, balance, activity tolerance, and safety  Worked on transfer training out of different height chairs with verbal and tactile cueing for improved sit/stand transfers  Completed gait training today with max distance being 231' using RW and CGA   Worked on community re-integration tasks in preparation for discharge to home including stair/ramp training with CGA and cueing for correct technique  Pt will benefit from additional skilled PT to maximize safety and LOF  PT Barriers   Physical Impairment Decreased strength;Decreased range of motion;Decreased endurance; Impaired balance;Decreased mobility;Pain   Functional Limitation Standing;Transfers; Walking; Wheelchair management;Stair negotiation   Plan   Treatment/Interventions Functional transfer training;LE strengthening/ROM; Therapeutic exercise; Endurance training;Patient/family training;Bed mobility;Gait training   Progress Progressing toward goals   PT Therapy Minutes   PT Time In 1133   PT Time Out 1239   PT Total Time (minutes) 66   PT Mode of treatment - Individual (minutes) 31   PT Mode of treatment - Concurrent (minutes) 35   PT Mode of treatment - Group (minutes) 0   PT Mode of treatment - Co-treat (minutes) 0   PT Mode of Teatment - Total time(minutes) 66 minutes   Therapy Time missed   Time missed?  No

## 2019-08-27 NOTE — PROGRESS NOTES
08/26/19 2224   Charting Type   Charting Type Shift assessment   Neurological   Level of Consciousness Alert/awake   Orientation Level Oriented X4   Cognition Follows commands   Speech Clear   Swallow Able to swallow solids and liquids without difficulty   R Hand  Strong   L Hand  Strong   RUE Muscle Strength 5- Normal strength   LUE Muscle Strength 5- Normal strength   RLE Muscle Strength 5- Normal strength   LLE Muscle Strength 5- Normal strength   Neuro Symptoms Forgetful   Osprey Coma Scale   Eye Opening 4   Best Verbal Response 5   Best Motor Response 6   Abhijit Coma Scale Score 15   HEENT   Vision - Corrective Lenses (at bedside) Glasses   R Ear Hearing aid   L Ear Hearing aid   Teeth Dentures upper;Dentures lower   Respiratory   Respiratory (WDL) WDL   Cardiac   Cardiac (WDL) WDL   Peripheral Vascular   Cyanosis None   Edema Right lower extremity; Left lower extremity   RLE Edema +1   LLE Edema +1   Integumentary   Skin Color Appropriate for ethnicity   Skin Condition/Temp Warm;Dry   Skin Integrity Bruising   Skin Location Right hip/ribs   Wound 08/21/19 Pressure Injury Sacrum   Date First Assessed/Time First Assessed: 08/21/19 1600   Pre-Existing Wound: Yes  Primary Wound Type: Pressure Injury  Location: Sacrum   Wound Description GAL   Dressing Foam, Silicon (eg   Allevyn, etc)   Dressing Status Intact   Musculoskeletal   RUE Full movement   LUE Full movement   RLE Full movement   LLE Full movement   Gastrointestinal   Gastrointestinal (WDL) WDL   Genitourinary   Genitourinary (WDL) X   Genitourinary Symptoms Indwelling catheter   Urine Assessment   Urine Color Yellow/straw   Urine Appearance Cloudy   Psychosocial   Psychosocial (WDL) WDL

## 2019-08-27 NOTE — ASSESSMENT & PLAN NOTE
- mildly elevated and stable at 468  -IM monitoring and have cleared patient for dc with scrip for CBC with results to PCP

## 2019-08-27 NOTE — PCC SPEECH THERAPY
Jenine Kocher is a 66 y o  male who presented to the [address] [place] [address] with AMS 2/2 to urosepsis which was treated with abx  Course complicated by RF which was treated with IVF and elevated troponins which cardiology attributed to sepsis  Prior to admission, patient was essentially independent with mobility, transfers and ADL's  Reino Shells lives with his daughter in a single family home  First floor set up is available and there are 3 steps to enter the home  Skilled speech assessment was completed  Patient presents with essentially adequate oral motor function for speech/communication skills and management of regular solids with thin liquids  Speech comprehension and expression at the multi step level for understanding directions and verbal sequencing of procedures and events is supervision level  Cognitively, patient presents as supervision for memory with new information and reasoning through problems taking into account his level of necessary assistance as well as reasoning his current situation  Reasoning also affects self feed and PO intake as patient currently benefits from encouragement and a dining room setting to ensure adequate nutritional needs are met  Short term skilled Speech is indicated focusing on the above  Expected outcome is Mod I to Independent    9/2/2019  Patient is participating in skilled speech therapy focusing on speech cognitive communication skills  Current appropriate diet texture is regular with thin liquids in a self feed setting  Speech comprehension for directions at the multi step level is supervision level  Speech expression to convey complex ideas and verbal sequencing is Mod I with familiar event and tasks  Cognitively, patient presents as supervision level of memory with new information and reasoning the current level of assistance for understanding when he needs to ask for help

## 2019-08-27 NOTE — TEAM CONFERENCE
Acute RehabilitationTeam Conference Note  Date: 8/27/2019   Time: 10:52 AM       Patient Name:  Erinn Vo       Medical Record Number: [id number]   YOB: 1945  Sex: Male          Room/Bed:  /[facility] 213-01  Payor Info:  Payor: Lana Marcano / Plan: MEDICARE A AND B / Product Type: Medicare A & B Fee for Service /      Admitting Diagnosis: Debility [R53 81]   Admit Date/Time:  8/21/2019  3:10 PM  Admission Comments: No comment available     Primary Diagnosis:  Chronic pain syndrome  Principal Problem: Chronic pain syndrome    Patient Active Problem List    Diagnosis Date Noted    Thrombocytosis ([address] ) 08/26/2019    Hypokalemia 08/26/2019    Leukocytosis 08/24/2019    Elevated troponin 08/21/2019    CKD (chronic kidney disease) 08/21/2019    Anemia 08/21/2019    Transaminitis 08/21/2019    Depression 08/21/2019    Chronic pain syndrome 08/16/2019    Chronic indwelling Diallo catheter 08/02/2019       Physical Therapy:    Weight Bearing Status: Full Weight Bearing  Transfers: Minimal Assistance  Bed Mobility: Minimal Assistance  Amulation Distance (ft): 248 feet  Ambulation: Contact Guard  Number of Stairs: 10  Assistive Device for Stairs: Bilateral Hand Rails  Stair Assistance: Contact Guard  Discharge Recommendations: [facility]:  [address] [facility] with[de-identified] Family Support, First Floor Setup, Home Physical Therapy    8/26/19: Pt is making good progress towards PT goals  Current level of function includes min A for bed mobility & transfers, CG while ambulating 248' with RW, and CGA while navigating 10 steps with B/L HR  Pt remains limited in strength, endurance, balance, cognition at times, and decreased safety  Pt will continue to benefit from skilled physical therapy to improve overall independence with functional mobility tasks         Occupational Therapy:  Eating: Modified Independent  Grooming: Supervision  Bathing: Minimal Assistance  Bathing: Minimal Assistance  Upper Body Dressing: Minimal Assistance  Lower Body Dressing: Minimal Assistance  Toileting: Total Assistance  Tub/Shower Transfer: Minimal Assistance  Toilet Transfer: Minimal Assistance  Cognition: Within Defined Limits  Orientation: Person, Place, Time, Situation  Discharge Recommendations: Home with:  [address] A[address] with[de-identified] Family Support, First Floor Setup, Home Occupational Therapy       8/26/19: Pt participates in ADLs, transfers and BUE therex  Pt requires assist due to decreased balance, safety, endurance and BUE strength with a recent R shoulder surgery  Pt demonstrates confusion over the weekend however is able to recall orientation questions and attends to tasks without difficult this day  Pt's current LOF as listed above and will benefit from continued skilled OT services to increase independence with daily tasks  Total assist for toileting due to catheter use with assist to manage  Speech Therapy:  Mode of Communication: Verbal  Cognition: Exceptions to WNL  Cognition: Decreased Executive Functions, Decreased Comprehension, Decreased Safety  Orientation: Person, Place, Time, Situation  Swallowing: Within Defined Limits  Diet Recommendations: Regular Diet  Discharge Recommendations: Home with:  [address] with[de-identified] Family Support  Saul Pacheco is a 66 y o  male who presented to the [facility] with AMS 2/2 to urosepsis which was treated with abx  Course complicated by RF which was treated with IVF and elevated troponins which cardiology attributed to sepsis  Prior to admission, patient was essentially independent with mobility, transfers and ADL's  Anabelle Joyner lives with his daughter in a single family home  First floor set up is available and there are 3 steps to enter the home  Skilled speech assessment was completed  Patient presents with essentially adequate oral motor function for speech/communication skills and management of regular solids with thin liquids    Speech comprehension and expression at the multi step level for understanding directions and verbal sequencing of procedures and events is supervision level  Cognitively, patient presents as supervision for memory with new information and reasoning through problems taking into account his level of necessary assistance as well as reasoning his current situation  Reasoning also affects self feed and PO intake as patient currently benefits from encouragement and a dining room setting to ensure adequate nutritional needs are met  Short term skilled Speech is indicated focusing on the above  Expected outcome is Mod I to Independent    Nursing Notes:  Appetite: Good  Diet Type: Regular/House                           Type of Wound (LDA): Wound(stage 2 sacrum)                       Bladder: Catheter  Catheter Type: Diallo  Bladder Patient/Family Education: Yes  Bowel: 3 - Moderate Assistance        Pain Location: Back  Pain Orientation: Lower  Pain Score: 0                       Hospital Pain Intervention(s): Repositioned, Medication (See MAR), Rest     Medication Management/Safety  Injectable: (heparin)    8/26/19 admitted 8/21/19, Freq falls at home, UTI, Sepsis  Min assist to amb to BR with RW  Chronic Diallo, clear straw color  ID consult today , IV started  To be on IV antibiotic Tx  VS WNL  AOX4  Inc of loose BM  Case Management:     Discharge Planning  Goal Length of Stay: 1(week)  Living Arrangements: Family members  Support Systems: Children, Family members  Assistance Needed: outpatient PT  Type of Current Residence: Private residence  Current [address] : No  Met with Pt & phone contact with daughter, whom resides with Pt  Pt's daughter works FT & will not be able to provide 24 hr supervision  One story home, 3 steps in with 2 HR  SW will continue to monitor & assist as needed with [address]  Is the patient actively participating in therapies?  yes  List any modifications to the treatment plan: na    Barriers Interventions Decreased endurance Therapeutic exercise, therapeutic activity   Decreased balance ADL, transfer, gait training   Decreased right shoulder ROM and strength Therapeutic exercise, therapeutic activity   Decreased safety, increased cog ST strategies, ADL/transfer/gait training; ID following    Urinary retention Diallo, patient education     Is the patient making expected progress toward goals?  yes  List any update or changes to goals: na    Medical Goals: Patient will be able to manage medical conditions and comorbid conditions with medications and follow up upon completion of rehab program    Weekly Team Goals:   Rehab Team Goals  ADL Team Goal: Patient will require assist with ADLs with least restrictive device upon completion of rehab program  Bowel/Bladder Team Goal: Patient will return to premorbid level for bladder/bowel management upon completion of rehab program  Transfer Team Goal: Patient will be independent with transfers with least restrictive device upon completion of rehab program  Locomotion Team Goal: Patient will be independent with locomotion with least restrictive device upon completion of rehab program  Cognitive Team Goal: Patient will return to premorbid level of cognitive activity upon completion of rehab program     S transfers, mobility  S/mod I self care  [address] and wellness: to be able to return home and assist with homemaking/cooking tasks    Discussion: Plan for return home with daughter with [facility] for PT, OT,  And nursing    Anticipated Discharge Date:  Sept 4, 2019

## 2019-08-27 NOTE — PROGRESS NOTES
08/27/19 1450   Pain Assessment   Pain Assessment No/denies pain   Restrictions/Precautions   Precautions Fall Risk;Limb alert;Contact/isolation   Executive Function Skills   Insight Mild insight   Task Initiation Initiates with cues   Planning Reduced planning skills   Memory Skills   Orientation Level Oriented X4   Short Term Recall of Paragraph Mild Impairment   Short Term Working Recall Mild Impairment   Memory (FIM) 5 - Needs cueing reminders <10%   Social Interaction (FIM) 5 - Needs monitoring/encouragement  to participate/interact   Auditory Comprehension   Comphrehends Conversation Simple   Interfering Components Motor planning; Working memory   EffectiveTechniques Pausing;Repetition;Stressing words;Visual/Gestural cues   Speech/Swallow Mechanism Exam   Mandible   (mild reduced endurance, nutritional risk)   Speech/Language/Cognition Assessmetn   Treatment Assessment Speech Pathology Daily Treatment Note - Speech/Cognitive Communication Skills - SLP targeted understanding of current therapeutic levels across disciplines and recall of procedures and safety precautions which are based on the current level of necessary assistance  The therapy board in patient's rooms was used as a visual guide indicating the patient's current levels during ambulation and completion of ADL's  SLP posed problems which may occur during walking, transfers, and completion of ADL's and encouraged the reasoning of solutions based on the current level of assistance  In addition, SLP discussed the current safety precautions which included call bell usage( ie asking for help) Patient currently presents at the level of supervision for comprehension of task, expression of procedures into a logical sequence and recall of safety precautions for safety awareness  He is making steady progress with noted improved alert status     Swallow Information   Current Diet Regular   Recommendations   Diet Solid Recommendation Regular consistency Diet Liquid Recommendation Thin liquid   General Precautions Upright as possible for all oral intake   QI: Eating   Assistance Needed Verbal cues   Eating CARE Score 4   Swallow Assessment Prognosis   Prognosis Good   SLP Therapy Minutes   SLP Time In 1450   SLP Time Out 4159   SLP Total Time (minutes) 32   SLP Mode of treatment - Individual (minutes) 32   Therapy Time missed   Time missed?  No   Daily FIM Score   Problem solving (FIM) 5 - Solves basic problems 90% of time   Comprehension (FIM) 5 - Understands basic directions and conversation   Expression (FIM) 5 - Needs help/cues only RARELY (< 10% of the time)   Eating (FIM) 5 - Patient requires supervision, cueing or coaxing

## 2019-08-27 NOTE — PROGRESS NOTES
08/27/19 1305   Pain Assessment   Pain Assessment No/denies pain   Restrictions/Precautions   Precautions Fall Risk;Limb alert;Contact/isolation  (VRE urine, recent RUE sx- limit heavy lifting)   Eating Assessment   Food To Mouth Yes  (with RUE, pt also reaches with RUE for items during meal)   Positioning Upright;Out of Bed   Meal Assessed Lunch   Dentures Upper; Lower   Opens Packages Yes   Eating (FIM) 5 - Patient requires supervision, cueing or coaxing   Transfer Bed/Chair/Wheelchair   Limitations Noted In Balance; Endurance;LE Strength   Stand Pivot Contact Guard   Bed, Chair, Wheelchair Transfer (FIM) 4 - Patient requires steadying assist or light touching   Toilet Transfer   Surface Assessed Raised Toilet   Transfer Technique Stand Pivot   Limitations Noted In Balance; Endurance; Safety;LE Strength   Toilet Transfer (FIM) 4 - Patient requires steadying assist or light touching   Exercise Tools   Other Exercise Tool 1 pulleys BUE with mild stretch RUE 2 sets 10   Other Exercise Tool 2 gripper with pegs B hands   Other Exercise Tool 3 functional reacher use for retrieval of pegs from the floor BUE   Cognition   Overall Cognitive Status WFL   Attention Within functional limits   Orientation Level Oriented X4   Additional Activities   Additional Activities Other (Comment)  (fxl mobility in room with RW CGA)   Assessment   Treatment Assessment Pt participates in UB therex in the gym with 32 minutes concurrent treatment focusing on similar goals as another to increase UB strength and ROM for ADL tasks  Pt requires assist secondary to decreased balance, safety, endurance, RUE ROM/ strength  Pt will benefit from continued skilled OT services to increase independence with daily tasks  Pt transistioed back to room and positioned in the recliner  Problem List Decreased strength;Decreased range of motion;Decreased endurance; Impaired balance;Decreased coordination;Decreased safety awareness;Orthopedic restrictions   Plan Treatment/Interventions ADL retraining;Functional transfer training; Therapeutic exercise; Endurance training;Patient/family training; Compensatory technique education   Progress Progressing toward goals   OT Therapy Minutes   OT Time In 1305   OT Time Out 1400   OT Total Time (minutes) 55   OT Mode of treatment - Individual (minutes) 23   OT Mode of treatment - Concurrent (minutes) 32   Therapy Time missed   Time missed?  No

## 2019-08-27 NOTE — PROGRESS NOTES
Physical Medicine and Rehabilitation Progress Note  Randolph Garcia 66 y o  male MRN: [id number]  Unit/Bed#: -01 Encounter: [id number]    HPI: Randolph Garcia is a 66 y o  male who presented to the [email or web address] with AMS 2/2 to urosepsis which was treated with abx  Course complicated by RF which was treated with IVF and elevated troponins which cardiology attributed to sepsis         Chief Complaint: sepsis     Interval/subjective: updated patient on ID recommendations     ROS: A 10 point ROS was performed; negative except as noted above       Assessment/Plan:      Hypokalemia  Assessment & Plan  - currently 3 3; repletion ordered  -IM monitoring     Thrombocytosis (HCC)  Assessment & Plan  - mildly elevated to 402 ()  -IM monitoring      Transaminitis  Assessment & Plan  -now WNL     Anemia  Assessment & Plan  -appears to be chronic going back to at least 3/2018 per EMR with a baseline of 10-11  - Hg currently 9 0  -IM monitoring     CKD (chronic kidney disease)  Assessment & Plan  - per renal baseline likely 1 0-1 1  -Cr currently 1 12  - patient appears to have been taking meloxicam and naproxen as OP, advised patient to stop all NSAIDs  -IM monitoring     Leukocytosis  Assessment & Plan  - trending down to 13 4 (ULN 10 8)  - however did have fever  - in acute care had bacteremia which was felt to be 2/2 to urinary tract infection in the setting of recent change of chronic indwelling cantor catheter therefore UA repeated on 8/25 which was unimpressive for UTI however UCx of 8/25 pending  - chest XR negative for PNA  - original recommendation in acute care was to leave current cantor to remain in place per urology recs as it was changed prior to acute care admission however if UCx of 8/25 is positive will need to be changed   - blood cx x 2 of 8/18 finalized as no growth however repeat blood cx of 8/24 not yet finalized  - oral abx completed on 8/26 per ID recs and per   Ranulfo's ID consult of 8/26 patient has been started on ceftriaxone 2 gm q24 and Dr Gurmeet Modi (ID) also recommended CT A/P which was unrevealing and TTE which is pending         Depression  Assessment & Plan  - on home zoloft 50 mg qd     Elevated troponin  Assessment & Plan  - evaluated by cardiology and felt to be 2/2 to sepsis rather than a true coronary event and they recommend FU as OP for ischemic SEGUNDO     Chronic indwelling Cantor catheter  Assessment & Plan  - h/o TURP  - on home proscar 5 mg qd   - changed once per month (recently changed)   - current cantor to remain in place per urology recs   - follows with Dr Meng Gregory     * Chronic pain syndrome  Assessment & Plan  - on home neurontin 300 HS    - on home MS IR 15 mg q6  - per PAPD[facility] fairly regular prescription for MS IR 15 mg last filled on 8/1/19 for 120 tabs with 0 refills      Scheduled Meds:    Current Facility-Administered Medications:  acetaminophen 650 mg Oral Q6H PRN Manasa Guerrero PA-C    bisacodyl 10 mg Rectal Daily PRN Андрей Leavitt MD    cefTRIAXone 2,000 mg Intravenous Q24H Андрей Leavitt MD Last Rate: 2,000 mg (08/26/19 2210)   chlorhexidine 15 mL Swish & Spit Q12H [address] Danny Leavitt MD    cholecalciferol 2,000 Units Oral Daily Андрей Leavitt MD    ferrous sulfate 325 mg Oral BID Андрей Leavitt MD    finasteride 5 mg Oral Daily Андрей Leavitt MD    gabapentin 300 mg Oral HS Андрей Leavitt MD    heparin (porcine) 5,000 Units Subcutaneous Q8H [address] Андрей Leavitt MD    morphine 15 mg Oral Q6H Андрей Leavitt MD    potassium chloride 40 mEq Oral BID Андрей Leavitt MD    sertraline 50 mg Oral Daily Андрей Leavitt MD           Incidental findings:    1) low Amylase/lipase: per IM unlikely to be of clinical significance and recommend OP FU with PCP with further testing/treatment and/or specialist referral at PCP's discretion   2) grade 1 DD: OP FU with cardiology  3) sinus arrhythmia: OP FU with cardiology   4) B/L non-obstructing renal stones: OP FU with Dr Meng Gregory  5) left kidney cyst: OP FU with Dr Dalia Camp  6) right lung granuloma: OP FU with PCP with further testing/treatment and/or specialist referral at PCP's discretion  7) diverticulosis: OP FU with PCP with further testing/treatment and/or specialist referral at PCP's discretion   8) small right sided fat containing inguinal hernia: OP FU with PCP with further testing/treatment and/or specialist referral at PCP's discretion          Note: per patient and family he is on  mg qd at home not because he was advised to take ASA by a physician but rather because he decided on his own to take ASA     DVT ppx: HSQ        Objective:    Functional Update:  Mobility: CG  Transfers: min   ADLs: mod-max         Physical Exam:    Vitals:    08/26/19 1905   BP: 138/78   Pulse: 73   Resp:    Temp: 98 2 °F (36 8 °C)   SpO2: 94%           General: alert, no apparent distress, cooperative and comfortable  HEENT:  Head: Normal, normocephalic, atraumatic    CARDIAC:  +S1/2   LUNGS:  respirations unlabored   ABDOMEN:  soft NT   EXTREMITIES:  volume status currently stable   NEURO:   awake, alert, appropriately answering questions   PSYCH:  mood/affect currently stable       Laboratory:   Results from last 7 days   Lab Units 08/26/19  0433 08/25/19  0438 08/21/19  0501   HEMOGLOBIN g/dL 9 0* 9 7* 9 9*   HEMATOCRIT % 27 3* 29 4* 29 7*   WBC Thousand/uL 13 40* 15 80* 7 50     Results from last 7 days   Lab Units 08/26/19  0433 08/25/19  0438 08/21/19  0501 08/20/19  0534   BUN mg/dL 17 18 21 25   SODIUM mmol/L 143 138 140 137   POTASSIUM mmol/L 3 3* 3 2* 3 5 3 9   CHLORIDE mmol/L 107 103 110* 109*   CREATININE mg/dL 1 12 1 01 1 21 1 32*   AST U/L  --  31  --  62*   ALT U/L  --  59  --  70*     Results from last 7 days   Lab Units 08/20/19  0534   PROTIME seconds 13 3*   INR  1 14        Patient Active Problem List   Diagnosis    Chronic indwelling Diallo catheter    Chronic pain syndrome    Elevated troponin    CKD (chronic kidney disease)    Anemia    Transaminitis    Depression    Leukocytosis    Thrombocytosis (HCC)    Hypokalemia

## 2019-08-27 NOTE — PROGRESS NOTES
08/27/19 1000   Pain Assessment   Pain Assessment No/denies pain   Pain Score No Pain   Restrictions/Precautions   Precautions Fall Risk;Limb alert;Contact/isolation  (VRW urine, recent R UE sx)   Cognition   Overall Cognitive Status WFL   Therapeutic Interventions   Strengthening Seated TE performed to tolerance    Assessment   Treatment Assessment Patient appropriate for concurrent therapy to increase encouragement, motivation, and socialization during  therapy session  Completed seated TE for general LE strengthening and conditioning with good tolerance  Pt had no c/o of discomfort or increased pain throughout the therapy session  Does require intermittent rest breaks due to fatigue  PT Barriers   Physical Impairment Decreased strength;Decreased range of motion;Decreased endurance; Impaired balance;Decreased mobility;Pain   Functional Limitation Standing;Transfers; Walking; Wheelchair management;Stair negotiation   Plan   Treatment/Interventions Functional transfer training;LE strengthening/ROM; Therapeutic exercise; Endurance training;Patient/family training;Bed mobility;Gait training   Progress Progressing toward goals   PT Therapy Minutes   PT Time In 1000   PT Time Out 1031   PT Total Time (minutes) 31   PT Mode of treatment - Individual (minutes) 0   PT Mode of treatment - Concurrent (minutes) 31   PT Mode of treatment - Group (minutes) 0   PT Mode of treatment - Co-treat (minutes) 0   PT Mode of Teatment - Total time(minutes) 31 minutes   Therapy Time missed   Time missed?  No

## 2019-08-27 NOTE — PROGRESS NOTES
08/26/19 1945   Patient Data   Rehab Impairment Debility with weakness   Etiologic Diagnosis Urosepsis   Support System   Name Candice Ríos Daughter   Power of Johanny Mason 39 to provide 24 hour supervision No   Able to provide physical help? Yes   Home Setup   Type of Home Single Level   Method of Entry Stairs   Number of Stairs 3   Available Equipment Single Point Cane   Baseline Information   Prior Device(s) Used Roller Walker   Prior IADL Participation   Meal Preparation Partial Participation   Laundry Partial Participation   Home Cleaning Partial Participation   Prior Level of Function   Functional Cognition 3  Independent - Patient completed the activities by him/herself, with or without an assistive device, with no assistance from a helper     Patient Preference   Nickname (Patient Preference) Elver   Psychosocial   Psychosocial (WDL) WDL   Patient Behaviors/Mood Brightens with approach   Needs Expressed Denies   Ability to Express Feelings Able to express   Ability to Express Needs Needs assistance   Ability to Express Thoughts Needs assistance   Ability to Understand Others Usually understands   Restrictions/Precautions   Precautions Fall Risk   Weight Bearing Restrictions No   Pain Assessment   Pain Assessment No/denies pain   QI: Eating   Assistance Needed Verbal cues   Eating CARE Score 4   Eating Assessment   Swallow Precautions No   Meal Assessed Dinner   Current Diet Regular   Intake Mode PO   Eating (FIM) 5 - Patient requires supervision, cueing or coaxing   Comprehension   Comprehension (FIM) 5 - Understands basic directions and conversation   Expression   Expression (FIM) 5 - Needs help/cues only RARELY (< 10% of the time)   Social Interaction   Social Interaction (FIM) 5 - Needs monitoring/encouragement  to participate/interact   Problem Solving   Problem solving (FIM) 5 - Solves complex problems But requires cues from helper   Memory   Memory (FIM) 5 - [place] cues patient   Cognition   Overall Cognitive Status Impaired   Arousal/Participation Alert   Attention Attends with cues to redirect   Orientation Level Disoriented X4   Memory Decreased recall of recent events   Following Commands Follows one step commands without difficulty   Objective Measure   ST Measure(s) BCAT   ST Findings mild speech/cognitive deficits associated withmedical diagnosis   Discharge Information   Impressions Oliver Sullivan is a 66 y o  male who presented to the [facility] with AMS 2/2 to urosepsis which was treated with abx  Course complicated by RF which was treated with IVF and elevated troponins which cardiology attributed to sepsis  Prior to admission, patient was essentially independent with mobility, transfers and ADL's  Grace Acevedo lives with his daughter in a single family home  First floor set up is available and there are 3 steps to enter the home  Skilled speech assessment was completed  Patient presents with essentially adequate oral motor function for speech/communication skills and management of regular solids with thin liquids  Speech comprehension and expression at the multi step level for understanding directions and verbal sequencing of procedures and events is supervision level  Cognitively, patient presents as supervision for memory with new information and reasoning through problems taking into account his level of necessary assistance as well as reasoning his current situation  Reasoning also affects self feed and PO intake as patient currently benefits from encouragement and a dining room setting to ensure adequate nutritional needs are met  Short term skilled Speech is indicated focusing on the above    Expected outcome is Mod I to Independent   SLP Therapy Minutes   SLP Time In 1945   SLP Time Out 2045   SLP Total Time (minutes) 60   SLP Mode of treatment - Individual (minutes) 60

## 2019-08-27 NOTE — PCC NURSING
8/26/19 admitted 8/21/19, Mahnaz falls at home, UTI, Sepsis  Min assist to amb to BR with RW  Chronic Diallo, clear straw color  ID consult today , IV started  To be on IV antibiotic Tx  VS WNL  AOX4  Inc of loose BM      9/1/19 Min assist of staff to Transfer from bed/chair/ BR, RW  Left hand painful swelling, warmth, Elevated , ice , medicated , with good results  Labs drawn , WNL  Diallo patent clear yellow   Cont of Bowel  AOX4, but forgetful

## 2019-08-27 NOTE — PROGRESS NOTES
Physical Medicine and Rehabilitation Progress Note  Scott Mcintosh 66 y o  male MRN: [id number]  Unit/Bed#: -01 Encounter: [id number]    HPI: Scott Mcintosh is a 66 y o  male who presented to the [address] with AMS 2/2 to urosepsis which was treated with abx  Course complicated by RF which was treated with IVF and elevated troponins which cardiology attributed to sepsis         Chief Complaint: sepsis     Interval/subjective: d/w patient potential dc date and patient is agreeable     ROS: A 10 point ROS was performed; negative except as noted above       Assessment/Plan:      Hypokalemia  Assessment & Plan  - currently 3 3; repletion ordered  -IM monitoring     Thrombocytosis (HCC)  Assessment & Plan  - mildly elevated to 402 ()  -IM monitoring      Anemia  Assessment & Plan  -appears to be chronic going back to at least 3/2018 per EMR with a baseline of 10-11  - Hg currently 9 0  -IM monitoring     CKD (chronic kidney disease)  Assessment & Plan  - per renal baseline likely 1 0-1 1  -Cr currently 1 12  - patient appears to have been taking meloxicam and naproxen as OP, advised patient to stop all NSAIDs  -IM monitoring     Leukocytosis  Assessment & Plan  - trending down to 13 4 (ULN 10 8)  - however did have fever but has been afebrile the last 48 hours   - in acute care had bacteremia which was felt to be 2/2 to urinary tract infection in the setting of recent change of chronic indwelling cantor catheter therefore UA repeated on 8/25 which was unimpressive for UTI and UCx grew 8543-9365 cfu/ml serratia sp (will likely not need to be treated however will d/w ID to confirm)  - chest XR negative for PNA  - original recommendation in acute care was to leave current cantor to remain in place per urology recs as it was changed prior to acute care admission however will d/w ID if cantor should be changed based on UCx of only [phone] cfu/ml serratia sp  - blood cx x 2 of 8/18 finalized as no growth however repeat blood cx of 8/24 not yet finalized  - oral abx completed on 8/26 per ID recs and per Dr Balbir Astorga ID consult of 8/26 patient has been started on ceftriaxone 2 gm q24 and Dr Farrukh Kellogg (ID) also recommended CT A/P which was unrevealing and TTE which did not show any valvular vegetation         Depression  Assessment & Plan  - on home zoloft 50 mg qd     Elevated troponin  Assessment & Plan  - evaluated by cardiology and felt to be 2/2 to sepsis rather than a true coronary event and they recommend FU as OP for ischemic SEGUNDO     Chronic indwelling Cantor catheter  Assessment & Plan  - h/o TURP  - on home proscar 5 mg qd   - changed once per month (recently changed)   - current cantor to remain in place per urology recs   - follows with Dr Mika Sunshine     * Chronic pain syndrome  Assessment & Plan  - on home neurontin 300 HS    - on home MS IR 15 mg q6  - per PAPDMP fairly regular prescription for MS IR 15 mg last filled on 8/1/19 for 120 tabs with 0 refills      Scheduled Meds:    Current Facility-Administered Medications:  acetaminophen 650 mg Oral Q6H PRN Ramirez Regan PA-C    bisacodyl 10 mg Rectal Daily PRN Almita Farah MD    cefTRIAXone 2,000 mg Intravenous Q24H Almita Farah MD Last Rate: 2,000 mg (08/26/19 2210)   chlorhexidine 15 mL Swish & Spit Q12H [facility] Almita Farah MD    cholecalciferol 2,000 Units Oral Daily Almita Farah MD    ferrous sulfate 325 mg Oral BID Almita Farah MD    finasteride 5 mg Oral Daily Almita Farah MD    gabapentin 300 mg Oral HS Almita Farah MD    heparin (porcine) 5,000 Units Subcutaneous Q8H [facility] Almita Farah MD    morphine 15 mg Oral Q6H Almita Farah MD    sertraline 50 mg Oral Daily Almita Farah MD           Incidental findings:    1) low Amylase/lipase: per IM unlikely to be of clinical significance and recommend OP FU with PCP with further testing/treatment and/or specialist referral at PCP's discretion   2) grade 1 DD: OP FU with cardiology  3) sinus arrhythmia: OP FU with cardiology   4) B/L non-obstructing renal stones: OP FU with Dr Isidoro Urbina  5) left kidney cyst: OP FU with Dr Isidoro Urbina  6) right lung granuloma: OP FU with PCP with further testing/treatment and/or specialist referral at PCP's discretion  7) diverticulosis: OP FU with PCP with further testing/treatment and/or specialist referral at PCP's discretion   8) small right sided fat containing inguinal hernia: OP FU with PCP with further testing/treatment and/or specialist referral at PCP's discretion          Note: per patient and family he is on  mg qd at home not because he was advised to take ASA by a physician but rather because he decided on his own to take ASA     DVT ppx: HSQ        Objective:    Functional Update:  Mobility: CG  Transfers: min   ADLs: min         Physical Exam:        General: alert, no apparent distress, cooperative and comfortable  HEENT:  Head: Normal, normocephalic, atraumatic    CARDIAC:  +S1/2   LUNGS:  respirations unlabored   ABDOMEN:  soft NT   EXTREMITIES:  volume status currently stable   NEURO:   awake, alert, appropriately answering questions   PSYCH:  mood/affect currently stable       Laboratory:   Results from last 7 days   Lab Units 08/26/19  0433 08/25/19  0438 08/21/19  0501   HEMOGLOBIN g/dL 9 0* 9 7* 9 9*   HEMATOCRIT % 27 3* 29 4* 29 7*   WBC Thousand/uL 13 40* 15 80* 7 50     Results from last 7 days   Lab Units 08/26/19  0433 08/25/19  0438 08/21/19  0501   BUN mg/dL 17 18 21   SODIUM mmol/L 143 138 140   POTASSIUM mmol/L 3 3* 3 2* 3 5   CHLORIDE mmol/L 107 103 110*   CREATININE mg/dL 1 12 1 01 1 21   AST U/L  --  31  --    ALT U/L  --  59  --             Patient Active Problem List   Diagnosis    Chronic indwelling Diallo catheter    Chronic pain syndrome    Elevated troponin    CKD (chronic kidney disease)    Anemia    Depression    Leukocytosis    Thrombocytosis (HCC)    Hypokalemia       This patient was discussed by the Interdisciplinary Team in weekly case conference today  The care of the patient was extensively discussed with all care providers and an appropriate rehabilitation plan was formulated unique for this patient  Barriers were identified preventing progression of therapy and appropriate interventions were discussed with each discipline  Please see the team note for input from all disciplines regarding barriers, intervention, and discharge planning  [ x ] Total time spent: 35 Mins, and greater than 50% of this time was spent counseling/coordinating care

## 2019-08-27 NOTE — PLAN OF CARE
Problem: PAIN - ADULT  Goal: Verbalizes/displays adequate comfort level or baseline comfort level  Description  Interventions:  - Encourage patient to monitor pain and request assistance  - Assess pain using appropriate pain scale  - Administer analgesics based on type and severity of pain and evaluate response  - Implement non-pharmacological measures as appropriate and evaluate response  - Consider cultural and social influences on pain and pain management  - Notify physician/advanced practitioner if interventions unsuccessful or patient reports new pain  Outcome: Progressing     Problem: PAIN - ADULT  Goal: Verbalizes/displays adequate comfort level or baseline comfort level  Description  Interventions:  - Encourage patient to monitor pain and request assistance  - Assess pain using appropriate pain scale  - Administer analgesics based on type and severity of pain and evaluate response  - Implement non-pharmacological measures as appropriate and evaluate response  - Consider cultural and social influences on pain and pain management  - Notify physician/advanced practitioner if interventions unsuccessful or patient reports new pain  Outcome: Progressing

## 2019-08-28 PROCEDURE — 97110 THERAPEUTIC EXERCISES: CPT

## 2019-08-28 PROCEDURE — 97537 COMMUNITY/WORK REINTEGRATION: CPT

## 2019-08-28 PROCEDURE — 97530 THERAPEUTIC ACTIVITIES: CPT

## 2019-08-28 PROCEDURE — 99232 SBSQ HOSP IP/OBS MODERATE 35: CPT | Performed by: PHYSICAL MEDICINE & REHABILITATION

## 2019-08-28 PROCEDURE — 97116 GAIT TRAINING THERAPY: CPT

## 2019-08-28 PROCEDURE — 97535 SELF CARE MNGMENT TRAINING: CPT

## 2019-08-28 PROCEDURE — G0515 COGNITIVE SKILLS DEVELOPMENT: HCPCS

## 2019-08-28 RX ORDER — CEFTRIAXONE 2 G/50ML
2000 INJECTION, SOLUTION INTRAVENOUS ONCE
Status: COMPLETED | OUTPATIENT
Start: 2019-08-28 | End: 2019-08-28

## 2019-08-28 RX ADMIN — CHLORHEXIDINE GLUCONATE 0.12% ORAL RINSE 15 ML: 1.2 LIQUID ORAL at 09:21

## 2019-08-28 RX ADMIN — CEFTRIAXONE 2000 MG: 2 INJECTION, SOLUTION INTRAVENOUS at 21:05

## 2019-08-28 RX ADMIN — GABAPENTIN 300 MG: 300 CAPSULE ORAL at 21:05

## 2019-08-28 RX ADMIN — VITAMIN D, TAB 1000IU (100/BT) 2000 UNITS: 25 TAB at 09:21

## 2019-08-28 RX ADMIN — HEPARIN SODIUM 5000 UNITS: 5000 INJECTION, SOLUTION INTRAVENOUS; SUBCUTANEOUS at 05:57

## 2019-08-28 RX ADMIN — CHLORHEXIDINE GLUCONATE 0.12% ORAL RINSE 15 ML: 1.2 LIQUID ORAL at 21:04

## 2019-08-28 RX ADMIN — MORPHINE SULFATE 15 MG: 15 TABLET ORAL at 23:44

## 2019-08-28 RX ADMIN — MORPHINE SULFATE 15 MG: 15 TABLET ORAL at 17:42

## 2019-08-28 RX ADMIN — HEPARIN SODIUM 5000 UNITS: 5000 INJECTION, SOLUTION INTRAVENOUS; SUBCUTANEOUS at 21:05

## 2019-08-28 RX ADMIN — MORPHINE SULFATE 15 MG: 15 TABLET ORAL at 12:37

## 2019-08-28 RX ADMIN — SERTRALINE HYDROCHLORIDE 50 MG: 50 TABLET ORAL at 09:20

## 2019-08-28 RX ADMIN — FERROUS SULFATE TAB 325 MG (65 MG ELEMENTAL FE) 325 MG: 325 (65 FE) TAB at 17:41

## 2019-08-28 RX ADMIN — HEPARIN SODIUM 5000 UNITS: 5000 INJECTION, SOLUTION INTRAVENOUS; SUBCUTANEOUS at 14:17

## 2019-08-28 RX ADMIN — FINASTERIDE 5 MG: 5 TABLET, FILM COATED ORAL at 09:21

## 2019-08-28 RX ADMIN — MORPHINE SULFATE 15 MG: 15 TABLET ORAL at 05:57

## 2019-08-28 RX ADMIN — FERROUS SULFATE TAB 325 MG (65 MG ELEMENTAL FE) 325 MG: 325 (65 FE) TAB at 09:20

## 2019-08-28 NOTE — PROGRESS NOTES
08/28/19 1500   Pain Assessment   Pain Assessment No/denies pain   Pain Score No Pain   Restrictions/Precautions   Precautions Bed/chair alarms; Fall Risk;Contact/isolation  (VRE urine)   Transfer Bed/Chair/Wheelchair   Limitations Noted In Balance; Endurance;LE Strength   Stand Pivot Contact Guard   Bed, Chair, Wheelchair Transfer (FIM) 4 - Patient requires steadying assist or light touching   Exercise Tools   Other Exercise Tool 1 card match BUE sitting and reaching BUE initially and then finishing up standing 2 minutes CGA   Other Exercise Tool 2 FM knots out B hands without difficulty   Additional Activities   Additional Activities Other (Comment)   Additional Activities Comments fxl mobility with RW gym to room CGA   Assessment   Treatment Assessment Pt participates in 30 minutes concurrent treatment this afternoon during UB therex focusing on similar goals of increasing UB strength and ROM for daily tasks  Pt tolerates session well and will benefit from continued skilled OT services to increase independence with daily tasks  Problem List Decreased strength;Decreased range of motion;Decreased endurance; Impaired balance;Decreased safety awareness   Plan   Treatment/Interventions ADL retraining;Functional transfer training; Therapeutic exercise; Endurance training;Patient/family training; Compensatory technique education   Progress Progressing toward goals   OT Therapy Minutes   OT Time In 1500   OT Time Out 1530   OT Total Time (minutes) 30   OT Mode of treatment - Concurrent (minutes) 30   Therapy Time missed   Time missed?  No

## 2019-08-28 NOTE — CASE MANAGEMENT
Tx team rec reviewed with Pt & Pt's daughter  Target DC date 9/4 with [facility] (PT, OT, Nsg)  Pt anxious to be [address] home  SW will continue to monitor & assist as needed with Tx & DC planning

## 2019-08-28 NOTE — QUICK NOTE
ID PLAN OF CARE NOTE:    Patient has been afebrile and asymptomatic  TTE showed no vegetation  CT abdomen showed no focus of infection  Repeat blood cultures are negative  Urine culture grew <10K Serratia  Recommendations:  1  Complete 3 days of ceftriaxone today  No additional antibiotics recommended at this time  2  Routine cantor change per Urology  3  Monitor clinical course

## 2019-08-28 NOTE — PROGRESS NOTES
Physical Medicine and Rehabilitation Progress Note  Corinne Hernandez 66 y o  male MRN: [id number]  Unit/Bed#: -01 Encounter: [id number]    HPI: Corinne Hernadnez is a 66 y o  male [facility] [address] [facility] A[facility] 2/2 to urosepsis which was treated with abx  Course complicated by RF which was treated with IVF and elevated troponins which cardiology attributed to sepsis         Chief Complaint: sepsis     Interval/subjective: Updated patient on active medical issues     ROS: A 10 point ROS was performed; negative except as noted above       Assessment/Plan:      Hypokalemia  Assessment & Plan  - currently 3 3; repletion ordered  -IM monitoring     Thrombocytosis (HCC)  Assessment & Plan  - mildly elevated to 402 ()  -IM monitoring      Anemia  Assessment & Plan  -appears to be chronic going back to at least 3/2018 per EMR with a baseline of 10-11  - Hg currently 9 0  -IM monitoring     CKD (chronic kidney disease)  Assessment & Plan  - per renal baseline likely 1 0-1 1  -Cr currently 1 12  - patient appears to have been taking meloxicam and naproxen as OP, advised patient to stop all NSAIDs  -IM monitoring     Leukocytosis  Assessment & Plan  - trending down to 13 4 (ULN 10 8)  - however did have fever but has been afebrile since 8/25  - in acute care had bacteremia which was felt to be 2/2 to urinary tract infection in the setting of recent change of chronic indwelling cantor catheter therefore UA repeated on 8/25 which was unimpressive for UTI and UCx grew [id number] cfu/ml serratia sp d/w Dr Connollyy Stacks of ID and does not feel this is a clinically significant finding  - chest XR negative for PNA  - original recommendation in acute care was to leave current cantor to remain in place per urology recs as it was changed prior to acute care admission and also d/w ID if cantor should be changed based on UCx of only [phone] cfu/ml serratia sp and they feel that amount of growth does not warrant a cantor change at this time beyond routine cantor change that patient has done regularly once a month as OP with Dr Talha Knott   - blood cx x 2 of 8/18 finalized as no growth however repeat blood cx of 8/24 not yet finalized  - oral abx completed on 8/26 per ID recs and per Dr Vallecillo Antoine ID consult of 8/26 patient has been started on ceftriaxone 2 gm q24 and Dr Keo Ireland (ID) also recommended CT A/P which was unrevealing and TTE which did not show any valvular vegetation therefore d/w Dr Keo Ireland these findings and she recommended 1 last dose of IV ceftriaxone 2000 mg on 8/28 and then stop abx        Depression  Assessment & Plan  - on home zoloft 50 mg qd     Elevated troponin  Assessment & Plan  - evaluated by cardiology and felt to be 2/2 to sepsis rather than a true coronary event and they recommend FU as OP for ischemic SEGUNDO     Chronic indwelling Cantor catheter  Assessment & Plan  - h/o TURP  - on home proscar 5 mg qd   - changed once per month (recently changed)   - current cantor to remain in place per urology recs   - follows with Dr Talha Knott     * Chronic pain syndrome  Assessment & Plan  - on home neurontin 300 HS    - on home MS IR 15 mg q6  - per PAPDMP fairly regular prescription for MS IR 15 mg last filled on 8/1/19 for 120 tabs with 0 refills      Scheduled Meds:    Current Facility-Administered Medications:  acetaminophen 650 mg Oral Q6H PRN Aisha Terrell PA-C   bisacodyl 10 mg Rectal Daily PRN Cristo Cabrera MD   cefTRIAXone 2,000 mg Intravenous Once Cristo Cabrera MD   chlorhexidine 15 mL Swish & Spit Q12H Riya Fernandes MD   cholecalciferol 2,000 Units Oral Daily Cristo Cabrera MD   ferrous sulfate 325 mg Oral BID Cristo Cabrera MD   finasteride 5 mg Oral Daily Cristo Cabrera MD   gabapentin 300 mg Oral HS Cristo Cabrera MD   heparin (porcine) 5,000 Units Subcutaneous Q8H Riya Fernandes MD   morphine 15 mg Oral Q6H Cristo Cabrera MD   sertraline 50 mg Oral Daily Cristo Cabrera MD          Incidental findings:    1) low Amylase/lipase: per IM unlikely to be of clinical significance and recommend OP FU with PCP with further testing/treatment and/or specialist referral at PCP's discretion   2) grade 1 DD: OP FU with cardiology  3) sinus arrhythmia: OP FU with cardiology   4) B/L non-obstructing renal stones: OP FU with Dr Bhumika Bolden  5) left kidney cyst: OP FU with Dr Bhumika Bolden  6) right lung granuloma: OP FU with PCP with further testing/treatment and/or specialist referral at PCP's discretion  7) diverticulosis: OP FU with PCP with further testing/treatment and/or specialist referral at PCP's discretion   8) small right sided fat containing inguinal hernia: OP FU with PCP with further testing/treatment and/or specialist referral at PCP's discretion          Note: per patient and family he is on  mg qd at home not because he was advised to take ASA by a physician but rather because he decided on his own to take ASA     DVT ppx: HSQ        Objective:    Functional Update:  Mobility: CG  Transfers: min   ADLs: min         Physical Exam:    Vitals:    08/28/19 0743   BP: 126/70   Pulse: 78   Resp: 18   Temp: 98 1 °F (36 7 °C)   SpO2: 91%           General: alert, no apparent distress, cooperative and comfortable  HEENT:  Head: Normal, normocephalic, atraumatic    CARDIAC:  +S1/2   LUNGS:  respirations unlabored   ABDOMEN:  soft NT   EXTREMITIES:  volume status currently stable   NEURO:   awake, alert, appropriately answering questions   PSYCH:  mood/affect currently stable       Laboratory:   Results from last 7 days   Lab Units 08/26/19  0433 08/25/19  0438   HEMOGLOBIN g/dL 9 0* 9 7*   HEMATOCRIT % 27 3* 29 4*   WBC Thousand/uL 13 40* 15 80*     Results from last 7 days   Lab Units 08/26/19  0433 08/25/19  0438   BUN mg/dL 17 18   SODIUM mmol/L 143 138   POTASSIUM mmol/L 3 3* 3 2*   CHLORIDE mmol/L 107 103   CREATININE mg/dL 1 12 1 01   AST U/L  --  31   ALT U/L  --  59            Patient Active Problem List Diagnosis    Chronic indwelling Diallo catheter    Chronic pain syndrome    Elevated troponin    CKD (chronic kidney disease)    Anemia    Depression    Leukocytosis    Thrombocytosis (HCC)    Hypokalemia

## 2019-08-28 NOTE — PROGRESS NOTES
08/28/19 1300   Pain Assessment   Pain Assessment No/denies pain   Pain Score No Pain   Restrictions/Precautions   Precautions Bed/chair alarms; Fall Risk;Contact/isolation  (recent R shoulder sx)   Cognition   Overall Cognitive Status WFL   Following Commands Follows one step commands without difficulty   Transfer Bed/Chair/Wheelchair   Limitations Noted In Balance; Endurance;Pain Management;UE Strength;LE Strength   Adaptive Equipment Roller Walker   Sit to Stand Minimal Assist  (min A )   Stand to Sit Minimal Assist  (min A )   All Transfer Minimal Assist;Assist x 1   Bed, Chair, Wheelchair Transfer (FIM) 4 - Patient completes 75% of all tasks   Ambulation   Primary Discharge Mode of Locomotion Walk   Walk Assist Level Close Supervision   Gait Pattern Slow [place];Decreased foot clearance   Distance Walked (feet)   (278' 145')   Limitations Noted In Balance; Endurance;Posture; Safety;Speed;Strength   Walking (FIM) 5 - [place] sets up equipment or applies device such as orthosis/prosthesis AND distance 150 feet or more, no rest   Stairs   Type Stairs   # of Steps   (15)   Assist Devices Bilateral Rail   Stairs (FIM) 4 - Patient requires steadying assist or light touching AND patient goes up and down full flight (12- 14 stairs)   Therapeutic Interventions   Strengthening seated TE 3 x 10    Other Gait training, transfer training, community re-integration    Assessment   Treatment Assessment Pt has been making good gains during therapy  Completed gait training for distance of 278' using RW and close S assistance  Worked on sit/stand transfers from w/c surface and chair surface due to min A still needed to complete the transfer at times  Demonstrated improved technique following visual demonstration and VC  Pt at times is CG/min A from the w/c level and min A from lower surfaces with set up assistance  Completed 15 steps with CG/close S assistance  As Pt fatigues he requires more steadying assistance and VC for safety  Pt will benefit from continued skilled PT to increased independence with functional mobility tasks  PT Barriers   Physical Impairment Decreased strength;Decreased endurance; Impaired balance;Decreased mobility;Pain   Functional Limitation Standing;Transfers; Walking;Stair negotiation   Plan   Treatment/Interventions Functional transfer training;LE strengthening/ROM; Therapeutic exercise; Endurance training;Bed mobility;Gait training   Progress Progressing toward goals   PT Therapy Minutes   PT Time In 1300   PT Time Out 1407   PT Total Time (minutes) 67   PT Mode of treatment - Individual (minutes) 67   PT Mode of treatment - Concurrent (minutes) 0   PT Mode of treatment - Group (minutes) 0   PT Mode of treatment - Co-treat (minutes) 0   PT Mode of Teatment - Total time(minutes) 67 minutes   Therapy Time missed   Time missed?  No

## 2019-08-28 NOTE — PROGRESS NOTES
08/28/19 1132   Pain Assessment   Pain Assessment No/denies pain   Restrictions/Precautions   Precautions Bed/chair alarms; Fall Risk;Contact/isolation  (recent R shoulder sx)   Grooming   Able To Initiate Tasks;Comb/Brush Hair;Wash/Dry Face;Brush/Clean Teeth;Wash/Dry Hands   Limitation Noted In Safety;Strength   Grooming (FIM) 5 - [place] sets up supplies or applies device   Bathing   Assessed Bath Style Sponge Bath   Anticipated D/C Bath Style Sponge Bath   Able to Giovanny Nickolas No   Able to [employer] Temperature No   Able to Wash/Rinse/Dry (body part) Left Arm;Right Arm;L Upper Leg;R Lower Leg/Foot;R Upper Leg;L Lower Leg/Foot;Chest;Abdomen;Perineal Area; Buttocks   Limitations Noted in Balance; Endurance;Problem Solving;ROM;Safety;Strength   Bathing (FIM) 4 - Patient requires steadying assist or light touching   Tub/Shower Transfer   Not Assessed Sponge Bath  (IV LUE)   Dressing/Undressing Clothing   Remove UB Clothes Button Shirt;Pullover Shirt   Remove LB Clothes Pants; Undergarment;Socks   Bienvenido Mandes; [address]; Undergarment;Socks   Limitations Noted In Balance; Endurance;Problem Solving; Safety;Strength;ROM   UB Dressing (FIM) 4 - Patient completes 75% of all tasks   LB Dressing (FIM) 4 - Patient completes 75% of all tasks   Transfer Bed/Chair/Wheelchair   Limitations Noted In Balance; Endurance;LE Strength;UE Strength;Problem Solving   Stand Pivot Contact Guard   Bed, Chair, Wheelchair Transfer (FIM) 4 - Patient requires steadying assist or light touching   Cognition   Overall Cognitive Status WFL   Additional Activities   Additional Activities Other (Comment)   Additional Activities Comments w/ c mobility to dining room with S using legs to propel   Assessment   Treatment Assessment Pt participates in ADL this morning with assist due to decreased BUE strength and ROM,  safety, balance and endurance   Pt toelrates session well with 25 minutes concurrent treatment during completion of ADL with simialr goals as another to increase independence  Pt is making gains towards goals and requires continued assist to thread catheter  Pt will benefit from continued skilled OT services to increase independence with daily tasks  Problem List Decreased strength;Decreased range of motion;Decreased endurance; Impaired balance;Decreased safety awareness;Pain   Plan   Treatment/Interventions ADL retraining;Functional transfer training; Therapeutic exercise; Endurance training;Patient/family training; Compensatory technique education   Progress Progressing toward goals   OT Therapy Minutes   OT Time In 1132   OT Time Out 1235   OT Total Time (minutes) 63   OT Mode of treatment - Individual (minutes) 38   OT Mode of treatment - Concurrent (minutes) 25   Therapy Time missed   Time missed?  No

## 2019-08-28 NOTE — NURSING NOTE
Pt resting in bed  Slept majority of shift  C/O pain to b/l shoulder and arms medicated with medication as ordered  Able to turn and reposition self in bed  IV intact in L FA  Diallo draining to gravity in drainage bag at bedside  Yellow clear urine  Cont bowel- pull-up in place for protection  Assessment unchanged  Continuing to monitor and follow plan of care  All items within reach to pt

## 2019-08-29 LAB
ANION GAP SERPL CALCULATED.3IONS-SCNC: 7 MMOL/L (ref 4–13)
BACTERIA BLD CULT: NORMAL
BACTERIA BLD CULT: NORMAL
BUN SERPL-MCNC: 13 MG/DL (ref 7–25)
CALCIUM SERPL-MCNC: 8.4 MG/DL (ref 8.6–10.5)
CHLORIDE SERPL-SCNC: 108 MMOL/L (ref 98–107)
CO2 SERPL-SCNC: 27 MMOL/L (ref 21–31)
CREAT SERPL-MCNC: 1.06 MG/DL (ref 0.7–1.3)
GFR SERPL CREATININE-BSD FRML MDRD: 68 ML/MIN/1.73SQ M
GLUCOSE SERPL-MCNC: 89 MG/DL (ref 65–99)
POTASSIUM SERPL-SCNC: 3.4 MMOL/L (ref 3.5–5.5)
SODIUM SERPL-SCNC: 142 MMOL/L (ref 134–143)

## 2019-08-29 PROCEDURE — 99232 SBSQ HOSP IP/OBS MODERATE 35: CPT | Performed by: PHYSICAL MEDICINE & REHABILITATION

## 2019-08-29 PROCEDURE — 92507 TX SP LANG VOICE COMM INDIV: CPT

## 2019-08-29 PROCEDURE — 97116 GAIT TRAINING THERAPY: CPT

## 2019-08-29 PROCEDURE — 97535 SELF CARE MNGMENT TRAINING: CPT

## 2019-08-29 PROCEDURE — 97110 THERAPEUTIC EXERCISES: CPT

## 2019-08-29 PROCEDURE — 80048 BASIC METABOLIC PNL TOTAL CA: CPT | Performed by: NURSE PRACTITIONER

## 2019-08-29 PROCEDURE — 97530 THERAPEUTIC ACTIVITIES: CPT

## 2019-08-29 PROCEDURE — 97537 COMMUNITY/WORK REINTEGRATION: CPT

## 2019-08-29 RX ORDER — POTASSIUM CHLORIDE 20 MEQ/1
20 TABLET, EXTENDED RELEASE ORAL DAILY
Status: DISCONTINUED | OUTPATIENT
Start: 2019-08-30 | End: 2019-09-03

## 2019-08-29 RX ADMIN — FINASTERIDE 5 MG: 5 TABLET, FILM COATED ORAL at 10:00

## 2019-08-29 RX ADMIN — GABAPENTIN 300 MG: 300 CAPSULE ORAL at 21:42

## 2019-08-29 RX ADMIN — CHLORHEXIDINE GLUCONATE 0.12% ORAL RINSE 15 ML: 1.2 LIQUID ORAL at 21:42

## 2019-08-29 RX ADMIN — MORPHINE SULFATE 15 MG: 15 TABLET ORAL at 18:14

## 2019-08-29 RX ADMIN — SERTRALINE HYDROCHLORIDE 50 MG: 50 TABLET ORAL at 10:00

## 2019-08-29 RX ADMIN — HEPARIN SODIUM 5000 UNITS: 5000 INJECTION, SOLUTION INTRAVENOUS; SUBCUTANEOUS at 05:57

## 2019-08-29 RX ADMIN — MORPHINE SULFATE 15 MG: 15 TABLET ORAL at 23:59

## 2019-08-29 RX ADMIN — VITAMIN D, TAB 1000IU (100/BT) 2000 UNITS: 25 TAB at 10:00

## 2019-08-29 RX ADMIN — FERROUS SULFATE TAB 325 MG (65 MG ELEMENTAL FE) 325 MG: 325 (65 FE) TAB at 10:00

## 2019-08-29 RX ADMIN — MORPHINE SULFATE 15 MG: 15 TABLET ORAL at 05:57

## 2019-08-29 RX ADMIN — MORPHINE SULFATE 15 MG: 15 TABLET ORAL at 12:09

## 2019-08-29 RX ADMIN — HEPARIN SODIUM 5000 UNITS: 5000 INJECTION, SOLUTION INTRAVENOUS; SUBCUTANEOUS at 21:42

## 2019-08-29 RX ADMIN — HEPARIN SODIUM 5000 UNITS: 5000 INJECTION, SOLUTION INTRAVENOUS; SUBCUTANEOUS at 13:54

## 2019-08-29 RX ADMIN — FERROUS SULFATE TAB 325 MG (65 MG ELEMENTAL FE) 325 MG: 325 (65 FE) TAB at 18:14

## 2019-08-29 RX ADMIN — CHLORHEXIDINE GLUCONATE 0.12% ORAL RINSE 15 ML: 1.2 LIQUID ORAL at 11:30

## 2019-08-29 NOTE — NURSING NOTE
Pt had noted decrease in urine output for the day  Verbal order to flush Diallo cath received from Dr Tara Arambula  When pt was laid in bed to begin flush procedure, urine began to flow freely through cath tubing  Beginning urine volume was 200 ml,b after patient supine in bed for 15 minutes total urine output was 1400ml  Amount documents in I/O flowsheet  No flushing was needed/performed  Will continue to monitor and follow plan of care  Call bell within reach and all pt needs are met at this time

## 2019-08-29 NOTE — PROGRESS NOTES
Physical Medicine and Rehabilitation Progress Note  Christine Woods 66 y o  male MRN: [id number]  Unit/Bed#: -01 Encounter: [id number]    HPI: Christine Woods is a 66 y o  male who presented to the [address] with AMS 2/2 to urosepsis which was treated with abx  Course complicated by RF which was treated with IVF and elevated troponins which cardiology attributed to sepsis         Chief Complaint: sepsis     Interval/subjective: patient denies any events overnight     ROS: A 10 point ROS was performed; negative except as noted above       Assessment/Plan:      Hypokalemia  Assessment & Plan  - currently 3 4, multiple repletions given however still below nl therefore will start daily K supplementation   -IM monitoring     Thrombocytosis (HCC)  Assessment & Plan  - mildly elevated at 402  -IM monitoring      Anemia  Assessment & Plan  -appears to be chronic going back to at least 3/2018 per EMR with a baseline of 10-11  - Hg currently 9 0  -IM monitoring     CKD (chronic kidney disease)  Assessment & Plan  - per renal baseline likely 1 0-1 1  -Cr currently 1 06  - patient appears to have been taking meloxicam and naproxen as OP, advised patient to stop all NSAIDs  -IM monitoring         Leukocytosis  Assessment & Plan  - currently 13 4  - however did have fever but has been afebrile since 8/25  - in acute care had bacteremia which was felt to be 2/2 to urinary tract infection in the setting of recent change of chronic indwelling cantor catheter therefore UA repeated on 8/25 which was unimpressive for UTI and UCx grew [id number] cfu/ml serratia sp d/w Dr Emil Wall of ID and does not feel this is a clinically significant finding  - chest XR negative for PNA  - original recommendation in acute care was to leave current cantor to remain in place per urology recs as it was changed prior to acute care admission and also d/w ID if cantor should be changed based on UCx of only [phone] cfu/ml serratia sp and they feel that amount of growth does not warrant a cantor change at this time beyond routine cantor change that patient has done regularly once a month as OP with Dr Gal Covignton   - blood cx x 2 of 8/24 not yet finalized as no growth  - per Dr Sabine Angeles ID consult recommended CT A/P which was unrevealing and TTE which did not show any valvular vegetation therefore d/w Dr Sherita Gar has recommended no further abx at this time         Depression  Assessment & Plan  - on home zoloft 50 mg qd     Chronic pain syndrome  Assessment & Plan  - on home neurontin 300 HS    - on home MS IR 15 mg q6  - per [facility] fairly regular prescription for MS IR 15 mg last filled on 8/1/19 for 120 tabs with 0 refills     Chronic indwelling Cantor catheter  Assessment & Plan  - h/o TURP  - on home proscar 5 mg qd   - changed once per month (recently changed)   - current cantor to remain in place per urology recs   - follows with Dr Gal Covington     * Elevated troponin  Assessment & Plan  - evaluated by cardiology and felt to be 2/2 to sepsis rather than a true coronary event and they recommend FU as OP for ischemic SEGUNDO      Scheduled Meds:    Current Facility-Administered Medications:  acetaminophen 650 mg Oral Q6H PRN Sonia Bailey PA-C   ascorbic acid 500 mg Oral Daily UBALDO Harkins   bisacodyl 10 mg Rectal Daily PRN Alfredo Johnson MD   chlorhexidine 15 mL Swish & Spit Q12H Riya Fernandes MD   cholecalciferol 2,000 Units Oral Daily Alfredo Johnson MD   ferrous sulfate 325 mg Oral BID Alfredo Johnson MD   finasteride 5 mg Oral Daily Alfredo Johnson MD   gabapentin 300 mg Oral HS Alfredo Johnson MD   heparin (porcine) 5,000 Units Subcutaneous Q8H Riya Fernandes MD   magnesium oxide 400 mg Oral BID Alfredo Johnson MD   morphine 15 mg Oral Alka Vanessa MD   potassium chloride 20 mEq Oral Daily Alfredo Johnson MD   sertraline 50 mg Oral Daily Alfredo Johnson MD          Incidental findings:    1) low Amylase/lipase: per IM unlikely to be of clinical significance and recommend OP FU with PCP with further testing/treatment and/or specialist referral at PCP's discretion   2) grade 1 DD: OP FU with cardiology  3) sinus arrhythmia: OP FU with cardiology   4) B/L non-obstructing renal stones: OP FU with Dr Jefry Gloria  5) left kidney cyst: OP FU with Dr Jefry Gloria  6) right lung granuloma: OP FU with PCP with further testing/treatment and/or specialist referral at PCP's discretion  7) diverticulosis: OP FU with PCP with further testing/treatment and/or specialist referral at PCP's discretion   8) small right sided fat containing inguinal hernia: OP FU with PCP with further testing/treatment and/or specialist referral at PCP's discretion          Note: per patient and family he is on  mg qd at home not because he was advised to take ASA by a physician but rather because he decided on his own to take ASA     DVT ppx: HSQ        Objective:    Functional Update:  Mobility: CG  Transfers: min   ADLs: min         Physical Exam:    Vitals:    08/29/19 0711   BP: 120/62   Pulse: 67   Resp: 18   Temp: 98 3   SpO2: 97%           General: alert, no apparent distress, cooperative and comfortable  HEENT:  Head: Normal, normocephalic, atraumatic    CARDIAC:  +S1/2   LUNGS:  respirations unlabored   ABDOMEN:  soft NT   EXTREMITIES:  volume status currently stable   NEURO:   awake, alert, appropriately answering questions   PSYCH:  mood/affect currently stable       Laboratory:   Results from last 7 days   Lab Units 08/30/19  0523 08/26/19  0433 08/25/19  0438   HEMOGLOBIN g/dL 8 8* 9 0* 9 7*   HEMATOCRIT % 26 0* 27 3* 29 4*   WBC Thousand/uL 7 90 13 40* 15 80*     Results from last 7 days   Lab Units 08/30/19  0523 08/29/19  0438 08/26/19  0433 08/25/19  0438   BUN mg/dL 13 13 17 18   SODIUM mmol/L 143 142 143 138   POTASSIUM mmol/L 3 5 3 4* 3 3* 3 2*   CHLORIDE mmol/L 109* 108* 107 103   CREATININE mg/dL 1 08 1 06 1 12 1 01   AST U/L 14  --   --  31   ALT U/L 22  --   --  59 Patient Active Problem List   Diagnosis    Chronic indwelling Diallo catheter    Chronic pain syndrome    Elevated troponin    CKD (chronic kidney disease)    Anemia    Depression    Leukocytosis    Thrombocytosis (HCC)    Hypokalemia    Hypomagnesemia

## 2019-08-29 NOTE — NURSING NOTE
Pt teaching on necessity of SCDs to prevent DVTs after pt stated "Those brace things you guys put on my legs are annoying"  Pt understood education and complied with use  No complaints of pain at this time  Call bell within reach and all needs are met at this time  Will continue to monitor and follow plan of care

## 2019-08-29 NOTE — PROGRESS NOTES
08/29/19 1129   Pain Assessment   Pain Assessment 0-10   Pain Score 7   Pain Type Chronic pain   Pain Location Shoulder   Pain Orientation Bilateral   Restrictions/Precautions   Precautions Bed/chair alarms; Fall Risk;Limb alert;Contact/isolation  (recent R shoulder sx, VRE urine)   Transfer Bed/Chair/Wheelchair   Limitations Noted In Balance; Endurance;LE Strength;Pain Management   Stand Pivot Contact Guard   Sit to Stand Supervision   Stand to W  R  Elise, Chair, Wheelchair Transfer (FIM) 4 - Patient requires steadying assist or light touching   Toileting   Able to Pull Clothing down no, up yes  Manage Hygiene Bowel   Limitations Noted In Balance;ROM;Safety;LE Strength;UE Strength   Toileting (FIM) 4 - Patient completes 75% of all tasks   Toilet Transfer   Surface Assessed Raised Toilet   Transfer Technique Stand Pivot   Limitations Noted In Balance; Endurance; Safety;ROM;LE Strength   Toilet Transfer (FIM) 5 - Patient requires supervision/monitoring   Exercise Tools   Other Exercise Tool 1 push pegs in and out with B hands completing jumping game for [employer] and memory   Additional Activities   Additional Activities Other (Comment)   Additional Activities Comments fxl mobility with RW 35 ft S/ CGA   Assessment   Treatment Assessment Pt paticipates UB therex, transfers and mobility  Pt toelrates session well with 38 minutes cocnurrent treatment focusing on similar goals as another to increase UB strength for ADL tasks  Pt is making progress towards goals and will benefit form continued skilled OT services to increase independence with daily tasks  Problem List Decreased strength;Decreased range of motion;Decreased endurance; Impaired balance;Decreased safety awareness;Orthopedic restrictions   Plan   Treatment/Interventions ADL retraining;Functional transfer training; Therapeutic exercise; Endurance training;Patient/family training; Compensatory technique education   Progress Progressing toward goals   OT Therapy Minutes   OT Time In 2960   OT Time Out 1239   OT Total Time (minutes) 70   OT Mode of treatment - Individual (minutes) 32   OT Mode of treatment - Concurrent (minutes) 38   Therapy Time missed   Time missed?  No

## 2019-08-29 NOTE — PROGRESS NOTES
08/29/19 1417   Pain Assessment   Pain Assessment 0-10   Pain Score 7   Pain Location Shoulder   Pain Orientation Right   Executive Function Skills   Insight Mild insight   Task Initiation Initiates with cues   Planning Reduced planning skills   Memory Skills   Orientation Level Oriented X4   Short Term Recall of Paragraph Mild Impairment   Short Term Working Recall Mild Impairment   Memory (FIM) 5 - Recalls/performs request 90% of time   Social Interaction (FIM) 6 - Interacts appropriately with others BUT requires extra  time   Auditory Comprehension   Comphrehends Conversation Simple   Interfering Components Attention - selective; Motor planning;Processing speed   EffectiveTechniques Extra processing time;Repetition;Stressing words   Speech/Language/Cognition Assessmetn   Treatment Assessment Speech Pathology Daily Treatment Note - Speech/Cognitive Communication Skills - SLP focused on abdominal strengthening with respiratory coordination exercises  Patient initially completed deep breathing with initial cues to breathe deeply and observe the rise of the abdomen Patient completed sustained phonation, producing the vowels in one sustained vocal production  Patient then moved forward in the wheel chair, without back support for abdominal exercises  SLP introduced hugging with forward backward movement, leg lifts without back support, and "row the boat" with forward movement, all to strengthen the abdominal core  Patient educated on how these exercises will facilitate communication via sustained phonation, coordination of respiration with swallow function, and ability to perform a strong cough for airway protection    Patient was a good participant with repetitions X10-15 as tolerated   Swallow Information   Current Diet Regular   Recommendations   Diet Solid Recommendation Regular consistency   Diet Liquid Recommendation Thin liquid   General Precautions Upright as possible for all oral intake   Swallow Assessment Prognosis   Prognosis Good   Prognosis Considerations Family/community support;Participation level; Potential   SLP Therapy Minutes   SLP Time In 6716   SLP Time Out 1250   SLP Total Time (minutes) 35   SLP Mode of treatment - Individual (minutes) 35   Therapy Time missed   Time missed?  No   Daily FIM Score   Problem solving (FIM) 5 - Solves basic problems 90% of time   Comprehension (FIM) 5 - Understands basic directions and conversation   Expression (FIM) 5 - Needs help/cues only RARELY (< 10% of the time)   Eating (FIM) 6 - Patient requires increased time or safety concern

## 2019-08-29 NOTE — PROGRESS NOTES
08/28/19 1932   Pain Assessment   Pain Assessment No/denies pain   Restrictions/Precautions   Precautions Bed/chair alarms; Fall Risk;Contact/isolation   Executive Function Skills   Insight Mild insight   Task Initiation Initiates with cues   Planning Reduced planning skills   Memory Skills   Orientation Level Oriented X4   Short Term Recall of Paragraph Mild Impairment   Short Term Working Recall Mild Impairment   Memory (FIM) 5 - [place] cues patient   Social Interaction (FIM) 5 - Interacts appropriately with others 90% of time   Auditory Comprehension   Comphrehends Conversation Simple   Interfering Components Motor planning;Processing speed   EffectiveTechniques Extra processing time;Pausing;Repetition;Visual/Gestural cues   Speech/Language/Cognition Assessmetn   Treatment Assessment Speech Pathology Daily Treatment Note - Speech/Cognitive Communication Skills - SLP focused on orientation and comprehension of verbal directions  SLP supplied directions using left/right while moving about the ARC and locating pertinent areas related to therapy  Recall of therapeutic tasks and procedures in each area according to the discipline was encouraged as well as the purpose in terms of improving function  SLP then targeted memory during trail-making to relocate patients room  Focus was on retention of visual markers for success  Patient currently presents at the supervision level for verbal sequencing of procedures, memory with new information and reasoning the purpose of tasks  Swallow Information   Current Diet Regular   Recommendations   Diet Solid Recommendation Regular consistency   Diet Liquid Recommendation Thin liquid   General Precautions Upright as possible for all oral intake   Swallow Assessment Prognosis   Prognosis Good   Prognosis Considerations Family/community support;Participation level; Potential   SLP Therapy Minutes   SLP Time In 1932   SLP Time Out 2007   SLP Total Time (minutes) 35   SLP Mode of treatment - Individual (minutes) 35   Therapy Time missed   Time missed?  No   Daily FIM Score   Problem solving (FIM) 5 - Solves basic problems 90% of time   Comprehension (FIM) 5 - Needs help/cues, repetition only RARELY ( < 10% of the time)   Expression (FIM) 5 - Needs help/cues only RARELY (< 10% of the time)   Eating (FIM) 6 - Patient requires increased time or safety concern

## 2019-08-29 NOTE — PROGRESS NOTES
08/29/19 0900   Pain Assessment   Pain Assessment 0-10   Pain Score 5   Pain Type Chronic pain   Pain Location Shoulder   Pain Orientation Left   Restrictions/Precautions   Precautions Bed/chair alarms; Fall Risk   Cognition   Overall Cognitive Status WFL   Arousal/Participation Alert; Cooperative   Attention Within functional limits   Following Commands Follows one step commands without difficulty   Transfer Bed/Chair/Wheelchair   Limitations Noted In Balance; Endurance;Pain Management;UE Strength;LE Strength   Adaptive Equipment Roller Walker   Stand Pivot   (close S )   Sit to Stand   (close S )   Stand to [employer] Controls   (close S )   All Transfer   (close S )   Bed, Chair, Wheelchair Transfer (FIM) 5 - Patient requires supervision/monitoring   Ambulation   Primary Discharge Mode of Locomotion Walk   Walk Assist Level Close Supervision   Gait Pattern Slow [place];Decreased foot clearance; Inconsistant [place]   Assist Device Roller Walker   Distance Walked (feet)   (143' x 2 ; 250')   Limitations Noted In Balance; Endurance;Posture; Safety;Speed;Strength   Walking (FIM) 5 - Patient requires supervision/monitoring AND distance 150 feet or more, no rest   Stairs   Type Stairs   # of Steps   (15)   Assist Devices Bilateral Rail   Findings   (CGA)   Stairs (FIM) 2 - Patient goes up and down 4 - 11 stairs regardless of assist/device/setup   Therapeutic Interventions   Strengthening Seated TE/ standing TE 3 x 10    Other gait training, transfer training, community re-integration    Assessment   Treatment Assessment Pt participated in gait training, seated TE with light resistance, and community re-integration for improvements in LE strength and functional mobility  Pt requires close S A for sit/stand transfers, and amb with RW for 250'  Completed 15 steps with close S/CGA with improved balance and safety today  Remains mostly limited in endurance, balance, strength, and safety   Pt continues to make good functional gain and will benefit from additional skilled PT to maximize LOF for safe anticipated D/C home  PT Barriers   Physical Impairment Decreased strength;Decreased endurance; Impaired balance;Decreased mobility;Pain   Functional Limitation Standing;Transfers; Walking;Stair negotiation   Plan   Treatment/Interventions Functional transfer training;LE strengthening/ROM; Therapeutic exercise; Endurance training;Patient/family training;Bed mobility;Gait training   Progress Progressing toward goals   PT Therapy Minutes   PT Time In 0900   PT Time Out 1034   PT Total Time (minutes) 94   PT Mode of treatment - Individual (minutes) 63   PT Mode of treatment - Concurrent (minutes) 31   PT Mode of treatment - Group (minutes) 0   PT Mode of treatment - Co-treat (minutes) 0   PT Mode of Teatment - Total time(minutes) 94 minutes   Therapy Time missed   Time missed?  No

## 2019-08-29 NOTE — PROGRESS NOTES
08/28/19 2017   Charting Type   Charting Type Shift assessment   Neurological   Neuro (WDL) X   Level of Consciousness Alert/awake   Orientation Level Oriented X4   Cognition Follows commands   Facial Symmetry Symmetrical   Tongue Deviation Midline   Speech Clear   Swallow Able to swallow solids and liquids without difficulty   R Hand  Strong   L Hand  Strong   Neuro Symptoms Forgetful   Relieved by Rest   Abhijit Coma Scale   Eye Opening 4   Best Verbal Response 5   Best Motor Response 6   [place] Coma Scale Score 15   HEENT   HEENT (WDL) X   Head and Face Symmetrical   R Eye Mildly impaired vision   L Eye Mildly impaired vision   Vision - Corrective Lenses (at bedside) Glasses   Respiratory   Respiratory (WDL) X   Respiratory Pattern Normal   Bilateral Breath Sounds Diminished   Respiratory Interventions   Respiratory Interventions Cough and deep breathe   Incentive Spirometry   IS level of assistance Needs encouragement   Frequency q2hr W/A   Respiratory Effort Normal   Treatment Tolerance Tolerated well   Incentive Spirometry Goal (mL) 2500 mL   Incentive Spirometry Achieved (mL) 2000 mL   Cardiac   Cardiac (WDL) WDL   Cardiac Regularity Regular   Pain Assessment   Pain Assessment No/denies pain   Pain Score No Pain   Peripheral Vascular   Peripheral Vascular (WDL) X   Cyanosis None   Capillary Refill Greater than 2 seconds (All extremities)   Pulses L pedal;R pedal;R radial;L radial   Edema Right lower extremity; Left lower extremity   RLE Edema Trace   LLE Edema Trace   RLE Neurovascular Assessment   R Pedal Pulse +1   LLE Neurovascular Assessment   L Pedal Pulse +1   Integumentary   Integumentary (WDL) X   Skin Color Appropriate for ethnicity   Skin Condition/Temp Warm;Dry   Skin Integrity Bruising   Skin Location right hip/rib   Skin Turgor Non-tenting   David Scale   Sensory Perceptions 3   Moisture 4   Activity 3   Mobility 3   Nutrition 3   Friction and Shear 2   David Scale Score 18   Wound 08/21/19 Pressure Injury Sacrum   Date First Assessed/Time First Assessed: 08/21/19 1600   Pre-Existing Wound: Yes  Primary Wound Type: Pressure Injury  Location: Sacrum   Wound Description GAL   Dressing Foam, Silicon (eg  Allevyn, etc)   Dressing Status Clean;Dry; Intact   Musculoskeletal   Musculoskeletal (WDL) X   Level of Assistance Contact guard assist, steadying assist   Assistive Device Front wheel walker   RUE Full movement   LUE Full movement   RLE Full movement   LLE Full movement   Gastrointestinal   Abdomen Inspection Soft;Nondistended   Genitourinary   Genitourinary (WDL) X   Genitourinary Symptoms Indwelling catheter   Urine Assessment   Urinary Incontinence No   Urine Color Yellow/straw   Provider Notification   Reason for Communication Other (Comment)  (check if need labs checked  pt had K+ 8-26,no redraw yet)   Provider Name ADONAY  [facility]   Provider Role Hospitalist   Method of Communication Other (Comment)  (obinna)   Response See orders   Notification Time 3893

## 2019-08-30 PROBLEM — E83.42 HYPOMAGNESEMIA: Status: ACTIVE | Noted: 2019-08-30

## 2019-08-30 PROBLEM — R78.81 BACTEREMIA DUE TO GRAM-NEGATIVE BACTERIA: Chronic | Status: ACTIVE | Noted: 2019-08-17

## 2019-08-30 PROBLEM — D75.839 THROMBOCYTOSIS: Chronic | Status: ACTIVE | Noted: 2019-08-26

## 2019-08-30 PROBLEM — R78.81 BACTEREMIA DUE TO GRAM-NEGATIVE BACTERIA: Chronic | Status: RESOLVED | Noted: 2019-08-17 | Resolved: 2019-08-21

## 2019-08-30 PROBLEM — D64.9 ANEMIA: Chronic | Status: ACTIVE | Noted: 2019-08-21

## 2019-08-30 PROBLEM — R79.89 ELEVATED TROPONIN: Chronic | Status: ACTIVE | Noted: 2019-08-21

## 2019-08-30 PROBLEM — D72.829 LEUKOCYTOSIS: Chronic | Status: ACTIVE | Noted: 2019-08-24

## 2019-08-30 PROBLEM — D72.823 LEUKEMOID REACTION: Chronic | Status: ACTIVE | Noted: 2019-08-24

## 2019-08-30 PROBLEM — R79.89 ELEVATED TROPONIN: Chronic | Status: RESOLVED | Noted: 2019-08-21 | Resolved: 2019-08-30

## 2019-08-30 PROBLEM — F32.A DEPRESSION: Chronic | Status: ACTIVE | Noted: 2019-08-21

## 2019-08-30 PROBLEM — N18.1 STAGE 1 CHRONIC KIDNEY DISEASE: Chronic | Status: ACTIVE | Noted: 2019-08-21

## 2019-08-30 PROBLEM — R77.8 ELEVATED TROPONIN: Chronic | Status: RESOLVED | Noted: 2019-08-21 | Resolved: 2019-08-30

## 2019-08-30 PROBLEM — E87.6 HYPOKALEMIA: Chronic | Status: ACTIVE | Noted: 2019-08-26

## 2019-08-30 PROBLEM — N18.9 CKD (CHRONIC KIDNEY DISEASE): Chronic | Status: ACTIVE | Noted: 2019-08-21

## 2019-08-30 PROBLEM — R77.8 ELEVATED TROPONIN: Chronic | Status: ACTIVE | Noted: 2019-08-21

## 2019-08-30 LAB
ALBUMIN SERPL BCP-MCNC: 2.8 G/DL (ref 3.5–5.7)
ALP SERPL-CCNC: 80 U/L (ref 55–165)
ALT SERPL W P-5'-P-CCNC: 22 U/L (ref 7–52)
ANION GAP SERPL CALCULATED.3IONS-SCNC: 6 MMOL/L (ref 4–13)
AST SERPL W P-5'-P-CCNC: 14 U/L (ref 13–39)
BASOPHILS # BLD AUTO: 0.1 THOUSANDS/ΜL (ref 0–0.1)
BASOPHILS NFR BLD AUTO: 1 % (ref 0–2)
BILIRUB SERPL-MCNC: 0.3 MG/DL (ref 0.2–1)
BUN SERPL-MCNC: 13 MG/DL (ref 7–25)
CALCIUM SERPL-MCNC: 8.5 MG/DL (ref 8.6–10.5)
CHLORIDE SERPL-SCNC: 109 MMOL/L (ref 98–107)
CO2 SERPL-SCNC: 28 MMOL/L (ref 21–31)
CREAT SERPL-MCNC: 1.08 MG/DL (ref 0.7–1.3)
EOSINOPHIL # BLD AUTO: 0.2 THOUSAND/ΜL (ref 0–0.61)
EOSINOPHIL NFR BLD AUTO: 2 % (ref 0–5)
ERYTHROCYTE [DISTWIDTH] IN BLOOD BY AUTOMATED COUNT: 14 % (ref 11.5–14.5)
GFR SERPL CREATININE-BSD FRML MDRD: 66 ML/MIN/1.73SQ M
GLUCOSE SERPL-MCNC: 92 MG/DL (ref 65–99)
HCT VFR BLD AUTO: 26 % (ref 42–47)
HGB BLD-MCNC: 8.8 G/DL (ref 14–18)
LYMPHOCYTES # BLD AUTO: 1.7 THOUSANDS/ΜL (ref 0.6–4.47)
LYMPHOCYTES NFR BLD AUTO: 21 % (ref 21–51)
MAGNESIUM SERPL-MCNC: 1.7 MG/DL (ref 1.9–2.7)
MCH RBC QN AUTO: 31.8 PG (ref 26–34)
MCHC RBC AUTO-ENTMCNC: 34 G/DL (ref 31–37)
MCV RBC AUTO: 94 FL (ref 81–99)
MONOCYTES # BLD AUTO: 0.9 THOUSAND/ΜL (ref 0.17–1.22)
MONOCYTES NFR BLD AUTO: 11 % (ref 2–12)
NEUTROPHILS # BLD AUTO: 5.1 THOUSANDS/ΜL (ref 1.4–6.5)
NEUTS SEG NFR BLD AUTO: 65 % (ref 42–75)
PLATELET # BLD AUTO: 467 THOUSANDS/UL (ref 149–390)
PMV BLD AUTO: 8.3 FL (ref 8.6–11.7)
POTASSIUM SERPL-SCNC: 3.5 MMOL/L (ref 3.5–5.5)
PROT SERPL-MCNC: 5.5 G/DL (ref 6.4–8.9)
RBC # BLD AUTO: 2.78 MILLION/UL (ref 4.3–5.9)
SODIUM SERPL-SCNC: 143 MMOL/L (ref 134–143)
WBC # BLD AUTO: 7.9 THOUSAND/UL (ref 4.8–10.8)

## 2019-08-30 PROCEDURE — 97535 SELF CARE MNGMENT TRAINING: CPT

## 2019-08-30 PROCEDURE — 97116 GAIT TRAINING THERAPY: CPT

## 2019-08-30 PROCEDURE — 97110 THERAPEUTIC EXERCISES: CPT

## 2019-08-30 PROCEDURE — 99231 SBSQ HOSP IP/OBS SF/LOW 25: CPT | Performed by: NURSE PRACTITIONER

## 2019-08-30 PROCEDURE — 99232 SBSQ HOSP IP/OBS MODERATE 35: CPT | Performed by: PHYSICAL MEDICINE & REHABILITATION

## 2019-08-30 PROCEDURE — 83735 ASSAY OF MAGNESIUM: CPT | Performed by: NURSE PRACTITIONER

## 2019-08-30 PROCEDURE — 85025 COMPLETE CBC W/AUTO DIFF WBC: CPT | Performed by: NURSE PRACTITIONER

## 2019-08-30 PROCEDURE — 80053 COMPREHEN METABOLIC PANEL: CPT | Performed by: NURSE PRACTITIONER

## 2019-08-30 PROCEDURE — 92507 TX SP LANG VOICE COMM INDIV: CPT

## 2019-08-30 PROCEDURE — G0515 COGNITIVE SKILLS DEVELOPMENT: HCPCS

## 2019-08-30 PROCEDURE — 97530 THERAPEUTIC ACTIVITIES: CPT

## 2019-08-30 PROCEDURE — 97537 COMMUNITY/WORK REINTEGRATION: CPT

## 2019-08-30 RX ORDER — ASCORBIC ACID 500 MG
500 TABLET ORAL DAILY
Status: DISCONTINUED | OUTPATIENT
Start: 2019-08-30 | End: 2019-09-06

## 2019-08-30 RX ADMIN — POTASSIUM CHLORIDE 20 MEQ: 1500 TABLET, EXTENDED RELEASE ORAL at 09:04

## 2019-08-30 RX ADMIN — FINASTERIDE 5 MG: 5 TABLET, FILM COATED ORAL at 09:04

## 2019-08-30 RX ADMIN — MORPHINE SULFATE 15 MG: 15 TABLET ORAL at 17:46

## 2019-08-30 RX ADMIN — HEPARIN SODIUM 5000 UNITS: 5000 INJECTION, SOLUTION INTRAVENOUS; SUBCUTANEOUS at 14:19

## 2019-08-30 RX ADMIN — Medication 400 MG: at 17:46

## 2019-08-30 RX ADMIN — Medication 400 MG: at 14:19

## 2019-08-30 RX ADMIN — MORPHINE SULFATE 15 MG: 15 TABLET ORAL at 05:40

## 2019-08-30 RX ADMIN — HEPARIN SODIUM 5000 UNITS: 5000 INJECTION, SOLUTION INTRAVENOUS; SUBCUTANEOUS at 21:24

## 2019-08-30 RX ADMIN — OXYCODONE HYDROCHLORIDE AND ACETAMINOPHEN 500 MG: 500 TABLET ORAL at 09:04

## 2019-08-30 RX ADMIN — FERROUS SULFATE TAB 325 MG (65 MG ELEMENTAL FE) 325 MG: 325 (65 FE) TAB at 09:04

## 2019-08-30 RX ADMIN — MORPHINE SULFATE 15 MG: 15 TABLET ORAL at 13:00

## 2019-08-30 RX ADMIN — SERTRALINE HYDROCHLORIDE 50 MG: 50 TABLET ORAL at 09:05

## 2019-08-30 RX ADMIN — HEPARIN SODIUM 5000 UNITS: 5000 INJECTION, SOLUTION INTRAVENOUS; SUBCUTANEOUS at 05:39

## 2019-08-30 RX ADMIN — GABAPENTIN 300 MG: 300 CAPSULE ORAL at 21:24

## 2019-08-30 RX ADMIN — VITAMIN D, TAB 1000IU (100/BT) 2000 UNITS: 25 TAB at 09:04

## 2019-08-30 RX ADMIN — FERROUS SULFATE TAB 325 MG (65 MG ELEMENTAL FE) 325 MG: 325 (65 FE) TAB at 17:46

## 2019-08-30 RX ADMIN — ACETAMINOPHEN 650 MG: 325 TABLET ORAL at 09:03

## 2019-08-30 RX ADMIN — CHLORHEXIDINE GLUCONATE 0.12% ORAL RINSE 15 ML: 1.2 LIQUID ORAL at 09:04

## 2019-08-30 NOTE — NURSING NOTE
Pt c/o stiff neck and shoulder pain  Pt refused to stay OOB in [address] and laid in bed to rest   PRN tylenol given  Pt asked for heating pad  Note left for Dr Nydia Tinsley for heating pad order  Pt states pain is "a little better, but it still bothers me"  Pt needed to be coaxed by OT to get OOB  Call bell within reach and all needs are currently met  Will continue to monitor and follow plan of care

## 2019-08-30 NOTE — PROGRESS NOTES
Physical Medicine and Rehabilitation Progress Note  Ann Sibley 66 y o  male MRN: [id number]  Unit/Bed#: [facility] [place] 213-01 Encounter: [id number]    HPI: Ann Sibley is a 66 y o  male who presented to the [facility] with AMS 2/2 to urosepsis which was treated with abx  Course complicated by RF which was treated with IVF and elevated troponins which cardiology attributed to sepsis         Chief Complaint: sepsis     Interval/subjective: patient without complaint currently     ROS: A 10 point ROS was performed; negative except as noted above       Assessment/Plan:      Hypokalemia  Assessment & Plan  - currently low normal at 3 5; therefore will continue daily K supplementation   - additionally with low Mg at 1 7 therefore will supplement magnesium as well  - recheck K and Mg on 9/3  -IM monitoring     Thrombocytosis (HCC)  Assessment & Plan  - mildly elevated and stable at 467  -IM monitoring      Anemia  Assessment & Plan  -appears to be chronic going back to at least 3/2018 per EMR with a baseline of 10-11  - Hg currently stable at 8 8  -IM monitoring     CKD (chronic kidney disease)  Assessment & Plan  - per renal baseline likely 1 0-1 1  -Cr currently 1 08  - patient appears to have been taking meloxicam and naproxen as OP, advised patient to stop all NSAIDs  -IM monitoring     Hypomagnesemia  Assessment & Plan  - currently 1 7, will start supplemental Mg  - recheck on 9/3  -IM monitoring     Leukocytosis  Assessment & Plan  - WBC now WNL   - however did have fever but has been afebrile since 8/25  - in acute care had bacteremia which was felt to be 2/2 to urinary tract infection in the setting of recent change of chronic indwelling cantor catheter therefore UA repeated on 8/25 which was unimpressive for UTI and UCx grew [id number] cfu/ml serratia sp d/w Dr Farrukh Kellogg of ID and does not feel this is a clinically significant finding  - chest XR negative for PNA  - original recommendation in acute care was to leave current cantor to remain in place per urology recs as it was changed prior to acute care admission and also d/w ID if cantor should be changed based on UCx of only [phone] cfu/ml serratia sp and they feel that amount of growth does not warrant a cantor change at this time beyond routine cantor change that patient has done regularly once a month as OP with Dr Abbey Dalal   - blood cx x 2 of 8/24 finalized as no growth  - per Dr Jacoby Gardner ID consult recommended CT A/P which was unrevealing and TTE which did not show any valvular vegetation therefore d/w Dr Loli Barrett has recommended no further abx at this time         Depression  Assessment & Plan  - on home zoloft 50 mg qd     Chronic pain syndrome  Assessment & Plan  - on home neurontin 300 HS    - on home MS IR 15 mg q6  - per [facility] fairly regular prescription for MS IR 15 mg last filled on 8/1/19 for 120 tabs with 0 refills     Chronic indwelling Cantor catheter  Assessment & Plan  - h/o TURP  - on home proscar 5 mg qd   - changed once per month (recently changed)   - current cantor to remain in place per urology recs   - follows with Dr Abbey Dalal     * Elevated troponin  Assessment & Plan  - evaluated by cardiology and felt to be 2/2 to sepsis rather than a true coronary event and they recommend FU as OP for ischemic SEGUNDO      Scheduled Meds:    Current Facility-Administered Medications:  acetaminophen 650 mg Oral Q6H PRN Fanny Bassett PA-C   ascorbic acid 500 mg Oral Daily UBALDO Harkins   bisacodyl 10 mg Rectal Daily PRN Mervin Martinez MD   chlorhexidine 15 mL Swish & Spit Q12H Zohaib Tenorio MD   cholecalciferol 2,000 Units Oral Daily Mervin Martinez MD   ferrous sulfate 325 mg Oral BID Mervin Martinez MD   finasteride 5 mg Oral Daily Mervin Martinez MD   gabapentin 300 mg Oral HS Mervin Martinez MD   heparin (porcine) 5,000 Units Subcutaneous Q8H Zohaib Tenorio MD   magnesium oxide 400 mg Oral BID Mervin Martinez MD   morphine 15 mg Oral Q6H Terry Devorah Walker MD   potassium chloride 20 mEq Oral Daily Cordell Subramanian MD   sertraline 50 mg Oral Daily Cordell Subramanian MD          Incidental findings:    1) low Amylase/lipase: per IM unlikely to be of clinical significance and recommend OP FU with PCP with further testing/treatment and/or specialist referral at PCP's discretion   2) grade 1 DD: OP FU with cardiology  3) sinus arrhythmia: OP FU with cardiology   4) B/L non-obstructing renal stones: OP FU with Dr Bala Cottrell  5) left kidney cyst: OP FU with Dr Bala Cottrell  6) right lung granuloma: OP FU with PCP with further testing/treatment and/or specialist referral at PCP's discretion  7) diverticulosis: OP FU with PCP with further testing/treatment and/or specialist referral at PCP's discretion   8) small right sided fat containing inguinal hernia: OP FU with PCP with further testing/treatment and/or specialist referral at PCP's discretion          Note: per patient and family he is on  mg qd at home not because he was advised to take ASA by a physician but rather because he decided on his own to take ASA     DVT ppx: HSQ        Objective:    Functional Update:  Mobility: CG  Transfers: min   ADLs: min         Physical Exam:              General: alert, no apparent distress, cooperative and comfortable  HEENT:  Head: Normal, normocephalic, atraumatic    CARDIAC:  +S1/2   LUNGS:  respirations unlabored   ABDOMEN:  soft NT   EXTREMITIES:  volume status currently stable   NEURO:   awake, alert, appropriately answering questions   PSYCH:  mood/affect currently stable       Laboratory:   Results from last 7 days   Lab Units 08/30/19  0523 08/26/19  0433 08/25/19  0438   HEMOGLOBIN g/dL 8 8* 9 0* 9 7*   HEMATOCRIT % 26 0* 27 3* 29 4*   WBC Thousand/uL 7 90 13 40* 15 80*     Results from last 7 days   Lab Units 08/30/19  0523 08/29/19  0438 08/26/19  0433 08/25/19  0438   BUN mg/dL 13 13 17 18   SODIUM mmol/L 143 142 143 138   POTASSIUM mmol/L 3 5 3 4* 3 3* 3 2*   CHLORIDE mmol/L 109* 108* 107 103   CREATININE mg/dL 1 08 1 06 1 12 1 01   AST U/L 14  --   --  31   ALT U/L 22  --   --  59            Patient Active Problem List   Diagnosis    Chronic indwelling Diallo catheter    Chronic pain syndrome    Elevated troponin    CKD (chronic kidney disease)    Anemia    Depression    Leukocytosis    Thrombocytosis (HCC)    Hypokalemia    Hypomagnesemia

## 2019-08-30 NOTE — ASSESSMENT & PLAN NOTE
·  Secondary to urinary tract infection  ·  urine and blood cultures were positive for E coli on 08/16/2019  ·   Urine culture was positive for Serratia marcescens  On 08/25/2019, infectious Disease was reconsulted at that time, susceptibility showed ceftriaxone 2 g daily to be most appropriate, patient did receive 3 days of antibiotics, patient had echocardiogram that showed no vegetation, repeat CT of the abdomen and pelvis showed no signs of infection, antibiotic was discontinued

## 2019-08-30 NOTE — ASSESSMENT & PLAN NOTE
· Unclear etiology  ·   WBC on 08/25/2019 was 15 80, 08/26/2019 was 13 40  ·  will check CBC this a m   ·  low-grade temperature of 99° 0 1  · Blood cultures-  From 08/24/2019 show no growth to date x5 days  · Check chest x-ray on 08/24/2019 showed no evidence of pneumonia  ·  patient finished Omnicef   ·  on 08/25/2019 urine culture was positive for Serratia Marcescens,  Infectious Disease was reconsulted, and based on the sensitivity ordered the patient ceftriaxone 2 g to 24 hours  The patient did receive 3 days of this medication  ·  patient's echocardiogram was negative for vegetation and repeat CT of the abdomen and pelvis show no infection  Antibiotic was discontinued  ·   Chronic Diallo will be managed by Urology

## 2019-08-30 NOTE — ASSESSMENT & PLAN NOTE
·  On 08/26/2019 potassium was 3 3  ·  will check magnesium level as well  ·  supplementation had been discontinued at this time  ·  will check CMP for potential need to restart potassium supplementation

## 2019-08-30 NOTE — ASSESSMENT & PLAN NOTE
·  Patient appears to be at baseline  ·  continue iron supplementation  ·  patient may benefit from vitamin-C addition

## 2019-08-30 NOTE — PROGRESS NOTES
08/30/19 1010   Pain Assessment   Pain Assessment 0-10   Pain Score 8   Pain Type Acute pain   Pain Location Neck   Restrictions/Precautions   Precautions Fall Risk;Bed/chair alarms;Limb alert;Contact/isolation  (hx VRE urine, recent R sh sx)   Grooming   Able To Initiate Tasks;Comb/Brush Hair;Wash/Dry Face;Brush/Clean Teeth;Wash/Dry Hands   Limitation Noted In Safety;Strength   Grooming (FIM) 5 - Patient requires supervision/monitoring   Bathing   Assessed Bath Style Sponge Bath   Anticipated D/C Bath Style Sponge Bath; Shower   Able to [employer] No   Able to [employer] Temperature No   Able to Wash/Rinse/Dry (body part) Left Arm;Right Arm; Chest;Abdomen;L Upper Leg;R Upper Leg;L Lower Leg/Foot;R Lower Leg/Foot;Perineal Area; Buttocks   Limitations Noted in Endurance;ROM;Safety;Strength   Bathing (FIM) 4 - Patient requires steadying assist or light touching   Tub/Shower Transfer   Not Assessed Patient refusal   Dressing/Undressing Clothing   Remove UB Clothes Pullover Shirt; Button Shirt   Remove LB Clothes Pants; Undergarment   Don  [facility]; Undergarment   Limitations Noted In Balance; Endurance; Safety;ROM;Strength   Findings pt did not remove and don completely however pushed down to bathe and pull up    UB Dressing (FIM) 4 - Patient completes 75% of all tasks   LB Dressing (FIM) 4 - Patient completes 75% of all tasks   Transfer Bed/Chair/Wheelchair   Limitations Noted In Balance; Endurance;LE Strength   Stand Pivot Contact Guard   Sit to Stand Supervision   Stand to Sit Supervision   Supine to Sit Supervision   Sit to Supine Supervision   Bed, Chair, Wheelchair Transfer (FIM) 4 - Patient requires steadying assist or light touching   Exercise Tools   Other Exercise Tool 1 FM knots out/ in B hands   Coordination   Gross Motor pt directed to stretch nexk by looking left and right to warm up before getting out of bed   Cognition   Overall Cognitive Status [facility] Treatment Assessment Pt participates in bathing and dressing with 30 minutes focusing on similar goals as another to increase function with ADLs with rest breaks and self pacing  Pt toelrates session with increased breaks and time due to pain of neck with MD suggesting hot packs for relief  Pt returned to bed at the end of session to rest with FM completed supine  Pt is making gains towards goals and will benefit from continued skilled OT services to increase independence with daily tasks  Problem List Decreased strength;Decreased range of motion;Decreased endurance; Impaired balance;Decreased safety awareness;Pain   Plan   Treatment/Interventions ADL retraining;Functional transfer training; Therapeutic exercise; Endurance training;Patient/family training; Compensatory technique education   Progress Progressing toward goals   OT Therapy Minutes   OT Time In 1010   OT Time Out 1110   OT Total Time (minutes) 60   OT Mode of treatment - Individual (minutes) 30   OT Mode of treatment - Concurrent (minutes) 30   Therapy Time missed   Time missed?  No

## 2019-08-30 NOTE — PROGRESS NOTES
08/30/19 1445   Pain Assessment   Pain Assessment No/denies pain   Restrictions/Precautions   Precautions Fall Risk;Bed/chair alarms;Limb alert;Contact/isolation   Executive Function Skills   Insight Mild insight   Task Initiation Initiates with cues   Planning Reduced planning skills   Memory Skills   Orientation Level Oriented X4   Short Term Recall of Paragraph Mild Impairment   Short Term Working Recall Mild Impairment   Memory (FIM) 5 - Needs cueing reminders <10%   Social Interaction (FIM) 6 - Interacts appropriately with others BUT requires extra  time   Auditory Comprehension   Comphrehends Conversation Simple   Interfering Components Attention - selective; Attention to detail; Motor planning; Working memory   Motor Speech Evaluation   Respiration/Phonation X   Speech/Language/Cognition Assessmetn   Treatment Assessment Speech Pathology Daily Treatment Note - Speech/Cognitive Communication Skills - SLP focused on attention to cues and comprehension of directions during completion of ADL's as well as patient's own ability to give directions to increase their success  SLP utilized tray set up with meal prep as a familiar scenario in patient's environment  Patient was required to follow 2-3step directions related to safe posture and positioning in the wheelchair with ability to place them selves near enough to the table in order to reach all tray items  Attention was placed on wheel chair brakes pre and post meal   At the initiation of the meal, the patient was required to give directions for functional preparation of the tray in terms of bite size and moistening solids as well as opening and preparation of all items in terms of condiments, etc  to be eaten  This task has been designed to increase patient's understanding of the steps needed to ensure their ongoing functional success at mealtime    Patient presented at a level of Mod I for verbalizing the sequence of steps but a level of supervision for reasoning through the motor movements to complete the task  Further focus is indicated in this area  Swallow Information   Current Diet Regular   Recommendations   Diet Solid Recommendation Regular consistency   Diet Liquid Recommendation Thin liquid   General Precautions Upright as possible for all oral intake   QI: Eating   Assistance Needed Verbal cues   Eating CARE Score 4   Swallow Assessment   Swallow Treatment Assessment Patient exhibited mild cough with his meal this day associated with mild incoordination with respiration and swallow with larger bites and mixed consistencies  SLP to analyze and monitor the situation  Swallow Assessment Prognosis   Prognosis Good   Prognosis Considerations Family/community support;Participation level; Potential   SLP Therapy Minutes   SLP Time In 6239   SLP Time Out 4450   SLP Total Time (minutes) 45   SLP Mode of treatment - Individual (minutes) 45   Therapy Time missed   Time missed?  No   Daily FIM Score   Problem solving (FIM) 5 - Solves basic problems 90% of time   Comprehension (FIM) 5 - Needs help/cues, repetition only RARELY ( < 10% of the time)   Expression (FIM) 6 - Expresses complex/abstract but requires:  more time   Eating (FIM) 6 - Patient requires increased time or safety concern  (for coordination respiration with swallow)        08/30/19 1445   Pain Assessment   Pain Assessment No/denies pain   Restrictions/Precautions   Precautions Fall Risk;Bed/chair alarms;Limb alert;Contact/isolation   Executive Function Skills   Insight Mild insight   Task Initiation Initiates with cues   Planning Reduced planning skills   Memory Skills   Orientation Level Oriented X4   Short Term Recall of Paragraph Mild Impairment   Short Term Working Recall Mild Impairment   Memory (FIM) 5 - Needs cueing reminders <10%   Social Interaction (FIM) 6 - Interacts appropriately with others BUT requires extra  time   Auditory Comprehension   Comphrehends Conversation Simple Interfering Components Attention - selective; Attention to detail; Motor planning; Working memory   Motor Speech Evaluation   Respiration/Phonation X   Speech/Language/Cognition Assessmetn   Treatment Assessment Speech Pathology Daily Treatment Note - Speech/Cognitive Communication Skills - SLP focused on attention to cues and comprehension of directions during completion of ADL's as well as patient's own ability to give directions to increase their success  SLP utilized tray set up with meal prep as a familiar scenario in patient's environment  Patient was required to follow 2-3step directions related to safe posture and positioning in the wheelchair with ability to place them selves near enough to the table in order to reach all tray items  Attention was placed on wheel chair brakes pre and post meal   At the initiation of the meal, the patient was required to give directions for functional preparation of the tray in terms of bite size and moistening solids as well as opening and preparation of all items in terms of condiments, etc  to be eaten  This task has been designed to increase patient's understanding of the steps needed to ensure their ongoing functional success at mealtime  Patient presented at a level of Mod I for verbalizing the sequence of steps but a level of supervision for reasoning through the motor movements to complete the task  Further focus is indicated in this area  Swallow Information   Current Diet Regular   Recommendations   Diet Solid Recommendation Regular consistency   Diet Liquid Recommendation Thin liquid   General Precautions Upright as possible for all oral intake   QI: Eating   Assistance Needed Verbal cues   Eating CARE Score 4   Swallow Assessment   Swallow Treatment Assessment Patient exhibited mild cough with his meal this day associated with mild incoordination with respiration and swallow with larger bites and mixed consistencies    SLP to analyze and monitor the situation  Swallow Assessment Prognosis   Prognosis Good   Prognosis Considerations Family/community support;Participation level; Potential   SLP Therapy Minutes   SLP Time In 5199   SLP Time Out 5460   SLP Total Time (minutes) 45   SLP Mode of treatment - Individual (minutes) 45   Therapy Time missed   Time missed?  No   Daily FIM Score   Problem solving (FIM) 5 - Solves basic problems 90% of time   Comprehension (FIM) 5 - Needs help/cues, repetition only RARELY ( < 10% of the time)   Expression (FIM) 6 - Expresses complex/abstract but requires:  more time   Eating (FIM) 6 - Patient requires increased time or safety concern  (for coordination respiration with swallow)

## 2019-08-30 NOTE — ASSESSMENT & PLAN NOTE
· The patient with recent history of RUTHANN  ·  and pancreatic are stable  ·  Monitor renal function closely  · Avoid nephrotoxins such as NSAIDs

## 2019-08-30 NOTE — PROGRESS NOTES
08/30/19 1302   Pain Assessment   Pain Assessment 0-10   Pain Score 6   Pain Type Acute pain   Pain Location Shoulder   Pain Orientation Bilateral   Restrictions/Precautions   Precautions Fall Risk;Bed/chair alarms;Limb alert;Contact/isolation  (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, recent R shoulder sx )   Cognition   Overall Cognitive Status WFL   Arousal/Participation Alert; Cooperative   Attention Attends with cues to redirect   Orientation Level Oriented X4   Following Commands Follows one step commands without difficulty   Transfer Bed/Chair/Wheelchair   Limitations Noted In Balance; Endurance;LE Strength;UE Strength;Pain Management   Adaptive Equipment Roller Walker   Stand Pivot Minimal Assist;Assist x 1;Supervision  (min A from lower chair surfaces; close S regular chair)   Sit to Stand Minimal Assist;Supervision  (min A from lower chair surfaces )   All Transfer Minimal Assist;Assist x 1;Supervision   Bed, Chair, Wheelchair Transfer (FIM) 4 - Patient completes 75% of all tasks   Ambulation   Primary Discharge Mode of Locomotion Walk   Walk Assist Level Close Supervision   Gait Pattern Slow [place]; Inconsistant [place];Decreased foot clearance; Forward Flexion   Assist Device Escobar Susan Puneet Walked (feet)   (165')   Limitations Noted In Endurance;Posture; Safety;Balance;Speed;Strength   Walking (FIM) 4 - Patient requires steadying assist or light touching AND distance 150 feet or more, no rest   Stairs   Type Stairs   # of Steps   (15)   Assist Devices Bilateral Rail   Stairs (FIM) 4 - Patient requires steadying assist or light touching AND patient goes up and down full flight (12- 14 stairs)   Toilet Transfer   Surface Assessed Raised Toilet   Toilet Transfer (FIM) 4 - Patient completes 75% of all tasks   Therapeutic Interventions   Strengthening seated TE 3 x 10    Other Gait training, transfer training, community re-integration    Assessment   Treatment Assessment Pt participated in gait training, seated TE with light resistance, and community re-integration for improvements in LE strength and functional mobility  Worked on sit/stand transfers from lower surface chairs to with min A needed and VC given for set up and sequencing  Pt is close S consistently when standing from w/c level height chairs  Completed 15 steps today with close S/CGA  Pt continues to get easily fatigued and have some SOB following mobility activities requiring intermittent rest breaks throughout therapy  Left Pt positioned supine in a recliner with cervical hot pack on and in the care of OT  Chris Londono continues to make good progress and will benefit from additional skilled PT to maximize LOF/safety  PT Barriers   Physical Impairment Decreased strength;Decreased endurance; Impaired balance;Decreased mobility;Pain   Functional Limitation Stair negotiation;Standing;Transfers; Walking   Plan   Treatment/Interventions Functional transfer training;LE strengthening/ROM; Therapeutic exercise;Gait training;Bed mobility; Endurance training   Progress Progressing toward goals   PT Therapy Minutes   PT Time In 1302   PT Time Out 1403   PT Total Time (minutes) 61   PT Mode of treatment - Individual (minutes) 61   PT Mode of treatment - Concurrent (minutes) 0   PT Mode of treatment - Group (minutes) 0   PT Mode of treatment - Co-treat (minutes) 0   PT Mode of Teatment - Total time(minutes) 61 minutes   Therapy Time missed   Time missed?  No     Additional amb trial to OT gym: 155'

## 2019-08-30 NOTE — ASSESSMENT & PLAN NOTE
· Renal ultrasound: 9 mm nonobstructing calculus within the lower pole the right kidney; Simple cyst within the left kidney  No hydronephrosis     · Patient will need to follow up with Urology as an outpatient

## 2019-08-30 NOTE — PROGRESS NOTES
08/30/19 1405   Pain Assessment   Pain Assessment 0-10   Pain Score 4   Pain Type Acute pain   Pain Location Neck   Hospital Pain Intervention(s) Heat applied  (no redness apparent when removed 20 minutes later, 6 layers )   Restrictions/Precautions   Precautions Fall Risk;Contact/isolation;Pain;Limb alert  (recent R sh sx, VRE urine)   Transfer Bed/Chair/Wheelchair   Limitations Noted In Balance; Endurance;LE Strength   Stand Pivot Supervision   Sit to Stand Supervision   Stand to Sit Supervision   Bed, Chair, Wheelchair Transfer (FIM) 5 - Patient requires supervision/monitoring   Exercise Tools   Other Exercise Tool 1 1# wt elbow flexion/ extension, supination/ pronation and wrist flexion/ flexion   Other Exercise Tool 2 3# gripper 2 sets 15 B hands   Cognition   Overall Cognitive Status WFL   Orientation Level Oriented X4   Additional Activities   Additional Activities Other (Comment)   Additional Activities Comments fxl mobility with RW S/CGA to and from gym   Assessment   Treatment Assessment Pt participates in BUE therex this day before returning to the room  Pt presents with a hot pack placed on neck with 4 layers and cover with it monitored for 20 minutes while completing UB therex  Pt participates in 10 minutes concurrent treatment focusing on similar goals as another to increase UB strength  Pt tolerates session without complaints and returned to room at end of session  Pt requires assist due to decreased balance, safety, endurarance and B UB ROM and pain  Pt will benefit from continued skilled OT services to increase independence with daily tasks  Problem List Decreased strength;Decreased range of motion;Decreased endurance; Impaired balance;Decreased safety awareness;Pain   Plan   Treatment/Interventions ADL retraining;Functional transfer training; Therapeutic exercise; Endurance training;Patient/family training; Compensatory technique education   Progress Progressing toward goals   OT Therapy Minutes   OT Time In 1405   OT Time Out 1442   OT Total Time (minutes) 37   OT Mode of treatment - Individual (minutes) 27   OT Mode of treatment - Concurrent (minutes) 10   Therapy Time missed   Time missed?  No

## 2019-08-30 NOTE — ASSESSMENT & PLAN NOTE
- resolved, Mg level still WNL without supplemental MG therefore IM recommends dc w/o supplemental Mg

## 2019-08-30 NOTE — PROGRESS NOTES
Progress Note - Vinay Part 1945, 66 y o  male MRN: [id number]    Unit/Bed#: -01 Encounter: [id number]    Primary Care Provider: Antoinette Shell DO   Date and time admitted to hospital: 8/21/2019  3:10 PM        * Hypokalemia  Assessment & Plan  ·  On 08/26/2019 potassium was 3 3  ·  will check magnesium level as well  ·  supplementation had been discontinued at this time  ·  will check CMP for potential need to restart potassium supplementation    Leukemoid reaction  Assessment & Plan  · Unclear etiology  ·   WBC on 08/25/2019 was 15 80, 08/26/2019 was 13 40  ·  will check CBC this a m   ·  low-grade temperature of 99° 0 1  · Blood cultures-  From 08/24/2019 show no growth to date x5 days  · Check chest x-ray on 08/24/2019 showed no evidence of pneumonia  ·  patient finished Omnicef   ·  on 08/25/2019 urine culture was positive for Serratia Marcescens,  Infectious Disease was reconsulted, and based on the sensitivity ordered the patient ceftriaxone 2 g to 24 hours  The patient did receive 3 days of this medication  ·  patient's echocardiogram was negative for vegetation and repeat CT of the abdomen and pelvis show no infection  Antibiotic was discontinued  ·   Chronic [place] will be managed by Urology  Bacteremia due to Gram-negative bacteria  Assessment & Plan  ·  Secondary to urinary tract infection  ·  urine and blood cultures were positive for E coli on 08/16/2019  ·   Urine culture was positive for Serratia marcescens  On 08/25/2019, infectious Disease was reconsulted at that time, susceptibility showed ceftriaxone 2 g daily to be most appropriate, patient did receive 3 days of antibiotics, patient had echocardiogram that showed no vegetation, repeat CT of the abdomen and pelvis showed no signs of infection, antibiotic was discontinued      Anemia  Assessment & Plan  ·  Patient appears to be at baseline  ·  continue iron supplementation  ·  patient may benefit from vitamin-C addition    Chronic indwelling [place] catheter  Assessment & Plan  · Renal ultrasound: 9 mm nonobstructing calculus within the lower pole the right kidney; Simple cyst within the left kidney  No hydronephrosis  · Patient will need to follow up with Urology as an outpatient    Stage 1 chronic kidney disease  Assessment & Plan  · The patient with recent history of RUTHANN  ·  and pancreatic are stable  ·  Monitor renal function closely  · Avoid nephrotoxins such as NSAIDs    Thrombocytosis ([address] )  Assessment & Plan  ·  Appears to be a chronic condition  ·  continue to monitor    Chronic pain syndrome  Assessment & Plan  · Continue with home regimen    Depression  Assessment & Plan  ·  Continue Zoloft    Elevated troponinresolved as of 8/30/2019  Assessment & Plan  ·  resolved      ·   Laboratory data scheduled  For 08/30/2019:  ·  CBC  ·  CMP  ·  magnesium    ·  recommendations for this patient:  ·   If potassium level is low, patient will require potassium supplementation to be ordered  ·  even if magnesium level is within normal limits, it may be suggestive to add magnesium supplementation to assist in  Hypokalemia management  ·  it appears as if the patient has chronic anemia, and does receive iron supplementation, patient may benefit from vitamin-C supplementation as well  ·  blood cultures from 08/24/2019 are no growth to date x5 days  ·  patient is last WBC on 08/26/2019 was 13 40 recheck today  ·  patient with low-grade temperatures of 99 1  VTE Pharmacologic Prophylaxis: Pharmacologic: Heparin    Patient Centered Rounds: I have performed bedside rounds with nursing staff today      Discussions with Specialists or Other Care Team Provider:   Not applicable    Education and Discussions with Family / Patient:  Not applicable    Current Length of Stay: 9 day(s)    Current Patient Status: Inpatient Rehab   Certification Statement: The patient will continue to require additional inpatient hospital stay due to Per primary services    Discharge Plan:  Per primary services    Code Status: Level 3 - DNAR and DNI    Subjective:    not applicable as this is just an update of the patient's chart  Objective:     Vitals:   Temp (24hrs), Av 2 °F (36 8 °C), Min:97 3 °F (36 3 °C), Max:99 1 °F (37 3 °C)    Temp:  [97 3 °F (36 3 °C)-99 1 °F (37 3 °C)] 99 1 °F (37 3 °C)  HR:  [67-74] 74  Resp:  [18] 18  BP: (120-128)/(62-80) 128/80  SpO2:  [93 %-97 %] 93 %  Body mass index is 28 72 kg/m²  Input and Output Summary (last 24 hours): Intake/Output Summary (Last 24 hours) at 2019 0456  Last data filed at 2019 1812  Gross per 24 hour   Intake 480 ml   Output 1400 ml   Net -920 ml       Physical Exam:     Physical Exam   Constitutional:   No physical assessment was performed this is just an update of the patient's chart  Additional Data:     Labs:    Results from last 7 days   Lab Units 19  0433   WBC Thousand/uL 13 40*   HEMOGLOBIN g/dL 9 0*   HEMATOCRIT % 27 3*   PLATELETS Thousands/uL 402*   NEUTROS PCT % 78*   LYMPHS PCT % 15*   MONOS PCT % 6   EOS PCT % 1     Results from last 7 days   Lab Units 19  0438  19  0438   POTASSIUM mmol/L 3 4*   < > 3 2*   CHLORIDE mmol/L 108*   < > 103   CO2 mmol/L 27   < > 26   BUN mg/dL 13   < > 18   CREATININE mg/dL 1 06   < > 1 01   CALCIUM mg/dL 8 4*   < > 8 6   ALT U/L  --   --  59   AST U/L  --   --  31    < > = values in this interval not displayed  * I Have Reviewed All Lab Data Listed Above  * Additional Pertinent Lab Tests Reviewed: [address] Admission  Reviewed    Imaging:  Imaging Reports Reviewed Today Include:   Not applicable    Recent Cultures (last 7 days):     Results from last 7 days   Lab Units 19  1457 19  0831 19  0830   BLOOD CULTURE   --  No Growth After 5 Days  No Growth After 5 Days     URINE CULTURE  0668-0074 cfu/ml Serratia marcescens*  --   --        Last 24 Hours Medication List:     Current Facility-Administered Medications:  acetaminophen 650 mg Oral Q6H PRN Ramirez Regan PA-C   ascorbic acid 500 mg Oral Daily UBALDO Harkins   bisacodyl 10 mg Rectal Daily PRN Almita Farah MD   chlorhexidine 15 mL Swish & Spit Q12H Katarina Aponte MD   cholecalciferol 2,000 Units Oral Daily Almita Farah MD   ferrous sulfate 325 mg Oral BID Almita Farah MD   finasteride 5 mg Oral Daily Almita Farha MD   gabapentin 300 mg Oral HS Almita Farah MD   heparin (porcine) 5,000 Units Subcutaneous Q8H Katarina Aponte MD   morphine 15 mg Oral Malcolm Krabbe, MD   potassium chloride 20 mEq Oral Daily Almita Farah MD   sertraline 50 mg Oral Daily Almita Farah MD        Today, Patient Was Seen By: UBALDO Armendariz    ** Please Note: Dictation voice to text software may have been used in the creation of this document   **

## 2019-08-31 RX ADMIN — POTASSIUM CHLORIDE 20 MEQ: 1500 TABLET, EXTENDED RELEASE ORAL at 08:19

## 2019-08-31 RX ADMIN — GABAPENTIN 300 MG: 300 CAPSULE ORAL at 21:25

## 2019-08-31 RX ADMIN — MORPHINE SULFATE 15 MG: 15 TABLET ORAL at 05:21

## 2019-08-31 RX ADMIN — VITAMIN D, TAB 1000IU (100/BT) 2000 UNITS: 25 TAB at 08:19

## 2019-08-31 RX ADMIN — Medication 400 MG: at 08:19

## 2019-08-31 RX ADMIN — FERROUS SULFATE TAB 325 MG (65 MG ELEMENTAL FE) 325 MG: 325 (65 FE) TAB at 08:19

## 2019-08-31 RX ADMIN — SERTRALINE HYDROCHLORIDE 50 MG: 50 TABLET ORAL at 08:19

## 2019-08-31 RX ADMIN — MORPHINE SULFATE 15 MG: 15 TABLET ORAL at 11:49

## 2019-08-31 RX ADMIN — HEPARIN SODIUM 5000 UNITS: 5000 INJECTION, SOLUTION INTRAVENOUS; SUBCUTANEOUS at 21:25

## 2019-08-31 RX ADMIN — MORPHINE SULFATE 15 MG: 15 TABLET ORAL at 23:22

## 2019-08-31 RX ADMIN — HEPARIN SODIUM 5000 UNITS: 5000 INJECTION, SOLUTION INTRAVENOUS; SUBCUTANEOUS at 05:21

## 2019-08-31 RX ADMIN — FERROUS SULFATE TAB 325 MG (65 MG ELEMENTAL FE) 325 MG: 325 (65 FE) TAB at 17:02

## 2019-08-31 RX ADMIN — CHLORHEXIDINE GLUCONATE 0.12% ORAL RINSE 15 ML: 1.2 LIQUID ORAL at 21:26

## 2019-08-31 RX ADMIN — OXYCODONE HYDROCHLORIDE AND ACETAMINOPHEN 500 MG: 500 TABLET ORAL at 08:19

## 2019-08-31 RX ADMIN — CHLORHEXIDINE GLUCONATE 0.12% ORAL RINSE 15 ML: 1.2 LIQUID ORAL at 08:19

## 2019-08-31 RX ADMIN — HEPARIN SODIUM 5000 UNITS: 5000 INJECTION, SOLUTION INTRAVENOUS; SUBCUTANEOUS at 13:44

## 2019-08-31 RX ADMIN — Medication 400 MG: at 17:02

## 2019-08-31 RX ADMIN — MORPHINE SULFATE 15 MG: 15 TABLET ORAL at 18:00

## 2019-08-31 RX ADMIN — MORPHINE SULFATE 15 MG: 15 TABLET ORAL at 00:00

## 2019-08-31 RX ADMIN — FINASTERIDE 5 MG: 5 TABLET, FILM COATED ORAL at 08:19

## 2019-08-31 NOTE — PLAN OF CARE
Problem: PAIN - ADULT  Goal: Verbalizes/displays adequate comfort level or baseline comfort level  Description  Interventions:  - Encourage patient to monitor pain and request assistance  - Assess pain using appropriate pain scale  - Administer analgesics based on type and severity of pain and evaluate response  - Implement non-pharmacological measures as appropriate and evaluate response  - Consider cultural and social influences on pain and pain management  - Notify physician/advanced practitioner if interventions unsuccessful or patient reports new pain  Outcome: Progressing     Problem: INFECTION - ADULT  Goal: Absence or prevention of progression during hospitalization  Description  INTERVENTIONS:  - Assess and monitor for signs and symptoms of infection  - Monitor lab/diagnostic results  - Monitor all insertion sites, i e  indwelling lines  - [place] appropriate cooling/warming therapies per order  - Administer medications as ordered  - Instruct and encourage patient and family to use good hand hygiene technique  - Identify and instruct in appropriate isolation precautions for identified infection/condition   Outcome: Progressing     Problem: SAFETY ADULT  Goal: Patient will remain free of falls  Description  INTERVENTIONS:  - Assess patient frequently for physical needs  -  Identify cognitive and physical deficits and behaviors that affect risk of falls    -  [place] fall precautions as indicated by assessment   - Educate patient/family on patient safety including physical limitations  - Instruct patient to call for assistance with activity based on assessment  - Modify environment to reduce risk of injury  -OT/PT consult to assist with strengthening/mobility   Outcome: Progressing  Goal: Maintain or return mobility status to optimal level  Description  INTERVENTIONS:  - Assess patient's baseline mobility status (ambulation, transfers, stairs, etc )    - Identify cognitive and physical deficits and behaviors that affect mobility  - Identify mobility aids required to assist with transfers and/or ambulation (gait belt, sit-to-stand, lift, walker, cane, etc )  - [place] fall precautions as indicated by assessment  - Record patient progress and toleration of activity level on Mobility SBAR; progress patient to next Phase/Stage  - Instruct patient to call for assistance with activity based on assessment     Outcome: Progressing     Problem: DISCHARGE PLANNING  Goal: Discharge to home or other facility with appropriate resources  Description  INTERVENTIONS:  - Identify barriers to discharge w/patient and caregiver  - Arrange for needed discharge resources and transportation as appropriate  - Identify discharge learning needs (meds, wound care, etc )  - Arrange for interpretive services to assist at discharge as needed  - Refer to Case Management Department for coordinating discharge planning if the patient needs post-hospital services based on physician/advanced practitioner order or complex needs related to functional status, cognitive ability, or social support system  Outcome: Progressing     Problem: GASTROINTESTINAL - ADULT  Goal: Maintains or returns to baseline bowel function  Description  INTERVENTIONS:  - Assess bowel function  - Encourage oral fluids to ensure adequate hydration  - Administer IV fluids if ordered to ensure adequate hydration  - Administer ordered medications as needed  - Encourage mobilization and activity  - Consider nutritional services referral to assist patient with adequate nutrition and appropriate food choices  Outcome: Progressing  Goal: Maintains adequate nutritional intake  Description  INTERVENTIONS:  - Monitor percentage of each meal consumed  - Identify factors contributing to decreased intake, treat as appropriate  - Assist with meals as needed  - Monitor I&O, weight, and lab values if indicated  - Obtain nutrition services referral as needed  Outcome: Progressing Problem: GENITOURINARY - ADULT  Goal: Urinary catheter remains patent  Description  INTERVENTIONS:  - Assess patency of urinary catheter  - If patient has a chronic cantor, consider changing catheter if non-functioning  - Follow guidelines for intermittent irrigation of non-functioning urinary catheter  Outcome: Progressing     Problem: METABOLIC, FLUID AND ELECTROLYTES - ADULT  Goal: Electrolytes maintained within normal limits  Description  INTERVENTIONS:  - Monitor labs and assess patient for signs and symptoms of electrolyte imbalances  - Administer electrolyte replacement as ordered  - Monitor response to electrolyte replacements, including repeat lab results as appropriate  - Instruct patient on fluid and nutrition as appropriate  Outcome: Progressing     Problem: SKIN/TISSUE INTEGRITY - ADULT  Goal: Incision(s), wounds(s) or drain site(s) healing without S/S of infection  Description  INTERVENTIONS  - Assess and document risk factors for skin impairment   - Assess and document dressing, incision, wound bed, drain sites and surrounding tissue  - Consider nutrition services referral as needed  - Oral mucous membranes remain intact  - Provide patient/ family education  Outcome: Progressing     Problem: HEMATOLOGIC - ADULT  Goal: Maintains hematologic stability  Description  INTERVENTIONS  - Assess for signs and symptoms of bleeding or hemorrhage  - Monitor labs  - Administer supportive blood products/factors as ordered and appropriate  Outcome: Progressing     Problem: MUSCULOSKELETAL - ADULT  Goal: Maintain or return mobility to safest level of function  Description  INTERVENTIONS:  - Assess patient's ability to carry out ADLs; assess patient's baseline for ADL function and identify physical deficits which impact ability to perform ADLs (bathing, care of mouth/teeth, toileting, grooming, dressing, etc )  - Assess/evaluate cause of self-care deficits   - Assess range of motion  - Assess patient's mobility  - Assess patient's need for assistive devices and provide as appropriate  - Encourage maximum independence but intervene and supervise when necessary  - Involve family in performance of ADLs  - Assess for home care needs following discharge   - OT consult to assist with ADL evaluation and planning for discharge  - Provide patient education as appropriate   Outcome: Progressing     Problem: Potential for Falls  Goal: Patient will remain free of falls  Description  INTERVENTIONS:  - Assess patient frequently for physical needs  -  Identify cognitive and physical deficits and behaviors that affect risk of falls  -  [place] fall precautions as indicated by assessment   - Educate patient/family on patient safety including physical limitations  - Instruct patient to call for assistance with activity based on assessment  - Modify environment to reduce risk of injury  - Consider OT/PT consult to assist with strengthening/mobility  Outcome: Progressing     Problem: Prexisting or High Potential for Compromised Skin Integrity  Goal: Skin integrity is maintained or improved  Description  INTERVENTIONS:  - Identify patients at risk for skin breakdown  - Assess and monitor skin integrity  - Assess and monitor nutrition and hydration status  - Monitor labs   - Assess for incontinence   - Turn and reposition patient  - Assist with mobility/ambulation  - Relieve pressure over bony prominences  - Avoid friction and shearing  - Provide appropriate hygiene as needed including keeping skin clean and dry  - Evaluate need for skin moisturizer/barrier cream  - Collaborate with interdisciplinary team   - Patient/family teaching  - Consider wound care consult   Outcome: Progressing     Problem: Nutrition/Hydration-ADULT  Goal: Nutrient/Hydration intake appropriate for improving, restoring or maintaining nutritional needs  Description  Monitor and assess patient's nutrition/hydration status for malnutrition   Collaborate with interdisciplinary team and initiate plan and interventions as ordered  Monitor patient's weight and dietary intake as ordered or per policy  Utilize nutrition screening tool and intervene as necessary  Determine patient's food preferences and provide high-protein, high-caloric foods as appropriate       INTERVENTIONS:  - Monitor oral intake, urinary output, labs, and treatment plans  - Assess nutrition and hydration status and recommend course of action  - Evaluate amount of meals eaten  - Assist patient with eating if necessary   - Allow adequate time for meals  - Recommend/ encourage appropriate diets, oral nutritional supplements, and vitamin/mineral supplements  - Order, calculate, and assess calorie counts as needed  - Recommend, monitor, and adjust tube feedings and TPN/PPN based on assessed needs  - Assess need for intravenous fluids  - Provide specific nutrition/hydration education as appropriate  - Include patient/family/caregiver in decisions related to nutrition  Outcome: Progressing

## 2019-08-31 NOTE — PROGRESS NOTES
08/31/19 0908   Charting Type   Charting Type Shift assessment   Neurological   Neuro (WDL) X   Muscle Function/Sensation Assessment ;Muscle strength   R Hand  Moderate   L Hand  Moderate   RUE Muscle Strength 4- Movement against gravity and limited resistance   LUE Muscle Strength 4- Movement against gravity and limited resistance   Neuro Symptoms Forgetful   Relieved by Rest   [place] Coma Scale   Eye Opening 4   Best Verbal Response 5   Best Motor Response 6   [place] Coma Scale Score 15   HEENT   HEENT (WDL) X   R Eye Mildly impaired vision   L Eye Mildly impaired vision   Vision - Corrective Lenses (at bedside) Glasses   R Ear Hearing aid   L Ear Hearing aid   Teeth Dentures upper;Dentures lower   Respiratory   Respiratory (WDL) X   Bilateral Breath Sounds Clear;Diminished   Cardiac   Cardiac (WDL) WDL   Pain Assessment   Pain Assessment 0-10   Pain Score 3   Pain Location Shoulder   Pain Orientation Bilateral   Peripheral Vascular   Peripheral Vascular (WDL) X   Edema Left lower extremity;Right lower extremity   RLE Edema Trace   LLE Edema Trace   Integumentary   Integumentary (WDL) X   Skin Condition/Temp Warm;Dry   Skin Integrity Bruising   Skin Location scattered; R hip   Skin Turgor Non-tenting   David Scale   Sensory Perceptions 4   Moisture 4   Activity 3   Mobility 3   Nutrition 3   Friction and Shear 3   David Scale Score 20   Wound 08/21/19 Pressure Injury Sacrum   Date First Assessed/Time First Assessed: 08/21/19 1600   Pre-Existing Wound: Yes  Primary Wound Type: Pressure Injury  Location: Sacrum   Wound Description [place]   Staging Stage II   Treatments Cleansed   Dressing Foam, Silicon (eg   Allevyn, etc)   Musculoskeletal   Musculoskeletal (WDL) X   Level of Assistance Contact guard assist, steadying assist   Assistive Device Front wheel walker   Gastrointestinal   Gastrointestinal (WDL) WDL   Stool Assessment   Bowel Incontinence No   Genitourinary   Genitourinary (WDL) X Genitourinary Symptoms Indwelling catheter   Urine Assessment   Urinary Incontinence No   Urine Color Yellow/straw   Urine Appearance Clear   Genitalia   Male Genitalia Intact   Genitourinary Additional Assessments   Genitourinary Additional Assessments No   Anal/Rectal   Anal/Rectal (WDL) WDL   Psychosocial   Psychosocial (WDL) WDL

## 2019-09-01 PROBLEM — M79.642 CHRONIC HAND PAIN, LEFT: Chronic | Status: ACTIVE | Noted: 2019-09-01

## 2019-09-01 PROBLEM — G89.29 CHRONIC HAND PAIN, LEFT: Chronic | Status: ACTIVE | Noted: 2019-09-01

## 2019-09-01 LAB
ALBUMIN SERPL BCP-MCNC: 3.2 G/DL (ref 3.5–5.7)
ALP SERPL-CCNC: 81 U/L (ref 55–165)
ALT SERPL W P-5'-P-CCNC: 17 U/L (ref 7–52)
ANION GAP SERPL CALCULATED.3IONS-SCNC: 8 MMOL/L (ref 4–13)
AST SERPL W P-5'-P-CCNC: 15 U/L (ref 13–39)
BASOPHILS # BLD AUTO: 0.1 THOUSANDS/ΜL (ref 0–0.1)
BASOPHILS NFR BLD AUTO: 1 % (ref 0–2)
BILIRUB SERPL-MCNC: 0.4 MG/DL (ref 0.2–1)
BUN SERPL-MCNC: 14 MG/DL (ref 7–25)
CALCIUM SERPL-MCNC: 9 MG/DL (ref 8.6–10.5)
CHLORIDE SERPL-SCNC: 100 MMOL/L (ref 98–107)
CO2 SERPL-SCNC: 28 MMOL/L (ref 21–31)
CREAT SERPL-MCNC: 1.02 MG/DL (ref 0.7–1.3)
EOSINOPHIL # BLD AUTO: 0 THOUSAND/ΜL (ref 0–0.61)
EOSINOPHIL NFR BLD AUTO: 0 % (ref 0–5)
ERYTHROCYTE [DISTWIDTH] IN BLOOD BY AUTOMATED COUNT: 14 % (ref 11.5–14.5)
GFR SERPL CREATININE-BSD FRML MDRD: 71 ML/MIN/1.73SQ M
GLUCOSE SERPL-MCNC: 109 MG/DL (ref 65–99)
HCT VFR BLD AUTO: 29.2 % (ref 42–47)
HGB BLD-MCNC: 9.6 G/DL (ref 14–18)
LYMPHOCYTES # BLD AUTO: 1.6 THOUSANDS/ΜL (ref 0.6–4.47)
LYMPHOCYTES NFR BLD AUTO: 20 % (ref 21–51)
MCH RBC QN AUTO: 31 PG (ref 26–34)
MCHC RBC AUTO-ENTMCNC: 32.9 G/DL (ref 31–37)
MCV RBC AUTO: 94 FL (ref 81–99)
MONOCYTES # BLD AUTO: 1.3 THOUSAND/ΜL (ref 0.17–1.22)
MONOCYTES NFR BLD AUTO: 16 % (ref 2–12)
NEUTROPHILS # BLD AUTO: 5.1 THOUSANDS/ΜL (ref 1.4–6.5)
NEUTS SEG NFR BLD AUTO: 63 % (ref 42–75)
PLATELET # BLD AUTO: 468 THOUSANDS/UL (ref 149–390)
PMV BLD AUTO: 8.1 FL (ref 8.6–11.7)
POTASSIUM SERPL-SCNC: 4.2 MMOL/L (ref 3.5–5.5)
PROT SERPL-MCNC: 6.4 G/DL (ref 6.4–8.9)
RBC # BLD AUTO: 3.1 MILLION/UL (ref 4.3–5.9)
SODIUM SERPL-SCNC: 136 MMOL/L (ref 134–143)
WBC # BLD AUTO: 8.1 THOUSAND/UL (ref 4.8–10.8)

## 2019-09-01 PROCEDURE — 80053 COMPREHEN METABOLIC PANEL: CPT | Performed by: NURSE PRACTITIONER

## 2019-09-01 PROCEDURE — 99233 SBSQ HOSP IP/OBS HIGH 50: CPT | Performed by: NURSE PRACTITIONER

## 2019-09-01 PROCEDURE — 85025 COMPLETE CBC W/AUTO DIFF WBC: CPT | Performed by: NURSE PRACTITIONER

## 2019-09-01 RX ADMIN — ACETAMINOPHEN 650 MG: 325 TABLET ORAL at 21:54

## 2019-09-01 RX ADMIN — MORPHINE SULFATE 15 MG: 15 TABLET ORAL at 17:25

## 2019-09-01 RX ADMIN — POTASSIUM CHLORIDE 20 MEQ: 1500 TABLET, EXTENDED RELEASE ORAL at 08:26

## 2019-09-01 RX ADMIN — FINASTERIDE 5 MG: 5 TABLET, FILM COATED ORAL at 08:26

## 2019-09-01 RX ADMIN — FERROUS SULFATE TAB 325 MG (65 MG ELEMENTAL FE) 325 MG: 325 (65 FE) TAB at 08:26

## 2019-09-01 RX ADMIN — HEPARIN SODIUM 5000 UNITS: 5000 INJECTION, SOLUTION INTRAVENOUS; SUBCUTANEOUS at 05:19

## 2019-09-01 RX ADMIN — CHLORHEXIDINE GLUCONATE 0.12% ORAL RINSE 15 ML: 1.2 LIQUID ORAL at 21:54

## 2019-09-01 RX ADMIN — Medication 400 MG: at 08:26

## 2019-09-01 RX ADMIN — FERROUS SULFATE TAB 325 MG (65 MG ELEMENTAL FE) 325 MG: 325 (65 FE) TAB at 17:25

## 2019-09-01 RX ADMIN — MORPHINE SULFATE 15 MG: 15 TABLET ORAL at 11:45

## 2019-09-01 RX ADMIN — Medication 400 MG: at 17:25

## 2019-09-01 RX ADMIN — HEPARIN SODIUM 5000 UNITS: 5000 INJECTION, SOLUTION INTRAVENOUS; SUBCUTANEOUS at 21:55

## 2019-09-01 RX ADMIN — ACETAMINOPHEN 650 MG: 325 TABLET ORAL at 08:26

## 2019-09-01 RX ADMIN — SERTRALINE HYDROCHLORIDE 50 MG: 50 TABLET ORAL at 08:26

## 2019-09-01 RX ADMIN — MORPHINE SULFATE 15 MG: 15 TABLET ORAL at 23:40

## 2019-09-01 RX ADMIN — MORPHINE SULFATE 15 MG: 15 TABLET ORAL at 05:17

## 2019-09-01 RX ADMIN — GABAPENTIN 300 MG: 300 CAPSULE ORAL at 21:54

## 2019-09-01 RX ADMIN — CHLORHEXIDINE GLUCONATE 0.12% ORAL RINSE 15 ML: 1.2 LIQUID ORAL at 08:26

## 2019-09-01 RX ADMIN — OXYCODONE HYDROCHLORIDE AND ACETAMINOPHEN 500 MG: 500 TABLET ORAL at 08:26

## 2019-09-01 RX ADMIN — HEPARIN SODIUM 5000 UNITS: 5000 INJECTION, SOLUTION INTRAVENOUS; SUBCUTANEOUS at 14:16

## 2019-09-01 RX ADMIN — VITAMIN D, TAB 1000IU (100/BT) 2000 UNITS: 25 TAB at 08:26

## 2019-09-01 NOTE — NURSING NOTE
Patient lethargic most of shift  Complains of back and arm pain all shift and medicated for same  Kpad used on back when sitting in chair  Patients appetite poor this shift  Food and fluids encouraged  Compliant with SCDs  Will continue to monitor

## 2019-09-01 NOTE — PLAN OF CARE
Problem: PAIN - ADULT  Goal: Verbalizes/displays adequate comfort level or baseline comfort level  Description  Interventions:  - Encourage patient to monitor pain and request assistance  - Assess pain using appropriate pain scale  - Administer analgesics based on type and severity of pain and evaluate response  - Implement non-pharmacological measures as appropriate and evaluate response  - Consider cultural and social influences on pain and pain management  - Notify physician/advanced practitioner if interventions unsuccessful or patient reports new pain  Outcome: Progressing     Problem: INFECTION - ADULT  Goal: Absence or prevention of progression during hospitalization  Description  INTERVENTIONS:  - Assess and monitor for signs and symptoms of infection  - Monitor lab/diagnostic results  - Monitor all insertion sites, i e  indwelling lines  - [place] appropriate cooling/warming therapies per order  - Administer medications as ordered  - Instruct and encourage patient and family to use good hand hygiene technique  - Identify and instruct in appropriate isolation precautions for identified infection/condition   Outcome: Progressing     Problem: SAFETY ADULT  Goal: Patient will remain free of falls  Description  INTERVENTIONS:  - Assess patient frequently for physical needs  -  Identify cognitive and physical deficits and behaviors that affect risk of falls    -  [place] fall precautions as indicated by assessment   - Educate patient/family on patient safety including physical limitations  - Instruct patient to call for assistance with activity based on assessment  - Modify environment to reduce risk of injury  -OT/PT consult to assist with strengthening/mobility   Outcome: Progressing  Goal: Maintain or return mobility status to optimal level  Description  INTERVENTIONS:  - Assess patient's baseline mobility status (ambulation, transfers, stairs, etc )    - Identify cognitive and physical deficits and behaviors that affect mobility  - Identify mobility aids required to assist with transfers and/or ambulation (gait belt, sit-to-stand, lift, walker, cane, etc )  - [place] fall precautions as indicated by assessment  - Record patient progress and toleration of activity level on Mobility SBAR; progress patient to next Phase/Stage  - Instruct patient to call for assistance with activity based on assessment     Outcome: Progressing     Problem: DISCHARGE PLANNING  Goal: Discharge to home or other facility with appropriate resources  Description  INTERVENTIONS:  - Identify barriers to discharge w/patient and caregiver  - Arrange for needed discharge resources and transportation as appropriate  - Identify discharge learning needs (meds, wound care, etc )  - Arrange for interpretive services to assist at discharge as needed  - Refer to Case Management Department for coordinating discharge planning if the patient needs post-hospital services based on physician/advanced practitioner order or complex needs related to functional status, cognitive ability, or social support system  Outcome: Progressing     Problem: SAFETY ADULT  Goal: Patient will remain free of falls  Description  INTERVENTIONS:  - Assess patient frequently for physical needs  -  Identify cognitive and physical deficits and behaviors that affect risk of falls    -  [place] fall precautions as indicated by assessment   - Educate patient/family on patient safety including physical limitations  - Instruct patient to call for assistance with activity based on assessment  - Modify environment to reduce risk of injury  -OT/PT consult to assist with strengthening/mobility   Outcome: Progressing  Goal: Maintain or return mobility status to optimal level  Description  INTERVENTIONS:  - Assess patient's baseline mobility status (ambulation, transfers, stairs, etc )    - Identify cognitive and physical deficits and behaviors that affect mobility  - Identify mobility aids required to assist with transfers and/or ambulation (gait belt, sit-to-stand, lift, walker, cane, etc )  - [place] fall precautions as indicated by assessment  - Record patient progress and toleration of activity level on Mobility SBAR; progress patient to next Phase/Stage  - Instruct patient to call for assistance with activity based on assessment     Outcome: Progressing     Problem: DISCHARGE PLANNING  Goal: Discharge to home or other facility with appropriate resources  Description  INTERVENTIONS:  - Identify barriers to discharge w/patient and caregiver  - Arrange for needed discharge resources and transportation as appropriate  - Identify discharge learning needs (meds, wound care, etc )  - Arrange for interpretive services to assist at discharge as needed  - Refer to Case Management Department for coordinating discharge planning if the patient needs post-hospital services based on physician/advanced practitioner order or complex needs related to functional status, cognitive ability, or social support system  Outcome: Progressing     Problem: GASTROINTESTINAL - ADULT  Goal: Maintains or returns to baseline bowel function  Description  INTERVENTIONS:  - Assess bowel function  - Encourage oral fluids to ensure adequate hydration  - Administer IV fluids if ordered to ensure adequate hydration  - Administer ordered medications as needed  - Encourage mobilization and activity  - Consider nutritional services referral to assist patient with adequate nutrition and appropriate food choices  Outcome: Progressing  Goal: Maintains adequate nutritional intake  Description  INTERVENTIONS:  - Monitor percentage of each meal consumed  - Identify factors contributing to decreased intake, treat as appropriate  - Assist with meals as needed  - Monitor I&O, weight, and lab values if indicated  - Obtain nutrition services referral as needed  Outcome: Progressing     Problem: GENITOURINARY - ADULT  Goal: Urinary catheter remains patent  Description  INTERVENTIONS:  - Assess patency of urinary catheter  - If patient has a chronic cantor, consider changing catheter if non-functioning  - Follow guidelines for intermittent irrigation of non-functioning urinary catheter  Outcome: Progressing     Problem: METABOLIC, FLUID AND ELECTROLYTES - ADULT  Goal: Electrolytes maintained within normal limits  Description  INTERVENTIONS:  - Monitor labs and assess patient for signs and symptoms of electrolyte imbalances  - Administer electrolyte replacement as ordered  - Monitor response to electrolyte replacements, including repeat lab results as appropriate  - Instruct patient on fluid and nutrition as appropriate  Outcome: Progressing     Problem: SKIN/TISSUE INTEGRITY - ADULT  Goal: Incision(s), wounds(s) or drain site(s) healing without S/S of infection  Description  INTERVENTIONS  - Assess and document risk factors for skin impairment   - Assess and document dressing, incision, wound bed, drain sites and surrounding tissue  - Consider nutrition services referral as needed  - Oral mucous membranes remain intact  - Provide patient/ family education  Outcome: Progressing     Problem: HEMATOLOGIC - ADULT  Goal: Maintains hematologic stability  Description  INTERVENTIONS  - Assess for signs and symptoms of bleeding or hemorrhage  - Monitor labs  - Administer supportive blood products/factors as ordered and appropriate  Outcome: Progressing     Problem: MUSCULOSKELETAL - ADULT  Goal: Maintain or return mobility to safest level of function  Description  INTERVENTIONS:  - Assess patient's ability to carry out ADLs; assess patient's baseline for ADL function and identify physical deficits which impact ability to perform ADLs (bathing, care of mouth/teeth, toileting, grooming, dressing, etc )  - Assess/evaluate cause of self-care deficits   - Assess range of motion  - Assess patient's mobility  - Assess patient's need for assistive devices and provide as appropriate  - Encourage maximum independence but intervene and supervise when necessary  - Involve family in performance of ADLs  - Assess for home care needs following discharge   - OT consult to assist with ADL evaluation and planning for discharge  - Provide patient education as appropriate   Outcome: Progressing

## 2019-09-02 PROBLEM — R53.81 DEBILITY: Status: ACTIVE | Noted: 2019-09-02

## 2019-09-02 PROCEDURE — 92507 TX SP LANG VOICE COMM INDIV: CPT

## 2019-09-02 PROCEDURE — 97537 COMMUNITY/WORK REINTEGRATION: CPT

## 2019-09-02 PROCEDURE — G0515 COGNITIVE SKILLS DEVELOPMENT: HCPCS

## 2019-09-02 PROCEDURE — 97110 THERAPEUTIC EXERCISES: CPT

## 2019-09-02 PROCEDURE — 99232 SBSQ HOSP IP/OBS MODERATE 35: CPT | Performed by: PHYSICAL MEDICINE & REHABILITATION

## 2019-09-02 PROCEDURE — 97116 GAIT TRAINING THERAPY: CPT

## 2019-09-02 PROCEDURE — 97535 SELF CARE MNGMENT TRAINING: CPT

## 2019-09-02 RX ADMIN — MORPHINE SULFATE 15 MG: 15 TABLET ORAL at 18:27

## 2019-09-02 RX ADMIN — GABAPENTIN 300 MG: 300 CAPSULE ORAL at 21:35

## 2019-09-02 RX ADMIN — Medication 400 MG: at 18:27

## 2019-09-02 RX ADMIN — MORPHINE SULFATE 15 MG: 15 TABLET ORAL at 11:48

## 2019-09-02 RX ADMIN — HEPARIN SODIUM 5000 UNITS: 5000 INJECTION, SOLUTION INTRAVENOUS; SUBCUTANEOUS at 21:35

## 2019-09-02 RX ADMIN — MORPHINE SULFATE 15 MG: 15 TABLET ORAL at 05:58

## 2019-09-02 RX ADMIN — SERTRALINE HYDROCHLORIDE 50 MG: 50 TABLET ORAL at 08:53

## 2019-09-02 RX ADMIN — FERROUS SULFATE TAB 325 MG (65 MG ELEMENTAL FE) 325 MG: 325 (65 FE) TAB at 08:53

## 2019-09-02 RX ADMIN — HEPARIN SODIUM 5000 UNITS: 5000 INJECTION, SOLUTION INTRAVENOUS; SUBCUTANEOUS at 05:58

## 2019-09-02 RX ADMIN — VITAMIN D, TAB 1000IU (100/BT) 2000 UNITS: 25 TAB at 08:53

## 2019-09-02 RX ADMIN — CHLORHEXIDINE GLUCONATE 0.12% ORAL RINSE 15 ML: 1.2 LIQUID ORAL at 21:36

## 2019-09-02 RX ADMIN — HEPARIN SODIUM 5000 UNITS: 5000 INJECTION, SOLUTION INTRAVENOUS; SUBCUTANEOUS at 14:39

## 2019-09-02 RX ADMIN — ACETAMINOPHEN 650 MG: 325 TABLET ORAL at 08:52

## 2019-09-02 RX ADMIN — OXYCODONE HYDROCHLORIDE AND ACETAMINOPHEN 500 MG: 500 TABLET ORAL at 08:53

## 2019-09-02 RX ADMIN — Medication 400 MG: at 08:53

## 2019-09-02 RX ADMIN — POTASSIUM CHLORIDE 20 MEQ: 1500 TABLET, EXTENDED RELEASE ORAL at 08:53

## 2019-09-02 RX ADMIN — CHLORHEXIDINE GLUCONATE 0.12% ORAL RINSE 15 ML: 1.2 LIQUID ORAL at 08:53

## 2019-09-02 RX ADMIN — FINASTERIDE 5 MG: 5 TABLET, FILM COATED ORAL at 08:53

## 2019-09-02 RX ADMIN — FERROUS SULFATE TAB 325 MG (65 MG ELEMENTAL FE) 325 MG: 325 (65 FE) TAB at 18:26

## 2019-09-02 NOTE — ASSESSMENT & PLAN NOTE
·  Patient appears to be at baseline  ·  continue iron supplementation  ·  patient may benefit from vitamin-C addition    · 09/01/2019:   At baseline

## 2019-09-02 NOTE — ASSESSMENT & PLAN NOTE
· Unclear etiology  ·   WBC on 08/25/2019 was 15 80, 08/26/2019 was 13 40  ·  will check CBC this a m   ·  low-grade temperature of 99° 0 1  · Blood cultures-  From 08/24/2019 show no growth to date x5 days  · Check chest x-ray on 08/24/2019 showed no evidence of pneumonia  ·  patient finished Omnicef   ·  on 08/25/2019 urine culture was positive for Serratia Marcescens,  Infectious Disease was reconsulted, and based on the sensitivity ordered the patient ceftriaxone 2 g to 24 hours  The patient did receive 3 days of this medication  ·  patient's echocardiogram was negative for vegetation and repeat CT of the abdomen and pelvis show no infection  Antibiotic was discontinued  ·   Chronic Diallo will be managed by Urology        · 09/01/2019:  Resolved

## 2019-09-02 NOTE — ASSESSMENT & PLAN NOTE
· The patient with recent history of RUTHANN  ·  and pancreatic are stable  ·  Monitor renal function closely  · Avoid nephrotoxins such as NSAIDs  · Significantly improved

## 2019-09-02 NOTE — PROGRESS NOTES
Progress Note - Urban Barton 1945, 66 y o  male MRN: [id number]    Unit/Bed#: [facility] 213-01 Encounter: [id number]    Primary Care Provider: Isela Montemayor DO   Date and time admitted to hospital: 8/21/2019  3:10 PM        Chronic hand pain, left  Assessment & Plan  · Patient did complain of tenderness of the left hand, was noted to be pink, warm, and swollen  · Ice supplied by nursing  · Elevated  · Review of patient's chart:  X-ray of left hand on 06/07/2019 shows significant degenerative disease  · Continue with p r n  Tylenol for pain  · 08/22/2019, Dr Mary Licona spoke to Dr Sanna Fisher, there was a reminder: Advised to have patient avoid pushing himself out of deep seated chair with high arm rests  Chronic pain syndrome  Assessment & Plan  · Continue with home regimen    Leukocytosis  Assessment & Plan  · Unclear etiology  ·   WBC on 08/25/2019 was 15 80, 08/26/2019 was 13 40  ·  will check CBC this a m   ·  low-grade temperature of 99° 0 1  · Blood cultures-  From 08/24/2019 show no growth to date x5 days  · Check chest x-ray on 08/24/2019 showed no evidence of pneumonia  ·  patient finished Omnicef   ·  on 08/25/2019 urine culture was positive for Serratia Marcescens,  Infectious Disease was reconsulted, and based on the sensitivity ordered the patient ceftriaxone 2 g to 24 hours  The patient did receive 3 days of this medication  ·  patient's echocardiogram was negative for vegetation and repeat CT of the abdomen and pelvis show no infection  Antibiotic was discontinued  ·   Chronic Diallo will be managed by Urology  · 09/01/2019:  Resolved    Anemia  Assessment & Plan  ·  Patient appears to be at baseline  ·  continue iron supplementation  ·  patient may benefit from vitamin-C addition    · 09/01/2019: At baseline    Chronic indwelling Diallo catheter  Assessment & Plan  · Renal ultrasound: 9 mm nonobstructing calculus within the lower pole the right kidney; Simple cyst within the left kidney  No hydronephrosis  · Patient will need to follow up with Urology as an outpatient  · Continue    CKD (chronic kidney disease)  Assessment & Plan  · The patient with recent history of RUTHANN  ·  and pancreatic are stable  ·  Monitor renal function closely  · Avoid nephrotoxins such as NSAIDs  · Significantly improved    Thrombocytosis ([address] )  Assessment & Plan  ·  Appears to be a chronic condition  ·  continue to monitor  · Stable    Hypokalemia  Assessment & Plan  ·  On 2019 potassium was 3 3  ·  will check magnesium level as well  ·  supplementation had been discontinued at this time  ·  will check CMP for potential need to restart potassium supplementation    · 2019:  Resolved    VTE Pharmacologic Prophylaxis: Pharmacologic: Heparin    Patient Centered Rounds: I have performed bedside rounds with nursing staff today  Discussions with Specialists or Other Care Team Provider:  Nursing  Education and Discussions with Family / Patient:  Discussed with patient    Current Length of Stay: 11 day(s)    Current Patient Status: Inpatient Rehab   Certification Statement: The patient will continue to require additional inpatient hospital stay due to Per primary services    Discharge Plan:  Per primary services    Code Status: Level 3 - DNAR and DNI    Subjective:   Patient is lying in his bed on his back  He states that his left hand was more painful than normal   The patient tells me that it was swollen earlier, and that it is still a little bit swollen  At 8:00 p m  I was contacted for patient's left hand with pain, pink, warmth, and swollen  Nursing did place ice on the patient's hand and elevated as well  Objective:     Vitals:   Temp (24hrs), Av 2 °F (37 3 °C), Min:99 1 °F (37 3 °C), Max:99 2 °F (37 3 °C)    Temp:  [99 1 °F (37 3 °C)-99 2 °F (37 3 °C)] 99 2 °F (37 3 °C)  HR:  [71-73] 71  Resp:  [18] 18  BP: (140-160)/(72-80) 140/72  SpO2:  [90 %-91 %] 90 %  Body mass index is 28 72 kg/m²  Input and Output Summary (last 24 hours): Intake/Output Summary (Last 24 hours) at 9/1/2019 2137  Last data filed at 9/1/2019 1303  Gross per 24 hour   Intake 120 ml   Output 1400 ml   Net -1280 ml       Physical Exam:     Physical Exam   Constitutional: Vital signs are normal  He appears well-developed and well-nourished  He is cooperative  HENT:   Head: Normocephalic and atraumatic  Nose: Nose normal    Mouth/Throat: Oropharynx is clear and moist and mucous membranes are normal    Eyes: Conjunctivae and EOM are normal    Neck: Full passive range of motion without pain  Cardiovascular: Normal rate, regular rhythm and normal heart sounds  Pulmonary/Chest: Effort normal and breath sounds normal    Abdominal: Soft  Normal appearance and bowel sounds are normal    Genitourinary:   Genitourinary Comments: D[place] catheter maintained   Musculoskeletal:        Hands:  Neurological: He is alert  Skin: Skin is warm  Psychiatric: He has a normal mood and affect  His speech is normal and behavior is normal        Additional Data:     Labs:    Results from last 7 days   Lab Units 09/01/19  2104   WBC Thousand/uL 8 10   HEMOGLOBIN g/dL 9 6*   HEMATOCRIT % 29 2*   PLATELETS Thousands/uL 468*   NEUTROS PCT % 63   LYMPHS PCT % 20*   MONOS PCT % 16*   EOS PCT % 0     Results from last 7 days   Lab Units 09/01/19  2104   POTASSIUM mmol/L 4 2   CHLORIDE mmol/L 100   CO2 mmol/L 28   BUN mg/dL 14   CREATININE mg/dL 1 02   CALCIUM mg/dL 9 0   ALK PHOS U/L 81   ALT U/L 17   AST U/L 15                   * I Have Reviewed All Lab Data Listed Above  * Additional Pertinent Lab Tests Reviewed:  [address] Admission  Reviewed    Imaging:  Imaging Reports Reviewed Today Include:  None    Recent Cultures (last 7 days):           Last 24 Hours Medication List:     Current Facility-Administered Medications:  acetaminophen 650 mg Oral Q6H PRN Dennis GuzmanadMARIAN sibley   ascorbic acid 500 mg Oral Daily Magaly VALLEJO UBALDO Holloway   bisacodyl 10 mg Rectal Daily PRN Ayden Wayne MD   chlorhexidine 15 mL Swish & Spit Q12H Sigrid Martínez MD   cholecalciferol 2,000 Units Oral Daily Ayden Wayne MD   ferrous sulfate 325 mg Oral BID Ayden Wayne MD   finasteride 5 mg Oral Daily Ayden Wayne MD   gabapentin 300 mg Oral HS Ayden Wayne MD   heparin (porcine) 5,000 Units Subcutaneous [facility] Ayden Wayne MD   magnesium oxide 400 mg Oral BID Ayden Wayne MD   morphine 15 mg Oral Ramoskaitlin Gonzalez MD   potassium chloride 20 mEq Oral Daily Ayden Wayne MD   sertraline 50 mg Oral Daily Ayden Wayne MD        Today, Patient Was Seen By: UBALDO Lloyd    ** Please Note: Dictation voice to text software may have been used in the creation of this document   **

## 2019-09-02 NOTE — PROGRESS NOTES
09/02/19 2501   Pain Assessment   Pain Assessment 0-10   Pain Type Chronic pain   Pain Location Hand   Pain Orientation Right;Left   Pain Frequency Constant/continuous   Grooming   Grooming (FIM) 5 - [place] sets up supplies or applies device   Bathing   Assessed Bath Style Sponge Bath   Bathing (FIM) 4 - Patient completes 8/10 or 9/10 parts   Dressing/Undressing Clothing   Remove UB Clothes Button Shirt   Remove LB Clothes Pants; Undergarment;Socks   [facility]; Undergarment;Socks; Shoes   Limitations Noted In Balance; Endurance   Positioning Supported Sit;Standing   UB Dressing (FIM) 4 - Patient completes 75% of all tasks   LB Dressing (FIM) 4 - Patient completes 75% of all tasks   Transfer Bed/Chair/Wheelchair   Limitations Noted In Endurance;Pain Management;UE Strength;LE Strength   Adaptive Equipment Roller Walker   Sit to Stand Moderate Assist   Stand to Sit Minimal   Findings First xfer from [facility] required more assistance due to hand/shoulder pain  (First xfer from [facility] required more assistance due to hand/shou)   Toileting   Able to Pull Clothing down yes, up no  Manage Hygiene Bowel   Limitations Noted In Balance;ROM   Toileting (FIM) 4 - Patient requires incidental assistance for pulling up or down one side of pants   Toilet Transfer   Surface Assessed Standard Commode   Transfer Technique Standard   Limitations Noted In Balance; Endurance; Safety   Toilet Transfer (FIM) 4 - Patient requires steadying assist or light touching   Cognition   Overall Cognitive Status WFL   Arousal/Participation Alert; Responsive; Cooperative   Attention Attends with cues to redirect   Orientation Level Oriented X4   Memory Decreased short term memory   Following Commands Follows one step commands without difficulty   Assessment   Treatment Assessment Pt seen for bedside ADLs for sponge bath  Pt tolerating and pushing through challenges with hand and shoulder pain    Pt to benefit from sklilled OT to max potential for safe d/c  Prognosis Good   Problem List Decreased strength;Decreased range of motion;Decreased endurance; Impaired balance   OT Therapy Minutes   OT Time In 0830   OT Time Out 0930   OT Total Time (minutes) 60   OT Mode of treatment - Individual (minutes) 60   OT Mode of treatment - Concurrent (minutes) 0   OT Mode of treatment - Group (minutes) 0   OT Mode of treatment - Co-treat (minutes) 0   OT Mode of Teatment - Total time(minutes) 60 minutes   Therapy Time missed   Time missed?  No

## 2019-09-02 NOTE — PROGRESS NOTES
09/01/19 2300   Charting Type   Charting Type Shift assessment   Neurological   Level of Consciousness Alert/awake   Orientation Level Oriented X4   Cognition Appropriate judgement; Appropriate safety awareness; Appropriate attention/concentration; Appropriate for developmental age   Speech Clear   Swallow Able to swallow solids and liquids without difficulty   R Hand  Weak   L Hand  Weak   RUE Muscle Strength 4- Movement against gravity and limited resistance   LUE Muscle Strength 4- Movement against gravity and limited resistance   RLE Muscle Strength 5- Normal strength   LLE Muscle Strength 5- Normal strength   Neuro Symptoms None   HEENT   Vision - Corrective Lenses (at bedside) Glasses   R Ear Hearing aid   L Ear Hearing aid   Teeth Dentures upper;Dentures lower   Respiratory   Bilateral Breath Sounds Clear;Diminished   Cardiac   Cardiac (WDL) WDL   Peripheral Vascular   Edema Other (Comment)  (Left hand)   RLE Edema Trace   LLE Edema Trace   Integumentary   Skin Color Ashen   Skin Condition/Temp Warm;Dry   Skin Integrity Bruising   Skin Location scattered   David Scale   Sensory Perceptions 4   Moisture 4   Activity 3   Mobility 3   Nutrition 3   Friction and Shear 3   David Scale Score 20   Wound 08/21/19 Pressure Injury Sacrum   Date First Assessed/Time First Assessed: 08/21/19 1600   Pre-Existing Wound: Yes  Primary Wound Type: Pressure Injury  Location: Sacrum   Wound Description GAL   Dressing Status Intact   Musculoskeletal   Level of Assistance Contact guard assist, steadying assist   Assistive Device Front wheel walker   RUE Limited movement   LUE Limited movement   RLE Limited movement   LLE Limited movement   Gastrointestinal   Gastrointestinal (WDL) WDL   Genitourinary   Genitourinary (WDL) X   Genitourinary Symptoms Indwelling catheter   Psychosocial   Psychosocial (WDL) WDL   Ability to Express Feelings Able to express   Ability to Express Needs Needs assistance   Ability to Express Thoughts Needs assistance   Ability to Understand Others Usually understands   Provider Notification   Provider Name ADONAY SPARKS [facility]   Provider Role Hospitalist   Method of Communication Other (Comment)   Response See orders   Notification Time 2000

## 2019-09-02 NOTE — ASSESSMENT & PLAN NOTE
· Renal ultrasound: 9 mm nonobstructing calculus within the lower pole the right kidney; Simple cyst within the left kidney  No hydronephrosis     · Patient will need to follow up with Urology as an outpatient  · Continue

## 2019-09-02 NOTE — PROGRESS NOTES
09/02/19 1100   Pain Assessment   Pain Assessment 0-10   Pain Score 7   Pain Type Chronic pain   Pain Location Hand   Pain Orientation Bilateral   Pain Descriptors Aching   Additional Activities   Additional Activities Comments LB dressing seated in WC  Pt able to cross legs and bend toward as needed to doff/sergei socks and shoes with increased time at supervision  Assessment   Treatment Assessment Pt pushing through challenges despite no change in pain  Pt to benefit from skilled OT to max potential for safe d/c     Plan   Treatment/Interventions ADL retraining;Functional transfer training; Therapeutic exercise; Endurance training;Patient/family training   Progress Progressing toward goals   OT Therapy Minutes   OT Time In 1100   OT Time Out 1130   OT Total Time (minutes) 30   OT Mode of treatment - Individual (minutes) 0   OT Mode of treatment - Concurrent (minutes) 30   OT Mode of treatment - Group (minutes) 0   OT Mode of treatment - Co-treat (minutes) 0   OT Mode of Teatment - Total time(minutes) 30 minutes   Therapy Time missed   Time missed?  No

## 2019-09-02 NOTE — PROGRESS NOTES
09/02/19 0956   Charting Type   Charting Type Shift assessment   Neurological   Neuro (WDL) X   Muscle Function/Sensation Assessment ;Muscle strength   R Hand  Weak   L Hand  Weak   RUE Muscle Strength 4- Movement against gravity and limited resistance   LUE Muscle Strength 4- Movement against gravity and limited resistance   Abhijit Coma Scale   Eye Opening 4   Best Verbal Response 5   Best Motor Response 6   [place] Coma Scale Score 15   HEENT   HEENT (WDL) X   R Eye Mildly impaired vision   L Eye Mildly impaired vision   Vision - Corrective Lenses (at bedside) Glasses   R Ear Hearing aid   L Ear Hearing aid   Teeth Dentures upper;Dentures lower   Respiratory   Respiratory (WDL) X   Bilateral Breath Sounds Clear;Diminished   Cardiac   Cardiac (WDL) WDL   Peripheral Vascular   Peripheral Vascular (WDL) X   Edema Other (Comment)  (B/L hands)   RLE Edema Non-pitting   LLE Edema Non-pitting   Integumentary   Integumentary (WDL) X   Skin Color Ashen   Skin Condition/Temp Warm;Dry   Skin Integrity Bruising   Skin Location scattered   Skin Turgor Non-tenting   David Scale   Sensory Perceptions 4   Moisture 4   Activity 3   Mobility 3   Nutrition 3   Friction and Shear 3   David Scale Score 20   Musculoskeletal   Musculoskeletal (WDL) X   Level of Assistance Contact guard assist, steadying assist   Assistive Device Front wheel walker   RUE Limited movement   LUE Limited movement   RLE Limited movement   LLE Limited movement   Gastrointestinal   Gastrointestinal (WDL) WDL   Stool Assessment   Bowel Incontinence No   Genitourinary   Genitourinary (WDL) X   Genitourinary Symptoms Indwelling catheter   Urine Assessment   Urinary Incontinence No   Genitalia   Male Genitalia Intact   Genitourinary Additional Assessments   Genitourinary Additional Assessments No   Anal/Rectal   Anal/Rectal (WDL) WDL   Psychosocial   Psychosocial (WDL) WDL

## 2019-09-02 NOTE — PROGRESS NOTES
09/02/19 0909   Pain Assessment   Pain Assessment 0-10   Pain Score 9  (7/10 posterior neck pain)   Pain Type Chronic pain   Pain Location Hand   Pain Orientation Bilateral   Pain [address] [employer] Pain Intervention(s) Heat applied   Restrictions/Precautions   Precautions Contact/isolation;Limb alert; Fall Risk  (VRE urine, R shoulder recently )   Cognition   Overall Cognitive Status WFL   Subjective   Subjective   ("my hands hurt" )   Transfer Bed/Chair/Wheelchair   Limitations Noted In Balance; Endurance;LE Strength;UE Strength;Pain Management   Adaptive Equipment Roller Walker   Stand Pivot Minimal Assist   Sit to Stand Minimal Assist   Findings   (min A due to P! in B/L hands )   Bed, Chair, Wheelchair Transfer (FIM) 4 - Patient completes 75% of all tasks   Ambulation   Primary Discharge Mode of Locomotion Walk   Walk Assist Level Close Supervision   Gait Pattern Slow [place]   Assist Device Roller Walker   Distance Walked (feet)   (155')   Limitations Noted In Endurance; Safety;Speed   Walking (FIM) 5 - Patient requires supervision/monitoring AND distance 150 feet or more, no rest   Stairs   Type Stairs   # of Steps   (15)   Assist Devices Bilateral Rail   Stairs (FIM) 4 - Patient requires steadying assist or light touching AND patient goes up and down full flight (12- 14 stairs)   Therapeutic Interventions   Strengthening Seated TE    Other gait training, transfer training, community re-integration    Assessment   Treatment Assessment Pt c/o of feeling stiff and sore in both hands and posterior neck  Cervical Hot pack was applied to both the posterior neck and hands for 15 minutes during seated TE with some relief noted in the neck  Pt required more assistance today for all sit/stand transfers due to increased pain in both hands  Completed gait training for distance of 155' using RW with a slower pace today  Pt was close S for gait training with cueing for posture   Completed 15 steps with CGA and cueing for safety  Following treatment session applied cervical hot pack with Pt seated in w/c for pain relief and informed nursing  Pt will benefit from continued skilled PT to increased independence with functional mobility tasks  PT Barriers   Physical Impairment Decreased strength;Decreased endurance; Impaired balance;Decreased mobility;Pain   Functional Limitation Standing;Transfers; Walking;Stair negotiation   Plan   Treatment/Interventions Functional transfer training;LE strengthening/ROM; Therapeutic exercise; Endurance training;Bed mobility;Gait training   Progress Progressing toward goals   PT Therapy Minutes   PT Time In 0930   PT Time Out 1033   PT Total Time (minutes) 63   PT Mode of treatment - Individual (minutes) 33   PT Mode of treatment - Concurrent (minutes) 30   PT Mode of treatment - Group (minutes) 0   PT Mode of treatment - Co-treat (minutes) 0   PT Mode of Teatment - Total time(minutes) 63 minutes   Therapy Time missed   Time missed?  No

## 2019-09-02 NOTE — PLAN OF CARE
Problem: PAIN - ADULT  Goal: Verbalizes/displays adequate comfort level or baseline comfort level  Description  Interventions:  - Encourage patient to monitor pain and request assistance  - Assess pain using appropriate pain scale  - Administer analgesics based on type and severity of pain and evaluate response  - Implement non-pharmacological measures as appropriate and evaluate response  - Consider cultural and social influences on pain and pain management  - Notify physician/advanced practitioner if interventions unsuccessful or patient reports new pain  Outcome: Progressing     Problem: INFECTION - ADULT  Goal: Absence or prevention of progression during hospitalization  Description  INTERVENTIONS:  - Assess and monitor for signs and symptoms of infection  - Monitor lab/diagnostic results  - Monitor all insertion sites, i e  indwelling lines  - [place] appropriate cooling/warming therapies per order  - Administer medications as ordered  - Instruct and encourage patient and family to use good hand hygiene technique  - Identify and instruct in appropriate isolation precautions for identified infection/condition   Outcome: Progressing     Problem: SAFETY ADULT  Goal: Patient will remain free of falls  Description  INTERVENTIONS:  - Assess patient frequently for physical needs  -  Identify cognitive and physical deficits and behaviors that affect risk of falls    -  [place] fall precautions as indicated by assessment   - Educate patient/family on patient safety including physical limitations  - Instruct patient to call for assistance with activity based on assessment  - Modify environment to reduce risk of injury  -OT/PT consult to assist with strengthening/mobility   Outcome: Progressing  Goal: Maintain or return mobility status to optimal level  Description  INTERVENTIONS:  - Assess patient's baseline mobility status (ambulation, transfers, stairs, etc )    - Identify cognitive and physical deficits and behaviors that affect mobility  - Identify mobility aids required to assist with transfers and/or ambulation (gait belt, sit-to-stand, lift, walker, cane, etc )  - [place] fall precautions as indicated by assessment  - Record patient progress and toleration of activity level on Mobility SBAR; progress patient to next Phase/Stage  - Instruct patient to call for assistance with activity based on assessment     Outcome: Progressing     Problem: DISCHARGE PLANNING  Goal: Discharge to home or other facility with appropriate resources  Description  INTERVENTIONS:  - Identify barriers to discharge w/patient and caregiver  - Arrange for needed discharge resources and transportation as appropriate  - Identify discharge learning needs (meds, wound care, etc )  - Arrange for interpretive services to assist at discharge as needed  - Refer to Case Management Department for coordinating discharge planning if the patient needs post-hospital services based on physician/advanced practitioner order or complex needs related to functional status, cognitive ability, or social support system  Outcome: Progressing     Problem: GASTROINTESTINAL - ADULT  Goal: Maintains or returns to baseline bowel function  Description  INTERVENTIONS:  - Assess bowel function  - Encourage oral fluids to ensure adequate hydration  - Administer IV fluids if ordered to ensure adequate hydration  - Administer ordered medications as needed  - Encourage mobilization and activity  - Consider nutritional services referral to assist patient with adequate nutrition and appropriate food choices  Outcome: Progressing  Goal: Maintains adequate nutritional intake  Description  INTERVENTIONS:  - Monitor percentage of each meal consumed  - Identify factors contributing to decreased intake, treat as appropriate  - Assist with meals as needed  - Monitor I&O, weight, and lab values if indicated  - Obtain nutrition services referral as needed  Outcome: Progressing Problem: GENITOURINARY - ADULT  Goal: Urinary catheter remains patent  Description  INTERVENTIONS:  - Assess patency of urinary catheter  - If patient has a chronic cantor, consider changing catheter if non-functioning  - Follow guidelines for intermittent irrigation of non-functioning urinary catheter  Outcome: Progressing     Problem: METABOLIC, FLUID AND ELECTROLYTES - ADULT  Goal: Electrolytes maintained within normal limits  Description  INTERVENTIONS:  - Monitor labs and assess patient for signs and symptoms of electrolyte imbalances  - Administer electrolyte replacement as ordered  - Monitor response to electrolyte replacements, including repeat lab results as appropriate  - Instruct patient on fluid and nutrition as appropriate  Outcome: Progressing     Problem: SKIN/TISSUE INTEGRITY - ADULT  Goal: Incision(s), wounds(s) or drain site(s) healing without S/S of infection  Description  INTERVENTIONS  - Assess and document risk factors for skin impairment   - Assess and document dressing, incision, wound bed, drain sites and surrounding tissue  - Consider nutrition services referral as needed  - Oral mucous membranes remain intact  - Provide patient/ family education  Outcome: Progressing     Problem: HEMATOLOGIC - ADULT  Goal: Maintains hematologic stability  Description  INTERVENTIONS  - Assess for signs and symptoms of bleeding or hemorrhage  - Monitor labs  - Administer supportive blood products/factors as ordered and appropriate  Outcome: Progressing     Problem: MUSCULOSKELETAL - ADULT  Goal: Maintain or return mobility to safest level of function  Description  INTERVENTIONS:  - Assess patient's ability to carry out ADLs; assess patient's baseline for ADL function and identify physical deficits which impact ability to perform ADLs (bathing, care of mouth/teeth, toileting, grooming, dressing, etc )  - Assess/evaluate cause of self-care deficits   - Assess range of motion  - Assess patient's mobility  - Assess patient's need for assistive devices and provide as appropriate  - Encourage maximum independence but intervene and supervise when necessary  - Involve family in performance of ADLs  - Assess for home care needs following discharge   - OT consult to assist with ADL evaluation and planning for discharge  - Provide patient education as appropriate   Outcome: Progressing     Problem: Potential for Falls  Goal: Patient will remain free of falls  Description  INTERVENTIONS:  - Assess patient frequently for physical needs  -  Identify cognitive and physical deficits and behaviors that affect risk of falls  -  [place] fall precautions as indicated by assessment   - Educate patient/family on patient safety including physical limitations  - Instruct patient to call for assistance with activity based on assessment  - Modify environment to reduce risk of injury  - Consider OT/PT consult to assist with strengthening/mobility  Outcome: Progressing     Problem: Prexisting or High Potential for Compromised Skin Integrity  Goal: Skin integrity is maintained or improved  Description  INTERVENTIONS:  - Identify patients at risk for skin breakdown  - Assess and monitor skin integrity  - Assess and monitor nutrition and hydration status  - Monitor labs   - Assess for incontinence   - Turn and reposition patient  - Assist with mobility/ambulation  - Relieve pressure over bony prominences  - Avoid friction and shearing  - Provide appropriate hygiene as needed including keeping skin clean and dry  - Evaluate need for skin moisturizer/barrier cream  - Collaborate with interdisciplinary team   - Patient/family teaching  - Consider wound care consult   Outcome: Progressing     Problem: Nutrition/Hydration-ADULT  Goal: Nutrient/Hydration intake appropriate for improving, restoring or maintaining nutritional needs  Description  Monitor and assess patient's nutrition/hydration status for malnutrition   Collaborate with interdisciplinary team and initiate plan and interventions as ordered  Monitor patient's weight and dietary intake as ordered or per policy  Utilize nutrition screening tool and intervene as necessary  Determine patient's food preferences and provide high-protein, high-caloric foods as appropriate       INTERVENTIONS:  - Monitor oral intake, urinary output, labs, and treatment plans  - Assess nutrition and hydration status and recommend course of action  - Evaluate amount of meals eaten  - Assist patient with eating if necessary   - Allow adequate time for meals  - Recommend/ encourage appropriate diets, oral nutritional supplements, and vitamin/mineral supplements  - Order, calculate, and assess calorie counts as needed  - Recommend, monitor, and adjust tube feedings and TPN/PPN based on assessed needs  - Assess need for intravenous fluids  - Provide specific nutrition/hydration education as appropriate  - Include patient/family/caregiver in decisions related to nutrition  Outcome: Progressing

## 2019-09-02 NOTE — ASSESSMENT & PLAN NOTE
·  On 08/26/2019 potassium was 3 3  ·  will check magnesium level as well  ·  supplementation had been discontinued at this time  ·  will check CMP for potential need to restart potassium supplementation    · 09/01/2019:  Resolved

## 2019-09-02 NOTE — ASSESSMENT & PLAN NOTE
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

## 2019-09-02 NOTE — PROGRESS NOTES
09/02/19 1505   Pain Assessment   Pain Assessment 0-10   Pain Score 6   Pain Type Chronic pain   Pain Location Neck   Pain Orientation Bilateral   Restrictions/Precautions   Precautions Contact/isolation;Limb alert; Fall Risk   Braces or Orthoses AFO   Executive Function Skills   Insight Mild insight   Task Initiation Initiates with cues   Planning Reduced planning skills   Memory Skills   Orientation Level Oriented X4   Short Term Recall of Paragraph Mild Impairment   Short Term Working Recall Mild Impairment   Memory (FIM) 5 - Golden cues patient   Social Interaction (FIM) 6 - Interacts appropriately with others BUT requires extra  time   Auditory Comprehension   Comphrehends Conversation Simple   Interfering Components Attention - selective; Attention to detail; Motor planning; Working memory   EffectiveTechniques Extra processing time;Repetition;Stressing words   Speech/Language/Cognition Assessmetn   Treatment Assessment SLP focused on cognitive memory and reasoning using the skilled therapy goals as the stimulus  Patient was asked to recall the procedures performed during therapy across disciplines, how they relate to current goals and how they will facilitate the ability to return to the previous level of function  Patient's daily schedule was utilized as a visual aid to promote recall of the days events and procedures performed during treatment  SLP then targeted setting goals for the week in speech therapy as well as across disciplines  Focus was on reasoning the current level, anticipating the final outcome and planning goals with reasonable outcomes for this week  Patient presents at a level of supervision for reasoning current levels and the ability to ascertain goals and solutions to problems based on these levels     Swallow Information   Current Diet Regular   Recommendations   Diet Solid Recommendation Regular consistency   Diet Liquid Recommendation Thin liquid   General Precautions Upright as possible for all oral intake   Swallow Assessment Prognosis   Prognosis Good   Prognosis Considerations Family/community support   SLP Therapy Minutes   SLP Time In 4320   SLP Time Out 3970   SLP Total Time (minutes) 47   SLP Mode of treatment - Individual (minutes) 47   Therapy Time missed   Time missed?  No   Daily FIM Score   Problem solving (FIM) 5 - Solves complex problems But requires cues from helper   Comprehension (FIM) 5 - Needs help/cues, repetition only RARELY ( < 10% of the time)   Expression (FIM) 6 - Expresses complex/abstract but requires:  more time   Eating (FIM) 6 - Patient requires increased time or safety concern

## 2019-09-02 NOTE — PROGRESS NOTES
PM&R Progress Note:    RN notified me of bilateral hand swelling and pain starting overnight  Unclear etiology  No history of trauma  No erythema or warmth of specific joints per RN report  Patient also denies history of RA or psoriatic arthritis  Improved with conservative care including ice/heat and elevation  Monitor and consider work-up further if continues      DDx: gout, arthritis flare    Memory MD Kanwal  PM&R Attending

## 2019-09-03 PROBLEM — M79.642 BILATERAL HAND PAIN: Status: ACTIVE | Noted: 2019-09-03

## 2019-09-03 PROBLEM — M79.641 BILATERAL HAND PAIN: Status: ACTIVE | Noted: 2019-09-03

## 2019-09-03 LAB
MAGNESIUM SERPL-MCNC: 2 MG/DL (ref 1.9–2.7)
POTASSIUM SERPL-SCNC: 4.3 MMOL/L (ref 3.5–5.5)

## 2019-09-03 PROCEDURE — 97116 GAIT TRAINING THERAPY: CPT

## 2019-09-03 PROCEDURE — 97535 SELF CARE MNGMENT TRAINING: CPT

## 2019-09-03 PROCEDURE — 97537 COMMUNITY/WORK REINTEGRATION: CPT

## 2019-09-03 PROCEDURE — 84132 ASSAY OF SERUM POTASSIUM: CPT | Performed by: PHYSICAL MEDICINE & REHABILITATION

## 2019-09-03 PROCEDURE — G0515 COGNITIVE SKILLS DEVELOPMENT: HCPCS

## 2019-09-03 PROCEDURE — 92507 TX SP LANG VOICE COMM INDIV: CPT

## 2019-09-03 PROCEDURE — 97110 THERAPEUTIC EXERCISES: CPT

## 2019-09-03 PROCEDURE — 83735 ASSAY OF MAGNESIUM: CPT | Performed by: PHYSICAL MEDICINE & REHABILITATION

## 2019-09-03 PROCEDURE — 99233 SBSQ HOSP IP/OBS HIGH 50: CPT | Performed by: PHYSICAL MEDICINE & REHABILITATION

## 2019-09-03 PROCEDURE — 97530 THERAPEUTIC ACTIVITIES: CPT

## 2019-09-03 RX ORDER — PREDNISONE 20 MG/1
20 TABLET ORAL DAILY
Status: DISCONTINUED | OUTPATIENT
Start: 2019-09-03 | End: 2019-09-06

## 2019-09-03 RX ORDER — SENNOSIDES 8.6 MG
2 TABLET ORAL
Status: DISCONTINUED | OUTPATIENT
Start: 2019-09-03 | End: 2019-09-06

## 2019-09-03 RX ADMIN — MORPHINE SULFATE 15 MG: 15 TABLET ORAL at 12:02

## 2019-09-03 RX ADMIN — FINASTERIDE 5 MG: 5 TABLET, FILM COATED ORAL at 08:35

## 2019-09-03 RX ADMIN — CHLORHEXIDINE GLUCONATE 0.12% ORAL RINSE 15 ML: 1.2 LIQUID ORAL at 21:38

## 2019-09-03 RX ADMIN — MORPHINE SULFATE 15 MG: 15 TABLET ORAL at 00:02

## 2019-09-03 RX ADMIN — ACETAMINOPHEN 650 MG: 325 TABLET ORAL at 21:38

## 2019-09-03 RX ADMIN — MORPHINE SULFATE 15 MG: 15 TABLET ORAL at 23:28

## 2019-09-03 RX ADMIN — HEPARIN SODIUM 5000 UNITS: 5000 INJECTION, SOLUTION INTRAVENOUS; SUBCUTANEOUS at 14:40

## 2019-09-03 RX ADMIN — MORPHINE SULFATE 15 MG: 15 TABLET ORAL at 17:58

## 2019-09-03 RX ADMIN — CHLORHEXIDINE GLUCONATE 0.12% ORAL RINSE 15 ML: 1.2 LIQUID ORAL at 08:35

## 2019-09-03 RX ADMIN — PREDNISONE 20 MG: 20 TABLET ORAL at 16:36

## 2019-09-03 RX ADMIN — Medication 400 MG: at 08:35

## 2019-09-03 RX ADMIN — SERTRALINE HYDROCHLORIDE 50 MG: 50 TABLET ORAL at 08:35

## 2019-09-03 RX ADMIN — HEPARIN SODIUM 5000 UNITS: 5000 INJECTION, SOLUTION INTRAVENOUS; SUBCUTANEOUS at 21:38

## 2019-09-03 RX ADMIN — VITAMIN D, TAB 1000IU (100/BT) 2000 UNITS: 25 TAB at 08:35

## 2019-09-03 RX ADMIN — OXYCODONE HYDROCHLORIDE AND ACETAMINOPHEN 500 MG: 500 TABLET ORAL at 08:35

## 2019-09-03 RX ADMIN — GABAPENTIN 300 MG: 300 CAPSULE ORAL at 21:38

## 2019-09-03 RX ADMIN — FERROUS SULFATE TAB 325 MG (65 MG ELEMENTAL FE) 325 MG: 325 (65 FE) TAB at 08:35

## 2019-09-03 RX ADMIN — POTASSIUM CHLORIDE 20 MEQ: 1500 TABLET, EXTENDED RELEASE ORAL at 08:35

## 2019-09-03 RX ADMIN — FERROUS SULFATE TAB 325 MG (65 MG ELEMENTAL FE) 325 MG: 325 (65 FE) TAB at 17:58

## 2019-09-03 RX ADMIN — HEPARIN SODIUM 5000 UNITS: 5000 INJECTION, SOLUTION INTRAVENOUS; SUBCUTANEOUS at 05:10

## 2019-09-03 RX ADMIN — MORPHINE SULFATE 15 MG: 15 TABLET ORAL at 05:10

## 2019-09-03 NOTE — PROGRESS NOTES
09/03/19 1306   Pain Assessment   Pain Assessment 0-10   Pain Score 7   Pain Type Chronic pain   Pain Location Neck   Pain Orientation Posterior   Restrictions/Precautions   Precautions Fall Risk;Pain;Limb alert;Contact/isolation  (recent R shoulder hx, contact VRE urine)   Cognition   Arousal/Participation Alert; Cooperative   Following Commands Follows one step commands without difficulty   Transfer Bed/Chair/Wheelchair   Limitations Noted In Balance; Endurance;Pain Management;UE Strength;LE Strength   Sit to Stand Contact Guard;Minimal Assist   Stand to Sit Contact Guard;Minimal Assist   All Transfer Contact Guard;Minimal Assist   Bed, Chair, Wheelchair Transfer (FIM) 4 - Patient completes 75% of all tasks   Ambulation   Primary Discharge Mode of Locomotion Walk   Walk Assist Level Contact Guard   Gait Pattern Slow [place];Decreased foot clearance; Forward Flexion   Distance Walked (feet)   (167' 215' )   Limitations Noted In Balance; Endurance; Safety;Speed   Walking (FIM) 4 - Patient requires steadying assist or light touching AND distance 150 feet or more, no rest   Stairs   Type Stairs   # of Steps   (15)   Assist Devices Bilateral Rail   Stairs (FIM) 4 - Patient requires steadying assist or light touching AND patient goes up and down full flight (12- 14 stairs)   Therapeutic Interventions   Strengthening Seated TE 3 x 10    Other gait training, transfer training, community re-integration    Assessment   Treatment Assessment Pt agreeable to participate in therapy  Pt's son was present for half of the therapy session today  Completed gait training, transfer training, community re-integration, and seated TE with focus on improving general LE strength, balance, activity tolerance, and safety with mobility activities  Pt tolerated therapy well  Needed CG/min A for sit stand transfers  Min A when standing from the lower chairs  Completed 15 steps using B/L HR with CGA   Left pt seated in recliner with cervical hot pack on with 6 layers of towels, call bell in reach, and nursing notified  Pt will benefit from continued skilled PT to increase independence with functional mobility tasks  PT Barriers   Physical Impairment Decreased strength;Decreased endurance; Impaired balance;Decreased mobility;Pain   Functional Limitation Standing;Transfers; Walking; Wheelchair management;Stair negotiation   Plan   Treatment/Interventions Functional transfer training;LE strengthening/ROM; Endurance training; Therapeutic exercise; Bed mobility;Gait training; Compensatory technique education   Progress Slow progress, decreased activity tolerance   PT Therapy Minutes   PT Time In 1306   PT Time Out 1408   PT Total Time (minutes) 62   PT Mode of treatment - Individual (minutes) 29   PT Mode of treatment - Concurrent (minutes) 33   PT Mode of treatment - Group (minutes) 0   PT Mode of treatment - Co-treat (minutes) 0   PT Mode of Teatment - Total time(minutes) 62 minutes   Therapy Time missed   Time missed?  No

## 2019-09-03 NOTE — PROGRESS NOTES
09/03/19 1306   Pain Assessment   Pain Assessment 0-10   Pain Score 7   Pain Type Chronic pain   Pain Location Neck   Pain Orientation Posterior   Restrictions/Precautions   Precautions Fall Risk;Pain;Limb alert;Contact/isolation  (recent R shoulder hx, contact VRE urine)   Cognition   Arousal/Participation Alert; Cooperative   Following Commands Follows one step commands without difficulty   Transfer Bed/Chair/Wheelchair   Limitations Noted In Balance; Endurance;Pain Management;UE Strength;LE Strength   Sit to Stand Contact Guard;Minimal Assist   Stand to Sit Contact Guard;Minimal Assist   All Transfer Contact Guard;Minimal Assist   Bed, Chair, Wheelchair Transfer (FIM) 4 - Patient completes 75% of all tasks   Ambulation   Primary Discharge Mode of Locomotion Walk   Walk Assist Level Contact Guard   Gait Pattern Slow [place];Decreased foot clearance; Forward Flexion   Distance Walked (feet)   (167' 215' )   Limitations Noted In Balance; Endurance; Safety;Speed   Walking (FIM) 4 - Patient requires steadying assist or light touching AND distance 150 feet or more, no rest   Stairs   Type Stairs   # of Steps   (15)   Assist Devices Bilateral Rail   Stairs (FIM) 4 - Patient requires steadying assist or light touching AND patient goes up and down full flight (12- 14 stairs)   Therapeutic Interventions   Strengthening Seated TE 3 x 10    Other gait training, transfer training, community re-integration    Assessment   Treatment Assessment Pt agreeable to participate in therapy  Pt's son was present for half of the therapy session today  Completed gait training, transfer training, community re-integration, and seated TE with focus on improving general LE strength, balance, activity tolerance, and safety with mobility activities  Pt tolerated therapy well  Needed CG/min A for sit stand transfers  Min A when standing from the lower chairs  Completed 15 steps using B/L HR with CGA   Left pt seated in recliner with cervical hot pack on with 6 layers of towels, call bell in reach, and nursing notified  Pt will benefit from continued skilled PT to increase independence with functional mobility tasks  PT Barriers   Physical Impairment Decreased strength;Decreased endurance; Impaired balance;Decreased mobility;Pain   Functional Limitation Standing;Transfers; Walking; Wheelchair management;Stair negotiation   Plan   Treatment/Interventions Functional transfer training;LE strengthening/ROM; Endurance training; Therapeutic exercise; Bed mobility;Gait training; Compensatory technique education   Progress Slow progress, decreased activity tolerance   PT Therapy Minutes   PT Time In 1306   PT Time Out 1421   PT Total Time (minutes) 75   PT Mode of treatment - Individual (minutes) 29   PT Mode of treatment - Concurrent (minutes) 46   PT Mode of treatment - Group (minutes) 0   PT Mode of treatment - Co-treat (minutes) 0   PT Mode of Teatment - Total time(minutes) 75 minutes   Therapy Time missed   Time missed?  No

## 2019-09-03 NOTE — PROGRESS NOTES
09/03/19 1211   Restrictions/Precautions   Precautions Fall Risk;Limb alert;Pain;Contact/isolation   Transfer Bed/Chair/Wheelchair   Limitations Noted In Balance; Endurance;LE Strength;UE Strength   Stand Pivot Supervision   Sit to Stand Supervision   Stand to Sit Supervision   Bed, Chair, Wheelchair Transfer (FIM) 5 - Patient requires supervision/monitoring   Additional Activities   Additional Activities Other (Comment)   Additional Activities Comments fxl mobility with RW room to gym with S to manage catheter bag   Assessment   Treatment Assessment Pt agreeable to attending lunch this afternoon with 14 minutes concurrent treatment provided during transfers and mobility from room to gym to prepare for meal  Pt tolerates session well and son present with levels and assist reviewed briefly  Pt will benefit from contnued skilled OT services to increase independence with daily tasks  Problem List Decreased strength;Decreased range of motion;Decreased endurance; Impaired balance;Decreased safety awareness;Orthopedic restrictions   Plan   Treatment/Interventions ADL retraining;Functional transfer training; Therapeutic exercise; Endurance training;Patient/family training; Compensatory technique education   Progress Progressing toward goals   OT Therapy Minutes   OT Time In 1211   OT Time Out 1225   OT Total Time (minutes) 14   OT Mode of treatment - Concurrent (minutes) 14   Therapy Time missed   Time missed?  No

## 2019-09-03 NOTE — ASSESSMENT & PLAN NOTE
- pain was across MP joints 2-5 B/L   - per patient has had this issue intermittently for approximately 2 years with episodes lasting 3 to 4 weeks at a time  - per patient was told this is due to arthritis (but he is unsure what kind of arthritis)  - patient was agreeable to short course of steroids which has resolved the issue w/o AE however did advise patient to FU with PCP with further testing/treatment and/or specialist referral at PCP's discretion

## 2019-09-03 NOTE — PLAN OF CARE
Problem: PAIN - ADULT  Goal: Verbalizes/displays adequate comfort level or baseline comfort level  Description  Interventions:  - Encourage patient to monitor pain and request assistance  - Assess pain using appropriate pain scale  - Administer analgesics based on type and severity of pain and evaluate response  - Implement non-pharmacological measures as appropriate and evaluate response  - Consider cultural and social influences on pain and pain management  - Notify physician/advanced practitioner if interventions unsuccessful or patient reports new pain  Outcome: Progressing     Problem: INFECTION - ADULT  Goal: Absence or prevention of progression during hospitalization  Description  INTERVENTIONS:  - Assess and monitor for signs and symptoms of infection  - Monitor lab/diagnostic results  - Monitor all insertion sites, i e  indwelling lines  - [place] appropriate cooling/warming therapies per order  - Administer medications as ordered  - Instruct and encourage patient and family to use good hand hygiene technique  - Identify and instruct in appropriate isolation precautions for identified infection/condition   Outcome: Progressing     Problem: SAFETY ADULT  Goal: Patient will remain free of falls  Description  INTERVENTIONS:  - Assess patient frequently for physical needs  -  Identify cognitive and physical deficits and behaviors that affect risk of falls    -  [place] fall precautions as indicated by assessment   - Educate patient/family on patient safety including physical limitations  - Instruct patient to call for assistance with activity based on assessment  - Modify environment to reduce risk of injury  -OT/PT consult to assist with strengthening/mobility   Outcome: Progressing  Goal: Maintain or return mobility status to optimal level  Description  INTERVENTIONS:  - Assess patient's baseline mobility status (ambulation, transfers, stairs, etc )    - Identify cognitive and physical deficits and behaviors that affect mobility  - Identify mobility aids required to assist with transfers and/or ambulation (gait belt, sit-to-stand, lift, walker, cane, etc )  - [place] fall precautions as indicated by assessment  - Record patient progress and toleration of activity level on Mobility SBAR; progress patient to next Phase/Stage  - Instruct patient to call for assistance with activity based on assessment     Outcome: Progressing     Problem: DISCHARGE PLANNING  Goal: Discharge to home or other facility with appropriate resources  Description  INTERVENTIONS:  - Identify barriers to discharge w/patient and caregiver  - Arrange for needed discharge resources and transportation as appropriate  - Identify discharge learning needs (meds, wound care, etc )  - Arrange for interpretive services to assist at discharge as needed  - Refer to Case Management Department for coordinating discharge planning if the patient needs post-hospital services based on physician/advanced practitioner order or complex needs related to functional status, cognitive ability, or social support system  Outcome: Progressing     Problem: GASTROINTESTINAL - ADULT  Goal: Maintains or returns to baseline bowel function  Description  INTERVENTIONS:  - Assess bowel function  - Encourage oral fluids to ensure adequate hydration  - Administer IV fluids if ordered to ensure adequate hydration  - Administer ordered medications as needed  - Encourage mobilization and activity  - Consider nutritional services referral to assist patient with adequate nutrition and appropriate food choices  Outcome: Progressing  Goal: Maintains adequate nutritional intake  Description  INTERVENTIONS:  - Monitor percentage of each meal consumed  - Identify factors contributing to decreased intake, treat as appropriate  - Assist with meals as needed  - Monitor I&O, weight, and lab values if indicated  - Obtain nutrition services referral as needed  Outcome: Progressing Problem: GENITOURINARY - ADULT  Goal: Urinary catheter remains patent  Description  INTERVENTIONS:  - Assess patency of urinary catheter  - If patient has a chronic cantor, consider changing catheter if non-functioning  - Follow guidelines for intermittent irrigation of non-functioning urinary catheter  Outcome: Progressing     Problem: METABOLIC, FLUID AND ELECTROLYTES - ADULT  Goal: Electrolytes maintained within normal limits  Description  INTERVENTIONS:  - Monitor labs and assess patient for signs and symptoms of electrolyte imbalances  - Administer electrolyte replacement as ordered  - Monitor response to electrolyte replacements, including repeat lab results as appropriate  - Instruct patient on fluid and nutrition as appropriate  Outcome: Progressing     Problem: SKIN/TISSUE INTEGRITY - ADULT  Goal: Incision(s), wounds(s) or drain site(s) healing without S/S of infection  Description  INTERVENTIONS  - Assess and document risk factors for skin impairment   - Assess and document dressing, incision, wound bed, drain sites and surrounding tissue  - Consider nutrition services referral as needed  - Oral mucous membranes remain intact  - Provide patient/ family education  Outcome: Progressing     Problem: HEMATOLOGIC - ADULT  Goal: Maintains hematologic stability  Description  INTERVENTIONS  - Assess for signs and symptoms of bleeding or hemorrhage  - Monitor labs  - Administer supportive blood products/factors as ordered and appropriate  Outcome: Progressing     Problem: MUSCULOSKELETAL - ADULT  Goal: Maintain or return mobility to safest level of function  Description  INTERVENTIONS:  - Assess patient's ability to carry out ADLs; assess patient's baseline for ADL function and identify physical deficits which impact ability to perform ADLs (bathing, care of mouth/teeth, toileting, grooming, dressing, etc )  - Assess/evaluate cause of self-care deficits   - Assess range of motion  - Assess patient's mobility  - Assess patient's need for assistive devices and provide as appropriate  - Encourage maximum independence but intervene and supervise when necessary  - Involve family in performance of ADLs  - Assess for home care needs following discharge   - OT consult to assist with ADL evaluation and planning for discharge  - Provide patient education as appropriate   Outcome: Progressing     Problem: Potential for Falls  Goal: Patient will remain free of falls  Description  INTERVENTIONS:  - Assess patient frequently for physical needs  -  Identify cognitive and physical deficits and behaviors that affect risk of falls  -  [place] fall precautions as indicated by assessment   - Educate patient/family on patient safety including physical limitations  - Instruct patient to call for assistance with activity based on assessment  - Modify environment to reduce risk of injury  - Consider OT/PT consult to assist with strengthening/mobility  Outcome: Progressing     Problem: Prexisting or High Potential for Compromised Skin Integrity  Goal: Skin integrity is maintained or improved  Description  INTERVENTIONS:  - Identify patients at risk for skin breakdown  - Assess and monitor skin integrity  - Assess and monitor nutrition and hydration status  - Monitor labs   - Assess for incontinence   - Turn and reposition patient  - Assist with mobility/ambulation  - Relieve pressure over bony prominences  - Avoid friction and shearing  - Provide appropriate hygiene as needed including keeping skin clean and dry  - Evaluate need for skin moisturizer/barrier cream  - Collaborate with interdisciplinary team   - Patient/family teaching  - Consider wound care consult   Outcome: Progressing     Problem: Nutrition/Hydration-ADULT  Goal: Nutrient/Hydration intake appropriate for improving, restoring or maintaining nutritional needs  Description  Monitor and assess patient's nutrition/hydration status for malnutrition   Collaborate with interdisciplinary team and initiate plan and interventions as ordered  Monitor patient's weight and dietary intake as ordered or per policy  Utilize nutrition screening tool and intervene as necessary  Determine patient's food preferences and provide high-protein, high-caloric foods as appropriate       INTERVENTIONS:  - Monitor oral intake, urinary output, labs, and treatment plans  - Assess nutrition and hydration status and recommend course of action  - Evaluate amount of meals eaten  - Assist patient with eating if necessary   - Allow adequate time for meals  - Recommend/ encourage appropriate diets, oral nutritional supplements, and vitamin/mineral supplements  - Order, calculate, and assess calorie counts as needed  - Recommend, monitor, and adjust tube feedings and TPN/PPN based on assessed needs  - Assess need for intravenous fluids  - Provide specific nutrition/hydration education as appropriate  - Include patient/family/caregiver in decisions related to nutrition  Outcome: Progressing

## 2019-09-03 NOTE — PROGRESS NOTES
09/03/19 1225   Pain Assessment   Pain Assessment 0-10   Pain Score 7   Pain Location Shoulder;Neck;Hand   Pain Orientation Bilateral   Restrictions/Precautions   Precautions Fall Risk;Pain;Limb alert;Contact/isolation   Executive Function Skills   Insight Mild insight   Task Initiation Initiates with cues   Planning Reduced planning skills   Memory Skills   Orientation Level Oriented X4   Short Term Recall of Paragraph Mild Impairment   Short Term Working Recall Mild Impairment   Memory (FIM) 5 - [place] cues patient   Social Interaction (FIM) 6 - Interacts appropriately with others BUT requires medication for control   Auditory Comprehension   Comphrehends Conversation Simple   Interfering Components Attention - selective; Attention to detail; Motor planning; Working memory   EffectiveTechniques Extra processing time;Repetition;Stressing words   Speech/Language/Cognition Assessmetn   Treatment Assessment Speech Pathology Daily Treatment Note - Speech/Cognitive Communication Skills - SLP focused on attention to cues and comprehension of directions during completion of ADL's as well as patient's own ability to give directions to increase their success  SLP utilized tray set up with meal prep as a familiar scenario in patient's environment  Patient was required to follow 2-3step directions related to safe posture and positioning in the wheelchair with ability to place them selves near enough to the table in order to reach all tray items  Attention was placed on wheel chair brakes pre and post meal   At the initiation of the meal, the patient was required to give directions for functional preparation of the tray in terms of bite size and moistening solids as well as opening and preparation of all items in terms of condiments, etc  to be eaten  This task has been designed to increase patient's understanding of the steps needed to ensure their ongoing functional success at mealtime   Patient presented at the supervision level for this task as he is challenged by motor tasks and then needs redirection cognitively  Swallow Information   Current Diet Regular   Recommendations   Diet Solid Recommendation Regular consistency   Diet Liquid Recommendation Thin liquid   General Precautions Upright as possible for all oral intake   Swallow Assessment Prognosis   Prognosis Good   Prognosis Considerations Family/community support;Participation level; Potential   SLP Therapy Minutes   SLP Time In 4059   SLP Time Out 3235   SLP Total Time (minutes) 40   SLP Mode of treatment - Individual (minutes) 40   Therapy Time missed   Time missed?  No   Daily FIM Score   Problem solving (FIM) 5 - Solves basic problems 90% of time   Comprehension (FIM) 5 - Needs help/cues, repetition only RARELY ( < 10% of the time)   Expression (FIM) 6 - Expresses complex/abstract but requires:  more time   Eating (FIM) 6 - Patient requires increased time or safety concern

## 2019-09-03 NOTE — PROGRESS NOTES
09/03/19 0930   Pain Assessment   Pain Assessment 0-10   Pain Score 7   Pain Type Acute pain;Chronic pain   Pain Location Arm  (hands to shoulders)   Pain Orientation Bilateral   Restrictions/Precautions   Precautions Fall Risk;Pain;Limb alert;Contact/isolation  (recent R sh sx, contact VRE urine)   Grooming   Able To Initiate Tasks; Acquire Items;Comb/Brush Hair;Wash/Dry Face;Brush/Clean Teeth;Wash/Dry Hands   Limitation Noted In Safety   Grooming (FIM) 5 - Patient requires supervision/monitoring   Bathing   Assessed Bath Style Shower   Anticipated D/C Bath Style Shower;Sponge Bath   Able to [place] Yes   Able to [employer] Temperature No   Able to Wash/Rinse/Dry (body part) Left Arm;Right Arm;L Upper Leg;R Upper Leg;L Lower Leg/Foot;R Lower Leg/Foot;Chest;Abdomen;Perineal Area; Buttocks   Limitations Noted in Safety;ROM   Bathing (FIM) 5 - Patient requires supervision/monitoring but completes 10/10 parts   Tub/Shower Transfer   Limitations Noted In Balance; Endurance;UE Strength   Adaptive Equipment Transfer Bench   Shower Transfer (FIM) 5 - Patient requires supervision/monitoring   Dressing/Undressing Clothing   Remove UB Clothes Button Shirt   Remove LB Clothes Undergarment;Socks   Don UB Clothes Button Shirt   Don LB Clothes Pants; Undergarment;Socks; Shoes   Limitations Noted In Balance; Coordination; Endurance; Safety;ROM;Strength   UB Dressing (FIM) 4 - Patient completes 75% of all tasks   LB Dressing (FIM) 4 - Patient completes 75% of all tasks   Transfer Bed/Chair/Wheelchair   Limitations Noted In Balance; Coordination; Endurance;Pain Management;Problem Solving;LE Strength   Stand Pivot Supervision   Sit to Stand Supervision   Stand to Sit Supervision   Bed, Chair, Wheelchair Transfer (FIM) 5 - Patient requires supervision/monitoring   Light Housekeeping   Light Housekeeping Level [place] Housekeeping Level of Assistance Close supervision   Light Housekeeping gathering items for ADL   Cognition Overall Cognitive Status [facility]   Arousal/Participation Alert; Responsive; Cooperative   Attention Within functional limits   Additional Activities   Additional Activities Other (Comment)   Additional Activities Comments   (S/ Preethi to manage catheter during ADL and transfers)   Assessment   Treatment Assessment Pt participates in a shower this morning with encouragement due to increase pain BUE from hands to shoulders  Pt requires assist this day due to decreased ORM/ strength/ coordination B hands, balance, safety and endurance  Pt toelrates session with increased rest breaks however this was to be the graduation day with goals of S/ Mod I for discharge to home  Pt's current LOF discussed in team meeting with recommended continuation of skilled OT services to increase independence and prepare for transition to home with daughter  Pt will benefit from continued skilled OT services to increase independence with daily tasks  Problem List Decreased strength;Decreased range of motion;Decreased endurance; Impaired balance;Decreased coordination;Decreased safety awareness;Pain;Orthopedic restrictions   Plan   Treatment/Interventions ADL retraining;Functional transfer training; Therapeutic exercise; Endurance training;Patient/family training; Compensatory technique education   Progress Slow progress, decreased activity tolerance   OT Therapy Minutes   OT Time In 0930   OT Time Out 1036   OT Total Time (minutes) 66   OT Mode of treatment - Individual (minutes) 66   Therapy Time missed   Time missed?  No

## 2019-09-03 NOTE — PROGRESS NOTES
09/03/19 0830   Charting Type   Charting Type Shift assessment   Neurological   Neuro (WDL) X   Muscle Function/Sensation Assessment ;Muscle strength   R Hand  Weak   L Hand  Weak   RUE Muscle Strength 4- Movement against gravity and limited resistance   LUE Muscle Strength 4- Movement against gravity and limited resistance   Abhijit Coma Scale   Eye Opening 4   Best Verbal Response 5   Best Motor Response 6   [place] Coma Scale Score 15   HEENT   HEENT (WDL) X   R Eye Mildly impaired vision   L Eye Mildly impaired vision   Vision - Corrective Lenses (at bedside) Glasses   R Ear Hearing aid   L Ear Hearing aid   Teeth Dentures upper;Dentures lower   Respiratory   Respiratory (WDL) X   Bilateral Breath Sounds Clear;Diminished   Cardiac   Cardiac (WDL) WDL   Pain Assessment   Pain Assessment 0-10   Pain Score 4   Pain Location Hand   Pain Orientation Bilateral   Peripheral Vascular   Peripheral Vascular (WDL) X   Edema   (B/Lhands)   RLE Edema Non-pitting   LLE Edema Non-pitting   Integumentary   Integumentary (WDL) X   Skin Color Ashen   Skin Condition/Temp Warm;Dry   Skin Integrity Bruising   Skin Location scattered   Skin Turgor Non-tenting   David Scale   Sensory Perceptions 3   Moisture 4   Activity 3   Mobility 3   Nutrition 3   Friction and Shear 3   David Scale Score 19   Musculoskeletal   Musculoskeletal (WDL) X   Level of Assistance Contact guard assist, steadying assist   Assistive Device Front wheel walker   RUE Limited movement   LUE Limited movement   RLE Limited movement   LLE Limited movement   Gastrointestinal   Gastrointestinal (WDL) WDL   Stool Assessment   Bowel Incontinence No   Genitourinary   Genitourinary (WDL) X   Genitourinary Symptoms Indwelling catheter   Urine Assessment   Urinary Incontinence No   Urine Color Yellow/straw   Urine Appearance Clear   Genitalia   Male Genitalia Intact   Genitourinary Additional Assessments   Genitourinary Additional Assessments No Anal/Rectal   Anal/Rectal (WDL) WDL   Psychosocial   Psychosocial (WDL) WDL

## 2019-09-03 NOTE — PROGRESS NOTES
09/02/19 2238   Charting Type   Charting Type Shift assessment   Neurological   Level of Consciousness Alert/awake   Orientation Level Oriented X4   Cognition Appropriate judgement; Appropriate safety awareness; Appropriate attention/concentration; Appropriate for developmental age   Speech Clear   Swallow Able to swallow solids and liquids without difficulty   R Hand  Weak  (due to swelling pain)   L Hand  Weak  (due to swelling pain)   RUE Muscle Strength 4- Movement against gravity and limited resistance   LUE Muscle Strength 4- Movement against gravity and limited resistance   RLE Muscle Strength 5- Normal strength   LLE Muscle Strength 5- Normal strength   Neuro Symptoms None   Relieved by Rest   HEENT   Vision - Corrective Lenses (at bedside) Glasses   R Ear Hearing aid   L Ear Hearing aid   Teeth Dentures upper;Dentures lower   Respiratory   Respiratory Pattern Normal   Chest Assessment Chest expansion symmetrical   Bilateral Breath Sounds Clear;Diminished   Cardiac   Cardiac (WDL) WDL   Peripheral Vascular   Edema Other (Comment)  (B/L hands)   RLE Edema Non-pitting   LLE Edema Non-pitting   Integumentary   Skin Color Ashen   Skin Condition/Temp Warm;Dry   Skin Integrity Bruising   Skin Location scattered   Wound 08/21/19 Pressure Injury Sacrum   Date First Assessed/Time First Assessed: 08/21/19 1600   Pre-Existing Wound: Yes  Primary Wound Type: Pressure Injury  Location: Sacrum   Wound Description GAL   Staging Stage II   Dressing Foam, Silicon (eg   Allevyn, etc)   Dressing Status Intact   Musculoskeletal   Level of Assistance Contact guard assist, steadying assist   Assistive Device Front wheel walker   RUE Limited movement   LUE Limited movement   RLE Limited movement   LLE Limited movement   Gastrointestinal   Gastrointestinal (WDL) WDL   Bowel Sounds (All Quadrants) Present   Stool Assessment   Bowel Incontinence No   Genitourinary   Genitourinary (WDL) X   Genitourinary Symptoms Indwelling catheter   Urine Assessment   Urinary Incontinence No   Psychosocial   Psychosocial (WDL) WDL   Ability to Express Feelings Able to express   Ability to Express Needs Able to express   Ability to Express Thoughts Able to express   Ability to Understand Others Understands

## 2019-09-03 NOTE — TEAM CONFERENCE
Acute RehabilitationTeam Conference Note  Date: 9/3/2019   Time: 10:47 AM       Patient Name:  Emerson Baeza       Medical Record Number: [id number]   YOB: 1945  Sex: Male          Room/Bed:  /-01  Payor Info:  Payor: Armin Haver / Plan: MEDICARE A AND B / Product Type: Medicare A & B Fee for Service /      Admitting Diagnosis: Debility [R53 81]   Admit Date/Time:  8/21/2019  3:10 PM  Admission Comments: No comment available     Primary Diagnosis:  Debility  Principal Problem: Debility    Patient Active Problem List    Diagnosis Date Noted    Debility 09/02/2019    Chronic hand pain, left 09/01/2019    Hypomagnesemia 08/30/2019    Thrombocytosis ([address] ) 08/26/2019    Hypokalemia 08/26/2019    Leukocytosis 08/24/2019    Elevated troponin 08/21/2019    CKD (chronic kidney disease) 08/21/2019    Anemia 08/21/2019    Depression 08/21/2019    Chronic pain syndrome 08/16/2019    Chronic indwelling Diallo catheter 08/02/2019       Physical Therapy:    Weight Bearing Status: Full Weight Bearing  Transfers: Minimal Assistance  Bed Mobility: Supervision  Amulation Distance (ft): 155 feet  Ambulation: Supervision(close S )  Assistive Device for Ambulation: Roller Walker  Number of Stairs: 15  Assistive Device for Stairs: Bilateral [employer]  Stair Assistance: Contact Guard  Ramp: Contact Guard  Discharge Recommendations: Home with:  [address] with[de-identified] Home Physical Therapy, Family Support    8/26/19: Pt is making good progress towards PT goals  Current level of function includes min A for bed mobility & transfers, CG while ambulating 248' with RW, and CGA while navigating 10 steps with B/L HR  Pt remains limited in strength, endurance, balance, cognition at times, and decreased safety  Pt will continue to benefit from skilled physical therapy to improve overall independence with functional mobility tasks  9/2/19: Pt is making good progress towards PT goals   Current level of function includes S for bed mobility & transfers, Close S while ambulating 155' with RW, and CGA while navigating 15 steps with B/L HR  Pt remains limited in strength, endurance, balance, cognition at times, and decreased safety  Pt will continue to benefit from skilled physical therapy to improve overall independence with functional mobility tasks  Occupational Therapy:  Eating: Modified Independent  Grooming: Supervision  Bathing: Supervision  Bathing: Supervision  Upper Body Dressing: Minimal Assistance  Lower Body Dressing: Minimal Assistance  Toileting: Total Assistance  Tub/Shower Transfer: Minimal Assistance  Toilet Transfer: Minimal Assistance  Cognition: Within Defined Limits  Orientation: Person, Place, Time, Situation  Discharge Recommendations: Home with:  [address] with[de-identified] Family Support, Home Occupational Therapy, First Floor Setup       8/26/19: Pt participates in ADLs, transfers and BUE therex  Pt requires assist due to decreased balance, safety, endurance and BUE strength with a recent R shoulder surgery  Pt demonstrates confusion over the weekend however is able to recall orientation questions and attends to tasks without difficult this day  Pt's current LOF as listed above and will benefit from continued skilled OT services to increase independence with daily tasks  Total assist for toileting due to catheter use with assist to manage  9/3/19:  Pt participates in ADLs, transfers and BUE therex  Pt requires assist due to decreased balance, safety, endurance and BUE strength with a recent R shoulder surgery and B hand pain  Pt is making gains towards goals and current LOF as listed above and will benefit from continued skilled OT services to increase independence with daily tasks  Daughter is able to provide assist as needed        Speech Therapy:  Mode of Communication: Verbal  Cognition: Exceptions to WNL  Cognition: Decreased Executive Functions, Decreased Comprehension, Decreased Safety  Orientation: Person, Place, Time, Situation  Swallowing: Within Defined Limits  Diet Recommendations: Regular Diet  Discharge Recommendations: Home with:  [address] with[de-identified] Family Support  Oliver Sullivan is a 66 y o  male who presented to the [address] [place] [address] with AMS 2/2 to urosepsis which was treated with abx  Course complicated by RF which was treated with IVF and elevated troponins which cardiology attributed to sepsis  Prior to admission, patient was essentially independent with mobility, transfers and ADL's  Gracesofia Amezcuaer lives with his daughter in a single family home  First floor set up is available and there are 3 steps to enter the home  Skilled speech assessment was completed  Patient presents with essentially adequate oral motor function for speech/communication skills and management of regular solids with thin liquids  Speech comprehension and expression at the multi step level for understanding directions and verbal sequencing of procedures and events is supervision level  Cognitively, patient presents as supervision for memory with new information and reasoning through problems taking into account his level of necessary assistance as well as reasoning his current situation  Reasoning also affects self feed and PO intake as patient currently benefits from encouragement and a dining room setting to ensure adequate nutritional needs are met  Short term skilled Speech is indicated focusing on the above  Expected outcome is Mod I to Independent    9/2/2019  Patient is participating in skilled speech therapy focusing on speech cognitive communication skills  Current appropriate diet texture is regular with thin liquids in a self feed setting  Speech comprehension for directions at the multi step level is supervision level  Speech expression to convey complex ideas and verbal sequencing is Mod I with familiar event and tasks    Cognitively, patient presents as supervision level of memory with new information and reasoning the current level of assistance for understanding when he needs to ask for help  Nursing Notes:  Appetite: Good  Diet Type: Regular/House                      Diet Patient/Family Education Complete: Yes    Type of Wound (LDA): Wound(stage 2 sacrum)                       Bladder: Catheter  Catheter Type: Diallo  Bladder Patient/Family Education: Yes  Bowel: 6 - Modified [place]     Bowel Patient/Family Education: Yes  Pain Location: Neck, Shoulder, Hand  Pain Orientation: Bilateral  Pain Score: 4                       Hospital Pain Intervention(s): Heat applied, Cold applied, Elevated  Pain Patient/Family Education: Yes  Medication Management/Safety  Injectable: (heparin)    8/26/19 admitted 8/21/19, Freq falls at home, UTI, Sepsis  Min assist to amb to BR with RW  Chronic Diallo, clear straw color  ID consult today , IV started  To be on IV antibiotic Tx  VS WNL  AOX4  Inc of loose BM      9/1/19 Min assist of staff to Transfer from bed/chair/ BR, RW  Left hand painful swelling, warmth, Elevated , ice , medicated , with good results  Labs drawn , WNL  Diallo patent clear yellow   Cont of Bowel  AOX4, but forgetful  Case Management:     Discharge Planning  Goal Length of Stay: 1(week)  Living Arrangements: Family members  Support Systems: Children, Family members  Assistance Needed: outpatient PT  Type of Current Residence: Private residence  Current [address] : No  Met with Pt & phone contact with daughter, whom resides with Pt  Pt's daughter works FT & will not be able to provide 24 hr supervision  One story home, 3 steps in with 2 HR  SW will continue to monitor & assist as needed with [address]     8/27/19 - Tx team rec reviewed with Pt & Pt's daughter  Target DC date 9/4 with [facility] (PT, OT, Nsg)  Pt anxious to be [address] home  SW will continue to monitor & assist as needed with Tx & DC planning  9/3/19 - Tx team recommendations reviewed with patient & family  Pt to be DC'd on 9/4/19 with [facility] (Nsg, PT, OT)  SW will continue to monitor & assist as needed with Tx & DC planning  Is the patient actively participating in therapies? yes  List any modifications to the treatment plan: na    Barriers Interventions   Bilateral hands swollen, decreased ROM Elevation   pain Medication management   Decreased coordination, strength Therapeutic exercise, therapeutic activity   Decreased cog ST strategies, ADL/transfer/gait training   Urinary retention Diallo, urology following      Is the patient making expected progress toward goals? yes  List any update or changes to goals: na    Medical Goals: Patient will be medically stable for discharge to [facility] upon completion of rehab program    Weekly Team Goals:   Rehab Team Goals  ADL Team Goal: Patient will require assist with ADLs with least restrictive device upon completion of rehab program  Bowel/Bladder Team Goal: Patient will return to premorbid level for bladder/bowel management upon completion of rehab program  Transfer Team Goal: Patient will be independent with transfers with least restrictive device upon completion of rehab program  Locomotion Team Goal: Patient will be independent with locomotion with least restrictive device upon completion of rehab program  Cognitive Team Goal: Patient will return to premorbid level of cognitive activity upon completion of rehab program     S/Mod I self care  Mod I transfers, mobility  Mod I cog  Complete family training     Aryan and wellness:   To be able to return to caring for dogs    Discussion: Plan for return home with daughter with [facility] for PT, OT, nursing, and aides    Anticipated Discharge Date:  Sept 7, 2019

## 2019-09-03 NOTE — CASE MANAGEMENT
Tx team recommendations reviewed with patient & family  Pt's DC changed from 9/4 to 9/7/19 with [facility] (Nsg, PT, OT)  SW will continue to monitor & assist as needed with Tx & DC planning

## 2019-09-04 PROCEDURE — G0515 COGNITIVE SKILLS DEVELOPMENT: HCPCS

## 2019-09-04 PROCEDURE — 97110 THERAPEUTIC EXERCISES: CPT

## 2019-09-04 PROCEDURE — 92507 TX SP LANG VOICE COMM INDIV: CPT

## 2019-09-04 PROCEDURE — 99232 SBSQ HOSP IP/OBS MODERATE 35: CPT | Performed by: PHYSICAL MEDICINE & REHABILITATION

## 2019-09-04 PROCEDURE — 97116 GAIT TRAINING THERAPY: CPT

## 2019-09-04 PROCEDURE — 97537 COMMUNITY/WORK REINTEGRATION: CPT

## 2019-09-04 PROCEDURE — 97530 THERAPEUTIC ACTIVITIES: CPT

## 2019-09-04 RX ADMIN — FERROUS SULFATE TAB 325 MG (65 MG ELEMENTAL FE) 325 MG: 325 (65 FE) TAB at 09:44

## 2019-09-04 RX ADMIN — FINASTERIDE 5 MG: 5 TABLET, FILM COATED ORAL at 09:44

## 2019-09-04 RX ADMIN — HEPARIN SODIUM 5000 UNITS: 5000 INJECTION, SOLUTION INTRAVENOUS; SUBCUTANEOUS at 05:42

## 2019-09-04 RX ADMIN — OXYCODONE HYDROCHLORIDE AND ACETAMINOPHEN 500 MG: 500 TABLET ORAL at 09:43

## 2019-09-04 RX ADMIN — SENNOSIDES 17.2 MG: 8.6 TABLET, FILM COATED ORAL at 21:32

## 2019-09-04 RX ADMIN — CHLORHEXIDINE GLUCONATE 0.12% ORAL RINSE 15 ML: 1.2 LIQUID ORAL at 21:32

## 2019-09-04 RX ADMIN — MORPHINE SULFATE 15 MG: 15 TABLET ORAL at 12:48

## 2019-09-04 RX ADMIN — GABAPENTIN 300 MG: 300 CAPSULE ORAL at 21:32

## 2019-09-04 RX ADMIN — VITAMIN D, TAB 1000IU (100/BT) 2000 UNITS: 25 TAB at 09:45

## 2019-09-04 RX ADMIN — HEPARIN SODIUM 5000 UNITS: 5000 INJECTION, SOLUTION INTRAVENOUS; SUBCUTANEOUS at 14:48

## 2019-09-04 RX ADMIN — HEPARIN SODIUM 5000 UNITS: 5000 INJECTION, SOLUTION INTRAVENOUS; SUBCUTANEOUS at 21:32

## 2019-09-04 RX ADMIN — MORPHINE SULFATE 15 MG: 15 TABLET ORAL at 23:27

## 2019-09-04 RX ADMIN — MORPHINE SULFATE 15 MG: 15 TABLET ORAL at 17:24

## 2019-09-04 RX ADMIN — CHLORHEXIDINE GLUCONATE 0.12% ORAL RINSE 15 ML: 1.2 LIQUID ORAL at 11:34

## 2019-09-04 RX ADMIN — PREDNISONE 20 MG: 20 TABLET ORAL at 09:44

## 2019-09-04 RX ADMIN — MORPHINE SULFATE 15 MG: 15 TABLET ORAL at 05:42

## 2019-09-04 RX ADMIN — SERTRALINE HYDROCHLORIDE 50 MG: 50 TABLET ORAL at 09:44

## 2019-09-04 RX ADMIN — FERROUS SULFATE TAB 325 MG (65 MG ELEMENTAL FE) 325 MG: 325 (65 FE) TAB at 17:24

## 2019-09-04 NOTE — PROGRESS NOTES
09/04/19 1235   Pain Assessment   Pain Assessment 0-10   Pain Score 4   Pain Type Chronic pain   Pain Location Shoulder   Pain Orientation Bilateral   Restrictions/Precautions   Precautions Fall Risk;Pain;Limb alert;Contact/isolation   Executive Function Skills   Insight Mild insight   Task Initiation Initiates with cues   Planning Reduced planning skills   Memory Skills   Orientation Level Oriented X4   Short Term Recall of Paragraph Mild Impairment   Short Term Working Recall Mild Impairment   Memory (FIM) 5 - [place] cues patient   Social Interaction (FIM) 6 - Interacts appropriately with others BUT requires extra  time   Auditory Comprehension   Comphrehends Conversation Simple   Interfering Components Attention - selective; Attention to detail; Motor planning; Working memory   EffectiveTechniques Extra processing time;Repetition;Stressing words   Speech/Language/Cognition Assessmetn   Treatment Assessment Speech Pathology Daily Treatment Note - Speech/Cognitive Communication  - SLP focused on compehension of daily routine and therapeutic program   SLP utilized visual aids which included the patient's daily schedule, wall clock and visualization of outside scenery via the window  Patient was requested to calculate the current time and determine therapy sessions about to take place, or those which have already occurred and recall or reason the tasks which take place according to the type of therapy  SLP then focused on patient's ability to verbal sequence mobility tasks to a 5 step level with accuracy in level of assistance  Patient presented at the supervision level for memory and reasoning level of assistance       Swallow Information   Current Diet Regular   Recommendations   Diet Solid Recommendation Regular consistency   Diet Liquid Recommendation Thin liquid   General Precautions Upright as possible for all oral intake   Swallow Assessment Prognosis   Prognosis Good   SLP Therapy Minutes   SLP Time In 3857 SLP Time Out 1320   SLP Total Time (minutes) 45   SLP Mode of treatment - Individual (minutes) 45   Therapy Time missed   Time missed?  No   Daily FIM Score   Problem solving (FIM) 5 - Solves basic problems 90% of time   Comprehension (FIM) 5 - Understands basic directions and conversation   Expression (FIM) 5 - Needs help/cues only RARELY (< 10% of the time)   Eating (FIM) 6 - Patient requires increased time or safety concern

## 2019-09-04 NOTE — NURSING NOTE
Pt has no complaints of discomfort today  Edema in hands have noted improvement  Diallo catheter continues to function  Will continue to monitor output and proper functioning of catheter  Call bell and belongings within reach  All needs are currently met  Will continue to monitor and follow plan of care

## 2019-09-04 NOTE — NURSING NOTE
Patient resting comfortably in bed at this time  No signs of distress noted  Bed alarm in place to maintain patient safety  Diallo catheter draining yellow urine overnight  Patient was encouraged to reposition frequently overnight to promote skin integrity  One assist for contact guard to ambulate to the bathroom  Patient woer SCDs as ordered for DVT prophylaxis  Call bell within reach  Will continue to monitor patient and follow plan of care

## 2019-09-04 NOTE — PROGRESS NOTES
Physical Medicine and Rehabilitation Progress Note  Johanna Vogt 66 y o  male MRN: [id number]  Unit/Bed#: -01 Encounter: [id number]    HPI: Johanna Vogt is a 66 y o  male who presented to the [facility] with AMS 2/2 to urosepsis which was treated with abx  Course complicated by RF which was treated with IVF and elevated troponins which cardiology attributed to sepsis         Chief Complaint: sepsis     Interval/subjective:  Patient states pain in his hands has resolved     ROS: A 10 point ROS was performed; negative except as noted above       Assessment/Plan:      Hypomagnesemia  Assessment & Plan  - Mg now WNL  - IM recommends stopping Mg supplementation and recheck level on 9/6    Hypokalemia  Assessment & Plan  - potassium and Mg both now WNL  - IM recommends stopping K and Mg supplementation and recheck levels on 9/6     Thrombocytosis (HCC)  Assessment & Plan  - mildly elevated and stable at 468  -IM monitoring      Anemia  Assessment & Plan  -appears to be chronic going back to at least 3/2018 per EMR with a baseline of 10-11  - Hg trending up to 9 6  -IM monitoring     * CKD (chronic kidney disease)  Assessment & Plan  - per renal baseline likely 1 0-1 1  -Cr currently 1 02  - patient appears to have been taking meloxicam and naproxen as OP, advised patient to stop all NSAIDs  -IM monitoring     Bilateral hand pain  Assessment & Plan  - pain across MP joints 2-5 B/L   - per patient has had this issue intermittently for approximately 2 years with episodes lasting 3 to 4 weeks at a time  - per patient was told this is due to arthritis (but he is unsure what kind of arthritis)  - patient agreeable to short course of steroids which has resolved the issue w/o AE however did advise patient to FU with PCP with further testing/treatment and/or specialist referral at PCP's discretion       Depression  Assessment & Plan  - on home zoloft 50 mg qd     Elevated troponin  Assessment & Plan  - evaluated by cardiology and felt to be 2/2 to sepsis rather than a true coronary event and they recommend FU as OP for ischemic SEGUNDO     Chronic pain syndrome  Assessment & Plan  - on home neurontin 300 HS    - on home MS IR 15 mg q6  - per [facility] fairly regular prescription for MS IR 15 mg last filled on 8/1/19 for 120 tabs with 0 refills     Chronic indwelling Cantor catheter  Assessment & Plan  - h/o TURP  - on home proscar 5 mg qd   - changed once per month (recently changed)   - current cantor to remain in place per urology recs   - follows with Dr Remi Carroll      Scheduled Meds:    Current Facility-Administered Medications:  acetaminophen 650 mg Oral Q6H PRN Michell Miguel PA-C   ascorbic acid 500 mg Oral Daily UBALDO Harkins   bisacodyl 10 mg Rectal Daily PRN Kimberly Chaudhary MD   chlorhexidine 15 mL Swish & Spit Q12H Riya Fernandes MD   cholecalciferol 2,000 Units Oral Daily Kimberly Chaudhary MD   ferrous sulfate 325 mg Oral BID Kimberly Chaudhary MD   finasteride 5 mg Oral Daily Kimberly Chaudhary MD   gabapentin 300 mg Oral HS Kimberly Chaudhary MD   heparin (porcine) 5,000 Units Subcutaneous Q8H [facility] Kimberly Chaudhary MD   morphine 15 mg Oral Q6H Kimberly Chaudhary MD   predniSONE 20 mg Oral Daily Kimberly Chaudhary MD   senna 2 tablet Oral HS Kimberly Chaudhary MD   sertraline 50 mg Oral Daily Kimberly Chaudhary MD          Incidental findings:    1) low Amylase/lipase: per IM unlikely to be of clinical significance and recommend OP FU with PCP with further testing/treatment and/or specialist referral at PCP's discretion   2) grade 1 DD: OP FU with cardiology  3) sinus arrhythmia: OP FU with cardiology   4) B/L non-obstructing renal stones: OP FU with Dr Remi Carroll  5) left kidney cyst: OP FU with Dr Remi Carroll  6) right lung granuloma: OP FU with PCP with further testing/treatment and/or specialist referral at PCP's discretion  7) diverticulosis: OP FU with PCP with further testing/treatment and/or specialist referral at PCP's discretion   8) small right sided fat containing inguinal hernia: OP FU with PCP with further testing/treatment and/or specialist referral at PCP's discretion          Note: per patient and family he is on  mg qd at home not because he was advised to take ASA by a physician but rather because he decided on his own to take ASA     DVT ppx: HSQ        Objective:    Functional Update:  Mobility: sup  Transfers: min  ADLs: min-sup         Physical Exam:    Vitals:    09/04/19 0731   BP: 142/72   Pulse: 60   Resp: 18   Temp: 98 °F (36 7 °C)   SpO2: 97%               General: alert, no apparent distress, cooperative and comfortable  HEENT:  Head: Normal, normocephalic, atraumatic    CARDIAC:  +S1/2   LUNGS:  respirations unlabored   ABDOMEN:  soft NT   EXTREMITIES:  volume status currently stable   NEURO:   awake, alert, appropriately answering questions   PSYCH:  mood/affect currently stable       Laboratory:   Results from last 7 days   Lab Units 09/01/19 2104 08/30/19  0523   HEMOGLOBIN g/dL 9 6* 8 8*   HEMATOCRIT % 29 2* 26 0*   WBC Thousand/uL 8 10 7 90     Results from last 7 days   Lab Units 09/03/19  0511 09/01/19  2104 08/30/19  0523 08/29/19  0438   BUN mg/dL  --  14 13 13   SODIUM mmol/L  --  136 143 142   POTASSIUM mmol/L 4 3 4 2 3 5 3 4*   CHLORIDE mmol/L  --  100 109* 108*   CREATININE mg/dL  --  1 02 1 08 1 06   AST U/L  --  15 14  --    ALT U/L  --  17 22  --             Patient Active Problem List   Diagnosis    Chronic indwelling Diallo catheter    Chronic pain syndrome    Elevated troponin    CKD (chronic kidney disease)    Anemia    Depression    Thrombocytosis (HCC)    Hypokalemia    Hypomagnesemia    Bilateral hand pain

## 2019-09-04 NOTE — PROGRESS NOTES
09/04/19 0925   Pain Assessment   Pain Assessment No/denies pain   Pain Score No Pain   Restrictions/Precautions   Precautions Fall Risk;Limb alert;Contact/isolation;Pain  (recent R shoulder sx, Hx VRE urine)   Cognition   Overall Cognitive Status WFL   Arousal/Participation Alert; Cooperative   Following Commands Follows one step commands without difficulty   Transfer Bed/Chair/Wheelchair   Limitations Noted In Balance; Endurance;Pain Management;UE Strength;LE Strength   Adaptive Equipment Roller Walker   Stand Pivot   (close S )   Sit to Stand   (close S )   Stand to [place] Controls   (close S )   All Transfer   (close S )   Bed, Chair, Wheelchair Transfer (FIM) 5 - Patient requires supervision/monitoring   Ambulation   Primary Discharge Mode of Locomotion Walk   Walk Assist Level Close Supervision   Gait Pattern Slow [place];Decreased foot clearance; Forward Flexion   Assist Device Roller Walker   Distance Walked (feet)   (976' 245' 144')   Limitations Noted In Balance; Endurance; Safety;Speed;Strength   Walking (FIM) 5 - Patient requires supervision/monitoring AND distance 150 feet or more, no rest   Stairs   Type Stairs   # of Steps   (15)   Assist Devices Bilateral Rail   Findings   (close S/CG)   Stairs (FIM) 4 - Patient requires steadying assist or light touching AND patient goes up and down full flight (12- 14 stairs)   Therapeutic Interventions   Strengthening Seated/standing TE 3 x 10    Other Gait training, transfer training, community re-integration    Assessment   Treatment Assessment Pt participated in gait training, seated/standing TE with light resistance, and community reintegration for improvements in LE strength and functional mobility  Pt notes he is feeling "much better today " Pt was close S for all sit/stand transfers with cueing for set up and initiation  Completed gait training for distance of 36' with close S assistance   Pt navigated 15 steps using B/L HR with CG/close S assistance with improvement in balance and safety today  Remains limited in activity tolerance, balance, strength, and safety  Pt will benefit from continued skilled PT to increased independence with functional mobility tasks  PT Barriers   Physical Impairment Decreased strength;Decreased endurance; Impaired balance;Decreased mobility;Pain   Functional Limitation Stair negotiation;Standing;Transfers; Walking   Plan   Treatment/Interventions Functional transfer training;LE strengthening/ROM; Therapeutic exercise;Patient/family training;Bed mobility;Gait training; Compensatory technique education   Progress Progressing toward goals   PT Therapy Minutes   PT Time In 0925   PT Time Out 1040   PT Total Time (minutes) 75   PT Mode of treatment - Individual (minutes) 65   PT Mode of treatment - Concurrent (minutes) 10   PT Mode of treatment - Group (minutes) 0   PT Mode of treatment - Co-treat (minutes) 0   PT Mode of Teatment - Total time(minutes) 75 minutes   Therapy Time missed   Time missed?  No

## 2019-09-04 NOTE — PROGRESS NOTES
09/04/19 1127   Pain Assessment   Pain Assessment 0-10   Pain Score 4   Pain Location Shoulder   Pain Orientation Bilateral   Restrictions/Precautions   Precautions Fall Risk;Limb alert;Pain;Contact/isolation  (recent R sh sx, hx VRE)   Eating Assessment   Food To Mouth Yes   Able To Cut Yes   Meal Assessed Lunch   Dentures Upper; Lower   Eating (FIM) 6 - Patient requires assistive device or dentures   Transfer Bed/Chair/Wheelchair   Limitations Noted In Balance; Endurance;LE Strength   Stand Pivot Supervision   Sit to Stand Supervision;Minimal   Stand to Sit Supervision   Findings min A lower surface transfer   Bed, Chair, Wheelchair Transfer (FIM) 4 - Patient completes 75% of all tasks   Exercise Tools   Other Exercise Tool 1 gripper with pegs B hands    Other Exercise Tool 2 push box all planes 1 set 20 no weight   Other Exercise Tool 3 card sorting reaching BUE to sort into suits and tehn numerical order   Cognition   Overall Cognitive Status WFL   Orientation Level Oriented to person;Oriented to place;Oriented to time;Oriented to situation   Additional Activities   Additional Activities Other (Comment)   Additional Activities Comments fxl mobility with RW S room to gym   Assessment   Treatment Assessment Pt presents sitting in recliner chair agreeable to OT treatment  30 minutes concurrent treatment provided during similar tasks as another to increase UB strength and ROM for improved ADL function  Pt reports decreased pain BUE and edema of hands decreased significantly  Pt is making progress towards goals and will benefit from continued skilled OT services to increase independence with daily tasks  Requires continued assist due to dereased balance, safety, UB strength/ ROM and endurance  Problem List Decreased strength;Decreased range of motion;Decreased endurance; Impaired balance;Decreased safety awareness;Pain;Orthopedic restrictions   Plan   Treatment/Interventions ADL retraining;Functional transfer training; Therapeutic exercise; Endurance training;Patient/family training; Compensatory technique education   Progress Progressing toward goals   OT Therapy Minutes   OT Time In 1127   OT Time Out 1233   OT Total Time (minutes) 66   OT Mode of treatment - Individual (minutes) 36   OT Mode of treatment - Concurrent (minutes) 30   Therapy Time missed   Time missed?  No

## 2019-09-05 PROCEDURE — G0515 COGNITIVE SKILLS DEVELOPMENT: HCPCS

## 2019-09-05 PROCEDURE — 97110 THERAPEUTIC EXERCISES: CPT

## 2019-09-05 PROCEDURE — 92507 TX SP LANG VOICE COMM INDIV: CPT

## 2019-09-05 PROCEDURE — 97116 GAIT TRAINING THERAPY: CPT

## 2019-09-05 PROCEDURE — 99232 SBSQ HOSP IP/OBS MODERATE 35: CPT | Performed by: PHYSICAL MEDICINE & REHABILITATION

## 2019-09-05 PROCEDURE — 97537 COMMUNITY/WORK REINTEGRATION: CPT

## 2019-09-05 PROCEDURE — 97530 THERAPEUTIC ACTIVITIES: CPT

## 2019-09-05 RX ADMIN — CHLORHEXIDINE GLUCONATE 0.12% ORAL RINSE 15 ML: 1.2 LIQUID ORAL at 08:17

## 2019-09-05 RX ADMIN — CHLORHEXIDINE GLUCONATE 0.12% ORAL RINSE 15 ML: 1.2 LIQUID ORAL at 21:14

## 2019-09-05 RX ADMIN — HEPARIN SODIUM 5000 UNITS: 5000 INJECTION, SOLUTION INTRAVENOUS; SUBCUTANEOUS at 21:13

## 2019-09-05 RX ADMIN — MORPHINE SULFATE 15 MG: 15 TABLET ORAL at 11:54

## 2019-09-05 RX ADMIN — HEPARIN SODIUM 5000 UNITS: 5000 INJECTION, SOLUTION INTRAVENOUS; SUBCUTANEOUS at 05:44

## 2019-09-05 RX ADMIN — FINASTERIDE 5 MG: 5 TABLET, FILM COATED ORAL at 08:17

## 2019-09-05 RX ADMIN — FERROUS SULFATE TAB 325 MG (65 MG ELEMENTAL FE) 325 MG: 325 (65 FE) TAB at 08:16

## 2019-09-05 RX ADMIN — SENNOSIDES 17.2 MG: 8.6 TABLET, FILM COATED ORAL at 21:13

## 2019-09-05 RX ADMIN — MORPHINE SULFATE 15 MG: 15 TABLET ORAL at 17:22

## 2019-09-05 RX ADMIN — VITAMIN D, TAB 1000IU (100/BT) 2000 UNITS: 25 TAB at 08:16

## 2019-09-05 RX ADMIN — OXYCODONE HYDROCHLORIDE AND ACETAMINOPHEN 500 MG: 500 TABLET ORAL at 08:17

## 2019-09-05 RX ADMIN — SERTRALINE HYDROCHLORIDE 50 MG: 50 TABLET ORAL at 08:16

## 2019-09-05 RX ADMIN — FERROUS SULFATE TAB 325 MG (65 MG ELEMENTAL FE) 325 MG: 325 (65 FE) TAB at 17:22

## 2019-09-05 RX ADMIN — GABAPENTIN 300 MG: 300 CAPSULE ORAL at 21:13

## 2019-09-05 RX ADMIN — MORPHINE SULFATE 15 MG: 15 TABLET ORAL at 23:22

## 2019-09-05 RX ADMIN — PREDNISONE 20 MG: 20 TABLET ORAL at 08:16

## 2019-09-05 RX ADMIN — MORPHINE SULFATE 15 MG: 15 TABLET ORAL at 05:44

## 2019-09-05 RX ADMIN — HEPARIN SODIUM 5000 UNITS: 5000 INJECTION, SOLUTION INTRAVENOUS; SUBCUTANEOUS at 14:11

## 2019-09-05 NOTE — PROGRESS NOTES
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)   Cognition   Overall Cognitive Status WFL   Arousal/Participation Alert; Cooperative   Transfer Bed/Chair/Wheelchair   Limitations Noted In Balance; Endurance;LE Strength;UE Strength;Pain Management   Adaptive Equipment Roller Walker   Stand Pivot Supervision   Sit to Stand Supervision   Stand to Schuyler Controls Supervision   All Transfer Supervision   Bed, Chair, Wheelchair Transfer (FIM) 5 - Patient requires supervision/monitoring   Ambulation   Primary Discharge Mode of Locomotion Walk   Walk Assist Level Supervision   Gait Pattern Slow [place]   Assist Device Roller Walker   Distance Walked (feet)   (795' 295' 155')   Limitations Noted In Balance; Safety; Endurance;Speed   Walking (FIM) 5 - Patient requires supervision/monitoring AND distance 150 feet or more, no rest   Stairs   Type Stairs; Ramp   # of Steps   (15)   Assist Devices Bilateral Rail   Stairs (FIM) 5 - Patient requires verbal cues AND goes up and down full flight (12- 14 stairs)   Therapeutic Interventions   Strengthening Seated/standing TE 3 x 10    Other Gait training, community re-integration    Assessment   Treatment Assessment Pt participates in concurrent therapy to increase encouragement, motivation, and socialization during  therapy session  Completed gait training, transfer training, community re-integration, and seated TE with focus on improving strength, balance, activity tolerance, and safety with functional mobility tasks  Pt is S for all transfers, amb, and steps  He continues to need frequent rest breaks due to increased fatigue with mobility tasks  Pt has made good progress towards PT goals and will continue to benefit from skilled physical therapy to maximize LOF      PT Barriers   Physical Impairment Decreased strength;Decreased endurance; Impaired balance;Decreased mobility;Pain   Functional Limitation Stair negotiation;Standing;Transfers; Walking   Plan   Treatment/Interventions Functional transfer training;LE strengthening/ROM; Therapeutic exercise; Endurance training;Bed mobility;Gait training   Progress Improving as expected   PT Therapy Minutes   PT Time In 0958   PT Time Out 1100   PT Total Time (minutes) 62   PT Mode of treatment - Individual (minutes) 30   PT Mode of treatment - Concurrent (minutes) 32   PT Mode of treatment - Group (minutes) 0   PT Mode of treatment - Co-treat (minutes) 0   PT Mode of Teatment - Total time(minutes) 62 minutes   Therapy Time missed   Time missed?  No

## 2019-09-05 NOTE — PROGRESS NOTES
Bilateral upper extremity weakness  Tolerating therapy  Pain relief from scheduled morphine ordered  Medications reviewed with pt and safety reinforced due to pt can be impulsive at times  Found  Pt standing at side of bed looking for his cell phone  Reinforced need to use callbell which was within reach as needed  Safety tab maintained

## 2019-09-05 NOTE — PROGRESS NOTES
09/05/19 1115   Pain Assessment   Pain Assessment 0-10   Pain Score 6   Pain Location Shoulder   Pain Orientation Bilateral   Restrictions/Precautions   Precautions Fall Risk;Limb alert;Contact/isolation;Pain  (Hx VRE urine, recent R sh sx)   Transfer Bed/Chair/Wheelchair   Limitations Noted In Balance; Endurance;LE Strength   Stand Pivot Supervision   Sit to Stand Supervision   Stand to Sit Supervision   Findings Supervision all transfers from higher surface including lft recliner which patient has at home   Bed, Chair, Wheelchair Transfer (FIM) 5 - Patient requires supervision/monitoring   Exercise Tools   Other Exercise Tool 1 Pulley for flexion and abd 2 sets 10 gentle stretch and easy movements B shoulders   Other Exercise Tool 2 Sanderbox 1 set 20 all directions no wt   Other Exercise Tool 3 gripper with pegs B ahnds with bopard supported on lap to avoid signifcant reaching   Coordination   Fine Motor knots out of green tubing while resting in recliner   Cognition   Overall Cognitive Status WFL   Additional Activities   Additional Activities Other (Comment)   Additional Activities Comments w/c mob with feet Mod I, fxl mobility with RW S/ occ CGA with one epidose of LOB during turn in room   Assessment   Treatment Assessment Pt scheduled for an ADL but due to time of day had previously bathed and completed grooming at the sink  OT discussed assisting with a shower tomorrow because of it being the day before discharge with patient agreeable and expresses understanding  Pt tolerates UB therex and transfers/ mobility with increased abilities noted since edema and pain BUE normailizing  Pt participates in 36 minutes cocnurrent treatment focusing on similar goals as another to increase ADLs and mobility through increased strength and activity tolerance  Problem List Decreased strength;Decreased range of motion;Decreased endurance; Impaired balance;Decreased safety awareness;Pain;Orthopedic restrictions   Plan Treatment/Interventions ADL retraining;Functional transfer training; Therapeutic exercise; Endurance training;Patient/family training; Compensatory technique education   Progress Progressing toward goals   OT Therapy Minutes   OT Time In 1115   OT Time Out 1234   OT Total Time (minutes) 79   OT Mode of treatment - Individual (minutes) 43   OT Mode of treatment - Concurrent (minutes) 36   Therapy Time missed   Time missed?  No

## 2019-09-05 NOTE — PROGRESS NOTES
09/05/19 0710   Charting Type   Charting Type Shift assessment   Neurological   Neuro (WDL) X   RUE Muscle Strength 4- Movement against gravity and limited resistance   LUE Muscle Strength 4- Movement against gravity and limited resistance   Relieved by Rest   Neuro Additional Assessments No   Abhijit Coma Scale   Eye Opening 4   Best Verbal Response 5   Best Motor Response 6   Lyons Coma Scale Score 15   HEENT   HEENT (WDL) WDL   R Eye Mildly impaired vision   L Eye Mildly impaired vision   Vision - Corrective Lenses (at bedside) Glasses   R Ear Hearing aid   L Ear Hearing aid   Teeth Dentures upper;Dentures lower   Respiratory   Respiratory (WDL) X   Chest Assessment Chest expansion symmetrical   Bilateral Breath Sounds Clear;Diminished   Respiratory Interventions   Respiratory Interventions Cough and deep breathe;Incentive spirometry   Cardiac   Cardiac (WDL) WDL   Cardiac Regularity Regular   Cardiac Symptoms None   Chest Pain Present No   Pacemaker/ICD No   Peripheral Vascular   Peripheral Vascular (WDL) X   RLE Edema Trace   LLE Edema Trace   PVS Additional Assessments No   RUE Neurovascular Assessment   R Radial Pulse +2   LUE Neurovascular Assessment   L Radial Pulse +2   RLE Neurovascular Assessment   R Pedal Pulse +1   LLE Neurovascular Assessment   L Pedal Pulse +1   Integumentary   Integumentary (WDL) X   Skin Integrity Bruising   Skin Location scattered   Skin Turgor Non-tenting   Integumentary Additional Assessments No   Tattoos/Piercings   Does patient have tattoos?  No   David Scale   Sensory Perceptions 3   Moisture 3   Activity 3   Mobility 3   Nutrition 3   Friction and Shear 3   David Scale Score 18   Wound (LDAs)   Type of Wound (LDA) Wound   Musculoskeletal   Musculoskeletal (WDL) X   Level of Assistance Contact guard assist, steadying assist   RUE Limited movement   LUE Limited movement   Musculoskeletal Additional Assessments No   Gastrointestinal   Gastrointestinal (WDL) X   GI Symptoms None   Gastrointestinal Additional Assessments No   Bowel Shift Assessment   Bowel Incontinence No   Stool Assessment   Bowel Incontinence No   Genitourinary   Genitourinary (WDL) X   Genitourinary Symptoms Indwelling catheter   Bladder Shift Assessment   Bladder Device Diallo   Bladder Incontinence Use of device (Comment)   Urine Assessment   Urinary Incontinence No   Urine Color Yellow/straw   Urine Appearance Clear   Urine Odor No odor   Genitalia   Male Genitalia Intact   Genitourinary Additional Assessments   Genitourinary Additional Assessments No   Anal/Rectal   Anal/Rectal (WDL) WDL   Psychosocial   Psychosocial (WDL) WDL   Needs Expressed Denies   Ability to Express Feelings Able to express   Ability to Express Needs Able to express   Ability to Express Thoughts Able to express   Ability to Understand Others Understands   Cough   Cough None

## 2019-09-05 NOTE — PROGRESS NOTES
Physical Medicine and Rehabilitation Progress Note  Juliano Lambert 66 y o  male MRN: [id number]  Unit/Bed#: [facility] 213-01 Encounter: [id number]    HPI: Juliano Lambert is a 66 y o  male who presented to the [address] with AMS 2/2 to urosepsis which was treated with abx  Course complicated by RF which was treated with IVF and elevated troponins which cardiology attributed to sepsis         Chief Complaint: sepsis     Interval/subjective:  Patient denies any events overnight     ROS: A 10 point ROS was performed; negative except as noted above       Assessment/Plan:      Hypomagnesemia  Assessment & Plan  - Mg now WNL  - IM recommends stopping Mg supplementation and recheck level on 9/6    Hypokalemia  Assessment & Plan  - potassium and Mg both now WNL  - IM recommends stopping K and Mg supplementation and recheck levels on 9/6     Thrombocytosis (HCC)  Assessment & Plan  - mildly elevated and stable at 468  -IM monitoring      Anemia  Assessment & Plan  -appears to be chronic going back to at least 3/2018 per EMR with a baseline of 10-11  - Hg trending up to 9 6  -IM monitoring     * CKD (chronic kidney disease)  Assessment & Plan  - per renal baseline likely 1 0-1 1  -Cr currently 1 02  - patient appears to have been taking meloxicam and naproxen as OP, advised patient to stop all NSAIDs  -IM monitoring     Bilateral hand pain  Assessment & Plan  - pain across MP joints 2-5 B/L   - per patient has had this issue intermittently for approximately 2 years with episodes lasting 3 to 4 weeks at a time  - per patient was told this is due to arthritis (but he is unsure what kind of arthritis)  - patient agreeable to short course of steroids which has resolved the issue w/o AE however did advise patient to FU with PCP with further testing/treatment and/or specialist referral at PCP's discretion       Depression  Assessment & Plan  - on home zoloft 50 mg qd     Elevated troponin  Assessment & Plan  - evaluated by cardiology and felt to be 2/2 to sepsis rather than a true coronary event and they recommend FU as OP for ischemic SEGUNDO     Chronic pain syndrome  Assessment & Plan  - on home neurontin 300 HS    - on home MS IR 15 mg q6  - per [facility] fairly regular prescription for MS IR 15 mg last filled on 8/1/19 for 120 tabs with 0 refills     Chronic indwelling Cantor catheter  Assessment & Plan  - h/o TURP  - on home proscar 5 mg qd   - changed once per month (recently changed)   - current cantor to remain in place per urology recs   - follows with Dr Sanket Schwartz      Scheduled Meds:    Current Facility-Administered Medications:  acetaminophen 650 mg Oral Q6H PRN Dalia Mcburney, PA-C   ascorbic acid 500 mg Oral Daily UBALDO Harkins   bisacodyl 10 mg Rectal Daily PRN Hung Lennon MD   chlorhexidine 15 mL Swish & Spit Q12H Riya Fernandes MD   cholecalciferol 2,000 Units Oral Daily Hung Lennon MD   ferrous sulfate 325 mg Oral BID Hung Lennon MD   finasteride 5 mg Oral Daily Hung Lennon MD   gabapentin 300 mg Oral HS Hung Lennon MD   heparin (porcine) 5,000 Units Subcutaneous Q8H [address] Hung Lennon MD   morphine 15 mg Oral Q6H Hung Lennon MD   predniSONE 20 mg Oral Daily Hung Lennon MD   senna 2 tablet Oral HS Hung Lennon MD   sertraline 50 mg Oral Daily Hung Lennon MD          Incidental findings:    1) low Amylase/lipase: per IM unlikely to be of clinical significance and recommend OP FU with PCP with further testing/treatment and/or specialist referral at PCP's discretion   2) grade 1 DD: OP FU with cardiology  3) sinus arrhythmia: OP FU with cardiology   4) B/L non-obstructing renal stones: OP FU with Dr Sanket Schwartz  5) left kidney cyst: OP FU with Dr Sanket Schwartz  6) right lung granuloma: OP FU with PCP with further testing/treatment and/or specialist referral at PCP's discretion  7) diverticulosis: OP FU with PCP with further testing/treatment and/or specialist referral at PCP's discretion   8) small right sided fat containing inguinal hernia: OP FU with PCP with further testing/treatment and/or specialist referral at PCP's discretion          Note: per patient and family he is on  mg qd at home not because he was advised to take ASA by a physician but rather because he decided on his own to take ASA     DVT ppx: HSQ        Objective:    Functional Update:  Mobility: sup  Transfers: min  ADLs: min-sup         Physical Exam:    T: 98 3  HR: 80  BP: 122/76  RR: 16  POx: 94%           General: alert, no apparent distress, cooperative and comfortable  HEENT:  Head: Normal, normocephalic, atraumatic    CARDIAC:  +S1/2   LUNGS:  respirations unlabored   ABDOMEN:  soft NT   EXTREMITIES:  volume status currently stable   NEURO:   awake, alert, appropriately answering questions   PSYCH:  mood/affect currently stable       Laboratory:   Results from last 7 days   Lab Units 09/01/19 2104 08/30/19  0523   HEMOGLOBIN g/dL 9 6* 8 8*   HEMATOCRIT % 29 2* 26 0*   WBC Thousand/uL 8 10 7 90     Results from last 7 days   Lab Units 09/03/19  0511 09/01/19 2104 08/30/19  0523   BUN mg/dL  --  14 13   SODIUM mmol/L  --  136 143   POTASSIUM mmol/L 4 3 4 2 3 5   CHLORIDE mmol/L  --  100 109*   CREATININE mg/dL  --  1 02 1 08   AST U/L  --  15 14   ALT U/L  --  17 22            Patient Active Problem List   Diagnosis    Chronic indwelling Diallo catheter    Chronic pain syndrome    Elevated troponin    CKD (chronic kidney disease)    Anemia    Depression    Thrombocytosis (HCC)    Hypokalemia    Hypomagnesemia    Bilateral hand pain

## 2019-09-05 NOTE — NURSING NOTE
Patient resting comfortably in bed at this time  No signs of distress noted  Bed alarm in place to maintain patient safety  Diallo catheter draining yellow urine overnight  Patient was encouraged to reposition frequently overnight to promote skin integrity  Patient wore SCDs as ordered for DVT prophylaxis  Call bell within reach  Will continue to monitor patient and follow plan of care

## 2019-09-05 NOTE — PLAN OF CARE
Problem: PAIN - ADULT  Goal: Verbalizes/displays adequate comfort level or baseline comfort level  Description  Interventions:  - Encourage patient to monitor pain and request assistance  - Assess pain using appropriate pain scale  - Administer analgesics based on type and severity of pain and evaluate response  - Implement non-pharmacological measures as appropriate and evaluate response  - Consider cultural and social influences on pain and pain management  - Notify physician/advanced practitioner if interventions unsuccessful or patient reports new pain  Outcome: Progressing     Problem: INFECTION - ADULT  Goal: Absence or prevention of progression during hospitalization  Description  INTERVENTIONS:  - Assess and monitor for signs and symptoms of infection  - Monitor lab/diagnostic results  - Monitor all insertion sites, i e  indwelling lines  - [place] appropriate cooling/warming therapies per order  - Administer medications as ordered  - Instruct and encourage patient and family to use good hand hygiene technique  - Identify and instruct in appropriate isolation precautions for identified infection/condition   Outcome: Progressing     Problem: SAFETY ADULT  Goal: Patient will remain free of falls  Description  INTERVENTIONS:  - Assess patient frequently for physical needs  -  Identify cognitive and physical deficits and behaviors that affect risk of falls    -  [place] fall precautions as indicated by assessment   - Educate patient/family on patient safety including physical limitations  - Instruct patient to call for assistance with activity based on assessment  - Modify environment to reduce risk of injury  -OT/PT consult to assist with strengthening/mobility   Outcome: Progressing  Goal: Maintain or return mobility status to optimal level  Description  INTERVENTIONS:  - Assess patient's baseline mobility status (ambulation, transfers, stairs, etc )    - Identify cognitive and physical deficits and behaviors that affect mobility  - Identify mobility aids required to assist with transfers and/or ambulation (gait belt, sit-to-stand, lift, walker, cane, etc )  - [place] fall precautions as indicated by assessment  - Record patient progress and toleration of activity level on Mobility SBAR; progress patient to next Phase/Stage  - Instruct patient to call for assistance with activity based on assessment     Outcome: Progressing     Problem: DISCHARGE PLANNING  Goal: Discharge to home or other facility with appropriate resources  Description  INTERVENTIONS:  - Identify barriers to discharge w/patient and caregiver  - Arrange for needed discharge resources and transportation as appropriate  - Identify discharge learning needs (meds, wound care, etc )  - Arrange for interpretive services to assist at discharge as needed  - Refer to Case Management Department for coordinating discharge planning if the patient needs post-hospital services based on physician/advanced practitioner order or complex needs related to functional status, cognitive ability, or social support system  Outcome: Progressing     Problem: GASTROINTESTINAL - ADULT  Goal: Maintains or returns to baseline bowel function  Description  INTERVENTIONS:  - Assess bowel function  - Encourage oral fluids to ensure adequate hydration  - Administer IV fluids if ordered to ensure adequate hydration  - Administer ordered medications as needed  - Encourage mobilization and activity  - Consider nutritional services referral to assist patient with adequate nutrition and appropriate food choices  Outcome: Progressing  Goal: Maintains adequate nutritional intake  Description  INTERVENTIONS:  - Monitor percentage of each meal consumed  - Identify factors contributing to decreased intake, treat as appropriate  - Assist with meals as needed  - Monitor I&O, weight, and lab values if indicated  - Obtain nutrition services referral as needed  Outcome: Progressing Problem: GENITOURINARY - ADULT  Goal: Urinary catheter remains patent  Description  INTERVENTIONS:  - Assess patency of urinary catheter  - If patient has a chronic cantor, consider changing catheter if non-functioning  - Follow guidelines for intermittent irrigation of non-functioning urinary catheter  Outcome: Progressing     Problem: METABOLIC, FLUID AND ELECTROLYTES - ADULT  Goal: Electrolytes maintained within normal limits  Description  INTERVENTIONS:  - Monitor labs and assess patient for signs and symptoms of electrolyte imbalances  - Administer electrolyte replacement as ordered  - Monitor response to electrolyte replacements, including repeat lab results as appropriate  - Instruct patient on fluid and nutrition as appropriate  Outcome: Progressing     Problem: SKIN/TISSUE INTEGRITY - ADULT  Goal: Incision(s), wounds(s) or drain site(s) healing without S/S of infection  Description  INTERVENTIONS  - Assess and document risk factors for skin impairment   - Assess and document dressing, incision, wound bed, drain sites and surrounding tissue  - Consider nutrition services referral as needed  - Oral mucous membranes remain intact  - Provide patient/ family education  Outcome: Progressing     Problem: HEMATOLOGIC - ADULT  Goal: Maintains hematologic stability  Description  INTERVENTIONS  - Assess for signs and symptoms of bleeding or hemorrhage  - Monitor labs  - Administer supportive blood products/factors as ordered and appropriate  Outcome: Progressing     Problem: MUSCULOSKELETAL - ADULT  Goal: Maintain or return mobility to safest level of function  Description  INTERVENTIONS:  - Assess patient's ability to carry out ADLs; assess patient's baseline for ADL function and identify physical deficits which impact ability to perform ADLs (bathing, care of mouth/teeth, toileting, grooming, dressing, etc )  - Assess/evaluate cause of self-care deficits   - Assess range of motion  - Assess patient's mobility  - Assess patient's need for assistive devices and provide as appropriate  - Encourage maximum independence but intervene and supervise when necessary  - Involve family in performance of ADLs  - Assess for home care needs following discharge   - OT consult to assist with ADL evaluation and planning for discharge  - Provide patient education as appropriate   Outcome: Progressing     Problem: Potential for Falls  Goal: Patient will remain free of falls  Description  INTERVENTIONS:  - Assess patient frequently for physical needs  -  Identify cognitive and physical deficits and behaviors that affect risk of falls  -  [place] fall precautions as indicated by assessment   - Educate patient/family on patient safety including physical limitations  - Instruct patient to call for assistance with activity based on assessment  - Modify environment to reduce risk of injury  - Consider OT/PT consult to assist with strengthening/mobility  Outcome: Progressing     Problem: Prexisting or High Potential for Compromised Skin Integrity  Goal: Skin integrity is maintained or improved  Description  INTERVENTIONS:  - Identify patients at risk for skin breakdown  - Assess and monitor skin integrity  - Assess and monitor nutrition and hydration status  - Monitor labs   - Assess for incontinence   - Turn and reposition patient  - Assist with mobility/ambulation  - Relieve pressure over bony prominences  - Avoid friction and shearing  - Provide appropriate hygiene as needed including keeping skin clean and dry  - Evaluate need for skin moisturizer/barrier cream  - Collaborate with interdisciplinary team   - Patient/family teaching  - Consider wound care consult   Outcome: Progressing     Problem: Nutrition/Hydration-ADULT  Goal: Nutrient/Hydration intake appropriate for improving, restoring or maintaining nutritional needs  Description  Monitor and assess patient's nutrition/hydration status for malnutrition   Collaborate with interdisciplinary team and initiate plan and interventions as ordered  Monitor patient's weight and dietary intake as ordered or per policy  Utilize nutrition screening tool and intervene as necessary  Determine patient's food preferences and provide high-protein, high-caloric foods as appropriate       INTERVENTIONS:  - Monitor oral intake, urinary output, labs, and treatment plans  - Assess nutrition and hydration status and recommend course of action  - Evaluate amount of meals eaten  - Assist patient with eating if necessary   - Allow adequate time for meals  - Recommend/ encourage appropriate diets, oral nutritional supplements, and vitamin/mineral supplements  - Order, calculate, and assess calorie counts as needed  - Recommend, monitor, and adjust tube feedings and TPN/PPN based on assessed needs  - Assess need for intravenous fluids  - Provide specific nutrition/hydration education as appropriate  - Include patient/family/caregiver in decisions related to nutrition  Outcome: Progressing

## 2019-09-05 NOTE — PROGRESS NOTES
09/05/19 1447   Pain Assessment   Pain Assessment 0-10   Pain Score 6   Pain Type Chronic pain   Restrictions/Precautions   Precautions Fall Risk;Limb alert;Contact/isolation;Pain   Braces or Orthoses AFO   Executive Function Skills   Insight Mild insight   Task Initiation Initiates with cues   Planning Reduced planning skills   Memory Skills   Orientation Level Oriented X4   Short Term Recall of Paragraph Mild Impairment   Short Term Working Recall Mild Impairment   Memory (FIM) 5 - Recalls/performs request 90% of time   Social Interaction (FIM) 6 - Interacts appropriately with others BUT requires extra  time   Auditory Comprehension   Comphrehends Conversation Simple   Interfering Components Attention - sustained; Attention to detail; Motor planning;Processing speed; Working memory   EffectiveTechniques Extra processing time;Repetition;Stressing words;Visual/Gestural cues   Speech/Language/Cognition Assessmetn   Treatment Assessment Speech Pathology Daily Treatment Note - Speech/Cognitive Communication Skills - For this session, SLP targeted comprehension of therapeutic levels and expression of precautions and safety sequences  Functional tasks were represented in a series of 5 action cards which depicted an activity which the patient would engage in regularly in the home setting  The task was presented in simplified salient steps to aid retention  The patient was initially requested to sequence the cards and explain the sequence verbally  At each step of the sequence, SLP stressed the level of necessary assistance and focused on patient's own awareness of how much assistance would be needed at that step within patient's current therapeutic levels, to perform the same task  Focus of this task was improving patient's awareness and ability to verbalize their current level of necessary assistance in daily tasks  Patient presented at the level of mod I for sequencing with the visual aid    He was supervision level for reasoning his current level of assistance as well as recall of procedures with concrete details  Swallow Information   Current Diet Regular   Recommendations   Diet Solid Recommendation Regular consistency   Diet Liquid Recommendation Thin liquid   General Precautions Feed only when alert;Upright as possible for all oral intake   Swallow Assessment Prognosis   Prognosis Good   Prognosis Considerations Family/community support;Participation level; Potential   SLP Therapy Minutes   SLP Time In 5845   SLP Time Out 0832   SLP Total Time (minutes) 60   Therapy Time missed   Time missed?  No   Daily FIM Score   Problem solving (FIM) 5 - Solves basic problems 90% of time   Comprehension (FIM) 5 - Understands basic directions and conversation   Expression (FIM) 6 - Expresses complex/abstract but requires:  more time   Eating (FIM) 6 - Patient requires increased time or safety concern

## 2019-09-06 LAB
MAGNESIUM SERPL-MCNC: 2.2 MG/DL (ref 1.9–2.7)
POTASSIUM SERPL-SCNC: 4.2 MMOL/L (ref 3.5–5.5)

## 2019-09-06 PROCEDURE — 92507 TX SP LANG VOICE COMM INDIV: CPT

## 2019-09-06 PROCEDURE — 97537 COMMUNITY/WORK REINTEGRATION: CPT

## 2019-09-06 PROCEDURE — 84132 ASSAY OF SERUM POTASSIUM: CPT | Performed by: PHYSICAL MEDICINE & REHABILITATION

## 2019-09-06 PROCEDURE — 97535 SELF CARE MNGMENT TRAINING: CPT

## 2019-09-06 PROCEDURE — 99239 HOSP IP/OBS DSCHRG MGMT >30: CPT | Performed by: PHYSICAL MEDICINE & REHABILITATION

## 2019-09-06 PROCEDURE — G0515 COGNITIVE SKILLS DEVELOPMENT: HCPCS

## 2019-09-06 PROCEDURE — 97116 GAIT TRAINING THERAPY: CPT

## 2019-09-06 PROCEDURE — 97110 THERAPEUTIC EXERCISES: CPT

## 2019-09-06 PROCEDURE — 83735 ASSAY OF MAGNESIUM: CPT | Performed by: PHYSICAL MEDICINE & REHABILITATION

## 2019-09-06 PROCEDURE — 97530 THERAPEUTIC ACTIVITIES: CPT

## 2019-09-06 RX ORDER — GABAPENTIN 300 MG/1
300 CAPSULE ORAL
Status: ON HOLD | COMMUNITY
End: 2019-09-07 | Stop reason: SDUPTHER

## 2019-09-06 RX ORDER — MORPHINE SULFATE 15 MG/1
15 TABLET ORAL EVERY 6 HOURS
COMMUNITY

## 2019-09-06 RX ADMIN — VITAMIN D, TAB 1000IU (100/BT) 2000 UNITS: 25 TAB at 09:45

## 2019-09-06 RX ADMIN — HEPARIN SODIUM 5000 UNITS: 5000 INJECTION, SOLUTION INTRAVENOUS; SUBCUTANEOUS at 05:43

## 2019-09-06 RX ADMIN — HEPARIN SODIUM 5000 UNITS: 5000 INJECTION, SOLUTION INTRAVENOUS; SUBCUTANEOUS at 21:16

## 2019-09-06 RX ADMIN — MORPHINE SULFATE 15 MG: 15 TABLET ORAL at 05:43

## 2019-09-06 RX ADMIN — MORPHINE SULFATE 15 MG: 15 TABLET ORAL at 12:58

## 2019-09-06 RX ADMIN — PREDNISONE 20 MG: 20 TABLET ORAL at 09:45

## 2019-09-06 RX ADMIN — FINASTERIDE 5 MG: 5 TABLET, FILM COATED ORAL at 09:45

## 2019-09-06 RX ADMIN — SERTRALINE HYDROCHLORIDE 50 MG: 50 TABLET ORAL at 09:45

## 2019-09-06 RX ADMIN — GABAPENTIN 300 MG: 300 CAPSULE ORAL at 21:16

## 2019-09-06 RX ADMIN — OXYCODONE HYDROCHLORIDE AND ACETAMINOPHEN 500 MG: 500 TABLET ORAL at 09:45

## 2019-09-06 RX ADMIN — MORPHINE SULFATE 15 MG: 15 TABLET ORAL at 18:12

## 2019-09-06 RX ADMIN — FERROUS SULFATE TAB 325 MG (65 MG ELEMENTAL FE) 325 MG: 325 (65 FE) TAB at 09:45

## 2019-09-06 RX ADMIN — CHLORHEXIDINE GLUCONATE 0.12% ORAL RINSE 15 ML: 1.2 LIQUID ORAL at 09:45

## 2019-09-06 RX ADMIN — HEPARIN SODIUM 5000 UNITS: 5000 INJECTION, SOLUTION INTRAVENOUS; SUBCUTANEOUS at 14:19

## 2019-09-06 RX ADMIN — FERROUS SULFATE TAB 325 MG (65 MG ELEMENTAL FE) 325 MG: 325 (65 FE) TAB at 18:12

## 2019-09-06 NOTE — NURSING NOTE
Resting in bed, c/o pain to shoulder and back scheduled medication admin as ordered  Able to turn and reposition self in bed  Cantor in place  Pt able to ambulated to BR and to empty cantor bag but unable to unlock clip to drain into toilet  Cont bowel  Assessment otherwise unchanged  Continuing to monitor  Bed alarm all items within reach

## 2019-09-06 NOTE — INCIDENTAL FINDINGS
1) low Amylase & lipase levels: Please follow up with your family doctor for further testing/treatment if any and/or specialist referral if any as they deem necessary     2) grade 1 diastolic dysfunction of your heart: Please follow up with Dr Moon Dawkins (or a cardiologist of your choosing) for further testing/treatment/monitoring if any as they deem necessary     3) sinus arrhythmia of your heart: Please follow up with Dr Moon Dawkins (or a cardiologist of your choosing) for further testing/treatment/monitoring if any as they deem necessary      4) kidney stones: Please follow up with Dr Linda Florez for further testing/treatment/monitoring if any as they deem necessary     5) left kidney cyst: Please follow up with Dr Linda Florez for further testing/treatment/monitoring if any as they deem necessary     6) right lung granuloma: Please follow up with your family doctor for further testing/treatment if any and/or specialist referral if any as they deem necessary     7) diverticulosis: Please follow up with your family doctor for further testing/treatment if any and/or specialist referral if any as they deem necessary     8) small right sided fat containing inguinal hernia: Please follow up with your family doctor for further testing/treatment if any and/or specialist referral if any as they deem necessary     9) elevated platelet count: please have your blood work drawn as prescribed to monitor your platelet count the results will be sent to your family doctor    10) elevated troponin level: Please follow up with Dr Moon Dawkins (or a cardiologist of your choosing) for further testing/treatment/monitoring if any as they deem necessary     11) low hemoglobin level (anemia) : please have your blood work drawn as prescribed to monitor your hemoglobin level the results will be sent to your family doctor    12) chronic kidney disease: Please follow up with your family doctor for further testing/treatment if any and/or specialist referral if any as they deem necessary

## 2019-09-06 NOTE — DISCHARGE SUMMARY
Physical Medicine and Rehabilitation Progress Note  Dorothea Campa 66 y o  male MRN: [id number]  Unit/Bed#: -01 Encounter: [id number]    Discharge Summary - PMR (FOR DOCUMENTATION PURPOSES ONLY NOT TO BE BILLED FOR)           Admission Date:    8/21/19  Discharge Date:   9/7/19    Diagnosis:   Sepsis     Functional Status Upon Discharge: Mod I mobility/tx/ADLs     Condition at Discharge: stable    Discharge instructions/Information to patient and family:   See after visit summary for information provided to patient and family  Provisions for Follow-Up Care:  See after visit summary for information related to follow-up care and any pertinent home health orders  Disposition: home with family     Planned Readmission: no        Discharge Medications:  See after visit summary for reconciled discharge medications provided to patient and family  Comorbidities:   See below for medical details     Hospital Course: The patient had an unremarkable rehab course with significant functional gains made, please see below for medical details:        Dorothea Campa is a 66 y o  male who presented to the [address] [place] [address] with AMS 2/2 to urosepsis which was treated with abx   Course complicated by RF which was treated with IVF and elevated troponins which cardiology attributed to sepsis               Hypomagnesemia  Assessment & Plan  - resolved, Mg level still WNL without supplemental MG therefore IM recommends dc w/o supplemental Mg    Hypokalemia  Assessment & Plan  - resolved and still WNL without supplemental K therefore IM recommends dc w/o supplement potassium     Thrombocytosis ([facility] [address] )  Assessment & Plan  - mildly elevated and stable at 468  -IM monitoring and have cleared patient for dc with scrip for CBC with results to PCP     Anemia  Assessment & Plan  -appears to be chronic going back to at least 3/2018 per EMR with a baseline of 10-11  - Hg trending up to 9 6  -IM monitoring and have cleared patient for dc with scrip for CBC with results to PCP    * CKD (chronic kidney disease)  Assessment & Plan  - per renal baseline likely 1 0-1 1  -Cr currently 1 02  - patient appears to have been taking meloxicam and naproxen as OP, advised patient to stop all NSAIDs  -IM monitoring and have cleared patient for dc     Bilateral hand pain  Assessment & Plan  - pain was across MP joints 2-5 B/L   - per patient has had this issue intermittently for approximately 2 years with episodes lasting 3 to 4 weeks at a time  - per patient was told this is due to arthritis (but he is unsure what kind of arthritis)  - patient was agreeable to short course of steroids which has resolved the issue w/o AE however did advise patient to FU with PCP with further testing/treatment and/or specialist referral at PCP's discretion       Depression  Assessment & Plan  - on home zoloft 50 mg qd     Elevated troponin  Assessment & Plan  - evaluated by cardiology and felt to be 2/2 to sepsis rather than a true coronary event and they recommend FU as OP for ischemic SEGUNDO     Chronic pain syndrome  Assessment & Plan  - on home neurontin 300 HS    - on home MS IR 15 mg q6  - per [facility] fairly regular prescription for MS IR 15 mg last filled on 8/1/19 for 120 tabs with 0 refills     Chronic indwelling Cantor catheter  Assessment & Plan  - h/o TURP  - on home proscar 5 mg qd   - changed once per month at Dr Angelica Toribio office (recently changed PTA)   - current cantor to remain in place per urology recs   - follows with Dr Dalia Camp      Scheduled Meds:    Current Facility-Administered Medications:  acetaminophen 650 mg Oral Q6H PRN Randal Gaviria PA-C   finasteride 5 mg Oral Daily Jewel Cast MD   gabapentin 300 mg Oral HS Jewel Cast MD   heparin (porcine) 5,000 Units Subcutaneous Q8H Christie Nguyen MD   morphine 15 mg Oral Mulugeta Stewart MD   sertraline 50 mg Oral Daily Jewel Cast MD          Incidental findings:    1) low Amylase/lipase: per IM unlikely to be of clinical significance and recommend OP FU with PCP with further testing/treatment and/or specialist referral at PCP's discretion   2) grade 1 DD: OP FU with cardiology  3) sinus arrhythmia: OP FU with cardiology   4) B/L non-obstructing renal stones: OP FU with Dr Mulugeta Patel  5) left kidney cyst: OP FU with Dr Mulugeta Patel  6) right lung granuloma: OP FU with PCP with further testing/treatment and/or specialist referral at PCP's discretion  7) diverticulosis: OP FU with PCP with further testing/treatment and/or specialist referral at PCP's discretion   8) small right sided fat containing inguinal hernia: OP FU with PCP with further testing/treatment and/or specialist referral at PCP's discretion          Note: per patient and family he is on  mg qd at home not because he was advised to take ASA by a physician but rather because he decided on his own to take ASA     DVT ppx: HSQ          * 33 min was spent in preparation for patient discharge including discussion with patient, patient's nurse, therapy staff, and case management

## 2019-09-06 NOTE — DISCHARGE INSTRUCTIONS
Should you develop fevers, chills, sweats, rigors, pelvic pain/discomfort, foul smelling urine, discolored urine, pus in the urine, or any pus/drainage from your cantor site/penis please contact your family doctor or Dr Dalia Camp immediately or go to the ER immediately as these are indicators of possible infection     Please have your blood work drawn as prescribed the results will be sent to your family doctor     Please note you are restricted from driving/operating a motorized vehicle/operating heavy machinery/etc     Please see your doctors listed in the follow up providers section of your discharge paperwork, and take the discharge paperwork with you to your appointments    Please note changes may have been made to your medications please refer to your discharge paperwork for your current medications and take this list with you to all your doctors appointments for your doctors to review    Please do not resume a home medication unless the medication reconciliation sheet indicates to do so, please do not assume that a medication that you were given a prescription for is the same as a medication you have at home based on both medications having the same name as dosages and frequency may have changed  Your nurse also reviewed with you just prior to your discharge from the hospital your medications, when to take them, how to take them, what they are for, how much to take, and when your next dose is due, please follow these instructions             Please note the following incidental findings were found during your recent hospitalization please discuss them with your doctors so that they may arrange any tests/referrals as they deem necessary:       1) low Amylase & lipase levels: Please follow up with your family doctor for further testing/treatment if any and/or specialist referral if any as they deem necessary     2) grade 1 diastolic dysfunction of your heart: Please follow up with Dr Jennifer López (or a cardiologist of your choosing) for further testing/treatment/monitoring if any as they deem necessary     3) sinus arrhythmia of your heart: Please follow up with Dr Jennifer López (or a cardiologist of your choosing) for further testing/treatment/monitoring if any as they deem necessary      4) kidney stones: Please follow up with Dr Dalia Camp for further testing/treatment/monitoring if any as they deem necessary     5) left kidney cyst: Please follow up with Dr Dalia Camp for further testing/treatment/monitoring if any as they deem necessary     6) right lung granuloma: Please follow up with your family doctor for further testing/treatment if any and/or specialist referral if any as they deem necessary     7) diverticulosis: Please follow up with your family doctor for further testing/treatment if any and/or specialist referral if any as they deem necessary     8) small right sided fat containing inguinal hernia: Please follow up with your family doctor for further testing/treatment if any and/or specialist referral if any as they deem necessary     9) elevated platelet count: please have your blood work drawn as prescribed to monitor your platelet count the results will be sent to your family doctor    10) elevated troponin level: Please follow up with Dr Jennifer López (or a cardiologist of your choosing) for further testing/treatment/monitoring if any as they deem necessary     11) low hemoglobin level (anemia) : please have your blood work drawn as prescribed to monitor your hemoglobin level the results will be sent to your family doctor    12) chronic kidney disease: Please follow up with your family doctor for further testing/treatment if any and/or specialist referral if any as they deem necessary        Please avoid NSAID (including but not limited to advil, aleve, motrin, naproxen, ibuprofen, mobic, meloxicam, diclofenac etc) medications unless cleared to do so by your doctors as these medications can potentially cause worsening of your chronic kidney disease unless cleared to do so by you doctors       Please note a summary of your hospital stay with relevant information for your doctors has been sent to them, please confirm with your doctors at your follow up visits that they have received this summary and have them contact [address] [place] [address] if they have not received them along with any other medical records they may require

## 2019-09-06 NOTE — PROGRESS NOTES
09/06/19 1130   Pain Assessment   Pain Assessment No/denies pain   Pain Score 4   Pain Type Chronic pain   Pain Location Shoulder   Pain Orientation Left   Restrictions/Precautions   Precautions Fall Risk;Limb alert;Contact/isolation   Executive Function Skills   Insight Mild insight   Planning Reduced planning skills   Memory Skills   Orientation Level Oriented X4   Short Term Working Recall Mild Impairment   Memory (FIM) 6 - Recognizes with extra time   Social Interaction (FIM) 6 - Interacts appropriately with others BUT requires extra  time   Auditory Comprehension   Comphrehends Conversation Simple   Interfering Components Motor planning; Working memory   EffectiveTechniques Extra processing time;Visual/Gestural cues   Speech/Language/Cognition Assessmetn   Treatment Assessment Speech Pathology Daily Treatment Note - Speech/Cognitive Communication Skills - SLP targeted cognitive reasoning and problem solving  Therapist posed scenarios in which a problem occurs during completion of an ADL  Patient was required to provide a solution in a sequence of salient steps  Focus was on reasoning the current level of assistance as opposed to LT memory in considering a safe and reasonable solution  The level of assistance was discussed at each stage of the sequence and SLP provided verbal and gestural cues as necessary to facilitate comprehension of the current situation  Patient presented at level of Mod I for comprehension of task and expression of procedures in salient sequences  He required intermittent cues with motor planning and reasoning level of assistance  Swallow Information   Current Diet Regular   Recommendations   Diet Solid Recommendation Regular consistency   Diet Liquid Recommendation Thin liquid   General Precautions Feed only when alert;Upright as possible for all oral intake   Swallow Assessment Prognosis   Prognosis Good   Prognosis Considerations Family/community support; Potential   SLP Therapy Minutes   SLP Time In 1130   SLP Time Out 1225   SLP Total Time (minutes) 55   SLP Mode of treatment - Individual (minutes) 55   Therapy Time missed   Time missed?  No   Daily FIM Score   Problem solving (FIM) 5 - Solves basic problems 90% of time   Comprehension (FIM) 6 - Understands complex/abstract but requires more time   Expression (FIM) 6 - Expresses complex/abstract but requires:  more time   Eating (FIM) 6 - Patient requires increased time or safety concern

## 2019-09-06 NOTE — PROGRESS NOTES
Physical Medicine and Rehabilitation Progress Note  Ann Sibley 66 y o  male MRN: [id number]  Unit/Bed#: -01 Encounter: [id number]    HPI: Ann Sibley is a 66 y o  male who presented to the [facility] [address] with AMS 2/2 to urosepsis which was treated with abx  Course complicated by RF which was treated with IVF and elevated troponins which cardiology attributed to sepsis         Chief Complaint: sepsis     Interval/subjective:  Patient states he feels well and is looking forward to dc tomorrow     ROS: A 10 point ROS was performed; negative except as noted above       Assessment/Plan:      Hypomagnesemia  Assessment & Plan  - resolved, Mg level still WNL without supplemental MG therefore IM recommends dc w/o supplemental Mg    Hypokalemia  Assessment & Plan  - resolved and still WNL without supplemental K therefore IM recommends dc w/o supplement potassium     Thrombocytosis (HCC)  Assessment & Plan  - mildly elevated and stable at 468  -IM monitoring and have cleared patient for dc with scrip for CBC with results to PCP     Anemia  Assessment & Plan  -appears to be chronic going back to at least 3/2018 per EMR with a baseline of 10-11  - Hg trending up to 9 6  -IM monitoring and have cleared patient for dc with scrip for CBC with results to PCP    * CKD (chronic kidney disease)  Assessment & Plan  - per renal baseline likely 1 0-1 1  -Cr currently 1 02  - patient appears to have been taking meloxicam and naproxen as OP, advised patient to stop all NSAIDs  -IM monitoring and have cleared patient for dc     Bilateral hand pain  Assessment & Plan  - pain was across MP joints 2-5 B/L   - per patient has had this issue intermittently for approximately 2 years with episodes lasting 3 to 4 weeks at a time  - per patient was told this is due to arthritis (but he is unsure what kind of arthritis)  - patient was agreeable to short course of steroids which has resolved the issue w/o AE however did advise patient to FU with PCP with further testing/treatment and/or specialist referral at PCP's discretion       Depression  Assessment & Plan  - on home zoloft 50 mg qd     Elevated troponin  Assessment & Plan  - evaluated by cardiology and felt to be 2/2 to sepsis rather than a true coronary event and they recommend FU as OP for ischemic SEGUNDO     Chronic pain syndrome  Assessment & Plan  - on home neurontin 300 HS    - on home MS IR 15 mg q6  - per PAPDMP fairly regular prescription for MS IR 15 mg last filled on 8/1/19 for 120 tabs with 0 refills     Chronic indwelling Canotr catheter  Assessment & Plan  - h/o TURP  - on home proscar 5 mg qd   - changed once per month at Dr Dixon Red office (recently changed PTA)   - current cantor to remain in place per urology recs   - follows with Dr Simone Gonzales      Scheduled Meds:    Current Facility-Administered Medications:  acetaminophen 650 mg Oral Q6H PRN Saray Brower, PAPATRICIA   ascorbic acid 500 mg Oral Daily UBALDO Harkins   bisacodyl 10 mg Rectal Daily PRN Jame Quiorz MD   chlorhexidine 15 mL Swish & Spit Q12H Riya Fernandes MD   cholecalciferol 2,000 Units Oral Daily Jame Quiroz MD   ferrous sulfate 325 mg Oral BID Jame Quiroz MD   finasteride 5 mg Oral Daily Jame Quiroz MD   gabapentin 300 mg Oral HS Jame Quiroz MD   heparin (porcine) 5,000 Units Subcutaneous Q8H [address] Jame Quiroz MD   morphine 15 mg Oral Q6H Jame Quiroz MD   predniSONE 20 mg Oral Daily Jame Quiroz MD   senna 2 tablet Oral HS Jame Quiroz MD   sertraline 50 mg Oral Daily Jame Quiroz MD          Incidental findings:    1) low Amylase/lipase: per IM unlikely to be of clinical significance and recommend OP FU with PCP with further testing/treatment and/or specialist referral at PCP's discretion   2) grade 1 DD: OP FU with cardiology  3) sinus arrhythmia: OP FU with cardiology   4) B/L non-obstructing renal stones: OP FU with Dr Simone Gonzales  5) left kidney cyst: OP FU with Dr Simone Gonzales  6) right lung granuloma: OP FU with PCP with further testing/treatment and/or specialist referral at PCP's discretion  7) diverticulosis: OP FU with PCP with further testing/treatment and/or specialist referral at PCP's discretion   8) small right sided fat containing inguinal hernia: OP FU with PCP with further testing/treatment and/or specialist referral at PCP's discretion          Note: per patient and family he is on  mg qd at home not because he was advised to take ASA by a physician but rather because he decided on his own to take ASA     DVT ppx: HSQ        Objective:    Functional Update:  Mobility: mod I   Transfers: mod I  ADLs: mod I         Physical Exam:        General: alert, no apparent distress, cooperative and comfortable  HEENT:  Head: Normal, normocephalic, atraumatic    CARDIAC:  +S1/2   LUNGS:  respirations unlabored   ABDOMEN:  soft NT   EXTREMITIES:  volume status currently stable   NEURO:   awake, alert, appropriately answering questions   PSYCH:  mood/affect currently stable       Laboratory:   Results from last 7 days   Lab Units 09/01/19  2104   HEMOGLOBIN g/dL 9 6*   HEMATOCRIT % 29 2*   WBC Thousand/uL 8 10     Results from last 7 days   Lab Units 09/06/19  0545 09/03/19  0511 09/01/19  2104   BUN mg/dL  --   --  14   SODIUM mmol/L  --   --  136   POTASSIUM mmol/L 4 2 4 3 4 2   CHLORIDE mmol/L  --   --  100   CREATININE mg/dL  --   --  1 02   AST U/L  --   --  15   ALT U/L  --   --  17            Patient Active Problem List   Diagnosis    Chronic indwelling Diallo catheter    Chronic pain syndrome    Elevated troponin    CKD (chronic kidney disease)    Anemia    Depression    Thrombocytosis (HCC)    Hypokalemia    Hypomagnesemia    Bilateral hand pain

## 2019-09-06 NOTE — PROGRESS NOTES
Discharge Summary     09/06/19 0948   Pain Assessment   Pain Assessment No/denies pain   Pain Score No Pain   Restrictions/Precautions   Precautions Diallo; Limb alert;Contact/isolation  (recent R sh sx, hx VRE urine)   QI: Eating   Assistance Needed Independent   Eating CARE Score 6   QI: Oral Hygiene   Assistance Needed Independent   Oral Hygiene CARE Score 6   Grooming   Able To Initiate Tasks; Acquire Items;Comb/Brush Hair;Wash/Dry Face;Brush/Clean Teeth;Wash/Dry Hands   Grooming (FIM) 7 - No helper, safely, timely and completes all tasks independently   QI: Shower/Bathe Self   Assistance Needed Independent   Shower/Bathe Self CARE Score 6   Bathing   Assessed Bath Style Shower   Anticipated D/C Bath Style Shower;Sponge Bath   Able to Giovanny Nickolas Yes   Able to [employer] Temperature Yes   Able to Wash/Rinse/Dry (body part) Left Arm;Right Arm;L Upper Leg;R Upper Leg;L Lower Leg/Foot;R Lower Leg/Foot;Chest;Abdomen;Perineal Area; Buttocks   Limitations Noted in Safety   Bathing (FIM) 6 - Patient requires assistive device/extra time/safety concerns but completes independently   Tub/Shower Transfer   Limitations Noted In Safety; Endurance   Adaptive Equipment Seat with Back   Shower Transfer (FIM) 5 - Patient requires supervision/monitoring   QI: Upper Body Dressing   Assistance Needed Independent   Upper Body Dressing CARE Score 6   QI: Lower Body Dressing   Assistance Needed Independent   Lower Body Dressing CARE Score 6   QI: Putting On/Taking Off Footwear   Assistance Needed Independent   Putting On/Taking Off Footwear CARE Score 6   QI: Picking Up Object   Assistance Needed Independent; Adaptive equipment   Picking Up Object CARE Score 6   Dressing/Undressing Clothing   Remove UB Clothes Pullover Shirt; Button Shirt   Remove LB Clothes Pants; Undergarment;Socks; [place]; [address]; Undergarment;Socks; Shoes   Limitations Noted In ROM   Findings increased time due to decreased ROM and strength BUE   UB Dressing (FIM) 6 - Patient requires assistive device/extra time/safety concerns but completes independently   LB Dressing (FIM) 6 - Patient requires assistive device/extra time/safety concerns but completes independently   Transfer Bed/Chair/Wheelchair   Stand Pivot Modified Independent   Findings reminders for placement of catheter initially   QI: [address]  Score 6   Toileting   Able to [address] down yes, up yes  Able to Manage Clothing Closures Yes   Manage Hygiene Bladder; Bowel   Limitations Noted In ROM;UE Strength   Findings pt able to complete bowel and baldder management with mod I   Toileting (FIM) 6 - Patient requires assistive device/extra time/safety concerns but completes independently   QI: Toilet Transfer   Assistance Needed Independent   Toilet Transfer CARE Score 6   Light Housekeeping   Light Housekeeping Level Walker   Light Housekeeping Level of Assistance Modified independent   Light Housekeeping retrieval and transport of ADL items in room   Cognition   Overall Cognitive Status WFL   Attention Within functional limits   Orientation Level Oriented to person;Oriented to place;Oriented to time;Oriented to situation   Additional Activities   Additional Activities Other (Comment)   Additional Activities Comments fxl mobility in room with RW Mod I to/ from BR   Assessment   Treatment Assessment Pt agreeable to a shower this morning with increased time required however pt able to complete tasks  Pt tolerates session with 35 minutes concurrent treatment focusing on ADL tasks with similar goals as another to increase independence with daily tasks  Problem List Decreased safety awareness;Decreased endurance;Decreased range of motion;Decreased strength   Plan   Treatment/Interventions ADL retraining;Functional transfer training; Therapeutic exercise; Endurance training;Patient/family training; Compensatory technique education   Progress Improving as expected   Recommendation   OT - OK to Discharge Yes   Discharge Summary Pt participates in ADLs, transfers/ homemaking and BUE therex  Pt has made great progress with goals met or exceeded with mod I achieved for all tasks except S shower transfer  Pt is able to manage catheter care with transprotation and emptying bag in the toilet  Pt has DME needed at home and will benefit from [facility] and family support to transition into home  Discharge planned 9/7/19  OT Therapy Minutes   OT Time In 0948   OT Time Out 1105   OT Total Time (minutes) 77   OT Mode of treatment - Individual (minutes) 42   OT Mode of treatment - Concurrent (minutes) 35   Therapy Time missed   Time missed?  No

## 2019-09-06 NOTE — PROGRESS NOTES
09/06/19 0825   Pain Assessment   Pain Assessment No/denies pain   Pain Score No Pain   Restrictions/Precautions   Precautions Fall Risk;Limb alert;Contact/isolation  (hx VRE in urine, recent shoudler sx)   Cognition   Overall Cognitive Status WFL   QI: Roll Left and Right   Assistance Needed Independent   Roll Left and Right CARE Score 6   QI: Sit to Lying   Assistance Needed Independent   Sit to Lying CARE Score 6   QI: Lying to Sitting on Side of Bed   Assistance Needed Independent   Lying to Sitting on Side of Bed CARE Score 6   QI: Sit to Stand   Assistance Needed Independent   Sit to Stand CARE Score 6   Bed Mobility   Able to Roll Left to Right;Right to Left;Scoot Up;Scoot Down   Findings   (mod I )   QI: Chair/Bed-to-Chair Transfer   Assistance Needed Independent   Chair/Bed-to-Chair Transfer CARE Score 6   Transfer Bed/Chair/Wheelchair   Limitations Noted In LE Strength; Endurance;UE Strength   Adaptive Equipment Roller Walker   Stand Pivot Modified Independent   Sit to Stand Modified Independent   Stand to Sit Modified Independent   Supine to Sit Modified Independent   Sit to Supine Modified Independent   All Transfer Modified Independent   Bed, Chair, Wheelchair Transfer (FIM) 6 - Patient requires assistive device/extra time/safety concerns but completes independently   QI: Car Transfer   Reason if not Attempted Safety concerns   Car Transfer CARE Score 88   QI: Walk 10 Feet   Assistance Needed Independent   Walk 10 Feet CARE Score 6   QI: Walk 50 Feet with Two Turns   Assistance Needed Independent   Walk 50 Feet with Two Turns CARE Score 6   QI: Walk 150 Feet   Assistance Needed Independent   Walk 150 Feet CARE Score 6   QI: Walking 10 Feet on Uneven Surfaces   Assistance Needed Independent   Walking 10 Feet on Uneven Surfaces CARE Score 6   Ambulation   Does the patient walk? 2   Yes   Primary Discharge Mode of Locomotion Walk   Walk Assist Level Modified Independent   Gait Pattern Slow [place] Assist Device Escobar Teixeira Walked (feet)   (245')   Limitations Noted In Safety; Endurance;Speed   Findings   (level and carpet)   Walking (FIM) 6 - Patient requires assistive device/extra time/safety concerns but completes independently AND distance 150 feet or more, no rest   QI: Wheel 50 Feet with Two Turns   Reason if not Attempted Activity not applicable   Wheel 50 Feet with Two Turns CARE Score 9   QI: Wheel 150 Feet   Reason if not Attempted Activity not applicable   Wheel 299 Feet CARE Score 9   Wheelchair mobility   QI: Does the patient use a wheelchair? 0  No   QI: 1 Step (Curb)   Assistance Needed Independent   1 Step (Curb) CARE Score 6   QI: 4 Steps   Assistance Needed Independent   4 Steps CARE Score 6   QI: 12 Steps   Assistance Needed Independent   12 Steps CARE Score 6   Stairs   Type Stairs   # of Steps   (15)   Assist Devices Bilateral Rail   Stairs (FIM) 6 - Patient requires assistive device/extra time/safety concerns but completes independently AND goes up and down full flight (12- 14 stairs)   Therapeutic Interventions   Strengthening Seated/standing TE 3 x 10    Other Gait training, transfer training, ciommunity re-integration    Assessment   Treatment Assessment Pt participates in functional mobility activities today including transfer training, gait training, and community re-integration tasks with focus on technique and safety  Pt was mod I for all transfers, amb, and steps using B/L HR  Completed seated/standing TE for general LE strengthening and conditioning with good tolerance  Pt had no c/o of pain or discomfort during therapy  Participated in 30 minutes of concurrent therapy Patient appropriate for concurrent therapy to increase encouragement, motivation, and socialization during  therapy session  PT Barriers   Physical Impairment Decreased strength;Decreased endurance; Impaired balance;Decreased mobility   Functional Limitation Stair negotiation;Transfers; Walking   Plan Treatment/Interventions Functional transfer training;LE strengthening/ROM; Endurance training; Therapeutic exercise; Bed mobility;Gait training; Compensatory technique education;Patient/family training   Progress Progressing toward goals   Recommendation   Equipment Recommended Walker   Discharge Summary Pt has met all PT goals and is mod I for all mobility tasks including transfers, ambulation using RW for distance of 245', and 15 steps using B/L HR  Pt remains limited in overall endurance and activity tolerance, strength, and safety  He will benefit from [facility] PT to address remaining limitations to restore Pt back to his PLOF  DME in place at home in the form of RW to increase safety and independence with functional mobility tasks  Pt is cleared for discharge to home with his daughter to assist in the transition on 9/7/19  Pt feels ready and excited to go home  PT Therapy Minutes   PT Time In 0825   PT Time Out 0930   PT Total Time (minutes) 65   PT Mode of treatment - Individual (minutes) 37   PT Mode of treatment - Concurrent (minutes) 28   PT Mode of treatment - Group (minutes) 0   PT Mode of treatment - Co-treat (minutes) 0   PT Mode of Teatment - Total time(minutes) 65 minutes   Therapy Time missed   Time missed?  No

## 2019-09-06 NOTE — CASE MANAGEMENT
DC instructions reviewed with Pt & Pt's family  Pt being [address] home tomorrow at 10 AM, being transported by family via car, which tx team has determined to be a safe mode of transport  FU appts indicated on DC instructions  HHC arranged with [facility] (PT,OT,Nsg,HHA)  FU IMM reviewed & signed

## 2019-09-06 NOTE — NURSING NOTE
Pt successfully emptied cantor bag independently today  Pt MOD I in room  All needs are currently met, no complaints of pain, call bell and belongings within reach  Will continue to monitor and follow plan of care

## 2019-09-07 VITALS
RESPIRATION RATE: 18 BRPM | HEIGHT: 69 IN | HEART RATE: 58 BPM | DIASTOLIC BLOOD PRESSURE: 82 MMHG | WEIGHT: 194.5 LBS | SYSTOLIC BLOOD PRESSURE: 150 MMHG | OXYGEN SATURATION: 92 % | BODY MASS INDEX: 28.81 KG/M2 | TEMPERATURE: 97 F

## 2019-09-07 RX ORDER — GABAPENTIN 300 MG/1
300 CAPSULE ORAL
Qty: 30 CAPSULE | Refills: 0 | Status: SHIPPED | OUTPATIENT
Start: 2019-09-07 | End: 2021-01-12 | Stop reason: HOSPADM

## 2019-09-07 RX ADMIN — FINASTERIDE 5 MG: 5 TABLET, FILM COATED ORAL at 08:39

## 2019-09-07 RX ADMIN — MORPHINE SULFATE 15 MG: 15 TABLET ORAL at 05:30

## 2019-09-07 RX ADMIN — SERTRALINE HYDROCHLORIDE 50 MG: 50 TABLET ORAL at 08:39

## 2019-09-07 RX ADMIN — MORPHINE SULFATE 15 MG: 15 TABLET ORAL at 00:02

## 2019-09-07 RX ADMIN — HEPARIN SODIUM 5000 UNITS: 5000 INJECTION, SOLUTION INTRAVENOUS; SUBCUTANEOUS at 05:30

## 2019-09-07 NOTE — PLAN OF CARE
Sacral wound healed, demonstrates cantor catheter care using clean technique, appetite improved, maintains safety precautions, medically stable [facility]

## 2019-09-07 NOTE — NURSING NOTE
Discharge instructions reviewed with pt & pt's daughter  Verbalized understanding  Pt demonstrated clean technique to do cantor catheter care  Emphasized importance of keeping follow-up appts  Patient was assessed and interdisciplinary team determined safest means of transportation is via car  Pt discharged  1035 via WC to car  Supervision with transfer into car, CNA assisted pt with seat belt

## 2019-09-07 NOTE — NURSING NOTE
Pt awake resting in bed  MOD I in room  Emptying cantor by self in toilet  Assessment unchanged  All items within reach  Continuing to monitor and follow plan of care

## 2019-09-07 NOTE — DISCHARGE INSTR - OTHER ORDERS
Drink plenty of fluids and consume fruits, vegetables to keep bowels active  If constipation occurs, consider over the counter stool softeners, senekot, milk-of-magnesia, and/or fleets enema  If unable to move bowels in 3-5 days, or if feeling uncomfortable, notify primary care physician or go to the emergency room  Diallo catheter care: maintain clean technique as directed, empty collection bag (leg bag or regular bag) before it becomes half full

## 2019-09-08 ENCOUNTER — LAB REQUISITION (OUTPATIENT)
Dept: LAB | Facility: HOSPITAL | Age: 74
End: 2019-09-08
Payer: MEDICARE

## 2019-09-08 DIAGNOSIS — D47.3 ESSENTIAL HEMORRHAGIC THROMBOCYTHEMIA (HCC): ICD-10-CM

## 2019-09-08 DIAGNOSIS — D64.9 ANEMIA: ICD-10-CM

## 2019-09-08 LAB
BASOPHILS # BLD AUTO: 0.1 THOUSANDS/ΜL (ref 0–0.1)
BASOPHILS NFR BLD AUTO: 1 % (ref 0–2)
EOSINOPHIL # BLD AUTO: 0 THOUSAND/ΜL (ref 0–0.61)
EOSINOPHIL NFR BLD AUTO: 1 % (ref 0–5)
ERYTHROCYTE [DISTWIDTH] IN BLOOD BY AUTOMATED COUNT: 14.2 % (ref 11.5–14.5)
HCT VFR BLD AUTO: 32.9 % (ref 42–47)
HGB BLD-MCNC: 10.9 G/DL (ref 14–18)
LYMPHOCYTES # BLD AUTO: 1.4 THOUSANDS/ΜL (ref 0.6–4.47)
LYMPHOCYTES NFR BLD AUTO: 19 % (ref 21–51)
MCH RBC QN AUTO: 31.2 PG (ref 26–34)
MCHC RBC AUTO-ENTMCNC: 32.9 G/DL (ref 31–37)
MCV RBC AUTO: 95 FL (ref 81–99)
MONOCYTES # BLD AUTO: 0.6 THOUSAND/ΜL (ref 0.17–1.22)
MONOCYTES NFR BLD AUTO: 8 % (ref 2–12)
NEUTROPHILS # BLD AUTO: 5.7 THOUSANDS/ΜL (ref 1.4–6.5)
NEUTS SEG NFR BLD AUTO: 72 % (ref 42–75)
PLATELET # BLD AUTO: 409 THOUSANDS/UL (ref 149–390)
PMV BLD AUTO: 8.4 FL (ref 8.6–11.7)
RBC # BLD AUTO: 3.48 MILLION/UL (ref 4.3–5.9)
WBC # BLD AUTO: 7.8 THOUSAND/UL (ref 4.8–10.8)

## 2019-09-08 PROCEDURE — 85025 COMPLETE CBC W/AUTO DIFF WBC: CPT | Performed by: PHYSICAL MEDICINE & REHABILITATION

## 2019-09-25 ENCOUNTER — AMBUL SURGICAL CARE (OUTPATIENT)
Dept: URBAN - NONMETROPOLITAN AREA SURGERY 1 | Facility: SURGERY | Age: 74
Setting detail: OPHTHALMOLOGY
End: 2019-09-25
Payer: COMMERCIAL

## 2019-09-25 ENCOUNTER — DOCTOR'S OFFICE (OUTPATIENT)
Dept: URBAN - NONMETROPOLITAN AREA CLINIC 1 | Facility: CLINIC | Age: 74
Setting detail: OPHTHALMOLOGY
End: 2019-09-25
Payer: COMMERCIAL

## 2019-09-25 DIAGNOSIS — H26.492: ICD-10-CM

## 2019-09-25 DIAGNOSIS — H26.493: ICD-10-CM

## 2019-09-25 DIAGNOSIS — H43.813: ICD-10-CM

## 2019-09-25 DIAGNOSIS — Z96.1: ICD-10-CM

## 2019-09-25 PROCEDURE — G8918 PT W/O PREOP ORDER IV AB PRO: HCPCS | Performed by: OPHTHALMOLOGY

## 2019-09-25 PROCEDURE — G8907 PT DOC NO EVENTS ON DISCHARG: HCPCS | Performed by: OPHTHALMOLOGY

## 2019-09-25 PROCEDURE — 92014 COMPRE OPH EXAM EST PT 1/>: CPT | Performed by: OPHTHALMOLOGY

## 2019-09-25 PROCEDURE — 66821 AFTER CATARACT LASER SURGERY: CPT | Performed by: OPHTHALMOLOGY

## 2019-09-25 ASSESSMENT — REFRACTION_CURRENTRX
OS_VPRISM_DIRECTION: BF
OD_CYLINDER: -2.00
OD_ADD: +2.75
OD_OVR_VA: 20/
OS_AXIS: 086
OS_ADD: +2.75
OD_VPRISM_DIRECTION: BF
OD_AXIS: 097
OS_OVR_VA: 20/
OD_OVR_VA: 20/
OS_OVR_VA: 20/
OS_SPHERE: +1.75
OS_CYLINDER: -2.00
OS_OVR_VA: 20/
OD_SPHERE: +0.50
OD_OVR_VA: 20/

## 2019-09-25 ASSESSMENT — REFRACTION_MANIFEST
OS_VA3: 20/
OU_VA: 20/
OD_VA1: 20/30+2
OS_VA1: 20/
OD_AXIS: 096
OS_VA3: 20/
OD_CYLINDER: -2.00
OD_VA2: 20/
OS_VA2: 20/
OD_SPHERE: +0.75
OD_VA2: 20/30+2
OS_CYLINDER: -2.00
OD_VA3: 20/
OS_VA1: 20/30+2
OU_VA: 20/
OS_VA2: 20/30+2
OD_VA3: 20/
OS_AXIS: 085
OS_SPHERE: +1.75
OD_ADD: +2.50
OS_ADD: +2.50
OD_VA1: 20/

## 2019-09-25 ASSESSMENT — VISUAL ACUITY
OS_BCVA: 20/20
OD_BCVA: 20/100+1

## 2019-09-25 ASSESSMENT — REFRACTION_AUTOREFRACTION
OS_AXIS: 073
OD_AXIS: 081
OD_SPHERE: +1.50
OS_CYLINDER: -2.50
OS_SPHERE: +2.25
OD_CYLINDER: -2.25

## 2019-09-25 ASSESSMENT — SPHEQUIV_DERIVED
OD_SPHEQUIV: 0.375
OS_SPHEQUIV: 0.75
OS_SPHEQUIV: 1
OD_SPHEQUIV: -0.25

## 2019-09-25 ASSESSMENT — CONFRONTATIONAL VISUAL FIELD TEST (CVF)
OS_FINDINGS: FULL
OD_FINDINGS: FULL

## 2019-10-03 NOTE — PLAN OF CARE
Patient:   MARY JUDD            MRN: [id number]            FIN: [id number]               Age:   66 years     Sex:  FEMALE     :  [birth year]   Associated Diagnoses:   None   Author:   ELIAS BUCHANAN      PROCEDURE:   1. Total vaginal hystereectomy  2. Anterior repair  3. Uterosacral suspension  4. Posterior repair  5. Cystoscopy  6. Transvaginal tape    PREOP DIAGNOSIS: Uterine prolapse, stress urinary incontinence, rectocele  POSTOP DIAGNOSIS: same    SURGEON: Richard Swanson MD  ASSISTANT: Elias De Guzman MD, Carol Velasquez MD    ANESTHESIA: GETA    COMPLICATIONS: None    EBL: 100ml  FLUIDS: 1700ml  UOP: 450ml  DEFICIT: N/A  BLOOD ADMIN:  None  PREOP HGB: 13.1    DVT PPX: SCDs  ANTIBIOTICS:  Clinda/Gent  GRAFTS/IMPLANTS: Advantage Fit    FINDINGS:  Grade 2 uterine prolapse, grade 3 cystocele, grade 1 rectocele  SPECIMENS: Uterus with cervix    Elias De Guzman MD            Electronically Signed On 2017 11:57  __________________________________________________   ELIAS BUCHANAN     Problem: INFECTION - ADULT  Goal: Absence or prevention of progression during hospitalization  INTERVENTIONS:  - Assess and monitor for signs and symptoms of infection  - Monitor lab/diagnostic results  - Monitor all insertion sites, i e  indwelling lines, tubes, and drains  - Monitor endotracheal (as able) and nasal secretions for changes in amount and color  - [place] appropriate cooling/warming therapies per order  - Administer medications as ordered  - Instruct and encourage patient and family to use good hand hygiene technique  - Identify and instruct in appropriate isolation precautions for identified infection/condition   Outcome: Progressing

## 2019-10-17 ENCOUNTER — HOSPITAL ENCOUNTER (OUTPATIENT)
Dept: RADIOLOGY | Facility: HOSPITAL | Age: 74
Discharge: HOME | End: 2019-10-17
Payer: MEDICARE

## 2019-10-17 ENCOUNTER — TRANSCRIBE ORDERS (OUTPATIENT)
Dept: ADMINISTRATIVE | Facility: HOSPITAL | Age: 74
End: 2019-10-17

## 2019-10-17 DIAGNOSIS — M25.512 CHRONIC LEFT SHOULDER PAIN: Primary | ICD-10-CM

## 2019-10-17 DIAGNOSIS — G89.29 CHRONIC LEFT SHOULDER PAIN: Primary | ICD-10-CM

## 2019-10-17 DIAGNOSIS — M25.512 CHRONIC LEFT SHOULDER PAIN: ICD-10-CM

## 2019-10-17 DIAGNOSIS — G89.29 CHRONIC LEFT SHOULDER PAIN: ICD-10-CM

## 2019-10-17 PROCEDURE — 73030 X-RAY EXAM OF SHOULDER: CPT

## 2019-11-12 ENCOUNTER — AMBUL SURGICAL CARE (OUTPATIENT)
Dept: URBAN - NONMETROPOLITAN AREA SURGERY 1 | Facility: SURGERY | Age: 74
Setting detail: OPHTHALMOLOGY
End: 2019-11-12
Payer: COMMERCIAL

## 2019-11-12 DIAGNOSIS — H26.491: ICD-10-CM

## 2019-11-12 PROCEDURE — G8907 PT DOC NO EVENTS ON DISCHARG: HCPCS | Performed by: CLINIC/CENTER

## 2019-11-12 PROCEDURE — 66821 AFTER CATARACT LASER SURGERY: CPT | Performed by: CLINIC/CENTER

## 2019-11-12 PROCEDURE — G8918 PT W/O PREOP ORDER IV AB PRO: HCPCS | Performed by: CLINIC/CENTER

## 2019-11-12 PROCEDURE — G8907 PT DOC NO EVENTS ON DISCHARG: HCPCS | Performed by: OPHTHALMOLOGY

## 2019-11-12 PROCEDURE — 66821 AFTER CATARACT LASER SURGERY: CPT | Performed by: OPHTHALMOLOGY

## 2019-11-12 PROCEDURE — G8918 PT W/O PREOP ORDER IV AB PRO: HCPCS | Performed by: OPHTHALMOLOGY

## 2019-11-22 ENCOUNTER — TRANSCRIBE ORDERS (OUTPATIENT)
Dept: LAB | Facility: MEDICAL CENTER | Age: 74
End: 2019-11-22

## 2019-11-22 ENCOUNTER — APPOINTMENT (OUTPATIENT)
Dept: LAB | Facility: MEDICAL CENTER | Age: 74
End: 2019-11-22
Payer: MEDICARE

## 2019-11-22 DIAGNOSIS — D75.839 THROMBOCYTOSIS: Chronic | ICD-10-CM

## 2019-11-22 DIAGNOSIS — E78.5 HYPERLIPIDEMIA, UNSPECIFIED HYPERLIPIDEMIA TYPE: ICD-10-CM

## 2019-11-22 DIAGNOSIS — M19.90 OSTEOARTHRITIS, UNSPECIFIED OSTEOARTHRITIS TYPE, UNSPECIFIED SITE: ICD-10-CM

## 2019-11-22 DIAGNOSIS — D64.9 ANEMIA: Chronic | ICD-10-CM

## 2019-11-22 DIAGNOSIS — Z86.79 HISTORY OF ANGINA: ICD-10-CM

## 2019-11-22 DIAGNOSIS — D64.9 ANEMIA, UNSPECIFIED TYPE: Primary | ICD-10-CM

## 2019-11-22 DIAGNOSIS — D64.9 ANEMIA, UNSPECIFIED TYPE: ICD-10-CM

## 2019-11-22 DIAGNOSIS — M54.9 CHRONIC BACK PAIN, UNSPECIFIED BACK LOCATION, UNSPECIFIED BACK PAIN LATERALITY: ICD-10-CM

## 2019-11-22 DIAGNOSIS — E53.8 LOW VITAMIN B12 LEVEL: ICD-10-CM

## 2019-11-22 DIAGNOSIS — G89.29 CHRONIC BACK PAIN, UNSPECIFIED BACK LOCATION, UNSPECIFIED BACK PAIN LATERALITY: ICD-10-CM

## 2019-11-22 DIAGNOSIS — I10 HYPERTENSION, UNSPECIFIED TYPE: ICD-10-CM

## 2019-11-22 LAB
ALBUMIN SERPL BCP-MCNC: 4 G/DL (ref 3.5–5)
ALP SERPL-CCNC: 112 U/L (ref 46–116)
ALT SERPL W P-5'-P-CCNC: 24 U/L (ref 12–78)
ANION GAP SERPL CALCULATED.3IONS-SCNC: 8 MMOL/L (ref 4–13)
AST SERPL W P-5'-P-CCNC: 19 U/L (ref 5–45)
BASOPHILS # BLD AUTO: 0.07 THOUSANDS/ΜL (ref 0–0.1)
BASOPHILS NFR BLD AUTO: 1 % (ref 0–1)
BILIRUB SERPL-MCNC: 0.35 MG/DL (ref 0.2–1)
BUN SERPL-MCNC: 24 MG/DL (ref 5–25)
CALCIUM SERPL-MCNC: 9.5 MG/DL (ref 8.3–10.1)
CHLORIDE SERPL-SCNC: 112 MMOL/L (ref 100–108)
CHOLEST SERPL-MCNC: 152 MG/DL (ref 50–200)
CO2 SERPL-SCNC: 23 MMOL/L (ref 21–32)
CREAT SERPL-MCNC: 1.2 MG/DL (ref 0.6–1.3)
EOSINOPHIL # BLD AUTO: 0.14 THOUSAND/ΜL (ref 0–0.61)
EOSINOPHIL NFR BLD AUTO: 2 % (ref 0–6)
ERYTHROCYTE [DISTWIDTH] IN BLOOD BY AUTOMATED COUNT: 14.7 % (ref 11.6–15.1)
FERRITIN SERPL-MCNC: 132 NG/ML (ref 8–388)
GFR SERPL CREATININE-BSD FRML MDRD: 58 ML/MIN/1.73SQ M
GLUCOSE P FAST SERPL-MCNC: 102 MG/DL (ref 65–99)
HCT VFR BLD AUTO: 37.5 % (ref 36.5–49.3)
HDLC SERPL-MCNC: 48 MG/DL
HGB BLD-MCNC: 11.6 G/DL (ref 12–17)
IMM GRANULOCYTES # BLD AUTO: 0.03 THOUSAND/UL (ref 0–0.2)
IMM GRANULOCYTES NFR BLD AUTO: 0 % (ref 0–2)
IRON SATN MFR SERPL: 29 %
IRON SERPL-MCNC: 84 UG/DL (ref 65–175)
LDLC SERPL CALC-MCNC: 85 MG/DL (ref 0–100)
LYMPHOCYTES # BLD AUTO: 1.49 THOUSANDS/ΜL (ref 0.6–4.47)
LYMPHOCYTES NFR BLD AUTO: 21 % (ref 14–44)
MCH RBC QN AUTO: 30.6 PG (ref 26.8–34.3)
MCHC RBC AUTO-ENTMCNC: 30.9 G/DL (ref 31.4–37.4)
MCV RBC AUTO: 99 FL (ref 82–98)
MONOCYTES # BLD AUTO: 0.5 THOUSAND/ΜL (ref 0.17–1.22)
MONOCYTES NFR BLD AUTO: 7 % (ref 4–12)
NEUTROPHILS # BLD AUTO: 4.76 THOUSANDS/ΜL (ref 1.85–7.62)
NEUTS SEG NFR BLD AUTO: 69 % (ref 43–75)
NONHDLC SERPL-MCNC: 104 MG/DL
NRBC BLD AUTO-RTO: 0 /100 WBCS
PLATELET # BLD AUTO: 318 THOUSANDS/UL (ref 149–390)
PMV BLD AUTO: 10.3 FL (ref 8.9–12.7)
POTASSIUM SERPL-SCNC: 4.5 MMOL/L (ref 3.5–5.3)
PROT SERPL-MCNC: 7.3 G/DL (ref 6.4–8.2)
RBC # BLD AUTO: 3.79 MILLION/UL (ref 3.88–5.62)
SODIUM SERPL-SCNC: 143 MMOL/L (ref 136–145)
TIBC SERPL-MCNC: 289 UG/DL (ref 250–450)
TRIGL SERPL-MCNC: 96 MG/DL
VIT B12 SERPL-MCNC: 502 PG/ML (ref 100–900)
WBC # BLD AUTO: 6.99 THOUSAND/UL (ref 4.31–10.16)

## 2019-11-22 PROCEDURE — 82607 VITAMIN B-12: CPT

## 2019-11-22 PROCEDURE — 83550 IRON BINDING TEST: CPT

## 2019-11-22 PROCEDURE — 82728 ASSAY OF FERRITIN: CPT

## 2019-11-22 PROCEDURE — 83540 ASSAY OF IRON: CPT

## 2019-11-22 PROCEDURE — 80053 COMPREHEN METABOLIC PANEL: CPT

## 2019-11-22 PROCEDURE — 36415 COLL VENOUS BLD VENIPUNCTURE: CPT

## 2019-11-22 PROCEDURE — 80061 LIPID PANEL: CPT

## 2019-11-22 PROCEDURE — 85025 COMPLETE CBC W/AUTO DIFF WBC: CPT

## 2019-12-03 ENCOUNTER — HOSPITAL ENCOUNTER (EMERGENCY)
Facility: HOSPITAL | Age: 74
Discharge: HOME | End: 2019-12-03
Attending: FAMILY MEDICINE | Admitting: FAMILY MEDICINE
Payer: MEDICARE

## 2019-12-03 VITALS
BODY MASS INDEX: 28.8 KG/M2 | TEMPERATURE: 98.3 F | OXYGEN SATURATION: 96 % | HEART RATE: 84 BPM | SYSTOLIC BLOOD PRESSURE: 139 MMHG | RESPIRATION RATE: 16 BRPM | WEIGHT: 195 LBS | DIASTOLIC BLOOD PRESSURE: 76 MMHG

## 2019-12-03 DIAGNOSIS — T83.9XXA PROBLEM WITH FOLEY CATHETER, INITIAL ENCOUNTER (HCC): Primary | ICD-10-CM

## 2019-12-03 DIAGNOSIS — Z46.6 URINARY CATHETER (FOLEY) CHANGE REQUIRED: ICD-10-CM

## 2019-12-03 PROCEDURE — 99283 EMERGENCY DEPT VISIT LOW MDM: CPT

## 2019-12-03 PROCEDURE — 99283 EMERGENCY DEPT VISIT LOW MDM: CPT | Performed by: FAMILY MEDICINE

## 2019-12-03 PROCEDURE — 51798 US URINE CAPACITY MEASURE: CPT

## 2019-12-03 NOTE — ED PROVIDER NOTES
History  Chief Complaint   Patient presents with    Urinary Catheter Insertion or Check     blocked cantor       History provided by:  Patient   used: No      This is a 57-year-old male history of urine retention has chronic Cantor catheter presented to ED with the complain of blocked Cantor  Patient states that he noticed that his Cantor is not draining since last night which prompted this ED visit  Currently denying any abdominal pain or back pain  Patient is denying any fever chills  Patient states he has an appointment with Christina Fleming however did not want to wait till tomorrow  Denies any other complaint  Prior to Admission Medications   Prescriptions Last Dose Informant Patient Reported?  Taking?   finasteride (PROSCAR) 5 mg tablet   Yes No   Sig: Take 5 mg by mouth daily   gabapentin (NEURONTIN) 300 mg capsule   No No   Sig: Take 1 capsule (300 mg total) by mouth daily at bedtime for 30 days   morphine (MSIR) 15 mg tablet   Yes No   Sig: Take 15 mg by mouth every 6 (six) hours   sertraline (ZOLOFT) 50 mg tablet   Yes No   Sig: Take 50 mg by mouth daily        Facility-Administered Medications: None       Past Medical History:   Diagnosis Date    Depression     Cantor catheter in place     Hypertension     Prostate enlargement     Psychiatric disorder     Urinary tract infection        Past Surgical History:   Procedure Laterality Date    ARTHROSCOPY KNEE      BACK SURGERY      L 4 or 5    CATARACT EXTRACTION Bilateral     CYSTOSCOPY W/ LASER LITHOTRIPSY N/A 8/20/2018    Procedure: LITHOTRISPY HOLMIUM LASER of bladder stones;  Surgeon: Henrry Mendoza MD;  Location: [address] [place] [address] OR;  Service: Urology    AR TRANSURETHRAL ELEC-SURG PROSTATECTOM N/A 8/20/2018    Procedure: CYSTOSCOPY; TURP;  Surgeon: Henrry Mendoza MD;  Location: [address] [place] [address] OR;  Service: Urology    SHOULDER SURGERY Right 05/31/2019       Family History   Problem Relation Age of Onset    Cancer Mother     Diabetes Father      I have reviewed and agree with the history as documented  Social History     Tobacco Use    Smoking status: Former Smoker     Packs/day: 1 00    Smokeless tobacco: Never Used    Tobacco comment: quit 50 years ago   Substance Use Topics    Alcohol use: Yes     Frequency: Monthly or less     Drinks per session: 1 or 2     Binge frequency: Less than monthly    Drug use: No        Review of Systems   Constitutional: Negative  Respiratory: Negative  Cardiovascular: Negative  Genitourinary:        Blocked Diallo   Musculoskeletal: Negative  Neurological: Negative  Physical Exam  Physical Exam   Constitutional: He appears well-developed and well-nourished  Neck: Normal range of motion  Neck supple  Cardiovascular: Normal rate, regular rhythm and normal heart sounds  Pulmonary/Chest: Effort normal and breath sounds normal    Abdominal: Soft  Bowel sounds are normal  There is no tenderness  Musculoskeletal: Normal range of motion  Nursing note and vitals reviewed  Vital Signs  ED Triage Vitals [12/03/19 1018]   Temperature Pulse Respirations Blood Pressure SpO2   98 3 °F (36 8 °C) 84 16 139/76 96 %      Temp Source Heart Rate Source Patient Position - Orthostatic VS BP Location FiO2 (%)   Temporal Monitor Sitting Left arm --      Pain Score       No Pain           Vitals:    12/03/19 1018   BP: 139/76   Pulse: 84   Patient Position - Orthostatic VS: Sitting         Visual Acuity      ED Medications  Medications - No data to display    Diagnostic Studies  Results Reviewed     None                 No orders to display              Procedures  Procedures         ED Course  ED Course as of Dec 03 1100   Tue Dec 03, 2019   1056 Diallo is placed  [address] [place] Diallo catheter is placed by the nurse Roney blake                                     [facility]      Disposition  Final diagnoses:   [facility]   Urinary catheter (Diallo) change required     Time reflects when diagnosis was documented in both MDM as applicable and the Disposition within this note     Time User Action Codes Description Comment    12/3/2019 11:00 AM Caridad Jordan Add [T83  9XXA] Problem with Diallo catheter, initial encounter ([facility] )     12/3/2019 11:00 AM Caridad Jordan Add [Z46 6] Urinary catheter (Diallo) change required       ED Disposition     ED Disposition Condition Date/Time Comment    Discharge Stable Tue Dec 3, 2019 11:00 AM Anabela Wolff discharge to home/self care  Follow-up Information     Follow up With Specialties Details Why [address], DO Emergency Medicine, Internal Medicine Schedule an appointment as soon as possible for a visit in 2 days If symptoms worsen [address] [facility]  [phone]            Patient's Medications   Discharge Prescriptions    No medications on file     No discharge procedures on file      ED Provider  Electronically Signed by           Aaln Denny MD  12/03/19 5662

## 2019-12-23 ENCOUNTER — HOSPITAL ENCOUNTER (EMERGENCY)
Facility: HOSPITAL | Age: 74
Discharge: HOME | End: 2019-12-23
Attending: EMERGENCY MEDICINE | Admitting: EMERGENCY MEDICINE
Payer: MEDICARE

## 2019-12-23 VITALS
DIASTOLIC BLOOD PRESSURE: 72 MMHG | HEIGHT: 69 IN | HEART RATE: 72 BPM | BODY MASS INDEX: 25.62 KG/M2 | SYSTOLIC BLOOD PRESSURE: 142 MMHG | RESPIRATION RATE: 16 BRPM | OXYGEN SATURATION: 97 % | WEIGHT: 173 LBS | TEMPERATURE: 97.8 F

## 2019-12-23 DIAGNOSIS — T83.9XXA PROBLEM WITH FOLEY CATHETER, INITIAL ENCOUNTER (HCC): Primary | ICD-10-CM

## 2019-12-23 DIAGNOSIS — N39.0 UTI (URINARY TRACT INFECTION): ICD-10-CM

## 2019-12-23 LAB
BACTERIA UR QL AUTO: ABNORMAL /HPF
BILIRUB UR QL STRIP: NEGATIVE
CLARITY UR: ABNORMAL
COLOR UR: YELLOW
GLUCOSE UR STRIP-MCNC: NEGATIVE MG/DL
HGB UR QL STRIP.AUTO: ABNORMAL
KETONES UR STRIP-MCNC: NEGATIVE MG/DL
LEUKOCYTE ESTERASE UR QL STRIP: ABNORMAL
NITRITE UR QL STRIP: POSITIVE
NON-SQ EPI CELLS URNS QL MICRO: ABNORMAL /HPF
PH UR STRIP.AUTO: 8 [PH]
PROT UR STRIP-MCNC: ABNORMAL MG/DL
RBC #/AREA URNS AUTO: ABNORMAL /HPF
SP GR UR STRIP.AUTO: 1.01 (ref 1–1.03)
UROBILINOGEN UR QL STRIP.AUTO: 0.2 E.U./DL
WBC #/AREA URNS AUTO: ABNORMAL /HPF

## 2019-12-23 PROCEDURE — 87186 SC STD MICRODIL/AGAR DIL: CPT | Performed by: EMERGENCY MEDICINE

## 2019-12-23 PROCEDURE — 99283 EMERGENCY DEPT VISIT LOW MDM: CPT

## 2019-12-23 PROCEDURE — 81001 URINALYSIS AUTO W/SCOPE: CPT | Performed by: EMERGENCY MEDICINE

## 2019-12-23 PROCEDURE — 96372 THER/PROPH/DIAG INJ SC/IM: CPT

## 2019-12-23 PROCEDURE — 87077 CULTURE AEROBIC IDENTIFY: CPT | Performed by: EMERGENCY MEDICINE

## 2019-12-23 PROCEDURE — 99283 EMERGENCY DEPT VISIT LOW MDM: CPT | Performed by: EMERGENCY MEDICINE

## 2019-12-23 PROCEDURE — 87086 URINE CULTURE/COLONY COUNT: CPT | Performed by: EMERGENCY MEDICINE

## 2019-12-23 RX ORDER — CEPHALEXIN 500 MG/1
500 CAPSULE ORAL EVERY 12 HOURS SCHEDULED
Qty: 20 CAPSULE | Refills: 0 | Status: SHIPPED | OUTPATIENT
Start: 2019-12-23 | End: 2020-01-02

## 2019-12-23 RX ADMIN — LIDOCAINE HYDROCHLORIDE 1000 MG: 10 INJECTION, SOLUTION EPIDURAL; INFILTRATION; INTRACAUDAL; PERINEURAL at 21:37

## 2019-12-24 NOTE — ED PROVIDER NOTES
History  Chief Complaint   Patient presents with    Urinary Catheter Problem     blocked cantor     This is a 41-year-old man with a history of urinary retention and a Cantor in place who complains those fully does not seem to be draining urine  This she started this morning and has progressed since then  Patient is having increasing discomfort in his suprapubic and pelvic area  He states this feels identical to issues of retention in the past   Patient is requires Cantor to be swabbed approximately every 3-4 weeks due to blockage  He does report some burning in his penis which started earlier today  Nothing seems to make her use appears better, progressively worsening with time  He rates the pain as moderate currently  Prior to Admission Medications   Prescriptions Last Dose Informant Patient Reported?  Taking?   finasteride (PROSCAR) 5 mg tablet   Yes No   Sig: Take 5 mg by mouth daily   gabapentin (NEURONTIN) 300 mg capsule   No No   Sig: Take 1 capsule (300 mg total) by mouth daily at bedtime for 30 days   morphine (MSIR) 15 mg tablet   Yes No   Sig: Take 15 mg by mouth every 6 (six) hours   sertraline (ZOLOFT) 50 mg tablet   Yes No   Sig: Take 50 mg by mouth daily        Facility-Administered Medications: None       Past Medical History:   Diagnosis Date    Depression     Cantor catheter in place     Hypertension     Prostate enlargement     Psychiatric disorder     Urinary tract infection        Past Surgical History:   Procedure Laterality Date    ARTHROSCOPY KNEE      BACK SURGERY      L 4 or 5    CATARACT EXTRACTION Bilateral     CYSTOSCOPY W/ LASER LITHOTRIPSY N/A 8/20/2018    Procedure: LITHOTRISPY HOLMIUM LASER of bladder stones;  Surgeon: Dickson Nascimento MD;  Location: [facility] MAIN OR;  Service: Urology    MD TRANSURETHRAL ELEC-SURG PROSTATECTOM N/A 8/20/2018    Procedure: Jil Sanchez; TURP;  Surgeon: Dickson Nascimento MD;  Location: [facility] MAIN OR;  Service: Urology    SHOULDER SURGERY Right 05/31/2019       Family History   Problem Relation Age of Onset    Cancer Mother     Diabetes Father      I have reviewed and agree with the history as documented  Social History     Tobacco Use    Smoking status: Former Smoker     Packs/day: 1 00    Smokeless tobacco: Never Used    Tobacco comment: quit 50 years ago   Substance Use Topics    Alcohol use: Yes     Frequency: Monthly or less     Drinks per session: 1 or 2     Binge frequency: Less than monthly    Drug use: No        Review of Systems   Constitutional: Negative for chills and fever  Eyes: Negative for visual disturbance  Respiratory: Negative for shortness of breath  Cardiovascular: Negative for chest pain  Gastrointestinal: Positive for abdominal pain  Negative for abdominal distention  Genitourinary: Positive for decreased urine volume and dysuria  Skin: Negative for rash  Neurological: Negative for dizziness, syncope and weakness  Physical Exam  Physical Exam   Constitutional: He is oriented to person, place, and time  He appears well-developed and well-nourished  No distress  HENT:   Head: Normocephalic and atraumatic  Eyes: Pupils are equal, round, and reactive to light  Conjunctivae and EOM are normal    Neck: Normal range of motion  Neck supple  Cardiovascular: Normal rate, regular rhythm and normal heart sounds  Pulmonary/Chest: Effort normal and breath sounds normal  No respiratory distress  Abdominal: Soft  Bowel sounds are normal    Mild suprapubic tenderness without guarding or rebound   Genitourinary:   Genitourinary Comments: Diallo catheter in place, normal penis   Musculoskeletal: Normal range of motion  Neurological: He is alert and oriented to person, place, and time  Skin: Skin is warm and dry  Psychiatric: He has a normal mood and affect   His behavior is normal  Judgment and thought content normal        Vital Signs  ED Triage Vitals [12/23/19 2017]   Temperature Pulse Respirations Blood Pressure SpO2   97 8 °F (36 6 °C) 70 16 143/74 96 %      Temp Source Heart Rate Source Patient Position - Orthostatic VS BP Location FiO2 (%)   Temporal Monitor Sitting Left arm --      Pain Score       8           Vitals:    12/23/19 2017   BP: 143/74   Pulse: 70   Patient Position - Orthostatic VS: Sitting         Visual Acuity      ED Medications  Medications - No data to display    Diagnostic Studies  Results Reviewed     None                 No orders to display              Procedures  Procedures         ED Course                               MDM  Number of Diagnoses or Management Options  Problem with Cantor catheter, initial encounter [facility]): new and requires workup  UTI (urinary tract infection): new and requires workup  Diagnosis management comments: This is a 66year old man with a UTI which likely contributed to his clogged cantor  Cantor was swapped out and patient was started on antibiotics  Initial dose given here  Patient appeared clinically well  No difficulty tolerating PO  Discussed warning signs and symptoms with the patient as well as when to return to the emergency department versus follow up with [facility]  Patient states understanding and agreement with the plan  Amount and/or Complexity of Data Reviewed  Clinical lab tests: ordered and reviewed          Disposition  Final diagnoses:   None     ED Disposition     None      Follow-up Information    None         Patient's Medications   Discharge Prescriptions    No medications on file     No discharge procedures on file      ED Provider  Electronically Signed by           Arnaud Duvall MD  12/25/19 6601

## 2019-12-27 LAB — BACTERIA UR CULT: ABNORMAL

## 2020-02-28 ENCOUNTER — HOSPITAL ENCOUNTER (EMERGENCY)
Facility: HOSPITAL | Age: 75
Discharge: HOME | End: 2020-02-28
Attending: EMERGENCY MEDICINE | Admitting: EMERGENCY MEDICINE
Payer: MEDICARE

## 2020-02-28 VITALS
TEMPERATURE: 98.2 F | RESPIRATION RATE: 18 BRPM | DIASTOLIC BLOOD PRESSURE: 88 MMHG | BODY MASS INDEX: 26.58 KG/M2 | OXYGEN SATURATION: 98 % | SYSTOLIC BLOOD PRESSURE: 150 MMHG | HEART RATE: 66 BPM | WEIGHT: 180 LBS

## 2020-02-28 DIAGNOSIS — T83.9XXA PROBLEM WITH FOLEY CATHETER, INITIAL ENCOUNTER (HCC): Primary | ICD-10-CM

## 2020-02-28 PROCEDURE — 87147 CULTURE TYPE IMMUNOLOGIC: CPT | Performed by: EMERGENCY MEDICINE

## 2020-02-28 PROCEDURE — 87086 URINE CULTURE/COLONY COUNT: CPT | Performed by: EMERGENCY MEDICINE

## 2020-02-28 PROCEDURE — 87077 CULTURE AEROBIC IDENTIFY: CPT | Performed by: EMERGENCY MEDICINE

## 2020-02-28 PROCEDURE — 99283 EMERGENCY DEPT VISIT LOW MDM: CPT | Performed by: EMERGENCY MEDICINE

## 2020-02-28 PROCEDURE — 87186 SC STD MICRODIL/AGAR DIL: CPT | Performed by: EMERGENCY MEDICINE

## 2020-02-28 PROCEDURE — 99283 EMERGENCY DEPT VISIT LOW MDM: CPT

## 2020-02-28 RX ORDER — CEPHALEXIN 500 MG/1
500 CAPSULE ORAL ONCE
Status: COMPLETED | OUTPATIENT
Start: 2020-02-28 | End: 2020-02-28

## 2020-02-28 RX ORDER — CEPHALEXIN 500 MG/1
500 CAPSULE ORAL EVERY 12 HOURS SCHEDULED
Qty: 6 CAPSULE | Refills: 0 | Status: SHIPPED | OUTPATIENT
Start: 2020-02-28 | End: 2020-03-02

## 2020-02-28 RX ADMIN — CEPHALEXIN 500 MG: 500 CAPSULE ORAL at 13:01

## 2020-02-28 NOTE — ED PROVIDER NOTES
History  Chief Complaint   Patient presents with    Urinary Catheter Problem     61-year-old male presenting with Diallo catheter blocked  Patient has a neurogenic bladder is has a catheter the last 2 years  Patient states that it began to slowly not drain  Patient is concerned because he has suprapubic pressure and building up  Patient complains of 2/10 pain worse with palpation becomes a 4/10  It does not radiate  No fever chills rigors  Urine has not changed in quality  Patient had this catheter changed 3 weeks ago  Patient states he has had UTIs with his catheter before but does not feel like it is the case now because he feels like it is normal   Patient denies other symptoms at this time  Patient denies fever chills rigors neck pain neck stiffness headache lightheadedness dizziness chest pain palpitations shortness of breath cough pleurisy motor weakness numbness or tingling nausea vomiting diarrhea constipation  Prior to Admission Medications   Prescriptions Last Dose Informant Patient Reported?  Taking?   finasteride (PROSCAR) 5 mg tablet   Yes No   Sig: Take 5 mg by mouth daily   gabapentin (NEURONTIN) 300 mg capsule   No No   Sig: Take 1 capsule (300 mg total) by mouth daily at bedtime for 30 days   morphine (MSIR) 15 mg tablet   Yes No   Sig: Take 15 mg by mouth every 6 (six) hours   sertraline (ZOLOFT) 50 mg tablet   Yes No   Sig: Take 50 mg by mouth daily        Facility-Administered Medications: None       Past Medical History:   Diagnosis Date    Depression     Diallo catheter in place     Hypertension     Prostate enlargement     Psychiatric disorder     Urinary tract infection        Past Surgical History:   Procedure Laterality Date    ARTHROSCOPY KNEE      BACK SURGERY      L 4 or 5    CATARACT EXTRACTION Bilateral     CYSTOSCOPY W/ LASER LITHOTRIPSY N/A 8/20/2018    Procedure: LITHOTRISPY HOLMIUM LASER of bladder stones;  Surgeon: Dayna Reddy MD;  Location: [address] [place] [address] OR; Service: Urology    CT TRANSURETHRAL ELEC-SURG PROSTATECTOM N/A 8/20/2018    Procedure: Yoli Zurita; TURP;  Surgeon: Jayne Rodríguez MD;  Location: [address] [place] [address] MAIN OR;  Service: Urology    SHOULDER SURGERY Right 05/31/2019       Family History   Problem Relation Age of Onset    Cancer Mother     Diabetes Father      I have reviewed and agree with the history as documented  E-Cigarette/Vaping     E-Cigarette/Vaping Substances     Social History     Tobacco Use    Smoking status: Former Smoker     Packs/day: 1 00    Smokeless tobacco: Never Used    Tobacco comment: quit 50 years ago   Substance Use Topics    Alcohol use: Yes     Frequency: Monthly or less     Drinks per session: 1 or 2     Binge frequency: Less than monthly    Drug use: No       Review of Systems   Constitutional: Negative for activity change, appetite change, chills, diaphoresis, fatigue, fever and unexpected weight change  HENT: Negative for congestion, ear discharge, ear pain, facial swelling, hearing loss, nosebleeds, postnasal drip, rhinorrhea, sinus pressure, sneezing, sore throat, tinnitus and trouble swallowing  Eyes: Negative for photophobia, pain, redness, itching and visual disturbance  Respiratory: Negative for cough, chest tightness, shortness of breath, wheezing and stridor  Cardiovascular: Negative for chest pain, palpitations and leg swelling  Gastrointestinal: Positive for abdominal pain  Negative for abdominal distention, blood in stool, constipation, diarrhea, nausea and vomiting  Endocrine: Negative for polydipsia and polyuria  Genitourinary: Positive for difficulty urinating  Negative for decreased urine volume, dysuria, enuresis, flank pain, frequency, hematuria and urgency  Musculoskeletal: Negative for arthralgias, back pain, gait problem, myalgias, neck pain and neck stiffness  Skin: Negative for rash and wound  Allergic/Immunologic: Negative for immunocompromised state     Neurological: Negative for dizziness, seizures, syncope, facial asymmetry, speech difficulty, weakness, light-headedness, numbness and headaches  Hematological: Negative for adenopathy  Psychiatric/Behavioral: Negative for agitation, behavioral problems, confusion, dysphoric mood, hallucinations, self-injury and suicidal ideas  The patient is not nervous/anxious and is not hyperactive  Physical Exam  Physical Exam   Constitutional: He is oriented to person, place, and time  He appears well-developed and well-nourished  No distress  HENT:   Head: Normocephalic and atraumatic  Right Ear: External ear normal    Left Ear: External ear normal    Nose: Nose normal    Mouth/Throat: Oropharynx is clear and moist  No oropharyngeal exudate  Eyes: Pupils are equal, round, and reactive to light  Conjunctivae and EOM are normal  Right eye exhibits no discharge  Left eye exhibits no discharge  No scleral icterus  Neck: Normal range of motion  Neck supple  No JVD present  No tracheal deviation present  No thyromegaly present  Cardiovascular: Normal rate, regular rhythm, normal heart sounds and intact distal pulses  No murmur heard  Pulmonary/Chest: Effort normal and breath sounds normal  No stridor  No respiratory distress  He has no wheezes  He has no rales  Abdominal: Soft  Bowel sounds are normal  He exhibits no distension and no mass  There is tenderness in the suprapubic area  There is no rigidity, no rebound, no guarding, no CVA tenderness, no tenderness at McBurney's point and negative Cuellar's sign  Genitourinary:         Musculoskeletal: Normal range of motion  He exhibits no edema, tenderness or deformity  Lymphadenopathy:     He has no cervical adenopathy  Neurological: He is alert and oriented to person, place, and time  No cranial nerve deficit  He exhibits normal muscle tone  Coordination normal    Skin: Skin is warm and dry  No rash noted  He is not diaphoretic  No erythema     Psychiatric: He has a normal mood and affect  His behavior is normal  Judgment and thought content normal    Nursing note and vitals reviewed  Vital Signs  ED Triage Vitals   Temperature Pulse Respirations Blood Pressure SpO2   02/28/20 1138 02/28/20 1138 02/28/20 1302 02/28/20 1138 02/28/20 1138   98 2 °F (36 8 °C) 69 18 (!) 177/96 98 %      Temp Source Heart Rate Source Patient Position - Orthostatic VS BP Location FiO2 (%)   02/28/20 1138 02/28/20 1138 02/28/20 1138 02/28/20 1138 --   Temporal Monitor Sitting Left arm       Pain Score       02/28/20 1138       8           Vitals:    02/28/20 1138 02/28/20 1302   BP: (!) 177/96 150/88   Pulse: 69 66   Patient Position - Orthostatic VS: Sitting Sitting         Visual Acuity      ED Medications  Medications   cephalexin (KEFLEX) capsule 500 mg (500 mg Oral Given 2/28/20 1301)       Diagnostic Studies  Results Reviewed     Procedure Component Value Units Date/Time    Urine culture [[id number]] Collected:  02/28/20 1256    Lab Status: In process Specimen:  Urine, Indwelling Diallo Catheter Updated:  02/28/20 1300                 No orders to display              Procedures  Procedures         ED Course                               MDM  Number of Diagnoses or Management Options  Problem with Diallo catheter, initial encounter [facility]):   Diagnosis management comments: 26-year-old male presenting with clogged Diallo catheter  Differential diagnosis includes but not limited to urethral trauma, UTI, Diallo malpositioning Diallo malfunctioning  Urine Diallo change  Patient does have a history of UTIs and requiring admission  When he has Diallo change she gets antibiotics  Will follow urology regimen  Antibiotics now and for the next 3 days as per patient's recommendations as per Neurology recommendations  ED return precautions were discussed he follow-up with his urologist   Patient demonstrates understanding  Patient felt much better after draining of urine    Patient agrees to follow-up care with Urology  ED return precautions were discussed and he demonstrates understanding  Risk of Complications, Morbidity, and/or Mortality  Presenting problems: moderate  Diagnostic procedures: moderate  Management options: moderate    Patient Progress  Patient progress: improved        Disposition  Final diagnoses:   Problem with Diallo catheter, initial encounter [facility])     Time reflects when diagnosis was documented in both MDM as applicable and the Disposition within this note     Time User Action Codes Description Comment    2/28/2020 12:49 PM Jeffel Hannaford  9XXA] Problem with Diallo catheter, initial encounter [facility])       ED Disposition     ED Disposition Condition Date/Time Comment    Discharge Stable Fri Feb 28, 2020 12:49 PM Jose Lester discharge to home/self care  Return precautions were discussed with patient  Patient understands when to return to  Emergency department  Patient agrees to discharge plan and follow up care               Follow-up Information     Follow up With Specialties Details Why [address], DO Emergency Medicine, Internal Medicine Go in 2 days  [address] Mayo Shearer MD Urology Go in 3 days  [address]  [phone]            Discharge Medication List as of 2/28/2020  1:12 PM      START taking these medications    Details   cephalexin (KEFLEX) 500 mg capsule Take 1 capsule (500 mg total) by mouth every 12 (twelve) hours for 3 days, Starting Fri 2/28/2020, Until Mon 3/2/2020, Normal         CONTINUE these medications which have NOT CHANGED    Details   finasteride (PROSCAR) 5 mg tablet Take 5 mg by mouth daily, Historical Med      gabapentin (NEURONTIN) 300 mg capsule Take 1 capsule (300 mg total) by mouth daily at bedtime for 30 days, Starting Sat 9/7/2019, Until Mon 10/7/2019, Print      morphine (MSIR) 15 mg tablet Take 15 mg by mouth every 6 (six) hours, Historical Med sertraline (ZOLOFT) 50 mg tablet Take 50 mg by mouth daily  , Starting Fri 1/26/2018, Historical Med           No discharge procedures on file      PDMP Review     None          ED Provider  Electronically Signed by           Maurice Barrios DO  02/28/20 1665

## 2020-03-02 LAB
BACTERIA UR CULT: ABNORMAL
BACTERIA UR CULT: ABNORMAL

## 2020-03-14 ENCOUNTER — HOSPITAL ENCOUNTER (EMERGENCY)
Facility: HOSPITAL | Age: 75
Discharge: HOME | End: 2020-03-14
Attending: EMERGENCY MEDICINE | Admitting: EMERGENCY MEDICINE
Payer: MEDICARE

## 2020-03-14 VITALS
RESPIRATION RATE: 18 BRPM | HEART RATE: 78 BPM | TEMPERATURE: 97.8 F | DIASTOLIC BLOOD PRESSURE: 86 MMHG | BODY MASS INDEX: 26.58 KG/M2 | OXYGEN SATURATION: 96 % | WEIGHT: 180 LBS | SYSTOLIC BLOOD PRESSURE: 156 MMHG

## 2020-03-14 DIAGNOSIS — T83.9XXA FOLEY CATHETER PROBLEM, INITIAL ENCOUNTER (HCC): ICD-10-CM

## 2020-03-14 DIAGNOSIS — N39.0 COMPLICATED UTI (URINARY TRACT INFECTION): Primary | ICD-10-CM

## 2020-03-14 PROCEDURE — 87077 CULTURE AEROBIC IDENTIFY: CPT | Performed by: EMERGENCY MEDICINE

## 2020-03-14 PROCEDURE — 87086 URINE CULTURE/COLONY COUNT: CPT | Performed by: EMERGENCY MEDICINE

## 2020-03-14 PROCEDURE — 81001 URINALYSIS AUTO W/SCOPE: CPT | Performed by: EMERGENCY MEDICINE

## 2020-03-14 PROCEDURE — 87186 SC STD MICRODIL/AGAR DIL: CPT | Performed by: EMERGENCY MEDICINE

## 2020-03-14 PROCEDURE — 99284 EMERGENCY DEPT VISIT MOD MDM: CPT | Performed by: EMERGENCY MEDICINE

## 2020-03-14 PROCEDURE — 99283 EMERGENCY DEPT VISIT LOW MDM: CPT

## 2020-03-14 RX ORDER — CEPHALEXIN 500 MG/1
500 CAPSULE ORAL EVERY 8 HOURS SCHEDULED
Qty: 30 CAPSULE | Refills: 0 | Status: SHIPPED | OUTPATIENT
Start: 2020-03-14 | End: 2020-03-24

## 2020-03-14 NOTE — ED PROVIDER NOTES
History  Chief Complaint   Patient presents with    Urinary Catheter Problem     states no drainage since last night , last changed 3 weeks ago in ED, has appt with judi wednesday but cannot wait     Patient feels that his Diallo catheter has become blocked he has not had passage of fluid and has a feeling of fullness suprapubically in his penis  No fever no chills no nausea no vomiting no chest pain or shortness of breath  No cough no sputum  Some loose stools no outright diarrhea  Prior to Admission Medications   Prescriptions Last Dose Informant Patient Reported? Taking?   finasteride (PROSCAR) 5 mg tablet   Yes No   Sig: Take 5 mg by mouth daily   gabapentin (NEURONTIN) 300 mg capsule   No No   Sig: Take 1 capsule (300 mg total) by mouth daily at bedtime for 30 days   morphine (MSIR) 15 mg tablet   Yes No   Sig: Take 15 mg by mouth every 6 (six) hours   sertraline (ZOLOFT) 50 mg tablet   Yes No   Sig: Take 50 mg by mouth daily        Facility-Administered Medications: None       Past Medical History:   Diagnosis Date    Depression     Diallo catheter in place     Hypertension     Prostate enlargement     Psychiatric disorder     Urinary tract infection        Past Surgical History:   Procedure Laterality Date    ARTHROSCOPY KNEE      BACK SURGERY      L 4 or 5    CATARACT EXTRACTION Bilateral     CYSTOSCOPY W/ LASER LITHOTRIPSY N/A 8/20/2018    Procedure: LITHOTRISPY HOLMIUM LASER of bladder stones;  Surgeon: Farhad Rios MD;  Location: [address] [place] [address] OR;  Service: Urology    HI TRANSURETHRAL ELEC-SURG PROSTATECTOM N/A 8/20/2018    Procedure: CYSTOSCOPY; TURP;  Surgeon: Farhad Rios MD;  Location: [address] [place] [address] OR;  Service: Urology    SHOULDER SURGERY Right 05/31/2019       Family History   Problem Relation Age of Onset    Cancer Mother     Diabetes Father      I have reviewed and agree with the history as documented      E-Cigarette/Vaping     E-Cigarette/Vaping Substances     Social History Tobacco Use    Smoking status: Former Smoker     Packs/day: 1 00    Smokeless tobacco: Never Used    Tobacco comment: quit 50 years ago   Substance Use Topics    Alcohol use: Yes     Frequency: Monthly or less     Drinks per session: 1 or 2     Binge frequency: Less than monthly    Drug use: No       Review of Systems   Constitutional: Negative for fever  HENT: Negative for rhinorrhea  Eyes: Negative for visual disturbance  Respiratory: Negative for shortness of breath  Cardiovascular: Negative for chest pain  Gastrointestinal: Negative for diarrhea and vomiting  Endocrine: Negative for polydipsia  Genitourinary: Negative for dysuria, frequency and hematuria  Musculoskeletal: Negative for neck stiffness  Skin: Negative for rash  Allergic/Immunologic: Negative for immunocompromised state  Neurological: Negative for speech difficulty, weakness and numbness  Physical Exam  Physical Exam   Constitutional: He is oriented to person, place, and time  He appears well-nourished  No distress  HENT:   Head: Normocephalic and atraumatic  Eyes: Conjunctivae and EOM are normal    Neck: Normal range of motion  Neck supple  Cardiovascular: Regular rhythm and normal heart sounds  Pulmonary/Chest: Effort normal and breath sounds normal    Abdominal: Soft  Bowel sounds are normal  He exhibits no mass  There is no tenderness  There is no guarding  Musculoskeletal: He exhibits no edema  Neurological: He is alert and oriented to person, place, and time  Skin: Skin is warm and dry  Psychiatric: He has a normal mood and affect         Vital Signs  ED Triage Vitals [03/14/20 1306]   Temperature Pulse Respirations Blood Pressure SpO2   97 8 °F (36 6 °C) 78 18 156/86 96 %      Temp Source Heart Rate Source Patient Position - Orthostatic VS BP Location FiO2 (%)   Temporal Monitor Sitting Left arm --      Pain Score       --           Vitals:    03/14/20 1306   BP: 156/86   Pulse: 78 Patient Position - Orthostatic VS: Sitting         Visual Acuity      ED Medications  Medications - No data to display    Diagnostic Studies  Results Reviewed     Procedure Component Value Units Date/Time    Urine culture [[id number]] Collected:  03/14/20 1327    Lab Status:  No result Specimen:  Urine, Indwelling Diallo Catheter     UA w Reflex to Microscopic w Reflex to Culture [[id number]] Collected:  03/14/20 1326    Lab Status:  No result Specimen:  Urine, Straight Cath                  No orders to display              Procedures  Procedures         ED Course                                 MDM  Number of Diagnoses or Management Options  Complicated UTI (urinary tract infection): Diallo catheter problem, initial encounter [facility]):   Diagnosis management comments: Blocked urinary catheter will replace it and check urine via new catheter    UTI - checked most recent sensitivities, will rx keflex        Disposition  Final diagnoses:   None     ED Disposition     None      Follow-up Information    None         Patient's Medications   Discharge Prescriptions    No medications on file     No discharge procedures on file      PDMP Review     None          ED Provider  Electronically Signed by           Payton Kent MD  03/14/20 6508

## 2020-03-16 LAB — BACTERIA UR CULT: ABNORMAL

## 2020-04-03 ENCOUNTER — HOSPITAL ENCOUNTER (EMERGENCY)
Facility: HOSPITAL | Age: 75
Discharge: HOME | End: 2020-04-03
Attending: INTERNAL MEDICINE | Admitting: INTERNAL MEDICINE
Payer: MEDICARE

## 2020-04-03 VITALS
DIASTOLIC BLOOD PRESSURE: 90 MMHG | OXYGEN SATURATION: 98 % | HEIGHT: 69 IN | HEART RATE: 84 BPM | BODY MASS INDEX: 27.4 KG/M2 | TEMPERATURE: 98.6 F | WEIGHT: 185 LBS | SYSTOLIC BLOOD PRESSURE: 169 MMHG | RESPIRATION RATE: 16 BRPM

## 2020-04-03 DIAGNOSIS — T83.9XXA PROBLEM WITH FOLEY CATHETER, INITIAL ENCOUNTER (HCC): Primary | ICD-10-CM

## 2020-04-03 PROCEDURE — 99284 EMERGENCY DEPT VISIT MOD MDM: CPT | Performed by: INTERNAL MEDICINE

## 2020-04-03 PROCEDURE — 99283 EMERGENCY DEPT VISIT LOW MDM: CPT

## 2020-04-03 RX ORDER — CIPROFLOXACIN 500 MG/1
500 TABLET, FILM COATED ORAL ONCE
Status: COMPLETED | OUTPATIENT
Start: 2020-04-03 | End: 2020-04-03

## 2020-04-03 RX ADMIN — CIPROFLOXACIN HYDROCHLORIDE 500 MG: 500 TABLET, FILM COATED ORAL at 14:20

## 2020-04-16 ENCOUNTER — HOSPITAL ENCOUNTER (EMERGENCY)
Facility: HOSPITAL | Age: 75
Discharge: HOME | End: 2020-04-16
Attending: EMERGENCY MEDICINE | Admitting: EMERGENCY MEDICINE
Payer: MEDICARE

## 2020-04-16 VITALS
OXYGEN SATURATION: 98 % | TEMPERATURE: 98 F | HEART RATE: 64 BPM | RESPIRATION RATE: 16 BRPM | SYSTOLIC BLOOD PRESSURE: 198 MMHG | DIASTOLIC BLOOD PRESSURE: 79 MMHG

## 2020-04-16 DIAGNOSIS — T83.9XXA FOLEY CATHETER PROBLEM, INITIAL ENCOUNTER (HCC): Primary | ICD-10-CM

## 2020-04-16 PROCEDURE — 99282 EMERGENCY DEPT VISIT SF MDM: CPT | Performed by: PHYSICIAN ASSISTANT

## 2020-04-16 PROCEDURE — 99281 EMR DPT VST MAYX REQ PHY/QHP: CPT

## 2020-05-18 ENCOUNTER — HOSPITAL ENCOUNTER (EMERGENCY)
Facility: HOSPITAL | Age: 75
Discharge: HOME | End: 2020-05-18
Attending: EMERGENCY MEDICINE | Admitting: EMERGENCY MEDICINE
Payer: MEDICARE

## 2020-05-18 VITALS
BODY MASS INDEX: 27.7 KG/M2 | HEART RATE: 66 BPM | HEIGHT: 69 IN | SYSTOLIC BLOOD PRESSURE: 182 MMHG | WEIGHT: 187 LBS | TEMPERATURE: 98.3 F | RESPIRATION RATE: 18 BRPM | DIASTOLIC BLOOD PRESSURE: 90 MMHG | OXYGEN SATURATION: 98 %

## 2020-05-18 DIAGNOSIS — N39.0 UTI (URINARY TRACT INFECTION): Primary | ICD-10-CM

## 2020-05-18 PROCEDURE — 87186 SC STD MICRODIL/AGAR DIL: CPT | Performed by: EMERGENCY MEDICINE

## 2020-05-18 PROCEDURE — 99283 EMERGENCY DEPT VISIT LOW MDM: CPT | Performed by: EMERGENCY MEDICINE

## 2020-05-18 PROCEDURE — 87147 CULTURE TYPE IMMUNOLOGIC: CPT | Performed by: EMERGENCY MEDICINE

## 2020-05-18 PROCEDURE — 81001 URINALYSIS AUTO W/SCOPE: CPT | Performed by: EMERGENCY MEDICINE

## 2020-05-18 PROCEDURE — 99283 EMERGENCY DEPT VISIT LOW MDM: CPT

## 2020-05-18 PROCEDURE — 87086 URINE CULTURE/COLONY COUNT: CPT | Performed by: EMERGENCY MEDICINE

## 2020-05-18 PROCEDURE — 87077 CULTURE AEROBIC IDENTIFY: CPT | Performed by: EMERGENCY MEDICINE

## 2020-05-18 RX ORDER — CEPHALEXIN 500 MG/1
500 CAPSULE ORAL EVERY 12 HOURS SCHEDULED
Qty: 14 CAPSULE | Refills: 0 | Status: SHIPPED | OUTPATIENT
Start: 2020-05-18 | End: 2020-05-25

## 2020-05-21 LAB
BACTERIA UR CULT: ABNORMAL
BACTERIA UR CULT: ABNORMAL

## 2020-06-06 ENCOUNTER — HOSPITAL ENCOUNTER (EMERGENCY)
Facility: HOSPITAL | Age: 75
Discharge: HOME | End: 2020-06-06
Attending: EMERGENCY MEDICINE
Payer: MEDICARE

## 2020-06-06 VITALS
BODY MASS INDEX: 27.25 KG/M2 | OXYGEN SATURATION: 95 % | WEIGHT: 184 LBS | SYSTOLIC BLOOD PRESSURE: 123 MMHG | HEIGHT: 69 IN | HEART RATE: 64 BPM | TEMPERATURE: 98.1 F | DIASTOLIC BLOOD PRESSURE: 67 MMHG | RESPIRATION RATE: 16 BRPM

## 2020-06-06 DIAGNOSIS — N39.0 UTI (URINARY TRACT INFECTION): ICD-10-CM

## 2020-06-06 DIAGNOSIS — T83.9XXA PROBLEM WITH FOLEY CATHETER, INITIAL ENCOUNTER (HCC): Primary | ICD-10-CM

## 2020-06-06 PROCEDURE — 87147 CULTURE TYPE IMMUNOLOGIC: CPT | Performed by: PHYSICIAN ASSISTANT

## 2020-06-06 PROCEDURE — 87086 URINE CULTURE/COLONY COUNT: CPT | Performed by: PHYSICIAN ASSISTANT

## 2020-06-06 PROCEDURE — 81001 URINALYSIS AUTO W/SCOPE: CPT | Performed by: PHYSICIAN ASSISTANT

## 2020-06-06 PROCEDURE — 87077 CULTURE AEROBIC IDENTIFY: CPT | Performed by: PHYSICIAN ASSISTANT

## 2020-06-06 PROCEDURE — 99284 EMERGENCY DEPT VISIT MOD MDM: CPT | Performed by: PHYSICIAN ASSISTANT

## 2020-06-06 PROCEDURE — 87186 SC STD MICRODIL/AGAR DIL: CPT | Performed by: PHYSICIAN ASSISTANT

## 2020-06-06 PROCEDURE — 99283 EMERGENCY DEPT VISIT LOW MDM: CPT

## 2020-06-06 RX ORDER — CEPHALEXIN 500 MG/1
500 CAPSULE ORAL ONCE
Status: COMPLETED | OUTPATIENT
Start: 2020-06-06 | End: 2020-06-06

## 2020-06-06 RX ORDER — CEPHALEXIN 500 MG/1
500 CAPSULE ORAL EVERY 12 HOURS SCHEDULED
Qty: 5 CAPSULE | Refills: 0 | Status: SHIPPED | OUTPATIENT
Start: 2020-06-06 | End: 2020-06-09

## 2020-06-06 RX ADMIN — CEPHALEXIN 500 MG: 500 CAPSULE ORAL at 18:20

## 2020-06-09 LAB
BACTERIA UR CULT: ABNORMAL
BACTERIA UR CULT: ABNORMAL

## 2020-07-02 ENCOUNTER — HOSPITAL ENCOUNTER (EMERGENCY)
Facility: HOSPITAL | Age: 75
Discharge: HOME | End: 2020-07-02
Attending: EMERGENCY MEDICINE
Payer: MEDICARE

## 2020-07-02 VITALS
SYSTOLIC BLOOD PRESSURE: 158 MMHG | BODY MASS INDEX: 27.7 KG/M2 | DIASTOLIC BLOOD PRESSURE: 74 MMHG | HEART RATE: 64 BPM | HEIGHT: 69 IN | WEIGHT: 187 LBS | OXYGEN SATURATION: 99 % | TEMPERATURE: 98.4 F | RESPIRATION RATE: 18 BRPM

## 2020-07-02 DIAGNOSIS — N39.0 UTI (URINARY TRACT INFECTION): ICD-10-CM

## 2020-07-02 DIAGNOSIS — T83.9XXA PROBLEM WITH FOLEY CATHETER, INITIAL ENCOUNTER (HCC): Primary | ICD-10-CM

## 2020-07-02 LAB
BACTERIA UR QL AUTO: ABNORMAL /HPF
BILIRUB UR QL STRIP: NEGATIVE
CLARITY UR: ABNORMAL
COLOR UR: YELLOW
GLUCOSE UR STRIP-MCNC: NEGATIVE MG/DL
HGB UR QL STRIP.AUTO: ABNORMAL
KETONES UR STRIP-MCNC: NEGATIVE MG/DL
LEUKOCYTE ESTERASE UR QL STRIP: ABNORMAL
NITRITE UR QL STRIP: POSITIVE
NON-SQ EPI CELLS URNS QL MICRO: ABNORMAL /HPF
PH UR STRIP.AUTO: 8.5 [PH]
PROT UR STRIP-MCNC: ABNORMAL MG/DL
RBC #/AREA URNS AUTO: ABNORMAL /HPF
SP GR UR STRIP.AUTO: 1.01 (ref 1–1.03)
UROBILINOGEN UR QL STRIP.AUTO: 0.2 E.U./DL
WBC #/AREA URNS AUTO: ABNORMAL /HPF

## 2020-07-02 PROCEDURE — 87086 URINE CULTURE/COLONY COUNT: CPT | Performed by: EMERGENCY MEDICINE

## 2020-07-02 PROCEDURE — 81001 URINALYSIS AUTO W/SCOPE: CPT | Performed by: EMERGENCY MEDICINE

## 2020-07-02 PROCEDURE — 87181 SC STD AGAR DILUTION PER AGT: CPT | Performed by: EMERGENCY MEDICINE

## 2020-07-02 PROCEDURE — 87077 CULTURE AEROBIC IDENTIFY: CPT | Performed by: EMERGENCY MEDICINE

## 2020-07-02 PROCEDURE — 99283 EMERGENCY DEPT VISIT LOW MDM: CPT

## 2020-07-02 PROCEDURE — 87186 SC STD MICRODIL/AGAR DIL: CPT | Performed by: EMERGENCY MEDICINE

## 2020-07-02 PROCEDURE — 99284 EMERGENCY DEPT VISIT MOD MDM: CPT | Performed by: EMERGENCY MEDICINE

## 2020-07-02 RX ORDER — CEPHALEXIN 500 MG/1
500 CAPSULE ORAL EVERY 12 HOURS SCHEDULED
Status: DISCONTINUED | OUTPATIENT
Start: 2020-07-02 | End: 2020-07-02 | Stop reason: HOSPADM

## 2020-07-02 RX ORDER — CEPHALEXIN 500 MG/1
500 CAPSULE ORAL EVERY 12 HOURS SCHEDULED
Qty: 14 CAPSULE | Refills: 0 | Status: SHIPPED | OUTPATIENT
Start: 2020-07-02 | End: 2020-07-09

## 2020-07-02 RX ADMIN — CEPHALEXIN 500 MG: 500 CAPSULE ORAL at 20:55

## 2020-07-03 NOTE — ED PROVIDER NOTES
History  Chief Complaint   Patient presents with    Urinary Catheter Insertion or Check     Pt states he thinks his catheter is blocked  Pt has appt Wed but says he can't wait that long  Pt has only had about 100mL out this morning  This is a 24-year-old man with chief complaint failure of Diallo catheter  Patient states that he has had problems with his prostate for years in his result has required of Diallo catheter usage  He states that periodically approximately every 3 weeks he requires a change of his Diallo catheter or outside becomes infected and or blocked  Patient reports that he was due in 5 days for replacement of his Diallo  Patient has his Diallo replaced at his urologist office Dr Marvin Tolbert  Patient still since this morning he has had essentially no urine output  States he has some mild suprapubic pressure but otherwise feels well  He does not think he is dehydrated  Prior to Admission Medications   Prescriptions Last Dose Informant Patient Reported?  Taking?   finasteride (PROSCAR) 5 mg tablet 7/2/2020 at Unknown time  Yes Yes   Sig: Take 5 mg by mouth daily   gabapentin (NEURONTIN) 300 mg capsule 7/1/2020 at Unknown time  No Yes   Sig: Take 1 capsule (300 mg total) by mouth daily at bedtime for 30 days   morphine (MSIR) 15 mg tablet 7/2/2020 at Unknown time  Yes Yes   Sig: Take 15 mg by mouth every 6 (six) hours   sertraline (ZOLOFT) 50 mg tablet Not Taking at Unknown time  Yes No   Sig: Take 50 mg by mouth daily        Facility-Administered Medications: None       Past Medical History:   Diagnosis Date    Depression     Diallo catheter in place     Hypertension     Prostate enlargement     Psychiatric disorder     Urinary tract infection        Past Surgical History:   Procedure Laterality Date    ARTHROSCOPY KNEE      BACK SURGERY      L 4 or 5    CATARACT EXTRACTION Bilateral     CYSTOSCOPY W/ LASER LITHOTRIPSY N/A 8/20/2018    Procedure: LITHOTRISPY HOLMIUM LASER of bladder stones;  Surgeon: Alexa Evans MD;  Location: [facility] MAIN OR;  Service: Urology    MO TRANSURETHRAL ELEC-SURG PROSTATECTOM N/A 8/20/2018    Procedure: Susa Ovens; TURP;  Surgeon: Alexa Evans MD;  Location: [facility] MAIN OR;  Service: Urology    SHOULDER SURGERY Right 05/31/2019       Family History   Problem Relation Age of Onset    Cancer Mother     Diabetes Father      I have reviewed and agree with the history as documented  E-Cigarette/Vaping    E-Cigarette Use Never User      E-Cigarette/Vaping Substances     Social History     Tobacco Use    Smoking status: Former Smoker     Packs/day: 1 00    Smokeless tobacco: Never Used    Tobacco comment: quit 50 years ago   Substance Use Topics    Alcohol use: Yes     Frequency: Monthly or less     Drinks per session: 1 or 2     Binge frequency: Less than monthly    Drug use: No       Review of Systems   Constitutional: Negative for chills and fever  Eyes: Negative for visual disturbance  Respiratory: Negative for shortness of breath  Cardiovascular: Negative for chest pain  Gastrointestinal: Negative for abdominal distention and abdominal pain  Endocrine: Negative for polyuria  Genitourinary: Positive for decreased urine volume  Negative for dysuria  Skin: Negative for rash  Neurological: Negative for dizziness, syncope and weakness  Physical Exam  Physical Exam   Constitutional: He is oriented to person, place, and time  He appears well-developed and well-nourished  No distress  HENT:   Head: Normocephalic and atraumatic  Eyes: Pupils are equal, round, and reactive to light  Conjunctivae and EOM are normal    Neck: Normal range of motion  Neck supple  Cardiovascular: Normal rate, regular rhythm and normal heart sounds  Pulmonary/Chest: Effort normal and breath sounds normal  No stridor  No respiratory distress  He has no wheezes  Abdominal: Soft  Bowel sounds are normal  He exhibits no distension  There is no tenderness   There is no rebound and no guarding  Genitourinary: Penis normal  No penile tenderness  Genitourinary Comments: No testicular deformity  Patient is circumcised  Musculoskeletal: Normal range of motion  Neurological: He is alert and oriented to person, place, and time  Skin: Skin is warm and dry  Psychiatric: He has a normal mood and affect  His behavior is normal  Judgment and thought content normal        Vital Signs  ED Triage Vitals [07/02/20 1944]   Temperature Pulse Respirations Blood Pressure SpO2   98 4 °F (36 9 °C) 64 18 158/74 99 %      Temp Source Heart Rate Source Patient Position - Orthostatic VS BP Location FiO2 (%)   Temporal Monitor -- Left arm --      Pain Score       3           Vitals:    07/02/20 1944   BP: 158/74   Pulse: 64         Visual Acuity      ED Medications  Medications   cephalexin (KEFLEX) capsule 500 mg (500 mg Oral Given 7/2/20 2055)       Diagnostic Studies  Results Reviewed     Procedure Component Value Units Date/Time    Urine Microscopic [[id number]]  (Abnormal) Collected:  07/02/20 2026    Lab Status:  Final result Specimen:  Urine, Indwelling Diallo Catheter Updated:  07/02/20 2040     RBC, UA None Seen /hpf      WBC, UA Innumerable /hpf      Epithelial Cells Occasional /hpf      Bacteria, UA Innumerable /hpf     Urine culture [[id number]] Collected:  07/02/20 2026    Lab Status:   In process Specimen:  Urine, Indwelling Diallo Catheter Updated:  07/02/20 2040    UA w Reflex to Microscopic w Reflex to Culture [[id number]]  (Abnormal) Collected:  07/02/20 2026    Lab Status:  Final result Specimen:  Urine, Indwelling Diallo Catheter Updated:  07/02/20 2035     Color, UA Yellow     Clarity, UA Cloudy     Specific Gravity, UA 1 015     pH, UA 8 5     Leukocytes, UA 3+     Nitrite, UA Positive     Protein, UA Trace mg/dl      Glucose, UA Negative mg/dl      Ketones, UA Negative mg/dl      Urobilinogen, UA 0 2 E U /dl      Bilirubin, UA Negative     Blood, UA 3+                 No orders to display              Procedures  Procedures         ED Course       US AUDIT      Most Recent Value   Initial Alcohol Screen: US AUDIT-C    1  How often do you have a drink containing alcohol?  0 Filed at: 07/02/2020 1947   3a  Male UNDER 65: How often do you have five or more drinks on one occasion? 0 Filed at: 07/02/2020 1947   Audit-C Score  0 Filed at: 07/02/2020 1947                  ESTEBAN/DAST-10      Most Recent Value   How many times in the past year have you    Used an illegal drug or used a prescription medication for non-medical reasons? Never Filed at: 07/02/2020 1947                                MDM  Number of Diagnoses or Management Options  Problem with Diallo catheter, initial encounter [facility]): new and requires workup  UTI (urinary tract infection): new and requires workup  Diagnosis management comments: This is a 75-year-old man with Diallo catheter dysfunction likely secondary to his urinary tract infection  Patient tolerates Keflex without difficulty for urinary tract infections which she has frequently  Given 1st dose here  Given a script to go home with  Diallo catheter replaced with good output  Patient states some relief of suprapubic pressure  Patient appears clinically well and is eager to return home  Has already arranged outpatient Urology follow-up  Discussed warning signs and symptoms with the patient as well as when to return to the emergency department versus follow up with [facility]  Patient states understanding and agreement with the plan  Amount and/or Complexity of Data Reviewed  Clinical lab tests: ordered and reviewed          Disposition  Final diagnoses:   Problem with Diallo catheter, initial encounter [facility])   UTI (urinary tract infection)     Time reflects when diagnosis was documented in both MDM as applicable and the Disposition within this note     Time User Action Codes Description Comment    7/2/2020  8:26 PM Tamiko Diego  9XXA] Problem with Diallo catheter, initial encounter ([facility] [address] )     7/2/2020  8:48 PM Alisa Tinajero Add [N39 0] UTI (urinary tract infection)       ED Disposition     ED Disposition Condition Date/Time Comment    Discharge Stable Thu Jul 2, 2020 2026 Thierry Julieth discharge to home/self care  Follow-up Information     Follow up With Specialties Details Why Yessy Gregory MD Urology Call   [address]  [phone]            Discharge Medication List as of 7/2/2020  8:48 PM      START taking these medications    Details   cephalexin (KEFLEX) 500 mg capsule Take 1 capsule (500 mg total) by mouth every 12 (twelve) hours for 7 days, Starting Thu 7/2/2020, Until Thu 7/9/2020, Normal         CONTINUE these medications which have NOT CHANGED    Details   finasteride (PROSCAR) 5 mg tablet Take 5 mg by mouth daily, Historical Med      gabapentin (NEURONTIN) 300 mg capsule Take 1 capsule (300 mg total) by mouth daily at bedtime for 30 days, Starting Sat 9/7/2019, Until Thu 7/2/2020, Print      morphine (MSIR) 15 mg tablet Take 15 mg by mouth every 6 (six) hours, Historical Med      sertraline (ZOLOFT) 50 mg tablet Take 50 mg by mouth daily  , Starting Fri 1/26/2018, Historical Med           No discharge procedures on file      PDMP Review     None          ED Provider  Electronically Signed by           Davy Ribera MD  07/02/20 6016

## 2020-07-05 LAB
BACTERIA UR CULT: ABNORMAL
BACTERIA UR CULT: ABNORMAL

## 2020-07-05 RX ORDER — SULFAMETHOXAZOLE AND TRIMETHOPRIM 800; 160 MG/1; MG/1
1 TABLET ORAL 2 TIMES DAILY
Qty: 10 TABLET | Refills: 0 | Status: SHIPPED | OUTPATIENT
Start: 2020-07-05 | End: 2020-07-10

## 2020-07-27 ENCOUNTER — TRANSCRIBE ORDERS (OUTPATIENT)
Dept: LAB | Facility: MEDICAL CENTER | Age: 75
End: 2020-07-27

## 2020-07-27 ENCOUNTER — APPOINTMENT (OUTPATIENT)
Dept: LAB | Facility: MEDICAL CENTER | Age: 75
End: 2020-07-27
Payer: MEDICARE

## 2020-07-27 DIAGNOSIS — E78.5 HYPERLIPIDEMIA, UNSPECIFIED HYPERLIPIDEMIA TYPE: ICD-10-CM

## 2020-07-27 DIAGNOSIS — I10 HYPERTENSION, UNSPECIFIED TYPE: Primary | ICD-10-CM

## 2020-07-27 DIAGNOSIS — E55.9 VITAMIN D DEFICIENCY: ICD-10-CM

## 2020-07-27 DIAGNOSIS — I10 HYPERTENSION, UNSPECIFIED TYPE: ICD-10-CM

## 2020-07-27 DIAGNOSIS — N19 RENAL FAILURE, UNSPECIFIED CHRONICITY: ICD-10-CM

## 2020-07-27 LAB
25(OH)D3 SERPL-MCNC: 32.8 NG/ML (ref 30–100)
ALBUMIN SERPL BCP-MCNC: 3.5 G/DL (ref 3.5–5)
ALP SERPL-CCNC: 112 U/L (ref 46–116)
ALT SERPL W P-5'-P-CCNC: 17 U/L (ref 12–78)
ANION GAP SERPL CALCULATED.3IONS-SCNC: 6 MMOL/L (ref 4–13)
AST SERPL W P-5'-P-CCNC: 18 U/L (ref 5–45)
BILIRUB SERPL-MCNC: 0.36 MG/DL (ref 0.2–1)
BUN SERPL-MCNC: 25 MG/DL (ref 5–25)
CALCIUM SERPL-MCNC: 9.8 MG/DL (ref 8.3–10.1)
CHLORIDE SERPL-SCNC: 110 MMOL/L (ref 100–108)
CHOLEST SERPL-MCNC: 156 MG/DL (ref 50–200)
CO2 SERPL-SCNC: 25 MMOL/L (ref 21–32)
CREAT SERPL-MCNC: 1.26 MG/DL (ref 0.6–1.3)
ERYTHROCYTE [DISTWIDTH] IN BLOOD BY AUTOMATED COUNT: 13 % (ref 11.6–15.1)
GFR SERPL CREATININE-BSD FRML MDRD: 55 ML/MIN/1.73SQ M
GLUCOSE P FAST SERPL-MCNC: 88 MG/DL (ref 65–99)
HCT VFR BLD AUTO: 36.1 % (ref 36.5–49.3)
HDLC SERPL-MCNC: 41 MG/DL
HGB BLD-MCNC: 11.8 G/DL (ref 12–17)
LDLC SERPL CALC-MCNC: 96 MG/DL (ref 0–100)
MCH RBC QN AUTO: 32.3 PG (ref 26.8–34.3)
MCHC RBC AUTO-ENTMCNC: 32.7 G/DL (ref 31.4–37.4)
MCV RBC AUTO: 99 FL (ref 82–98)
NONHDLC SERPL-MCNC: 115 MG/DL
PLATELET # BLD AUTO: 238 THOUSANDS/UL (ref 149–390)
PMV BLD AUTO: 10.8 FL (ref 8.9–12.7)
POTASSIUM SERPL-SCNC: 4.1 MMOL/L (ref 3.5–5.3)
PROT SERPL-MCNC: 6.7 G/DL (ref 6.4–8.2)
RBC # BLD AUTO: 3.65 MILLION/UL (ref 3.88–5.62)
SODIUM SERPL-SCNC: 141 MMOL/L (ref 136–145)
TRIGL SERPL-MCNC: 97 MG/DL
TSH SERPL DL<=0.05 MIU/L-ACNC: 2.7 UIU/ML (ref 0.36–3.74)
WBC # BLD AUTO: 6.14 THOUSAND/UL (ref 4.31–10.16)

## 2020-07-27 PROCEDURE — 84443 ASSAY THYROID STIM HORMONE: CPT

## 2020-07-27 PROCEDURE — 85027 COMPLETE CBC AUTOMATED: CPT

## 2020-07-27 PROCEDURE — 82306 VITAMIN D 25 HYDROXY: CPT

## 2020-07-27 PROCEDURE — 36415 COLL VENOUS BLD VENIPUNCTURE: CPT

## 2020-07-27 PROCEDURE — 80061 LIPID PANEL: CPT

## 2020-07-27 PROCEDURE — 80053 COMPREHEN METABOLIC PANEL: CPT

## 2020-07-31 ENCOUNTER — HOSPITAL ENCOUNTER (EMERGENCY)
Facility: HOSPITAL | Age: 75
Discharge: HOME | End: 2020-07-31
Attending: EMERGENCY MEDICINE | Admitting: EMERGENCY MEDICINE
Payer: MEDICARE

## 2020-07-31 VITALS
TEMPERATURE: 97.4 F | BODY MASS INDEX: 27.7 KG/M2 | RESPIRATION RATE: 18 BRPM | WEIGHT: 187 LBS | SYSTOLIC BLOOD PRESSURE: 175 MMHG | OXYGEN SATURATION: 97 % | HEIGHT: 69 IN | HEART RATE: 56 BPM | DIASTOLIC BLOOD PRESSURE: 93 MMHG

## 2020-07-31 DIAGNOSIS — Z46.6 ENCOUNTER FOR FOLEY CATHETER REPLACEMENT: ICD-10-CM

## 2020-07-31 DIAGNOSIS — N39.0 UTI (URINARY TRACT INFECTION): Primary | ICD-10-CM

## 2020-07-31 LAB
BACTERIA UR QL AUTO: ABNORMAL /HPF
BILIRUB UR QL STRIP: NEGATIVE
CLARITY UR: CLEAR
COLOR UR: YELLOW
GLUCOSE UR STRIP-MCNC: NEGATIVE MG/DL
HGB UR QL STRIP.AUTO: ABNORMAL
KETONES UR STRIP-MCNC: NEGATIVE MG/DL
LEUKOCYTE ESTERASE UR QL STRIP: ABNORMAL
MUCOUS THREADS UR QL AUTO: ABNORMAL
NITRITE UR QL STRIP: POSITIVE
NON-SQ EPI CELLS URNS QL MICRO: ABNORMAL /HPF
OTHER STN SPEC: ABNORMAL
PH UR STRIP.AUTO: 6.5 [PH]
PROT UR STRIP-MCNC: NEGATIVE MG/DL
RBC #/AREA URNS AUTO: ABNORMAL /HPF
SP GR UR STRIP.AUTO: 1.01 (ref 1–1.03)
UROBILINOGEN UR QL STRIP.AUTO: 0.2 E.U./DL
WBC #/AREA URNS AUTO: ABNORMAL /HPF

## 2020-07-31 PROCEDURE — 87086 URINE CULTURE/COLONY COUNT: CPT | Performed by: EMERGENCY MEDICINE

## 2020-07-31 PROCEDURE — 99283 EMERGENCY DEPT VISIT LOW MDM: CPT

## 2020-07-31 PROCEDURE — 99285 EMERGENCY DEPT VISIT HI MDM: CPT | Performed by: EMERGENCY MEDICINE

## 2020-07-31 PROCEDURE — 81001 URINALYSIS AUTO W/SCOPE: CPT | Performed by: EMERGENCY MEDICINE

## 2020-07-31 PROCEDURE — 87077 CULTURE AEROBIC IDENTIFY: CPT | Performed by: EMERGENCY MEDICINE

## 2020-07-31 PROCEDURE — 87186 SC STD MICRODIL/AGAR DIL: CPT | Performed by: EMERGENCY MEDICINE

## 2020-07-31 PROCEDURE — 87147 CULTURE TYPE IMMUNOLOGIC: CPT | Performed by: EMERGENCY MEDICINE

## 2020-07-31 RX ORDER — MELATONIN
4000 DAILY
COMMUNITY

## 2020-07-31 RX ORDER — CEPHALEXIN 500 MG/1
500 CAPSULE ORAL ONCE
Status: COMPLETED | OUTPATIENT
Start: 2020-07-31 | End: 2020-07-31

## 2020-07-31 RX ORDER — CEPHALEXIN 500 MG/1
500 CAPSULE ORAL EVERY 12 HOURS SCHEDULED
Qty: 14 CAPSULE | Refills: 0 | Status: SHIPPED | OUTPATIENT
Start: 2020-07-31 | End: 2020-08-07

## 2020-07-31 RX ADMIN — CEPHALEXIN 500 MG: 500 CAPSULE ORAL at 16:47

## 2020-07-31 NOTE — ED PROVIDER NOTES
History  Chief Complaint   Patient presents with    Urinary Catheter Insertion or Check     This 70-year-old man with a history of chronic indwelling Diallo catheter  Reports that he has a changed every 2 weeks due to recurrence of urinary tract infections and clogging  Patient reports that he typically gets them changed by Dr Constance Antonio period of Dr Constance Antonio was unable to accommodate him within the next few weeks  Patient is currently due for replacement  Patient is well-known to this facility for this issue  Patient states he otherwise feels well currently  No dysuria  Mild discomfort in his pelvic area typical for his UTIs which started half an hour prior to arrival  Normal urine output  No fevers chills or nausea vomiting  Prior to Admission Medications   Prescriptions Last Dose Informant Patient Reported? Taking?    cholecalciferol (VITAMIN D3) 1,000 units tablet   Yes Yes   Sig: Take 4,000 Units by mouth daily   finasteride (PROSCAR) 5 mg tablet   Yes No   Sig: Take 5 mg by mouth daily   gabapentin (NEURONTIN) 300 mg capsule   No No   Sig: Take 1 capsule (300 mg total) by mouth daily at bedtime for 30 days   morphine (MSIR) 15 mg tablet   Yes No   Sig: Take 15 mg by mouth every 6 (six) hours   sertraline (ZOLOFT) 50 mg tablet   Yes No   Sig: Take 50 mg by mouth daily        Facility-Administered Medications: None       Past Medical History:   Diagnosis Date    Depression     Diallo catheter in place     Hypertension     Prostate enlargement     Psychiatric disorder     Urinary tract infection        Past Surgical History:   Procedure Laterality Date    ARTHROSCOPY KNEE      BACK SURGERY      L 4 or 5    CATARACT EXTRACTION Bilateral     CYSTOSCOPY W/ LASER LITHOTRIPSY N/A 8/20/2018    Procedure: LITHOTRISPY HOLMIUM LASER of bladder stones;  Surgeon: Mary Castorena MD;  Location: [address] [place] [address];  Service: Urology    [place] TRANSURETHRAL ELEC-SURG PROSTATECTOM N/A 8/20/2018    Procedure: CYSTOSCOPY; OSMAN;  Surgeon: Alexa Evans MD;  Location: [facility] MAIN OR;  Service: Urology    SHOULDER SURGERY Right 05/31/2019       Family History   Problem Relation Age of Onset    Cancer Mother     Diabetes Father      I have reviewed and agree with the history as documented  E-Cigarette/Vaping    E-Cigarette Use Never User      E-Cigarette/Vaping Substances     Social History     Tobacco Use    Smoking status: Former Smoker     Packs/day: 1 00    Smokeless tobacco: Never Used    Tobacco comment: quit 50 years ago   Substance Use Topics    Alcohol use: Yes     Frequency: Monthly or less     Drinks per session: 1 or 2     Binge frequency: Less than monthly    Drug use: No       Review of Systems   Constitutional: Negative for chills and fever  Eyes: Negative for visual disturbance  Respiratory: Negative for shortness of breath  Cardiovascular: Negative for chest pain  Gastrointestinal: Negative for abdominal distention and abdominal pain  Endocrine: Negative for polyuria  Genitourinary: Negative for decreased urine volume and dysuria  Neurological: Negative for dizziness, syncope and weakness  Physical Exam  Physical Exam   Constitutional: He is oriented to person, place, and time  He appears well-developed and well-nourished  No distress  HENT:   Head: Normocephalic and atraumatic  Eyes: Pupils are equal, round, and reactive to light  Conjunctivae and EOM are normal    Neck: Normal range of motion  Neck supple  Cardiovascular: Normal rate, regular rhythm and normal heart sounds  Pulmonary/Chest: Effort normal and breath sounds normal  No stridor  No respiratory distress  He has no wheezes  He has no rales  Abdominal: Soft  Bowel sounds are normal  He exhibits no distension and no mass  There is no tenderness  There is no rebound and no guarding  Genitourinary: Penis normal    Genitourinary Comments: Diallo in place  Good normal colored urine output     Musculoskeletal: Normal range of motion  Neurological: He is alert and oriented to person, place, and time  Skin: Skin is warm and dry  Psychiatric: He has a normal mood and affect  His behavior is normal  Judgment and thought content normal        Vital Signs  ED Triage Vitals [07/31/20 1555]   Temperature Pulse Respirations Blood Pressure SpO2   (!) 97 4 °F (36 3 °C) 56 18 (!) 175/93 97 %      Temp Source Heart Rate Source Patient Position - Orthostatic VS BP Location FiO2 (%)   Temporal Monitor Sitting Left arm --      Pain Score       --           Vitals:    07/31/20 1555   BP: (!) 175/93   Pulse: 56   Patient Position - Orthostatic VS: Sitting         Visual Acuity      ED Medications  Medications   cephalexin (KEFLEX) capsule 500 mg (500 mg Oral Given 7/31/20 1647)       Diagnostic Studies  Results Reviewed     Procedure Component Value Units Date/Time    Urine Microscopic [[id number]]  (Abnormal) Collected:  07/31/20 1622    Lab Status:  Final result Specimen:  Urine, Straight Cath Updated:  07/31/20 1646     RBC, UA 2-4 /hpf      WBC, UA 30-50 /hpf      Epithelial Cells Occasional /hpf      Bacteria, UA Innumerable /hpf      OTHER OBSERVATIONS WBCs Clumped     MUCUS THREADS Occasional    Urine culture [[id number]] Collected:  07/31/20 1622    Lab Status:   In process Specimen:  Urine, Straight Cath Updated:  07/31/20 1646    UA w Reflex to Microscopic w Reflex to Culture [[id number]]  (Abnormal) Collected:  07/31/20 1622    Lab Status:  Final result Specimen:  Urine, Straight Cath Updated:  07/31/20 1637     Color, UA Yellow     Clarity, UA Clear     Specific Gravity, UA 1 010     pH, UA 6 5     Leukocytes, UA 3+     Nitrite, UA Positive     Protein, UA Negative mg/dl      Glucose, UA Negative mg/dl      Ketones, UA Negative mg/dl      Urobilinogen, UA 0 2 E U /dl      Bilirubin, UA Negative     Blood, UA Trace-Intact                 No orders to display              Procedures  Procedures         ED Course       US AUDIT      Most Recent Value   Initial Alcohol Screen: US AUDIT-C    1  How often do you have a drink containing alcohol?  0 Filed at: 07/31/2020 1559   2  How many drinks containing alcohol do you have on a typical day you are drinking? 0 Filed at: 07/31/2020 1559   3a  Male UNDER 65: How often do you have five or more drinks on one occasion? 0 Filed at: 07/31/2020 1559   3b  FEMALE Any Age, or MALE 65+: How often do you have 4 or more drinks on one occassion? 0 Filed at: 07/31/2020 1559   Audit-C Score  0 Filed at: 07/31/2020 1559                  ESTEBAN/DAST-10      Most Recent Value   How many times in the past year have you    Used an illegal drug or used a prescription medication for non-medical reasons? Never Filed at: 07/31/2020 1600                                MDM  Number of Diagnoses or Management Options  Encounter for Diallo catheter replacement: new and requires workup  UTI (urinary tract infection): new and requires workup  Diagnosis management comments: This is a 78-year-old man with the urinary tract infection setting of Diallo catheter  I have seen patient multiple times for an identical presentation  He is tolerating p o  Without difficulty  Given initial dose of Keflex in the emergency department  Already has follow-up scheduled with Urology  Discussed warning signs and symptoms with the patient as well as when to return to the emergency department versus follow up with [facility]  Patient states understanding and agreement with the plan           Amount and/or Complexity of Data Reviewed  Clinical lab tests: ordered and reviewed          Disposition  Final diagnoses:   UTI (urinary tract infection)   Encounter for Diallo catheter replacement     Time reflects when diagnosis was documented in both MDM as applicable and the Disposition within this note     Time User Action Codes Description Comment    7/31/2020  4:40 PM Maco Aguilar Add [N39 0] UTI (urinary tract infection)     7/31/2020  4:40 PM Paige Varela [Z46 6] Encounter for Diallo catheter replacement       ED Disposition     ED Disposition Condition Date/Time Comment    Discharge Stable Fri Jul 31, 2020  4:40 PM Odessia Person discharge to home/self care  Follow-up Information     Follow up With Specialties Details Why [address] [facility], Internal Medicine In 1 day  [place] [address] [place] [address] [facility]      Say Pierre MD Urology Call in 1 day  [address]   [phone]            Discharge Medication List as of 7/31/2020  4:41 PM      START taking these medications    Details   cephalexin (KEFLEX) 500 mg capsule Take 1 capsule (500 mg total) by mouth every 12 (twelve) hours for 7 days, Starting Fri 7/31/2020, Until Fri 8/7/2020, Normal         CONTINUE these medications which have NOT CHANGED    Details   cholecalciferol (VITAMIN D3) 1,000 units tablet Take 4,000 Units by mouth daily, Historical Med      finasteride (PROSCAR) 5 mg tablet Take 5 mg by mouth daily, Historical Med      gabapentin (NEURONTIN) 300 mg capsule Take 1 capsule (300 mg total) by mouth daily at bedtime for 30 days, Starting Sat 9/7/2019, Until Thu 7/2/2020, Print      morphine (MSIR) 15 mg tablet Take 15 mg by mouth every 6 (six) hours, Historical Med      sertraline (ZOLOFT) 50 mg tablet Take 50 mg by mouth daily  , Starting Fri 1/26/2018, Historical Med           No discharge procedures on file      PDMP Review     None          ED Provider  Electronically Signed by           Chyna Tena MD  07/31/20 5429

## 2020-07-31 NOTE — ED NOTES
Standard cantor bag in place to drain post insertion  700 ml clear, yellow urine in bag  Leg bag placed to left leg  Patient aware of there need to use standard drainage bag at bedtime       Adriana Paz RN  07/31/20 9099

## 2020-08-03 LAB
BACTERIA UR CULT: ABNORMAL
BACTERIA UR CULT: ABNORMAL

## 2020-09-14 NOTE — PROGRESS NOTES
Patient would like to go over results of sleep study.    Dr. English out.    [facility]        NAME: Charli Self is a 74 y o  male  : 1945    MRN: [id number]  DATE: 2018  TIME: 3:27 PM    Assessment and Plan   Contusion of rib on right side, initial encounter [S20 211A]  1  Contusion of rib on right side, initial encounter     2  Rib pain on right side  XR ribs right w pa chest min 3 views         Patient Instructions     Take Tylenol as needed for discomfort  Follow up with PCP in 3-5 days  Proceed to  ER if symptoms worsen  Chief Complaint     Chief Complaint   Patient presents with    Rib Pain     Pt c/o right rib pain after falling yesterday/          History of Present Illness       Patient lost his balance and fell yesterday while at the [employer]  The patient injured his right lateral and posterior rib area  Review of Systems   Review of Systems   Constitutional: Negative for fever  HENT: Negative for sore throat  Eyes: Negative for redness  Respiratory: Negative for cough, shortness of breath and wheezing  Gastrointestinal: Negative for abdominal pain  Musculoskeletal: Positive for back pain (Left posterior mid to lower rib area)  Skin: Negative for rash  Neurological: Negative for dizziness  Hematological: Negative for adenopathy           Current Medications       Current Outpatient Prescriptions:     aspirin 325 mg tablet, Take 325 mg by mouth daily, Disp: , Rfl:     finasteride (PROSCAR) 5 mg tablet, Take 5 mg by mouth daily, Disp: , Rfl:     gabapentin (NEURONTIN) 300 mg capsule, Take 300 mg by mouth daily at bedtime  , Disp: , Rfl: 0    meloxicam (MOBIC) 15 mg tablet, Take 1 tablet (15 mg total) by mouth daily, Disp: 30 tablet, Rfl: 2    morphine (MSIR) 15 mg tablet, Take 15 mg by mouth 4 (four) times a day  , Disp: , Rfl: 0    naproxen (NAPROSYN) 500 mg tablet, Take 1 tablet (500 mg total) by mouth 2 (two) times a day with meals, Disp: 15 tablet, Rfl: 0    naproxen (NAPROSYN) 500 mg tablet, naproxen 500 mg tablet, Disp: , Rfl:     nitrofurantoin (MACRODANTIN) 100 mg capsule, Take 100 mg by mouth 2 (two) times a day, Disp: , Rfl:     sertraline (ZOLOFT) 50 mg tablet, Take 50 mg by mouth daily  , Disp: , Rfl: 0    Turmeric Curcumin 500 MG CAPS, Take 1 capsule (500 mg total) by mouth 2 (two) times a day, Disp: 60 capsule, Rfl: 2    Current Allergies     Allergies as of 12/13/2018    (No Known Allergies)            The following portions of the patient's history were reviewed and updated as appropriate: allergies, current medications, past family history, past medical history, past social history, past surgical history and problem list      Past Medical History:   Diagnosis Date    Depression     Diallo catheter in place     Hypertension     Prostate enlargement     Urinary tract infection        Past Surgical History:   Procedure Laterality Date    ARTHROSCOPY KNEE      BACK SURGERY      L 4 or 5    CATARACT EXTRACTION Bilateral     CYSTOSCOPY W/ LASER LITHOTRIPSY N/A 8/20/2018    Procedure: LITHOTRISPY HOLMIUM LASER of bladder stones;  Surgeon: Ivan Grullon MD;  Location: [address] [place] [address] OR;  Service: Urology    HI TRANSURETHRAL ELEC-SURG PROSTATECTOM N/A 8/20/2018    Procedure: CYSTOSCOPY; TURP;  Surgeon: Ivan Grullon MD;  Location: [address] [place] [address] OR;  Service: Urology       No family history on file  Medications have been verified  Objective   /92   Pulse 92   Temp 99 6 °F (37 6 °C)   Resp 16   SpO2 97%        Physical Exam     Physical Exam   Constitutional: He is oriented to person, place, and time  He appears well-developed and well-nourished  HENT:   Head: Normocephalic and atraumatic  Eyes: Pupils are equal, round, and reactive to light  Neck: No tracheal deviation present  Cardiovascular: Normal rate and regular rhythm      Left posterior lateral rib tenderness on palpation   Pulmonary/Chest: Effort normal and breath sounds normal    Neurological: He is alert and oriented to person, place, and time  Skin: Skin is warm and dry  Psychiatric: He has a normal mood and affect  His behavior is normal  Judgment and thought content normal    Nursing note and vitals reviewed  Rib x-ray viewed by me and independently reviewed by me contemporaneously as questionable right 8th rib fracture

## 2020-10-02 ENCOUNTER — HOSPITAL ENCOUNTER (EMERGENCY)
Facility: HOSPITAL | Age: 75
Discharge: HOME | End: 2020-10-02
Attending: EMERGENCY MEDICINE
Payer: MEDICARE

## 2020-10-02 VITALS
BODY MASS INDEX: 27.17 KG/M2 | TEMPERATURE: 98.6 F | SYSTOLIC BLOOD PRESSURE: 118 MMHG | WEIGHT: 184 LBS | RESPIRATION RATE: 16 BRPM | HEART RATE: 68 BPM | DIASTOLIC BLOOD PRESSURE: 65 MMHG | OXYGEN SATURATION: 97 %

## 2020-10-02 DIAGNOSIS — N39.0 UTI (URINARY TRACT INFECTION): ICD-10-CM

## 2020-10-02 DIAGNOSIS — T83.9XXA FOLEY CATHETER PROBLEM (HCC): Primary | ICD-10-CM

## 2020-10-02 LAB
AMORPH PHOS CRY URNS QL MICRO: ABNORMAL /HPF
BACTERIA UR QL AUTO: ABNORMAL /HPF
BILIRUB UR QL STRIP: NEGATIVE
CLARITY UR: ABNORMAL
COLOR UR: YELLOW
GLUCOSE UR STRIP-MCNC: ABNORMAL MG/DL
HGB UR QL STRIP.AUTO: NEGATIVE
KETONES UR STRIP-MCNC: NEGATIVE MG/DL
LEUKOCYTE ESTERASE UR QL STRIP: ABNORMAL
MUCOUS THREADS UR QL AUTO: ABNORMAL
NITRITE UR QL STRIP: POSITIVE
NON-SQ EPI CELLS URNS QL MICRO: ABNORMAL /HPF
PH UR STRIP.AUTO: 8.5 [PH]
PROT UR STRIP-MCNC: ABNORMAL MG/DL
RBC #/AREA URNS AUTO: ABNORMAL /HPF
SP GR UR STRIP.AUTO: <1.005 (ref 1–1.03)
TRI-PHOS CRY URNS QL MICRO: ABNORMAL /HPF
UROBILINOGEN UR QL STRIP.AUTO: 0.2 E.U./DL
WBC #/AREA URNS AUTO: ABNORMAL /HPF

## 2020-10-02 PROCEDURE — 87186 SC STD MICRODIL/AGAR DIL: CPT | Performed by: EMERGENCY MEDICINE

## 2020-10-02 PROCEDURE — 99283 EMERGENCY DEPT VISIT LOW MDM: CPT | Performed by: EMERGENCY MEDICINE

## 2020-10-02 PROCEDURE — 87086 URINE CULTURE/COLONY COUNT: CPT | Performed by: EMERGENCY MEDICINE

## 2020-10-02 PROCEDURE — 87077 CULTURE AEROBIC IDENTIFY: CPT | Performed by: EMERGENCY MEDICINE

## 2020-10-02 PROCEDURE — 99283 EMERGENCY DEPT VISIT LOW MDM: CPT

## 2020-10-02 PROCEDURE — 81001 URINALYSIS AUTO W/SCOPE: CPT | Performed by: EMERGENCY MEDICINE

## 2020-10-02 RX ORDER — CEPHALEXIN 500 MG/1
500 CAPSULE ORAL EVERY 6 HOURS SCHEDULED
Qty: 28 CAPSULE | Refills: 0 | Status: SHIPPED | OUTPATIENT
Start: 2020-10-02 | End: 2020-10-09

## 2020-10-02 RX ORDER — CEPHALEXIN 500 MG/1
500 CAPSULE ORAL ONCE
Status: COMPLETED | OUTPATIENT
Start: 2020-10-02 | End: 2020-10-02

## 2020-10-02 RX ADMIN — CEPHALEXIN 500 MG: 500 CAPSULE ORAL at 02:44

## 2020-10-05 LAB
BACTERIA UR CULT: ABNORMAL
BACTERIA UR CULT: ABNORMAL

## 2020-11-02 ENCOUNTER — HOSPITAL ENCOUNTER (EMERGENCY)
Facility: HOSPITAL | Age: 75
Discharge: HOME | End: 2020-11-02
Attending: EMERGENCY MEDICINE | Admitting: EMERGENCY MEDICINE
Payer: MEDICARE

## 2020-11-02 VITALS
TEMPERATURE: 97.4 F | BODY MASS INDEX: 27.47 KG/M2 | OXYGEN SATURATION: 98 % | WEIGHT: 186 LBS | HEART RATE: 66 BPM | SYSTOLIC BLOOD PRESSURE: 150 MMHG | RESPIRATION RATE: 16 BRPM | DIASTOLIC BLOOD PRESSURE: 70 MMHG

## 2020-11-02 DIAGNOSIS — N39.0 UTI (URINARY TRACT INFECTION): ICD-10-CM

## 2020-11-02 DIAGNOSIS — T83.091A OBSTRUCTION OF FOLEY CATHETER, INITIAL ENCOUNTER (HCC): Primary | ICD-10-CM

## 2020-11-02 LAB
BACTERIA UR QL AUTO: ABNORMAL /HPF
BILIRUB UR QL STRIP: NEGATIVE
CLARITY UR: ABNORMAL
COLOR UR: YELLOW
GLUCOSE UR STRIP-MCNC: NEGATIVE MG/DL
HGB UR QL STRIP.AUTO: ABNORMAL
KETONES UR STRIP-MCNC: NEGATIVE MG/DL
LEUKOCYTE ESTERASE UR QL STRIP: ABNORMAL
MUCOUS THREADS UR QL AUTO: ABNORMAL
NITRITE UR QL STRIP: POSITIVE
NON-SQ EPI CELLS URNS QL MICRO: ABNORMAL /HPF
OTHER STN SPEC: ABNORMAL
PH UR STRIP.AUTO: 7.5 [PH]
PROT UR STRIP-MCNC: ABNORMAL MG/DL
RBC #/AREA URNS AUTO: ABNORMAL /HPF
SP GR UR STRIP.AUTO: 1.01 (ref 1–1.03)
UROBILINOGEN UR QL STRIP.AUTO: 0.2 E.U./DL
WBC #/AREA URNS AUTO: ABNORMAL /HPF

## 2020-11-02 PROCEDURE — 87186 SC STD MICRODIL/AGAR DIL: CPT | Performed by: EMERGENCY MEDICINE

## 2020-11-02 PROCEDURE — 87086 URINE CULTURE/COLONY COUNT: CPT | Performed by: EMERGENCY MEDICINE

## 2020-11-02 PROCEDURE — 99283 EMERGENCY DEPT VISIT LOW MDM: CPT

## 2020-11-02 PROCEDURE — 99282 EMERGENCY DEPT VISIT SF MDM: CPT | Performed by: EMERGENCY MEDICINE

## 2020-11-02 PROCEDURE — 81001 URINALYSIS AUTO W/SCOPE: CPT | Performed by: EMERGENCY MEDICINE

## 2020-11-02 PROCEDURE — 87077 CULTURE AEROBIC IDENTIFY: CPT | Performed by: EMERGENCY MEDICINE

## 2020-11-02 RX ORDER — SULFAMETHOXAZOLE AND TRIMETHOPRIM 800; 160 MG/1; MG/1
1 TABLET ORAL ONCE
Status: COMPLETED | OUTPATIENT
Start: 2020-11-02 | End: 2020-11-02

## 2020-11-02 RX ORDER — SULFAMETHOXAZOLE AND TRIMETHOPRIM 800; 160 MG/1; MG/1
1 TABLET ORAL 2 TIMES DAILY
Qty: 20 TABLET | Refills: 0 | Status: SHIPPED | OUTPATIENT
Start: 2020-11-02 | End: 2020-11-12

## 2020-11-02 RX ADMIN — SULFAMETHOXAZOLE AND TRIMETHOPRIM 1 TABLET: 800; 160 TABLET ORAL at 04:31

## 2020-11-05 LAB
BACTERIA UR CULT: ABNORMAL
BACTERIA UR CULT: ABNORMAL

## 2020-11-25 ENCOUNTER — HOSPITAL ENCOUNTER (EMERGENCY)
Facility: HOSPITAL | Age: 75
Discharge: HOME | End: 2020-11-25
Attending: EMERGENCY MEDICINE | Admitting: EMERGENCY MEDICINE
Payer: MEDICARE

## 2020-11-25 VITALS
HEART RATE: 74 BPM | DIASTOLIC BLOOD PRESSURE: 63 MMHG | WEIGHT: 185 LBS | RESPIRATION RATE: 16 BRPM | HEIGHT: 69 IN | TEMPERATURE: 98.7 F | BODY MASS INDEX: 27.4 KG/M2 | SYSTOLIC BLOOD PRESSURE: 120 MMHG | OXYGEN SATURATION: 99 %

## 2020-11-25 DIAGNOSIS — T83.9XXA FOLEY CATHETER PROBLEM, INITIAL ENCOUNTER (HCC): Primary | ICD-10-CM

## 2020-11-25 LAB
BACTERIA UR QL AUTO: ABNORMAL /HPF
BILIRUB UR QL STRIP: NEGATIVE
CLARITY UR: ABNORMAL
COLOR UR: YELLOW
GLUCOSE UR STRIP-MCNC: NEGATIVE MG/DL
HGB UR QL STRIP.AUTO: ABNORMAL
KETONES UR STRIP-MCNC: NEGATIVE MG/DL
LEUKOCYTE ESTERASE UR QL STRIP: ABNORMAL
NITRITE UR QL STRIP: POSITIVE
NON-SQ EPI CELLS URNS QL MICRO: ABNORMAL /HPF
PH UR STRIP.AUTO: 6.5 [PH]
PROT UR STRIP-MCNC: NEGATIVE MG/DL
RBC #/AREA URNS AUTO: ABNORMAL /HPF
SP GR UR STRIP.AUTO: 1.01 (ref 1–1.03)
UROBILINOGEN UR QL STRIP.AUTO: 0.2 E.U./DL
WBC #/AREA URNS AUTO: ABNORMAL /HPF
WBC CASTS URNS QL MICRO: ABNORMAL /LPF

## 2020-11-25 PROCEDURE — 99283 EMERGENCY DEPT VISIT LOW MDM: CPT

## 2020-11-25 PROCEDURE — 87086 URINE CULTURE/COLONY COUNT: CPT | Performed by: EMERGENCY MEDICINE

## 2020-11-25 PROCEDURE — 81001 URINALYSIS AUTO W/SCOPE: CPT | Performed by: EMERGENCY MEDICINE

## 2020-11-25 PROCEDURE — 99282 EMERGENCY DEPT VISIT SF MDM: CPT | Performed by: EMERGENCY MEDICINE

## 2020-11-25 PROCEDURE — 87077 CULTURE AEROBIC IDENTIFY: CPT | Performed by: EMERGENCY MEDICINE

## 2020-11-25 PROCEDURE — 87186 SC STD MICRODIL/AGAR DIL: CPT | Performed by: EMERGENCY MEDICINE

## 2020-11-25 RX ORDER — ASCORBIC ACID 500 MG
500 TABLET ORAL DAILY
COMMUNITY

## 2020-11-25 RX ORDER — ACETAMINOPHEN 325 MG/1
650 TABLET ORAL EVERY 6 HOURS PRN
COMMUNITY
End: 2021-05-19

## 2020-11-28 LAB
BACTERIA UR CULT: ABNORMAL
BACTERIA UR CULT: ABNORMAL

## 2020-12-01 ENCOUNTER — HOSPITAL ENCOUNTER (EMERGENCY)
Facility: HOSPITAL | Age: 75
Discharge: HOME | End: 2020-12-01
Attending: EMERGENCY MEDICINE
Payer: MEDICARE

## 2020-12-01 VITALS
RESPIRATION RATE: 18 BRPM | HEART RATE: 92 BPM | BODY MASS INDEX: 26.66 KG/M2 | OXYGEN SATURATION: 94 % | TEMPERATURE: 97 F | HEIGHT: 69 IN | WEIGHT: 180 LBS | DIASTOLIC BLOOD PRESSURE: 81 MMHG | SYSTOLIC BLOOD PRESSURE: 124 MMHG

## 2020-12-01 DIAGNOSIS — T83.9XXA PROBLEM WITH URINARY CATHETER (HCC): Primary | ICD-10-CM

## 2020-12-01 LAB
BACTERIA UR QL AUTO: ABNORMAL /HPF
BILIRUB UR QL STRIP: NEGATIVE
CLARITY UR: CLEAR
COLOR UR: YELLOW
GLUCOSE UR STRIP-MCNC: NEGATIVE MG/DL
HGB UR QL STRIP.AUTO: ABNORMAL
KETONES UR STRIP-MCNC: NEGATIVE MG/DL
LEUKOCYTE ESTERASE UR QL STRIP: ABNORMAL
MUCOUS THREADS UR QL AUTO: ABNORMAL
NITRITE UR QL STRIP: NEGATIVE
NON-SQ EPI CELLS URNS QL MICRO: ABNORMAL /HPF
OTHER STN SPEC: ABNORMAL
PH UR STRIP.AUTO: 7 [PH]
PROT UR STRIP-MCNC: NEGATIVE MG/DL
RBC #/AREA URNS AUTO: ABNORMAL /HPF
SP GR UR STRIP.AUTO: 1.01 (ref 1–1.03)
UROBILINOGEN UR QL STRIP.AUTO: 0.2 E.U./DL
WBC #/AREA URNS AUTO: ABNORMAL /HPF

## 2020-12-01 PROCEDURE — 99283 EMERGENCY DEPT VISIT LOW MDM: CPT

## 2020-12-01 PROCEDURE — 99284 EMERGENCY DEPT VISIT MOD MDM: CPT | Performed by: EMERGENCY MEDICINE

## 2020-12-01 PROCEDURE — 87086 URINE CULTURE/COLONY COUNT: CPT | Performed by: EMERGENCY MEDICINE

## 2020-12-01 PROCEDURE — 87186 SC STD MICRODIL/AGAR DIL: CPT | Performed by: EMERGENCY MEDICINE

## 2020-12-01 PROCEDURE — 81001 URINALYSIS AUTO W/SCOPE: CPT | Performed by: EMERGENCY MEDICINE

## 2020-12-01 PROCEDURE — 87077 CULTURE AEROBIC IDENTIFY: CPT | Performed by: EMERGENCY MEDICINE

## 2020-12-04 LAB
BACTERIA UR CULT: ABNORMAL

## 2020-12-27 ENCOUNTER — HOSPITAL ENCOUNTER (INPATIENT)
Facility: HOSPITAL | Age: 75
LOS: 9 days | DRG: 853 | End: 2021-01-05
Attending: EMERGENCY MEDICINE | Admitting: HOSPITALIST
Payer: MEDICARE

## 2020-12-27 ENCOUNTER — APPOINTMENT (EMERGENCY)
Dept: RADIOLOGY | Facility: HOSPITAL | Age: 75
DRG: 853 | End: 2020-12-27
Payer: MEDICARE

## 2020-12-27 DIAGNOSIS — A41.9 SEPSIS (HCC): Primary | ICD-10-CM

## 2020-12-27 DIAGNOSIS — R00.0 TACHYCARDIA: ICD-10-CM

## 2020-12-27 DIAGNOSIS — N20.1 OBSTRUCTION OF URETEROPELVIC JUNCTION (UPJ) DUE TO STONE: ICD-10-CM

## 2020-12-27 DIAGNOSIS — N39.0 UTI (URINARY TRACT INFECTION): ICD-10-CM

## 2020-12-27 DIAGNOSIS — R78.81 BACTEREMIA: ICD-10-CM

## 2020-12-27 DIAGNOSIS — N30.01 ACUTE CYSTITIS WITH HEMATURIA: Chronic | ICD-10-CM

## 2020-12-27 DIAGNOSIS — N19 RENAL FAILURE: ICD-10-CM

## 2020-12-27 PROBLEM — D75.839 THROMBOCYTOSIS: Chronic | Status: RESOLVED | Noted: 2019-08-26 | Resolved: 2020-12-27

## 2020-12-27 PROBLEM — G93.41 ACUTE METABOLIC ENCEPHALOPATHY: Status: ACTIVE | Noted: 2019-08-16

## 2020-12-27 PROBLEM — E87.6 HYPOKALEMIA: Chronic | Status: RESOLVED | Noted: 2019-08-26 | Resolved: 2020-12-27

## 2020-12-27 PROBLEM — R09.02 HYPOXIA: Status: ACTIVE | Noted: 2020-12-27

## 2020-12-27 LAB
ALBUMIN SERPL BCP-MCNC: 3.7 G/DL (ref 3.5–5.7)
ALP SERPL-CCNC: 67 U/L (ref 55–165)
ALT SERPL W P-5'-P-CCNC: 31 U/L (ref 7–52)
ANION GAP SERPL CALCULATED.3IONS-SCNC: 14 MMOL/L (ref 4–13)
APTT PPP: 30 SECONDS (ref 23–37)
ARTERIAL PATENCY WRIST A: YES
AST SERPL W P-5'-P-CCNC: 36 U/L (ref 13–39)
ATRIAL RATE: 117 BPM
ATRIAL RATE: 120 BPM
BACTERIA UR QL AUTO: ABNORMAL /HPF
BASE EXCESS BLDA CALC-SCNC: -5 MMOL/L (ref -2–3)
BILIRUB SERPL-MCNC: 0.7 MG/DL (ref 0.2–1)
BILIRUB UR QL STRIP: NEGATIVE
BUN SERPL-MCNC: 52 MG/DL (ref 7–25)
CALCIUM SERPL-MCNC: 8.7 MG/DL (ref 8.6–10.5)
CHLORIDE SERPL-SCNC: 108 MMOL/L (ref 98–107)
CLARITY UR: ABNORMAL
CO2 SERPL-SCNC: 19 MMOL/L (ref 21–31)
COLOR UR: YELLOW
CREAT SERPL-MCNC: 2.47 MG/DL (ref 0.7–1.3)
ERYTHROCYTE [DISTWIDTH] IN BLOOD BY AUTOMATED COUNT: 12.7 % (ref 11.5–14.5)
FLUAV RNA RESP QL NAA+PROBE: NEGATIVE
FLUBV RNA RESP QL NAA+PROBE: NEGATIVE
GFR SERPL CREATININE-BSD FRML MDRD: 24 ML/MIN/1.73SQ M
GLUCOSE SERPL-MCNC: 199 MG/DL (ref 65–99)
GLUCOSE UR STRIP-MCNC: NEGATIVE MG/DL
HCO3 BLDA-SCNC: 18.3 MMOL/L (ref 22–28)
HCT VFR BLD AUTO: 33.4 % (ref 42–47)
HGB BLD-MCNC: 11.4 G/DL (ref 14–18)
HGB UR QL STRIP.AUTO: ABNORMAL
INR PPP: 1.39 (ref 0.84–1.19)
KETONES UR STRIP-MCNC: NEGATIVE MG/DL
LACTATE SERPL-SCNC: 1.9 MMOL/L (ref 0.5–2)
LACTATE SERPL-SCNC: 2.6 MMOL/L (ref 0.5–2)
LACTATE SERPL-SCNC: 3 MMOL/L (ref 0.5–2)
LACTATE SERPL-SCNC: 4 MMOL/L (ref 0.5–2)
LEUKOCYTE ESTERASE UR QL STRIP: ABNORMAL
LIPASE SERPL-CCNC: 33 U/L (ref 11–82)
LYMPHOCYTES # BLD AUTO: 0.51 THOUSAND/UL (ref 0.6–4.47)
LYMPHOCYTES # BLD AUTO: 5 % (ref 20–51)
MCH RBC QN AUTO: 33.2 PG (ref 26–34)
MCHC RBC AUTO-ENTMCNC: 34.2 G/DL (ref 31–37)
MCV RBC AUTO: 97 FL (ref 81–99)
MONOCYTES # BLD AUTO: 0.1 THOUSAND/UL (ref 0–1.22)
MONOCYTES NFR BLD AUTO: 1 % (ref 4–12)
NASAL CANNULA: ABNORMAL
NEUTS BAND NFR BLD MANUAL: 9 % (ref 0–8)
NEUTS SEG # BLD: 9.59 THOUSAND/UL (ref 1.81–6.82)
NEUTS SEG NFR BLD AUTO: 85 % (ref 42–75)
NITRITE UR QL STRIP: POSITIVE
NON-SQ EPI CELLS URNS QL MICRO: ABNORMAL /HPF
O2 CT BLDA-SCNC: 14 ML/DL
OXYHGB MFR BLDA: 90.5 % (ref 94–100)
P AXIS: 60 DEGREES
P AXIS: 68 DEGREES
PCO2 BLDA: 28.8 MM HG (ref 35–45)
PH BLDA: 7.42 [PH] (ref 7.35–7.45)
PH UR STRIP.AUTO: 8.5 [PH]
PLATELET # BLD AUTO: 168 THOUSANDS/UL (ref 149–390)
PLATELET BLD QL SMEAR: ADEQUATE
PMV BLD AUTO: 8.4 FL (ref 8.6–11.7)
PO2 BLDA: 67 MM HG (ref 80–100)
POTASSIUM SERPL-SCNC: 3.5 MMOL/L (ref 3.5–5.5)
PR INTERVAL: 166 MS
PR INTERVAL: 184 MS
PROCALCITONIN SERPL-MCNC: 3.28 NG/ML
PROT SERPL-MCNC: 6.1 G/DL (ref 6.4–8.9)
PROT UR STRIP-MCNC: ABNORMAL MG/DL
PROTHROMBIN TIME: 16.9 SECONDS (ref 11.6–14.5)
QRS AXIS: 58 DEGREES
QRS AXIS: 62 DEGREES
QRSD INTERVAL: 98 MS
QRSD INTERVAL: 98 MS
QT INTERVAL: 292 MS
QT INTERVAL: 316 MS
QTC INTERVAL: 412 MS
QTC INTERVAL: 440 MS
RBC # BLD AUTO: 3.44 MILLION/UL (ref 4.3–5.9)
RBC #/AREA URNS AUTO: ABNORMAL /HPF
RBC MORPH BLD: NORMAL
RSV RNA RESP QL NAA+PROBE: NEGATIVE
SARS-COV-2 RNA RESP QL NAA+PROBE: NEGATIVE
SODIUM SERPL-SCNC: 141 MMOL/L (ref 134–143)
SP GR UR STRIP.AUTO: 1.01 (ref 1–1.03)
SPECIMEN SOURCE: ABNORMAL
T WAVE AXIS: -36 DEGREES
T WAVE AXIS: -8 DEGREES
TOTAL CELLS COUNTED SPEC: 100
TOXIC GRANULES BLD QL SMEAR: PRESENT
TRI-PHOS CRY URNS QL MICRO: ABNORMAL /HPF
TROPONIN I SERPL-MCNC: 0.03 NG/ML
UROBILINOGEN UR QL STRIP.AUTO: 0.2 E.U./DL
VENTRICULAR RATE: 117 BPM
VENTRICULAR RATE: 120 BPM
WBC # BLD AUTO: 10.2 THOUSAND/UL (ref 4.8–10.8)
WBC #/AREA URNS AUTO: ABNORMAL /HPF

## 2020-12-27 PROCEDURE — 96361 HYDRATE IV INFUSION ADD-ON: CPT

## 2020-12-27 PROCEDURE — 85027 COMPLETE CBC AUTOMATED: CPT | Performed by: EMERGENCY MEDICINE

## 2020-12-27 PROCEDURE — 36415 COLL VENOUS BLD VENIPUNCTURE: CPT | Performed by: EMERGENCY MEDICINE

## 2020-12-27 PROCEDURE — 71045 X-RAY EXAM CHEST 1 VIEW: CPT

## 2020-12-27 PROCEDURE — 84484 ASSAY OF TROPONIN QUANT: CPT | Performed by: EMERGENCY MEDICINE

## 2020-12-27 PROCEDURE — 93010 ELECTROCARDIOGRAM REPORT: CPT | Performed by: INTERNAL MEDICINE

## 2020-12-27 PROCEDURE — 87086 URINE CULTURE/COLONY COUNT: CPT | Performed by: EMERGENCY MEDICINE

## 2020-12-27 PROCEDURE — 99285 EMERGENCY DEPT VISIT HI MDM: CPT | Performed by: EMERGENCY MEDICINE

## 2020-12-27 PROCEDURE — 96375 TX/PRO/DX INJ NEW DRUG ADDON: CPT

## 2020-12-27 PROCEDURE — 97167 OT EVAL HIGH COMPLEX 60 MIN: CPT

## 2020-12-27 PROCEDURE — 87077 CULTURE AEROBIC IDENTIFY: CPT | Performed by: EMERGENCY MEDICINE

## 2020-12-27 PROCEDURE — 81001 URINALYSIS AUTO W/SCOPE: CPT | Performed by: EMERGENCY MEDICINE

## 2020-12-27 PROCEDURE — 87186 SC STD MICRODIL/AGAR DIL: CPT | Performed by: EMERGENCY MEDICINE

## 2020-12-27 PROCEDURE — 99285 EMERGENCY DEPT VISIT HI MDM: CPT

## 2020-12-27 PROCEDURE — 97163 PT EVAL HIGH COMPLEX 45 MIN: CPT

## 2020-12-27 PROCEDURE — 99223 1ST HOSP IP/OBS HIGH 75: CPT | Performed by: PHYSICIAN ASSISTANT

## 2020-12-27 PROCEDURE — 36600 WITHDRAWAL OF ARTERIAL BLOOD: CPT

## 2020-12-27 PROCEDURE — 85007 BL SMEAR W/DIFF WBC COUNT: CPT | Performed by: EMERGENCY MEDICINE

## 2020-12-27 PROCEDURE — 93005 ELECTROCARDIOGRAM TRACING: CPT

## 2020-12-27 PROCEDURE — 96365 THER/PROPH/DIAG IV INF INIT: CPT

## 2020-12-27 PROCEDURE — 85730 THROMBOPLASTIN TIME PARTIAL: CPT | Performed by: EMERGENCY MEDICINE

## 2020-12-27 PROCEDURE — 87040 BLOOD CULTURE FOR BACTERIA: CPT | Performed by: EMERGENCY MEDICINE

## 2020-12-27 PROCEDURE — 82805 BLOOD GASES W/O2 SATURATION: CPT | Performed by: EMERGENCY MEDICINE

## 2020-12-27 PROCEDURE — 0241U HB NFCT DS VIR RESP RNA 4 TRGT: CPT | Performed by: EMERGENCY MEDICINE

## 2020-12-27 PROCEDURE — 85610 PROTHROMBIN TIME: CPT | Performed by: EMERGENCY MEDICINE

## 2020-12-27 PROCEDURE — 83605 ASSAY OF LACTIC ACID: CPT | Performed by: PHYSICIAN ASSISTANT

## 2020-12-27 PROCEDURE — 83605 ASSAY OF LACTIC ACID: CPT | Performed by: EMERGENCY MEDICINE

## 2020-12-27 PROCEDURE — 83690 ASSAY OF LIPASE: CPT | Performed by: EMERGENCY MEDICINE

## 2020-12-27 PROCEDURE — 84145 PROCALCITONIN (PCT): CPT | Performed by: EMERGENCY MEDICINE

## 2020-12-27 PROCEDURE — 80053 COMPREHEN METABOLIC PANEL: CPT | Performed by: EMERGENCY MEDICINE

## 2020-12-27 RX ORDER — NITROGLYCERIN 0.4 MG/1
0.4 TABLET SUBLINGUAL ONCE
Status: COMPLETED | OUTPATIENT
Start: 2020-12-27 | End: 2020-12-27

## 2020-12-27 RX ORDER — SODIUM CHLORIDE 9 MG/ML
100 INJECTION, SOLUTION INTRAVENOUS CONTINUOUS
Status: DISCONTINUED | OUTPATIENT
Start: 2020-12-27 | End: 2020-12-28

## 2020-12-27 RX ORDER — ONDANSETRON 2 MG/ML
4 INJECTION INTRAMUSCULAR; INTRAVENOUS ONCE
Status: COMPLETED | OUTPATIENT
Start: 2020-12-27 | End: 2020-12-27

## 2020-12-27 RX ORDER — FERROUS SULFATE 325(65) MG
325 TABLET ORAL
COMMUNITY

## 2020-12-27 RX ORDER — FINASTERIDE 5 MG/1
5 TABLET, FILM COATED ORAL DAILY
Status: DISCONTINUED | OUTPATIENT
Start: 2020-12-27 | End: 2021-01-05 | Stop reason: HOSPADM

## 2020-12-27 RX ORDER — ACETAMINOPHEN 325 MG/1
650 TABLET ORAL EVERY 4 HOURS PRN
Status: DISCONTINUED | OUTPATIENT
Start: 2020-12-27 | End: 2021-01-05 | Stop reason: HOSPADM

## 2020-12-27 RX ORDER — ASCORBIC ACID 500 MG
500 TABLET ORAL DAILY
Status: DISCONTINUED | OUTPATIENT
Start: 2020-12-27 | End: 2021-01-05 | Stop reason: HOSPADM

## 2020-12-27 RX ORDER — FERROUS SULFATE 325(65) MG
325 TABLET ORAL
Status: DISCONTINUED | OUTPATIENT
Start: 2020-12-28 | End: 2021-01-05 | Stop reason: HOSPADM

## 2020-12-27 RX ORDER — CEFTRIAXONE 1 G/50ML
1000 INJECTION, SOLUTION INTRAVENOUS ONCE
Status: COMPLETED | OUTPATIENT
Start: 2020-12-27 | End: 2020-12-27

## 2020-12-27 RX ORDER — MORPHINE SULFATE 15 MG/1
15 TABLET ORAL 2 TIMES DAILY
Status: DISCONTINUED | OUTPATIENT
Start: 2020-12-27 | End: 2021-01-05 | Stop reason: HOSPADM

## 2020-12-27 RX ORDER — MELATONIN
4000 DAILY
Status: DISCONTINUED | OUTPATIENT
Start: 2020-12-27 | End: 2021-01-05 | Stop reason: HOSPADM

## 2020-12-27 RX ORDER — GABAPENTIN 400 MG/1
400 CAPSULE ORAL
Status: DISCONTINUED | OUTPATIENT
Start: 2020-12-27 | End: 2021-01-05 | Stop reason: HOSPADM

## 2020-12-27 RX ORDER — DIPHENHYDRAMINE HCL 25 MG
50 TABLET ORAL
Status: DISCONTINUED | OUTPATIENT
Start: 2020-12-27 | End: 2021-01-05 | Stop reason: HOSPADM

## 2020-12-27 RX ORDER — ONDANSETRON 2 MG/ML
4 INJECTION INTRAMUSCULAR; INTRAVENOUS EVERY 6 HOURS PRN
Status: DISCONTINUED | OUTPATIENT
Start: 2020-12-27 | End: 2021-01-05 | Stop reason: HOSPADM

## 2020-12-27 RX ORDER — CEFTRIAXONE 1 G/50ML
1000 INJECTION, SOLUTION INTRAVENOUS EVERY 24 HOURS
Status: DISCONTINUED | OUTPATIENT
Start: 2020-12-28 | End: 2020-12-27

## 2020-12-27 RX ORDER — HEPARIN SODIUM 5000 [USP'U]/ML
5000 INJECTION, SOLUTION INTRAVENOUS; SUBCUTANEOUS EVERY 8 HOURS SCHEDULED
Status: DISCONTINUED | OUTPATIENT
Start: 2020-12-27 | End: 2021-01-05 | Stop reason: HOSPADM

## 2020-12-27 RX ADMIN — ONDANSETRON 4 MG: 2 INJECTION INTRAMUSCULAR; INTRAVENOUS at 05:31

## 2020-12-27 RX ADMIN — SODIUM CHLORIDE 1200 ML: 0.9 INJECTION, SOLUTION INTRAVENOUS at 07:26

## 2020-12-27 RX ADMIN — FINASTERIDE 5 MG: 5 TABLET, FILM COATED ORAL at 14:28

## 2020-12-27 RX ADMIN — PIPERACILLIN SODIUM AND TAZOBACTAM SODIUM 2.25 G: 2; .25 INJECTION, POWDER, LYOPHILIZED, FOR SOLUTION INTRAVENOUS at 21:55

## 2020-12-27 RX ADMIN — PIPERACILLIN SODIUM AND TAZOBACTAM SODIUM 2.25 G: 2; .25 INJECTION, POWDER, LYOPHILIZED, FOR SOLUTION INTRAVENOUS at 15:53

## 2020-12-27 RX ADMIN — CEFTRIAXONE 1000 MG: 1 INJECTION, SOLUTION INTRAVENOUS at 05:50

## 2020-12-27 RX ADMIN — VANCOMYCIN HYDROCHLORIDE 750 MG: 750 INJECTION, SOLUTION INTRAVENOUS at 07:51

## 2020-12-27 RX ADMIN — SODIUM CHLORIDE 100 ML/HR: 0.9 INJECTION, SOLUTION INTRAVENOUS at 21:55

## 2020-12-27 RX ADMIN — MORPHINE SULFATE 15 MG: 15 TABLET ORAL at 17:23

## 2020-12-27 RX ADMIN — SODIUM CHLORIDE 1000 ML: 0.9 INJECTION, SOLUTION INTRAVENOUS at 06:04

## 2020-12-27 RX ADMIN — SODIUM CHLORIDE 100 ML/HR: 0.9 INJECTION, SOLUTION INTRAVENOUS at 10:09

## 2020-12-27 RX ADMIN — OXYCODONE HYDROCHLORIDE AND ACETAMINOPHEN 500 MG: 500 TABLET ORAL at 14:28

## 2020-12-27 RX ADMIN — NITROGLYCERIN 0.4 MG: 0.4 TABLET SUBLINGUAL at 05:31

## 2020-12-27 RX ADMIN — Medication 4000 UNITS: at 14:28

## 2020-12-27 RX ADMIN — HEPARIN SODIUM 5000 UNITS: 5000 INJECTION INTRAVENOUS; SUBCUTANEOUS at 21:55

## 2020-12-27 RX ADMIN — HEPARIN SODIUM 5000 UNITS: 5000 INJECTION INTRAVENOUS; SUBCUTANEOUS at 10:20

## 2020-12-27 RX ADMIN — GABAPENTIN 400 MG: 400 CAPSULE ORAL at 21:55

## 2020-12-27 RX ADMIN — SERTRALINE HYDROCHLORIDE 50 MG: 50 TABLET ORAL at 14:28

## 2020-12-27 NOTE — ED NOTES
Daughter told me she felt his catheter was occluded, scanned with bladder [id number]  Attempted to irrigate cantor and could not inject any fluid  Cantor DC'ed, new 18 fr cantor placed without difficulty, secured to leg        Alcides Livingston RN  12/27/20 9583

## 2020-12-27 NOTE — ED CARE HANDOFF
Emergency Department Sign Out Note        Sign out and transfer of care from Dr Scott   See Separate Emergency Department note  The patient, Frankey Laming, was evaluated by the previous provider for Urosepsis     Workup Completed:  Labs/UA/Chest X-ray     ED Course / Workup Pending (followup): Sign out from Dr Scott  pt diagnosed with urosepsis  Discuss with Dr Marii Aldridge will admit for observation  ED Course as of Dec 27 0730   Sun Dec 27, 2020   0725 Dicussed with Dr Marii Aldridge accepted the admission to Observation        Procedures  MDM    Disposition  Final diagnoses:   Sepsis [facility])   Renal failure   UTI (urinary tract infection)   Tachycardia     Time reflects when diagnosis was documented in both MDM as applicable and the Disposition within this note     Time User Action Codes Description Comment    12/27/2020  7:29 AM Dionna Chen Add [A41 9] Sepsis ([address] )     12/27/2020  7:30 AM Dionna Chen Add [N19] Renal failure     12/27/2020  7:30 AM Dionna Chen Add [N39 0] UTI (urinary tract infection)     12/27/2020  7:30 AM Dionna Chen Add [R00 0] Tachycardia       ED Disposition     ED Disposition Condition Date/Time Comment    Admit Stable [place] Dec 27, 2020  7:22 AM Case was discussed with José Antonio Ortiz and the patient's admission status was agreed to be Admission Status: observation status to the service of Dr Marii Aldridge    Follow-up Information    None       Patient's Medications   Discharge Prescriptions    No medications on file     No discharge procedures on file         ED Provider  Electronically Signed by     Jess Hines MD  12/27/20 9872

## 2020-12-27 NOTE — H&P
H&P- iDllon Arredondo 1945, 66 y o  male MRN: [id number]    Unit/Bed#: -01 Encounter: [id number]    Primary Care Provider: Savannah Gilford, DO   Date and time admitted to hospital: 12/27/2020  5:16 AM    * Sepsis [facility])  Assessment & Plan  · Secondary to UTI, noted by acute kidney injury, lactic acidosis, tachycardia and tachypnea  · Status post 2200 mL in the ER  · Lactic acid improving  · Continue antibiotics    Acute metabolic encephalopathy  Assessment & Plan  · Secondary to UTI/sepsis  · Patient with increased confusion  · Continue supportive care    Acute cystitis with hematuria  Assessment & Plan  · UA with 3+ leukocytes, nitrite positive, trace blood  · WBC micro 10-20  · Continue Rocephin started in the ER  · Patient has chronic Diallo    Hypoxia  Assessment & Plan  · Pulse ox 87% on room air, placed on oxygen and improved to mid 90s  · Patient did vomit and concern for aspiration  · Portable chest x-ray pending  · Patient on Zosyn currently    CKD (chronic kidney disease)  Assessment & Plan  Lab Results   Component Value Date    EGFR 24 12/27/2020    EGFR 55 07/27/2020    EGFR 58 11/22/2019    CREATININE 2 47 (H) 12/27/2020    CREATININE 1 26 07/27/2020    CREATININE 1 20 11/22/2019       Chronic pain syndrome  Assessment & Plan  · Continuous opioid dependence  · Continue home medication    RUTHANN (acute kidney injury) ([facility] [address] )  Assessment & Plan  · With chronic kidney disease stage 3  · Creatinine baseline 1 1    Chronic indwelling Diallo catheter  Assessment & Plan  · Diallo changed in the morning    VTE Prophylaxis: Heparin  Code Status:  DNR/DNI - per daughter he does not want life support  Discussion with family: daughter updated on phone    Anticipated Length of Stay:  Patient will be admitted on an Inpatient basis with an anticipated length of stay of  > 2 midnights     Justification for Hospital Stay:  IV antibiotics, cultures, supportive care    Chief Complaint:   Altered mental status    History of Present Illness:    Yusef Hawkins is a 66 y o  male who presents with altered mental status  Patient with past medical history of hypertension, depression, chronic pain with continuous opioid dependence, BPH with chronic Cantor and recurrent UTI was brought to the ER secondary to altered mental status, vomiting, issues with the Cantor with cloudy, decreased urinary output  They also noted having chills and rigors on 12/26 these are all similar issues when he had sepsis with his UTI  Was concern for aspiration after vomiting he was hypoxic in the ER  Yesterday, he told daughter was tired  He woke up had breakfast and supper, went to bed early because tired  Daughter checked on him, early this morning he was moaning, she noted nothing in his catheter bag  He vomited  He get changed every 2 weeks w/ Dr Jannette Naidu  He was shaking and she could not get temperature  He normally active, drives, ambulates around house ok, uses cane when out and functions independently  Review of Systems:    Review of Systems   Constitutional: Positive for chills and fatigue  Rigors   HENT: Negative for rhinorrhea, sore throat and trouble swallowing  Eyes: Negative for discharge and redness  Respiratory: Negative for cough and shortness of breath  Cardiovascular: Negative for chest pain and leg swelling  Gastrointestinal: Positive for nausea and vomiting  Negative for abdominal pain and diarrhea  Genitourinary: Positive for hematuria  Chronic cantor   Musculoskeletal: Negative for back pain and neck pain  Skin: Negative for rash and wound  Neurological: Positive for weakness  Negative for headaches  Psychiatric/Behavioral: Positive for confusion  Negative for agitation         Past Medical and Surgical History:     Past Medical History:   Diagnosis Date    Depression     Cantor catheter in place     Hypertension     Prostate enlargement     Psychiatric disorder     Urinary tract infection        Past Surgical History:   Procedure Laterality Date    ARTHROSCOPY KNEE      BACK SURGERY      L 4 or 5    CATARACT EXTRACTION Bilateral     CYSTOSCOPY W/ LASER LITHOTRIPSY N/A 8/20/2018    Procedure: LITHOTRISPY HOLMIUM LASER of bladder stones;  Surgeon: Thelma Lim MD;  Location: [address] [place] [address] OR;  Service: Urology    [place] TRANSURETHRAL ELEC-SURG [address] N/A 8/20/2018    Procedure: Sudha Ada; TURP;  Surgeon: Thelma Lim MD;  Location: [address] [place] [address] OR;  Service: Urology    SHOULDER SURGERY Right 05/31/2019       Meds/Allergies:    Prior to Admission medications    Medication Sig Start Date End Date Taking? Authorizing Provider   acetaminophen (TYLENOL) 325 mg tablet Take 650 mg by mouth every 6 (six) hours as needed    Historical Provider, MD   ascorbic acid (VITAMIN C) 500 MG tablet Take 500 mg by mouth daily    Historical Provider, MD   B Complex Vitamins (B COMPLEX 1 PO) Take 1 capsule by mouth daily    Historical Provider, MD   cholecalciferol (VITAMIN D3) 1,000 units tablet Take 4,000 Units by mouth daily    Historical Provider, MD   finasteride (PROSCAR) 5 mg tablet Take 5 mg by mouth daily    Historical Provider, MD   gabapentin (NEURONTIN) 300 mg capsule Take 1 capsule (300 mg total) by mouth daily at bedtime for 30 days 9/7/19 11/2/20  UBALDO Johnson   morphine (MSIR) 15 mg tablet Take 15 mg by mouth every 6 (six) hours    Historical Provider, MD   sertraline (ZOLOFT) 50 mg tablet Take 50 mg by mouth daily   1/26/18   Historical Provider, MD     all medications and allergies reviewed    Allergies: No Known Allergies    Social History:     Marital Status:     Occupation: retired  Patient Pre-hospital Living Situation: home with daughter  Patient Pre-hospital Level of Mobility:  Cane as needed  Patient Pre-hospital Diet Restrictions:  None  Substance Use History:   Social History     Substance and Sexual Activity   Alcohol Use Never    Frequency: Monthly or less    Drinks per session: 1 or 2    Binge frequency: Less than monthly     Social History     Tobacco Use   Smoking Status Former Smoker    Packs/day: 1 00   Smokeless Tobacco Never Used   Tobacco Comment    quit 50 years ago     Social History     Substance and Sexual Activity   Drug Use Never       Family History:  I have reviewed the patient's family history    Physical Exam:     Vitals:   Blood Pressure: 162/74 (12/27/20 0805)  Pulse: (!) 127 (12/27/20 0805)  Temperature: 98 9 °F (37 2 °C) (12/27/20 0805)  Temp Source: Temporal (12/27/20 0805)  Respirations: 22 (12/27/20 0805)  Height: 5' 9" (175 3 cm) (12/27/20 0805)  Weight - Scale: 83 3 kg (183 lb 10 3 oz) (12/27/20 0805)  SpO2: 93 % (12/27/20 0805)    Physical Exam  Vitals signs reviewed  Constitutional:       General: He is not in acute distress  Appearance: Normal appearance  He is ill-appearing  HENT:      Head: Normocephalic and atraumatic  Right Ear: External ear normal       Left Ear: External ear normal       Nose: Nose normal    Eyes:      General:         Right eye: No discharge  Left eye: No discharge  Conjunctiva/sclera: Conjunctivae normal    Neck:      Musculoskeletal: Normal range of motion  Cardiovascular:      Rate and Rhythm: Regular rhythm  Tachycardia present  Heart sounds: Normal heart sounds  No murmur  Pulmonary:      Effort: Pulmonary effort is normal  No respiratory distress  Breath sounds: Normal breath sounds  No wheezing or rales  Abdominal:      General: Bowel sounds are normal  There is no distension  Palpations: Abdomen is soft  Tenderness: There is no abdominal tenderness  There is no guarding  Genitourinary:     Comments: Positive chronic Diallo, reported changed in the emergency room  Musculoskeletal: Normal range of motion  Skin:     General: Skin is warm and dry  Neurological:      Mental Status: He is lethargic and disoriented  Motor: Weakness present  Additional Data:     Lab Results: I have personally reviewed pertinent reports  Results from last 7 days   Lab Units 12/27/20  0521   WBC Thousand/uL 10 20   HEMOGLOBIN g/dL 11 4*   HEMATOCRIT % 33 4*   PLATELETS Thousands/uL 168   BANDS PCT % 9*   LYMPHO PCT % 5*   MONO PCT % 1*     Results from last 7 days   Lab Units 12/27/20  0523   SODIUM mmol/L 141   POTASSIUM mmol/L 3 5   CHLORIDE mmol/L 108*   CO2 mmol/L 19*   BUN mg/dL 52*   CREATININE mg/dL 2 47*   ANION GAP mmol/L 14*   CALCIUM mg/dL 8 7   ALBUMIN g/dL 3 7   TOTAL BILIRUBIN mg/dL 0 70   ALK PHOS U/L 67   ALT U/L 31   AST U/L 36   GLUCOSE RANDOM mg/dL 199*     Results from last 7 days   Lab Units 12/27/20  0521   INR  1 39*             Results from last 7 days   Lab Units 12/27/20  1226 12/27/20  1009 12/27/20  0725 12/27/20  0521   LACTIC ACID mmol/L 1 9 3 0* 2 6* 4 0*       Imaging: Formal read  XR chest portable - 1 view   ED Interpretation by Avila Abbasi MD (12/27 6446)   Portable CXR per my interpretation shows  No fracture  No cardiomegaly  No infiltrate  No effusion  No pneumothorax  Wide mediastinum (unchanged/rotated)            Epic / Care Everywhere Records Reviewed: Yes    ** Please Note: This note has been constructed using a voice recognition system   **

## 2020-12-27 NOTE — PLAN OF CARE
Problem: OCCUPATIONAL THERAPY ADULT  Goal: Performs self-care activities at highest level of function for planned discharge setting  See evaluation for individualized goals  Description: Treatment Interventions: ADL retraining, Functional transfer training, UE strengthening/ROM, Endurance training, Cognitive reorientation, Patient/family training, Compensatory technique education, Activityengagement, Energy conservation          See flowsheet documentation for full assessment, interventions and recommendations  Note: Limitation: Decreased ADL status, Decreased UE strength, Decreased Safe judgement during ADL, Decreased cognition, Decreased endurance, Decreased self-care trans, Decreased high-level ADLs  Prognosis: Fair  Assessment: Patient is a 66 y o  male seen for OT evaluation s/p admit to [address] [place] [address]  on 12/27/2020 w/Sepsis [facility])  Commorbidities affecting patient's functional performance at time of assessment include: acute cystitis, acute encephalopathy, RUTHANN, chronic cantor, chronic pain syndrome, depression, and hypoxia  Orders placed for OT evaluation and treatment and up and OOB as tolerated   Performed at least two patient identifiers during session including name and wristband  Prior to admission, Patient reporting being independent with ADLs, ambulatory with [facility] and lives with daughter in one story house with 3 NOEMY  Personal factors affecting patient at time of initial evaluation include: steps to enter, limited insight into deficits, difficulty performing ADLs and difficulty performing IADLs  Upon evaluation, patient requires minimal  assist for UB ADLs, moderate assist for LB ADLs    Patient is oriented to name,, oriented to time,, disoriented to place, and disoriented to situation, and presents with impaired orientation to person, place, time and situation, limited ability to recall information after a brief period of time (short term memory) / working memory , limited ability to recall information after a long period of time (long term memory) and impaired judgement, inability to make safe decisions  Occupational performance is affected by the following deficits: orientation, decreased muscle strength, dynamic sit/ stand balance deficit with poor standing tolerance time for self care and functional mobility, decreased activity tolerance, impaired memory, impaired problem solving, decreased safety awareness, delayed righting and equilibrium reactions and postural control and postural alignment deficit, requiring external assistance to complete transitional movements  Patient to benefit from continued Occupational Therapy treatment while in the hospital to address deficits as defined above and maximize level of functional independence with ADLs and functional mobility  Occupational Performance areas to address include: eating, grooming , bathing/ shower, dressing, toilet hygiene, transfer to all surfaces, functional ambulation, medication routine/ management, IADLS: Household maintenance, IADLs: safety procedures and IADLs: meal prep/ clean up  From OT standpoint, recommendation at time of d/c would be Post-Acute Rehabilitation Services         OT Discharge Recommendation: Post-Acute Rehabilitation Services  OT - OK to Discharge: Yes(Once medically cleared )     Jack Lozano, OT

## 2020-12-27 NOTE — ASSESSMENT & PLAN NOTE
· Secondary to UTI, noted by acute kidney injury, lactic acidosis, tachycardia and tachypnea  · Status post 2200 mL in the ER  · Lactic acid improving  · Continue antibiotics

## 2020-12-27 NOTE — ASSESSMENT & PLAN NOTE
· UA with 3+ leukocytes, nitrite positive, trace blood  · WBC micro 10-20  · Continue Rocephin started in the ER  · Patient has chronic Diallo

## 2020-12-27 NOTE — OCCUPATIONAL THERAPY NOTE
Occupational Therapy Evaluation      Vinay Part    12/27/2020    Patient Active Problem List   Diagnosis    Chronic indwelling Diallo catheter    Sepsis ([facility] [address] )    RUTHANN (acute kidney injury) ([facility] [address] [facility]    Acute metabolic encephalopathy    Acute cystitis with hematuria    Chronic pain syndrome    Elevated troponin    CKD (chronic kidney disease)    Anemia    Depression    Hypomagnesemia    Bilateral hand pain    Hypoxia       Past Medical History:   Diagnosis Date    Depression     Diallo catheter in place     Hypertension     Prostate enlargement     Psychiatric disorder     Urinary tract infection        Past Surgical History:   Procedure Laterality Date    ARTHROSCOPY KNEE      BACK SURGERY      L 4 or 5    CATARACT EXTRACTION Bilateral     CYSTOSCOPY W/ LASER LITHOTRIPSY N/A 8/20/2018    Procedure: LITHOTRISPY HOLMIUM LASER of bladder stones;  Surgeon: Tara Hinojosa MD;  Location: [address] [place] [address] MAIN OR;  Service: Urology    IN TRANSURETHRAL ELEC-SURG PROSTATECTOM N/A 8/20/2018    Procedure: CYSTOSCOPY; TURP;  Surgeon: Tara Hinojosa MD;  Location: [address] [place] [address] MAIN OR;  Service: Urology    SHOULDER SURGERY Right 05/31/2019 12/27/20 1102   OT Last Visit   OT Visit Date 12/27/20   Note Type   Note type Evaluation   Restrictions/Precautions   Weight Bearing Precautions Per Order No   Other Precautions Contact/isolation;Cognitive;Multiple lines;Telemetry; Fall Risk;Hard of hearing   Pain Assessment   Pain Assessment Tool Pain Assessment not indicated - pt denies pain   Pain Score No Pain   Home Living   Type of [address] [facility] level;Performs ADLs on one level; Able to live on main level with bedroom/bathroom;Stairs to enter with rails  (3 NOEMY)   Bathroom Shower/Tub Tub/shower unit   [employer] Raised   Bathroom Equipment Grab bars in shower; Shower chair; Toilet raiser   [facility]  (utilizes [facility] at baseline )   Prior Function   Level of [place] Independent with ADLs and functional mobility   Lives With Daughter   Receives Help From Family   ADL Assistance Independent   IADLs Needs assistance   Falls in the last 6 months   (pt unable to recall )   Vocational Retired   Comments Patient is a poor historian 2/2 decreased cognition  All information on home living and PLOF obtained via chart review  Will follow-up with CM  Lifestyle   Autonomy Patient reporting being independent with ADLs, ambulatory with [facility] and lives with daughter in one story house with 3 NOEMY   Reciprocal Relationships supportive daughter    Service to Others retired    ADL   Eating Assistance 5  Supervision/Setup   Grooming Assistance 4  Minimal Assistance   [address] 4  Minimal Assistance   LB [address] 3  Moderate Assistance   [address] 4  [address] [facility] 3  Moderate Assistance   [address]  4  Minimal Assistance   Bed Mobility   Supine to Sit 3  Moderate assistance   Additional items Assist x 2;HOB elevated; Bedrails; Increased time required;Verbal cues;LE management   Sit to Supine 2  Maximal assistance   Additional items Assist x 2; Increased time required;LE management;Verbal cues   Additional Comments Pt sat EOB ~2 minutes with Max Assist x 1 to maintain static sitting balance  Pt reported (+) lightheadedness  Pt requested to return BTB 2/2 fatigue and "whooziness"      Transfers   Sit to Stand Unable to assess  (pt stated multiple times- "I can't stant" )   Functional Mobility   Additional Comments Additonal standing or mobility trail deferred 2/2 safety concerns    Balance   Static Sitting Poor   Dynamic Sitting Poor -   Activity Tolerance   Activity Tolerance Patient limited by fatigue;Treatment limited secondary to medical complications (Comment)  (Lightheadedness )   Nurse Made Aware RN made aware of outcomes    RUE Assessment   RUE Assessment WFL  (full AROM, grossly 4-/5 MMT)   LUE Assessment   LUE Assessment WFL  (full AROM, grossly 4-/5 MMT)   Hand Function   Gross Motor Coordination Functional   Fine Motor Coordination Functional   Cognition   Overall Cognitive Status Impaired   Arousal/Participation Alert; Responsive; Cooperative   Attention Attends with cues to redirect   Orientation Level Oriented to person;Oriented to time;Disoriented to place; Disoriented to situation   Memory Decreased short term memory;Decreased recall of recent events;Decreased recall of precautions   Following Commands Follows one step commands with increased time or repetition   Comments Pt agreeable to OT eval    Assessment   Limitation Decreased ADL status; Decreased UE strength;Decreased Safe judgement during ADL;Decreased cognition;Decreased endurance;Decreased self-care trans;Decreased high-level ADLs   Prognosis Fair   Assessment Patient is a 66 y o  male seen for OT evaluation s/p admit to [address] [place] [address]  on 12/27/2020 w/Sepsis [facility])  Commorbidities affecting patient's functional performance at time of assessment include: acute cystitis, acute encephalopathy, RUTHANN, chronic cantor, chronic pain syndrome, depression, and hypoxia  Orders placed for OT evaluation and treatment and up and OOB as tolerated   Performed at least two patient identifiers during session including name and wristband  Prior to admission, Patient reporting being independent with ADLs, ambulatory with [facility] and lives with daughter in one story house with 3 NOEMY  Personal factors affecting patient at time of initial evaluation include: steps to enter, limited insight into deficits, difficulty performing ADLs and difficulty performing IADLs  Upon evaluation, patient requires minimal  assist for UB ADLs, moderate assist for LB ADLs    Patient is oriented to name,, oriented to time,, disoriented to place, and disoriented to situation, and presents with impaired orientation to person, place, time and situation, limited ability to recall information after a brief period of time (short term memory) / working memory , limited ability to recall information after a long period of time (long term memory) and impaired judgement, inability to make safe decisions  Occupational performance is affected by the following deficits: orientation, decreased muscle strength, dynamic sit/ stand balance deficit with poor standing tolerance time for self care and functional mobility, decreased activity tolerance, impaired memory, impaired problem solving, decreased safety awareness, delayed righting and equilibrium reactions and postural control and postural alignment deficit, requiring external assistance to complete transitional movements  Patient to benefit from continued Occupational Therapy treatment while in the hospital to address deficits as defined above and maximize level of functional independence with ADLs and functional mobility  Occupational Performance areas to address include: eating, grooming , bathing/ shower, dressing, toilet hygiene, transfer to all surfaces, functional ambulation, medication routine/ management, IADLS: Household maintenance, IADLs: safety procedures and IADLs: meal prep/ clean up  From OT standpoint, recommendation at time of d/c would be Post-Acute Rehabilitation Services  Goals   Patient Goals to feel better    Plan   Treatment Interventions ADL retraining;Functional transfer training;UE strengthening/ROM; Endurance training;Cognitive reorientation;Patient/family training; Compensatory technique education; Activityengagement; Energy conservation   Goal Expiration Date 01/06/21   OT Treatment Day 0   OT Frequency 3-5x/wk   Recommendation   OT Discharge Recommendation Post-Acute Rehabilitation Services   OT - OK to Discharge Yes  (Once medically cleared )   Barthel Index   Feeding 5   Bathing 0   Grooming Score 0   Dressing Score 5   Bladder Score 0   Bowels Score 5   Toilet Use Score 5   Transfers (Bed/Chair) Score 5   Mobility (Level Surface) Score 0   Stairs Score 0   Barthel Index Score 25     GOALS:    *ADL transfers with (S) for inc'd independence with ADLs/purposeful tasks    *UB ADL with (S) for inc'd independence with self cares    *LB ADL with (S) using AE prn for inc'd independence with self cares    *Toileting with (S) for clothing management and hygiene for return to PLOF with personal care    *Increase stand tolerance x5 m for inc'd tolerance with standing purposeful tasks    *Participate in 10m UE therex to increase overall stamina/activity tolerance for purposeful tasks    *Bed mobility- (S) for inc'd independence to manage own comfort and initiate EOB & OOB purposeful tasks    *Patient will increase OOB/sitting tolerance to 2-4 hours per day to increase participation in self-care and leisure tasks with no s/s of exertion           Olaf El MS, OTR/L

## 2020-12-27 NOTE — ASSESSMENT & PLAN NOTE
· Pulse ox 87% on room air, placed on oxygen and improved to mid 90s  · Patient did vomit and concern for aspiration  · Portable chest x-ray pending  · Patient on Zosyn currently

## 2020-12-27 NOTE — ED PROVIDER NOTES
History  Chief Complaint   Patient presents with    Vomiting     vomiting with suspected aspiration, confused, hypoxic     HPI  Frequent ED visits for long-term Cantor malfunction  More of same today-- cloudy, decreased urine output per wife  EMS called for "Omega response," unclear issue  Vomiting, reportedly aspirated per EMS  Alert, protecting airway, but not responding to questions, just saying "ugh "    [facility]       Daughter here, can give more info: states normal yesterday  Today, no UOP in the cantor bag  This evening, chills/rigors  Fairly similar to last time, when he had sepsis & similar presentation  Prior to Admission Medications   Prescriptions Last Dose Informant Patient Reported? Taking?    B Complex Vitamins (B COMPLEX 1 PO)   Yes No   Sig: Take 1 capsule by mouth daily   acetaminophen (TYLENOL) 325 mg tablet   Yes No   Sig: Take 650 mg by mouth every 6 (six) hours as needed   ascorbic acid (VITAMIN C) 500 MG tablet   Yes No   Sig: Take 500 mg by mouth daily   cholecalciferol (VITAMIN D3) 1,000 units tablet   Yes No   Sig: Take 4,000 Units by mouth daily   finasteride (PROSCAR) 5 mg tablet   Yes No   Sig: Take 5 mg by mouth daily   gabapentin (NEURONTIN) 300 mg capsule   No No   Sig: Take 1 capsule (300 mg total) by mouth daily at bedtime for 30 days   morphine (MSIR) 15 mg tablet   Yes No   Sig: Take 15 mg by mouth every 6 (six) hours   sertraline (ZOLOFT) 50 mg tablet   Yes No   Sig: Take 50 mg by mouth daily        Facility-Administered Medications: None       Past Medical History:   Diagnosis Date    Depression     Cantor catheter in place     Hypertension     Prostate enlargement     Psychiatric disorder     Urinary tract infection        Past Surgical History:   Procedure Laterality Date    ARTHROSCOPY KNEE      BACK SURGERY      L 4 or 5    CATARACT EXTRACTION Bilateral     CYSTOSCOPY W/ LASER LITHOTRIPSY N/A 8/20/2018    Procedure: LITHOTRISPY HOLMIUM LASER of bladder stones;  Surgeon: Henrry Mendoza MD;  Location: [facility] MAIN OR;  Service: Urology    ID TRANSURETHRAL ELEC-SURG PROSTATECTOM N/A 8/20/2018    Procedure: Estefanía Stoverlm; TURP;  Surgeon: Henrry Mendoza MD;  Location: [facility] MAIN OR;  Service: Urology    SHOULDER SURGERY Right 05/31/2019       Family History   Problem Relation Age of Onset    Cancer Mother     Diabetes Father      I have reviewed and agree with the history as documented  E-Cigarette/Vaping    E-Cigarette Use Never User      E-Cigarette/Vaping Substances     Social History     Tobacco Use    Smoking status: Former Smoker     Packs/day: 1 00    Smokeless tobacco: Never Used    Tobacco comment: quit 50 years ago   Substance Use Topics    Alcohol use: Yes     Frequency: Monthly or less     Drinks per session: 1 or 2     Binge frequency: Less than monthly    Drug use: No       Review of Systems   Unable to perform ROS: Mental status change       Physical Exam  Physical Exam  Vitals signs and nursing note reviewed  Constitutional:       General: He is not in acute distress  Appearance: He is well-developed  He is not diaphoretic  Comments: Disoriented/uncooperative  Protecting airway  Vomiting on self  HENT:      Head: Normocephalic and atraumatic  Eyes:      General:         Right eye: No discharge  Left eye: No discharge  Conjunctiva/sclera: Conjunctivae normal       Pupils: Pupils are equal, round, and reactive to light  Neck:      Musculoskeletal: Neck supple  Cardiovascular:      Rate and Rhythm: Normal rate and regular rhythm  Heart sounds: Normal heart sounds  No murmur  No friction rub  No gallop  Pulmonary:      Effort: Pulmonary effort is normal  No respiratory distress  Breath sounds: Normal breath sounds  No wheezing or rales  Comments: Hypoxic to mid-80's on room air  Abdominal:      General: There is no distension  Palpations: Abdomen is soft  There is no mass        Tenderness: There is no abdominal tenderness  There is no guarding or rebound  Hernia: No hernia is present  Musculoskeletal:         General: No deformity  Skin:     General: Skin is warm and dry  Capillary Refill: Capillary refill takes less than 2 seconds  Coloration: Skin is pale  Comments: Cool/pale/diaphoretic  Neurological:      Mental Status: He is alert and oriented to person, place, and time  Vital Signs  ED Triage Vitals   Temperature Pulse Respirations Blood Pressure SpO2   12/27/20 0519 12/27/20 0515 12/27/20 0515 12/27/20 0515 12/27/20 0515   (!) 97 4 °F (36 3 °C) (!) 126 (!) 24 155/65 (!) 87 %      Temp src Heart Rate Source Patient Position - Orthostatic VS BP Location FiO2 (%)   -- -- -- -- --             Pain Score       --                  Vitals:    12/27/20 0515 12/27/20 0530 12/27/20 0600   BP: 155/65 146/65 119/61   Pulse: (!) 126 (!) 115 (!) 115         Visual Acuity      ED Medications  Medications   sodium chloride 0 9 % bolus 1,000 mL (1,000 mL Intravenous New Bag 12/27/20 0604)   ondansetron (ZOFRAN) injection 4 mg (4 mg Intravenous Given 12/27/20 0531)   nitroglycerin (NITROSTAT) SL tablet 0 4 mg (0 4 mg Sublingual Given 12/27/20 0531)   cefTRIAXone (ROCEPHIN) IVPB (premix in dextrose) 1,000 mg 50 mL (0 mg Intravenous Stopped 12/27/20 0618)       Diagnostic Studies  Results Reviewed     Procedure Component Value Units Date/Time    COVID19, Influenza A/B, RSV PCR, [facility] [[id number]]  (Normal) Collected: 12/27/20 0519    Lab Status: Final result Specimen: Nares from Nasopharyngeal Swab Updated: 12/27/20 0607     SARS-CoV-2 Negative     INFLUENZA A PCR Negative     INFLUENZA B PCR Negative     RSV PCR Negative    Narrative: This test has been authorized by FDA under an EUA (Emergency Use Assay) for use by authorized laboratories    Clinical caution and judgement should be used with the interpretation of these results with consideration of the clinical impression and other laboratory testing  Testing reported as "Positive" or "Negative" has been proven to be accurate according to standard laboratory validation requirements  All testing is performed with control materials showing appropriate reactivity at standard intervals  Troponin I [[id number]]  (Normal) Collected: 12/27/20 0521    Lab Status: Final result Specimen: Blood from Arm, Left Updated: 12/27/20 0555     Troponin I 0 03 ng/mL     Lactic acid [[id number]]  (Abnormal) Collected: 12/27/20 0521    Lab Status: Final result Specimen: Blood from Arm, Left Updated: 12/27/20 0555     LACTIC ACID 4 0 mmol/L     Narrative:      Result may be elevated if tourniquet was used during collection      Lactic acid 2 Hours [[id number]]     Lab Status: No result Specimen: Blood     Comprehensive metabolic panel [[id number]]  (Abnormal) Collected: 12/27/20 0523    Lab Status: Final result Specimen: Blood from Arm, Left Updated: 12/27/20 0553     Sodium 141 mmol/L      Potassium 3 5 mmol/L      Chloride 108 mmol/L      CO2 19 mmol/L      ANION GAP 14 mmol/L      BUN 52 mg/dL      Creatinine 2 47 mg/dL      Glucose 199 mg/dL      Calcium 8 7 mg/dL      AST 36 U/L      ALT 31 U/L      Alkaline Phosphatase 67 U/L      Total Protein 6 1 g/dL      Albumin 3 7 g/dL      Total Bilirubin 0 70 mg/dL      eGFR 24 ml/min/1 73sq m     Narrative:      Rick guidelines for Chronic Kidney Disease (CKD):     Stage 1 with normal or high GFR (GFR > 90 mL/min/1 73 square meters)    Stage 2 Mild CKD (GFR = 60-89 mL/min/1 73 square meters)    Stage 3A Moderate CKD (GFR = 45-59 mL/min/1 73 square meters)    Stage 3B Moderate CKD (GFR = 30-44 mL/min/1 73 square meters)    Stage 4 Severe CKD (GFR = 15-29 mL/min/1 73 square meters)    Stage 5 End Stage CKD (GFR <15 mL/min/1 73 square meters)  Note: GFR calculation is accurate only with a steady state creatinine    Lipase [[id number]]  (Normal) Collected: 12/27/20 0523    Lab Status: Final result Specimen: Blood from Arm, Left Updated: 12/27/20 0553     Lipase 33 u/L     Urine Microscopic [[id number]]  (Abnormal) Collected: 12/27/20 0528    Lab Status: Final result Specimen: Urine, Indwelling Diallo Catheter Updated: 12/27/20 0553     RBC, UA 10-20 /hpf      WBC, UA 10-20 /hpf      Epithelial Cells Occasional /hpf      Bacteria, UA Moderate /hpf      Triplep Phos Celia, UA Moderate /hpf     Urine culture [[id number]] Collected: 12/27/20 0528    Lab Status:  In process Specimen: Urine, Indwelling Diallo Catheter Updated: 12/27/20 0553    Blood gas, Arterial [[id number]]  (Abnormal) Collected: 12/27/20 0529    Lab Status: Final result Specimen: Blood, Arterial from Radial, Left Updated: 12/27/20 0550     pH, Arterial 7 420     pCO2, Arterial 28 8 mm Hg      pO2, Arterial 67 0 mm Hg      HCO3, Arterial 18 3 mmol/L      Base Excess, Arterial -5 0 mmol/L      O2 Content, Arterial 14 0 mL/dL      O2 HGB,Arterial  90 5 %      SOURCE Radial, Left     YINKA TEST Yes     Nasal Cannula 5lpm    Protime-INR [[id number]]  (Abnormal) Collected: 12/27/20 0521    Lab Status: Final result Specimen: Blood from Arm, Left Updated: 12/27/20 0547     Protime 16 9 seconds      INR 1 39    APTT [[id number]]  (Normal) Collected: 12/27/20 0521    Lab Status: Final result Specimen: Blood from Arm, Left Updated: 12/27/20 0547     PTT 30 seconds     UA w Reflex to Microscopic w Reflex to Culture [[id number]]  (Abnormal) Collected: 12/27/20 0528    Lab Status: Final result Specimen: Urine, Indwelling Diallo Catheter Updated: 12/27/20 0538     Color, UA Yellow     Clarity, UA Slightly Cloudy     Specific Albers, UA 1 010     pH, UA 8 5     Leukocytes, UA 3+     Nitrite, UA Positive     Protein, UA 1+ mg/dl      Glucose, UA Negative mg/dl      Ketones, UA Negative mg/dl      Urobilinogen, UA 0 2 E U /dl      Bilirubin, UA Negative     Blood, UA Trace-Intact    CBC and differential [[id number]]  (Abnormal) Collected: 12/27/20 9971 Lab Status: Final result Specimen: Blood from Arm, Left Updated: 12/27/20 0537     WBC 10 20 Thousand/uL      RBC 3 44 Million/uL      Hemoglobin 11 4 g/dL      Hematocrit 33 4 %      MCV 97 fL      MCH 33 2 pg      MCHC 34 2 g/dL      RDW 12 7 %      MPV 8 4 fL      Platelets 471 Thousands/uL     Manual Differential (Non Wam) [[id number]] Collected: 12/27/20 0521    Lab Status: In process Specimen: Blood from Arm, Left Updated: 12/27/20 0533    Blood culture #1 [[id number]] Collected: 12/27/20 0521    Lab Status: In process Specimen: Blood from Arm, Left Updated: 12/27/20 0533    Blood culture #2 [[id number]] Collected: 12/27/20 0521    Lab Status: In process Specimen: Blood from Arm, Left Updated: 12/27/20 0533    Procalcitonin with AM Reflex [[id number]] Collected: 12/27/20 0521    Lab Status: In process Specimen: Blood from Arm, Left Updated: 12/27/20 0530                 XR chest portable - 1 view   ED Interpretation by Keyon Villavicencio MD (12/27 5207)   Portable CXR per my interpretation shows  No fracture  No cardiomegaly  No infiltrate  No effusion  No pneumothorax  Wide mediastinum (unchanged/rotated)                   Procedures  Procedures     EKG per my interpretation:  Time: 05:18  Rate & rhythm:  ST @ 120  Intervals:  Normal  ST segments: depressed in V2-V4  T waves: Inverted in II/III/aVF, flat/nonspecific in precordial leads  Q waves: No pathologic Q waves  Summary: Anterior ST depressions, suggestive of subendocardial ischemia      EKG per my interpretation:  Time: 05:39  Rate & rhythm:  ST @ 117  Intervals:  Normal  ST segments: Trace anterior depression, looks improved vs prior  T waves: Inverted in II/III/aVF, flattened/nonspecific in precordial leads  Q waves: No pathologic Q waves  Summary: Improved vs prior      ED Course  ED Course as of Dec 27 0646   Sun Dec 27, 2020   0529 CXR looks like wide mediastinum  EKG with some anterior ST depressions  Hold ASA for now given wide mediastinum  Try some NTG   IVF  Color a bit better with the NC O2       0535 CXR similar to Aug 2019--same wide mediastinum (looks like just badly rotated--trachea sits well right of spine)  0551 ABG: Met acidosis with good resp compensation      0556 ARF (Cr doubled)  Lactate 4   1L IVF while I await trop; still some suspicion for MI       0558 Trop neg for now, but hypoxic  Labs support severe urosepsis (Lactic 4), but I'm a bit hesitant to flood him with fluids given his hypoxia  1L IVF at least and recheck  5094 Diallo replaced  Initial UOP about 800  Covid neg  I think the 30/kg is safe, so will order another 1 2L  SBIRT 20yo+      Most Recent Value   SBIRT (22 yo +)   In order to provide better care to our patients, we are screening all of our patients for alcohol and drug use  Would it be okay to ask you these screening questions? Yes Filed at: 12/27/2020 0542   Initial Alcohol Screen: US AUDIT-C    1  How often do you have a drink containing alcohol?  0 Filed at: 12/27/2020 0542   2  How many drinks containing alcohol do you have on a typical day you are drinking? 0 Filed at: 12/27/2020 0542   3a  Male UNDER 65: How often do you have five or more drinks on one occasion? 0 Filed at: 12/27/2020 0542   3b  FEMALE Any Age, or MALE 65+: How often do you have 4 or more drinks on one occassion? 0 Filed at: 12/27/2020 0542   Audit-C Score  0 Filed at: 12/27/2020 9400   ESTEBAN: How many times in the past year have you    Used an illegal drug or used a prescription medication for non-medical reasons? Never Filed at: 12/27/2020 0542                    MDM  Number of Diagnoses or Management Options  Renal failure:   Sepsis [facility]):   Tachycardia:   UTI (urinary tract infection):   Diagnosis management comments: ALOC-- check ALOC/Sepsis eval   No signs of head injury, nonfocal exam, I don't see need for CTH at this time      W/U shows urosepsis, adequately explains symptoms, actually improved clinically vs arrival   Will admit as urosepsis/severe sepsis  Critical care time exclusive of procedures was 45 minutes  This time was spent documenting, interpreting results, informing patient/family, obtaining history from family, coordinating admission  Amount and/or Complexity of Data Reviewed  Clinical lab tests: ordered and reviewed  Tests in the radiology section of CPT®: ordered and reviewed  Tests in the medicine section of CPT®: ordered and reviewed  Decide to obtain previous medical records or to obtain history from someone other than the patient: yes  Review and summarize past medical records: yes  Discuss the patient with other providers: yes  Independent visualization of images, tracings, or specimens: yes        Disposition  Final diagnoses:   Sepsis ([facility] [address] )   Renal failure   UTI (urinary tract infection)   Tachycardia     Time reflects when diagnosis was documented in both MDM as applicable and the Disposition within this note     Time User Action Codes Description Comment    12/27/2020  7:29 AM Josiephine Teresa Add [A41 9] Sepsis ([facility] [address] )     12/27/2020  7:30 AM Josiephine Teresa Add [N19] Renal failure     12/27/2020  7:30 AM Josiephine Teresa Add [N39 0] UTI (urinary tract infection)     12/27/2020  7:30 AM Abhi Perez Add [R00 0] Tachycardia     12/28/2020  4:10 PM Iraj Branham Add [N20 1] Obstruction of ureteropelvic junction (UPJ) due to stone     12/29/2020 12:05 PM Rui SHARMA Modify [N20 1] Obstruction of ureteropelvic junction (UPJ) due to stone     12/29/2020  1:39 PM Mayuri Gould Modify [N20 1] Obstruction of ureteropelvic junction (UPJ) due to stone       ED Disposition     ED Disposition Condition Date/Time Comment    Admit Stable Cathi Dec 27, 2020  7:22 AM Case was discussed with Erika Lopez and the patient's admission status was agreed to be Admission Status: observation status to the service of Dr Naldo Tejada            Follow-up Information    None         Patient's Medications Discharge Prescriptions    No medications on file     No discharge procedures on file      PDMP Review     None          ED Provider  Electronically Signed by           Avila Abbasi MD  12/31/20 [phone]

## 2020-12-27 NOTE — PLAN OF CARE
Problem: PHYSICAL THERAPY ADULT  Goal: Performs mobility at highest level of function for planned discharge setting  See evaluation for individualized goals  Description: Treatment/Interventions: Functional transfer training, LE strengthening/ROM, Therapeutic exercise, Endurance training, Patient/family training, Equipment eval/education, Bed mobility, Gait training, Cognitive reorientation, Spoke to nursing, Spoke to case management  Equipment Recommended: (TBD pending further OOB mobility)       See flowsheet documentation for full assessment, interventions and recommendations  Note: Prognosis: Guarded  Problem List: Decreased strength, Decreased endurance, Impaired balance, Decreased mobility, Decreased safety awareness  Assessment: Pt is 66 y o  male seen for PT evaluation on 12/27/2020 s/p admit to [address] on 12/27/2020 w/ Sepsis [facility]); pt presented to ED c altered mental status  PT consulted to assess pt's functional mobility and d/c needs  Order placed for PT eval and tx, w/ up and OOB as tolerated order  PT performed at least 2 patient identifiers during session: Name and wristband  Comorbidities affecting pt's physical performance at time of assessment include: acute metabolic encephalopathy, acute cystitis with hematuria, CKD, chronic pain syndrome, RUTHANN, chronic indwelling Diallo catheter  PTA, pt was independent w/ all functional mobility w/ SPC, has 3 NOEMY and lives w/ dtr in 1 level home  Personal factors affecting pt at time of IE include: ambulating w/ assistive device, stairs to enter home, inability to navigate level surfaces w/o external assistance, unable to perform dynamic tasks in community, decreased cognition, decreased initiation and engagement, unable to perform physical activity, limited insight into impairments, inability to perform IADLs and inability to perform ADLs   Please find objective findings from PT assessment regarding body systems outlined above with impairments and limitations including weakness, impaired balance, decreased endurance, decreased activity tolerance, decreased functional mobility tolerance, decreased safety awareness and fall risk, as well as mobility assessment (need for cueing for mobility technique)  The following objective measures performed on IE also reveal limitations: Barthel Index: 25/100  Pt's clinical presentation is currently unstable/unpredictable seen in pt's presentation of abnormal lab value(s) and ongoing medical assessment  Pt to benefit from continued PT tx to address deficits as defined above and maximize level of functional independent mobility and consistency  From PT/mobility standpoint, recommendation at time of d/c would be STR pending progress in order to facilitate return to PLOF  Barriers to Discharge: Inaccessible home environment     PT Discharge Recommendation: [id number]     PT - OK to Discharge: No    See flowsheet documentation for full assessment

## 2020-12-27 NOTE — PLAN OF CARE
Problem: Potential for Falls  Goal: Patient will remain free of falls  Description: INTERVENTIONS:  - Assess patient frequently for physical needs  -  Identify cognitive and physical deficits and behaviors that affect risk of falls    -  [place] fall precautions as indicated by assessment   - Educate patient/family on patient safety including physical limitations  - Instruct patient to call for assistance with activity based on assessment  - Modify environment to reduce risk of injury  - Consider OT/PT consult to assist with strengthening/mobility  Outcome: Progressing     Problem: NEUROSENSORY - ADULT  Goal: Achieves stable or improved neurological status  Description: INTERVENTIONS  - Monitor and report changes in neurological status  - Monitor vital signs such as temperature, blood pressure, glucose, and any other labs ordered   - Initiate measures to prevent increased intracranial pressure  - Monitor for seizure activity and implement precautions if appropriate      Outcome: Progressing     Problem: GENITOURINARY - ADULT  Goal: Maintains or returns to baseline urinary function  Description: INTERVENTIONS:  - Assess urinary function  - Encourage oral fluids to ensure adequate hydration if ordered  - Administer IV fluids as ordered to ensure adequate hydration  - Administer ordered medications as needed  - Offer frequent toileting  - Follow urinary retention protocol if ordered  Outcome: Progressing  Goal: Absence of urinary retention  Description: INTERVENTIONS:  - Assess patients ability to void and empty bladder  - Monitor I/O  - Bladder scan as needed  - Discuss with physician/AP medications to alleviate retention as needed  - Discuss catheterization for long term situations as appropriate  Outcome: Progressing  Goal: Urinary catheter remains patent  Description: INTERVENTIONS:  - Assess patency of urinary catheter  - If patient has a chronic cantor, consider changing catheter if non-functioning  - Follow guidelines for intermittent irrigation of non-functioning urinary catheter  Outcome: Progressing     Problem: METABOLIC, FLUID AND ELECTROLYTES - ADULT  Goal: Electrolytes maintained within normal limits  Description: INTERVENTIONS:  - Monitor labs and assess patient for signs and symptoms of electrolyte imbalances  - Administer electrolyte replacement as ordered  - Monitor response to electrolyte replacements, including repeat lab results as appropriate  - Instruct patient on fluid and nutrition as appropriate  Outcome: Progressing     Problem: SKIN/TISSUE INTEGRITY - ADULT  Goal: Skin integrity remains intact  Description: INTERVENTIONS  - Identify patients at risk for skin breakdown  - Assess and monitor skin integrity  - Assess and monitor nutrition and hydration status  - Monitor labs (i e  albumin)  - Assess for incontinence   - Turn and reposition patient  - Assist with mobility/ambulation  - Relieve pressure over bony prominences  - Avoid friction and shearing  - Provide appropriate hygiene as needed including keeping skin clean and dry  - Evaluate need for skin moisturizer/barrier cream  - Collaborate with interdisciplinary team (i e  Nutrition, Rehabilitation, etc )   - Patient/family teaching  Outcome: Progressing     Problem: MUSCULOSKELETAL - ADULT  Goal: Maintain or return mobility to safest level of function  Description: INTERVENTIONS:  - Assess patient's ability to carry out ADLs; assess patient's baseline for ADL function and identify physical deficits which impact ability to perform ADLs (bathing, care of mouth/teeth, toileting, grooming, dressing, etc )  - Assess/evaluate cause of self-care deficits   - Assess range of motion  - Assess patient's mobility  - Assess patient's need for assistive devices and provide as appropriate  - Encourage maximum independence but intervene and supervise when necessary  - Involve family in performance of ADLs  - Assess for home care needs following discharge   - Consider OT consult to assist with ADL evaluation and planning for discharge  - Provide patient education as appropriate  Outcome: Progressing     Problem: PAIN - ADULT  Goal: Verbalizes/displays adequate comfort level or baseline comfort level  Description: Interventions:  - Encourage patient to monitor pain and request assistance  - Assess pain using appropriate pain scale  - Administer analgesics based on type and severity of pain and evaluate response  - Implement non-pharmacological measures as appropriate and evaluate response  - Consider cultural and social influences on pain and pain management  - Notify physician/advanced practitioner if interventions unsuccessful or patient reports new pain  Outcome: Progressing     Problem: INFECTION - ADULT  Goal: Absence or prevention of progression during hospitalization  Description: INTERVENTIONS:  - Assess and monitor for signs and symptoms of infection  - Monitor lab/diagnostic results  - Monitor all insertion sites, i e  indwelling lines, tubes, and drains  - Monitor endotracheal if appropriate and nasal secretions for changes in amount and color  - [place] appropriate cooling/warming therapies per order  - Administer medications as ordered  - Instruct and encourage patient and family to use good hand hygiene technique  - Identify and instruct in appropriate isolation precautions for identified infection/condition  Outcome: Progressing     Problem: SAFETY ADULT  Goal: Patient will remain free of falls  Description: INTERVENTIONS:  - Assess patient frequently for physical needs  -  Identify cognitive and physical deficits and behaviors that affect risk of falls    -  [place] fall precautions as indicated by assessment   - Educate patient/family on patient safety including physical limitations  - Instruct patient to call for assistance with activity based on assessment  - Modify environment to reduce risk of injury  - Consider OT/PT consult to assist with strengthening/mobility  Outcome: Progressing  Goal: Maintain or return to baseline ADL function  Description: INTERVENTIONS:  -  Assess patient's ability to carry out ADLs; assess patient's baseline for ADL function and identify physical deficits which impact ability to perform ADLs (bathing, care of mouth/teeth, toileting, grooming, dressing, etc )  - Assess/evaluate cause of self-care deficits   - Assess range of motion  - Assess patient's mobility; develop plan if impaired  - Assess patient's need for assistive devices and provide as appropriate  - Encourage maximum independence but intervene and supervise when necessary  - Involve family in performance of ADLs  - Assess for home care needs following discharge   - Consider OT consult to assist with ADL evaluation and planning for discharge  - Provide patient education as appropriate  Outcome: Progressing  Goal: Maintain or return mobility status to optimal level  Description: INTERVENTIONS:  - Assess patient's baseline mobility status (ambulation, transfers, stairs, etc )    - Identify cognitive and physical deficits and behaviors that affect mobility  - Identify mobility aids required to assist with transfers and/or ambulation (gait belt, sit-to-stand, lift, walker, cane, etc )  - [place] fall precautions as indicated by assessment  - Record patient progress and toleration of activity level on Mobility SBAR; progress patient to next Phase/Stage  - Instruct patient to call for assistance with activity based on assessment  - Consider rehabilitation consult to assist with strengthening/weightbearing, etc   Outcome: Progressing     Problem: DISCHARGE PLANNING  Goal: Discharge to home or other facility with appropriate resources  Description: INTERVENTIONS:  - Identify barriers to discharge w/patient and caregiver  - Arrange for needed discharge resources and transportation as appropriate  - Identify discharge learning needs (meds, wound care, etc )  - Arrange for interpretive services to assist at discharge as needed  - Refer to Case Management Department for coordinating discharge planning if the patient needs post-hospital services based on physician/advanced practitioner order or complex needs related to functional status, cognitive ability, or social support system  Outcome: Progressing     Problem: Knowledge Deficit  Goal: Patient/family/caregiver demonstrates understanding of disease process, treatment plan, medications, and discharge instructions  Description: Complete learning assessment and assess knowledge base    Interventions:  - Provide teaching at level of understanding  - Provide teaching via preferred learning methods  Outcome: Progressing

## 2020-12-27 NOTE — PHYSICAL THERAPY NOTE
Physical Therapy Evaluation     Patient's Name: Joslyn Kehr    Admitting Diagnosis  Vomiting [R11 10]  UTI (urinary tract infection) [N39 0]  Renal failure [N19]  Tachycardia [R00 0]  Sepsis ([facility] [facility] ) [A41 9]    Problem List  Patient Active Problem List   Diagnosis    Chronic indwelling Diallo catheter    Sepsis ([facility] [facility] )    [facility]    Acute metabolic encephalopathy    Acute cystitis with hematuria    Chronic pain syndrome    Elevated troponin    CKD (chronic kidney disease)    Anemia    Depression    Hypomagnesemia    Bilateral hand pain    Hypoxia       Past Medical History  Past Medical History:   Diagnosis Date    Depression     Diallo catheter in place     Hypertension     Prostate enlargement     Psychiatric disorder     Urinary tract infection        Past Surgical History  Past Surgical History:   Procedure Laterality Date    ARTHROSCOPY KNEE      BACK SURGERY      L 4 or 5    CATARACT EXTRACTION Bilateral     CYSTOSCOPY W/ LASER LITHOTRIPSY N/A 8/20/2018    Procedure: LITHOTRISPY HOLMIUM LASER of bladder stones;  Surgeon: Marilou Lozano MD;  Location: [facility] MAIN OR;  Service: Urology    ND TRANSURETHRAL ELEC-SURG PROSTATECTOM N/A 8/20/2018    Procedure: CYSTOSCOPY; TURP;  Surgeon: Marilou Lozano MD;  Location: [facility] MAIN OR;  Service: Urology    SHOULDER SURGERY Right 05/31/2019 12/27/20 1104   PT Last Visit   PT Visit Date 12/27/20   Note Type   Note type Evaluation   Pain Assessment   Pain Assessment Tool Pain Assessment not indicated - pt denies pain   Pain Score No Pain   Home Living   Type of [address] One level;Performs ADLs on one level; Able to live on main level with bedroom/bathroom;Stairs to enter with rails  (3 NOEMY)   Bathroom Shower/Tub Tub/shower unit   [employer] Raised   Bathroom Equipment Grab bars in shower; Shower chair; Toilet raiser   [address]   Additional Comments pt is poor historian d/t AMS and confusion   Prior Function   Level of [place] Independent with ADLs and functional mobility   Lives With Daughter   Receives Help From Family   ADL Assistance Independent   IADLs Needs assistance   Falls in the last 6 months   (pt unable to recall)   Vocational Retired   Restrictions/Precautions   [facility] Precautions Per Order No   Other Precautions Contact/isolation;Cognitive; Bed Alarm;Multiple lines;O2;Fall Risk   General   Family/Caregiver Present No   Cognition   Overall Cognitive Status Impaired   Arousal/Participation Responsive   Orientation Level Oriented to person;Oriented to time;Disoriented to place; Disoriented to situation   Memory Decreased recall of biographical information;Decreased short term memory;Decreased recall of precautions;Decreased recall of recent events   Following Commands Follows one step commands with increased time or repetition   Comments pt agreeable to PT session   RLE Assessment   RLE Assessment X   Strength RLE   RLE Overall Strength 3-/5   LLE Assessment   LLE Assessment X   Strength LLE   LLE Overall Strength 3-/5   Coordination   Movements are Fluid and Coordinated 0   Sensation WFL   Bed Mobility   Supine to Sit 3  Moderate assistance   Additional items Assist x 2; Increased time required;Verbal cues   Sit to Supine 3  Moderate assistance   Additional items Assist x 2;Verbal cues; Increased time required   Additional Comments max Ax1 to maintain neutral sitting posture required x 2 mins   Transfers   Sit to Stand Unable to assess  (PT deferred 2/2 pt reports of lightheadedness)   Balance   Static Sitting Poor   Dynamic Sitting Poor -   Endurance Deficit   Endurance Deficit Yes   Activity Tolerance   Activity Tolerance Patient limited by fatigue   Nurse Made Aware Yes, RN made aware of outcomes/recs   Assessment   Prognosis Guarded   Problem List Decreased strength;Decreased endurance; Impaired balance;Decreased mobility; Decreased safety awareness Assessment Pt is 66 y o  male seen for PT evaluation on 12/27/2020 s/p admit to [address] on 12/27/2020 w/ Sepsis [facility]); pt presented to ED c altered mental status  PT consulted to assess pt's functional mobility and d/c needs  Order placed for PT eval and tx, w/ up and OOB as tolerated order  PT performed at least 2 patient identifiers during session: Name and wristband  Comorbidities affecting pt's physical performance at time of assessment include: acute metabolic encephalopathy, acute cystitis with hematuria, CKD, chronic pain syndrome, RUTHANN, chronic indwelling Diallo catheter  PTA, pt was independent w/ all functional mobility w/ SPC, has 3 NOEMY and lives w/ dtr in 1 level home  Personal factors affecting pt at time of IE include: ambulating w/ assistive device, stairs to enter home, inability to navigate level surfaces w/o external assistance, unable to perform dynamic tasks in community, decreased cognition, decreased initiation and engagement, unable to perform physical activity, limited insight into impairments, inability to perform IADLs and inability to perform ADLs  Please find objective findings from PT assessment regarding body systems outlined above with impairments and limitations including weakness, impaired balance, decreased endurance, decreased activity tolerance, decreased functional mobility tolerance, decreased safety awareness and fall risk, as well as mobility assessment (need for cueing for mobility technique)  The following objective measures performed on IE also reveal limitations: Barthel Index: 25/100  Pt's clinical presentation is currently unstable/unpredictable seen in pt's presentation of abnormal lab value(s) and ongoing medical assessment  Pt to benefit from continued PT tx to address deficits as defined above and maximize level of functional independent mobility and consistency   From PT/mobility standpoint, recommendation at time of d/c would be STR pending progress in order to facilitate return to PLOF  Barriers to Discharge Inaccessible home environment   Goals   Patient Goals "to rest"   STG Expiration Date 01/06/21   Short Term Goal #1 Pt will: Perform bed mobility tasks to modified I to improve ease of bed mobility  Perform transfers to modified I to improve ease of transfers  Perform ambulation with Supervision and RW for 125 ft to increase Indep in home environment  Increase BLE strength to 4/5 to improve functional mobility  Increase OOB activity tolerance to 10 mins without s/s of exertion to decrease fall risk  Increase dynamic standing balance to F to decrease fall risk  Navigate up and down 3 steps so pt can get into and out of his home   PT Treatment Day 0   Plan   Treatment/Interventions Functional transfer training;LE strengthening/ROM; Therapeutic exercise; Endurance training;Patient/family training;Equipment eval/education; Bed mobility;Gait training;Cognitive reorientation;Spoke to nursing;Spoke to case management   PT Frequency   (3-5x/wk)   Recommendation   PT Discharge Recommendation Post-Acute Rehabilitation Services   Equipment Recommended   (TBD pending further OOB mobility)   PT - OK to Discharge No   Barthel Index   Feeding 5   Bathing 0   Grooming Score 0   Dressing Score 5   Bladder Score 0   Bowels Score 5   Toilet Use Score 5   Transfers (Bed/Chair) Score 5   Mobility (Level Surface) Score 0   Stairs Score 0   Barthel Index Score 25           Shania Lozano, PT

## 2020-12-28 ENCOUNTER — APPOINTMENT (INPATIENT)
Dept: RADIOLOGY | Facility: HOSPITAL | Age: 75
DRG: 853 | End: 2020-12-28
Payer: MEDICARE

## 2020-12-28 ENCOUNTER — APPOINTMENT (INPATIENT)
Dept: ULTRASOUND IMAGING | Facility: HOSPITAL | Age: 75
DRG: 853 | End: 2020-12-28
Payer: MEDICARE

## 2020-12-28 ENCOUNTER — APPOINTMENT (INPATIENT)
Dept: CT IMAGING | Facility: HOSPITAL | Age: 75
DRG: 853 | End: 2020-12-28
Payer: MEDICARE

## 2020-12-28 PROBLEM — J96.01 ACUTE RESPIRATORY FAILURE WITH HYPOXIA (HCC): Status: ACTIVE | Noted: 2020-12-27

## 2020-12-28 PROBLEM — N20.1 URETEROPELVIC JUNCTION CALCULUS: Status: ACTIVE | Noted: 2020-12-28

## 2020-12-28 LAB
ANION GAP SERPL CALCULATED.3IONS-SCNC: 9 MMOL/L (ref 4–13)
BUN SERPL-MCNC: 55 MG/DL (ref 7–25)
CALCIUM SERPL-MCNC: 7.6 MG/DL (ref 8.6–10.5)
CHLORIDE SERPL-SCNC: 109 MMOL/L (ref 98–107)
CO2 SERPL-SCNC: 20 MMOL/L (ref 21–31)
CREAT SERPL-MCNC: 2.71 MG/DL (ref 0.7–1.3)
ERYTHROCYTE [DISTWIDTH] IN BLOOD BY AUTOMATED COUNT: 13.2 % (ref 11.5–14.5)
GFR SERPL CREATININE-BSD FRML MDRD: 22 ML/MIN/1.73SQ M
GLUCOSE SERPL-MCNC: 124 MG/DL (ref 65–99)
HCT VFR BLD AUTO: 29.4 % (ref 42–47)
HGB BLD-MCNC: 9.8 G/DL (ref 14–18)
MCH RBC QN AUTO: 32.3 PG (ref 26–34)
MCHC RBC AUTO-ENTMCNC: 33.3 G/DL (ref 31–37)
MCV RBC AUTO: 97 FL (ref 81–99)
PLATELET # BLD AUTO: 118 THOUSANDS/UL (ref 149–390)
PMV BLD AUTO: 9.3 FL (ref 8.6–11.7)
POTASSIUM SERPL-SCNC: 3.6 MMOL/L (ref 3.5–5.5)
PROCALCITONIN SERPL-MCNC: 91.9 NG/ML
RBC # BLD AUTO: 3.04 MILLION/UL (ref 4.3–5.9)
SODIUM SERPL-SCNC: 138 MMOL/L (ref 134–143)
WBC # BLD AUTO: 15.5 THOUSAND/UL (ref 4.8–10.8)

## 2020-12-28 PROCEDURE — 99232 SBSQ HOSP IP/OBS MODERATE 35: CPT | Performed by: INTERNAL MEDICINE

## 2020-12-28 PROCEDURE — 97110 THERAPEUTIC EXERCISES: CPT

## 2020-12-28 PROCEDURE — G1004 CDSM NDSC: HCPCS

## 2020-12-28 PROCEDURE — 85027 COMPLETE CBC AUTOMATED: CPT | Performed by: PHYSICIAN ASSISTANT

## 2020-12-28 PROCEDURE — 76770 US EXAM ABDO BACK WALL COMP: CPT

## 2020-12-28 PROCEDURE — 87040 BLOOD CULTURE FOR BACTERIA: CPT | Performed by: INTERNAL MEDICINE

## 2020-12-28 PROCEDURE — 74176 CT ABD & PELVIS W/O CONTRAST: CPT

## 2020-12-28 PROCEDURE — 84145 PROCALCITONIN (PCT): CPT | Performed by: EMERGENCY MEDICINE

## 2020-12-28 PROCEDURE — 80048 BASIC METABOLIC PNL TOTAL CA: CPT | Performed by: PHYSICIAN ASSISTANT

## 2020-12-28 PROCEDURE — 71045 X-RAY EXAM CHEST 1 VIEW: CPT

## 2020-12-28 PROCEDURE — 99223 1ST HOSP IP/OBS HIGH 75: CPT | Performed by: INTERNAL MEDICINE

## 2020-12-28 RX ORDER — SODIUM CHLORIDE, SODIUM GLUCONATE, SODIUM ACETATE, POTASSIUM CHLORIDE, MAGNESIUM CHLORIDE, SODIUM PHOSPHATE, DIBASIC, AND POTASSIUM PHOSPHATE .53; .5; .37; .037; .03; .012; .00082 G/100ML; G/100ML; G/100ML; G/100ML; G/100ML; G/100ML; G/100ML
75 INJECTION, SOLUTION INTRAVENOUS CONTINUOUS
Status: DISPENSED | OUTPATIENT
Start: 2020-12-28 | End: 2020-12-31

## 2020-12-28 RX ORDER — CALCIUM CARBONATE 200(500)MG
500 TABLET,CHEWABLE ORAL 2 TIMES DAILY
Status: DISCONTINUED | OUTPATIENT
Start: 2020-12-28 | End: 2021-01-05 | Stop reason: HOSPADM

## 2020-12-28 RX ADMIN — Medication 4000 UNITS: at 09:15

## 2020-12-28 RX ADMIN — HEPARIN SODIUM 5000 UNITS: 5000 INJECTION INTRAVENOUS; SUBCUTANEOUS at 05:21

## 2020-12-28 RX ADMIN — OXYCODONE HYDROCHLORIDE AND ACETAMINOPHEN 500 MG: 500 TABLET ORAL at 09:15

## 2020-12-28 RX ADMIN — CALCIUM CARBONATE (ANTACID) CHEW TAB 500 MG 500 MG: 500 CHEW TAB at 20:31

## 2020-12-28 RX ADMIN — SODIUM CHLORIDE, SODIUM GLUCONATE, SODIUM ACETATE, POTASSIUM CHLORIDE, MAGNESIUM CHLORIDE, SODIUM PHOSPHATE, DIBASIC, AND POTASSIUM PHOSPHATE 75 ML/HR: .53; .5; .37; .037; .03; .012; .00082 INJECTION, SOLUTION INTRAVENOUS at 15:49

## 2020-12-28 RX ADMIN — CALCIUM CARBONATE (ANTACID) CHEW TAB 500 MG 500 MG: 500 CHEW TAB at 15:49

## 2020-12-28 RX ADMIN — PIPERACILLIN SODIUM AND TAZOBACTAM SODIUM 2.25 G: 2; .25 INJECTION, POWDER, LYOPHILIZED, FOR SOLUTION INTRAVENOUS at 04:02

## 2020-12-28 RX ADMIN — ACETAMINOPHEN 650 MG: 325 TABLET ORAL at 15:49

## 2020-12-28 RX ADMIN — PIPERACILLIN SODIUM AND TAZOBACTAM SODIUM 2.25 G: 2; .25 INJECTION, POWDER, LYOPHILIZED, FOR SOLUTION INTRAVENOUS at 15:48

## 2020-12-28 RX ADMIN — PIPERACILLIN SODIUM AND TAZOBACTAM SODIUM 2.25 G: 2; .25 INJECTION, POWDER, LYOPHILIZED, FOR SOLUTION INTRAVENOUS at 11:46

## 2020-12-28 RX ADMIN — MORPHINE SULFATE 15 MG: 15 TABLET ORAL at 17:10

## 2020-12-28 RX ADMIN — SERTRALINE HYDROCHLORIDE 50 MG: 50 TABLET ORAL at 09:15

## 2020-12-28 RX ADMIN — MORPHINE SULFATE 15 MG: 15 TABLET ORAL at 09:15

## 2020-12-28 RX ADMIN — FERROUS SULFATE TAB 325 MG (65 MG ELEMENTAL FE) 325 MG: 325 (65 FE) TAB at 09:15

## 2020-12-28 RX ADMIN — HEPARIN SODIUM 5000 UNITS: 5000 INJECTION INTRAVENOUS; SUBCUTANEOUS at 15:49

## 2020-12-28 RX ADMIN — FINASTERIDE 5 MG: 5 TABLET, FILM COATED ORAL at 09:15

## 2020-12-28 NOTE — CONSULTS
Consultation - Nephrology   Jose Lester 66 y o  male MRN: [id number]  Unit/Bed#: -01 Encounter: [id number]      A/P:  1  Acute kidney injury  Due to sepsis of urinary origin  He has pyuria and fever, tachycardia and relative hypoxia on 4 liters/minute  Will check urine electrolytes  Start Isolyte at 75 ml/hr for 2 liters  Check a dedicated kidney ultrasound  Continue antibiotics but would choose a 3rd generation cephalosporin in view of acute kidney injury  2  Chronic kidney disease stage IIIA  Baseline creatinine 1 2-1 3 mg/dL  3  Acute metabolic encephalopathy  Today the patient was able to give a very good history and was a little confused and needed to be directives  4  Hypoxia  Concern for aspiration  A chest x-ray is pending and he is on Zosyn for this   5  Hypocalcemia  Check a PTH and vitamin D level  Start Tums inbetween meals to enhance absorption          Thank you for allowing us to participate in the care of your patient  Please feel free to contact us regarding the care of this patient, or any other questions/concerns that may be applicable  Patient Active Problem List   Diagnosis    Chronic indwelling Diallo catheter    Sepsis ([address] )    RUTHANN (acute kidney injury) ([address] )    Acute metabolic encephalopathy    Acute cystitis with hematuria    Chronic pain syndrome    Elevated troponin    CKD (chronic kidney disease)    Anemia    Depression    Hypomagnesemia    Bilateral hand pain    Acute respiratory failure with hypoxia ([address] )       History of Present Illness   Physician Requesting Consult: Francisco Gil MD  Reason for Consult / Principal Problem:  Acute kidney injury  Hx and PE limited by:   HPI: Jose Lester is a 66y o  year old male who presents with acute kidney injury with a creatinine of 2 47 mg/dL on admission which dominic to 2 71 mg/dL today    He has a baseline creatinine of 1 26 mg/dL in July and has stage 3 chronic kidney disease dating back to 2018  He was noted to have altered mental status with increasing confusion, vomiting, fevers and chills on 12/26 and cloudy urine in his Diallo with a decreased urinary output  Was brought to the emergency department after his daughter noted no urine in the Diallo catheter bag  He normally gets the catheter changed every 2 weeks by Dr Jannette Naidu  He is normally quite functional     History obtained from chart review and the patient    Review of Systems - Negative except as mentioned above in HPI, more specifics as mentioned below    Review of Systems - General ROS: positive for  - chills and fever  Ophthalmic ROS: negative  ENT ROS: positive for - hearing change  Respiratory ROS: positive for - cough  Cardiovascular ROS: no chest pain or dyspnea on exertion  Gastrointestinal ROS: positive for - diarrhea and nausea/vomiting  Genito-Urinary ROS: positive for - change in urinary stream and dysuria  Musculoskeletal ROS: positive for - muscular weakness  Neurological ROS: no TIA or stroke symptoms  positive for - behavioral changes  Dermatological ROS: negative    Historical Information   Past Medical History:   Diagnosis Date    Depression     Diallo catheter in place     Hypertension     Prostate enlargement     Psychiatric disorder     Urinary tract infection      Past Surgical History:   Procedure Laterality Date    ARTHROSCOPY KNEE      BACK SURGERY      L 4 or 5    CATARACT EXTRACTION Bilateral     CYSTOSCOPY W/ LASER LITHOTRIPSY N/A 8/20/2018    Procedure: LITHOTRISPY HOLMIUM LASER of bladder stones;  Surgeon: Kelsie Diaz MD;  Location: [address] [place] [address] OR;  Service: Urology    VT TRANSURETHRAL ELEC-SURG PROSTATECTOM N/A 8/20/2018    Procedure: CYSTOSCOPY; TURP;  Surgeon: Kelsie Diaz MD;  Location: [address] [place] [address] OR;  Service: Urology    SHOULDER SURGERY Right 05/31/2019     Social History   Social History     Substance and Sexual Activity   Alcohol Use Never    Frequency: Monthly or less    Drinks per session: 1 or 2    Binge frequency: Less than monthly     Social History     Substance and Sexual Activity   Drug Use Never     Social History     Tobacco Use   Smoking Status Former Smoker    Packs/day: 1 00   Smokeless Tobacco Never Used   Tobacco Comment    quit 50 years ago     Family History   Problem Relation Age of Onset    Cancer Mother     Diabetes Father        Meds/Allergies   all current active meds have been reviewed, current meds:   Current Facility-Administered Medications   Medication Dose Route Frequency    acetaminophen (TYLENOL) tablet 650 mg  650 mg Oral Q4H PRN    ascorbic acid (VITAMIN C) tablet 500 mg  500 mg Oral Daily    cholecalciferol (VITAMIN D3) tablet 4,000 Units  4,000 Units Oral Daily    diphenhydrAMINE (BENADRYL) tablet 50 mg  50 mg Oral HS PRN    ferrous sulfate tablet 325 mg  325 mg Oral Daily With Breakfast    finasteride (PROSCAR) tablet 5 mg  5 mg Oral Daily    gabapentin (NEURONTIN) capsule 400 mg  400 mg Oral HS    heparin (porcine) subcutaneous injection 5,000 Units  5,000 Units Subcutaneous Q8H [address]    morphine (MSIR) IR tablet 15 mg  15 mg Oral BID    ondansetron (ZOFRAN) injection 4 mg  4 mg Intravenous Q6H PRN    piperacillin-tazobactam (ZOSYN) 2 25 g in sodium chloride 0 9 % 50 mL IVPB  2 25 g Intravenous Q6H    sertraline (ZOLOFT) tablet 50 mg  50 mg Oral Daily    and PTA meds:    Medications Prior to Admission   Medication    ascorbic acid (VITAMIN C) 500 MG tablet    B Complex Vitamins (B COMPLEX 1 PO)    cholecalciferol (VITAMIN D3) 1,000 units tablet    ferrous sulfate 325 (65 Fe) mg tablet    finasteride (PROSCAR) 5 mg tablet    morphine (MSIR) 15 mg tablet    sertraline (ZOLOFT) 50 mg tablet    acetaminophen (TYLENOL) 325 mg tablet    gabapentin (NEURONTIN) 300 mg capsule         No Known Allergies    Objective     Intake/Output Summary (Last 24 hours) at 12/28/2020 1346  Last data filed at 12/27/2020 2100  Gross per 24 hour   Intake    Output 475 ml   Net -475 ml Invasive Devices:   Urethral Catheter 18 Fr  (Active)   Reasons to continue Urinary Catheter  Chronic urinary catheter 12/28/20 1001   Site Assessment Clean;Skin intact 12/28/20 1001   Collection Container Standard drainage bag 12/28/20 1001   Securement Method Securing device (Describe) 12/28/20 1001   Output (mL) 200 mL 12/27/20 2100       Physical Exam      I/O last 3 completed shifts: In: 1400 [I V :400; IV Piggyback:1000]  Out: 1075 [Urine:1075]    Vitals:    12/28/20 0700   BP: 118/58   Pulse: (!) 107   Resp: 20   Temp: 100 2 °F (37 9 °C)   SpO2: 90%       Gen: in NAD, Alert/Awake  HEENT: no sclerous icterus, MMM, neck suppleHOH  CV: +S1/S2, RRR  Lungs: CTA bilaterally  Abd: +BS, soft NT/ND  Ext: all four extremities are warm  Diallo in place  Skin: no rashes noted  Neuro: CN II-XII intact    Current Weight: Weight - Scale: 83 3 kg (183 lb 10 3 oz)  First Weight: Weight - Scale: 82 8 kg (182 lb 8 7 oz)    Lab Results:  I have personally reviewed pertinent labs      CBC:   Lab Results   Component Value Date    WBC 15 50 (H) 12/28/2020    HGB 9 8 (L) 12/28/2020    HCT 29 4 (L) 12/28/2020    MCV 97 12/28/2020     (L) 12/28/2020    MCH 32 3 12/28/2020    MCHC 33 3 12/28/2020    RDW 13 2 12/28/2020    MPV 9 3 12/28/2020     CMP:   Lab Results   Component Value Date    K 3 6 12/28/2020     (H) 12/28/2020    CO2 20 (L) 12/28/2020    BUN 55 (H) 12/28/2020    CREATININE 2 71 (H) 12/28/2020    CALCIUM 7 6 (L) 12/28/2020    EGFR 22 12/28/2020     Phosphorus: No results found for: PHOS  Magnesium: No results found for: MG  Urinalysis: No results found for: COLORU, CLARITYU, SPECGRAV, PHUR, LEUKOCYTESUR, NITRITE, PROTEINUA, GLUCOSEU, KETONESU, BILIRUBINUR, BLOODU  Ionized Calcium: No results found for: CAION  Coagulation: No results found for: PT, INR, APTT  Troponin: No results found for: TROPONINI  ABG: No results found for: PHART, JVX2SCA, PO2ART, OTH1JNM, K2VDQSFO, BEART, SOURCE    Results from last 7 days   Lab Units 12/28/20  0521 12/27/20  0523   POTASSIUM mmol/L 3 6 3 5   CHLORIDE mmol/L 109* 108*   CO2 mmol/L 20* 19*   BUN mg/dL 55* 52*   CREATININE mg/dL 2 71* 2 47*   CALCIUM mg/dL 7 6* 8 7   ALK PHOS U/L  --  67   ALT U/L  --  31   AST U/L  --  36       Radiology review:  Procedure: Xr Chest Portable - 1 View    Result Date: 12/27/2020  Narrative: CHEST INDICATION:   Hypoxic possible aspiration  COMPARISON:  Chest radiograph from 8/24/2019  EXAM PERFORMED/VIEWS:  XR CHEST PORTABLE FINDINGS: Cardiomediastinal silhouette appears unremarkable  The lungs are clear  No pneumothorax or pleural effusion  Osseous structures appear within normal limits for patient age  Bilateral shoulder arthroplasty  Impression: No acute cardiopulmonary disease  Workstation performed: [id number]         EKG, Pathology, and Other Studies:  I have reviewed all labs in [facility] everywhere  And the CT scan of the abdomen and pelvis and all notes from this admission      Counseling / Coordination of Care  Total ADDITIONAL floor / unit time spent today 35 minutes  Greater than 50% of total time was spent with the patient and / or family counseling and / or coordination of care  A description of the counseling / coordination of care: Nicole Acosta MD      This consultation note was produced in part using a dictation device which may document imprecise wording from author's original intent

## 2020-12-28 NOTE — ASSESSMENT & PLAN NOTE
· UA with 3+ leukocytes, nitrite positive, trace blood  · Urine culture growing Proteus  · Awaiting sensitivity results  · Patient received Rocephin in the ER  · Is on Zosyn  · Patient has chronic Diallo

## 2020-12-28 NOTE — NURSING NOTE
Patient temp reported as 101 2, RN adm 650 mg P O  tylenol as per order and temp decreased to 98 2, will continue to monitor

## 2020-12-28 NOTE — PLAN OF CARE
Problem: OCCUPATIONAL THERAPY ADULT  Goal: Performs self-care activities at highest level of function for planned discharge setting  See evaluation for individualized goals  Description: Treatment Interventions: ADL retraining, Functional transfer training, UE strengthening/ROM, Endurance training, Cognitive reorientation, Patient/family training, Compensatory technique education, Activityengagement, Energy conservation          See flowsheet documentation for full assessment, interventions and recommendations  Outcome: Progressing  Note: Limitation: Decreased ADL status, Decreased UE strength, Decreased Safe judgement during ADL, Decreased cognition, Decreased endurance, Decreased self-care trans, Decreased high-level ADLs  Prognosis: Fair  Assessment: Patient participated in Skilled OT session this date with interventions consisting of Energy Conservation techniques and therapeutic exercise to: increase functional use of BUEs, increase BUE muscle strength , increase activity tolerance for self-care activities   Patient agreeable to OT treatment session, upon arrival patient was found seated in bed (supported)  Patient requiring verbal cues for correct technique, verbal cues for pacing thru activity steps, cognitive assistance to anticipate next step, one step directives and frequent rest periods  Patient continues to be functioning below baseline level, occupational performance remains limited secondary to factors listed above and increased risk for falls and injury  From OT standpoint, recommendation at time of d/c would be post-acute rehabilitation services  Patient to benefit from continued Occupational Therapy treatment while in the hospital to address deficits as defined above and maximize level of functional independence with ADLs and functional mobility        OT Discharge Recommendation: Post-Acute Rehabilitation Services  OT - OK to Discharge: Yes(Once medically cleared )     Anuj Landeros PEDERSON/L

## 2020-12-28 NOTE — PLAN OF CARE
Problem: Potential for Falls  Goal: Patient will remain free of falls  Description: INTERVENTIONS:  - Assess patient frequently for physical needs  -  Identify cognitive and physical deficits and behaviors that affect risk of falls    -  [place] fall precautions as indicated by assessment   - Educate patient/family on patient safety including physical limitations  - Instruct patient to call for assistance with activity based on assessment  - Modify environment to reduce risk of injury  - Consider OT/PT consult to assist with strengthening/mobility  Outcome: Progressing     Problem: NEUROSENSORY - ADULT  Goal: Achieves stable or improved neurological status  Description: INTERVENTIONS  - Monitor and report changes in neurological status  - Monitor vital signs such as temperature, blood pressure, glucose, and any other labs ordered   - Initiate measures to prevent increased intracranial pressure  - Monitor for seizure activity and implement precautions if appropriate      Outcome: Progressing     Problem: GENITOURINARY - ADULT  Goal: Maintains or returns to baseline urinary function  Description: INTERVENTIONS:  - Assess urinary function  - Encourage oral fluids to ensure adequate hydration if ordered  - Administer IV fluids as ordered to ensure adequate hydration  - Administer ordered medications as needed  - Offer frequent toileting  - Follow urinary retention protocol if ordered  Outcome: Progressing  Goal: Absence of urinary retention  Description: INTERVENTIONS:  - Assess patients ability to void and empty bladder  - Monitor I/O  - Bladder scan as needed  - Discuss with physician/AP medications to alleviate retention as needed  - Discuss catheterization for long term situations as appropriate  Outcome: Progressing  Goal: Urinary catheter remains patent  Description: INTERVENTIONS:  - Assess patency of urinary catheter  - If patient has a chronic cantor, consider changing catheter if non-functioning  - Follow guidelines for intermittent irrigation of non-functioning urinary catheter  Outcome: Progressing     Problem: METABOLIC, FLUID AND ELECTROLYTES - ADULT  Goal: Electrolytes maintained within normal limits  Description: INTERVENTIONS:  - Monitor labs and assess patient for signs and symptoms of electrolyte imbalances  - Administer electrolyte replacement as ordered  - Monitor response to electrolyte replacements, including repeat lab results as appropriate  - Instruct patient on fluid and nutrition as appropriate  Outcome: Progressing     Problem: SKIN/TISSUE INTEGRITY - ADULT  Goal: Skin integrity remains intact  Description: INTERVENTIONS  - Identify patients at risk for skin breakdown  - Assess and monitor skin integrity  - Assess and monitor nutrition and hydration status  - Monitor labs (i e  albumin)  - Assess for incontinence   - Turn and reposition patient  - Assist with mobility/ambulation  - Relieve pressure over bony prominences  - Avoid friction and shearing  - Provide appropriate hygiene as needed including keeping skin clean and dry  - Evaluate need for skin moisturizer/barrier cream  - Collaborate with interdisciplinary team (i e  Nutrition, Rehabilitation, etc )   - Patient/family teaching  Outcome: Progressing     Problem: MUSCULOSKELETAL - ADULT  Goal: Maintain or return mobility to safest level of function  Description: INTERVENTIONS:  - Assess patient's ability to carry out ADLs; assess patient's baseline for ADL function and identify physical deficits which impact ability to perform ADLs (bathing, care of mouth/teeth, toileting, grooming, dressing, etc )  - Assess/evaluate cause of self-care deficits   - Assess range of motion  - Assess patient's mobility  - Assess patient's need for assistive devices and provide as appropriate  - Encourage maximum independence but intervene and supervise when necessary  - Involve family in performance of ADLs  - Assess for home care needs following discharge   - Consider OT consult to assist with ADL evaluation and planning for discharge  - Provide patient education as appropriate  Outcome: Progressing     Problem: PAIN - ADULT  Goal: Verbalizes/displays adequate comfort level or baseline comfort level  Description: Interventions:  - Encourage patient to monitor pain and request assistance  - Assess pain using appropriate pain scale  - Administer analgesics based on type and severity of pain and evaluate response  - Implement non-pharmacological measures as appropriate and evaluate response  - Consider cultural and social influences on pain and pain management  - Notify physician/advanced practitioner if interventions unsuccessful or patient reports new pain  Outcome: Progressing     Problem: INFECTION - ADULT  Goal: Absence or prevention of progression during hospitalization  Description: INTERVENTIONS:  - Assess and monitor for signs and symptoms of infection  - Monitor lab/diagnostic results  - Monitor all insertion sites, i e  indwelling lines, tubes, and drains  - Monitor endotracheal if appropriate and nasal secretions for changes in amount and color  - [place] appropriate cooling/warming therapies per order  - Administer medications as ordered  - Instruct and encourage patient and family to use good hand hygiene technique  - Identify and instruct in appropriate isolation precautions for identified infection/condition  Outcome: Progressing     Problem: SAFETY ADULT  Goal: Patient will remain free of falls  Description: INTERVENTIONS:  - Assess patient frequently for physical needs  -  Identify cognitive and physical deficits and behaviors that affect risk of falls    -  [place] fall precautions as indicated by assessment   - Educate patient/family on patient safety including physical limitations  - Instruct patient to call for assistance with activity based on assessment  - Modify environment to reduce risk of injury  - Consider OT/PT consult to assist with strengthening/mobility  Outcome: Progressing  Goal: Maintain or return to baseline ADL function  Description: INTERVENTIONS:  -  Assess patient's ability to carry out ADLs; assess patient's baseline for ADL function and identify physical deficits which impact ability to perform ADLs (bathing, care of mouth/teeth, toileting, grooming, dressing, etc )  - Assess/evaluate cause of self-care deficits   - Assess range of motion  - Assess patient's mobility; develop plan if impaired  - Assess patient's need for assistive devices and provide as appropriate  - Encourage maximum independence but intervene and supervise when necessary  - Involve family in performance of ADLs  - Assess for home care needs following discharge   - Consider OT consult to assist with ADL evaluation and planning for discharge  - Provide patient education as appropriate  Outcome: Progressing  Goal: Maintain or return mobility status to optimal level  Description: INTERVENTIONS:  - Assess patient's baseline mobility status (ambulation, transfers, stairs, etc )    - Identify cognitive and physical deficits and behaviors that affect mobility  - Identify mobility aids required to assist with transfers and/or ambulation (gait belt, sit-to-stand, lift, walker, cane, etc )  - [place] fall precautions as indicated by assessment  - Record patient progress and toleration of activity level on Mobility SBAR; progress patient to next Phase/Stage  - Instruct patient to call for assistance with activity based on assessment  - Consider rehabilitation consult to assist with strengthening/weightbearing, etc   Outcome: Progressing     Problem: DISCHARGE PLANNING  Goal: Discharge to home or other facility with appropriate resources  Description: INTERVENTIONS:  - Identify barriers to discharge w/patient and caregiver  - Arrange for needed discharge resources and transportation as appropriate  - Identify discharge learning needs (meds, wound care, etc )  - Arrange for interpretive services to assist at discharge as needed  - Refer to Case Management Department for coordinating discharge planning if the patient needs post-hospital services based on physician/advanced practitioner order or complex needs related to functional status, cognitive ability, or social support system  Outcome: Progressing     Problem: Knowledge Deficit  Goal: Patient/family/caregiver demonstrates understanding of disease process, treatment plan, medications, and discharge instructions  Description: Complete learning assessment and assess knowledge base    Interventions:  - Provide teaching at level of understanding  - Provide teaching via preferred learning methods  Outcome: Progressing

## 2020-12-28 NOTE — OCCUPATIONAL THERAPY NOTE
12/28/20 1014   OT Last Visit   OT Visit Date 12/28/20   Note Type   Note Type Treatment   Restrictions/Precautions   Weight Bearing Precautions Per Order No   Other Precautions Contact/isolation;Cognitive;Multiple lines;O2;Fall Risk   Lifestyle   Reciprocal Relationships mentioned daughter who he stated accompanied him to hospital   (also reports that he is unable to remember his admission )   Pain Assessment   Pain Assessment Tool Pain Assessment not indicated - pt denies pain   ADL   Where Assessed Other (Comment)  (sitting in bed (supported))   UB Bathing Comments he declined self-care options given at this time    Therapeutic Excerise-Strength   UE Strength Yes  (agreed to exer  )   Right Upper Extremity- Strength   R Shoulder Flexion; Extension; Other (Comment)  (pro/re-traction )   R Elbow Elbow flexion;Elbow extension  (in forward And reverse; Also: supin/pron-ation )   R Weight/Reps/Sets 1 pound weight - 10 reps shoulders - 20 reps elbows   RUE Strength Comment R shoulder fatigues more easily than Left - Patient showed surgical scars from prev  shoulder (both) procedures   Left Upper Extremity-Strength   L Weights/Reps/Sets all exers  Same as RUE above   Coordination   Gross Motor appears WFL   Dexterity appears WFL   Cognition   Overall Cognitive Status Impaired   Arousal/Participation Alert; Responsive   Attention Attends with cues to redirect   Following Commands Follows one step commands with increased time or repetition   Comments patient had difficulty identifying recent and/or current daily activities of interest   (mentioned golf and riding mower grass cutting )   Activity Tolerance   Activity Tolerance Patient limited by fatigue   Assessment   Assessment Patient participated in Skilled OT session this date with interventions consisting of Energy Conservation techniques and therapeutic exercise to: increase functional use of BUEs, increase BUE muscle strength , increase activity tolerance for self-care activities   Patient agreeable to OT treatment session, upon arrival patient was found seated in bed (supported)  Patient requiring verbal cues for correct technique, verbal cues for pacing thru activity steps, cognitive assistance to anticipate next step, one step directives and frequent rest periods  Patient continues to be functioning below baseline level, occupational performance remains limited secondary to factors listed above and increased risk for falls and injury  From OT standpoint, recommendation at time of d/c would be post-acute rehabilitation services  Patient to benefit from continued Occupational Therapy treatment while in the hospital to address deficits as defined above and maximize level of functional independence with ADLs and functional mobility  Plan   Treatment Interventions UE strengthening/ROM; Patient/family training;Energy conservation; Activityengagement   Goal Expiration Date 01/06/21   OT Treatment Day 172 DACIA Capps/L

## 2020-12-28 NOTE — PROGRESS NOTES
Progress Note - [facility] 1945, 66 y o  male MRN: [id number]    Unit/Bed#: -01 Encounter: [id number]    Primary Care Provider: Dario Dietrich DO   Date and time admitted to hospital: 12/27/2020  5:16 AM        Hypoxia  Assessment & Plan  · Pulse ox 87% on room air, placed on oxygen and improved to mid 90s  · Patient is on 4 L of supplemental oxygen  · Patient did vomit and concern for aspiration  · Continue Zosyn  · Trend procalcitonin  · Trend WBC and fever curve  · Sputum culture  · Wean oxygen as tolerated    Depression  Assessment & Plan  On Zoloft  Denies any suicidal or homicidal ideation    Chronic pain syndrome  Assessment & Plan  · Continuous opioid dependence  · Continue home medication    Acute cystitis with hematuria  Assessment & Plan  · UA with 3+ leukocytes, nitrite positive, trace blood  · Urine culture growing Proteus  · Awaiting sensitivity results  · Patient received Rocephin in the ER  · Is on Zosyn  · Patient has chronic Diallo    Acute metabolic encephalopathy  Assessment & Plan  · Secondary to UTI/sepsis  · Patient with increased confusion  · Continue supportive care    RUTHANN (acute kidney injury) ([facility] [address] )  Assessment & Plan  · Presented with creatinine of 2 47  · With chronic kidney disease stage 3  Creatinine baseline 1 1  · Creatinine this morning is 2 71  · Patient on IV fluids  · Will order CT abdomen pelvis to rule out for obstructive uropathy/hydronephrosis    Chronic indwelling Diallo catheter  Assessment & Plan  · Diallo changed during this admission    * Sepsis [facility])  Assessment & Plan  · Secondary to UTI, noted by acute kidney injury, lactic acidosis, tachycardia and tachypnea  · Blood cultures growing gram-negative rods likely from urinary source  · Status post 2200 mL in the ER  · Lactic acid improving  · On Zosyn  · Follow-up culture results  · Trend WBC and fever curve        Labs & Imaging: I have personally reviewed pertinent reports        VTE Pharmacologic Prophylaxis: Heparin  VTE Mechanical Prophylaxis: sequential compression device    Code Status:   Level 1 - Full Code    Patient Centered Rounds: I have performed bedside rounds with nursing staff today  Discussions with Specialists or Other Care Team Provider: NUNU    Education and Discussions with Family / Patient:  Daughter Candice    Current Length of Stay: 1 day(s)    Current Patient Status: Inpatient   Certification Statement: The patient will continue to require additional inpatient hospital stay due to see my assessment and plan  Subjective:   Patient is seen and examined at bedside  Patient states he feels a little better  T-max of 100 2° this morning  Blood cultures growing gram-negative rods  Creatinine is getting worse  Will obtain imaging studies including chest x-ray  Has shortness of breath  Denies any abdominal pain, nausea, vomiting or diarrhea  All other ROS are negative  Objective:    Vitals: Blood pressure 118/58, pulse (!) 107, temperature 100 2 °F (37 9 °C), temperature source Temporal, resp  rate 20, height 5' 9" (1 753 m), weight 83 3 kg (183 lb 10 3 oz), SpO2 90 %  ,Body mass index is 27 12 kg/m²  SPO2 RA Rest      ED to Hosp-Admission (Current) from 12/27/2020 in [address]   SpO2  90 %   SpO2 Activity  At Rest   O2 Device  Nasal cannula   O2 Flow Rate          I&O:     Intake/Output Summary (Last 24 hours) at 12/28/2020 1101  Last data filed at 12/27/2020 2100  Gross per 24 hour   Intake    Output 475 ml   Net -475 ml       Physical Exam:    General- Alert, lying comfortably in bed  Not in any acute distress     Neck- Supple, No JVD  CVS- regular, S1 and S2 normal  Chest- Bilateral Air entry, decreased at bases  Abdomen- soft, nontender, not distended, no guarding or rigidity, BS+  Extremities-  No pedal edema, No calf tenderness  Diallo catheter in place  CNS-   Alert, awake and orientedx3   No focal deficits present      Invasive Devices     Peripheral Intravenous Line            Peripheral IV 12/27/20 Left Forearm 1 day    Peripheral IV 12/27/20 Right;Ventral (anterior) Forearm 1 day          Drain            Urethral Catheter 18 Fr  1 day                      Social History  reviewed  Family History   Problem Relation Age of Onset    Cancer Mother     Diabetes Father     reviewed    Meds:  Current Facility-Administered Medications   Medication Dose Route Frequency Provider Last Rate Last Admin    acetaminophen (TYLENOL) tablet 650 mg  650 mg Oral Q4H PRN Loree Rahman PA-C        ascorbic acid (VITAMIN C) tablet 500 mg  500 mg Oral Daily RADHA NunezC   500 mg at 12/28/20 0915    cholecalciferol (VITAMIN D3) tablet 4,000 Units  4,000 Units Oral Daily RADHA NunezC   4,000 Units at 12/28/20 0915    diphenhydrAMINE (BENADRYL) tablet 50 mg  50 mg Oral HS PRN Loree Rahman PA-C        ferrous sulfate tablet 325 mg  325 mg Oral Daily With Breakfast Loree Rahman PA-C   325 mg at 12/28/20 0915    finasteride (PROSCAR) tablet 5 mg  5 mg Oral Daily RADHA NunezC   5 mg at 12/28/20 0915    gabapentin (NEURONTIN) capsule 400 mg  400 mg Oral HS RADHA NunezC   400 mg at 12/27/20 2155    heparin (porcine) subcutaneous injection 5,000 Units  5,000 Units Subcutaneous [facility] RADHA NunezC   5,000 Units at 12/28/20 0521    morphine (MSIR) IR tablet 15 mg  15 mg Oral BID RADHA NunezC   15 mg at 12/28/20 0915    ondansetron (ZOFRAN) injection 4 mg  4 mg Intravenous Q6H PRN Loree Rahman PA-C        piperacillin-tazobactam (ZOSYN) 2 25 g in sodium chloride 0 9 % 50 mL IVPB  2 25 g Intravenous Q6H Korin Zuleta PA-C 100 mL/hr at 12/28/20 0402 2 25 g at 12/28/20 0402    sertraline (ZOLOFT) tablet 50 mg  50 mg Oral Daily RADHA NunezC   50 mg at 12/28/20 0915      Medications Prior to Admission   Medication    ascorbic acid (VITAMIN C) 500 MG tablet    B Complex Vitamins (B COMPLEX 1 PO)    cholecalciferol (VITAMIN D3) 1,000 units tablet    ferrous sulfate 325 (65 Fe) mg tablet    finasteride (PROSCAR) 5 mg tablet    morphine (MSIR) 15 mg tablet    sertraline (ZOLOFT) 50 mg tablet    acetaminophen (TYLENOL) 325 mg tablet    gabapentin (NEURONTIN) 300 mg capsule       Labs:  Results from last 7 days   Lab Units 12/28/20  0521 12/27/20  0521   WBC Thousand/uL 15 50* 10 20   HEMOGLOBIN g/dL 9 8* 11 4*   HEMATOCRIT % 29 4* 33 4*   PLATELETS Thousands/uL 118* 168   LYMPHO PCT %  --  5*   MONO PCT %  --  1*     Results from last 7 days   Lab Units 12/28/20  0521 12/27/20  0523   POTASSIUM mmol/L 3 6 3 5   CHLORIDE mmol/L 109* 108*   CO2 mmol/L 20* 19*   BUN mg/dL 55* 52*   CREATININE mg/dL 2 71* 2 47*   CALCIUM mg/dL 7 6* 8 7   ALK PHOS U/L  --  67   ALT U/L  --  31   AST U/L  --  36     Lab Results   Component Value Date    TROPONINI 0 03 12/27/2020    TROPONINI 3 08 (H) 08/17/2019    TROPONINI 4 69 (H) 08/16/2019    CKTOTAL 183 03/29/2018    CKTOTAL 195 03/29/2018     Results from last 7 days   Lab Units 12/27/20  0521   INR  1 39*     Lab Results   Component Value Date    BLOODCX No Growth After 5 Days  08/24/2019    BLOODCX No Growth After 5 Days  08/24/2019    BLOODCX No Growth After 5 Days  08/18/2019    URINECX >100,000 cfu/ml Proteus species (A) 12/27/2020    URINECX >100,000 cfu/ml Proteus mirabilis (A) 12/01/2020    URINECX 60,000-69,000 cfu/ml Enterococcus faecalis (A) 12/01/2020    URINECX 50,000-59,000 cfu/ml Pseudomonas aeruginosa (A) 12/01/2020         Imaging:  Results for orders placed during the hospital encounter of 12/27/20   XR chest portable - 1 view    Narrative CHEST     INDICATION:   Hypoxic possible aspiration  COMPARISON:  Chest radiograph from 8/24/2019  EXAM PERFORMED/VIEWS:  XR CHEST PORTABLE      FINDINGS:    Cardiomediastinal silhouette appears unremarkable  The lungs are clear    No pneumothorax or pleural effusion  Osseous structures appear within normal limits for patient age  Bilateral shoulder arthroplasty  Impression No acute cardiopulmonary disease  Workstation performed: [id number]       No results found for this or any previous visit  Last 24 Hours Medication List:   Current Facility-Administered Medications   Medication Dose Route Frequency Provider Last Rate    acetaminophen  650 mg Oral Q4H PRN Wells Filter, PA-C      ascorbic acid  500 mg Oral Daily Wells Filter, PA-C      cholecalciferol  4,000 Units Oral Daily Wells Filter, PA-C      diphenhydrAMINE  50 mg Oral HS PRN Wells Filter, PA-C      ferrous sulfate  325 mg Oral Daily With Breakfast Wells Filter, PA-C      finasteride  5 mg Oral Daily Wells Filter, PA-C      gabapentin  400 mg Oral HS Wells Filter, PA-C      heparin (porcine)  5,000 Units Subcutaneous [facility] Wells Filter, PA-C      morphine  15 mg Oral BID Wells Filter, PA-C      ondansetron  4 mg Intravenous Q6H PRN Wells Filter, PA-C      piperacillin-tazobactam  2 25 g Intravenous Q6H Wells Filter, PA-C 2 25 g (12/28/20 0402)    sertraline  50 mg Oral Daily Wells Filter, PA-C          Today, Patient Was Seen By: Nando Bloom MD    ** Please Note: Dictation voice to text software may have been used in the creation of this document   **

## 2020-12-28 NOTE — CASE MANAGEMENT
Evaluated the pt at the bedside  Pt was admitted to the hospital for vomiting  Explained the role of CM and the options of discharge planning with the pt  Pt states he lives with his daughter in a ranch home with 3 NOEMY  Pt was independent with ADL's, carrried his laundry to laundry room  Daughter did the laundry, cooking and cleaning  Pt takes his own medications  Pt has a cane and walker  Pt uses a cane when outside  Pt PCP is Dr Sammy Gonzalez  Pt uses [facility] on [address]  PT is recommending STR  PT states he would like a referral made to ARU as he was there after surgery  A referral was made for same  A post acute care recommendation was made by your care team for STR  Discussed Freedom of Choice with patient  List of facilities given to patient via in person  patient aware the list is custom filtered for them by preference  and that [facility] post acute providers are designated  Pt was given a list to choose from  TC to pt daughter Amado Ellison to inform of pt needing STR  Daughter would like ARU, did not have any SNF choices at this time  Patient/caregiver received discharge checklist   Content reviewed  Patient/caregiver encouraged to participate in discharge plan of care prior to discharge home  CM reviewed d/c planning process including the following: identifying help at home, patient preference for d/c planning needs, availability of treatment team to discuss questions or concerns patient and/or family may have regarding understanding medications and recognizing signs and symptoms once discharged  CM also encouraged patient to follow up with all recommended appointments after discharge  Patient advised of importance for patient and family to participate in managing patients medical well being

## 2020-12-28 NOTE — ASSESSMENT & PLAN NOTE
Urine culture pathogen only growing Proteus mirabilis - lesser colonies of Enterococcus/Alcaligenes species noted  In the setting of concurrent checked aspiration pneumonia, continue IV Zosyn pending final sensitivities  Chronic Diallo catheter presence noted

## 2020-12-28 NOTE — ASSESSMENT & PLAN NOTE
· Pulse ox 87% on room air, placed on oxygen and improved to mid 90s  · Patient is on 4 L of supplemental oxygen  · Patient did vomit and concern for aspiration  · Continue Zosyn  · Trend procalcitonin  · Trend WBC and fever curve  · Sputum culture  · Wean oxygen as tolerated

## 2020-12-28 NOTE — PLAN OF CARE
Problem: Potential for Falls  Goal: Patient will remain free of falls  Description: INTERVENTIONS:  - Assess patient frequently for physical needs  -  Identify cognitive and physical deficits and behaviors that affect risk of falls    -  [place] fall precautions as indicated by assessment   - Educate patient/family on patient safety including physical limitations  - Instruct patient to call for assistance with activity based on assessment  - Modify environment to reduce risk of injury  - Consider OT/PT consult to assist with strengthening/mobility  Outcome: Progressing     Problem: NEUROSENSORY - ADULT  Goal: Achieves stable or improved neurological status  Description: INTERVENTIONS  - Monitor and report changes in neurological status  - Monitor vital signs such as temperature, blood pressure, glucose, and any other labs ordered   - Initiate measures to prevent increased intracranial pressure  - Monitor for seizure activity and implement precautions if appropriate      Outcome: Progressing     Problem: GENITOURINARY - ADULT  Goal: Maintains or returns to baseline urinary function  Description: INTERVENTIONS:  - Assess urinary function  - Encourage oral fluids to ensure adequate hydration if ordered  - Administer IV fluids as ordered to ensure adequate hydration  - Administer ordered medications as needed  - Offer frequent toileting  - Follow urinary retention protocol if ordered  Outcome: Progressing  Goal: Absence of urinary retention  Description: INTERVENTIONS:  - Assess patients ability to void and empty bladder  - Monitor I/O  - Bladder scan as needed  - Discuss with physician/AP medications to alleviate retention as needed  - Discuss catheterization for long term situations as appropriate  Outcome: Progressing  Goal: Urinary catheter remains patent  Description: INTERVENTIONS:  - Assess patency of urinary catheter  - If patient has a chronic cantor, consider changing catheter if non-functioning  - Follow guidelines for intermittent irrigation of non-functioning urinary catheter  Outcome: Progressing     Problem: METABOLIC, FLUID AND ELECTROLYTES - ADULT  Goal: Electrolytes maintained within normal limits  Description: INTERVENTIONS:  - Monitor labs and assess patient for signs and symptoms of electrolyte imbalances  - Administer electrolyte replacement as ordered  - Monitor response to electrolyte replacements, including repeat lab results as appropriate  - Instruct patient on fluid and nutrition as appropriate  Outcome: Progressing     Problem: SKIN/TISSUE INTEGRITY - ADULT  Goal: Skin integrity remains intact  Description: INTERVENTIONS  - Identify patients at risk for skin breakdown  - Assess and monitor skin integrity  - Assess and monitor nutrition and hydration status  - Monitor labs (i e  albumin)  - Assess for incontinence   - Turn and reposition patient  - Assist with mobility/ambulation  - Relieve pressure over bony prominences  - Avoid friction and shearing  - Provide appropriate hygiene as needed including keeping skin clean and dry  - Evaluate need for skin moisturizer/barrier cream  - Collaborate with interdisciplinary team (i e  Nutrition, Rehabilitation, etc )   - Patient/family teaching  Outcome: Progressing

## 2020-12-28 NOTE — NURSING NOTE
Pt alert and oriented, aware he has an infection, states he is very tired  Dinner tray untouched, 'not very hungry', did however have 2 applesauce and 2 puddings  Currently sleeping, no s/s of pain or distress  Diallo in place draining slightly hazy yellow urine  Call bell is within reach

## 2020-12-28 NOTE — QUICK NOTE
Patient CT abdomen pelvis showed  -8 mm right UPJ calculus with mild right hydronephrosis  -Right lower lobe opacity likely representing pneumonia  Spoke with urologist Dr Mayuri Shearer and he will review the CT scan and recommend further management

## 2020-12-28 NOTE — ASSESSMENT & PLAN NOTE
· Secondary to UTI, noted by acute kidney injury, lactic acidosis, tachycardia and tachypnea  · Blood cultures growing gram-negative rods likely from urinary source  · Status post 2200 mL in the ER  · Lactic acid improving  · On Zosyn  · Follow-up culture results  · Trend WBC and fever curve

## 2020-12-28 NOTE — ASSESSMENT & PLAN NOTE
S/p retrograde pyelogram with ureteral stenting today - appreciate urology evaluation  PRN pain/emesis control

## 2020-12-28 NOTE — ASSESSMENT & PLAN NOTE
· Presented with creatinine of 2 47  · With chronic kidney disease stage 3   Creatinine baseline 1 1  · Creatinine this morning is 2 71  · Patient on IV fluids  · Will order CT abdomen pelvis to rule out for obstructive uropathy/hydronephrosis

## 2020-12-29 ENCOUNTER — ANESTHESIA EVENT (INPATIENT)
Dept: PERIOP | Facility: HOSPITAL | Age: 75
DRG: 853 | End: 2020-12-29
Payer: MEDICARE

## 2020-12-29 ENCOUNTER — ANESTHESIA (INPATIENT)
Dept: PERIOP | Facility: HOSPITAL | Age: 75
DRG: 853 | End: 2020-12-29
Payer: MEDICARE

## 2020-12-29 ENCOUNTER — APPOINTMENT (INPATIENT)
Dept: RADIOLOGY | Facility: HOSPITAL | Age: 75
DRG: 853 | End: 2020-12-29
Payer: MEDICARE

## 2020-12-29 VITALS — HEART RATE: 77 BPM

## 2020-12-29 PROBLEM — J18.9 PNEUMONIA DUE TO INFECTIOUS ORGANISM: Status: ACTIVE | Noted: 2020-12-29

## 2020-12-29 PROBLEM — R78.81 BACTEREMIA: Status: ACTIVE | Noted: 2020-12-29

## 2020-12-29 PROBLEM — D69.6 THROMBOCYTOPENIA (HCC): Status: ACTIVE | Noted: 2020-12-29

## 2020-12-29 PROBLEM — N13.2 HYDRONEPHROSIS WITH OBSTRUCTING CALCULUS: Status: ACTIVE | Noted: 2020-12-28

## 2020-12-29 PROBLEM — D63.8 ANEMIA OF CHRONIC DISEASE: Status: ACTIVE | Noted: 2019-08-21

## 2020-12-29 PROBLEM — R65.20 SEVERE SEPSIS (HCC): Status: ACTIVE | Noted: 2019-08-16

## 2020-12-29 LAB
25(OH)D3 SERPL-MCNC: 26.6 NG/ML (ref 30–100)
ALBUMIN SERPL BCP-MCNC: 2.9 G/DL (ref 3.5–5.7)
ALP SERPL-CCNC: 84 U/L (ref 55–165)
ALT SERPL W P-5'-P-CCNC: 39 U/L (ref 7–52)
ANION GAP SERPL CALCULATED.3IONS-SCNC: 10 MMOL/L (ref 4–13)
AST SERPL W P-5'-P-CCNC: 53 U/L (ref 13–39)
BACTERIA BLD CULT: ABNORMAL
BILIRUB SERPL-MCNC: 0.5 MG/DL (ref 0.2–1)
BUN SERPL-MCNC: 56 MG/DL (ref 7–25)
CALCIUM ALBUM COR SERPL-MCNC: 8.7 MG/DL (ref 8.3–10.1)
CALCIUM SERPL-MCNC: 7.8 MG/DL (ref 8.6–10.5)
CHLORIDE SERPL-SCNC: 110 MMOL/L (ref 98–107)
CO2 SERPL-SCNC: 20 MMOL/L (ref 21–31)
CREAT SERPL-MCNC: 2.63 MG/DL (ref 0.7–1.3)
CREAT UR-MCNC: 107 MG/DL
ERYTHROCYTE [DISTWIDTH] IN BLOOD BY AUTOMATED COUNT: 13.3 % (ref 11.5–14.5)
GFR SERPL CREATININE-BSD FRML MDRD: 22 ML/MIN/1.73SQ M
GLUCOSE SERPL-MCNC: 120 MG/DL (ref 65–99)
GRAM STN SPEC: ABNORMAL
HCT VFR BLD AUTO: 28 % (ref 42–47)
HGB BLD-MCNC: 9.6 G/DL (ref 14–18)
MAGNESIUM SERPL-MCNC: 1.8 MG/DL (ref 1.9–2.7)
MCH RBC QN AUTO: 33 PG (ref 26–34)
MCHC RBC AUTO-ENTMCNC: 34.4 G/DL (ref 31–37)
MCV RBC AUTO: 96 FL (ref 81–99)
MICROALBUMIN UR-MCNC: 69.4 MG/L (ref 0–20)
MICROALBUMIN/CREAT 24H UR: 65 MG/G CREATININE (ref 0–30)
PLATELET # BLD AUTO: 110 THOUSANDS/UL (ref 149–390)
PMV BLD AUTO: 9.6 FL (ref 8.6–11.7)
POTASSIUM SERPL-SCNC: 3.9 MMOL/L (ref 3.5–5.5)
PROCALCITONIN SERPL-MCNC: 59.24 NG/ML
PROT SERPL-MCNC: 5.4 G/DL (ref 6.4–8.9)
PROT UR-MCNC: 148 MG/DL
PROT/CREAT UR: 1.38 MG/G{CREAT} (ref 0–0.1)
PTH-INTACT SERPL-MCNC: 84.7 PG/ML (ref 18.4–80.1)
RBC # BLD AUTO: 2.91 MILLION/UL (ref 4.3–5.9)
SODIUM 24H UR-SCNC: 29 MOL/L
SODIUM SERPL-SCNC: 140 MMOL/L (ref 134–143)
WBC # BLD AUTO: 9.5 THOUSAND/UL (ref 4.8–10.8)

## 2020-12-29 PROCEDURE — 87086 URINE CULTURE/COLONY COUNT: CPT | Performed by: UROLOGY

## 2020-12-29 PROCEDURE — 87077 CULTURE AEROBIC IDENTIFY: CPT | Performed by: UROLOGY

## 2020-12-29 PROCEDURE — 87186 SC STD MICRODIL/AGAR DIL: CPT | Performed by: UROLOGY

## 2020-12-29 PROCEDURE — C2617 STENT, NON-COR, TEM W/O DEL: HCPCS | Performed by: UROLOGY

## 2020-12-29 PROCEDURE — 82306 VITAMIN D 25 HYDROXY: CPT | Performed by: INTERNAL MEDICINE

## 2020-12-29 PROCEDURE — 84145 PROCALCITONIN (PCT): CPT | Performed by: INTERNAL MEDICINE

## 2020-12-29 PROCEDURE — C1769 GUIDE WIRE: HCPCS | Performed by: UROLOGY

## 2020-12-29 PROCEDURE — 82043 UR ALBUMIN QUANTITATIVE: CPT | Performed by: INTERNAL MEDICINE

## 2020-12-29 PROCEDURE — 97530 THERAPEUTIC ACTIVITIES: CPT

## 2020-12-29 PROCEDURE — 97110 THERAPEUTIC EXERCISES: CPT

## 2020-12-29 PROCEDURE — 84300 ASSAY OF URINE SODIUM: CPT | Performed by: INTERNAL MEDICINE

## 2020-12-29 PROCEDURE — 74450 X-RAY URETHRA/BLADDER: CPT

## 2020-12-29 PROCEDURE — 85027 COMPLETE CBC AUTOMATED: CPT | Performed by: INTERNAL MEDICINE

## 2020-12-29 PROCEDURE — 99232 SBSQ HOSP IP/OBS MODERATE 35: CPT | Performed by: INTERNAL MEDICINE

## 2020-12-29 PROCEDURE — 80053 COMPREHEN METABOLIC PANEL: CPT | Performed by: INTERNAL MEDICINE

## 2020-12-29 PROCEDURE — 82570 ASSAY OF URINE CREATININE: CPT | Performed by: INTERNAL MEDICINE

## 2020-12-29 PROCEDURE — 0T768DZ DILATION OF RIGHT URETER WITH INTRALUMINAL DEVICE, VIA NATURAL OR ARTIFICIAL OPENING ENDOSCOPIC: ICD-10-PCS | Performed by: UROLOGY

## 2020-12-29 PROCEDURE — 83970 ASSAY OF PARATHORMONE: CPT | Performed by: INTERNAL MEDICINE

## 2020-12-29 PROCEDURE — 84156 ASSAY OF PROTEIN URINE: CPT | Performed by: INTERNAL MEDICINE

## 2020-12-29 PROCEDURE — 83735 ASSAY OF MAGNESIUM: CPT | Performed by: INTERNAL MEDICINE

## 2020-12-29 DEVICE — INLAY OPTIMA URETERAL STENT W/O GUIDEWIRE
Type: IMPLANTABLE DEVICE | Site: URINARY BLADDER | Status: FUNCTIONAL
Brand: BARD® INLAY OPTIMA® URETERAL STENT

## 2020-12-29 RX ORDER — MAGNESIUM SULFATE HEPTAHYDRATE 40 MG/ML
2 INJECTION, SOLUTION INTRAVENOUS ONCE
Status: COMPLETED | OUTPATIENT
Start: 2020-12-29 | End: 2020-12-29

## 2020-12-29 RX ORDER — MAGNESIUM HYDROXIDE 1200 MG/15ML
LIQUID ORAL AS NEEDED
Status: DISCONTINUED | OUTPATIENT
Start: 2020-12-29 | End: 2020-12-29 | Stop reason: HOSPADM

## 2020-12-29 RX ORDER — DEXAMETHASONE SODIUM PHOSPHATE 4 MG/ML
INJECTION, SOLUTION INTRA-ARTICULAR; INTRALESIONAL; INTRAMUSCULAR; INTRAVENOUS; SOFT TISSUE AS NEEDED
Status: DISCONTINUED | OUTPATIENT
Start: 2020-12-29 | End: 2020-12-29

## 2020-12-29 RX ORDER — FENTANYL CITRATE 50 UG/ML
INJECTION, SOLUTION INTRAMUSCULAR; INTRAVENOUS AS NEEDED
Status: DISCONTINUED | OUTPATIENT
Start: 2020-12-29 | End: 2020-12-29

## 2020-12-29 RX ORDER — FENTANYL CITRATE/PF 50 MCG/ML
25 SYRINGE (ML) INJECTION
Status: DISCONTINUED | OUTPATIENT
Start: 2020-12-29 | End: 2020-12-29 | Stop reason: HOSPADM

## 2020-12-29 RX ORDER — SODIUM CHLORIDE, SODIUM LACTATE, POTASSIUM CHLORIDE, CALCIUM CHLORIDE 600; 310; 30; 20 MG/100ML; MG/100ML; MG/100ML; MG/100ML
INJECTION, SOLUTION INTRAVENOUS CONTINUOUS PRN
Status: DISCONTINUED | OUTPATIENT
Start: 2020-12-29 | End: 2020-12-29

## 2020-12-29 RX ORDER — ONDANSETRON 2 MG/ML
4 INJECTION INTRAMUSCULAR; INTRAVENOUS ONCE AS NEEDED
Status: DISCONTINUED | OUTPATIENT
Start: 2020-12-29 | End: 2020-12-29 | Stop reason: HOSPADM

## 2020-12-29 RX ORDER — PROPOFOL 10 MG/ML
INJECTION, EMULSION INTRAVENOUS AS NEEDED
Status: DISCONTINUED | OUTPATIENT
Start: 2020-12-29 | End: 2020-12-29

## 2020-12-29 RX ORDER — ONDANSETRON 2 MG/ML
INJECTION INTRAMUSCULAR; INTRAVENOUS AS NEEDED
Status: DISCONTINUED | OUTPATIENT
Start: 2020-12-29 | End: 2020-12-29

## 2020-12-29 RX ORDER — LIDOCAINE HYDROCHLORIDE 10 MG/ML
INJECTION, SOLUTION EPIDURAL; INFILTRATION; INTRACAUDAL; PERINEURAL AS NEEDED
Status: DISCONTINUED | OUTPATIENT
Start: 2020-12-29 | End: 2020-12-29

## 2020-12-29 RX ORDER — SODIUM CHLORIDE 9 MG/ML
INJECTION, SOLUTION INTRAVENOUS AS NEEDED
Status: DISCONTINUED | OUTPATIENT
Start: 2020-12-29 | End: 2020-12-29 | Stop reason: HOSPADM

## 2020-12-29 RX ADMIN — MAGNESIUM SULFATE IN WATER 2 G: 40 INJECTION, SOLUTION INTRAVENOUS at 08:24

## 2020-12-29 RX ADMIN — CALCIUM CARBONATE (ANTACID) CHEW TAB 500 MG 500 MG: 500 CHEW TAB at 23:11

## 2020-12-29 RX ADMIN — LIDOCAINE HYDROCHLORIDE 100 MG: 10 INJECTION, SOLUTION EPIDURAL; INFILTRATION; INTRACAUDAL; PERINEURAL at 11:22

## 2020-12-29 RX ADMIN — HEPARIN SODIUM 5000 UNITS: 5000 INJECTION INTRAVENOUS; SUBCUTANEOUS at 13:55

## 2020-12-29 RX ADMIN — PIPERACILLIN SODIUM AND TAZOBACTAM SODIUM 2.25 G: 2; .25 INJECTION, POWDER, LYOPHILIZED, FOR SOLUTION INTRAVENOUS at 10:19

## 2020-12-29 RX ADMIN — SODIUM CHLORIDE, SODIUM LACTATE, POTASSIUM CHLORIDE, AND CALCIUM CHLORIDE: .6; .31; .03; .02 INJECTION, SOLUTION INTRAVENOUS at 11:14

## 2020-12-29 RX ADMIN — HEPARIN SODIUM 5000 UNITS: 5000 INJECTION INTRAVENOUS; SUBCUTANEOUS at 05:07

## 2020-12-29 RX ADMIN — HEPARIN SODIUM 5000 UNITS: 5000 INJECTION INTRAVENOUS; SUBCUTANEOUS at 23:00

## 2020-12-29 RX ADMIN — FENTANYL CITRATE 25 MCG: 50 INJECTION INTRAMUSCULAR; INTRAVENOUS at 11:28

## 2020-12-29 RX ADMIN — PHENYLEPHRINE HYDROCHLORIDE 100 MCG: 10 INJECTION INTRAVENOUS at 11:38

## 2020-12-29 RX ADMIN — ONDANSETRON 4 MG: 2 INJECTION INTRAMUSCULAR; INTRAVENOUS at 11:49

## 2020-12-29 RX ADMIN — PIPERACILLIN SODIUM AND TAZOBACTAM SODIUM 2.25 G: 2; .25 INJECTION, POWDER, LYOPHILIZED, FOR SOLUTION INTRAVENOUS at 02:07

## 2020-12-29 RX ADMIN — MORPHINE SULFATE 15 MG: 15 TABLET ORAL at 17:23

## 2020-12-29 RX ADMIN — PIPERACILLIN SODIUM AND TAZOBACTAM SODIUM 2.25 G: 2; .25 INJECTION, POWDER, LYOPHILIZED, FOR SOLUTION INTRAVENOUS at 23:12

## 2020-12-29 RX ADMIN — FENTANYL CITRATE 25 MCG: 50 INJECTION INTRAMUSCULAR; INTRAVENOUS at 11:34

## 2020-12-29 RX ADMIN — SODIUM CHLORIDE, SODIUM GLUCONATE, SODIUM ACETATE, POTASSIUM CHLORIDE, MAGNESIUM CHLORIDE, SODIUM PHOSPHATE, DIBASIC, AND POTASSIUM PHOSPHATE 75 ML/HR: .53; .5; .37; .037; .03; .012; .00082 INJECTION, SOLUTION INTRAVENOUS at 23:11

## 2020-12-29 RX ADMIN — PIPERACILLIN SODIUM AND TAZOBACTAM SODIUM 2.25 G: 2; .25 INJECTION, POWDER, LYOPHILIZED, FOR SOLUTION INTRAVENOUS at 04:48

## 2020-12-29 RX ADMIN — PIPERACILLIN SODIUM AND TAZOBACTAM SODIUM 2.25 G: 2; .25 INJECTION, POWDER, LYOPHILIZED, FOR SOLUTION INTRAVENOUS at 15:36

## 2020-12-29 RX ADMIN — FENTANYL CITRATE 50 MCG: 50 INJECTION INTRAMUSCULAR; INTRAVENOUS at 11:41

## 2020-12-29 RX ADMIN — GABAPENTIN 400 MG: 400 CAPSULE ORAL at 23:00

## 2020-12-29 RX ADMIN — PROPOFOL 100 MG: 10 INJECTION, EMULSION INTRAVENOUS at 11:22

## 2020-12-29 RX ADMIN — GABAPENTIN 400 MG: 400 CAPSULE ORAL at 02:07

## 2020-12-29 RX ADMIN — PHENYLEPHRINE HYDROCHLORIDE 100 MCG: 10 INJECTION INTRAVENOUS at 11:32

## 2020-12-29 RX ADMIN — DEXAMETHASONE SODIUM PHOSPHATE 8 MG: 4 INJECTION, SOLUTION INTRA-ARTICULAR; INTRALESIONAL; INTRAMUSCULAR; INTRAVENOUS; SOFT TISSUE at 11:27

## 2020-12-29 RX ADMIN — HEPARIN SODIUM 5000 UNITS: 5000 INJECTION INTRAVENOUS; SUBCUTANEOUS at 02:07

## 2020-12-29 NOTE — PLAN OF CARE
Problem: Potential for Falls  Goal: Patient will remain free of falls  Description: INTERVENTIONS:  - Assess patient frequently for physical needs  -  Identify cognitive and physical deficits and behaviors that affect risk of falls    -  [place] fall precautions as indicated by assessment   - Educate patient/family on patient safety including physical limitations  - Instruct patient to call for assistance with activity based on assessment  - Modify environment to reduce risk of injury  - Consider OT/PT consult to assist with strengthening/mobility  Outcome: Progressing     Problem: NEUROSENSORY - ADULT  Goal: Achieves stable or improved neurological status  Description: INTERVENTIONS  - Monitor and report changes in neurological status  - Monitor vital signs such as temperature, blood pressure, glucose, and any other labs ordered   - Initiate measures to prevent increased intracranial pressure  - Monitor for seizure activity and implement precautions if appropriate      Outcome: Progressing     Problem: GENITOURINARY - ADULT  Goal: Maintains or returns to baseline urinary function  Description: INTERVENTIONS:  - Assess urinary function  - Encourage oral fluids to ensure adequate hydration if ordered  - Administer IV fluids as ordered to ensure adequate hydration  - Administer ordered medications as needed  - Offer frequent toileting  - Follow urinary retention protocol if ordered  Outcome: Progressing  Goal: Absence of urinary retention  Description: INTERVENTIONS:  - Assess patients ability to void and empty bladder  - Monitor I/O  - Bladder scan as needed  - Discuss with physician/AP medications to alleviate retention as needed  - Discuss catheterization for long term situations as appropriate  Outcome: Progressing  Goal: Urinary catheter remains patent  Description: INTERVENTIONS:  - Assess patency of urinary catheter  - If patient has a chronic cantor, consider changing catheter if non-functioning  - Follow guidelines for intermittent irrigation of non-functioning urinary catheter  Outcome: Progressing     Problem: METABOLIC, FLUID AND ELECTROLYTES - ADULT  Goal: Electrolytes maintained within normal limits  Description: INTERVENTIONS:  - Monitor labs and assess patient for signs and symptoms of electrolyte imbalances  - Administer electrolyte replacement as ordered  - Monitor response to electrolyte replacements, including repeat lab results as appropriate  - Instruct patient on fluid and nutrition as appropriate  Outcome: Progressing     Problem: SKIN/TISSUE INTEGRITY - ADULT  Goal: Skin integrity remains intact  Description: INTERVENTIONS  - Identify patients at risk for skin breakdown  - Assess and monitor skin integrity  - Assess and monitor nutrition and hydration status  - Monitor labs (i e  albumin)  - Assess for incontinence   - Turn and reposition patient  - Assist with mobility/ambulation  - Relieve pressure over bony prominences  - Avoid friction and shearing  - Provide appropriate hygiene as needed including keeping skin clean and dry  - Evaluate need for skin moisturizer/barrier cream  - Collaborate with interdisciplinary team (i e  Nutrition, Rehabilitation, etc )   - Patient/family teaching  Outcome: Progressing     Problem: MUSCULOSKELETAL - ADULT  Goal: Maintain or return mobility to safest level of function  Description: INTERVENTIONS:  - Assess patient's ability to carry out ADLs; assess patient's baseline for ADL function and identify physical deficits which impact ability to perform ADLs (bathing, care of mouth/teeth, toileting, grooming, dressing, etc )  - Assess/evaluate cause of self-care deficits   - Assess range of motion  - Assess patient's mobility  - Assess patient's need for assistive devices and provide as appropriate  - Encourage maximum independence but intervene and supervise when necessary  - Involve family in performance of ADLs  - Assess for home care needs following discharge   - Consider OT consult to assist with ADL evaluation and planning for discharge  - Provide patient education as appropriate  Outcome: Progressing     Problem: PAIN - ADULT  Goal: Verbalizes/displays adequate comfort level or baseline comfort level  Description: Interventions:  - Encourage patient to monitor pain and request assistance  - Assess pain using appropriate pain scale  - Administer analgesics based on type and severity of pain and evaluate response  - Implement non-pharmacological measures as appropriate and evaluate response  - Consider cultural and social influences on pain and pain management  - Notify physician/advanced practitioner if interventions unsuccessful or patient reports new pain  Outcome: Progressing     Problem: INFECTION - ADULT  Goal: Absence or prevention of progression during hospitalization  Description: INTERVENTIONS:  - Assess and monitor for signs and symptoms of infection  - Monitor lab/diagnostic results  - Monitor all insertion sites, i e  indwelling lines, tubes, and drains  - Monitor endotracheal if appropriate and nasal secretions for changes in amount and color  - [place] appropriate cooling/warming therapies per order  - Administer medications as ordered  - Instruct and encourage patient and family to use good hand hygiene technique  - Identify and instruct in appropriate isolation precautions for identified infection/condition  Outcome: Progressing     Problem: SAFETY ADULT  Goal: Patient will remain free of falls  Description: INTERVENTIONS:  - Assess patient frequently for physical needs  -  Identify cognitive and physical deficits and behaviors that affect risk of falls    -  [place] fall precautions as indicated by assessment   - Educate patient/family on patient safety including physical limitations  - Instruct patient to call for assistance with activity based on assessment  - Modify environment to reduce risk of injury  - Consider OT/PT consult to assist with strengthening/mobility  Outcome: Progressing  Goal: Maintain or return to baseline ADL function  Description: INTERVENTIONS:  -  Assess patient's ability to carry out ADLs; assess patient's baseline for ADL function and identify physical deficits which impact ability to perform ADLs (bathing, care of mouth/teeth, toileting, grooming, dressing, etc )  - Assess/evaluate cause of self-care deficits   - Assess range of motion  - Assess patient's mobility; develop plan if impaired  - Assess patient's need for assistive devices and provide as appropriate  - Encourage maximum independence but intervene and supervise when necessary  - Involve family in performance of ADLs  - Assess for home care needs following discharge   - Consider OT consult to assist with ADL evaluation and planning for discharge  - Provide patient education as appropriate  Outcome: Progressing  Goal: Maintain or return mobility status to optimal level  Description: INTERVENTIONS:  - Assess patient's baseline mobility status (ambulation, transfers, stairs, etc )    - Identify cognitive and physical deficits and behaviors that affect mobility  - Identify mobility aids required to assist with transfers and/or ambulation (gait belt, sit-to-stand, lift, walker, cane, etc )  - [place] fall precautions as indicated by assessment  - Record patient progress and toleration of activity level on Mobility SBAR; progress patient to next Phase/Stage  - Instruct patient to call for assistance with activity based on assessment  - Consider rehabilitation consult to assist with strengthening/weightbearing, etc   Outcome: Progressing     Problem: DISCHARGE PLANNING  Goal: Discharge to home or other facility with appropriate resources  Description: INTERVENTIONS:  - Identify barriers to discharge w/patient and caregiver  - Arrange for needed discharge resources and transportation as appropriate  - Identify discharge learning needs (meds, wound care, etc )  - Arrange for interpretive services to assist at discharge as needed  - Refer to Case Management Department for coordinating discharge planning if the patient needs post-hospital services based on physician/advanced practitioner order or complex needs related to functional status, cognitive ability, or social support system  Outcome: Progressing     Problem: Knowledge Deficit  Goal: Patient/family/caregiver demonstrates understanding of disease process, treatment plan, medications, and discharge instructions  Description: Complete learning assessment and assess knowledge base    Interventions:  - Provide teaching at level of understanding  - Provide teaching via preferred learning methods  Outcome: Progressing

## 2020-12-29 NOTE — ASSESSMENT & PLAN NOTE
Blood cultures from 12/27 growing Proteus species -> likely source is urine due to concurrent UTI  Continue empiric IV Zosyn pending sensitivities  Repeat blood cultures drawn yesterday

## 2020-12-29 NOTE — OP NOTE
OPERATIVE REPORT  PATIENT NAME: Jade Hernandez    :  1945  MRN: [id number]  Pt Location: [address] [place] [address] OR ROOM 02    SURGERY DATE: 2020    Surgeon(s) and Role:     * Elsa Barraza MD - Primary    Preop Diagnosis:  Obstruction of ureteropelvic junction (UPJ) due to stone [N20 1]    Post-Op Diagnosis Codes:     * Obstruction of ureteropelvic junction (UPJ) due to stone [N20 1]    Procedure(s) (LRB):  CYSTOSCOPY RETROGRADE PYELOGRAM WITH INSERTION STENT URETERAL (Right)    Specimen(s):  ID Type Source Tests Collected by Time Destination   A :  Urine Urine, Renal, Right URINE CULTURE Elsa Barraza MD 2020 1155        Estimated Blood Loss:   Minimal    Drains:  Urethral Catheter Latex 18 Fr  (Active)   Number of days: 0       Ureteral Drain/Stent Right ureter 6 Fr  (Active)   Number of days: 0       Anesthesia Type:   General LMA    Operative Indications:  Obstruction of ureteropelvic junction (UPJ) due to stone [N20 1]  This is a 64-year-old male known to me with a history of urinary retention managed with an indwelling Diallo catheter  He now presents with urosepsis and renal failure with CT scan showing 1 cm stone in the proximal right ureter near the UPJ with mild-to-moderate hydronephrosis  Options, procedures, benefits and risks were discussed and he agreed to the planned procedure  Operative Findings:  Obstructing right UPJ/proximal ureteral stone    Complications:   None    Procedure and Technique:  The patient was properly identified in the operating room and after adequate general anesthesia was placed in the lithotomy position  The lower abdomen and genitalia were prepped and draped in the usual fashion after removal of the Diallo catheter  Cystourethroscopy was performed with 22 [place] of [place] cystoscope using 30 degree and 70 degree lenses  The prostate was open  The bladder was smooth with no masses or calcifications    The ureteral orifices were in normal position bilaterally and were relatively small in caliber  Fluoroscopy revealed the large calcification at the approximate L3 vertebral level consistent with UPJ stone  A 5 [place] open-ended ureteral catheter was passed into the right ureteral orifice with the aid of a solo guidewire  Contrast was not instilled outlining a normal caliber ureter without filling defect into the level of the stone was reached  At this point there was a filling defect with proximal mild to moderate dilation  The solo guidewire was passed through the catheter easily into the collecting system and the catheter followed into the collecting system  The wire was removed  A brisk drip of slightly bloody cloudy urine was drained and sent for culture  The wire was replaced into the upper pole  The ureteral length was measured  The catheter was removed leaving the wire in place  A 6 [place] 22 cm Bard inlay optima stent was passed over the wire and the wire was removed leaving a good curl of stent within the upper pole and within the bladder  The cystoscope was removed  An 25 [place] Diallo catheter was placed with 10 cc of water in the balloon  Drainage was clear  The patient tolerated the procedure well and left the operating room in good condition     I was present for the entire procedure    Patient Disposition:  PACU     SIGNATURE: Annabel Zarate MD  DATE: December 29, 2020  TIME: 12:13 PM

## 2020-12-29 NOTE — SPEECH THERAPY NOTE
Speech/Language Pathology Note    Patient Name: Tunde Kathleen  XTJET'G Date: 12/29/2020     ST orders received and appreciated  Pt currently NPO for OR  Will hold orders today and f/u with dysphagia assessment tomorrow 12/30/2020

## 2020-12-29 NOTE — ASSESSMENT & PLAN NOTE
With tachycardia/tachypnea coupled leukocytosis and lactic acidosis -> resolving  In the setting of Proteus bacteremia/UTI and right basilar pneumonia (see below)  Monitor vital signs and maintain hemodynamics  Trend procalcitonin until peak

## 2020-12-29 NOTE — PHYSICAL THERAPY NOTE
Physical Therapy Evaluation     Patient's Name: Anabela Wolff    Admitting Diagnosis  Vomiting [R11 10]  UTI (urinary tract infection) [N39 0]  Renal failure [N19]  Tachycardia [R00 0]  Sepsis ([facility] [address] ) [A41 9]    Problem List  Patient Active Problem List   Diagnosis    Chronic indwelling Diallo catheter    Sepsis ([facility] [address] )    [facility]    Acute metabolic encephalopathy    Acute cystitis with hematuria    Chronic pain syndrome    Elevated troponin    CKD (chronic kidney disease)    Anemia    Depression    Hypomagnesemia    Bilateral hand pain    Acute respiratory failure with hypoxia (HCC)    Ureteropelvic junction calculus       Past Medical History  Past Medical History:   Diagnosis Date    Depression     Diallo catheter in place     Hypertension     Prostate enlargement     Psychiatric disorder     Urinary tract infection        Past Surgical History  Past Surgical History:   Procedure Laterality Date    ARTHROSCOPY KNEE      BACK SURGERY      L 4 or 5    CATARACT EXTRACTION Bilateral     CYSTOSCOPY W/ LASER LITHOTRIPSY N/A 8/20/2018    Procedure: LITHOTRISPY HOLMIUM LASER of bladder stones;  Surgeon: Rosario Hickman MD;  Location: [facility] MAIN OR;  Service: Urology    [place] TRANSURETHRAL ELEC-SURG PROSTATECTOM N/A 8/20/2018    Procedure: CYSTOSCOPY; TURP;  Surgeon: Rosario Hickman MD;  Location: [facility] MAIN OR;  Service: Urology    SHOULDER SURGERY Right 05/31/2019 12/29/20 0810   PT Last Visit   PT Visit Date 12/29/20   Note Type   Note Type Treatment   Pain Assessment   Pain Assessment Tool Pain Assessment not indicated - pt denies pain   Pain Score No Pain   Restrictions/Precautions   Weight Bearing Precautions Per Order No   Other Precautions Contact/isolation; Chair Alarm; Bed Alarm;Cognitive; Fall Risk;Multiple lines   General   Chart Reviewed Yes   Response to Previous Treatment Patient with no complaints from previous session     Family/Caregiver Present No   Cognition Arousal/Participation Alert; Responsive; Cooperative   Attention Attends with cues to redirect   Orientation Level Oriented to person;Oriented to time;Disoriented to place; Disoriented to situation   Memory Decreased recall of biographical information;Decreased short term memory;Decreased recall of precautions;Decreased recall of recent events   Following Commands Follows one step commands with increased time or repetition   Comments pt agreeable to PT session   Subjective   Subjective "I feel alittle stronger today"   Bed Mobility   Supine to Sit 3  Moderate assistance   Additional items Assist x 1;HOB elevated; Bedrails; Increased time required;Verbal cues   Sit to Supine 4  Minimal assistance   Additional items Assist x 2; Increased time required;Verbal cues;LE management   Transfers   Sit to Stand 3  Moderate assistance   Additional items Assist x 1; Increased time required;Verbal cues   Stand to Sit 3  Moderate assistance   Additional items Assist x 1;Verbal cues  (poor eccentric control)   Additional Comments pt denies any lightheadedness/dizziness   Ambulation/Elevation   Gait pattern Improper Weight shift;Decreased foot clearance; Inconsistent stephen; Foward flexed; Step to;Excessively slow  (poor sequencing of RW and B LE SLA)   Gait Assistance 3  Moderate assist   Additional items Assist x 1;Verbal cues; Tactile cues   Assistive Device Rolling walker   Distance 3 ft forward/backward, 3 ft sidestepping   Stair Management Assistance Not tested   Balance   Static Sitting Fair   Dynamic Sitting Fair -   Static Standing Poor +   Dynamic Standing Poor   Ambulatory Poor   Endurance Deficit   Endurance Deficit Yes   Activity Tolerance   Activity Tolerance Patient limited by fatigue;Treatment limited secondary to medical complications (Comment)  (confusion)   Nurse Made Aware Yes, RN Kristofer Pro verbalized pt appropriate for PT session, made aware of outcomes/recs   Exercises   Heelslides Supine;10 reps;AAROM; Right;Left   Hip Abduction Supine;10 reps;AAROM; Right;Left   Hip Adduction Supine;10 reps;AAROM; Right;Left   Ankle Pumps Supine;20 reps;AROM; Bilateral   Assessment   Prognosis Fair   Problem List Decreased strength;Decreased endurance; Impaired balance;Decreased mobility; Decreased safety awareness   Assessment Pt seen for PT treatment session this date with interventions consisting of gait training w/ emphasis on improving pt's ability to ambulate level surfaces x 3 ft forward/backward, 3 ft sidestepping with mod A provided by therapist with RW, Therapeutic exercise consisting of: AROM 10-20 reps B LE in supine position and therapeutic activity consisting of training: supine<>sit transfers, sit<>stand transfers, static sitting tolerance at EOB for 5 minutes w/ B UE support, static standing tolerance for 3-5 minutes w/ B UE support, vc and tactile cues for static sitting posture faciliation and vc and tactile cues for static standing posture faciliation  Pt agreeable to PT treatment session upon arrival, pt found supine in bed w/ HOB elevated, in no apparent distress and responsive  In comparison to previous session, pt with improvements in tolerance to increased OOB activity with increased alertness this session with minimal confusion still noted  Post session: pt returned BTB, bed alarm engaged, all needs in reach and RN notified of session findings/recommendations  Continue to recommend STR at time of d/c in order to maximize pt's functional independence and safety w/ mobility  Pt continues to be functioning below baseline level, and remains limited 2* factors listed above  PT will continue to see pt while here in order to address the deficits listed above and provide interventions consistent w/ POC in effort to achieve STGs     Barriers to Discharge Inaccessible home environment   Goals   Patient Goals "to feel less stiff"   STG Expiration Date 01/06/21   Short Term Goal #1 goals remain appropriate   PT Treatment Day 1   Plan Treatment/Interventions Functional transfer training;LE strengthening/ROM; Therapeutic exercise; Endurance training;Patient/family training;Equipment eval/education; Bed mobility;Gait training;Cognitive reorientation;Spoke to nursing;Spoke to case management   Progress Slow progress, decreased activity tolerance   PT Frequency   (3-5x/wk)   Recommendation   PT Discharge Recommendation Post-Acute Rehabilitation Services   Equipment Recommended   (continue RW use)   PT - OK to Discharge Yes  (when medically cleared, if to STR)         Justin Valencia, PT

## 2020-12-29 NOTE — ASSESSMENT & PLAN NOTE
Currently requiring up to 4 L via nasal cannula - attempt to wean down as tolerated  Secondary to suspected right basilar pneumonia

## 2020-12-29 NOTE — ASSESSMENT & PLAN NOTE
Baseline creatinine approximately 1 2-1 3 - currently @ 2 63 from a 2 71 peak  Exacerbated in the setting of obstructive hydroureteronephrosis - s/p ureteral stenting today  Monitor renal function and urine output - limit/avoid nephrotoxins if possible  Nephrology following

## 2020-12-29 NOTE — PROGRESS NOTES
[address] Internal Medicine - Progress Note  Patient: Erinn Vo 66 y o  male MRN: [id number]  Unit/Bed#: -01 Encounter: [id number]  Primary Care Provider: Dieter Bell DO  Date Of Visit: 12/29/20        Assessment & Plan:    * Severe sepsis (evolving)  Assessment & Plan  With tachycardia/tachypnea coupled leukocytosis and lactic acidosis -> resolving  In the setting of Proteus bacteremia/UTI and right basilar pneumonia (see below)  Monitor vital signs and maintain hemodynamics  Trend procalcitonin until peak    Gram-negative bacteremia  Assessment & Plan  Blood cultures from 12/27 growing Proteus species -> likely source is urine due to concurrent UTI  Continue empiric IV Zosyn pending sensitivities  Repeat blood cultures drawn yesterday    Gram-negative UTI  Assessment & Plan  Urine culture pathogen only growing Proteus mirabilis - lesser colonies of Enterococcus/Alcaligenes species noted  In the setting of concurrent checked aspiration pneumonia, continue IV Zosyn pending final sensitivities  Chronic Diallo catheter presence noted    Pneumonia of right lower lobe  Assessment & Plan  As seen on CXR imaging  Suspect possibly aspiration secondary to vomiting under encephalopathic state  Continue empiric IV Zosyn   Aspiration precautions - await speech/swallow evaluation  Continue empiric IV Zosyn  Maintain oxygen - encourage incentive spirometry    Right hydroureteronephrosis with obstructing calculus  Assessment & Plan  S/p retrograde pyelogram with ureteral stenting today - appreciate urology evaluation  PRN pain/emesis control    Acute metabolic encephalopathy  Assessment & Plan  In the setting of sepsis/infections coupled with acute kidney injury  Continue clinical monitoring    Acute respiratory failure with hypoxia  Assessment & Plan  Currently requiring up to 4 L via nasal cannula - attempt to wean down as tolerated  Secondary to suspected right basilar pneumonia    Acute kidney injury - Chronic kidney disease stage 3  Assessment & Plan  Baseline creatinine approximately 1 2-1 3 - currently @ 2 63 from a 2 71 peak  Exacerbated in the setting of obstructive hydroureteronephrosis - s/p ureteral stenting today  Monitor renal function and urine output - limit/avoid nephrotoxins if possible  Nephrology following    Hypomagnesemia  Assessment & Plan  Monitor/replete serum magnesium  Serum potassium normal    Anemia of chronic disease  Assessment & Plan  Monitor H/H    Thrombocytopenia   Assessment & Plan  Monitor platelet count - monitor for bleeding      DVT Prophylaxis:  Heparin SC       Patient Centered Rounds:  I have performed bedside rounds and discussed plan of care with nursing today  Discussions with Specialists or Other Care Team Provider:  see above assessments if applicable    Education and Discussions with Family / Patient: Patient at bedside    Time Spent for Care:  32 minutes  More than 50% of total time spent on counseling and coordination of care as described above  Current Length of Stay: 2 day(s)    Current Patient Status: Inpatient   Certification Statement:  Patient will continue to require additional hospital stay due to assessments as noted above  Code Status: Level 1 - Full Code        Subjective:     Seen/examined after returning from ureteral stenting  Denying pain, fever/chills, or nausea at this time  Remains generally weak/fatigued currently  Objective:     Vitals:   Temp (24hrs), Av 1 °F (37 3 °C), Min:98 1 °F (36 7 °C), Max:101 2 °F (38 4 °C)    Temp:  [98 1 °F (36 7 °C)-101 2 °F (38 4 °C)] 98 1 °F (36 7 °C)  HR:  [72-98] 78  Resp:  [14-20] 16  BP: ()/(52-64) 113/64  SpO2:  [92 %-99 %] 96 %  Body mass index is 27 12 kg/m²  Input and Output Summary (last 24 hours):        Intake/Output Summary (Last 24 hours) at 2020 1422  Last data filed at 2020 1300  Gross per 24 hour   Intake 400 ml   Output 1115 ml   Net -715 ml       Physical Exam:     GENERAL:  Weak/fatigued -  (error ) no acute distress at rest  HEAD:  Normocephalic - atraumatic  EYES: PERRL - EOMI   MOUTH:  Mucosa moist  NECK:  Supple - full range of motion  CARDIAC:  Rate controlled - S1/S2 positive  PULMONARY:  Mildly diminished at the right base - nonlabored respirations at rest  ABDOMEN:  Soft - currently nontender/nondistended - active bowel sounds  MUSCULOSKELETAL:  Motor strength/range of motion deconditioned  NEUROLOGIC:  Alert/awake  SKIN:  Chronic wrinkles/blemishes   PSYCHIATRIC:  Mood/affect stable      Additional Data:     Labs & Recent Cultures:    Results from last 7 days   Lab Units 12/29/20  0542  12/27/20  0521   WBC Thousand/uL 9 50   < > 10 20   HEMOGLOBIN g/dL 9 6*   < > 11 4*   HEMATOCRIT % 28 0*   < > 33 4*   PLATELETS Thousands/uL 110*   < > 168   BANDS PCT %  --   --  9*   LYMPHO PCT %  --   --  5*   MONO PCT %  --   --  1*    < > = values in this interval not displayed  Results from last 7 days   Lab Units 12/29/20  0542   POTASSIUM mmol/L 3 9   CHLORIDE mmol/L 110*   CO2 mmol/L 20*   BUN mg/dL 56*   CREATININE mg/dL 2 63*   CALCIUM mg/dL 7 8*   ALK PHOS U/L 84   ALT U/L 39   AST U/L 53*     Results from last 7 days   Lab Units 12/27/20  0521   INR  1 39*             Results from last 7 days   Lab Units 12/29/20  0542 12/28/20  0521 12/27/20  1226 12/27/20  1009 12/27/20  0725 12/27/20  0521   LACTIC ACID mmol/L  --   --  1 9 3 0* 2 6* 4 0*   PROCALCITONIN ng/ml 59 24* 91 90*  --   --   --  3 28*         Results from last 7 days   Lab Units 12/28/20  1236 12/28/20  1227 12/27/20  0528 12/27/20  0521   BLOOD CULTURE  Received in Microbiology Lab  Culture in Progress  Received in Microbiology Lab  Culture in Progress    --  Proteus mirabilis*  Proteus mirabilis*   GRAM STAIN RESULT   --   --   --  Gram negative rods*  Gram negative rods*   URINE CULTURE   --   --  >100,000 cfu/ml Proteus mirabilis*  40,000-49,000 cfu/ml Enterococcus faecalis* 50,000-59,000 cfu/ml Alcaligenes faecalis*  --          Last 24 Hours Medication List:   Current Facility-Administered Medications   Medication Dose Route Frequency Provider Last Rate    acetaminophen  650 mg Oral Q4H PRN Nomi Hyde MD      ascorbic acid  500 mg Oral Daily Nomi Hyde MD      calcium carbonate  500 mg Oral BID Nomi Hyde MD      cholecalciferol  4,000 Units Oral Daily Nomi Hyde MD      diphenhydrAMINE  50 mg Oral HS PRN Nomi Hyde MD      ferrous sulfate  325 mg Oral Daily With Breakfast Nomi Hyde MD      finasteride  5 mg Oral Daily Nomi Hyde MD      gabapentin  400 mg Oral HS Nomi Hyde MD      heparin (porcine)  5,000 Units Subcutaneous [facility] Nomi Hyde MD      morphine  15 mg Oral BID Nomi Hyde MD      multi-electrolyte  75 mL/hr Intravenous Continuous Veto [place] MD Stopped (12/29/20 1114)    ondansetron  4 mg Intravenous Q6H PRN Nomi Hyde MD      piperacillin-tazobactam  2 25 g Intravenous Q6H Nomi Hyde MD 2 25 g (12/29/20 1019)    sertraline  50 mg Oral Daily Nomi Hyde MD                    ** Please Note: This note is constructed using a voice recognition dictation system  An occasional wrong word/phrase or sound-a-like substitution may have been picked up by dictation device due to the inherent limitations of voice recognition software  Read the chart carefully and recognize, using reasonable context, where substitutions may have occurred  **

## 2020-12-29 NOTE — PLAN OF CARE
Problem: PHYSICAL THERAPY ADULT  Goal: Performs mobility at highest level of function for planned discharge setting  See evaluation for individualized goals  Description: Treatment/Interventions: Functional transfer training, LE strengthening/ROM, Therapeutic exercise, Endurance training, Patient/family training, Equipment eval/education, Bed mobility, Gait training, Cognitive reorientation, Spoke to nursing, Spoke to case management  Equipment Recommended: (TBD pending further OOB mobility)       See flowsheet documentation for full assessment, interventions and recommendations  Outcome: Progressing  Note: Prognosis: Fair  Problem List: Decreased strength, Decreased endurance, Impaired balance, Decreased mobility, Decreased safety awareness  Assessment: Pt seen for PT treatment session this date with interventions consisting of gait training w/ emphasis on improving pt's ability to ambulate level surfaces x 3 ft forward/backward, 3 ft sidestepping with mod A provided by therapist with RW, Therapeutic exercise consisting of: AROM 10-20 reps B LE in supine position and therapeutic activity consisting of training: supine<>sit transfers, sit<>stand transfers, static sitting tolerance at EOB for 5 minutes w/ B UE support, static standing tolerance for 3-5 minutes w/ B UE support, vc and tactile cues for static sitting posture faciliation and vc and tactile cues for static standing posture faciliation  Pt agreeable to PT treatment session upon arrival, pt found supine in bed w/ HOB elevated, in no apparent distress and responsive  In comparison to previous session, pt with improvements in tolerance to increased OOB activity with increased alertness this session with minimal confusion still noted  Post session: pt returned BTB, bed alarm engaged, all needs in reach and RN notified of session findings/recommendations   Continue to recommend STR at time of d/c in order to maximize pt's functional independence and safety w/ mobility  Pt continues to be functioning below baseline level, and remains limited 2* factors listed above  PT will continue to see pt while here in order to address the deficits listed above and provide interventions consistent w/ POC in effort to achieve STGs  Barriers to Discharge: Inaccessible home environment     PT Discharge Recommendation: [id number]     PT - OK to Discharge: Yes(when medically cleared, if to STR)    See flowsheet documentation for full assessment

## 2020-12-29 NOTE — PLAN OF CARE
Problem: Potential for Falls  Goal: Patient will remain free of falls  Description: INTERVENTIONS:  - Assess patient frequently for physical needs  -  Identify cognitive and physical deficits and behaviors that affect risk of falls    -  [place] fall precautions as indicated by assessment   - Educate patient/family on patient safety including physical limitations  - Instruct patient to call for assistance with activity based on assessment  - Modify environment to reduce risk of injury  - Consider OT/PT consult to assist with strengthening/mobility  Outcome: Progressing     Problem: NEUROSENSORY - ADULT  Goal: Achieves stable or improved neurological status  Description: INTERVENTIONS  - Monitor and report changes in neurological status  - Monitor vital signs such as temperature, blood pressure, glucose, and any other labs ordered   - Initiate measures to prevent increased intracranial pressure  - Monitor for seizure activity and implement precautions if appropriate      Outcome: Progressing     Problem: GENITOURINARY - ADULT  Goal: Maintains or returns to baseline urinary function  Description: INTERVENTIONS:  - Assess urinary function  - Encourage oral fluids to ensure adequate hydration if ordered  - Administer IV fluids as ordered to ensure adequate hydration  - Administer ordered medications as needed  - Offer frequent toileting  - Follow urinary retention protocol if ordered  Outcome: Progressing  Goal: Absence of urinary retention  Description: INTERVENTIONS:  - Assess patients ability to void and empty bladder  - Monitor I/O  - Bladder scan as needed  - Discuss with physician/AP medications to alleviate retention as needed  - Discuss catheterization for long term situations as appropriate  Outcome: Progressing  Goal: Urinary catheter remains patent  Description: INTERVENTIONS:  - Assess patency of urinary catheter  - If patient has a chronic cantor, consider changing catheter if non-functioning  - Follow guidelines for intermittent irrigation of non-functioning urinary catheter  Outcome: Progressing     Problem: METABOLIC, FLUID AND ELECTROLYTES - ADULT  Goal: Electrolytes maintained within normal limits  Description: INTERVENTIONS:  - Monitor labs and assess patient for signs and symptoms of electrolyte imbalances  - Administer electrolyte replacement as ordered  - Monitor response to electrolyte replacements, including repeat lab results as appropriate  - Instruct patient on fluid and nutrition as appropriate  Outcome: Progressing     Problem: SKIN/TISSUE INTEGRITY - ADULT  Goal: Skin integrity remains intact  Description: INTERVENTIONS  - Identify patients at risk for skin breakdown  - Assess and monitor skin integrity  - Assess and monitor nutrition and hydration status  - Monitor labs (i e  albumin)  - Assess for incontinence   - Turn and reposition patient  - Assist with mobility/ambulation  - Relieve pressure over bony prominences  - Avoid friction and shearing  - Provide appropriate hygiene as needed including keeping skin clean and dry  - Evaluate need for skin moisturizer/barrier cream  - Collaborate with interdisciplinary team (i e  Nutrition, Rehabilitation, etc )   - Patient/family teaching  Outcome: Progressing     Problem: MUSCULOSKELETAL - ADULT  Goal: Maintain or return mobility to safest level of function  Description: INTERVENTIONS:  - Assess patient's ability to carry out ADLs; assess patient's baseline for ADL function and identify physical deficits which impact ability to perform ADLs (bathing, care of mouth/teeth, toileting, grooming, dressing, etc )  - Assess/evaluate cause of self-care deficits   - Assess range of motion  - Assess patient's mobility  - Assess patient's need for assistive devices and provide as appropriate  - Encourage maximum independence but intervene and supervise when necessary  - Involve family in performance of ADLs  - Assess for home care needs following discharge   - Consider OT consult to assist with ADL evaluation and planning for discharge  - Provide patient education as appropriate  Outcome: Progressing     Problem: PAIN - ADULT  Goal: Verbalizes/displays adequate comfort level or baseline comfort level  Description: Interventions:  - Encourage patient to monitor pain and request assistance  - Assess pain using appropriate pain scale  - Administer analgesics based on type and severity of pain and evaluate response  - Implement non-pharmacological measures as appropriate and evaluate response  - Consider cultural and social influences on pain and pain management  - Notify physician/advanced practitioner if interventions unsuccessful or patient reports new pain  Outcome: Progressing     Problem: INFECTION - ADULT  Goal: Absence or prevention of progression during hospitalization  Description: INTERVENTIONS:  - Assess and monitor for signs and symptoms of infection  - Monitor lab/diagnostic results  - Monitor all insertion sites, i e  indwelling lines, tubes, and drains  - Monitor endotracheal if appropriate and nasal secretions for changes in amount and color  - [place] appropriate cooling/warming therapies per order  - Administer medications as ordered  - Instruct and encourage patient and family to use good hand hygiene technique  - Identify and instruct in appropriate isolation precautions for identified infection/condition  Outcome: Progressing     Problem: SAFETY ADULT  Goal: Patient will remain free of falls  Description: INTERVENTIONS:  - Assess patient frequently for physical needs  -  Identify cognitive and physical deficits and behaviors that affect risk of falls    -  [place] fall precautions as indicated by assessment   - Educate patient/family on patient safety including physical limitations  - Instruct patient to call for assistance with activity based on assessment  - Modify environment to reduce risk of injury  - Consider OT/PT consult to assist with strengthening/mobility  Outcome: Progressing  Goal: Maintain or return to baseline ADL function  Description: INTERVENTIONS:  -  Assess patient's ability to carry out ADLs; assess patient's baseline for ADL function and identify physical deficits which impact ability to perform ADLs (bathing, care of mouth/teeth, toileting, grooming, dressing, etc )  - Assess/evaluate cause of self-care deficits   - Assess range of motion  - Assess patient's mobility; develop plan if impaired  - Assess patient's need for assistive devices and provide as appropriate  - Encourage maximum independence but intervene and supervise when necessary  - Involve family in performance of ADLs  - Assess for home care needs following discharge   - Consider OT consult to assist with ADL evaluation and planning for discharge  - Provide patient education as appropriate  Outcome: Progressing  Goal: Maintain or return mobility status to optimal level  Description: INTERVENTIONS:  - Assess patient's baseline mobility status (ambulation, transfers, stairs, etc )    - Identify cognitive and physical deficits and behaviors that affect mobility  - Identify mobility aids required to assist with transfers and/or ambulation (gait belt, sit-to-stand, lift, walker, cane, etc )  - [place] fall precautions as indicated by assessment  - Record patient progress and toleration of activity level on Mobility SBAR; progress patient to next Phase/Stage  - Instruct patient to call for assistance with activity based on assessment  - Consider rehabilitation consult to assist with strengthening/weightbearing, etc   Outcome: Progressing     Problem: DISCHARGE PLANNING  Goal: Discharge to home or other facility with appropriate resources  Description: INTERVENTIONS:  - Identify barriers to discharge w/patient and caregiver  - Arrange for needed discharge resources and transportation as appropriate  - Identify discharge learning needs (meds, wound care, etc )  - Arrange for interpretive services to assist at discharge as needed  - Refer to Case Management Department for coordinating discharge planning if the patient needs post-hospital services based on physician/advanced practitioner order or complex needs related to functional status, cognitive ability, or social support system  Outcome: Progressing     Problem: Knowledge Deficit  Goal: Patient/family/caregiver demonstrates understanding of disease process, treatment plan, medications, and discharge instructions  Description: Complete learning assessment and assess knowledge base    Interventions:  - Provide teaching at level of understanding  - Provide teaching via preferred learning methods  Outcome: Progressing

## 2020-12-29 NOTE — OCCUPATIONAL THERAPY NOTE
OccupationalTherapy Cancellation Note     Patient Name: Ann Sibley  GGZRS'N Date: 12/29/2020  Problem List  Principal Problem:    Severe sepsis (evolving)  Active Problems:    Chronic indwelling Diallo catheter    Acute kidney injury - Chronic kidney disease stage 3    Acute metabolic encephalopathy    Gram-negative UTI    Chronic pain syndrome    Anemia of chronic disease    Depression    Hypomagnesemia    Acute respiratory failure with hypoxia    Right hydroureteronephrosis with obstructing calculus    Gram-negative bacteremia    Pneumonia of right lower lobe    Thrombocytopenia           12/29/20 1200   OT Last Visit   OT Visit Date 12/29/20   Note Type   Note Type Treatment   Cancel Reasons Patient off floor/test     Chart review performed and OT treatment session attempted  At this time, OT treatment cancelled due to patient off floor  OT will follow and provide skilled interventions as appropriate        Kiera Hoffman MS, OTR/L

## 2020-12-29 NOTE — NURSING NOTE
Patient transported to OR at this time, dentures removed, glasses and hearing aides remained intact for paperwork reasons  Diallo emptied, 200cc, clear, yellow urine prior to transport

## 2020-12-29 NOTE — ASSESSMENT & PLAN NOTE
As seen on CXR imaging  Suspect possibly aspiration secondary to vomiting under encephalopathic state  Continue empiric IV Zosyn   Aspiration precautions - await speech/swallow evaluation  Continue empiric IV Zosyn  Maintain oxygen - encourage incentive spirometry

## 2020-12-29 NOTE — NURSING NOTE
Patient returned from OR at this time, vital signs stable, no complaints offered, patient remains comfortable in bed with all belongings in reach, will continue to monitor for changes

## 2020-12-29 NOTE — CONSULTS
Consultation - Urology   Chen Perez 66 y o  male MRN: [id number]  Unit/Bed#: OR DONNA Encounter: [id number]      Assessment/Plan      Assessment:  Urosepsis secondary to chronic bacterial colonization of the indwelling Diallo which was not draining well on admission and require change  Persistent renal failure secondary to obstructing 1 cm stone in the proximal right ureter/UPJ  Chronic urinary retention managed with indwelling Diallo catheter, last changed less than 2 weeks ago  Plan:  Options, procedures, benefits and risks were discussed and he agreed to the planned procedure which would include cystoscopy, right retrograde urogram and insertion of right ureteral stent  Diallo catheter will be changed at that time, as well  Continue current course of antibiotics  Definitive management of the stone will be delayed until resolution of the infection and renal failure  History of Present Illness   Attending: Yaw Sahu MD  Reason for Consult / Principal Problem:  Urosepsis  HPI: Chen Perez is a 66y o  year old male who presents with increased confusion, vomiting and fever with decreased drainage from the Diallo catheter  In the emergency room the Diallo catheter was changed with 2200 cc of urine returned  He was admitted with fever and renal failure with creatinine up to 2 7  He improved clinically on Zosyn, but his elevated creatinine persisted  A CT scan was therefore performed which demonstrated a 1 cm stone in the proximal right ureter just below the UPJ associated with mild to moderate proximal hydronephrosis  He does have a history of bladder stones in the past but no known history of kidney stones  He also denied any right upper quadrant or flank pain  The CT scan also showed right lower pole stones  Urine and blood cultures are positive for Proteus  Consults    Review of Systems   Gastrointestinal: Negative for abdominal distention and abdominal pain     Genitourinary: Negative for flank pain and hematuria         Historical Information   Past Medical History:   Diagnosis Date    Depression     Diallo catheter in place     Hypertension     Prostate enlargement     Psychiatric disorder     Urinary tract infection      Past Surgical History:   Procedure Laterality Date    ARTHROSCOPY KNEE      BACK SURGERY      L 4 or 5    CATARACT EXTRACTION Bilateral     CYSTOSCOPY W/ LASER LITHOTRIPSY N/A 8/20/2018    Procedure: LITHOTRISPY HOLMIUM LASER of bladder stones;  Surgeon: Nomi Hyde MD;  Location: [facility] MAIN OR;  Service: Urology    MT TRANSURETHRAL ELEC-SURG PROSTATECTOM N/A 8/20/2018    Procedure: CYSTOSCOPY; TURP;  Surgeon: Nomi Hyde MD;  Location: [facility] MAIN OR;  Service: Urology    SHOULDER SURGERY Right 05/31/2019     Social History   Social History     Substance and Sexual Activity   Alcohol Use Never    Frequency: Monthly or less    Drinks per session: 1 or 2    Binge frequency: Less than monthly     @DRUGHX  E-Cigarette/Vaping    E-Cigarette Use Never User      E-Cigarette/Vaping Substances     Social History     Tobacco Use   Smoking Status Former Smoker    Packs/day: 1 00   Smokeless Tobacco Never Used   Tobacco Comment    quit 50 years ago     Family History: non-contributory    Meds/Allergies   current meds:   Current Facility-Administered Medications   Medication Dose Route Frequency    [MAR Hold] acetaminophen (TYLENOL) tablet 650 mg  650 mg Oral Q4H PRN    [MAR Hold] ascorbic acid (VITAMIN C) tablet 500 mg  500 mg Oral Daily    [MAR Hold] calcium carbonate (TUMS) chewable tablet 500 mg  500 mg Oral BID    [MAR Hold] cholecalciferol (VITAMIN D3) tablet 4,000 Units  4,000 Units Oral Daily    [MAR Hold] diphenhydrAMINE (BENADRYL) tablet 50 mg  50 mg Oral HS PRN    [MAR Hold] ferrous sulfate tablet 325 mg  325 mg Oral Daily With Breakfast    [MAR Hold] finasteride (PROSCAR) tablet 5 mg  5 mg Oral Daily    [MAR Hold] gabapentin (NEURONTIN) capsule 400 mg  400 mg Oral HS    [MAR Hold] heparin (porcine) subcutaneous injection 5,000 Units  5,000 Units Subcutaneous Q8H [facility]    [MAR Hold] morphine (MSIR) IR tablet 15 mg  15 mg Oral BID    multi-electrolyte (PLASMALYTE-A/ISOLYTE-S PH 7 4) IV solution  75 mL/hr Intravenous Continuous    [MAR Hold] ondansetron (ZOFRAN) injection 4 mg  4 mg Intravenous Q6H PRN    [MAR Hold] piperacillin-tazobactam (ZOSYN) 2 25 g in sodium chloride 0 9 % 50 mL IVPB  2 25 g Intravenous Q6H    [MAR Hold] sertraline (ZOLOFT) tablet 50 mg  50 mg Oral Daily     No Known Allergies    Objective   Vitals: Blood pressure 108/55, pulse 84, temperature 99 °F (37 2 °C), temperature source Temporal, resp  rate 17, height 5' 9" (1 753 m), weight 83 3 kg (183 lb 10 3 oz), SpO2 92 %  I/O last 24 hours: In: 400 [I V :400]  Out: 1025 [Urine:1025]    Invasive Devices     Peripheral Intravenous Line            Peripheral IV 12/29/20 Right;Dorsal (posterior) Forearm less than 1 day          Drain            Ureteral Drain/Stent Right ureter 6 Fr  less than 1 day    Urethral Catheter Latex 18 Fr  less than 1 day                Physical Exam  Abdominal:      General: There is no distension  Tenderness: There is no abdominal tenderness     Genitourinary:     Penis: Normal        Scrotum/Testes: Normal      Diallo catheter draining clear yellow urine    Lab Results:   CBC:   Lab Results   Component Value Date    WBC 9 50 12/29/2020    HGB 9 6 (L) 12/29/2020    HCT 28 0 (L) 12/29/2020    MCV 96 12/29/2020     (L) 12/29/2020    MCH 33 0 12/29/2020    MCHC 34 4 12/29/2020    RDW 13 3 12/29/2020    MPV 9 6 12/29/2020     CMP:   Lab Results   Component Value Date    SODIUM 140 12/29/2020     (H) 12/29/2020    CO2 20 (L) 12/29/2020    BUN 56 (H) 12/29/2020    CREATININE 2 63 (H) 12/29/2020    CALCIUM 7 8 (L) 12/29/2020    AST 53 (H) 12/29/2020    ALT 39 12/29/2020    ALKPHOS 84 12/29/2020    EGFR 22 12/29/2020     Urinalysis: No results found for: Arlin Lucia, CLARITYU, SPECGRAV, PHUR, LEUKOCYTESUR, NITRITE, PROTEINUA, GLUCOSEU, KETONESU, BILIRUBINUR, BLOODU  Urine Culture: No results found for: URINECX  Imaging Studies: I have personally reviewed pertinent reports  EKG, Pathology, and Other Studies: I have personally reviewed pertinent reports  VTE Prophylaxis: Sequential compression device (Venodyne)     Code Status: Level 1 - Full Code  Advance Directive and Living Will:      Power of :    POLST:      Counseling / Coordination of Care  Total floor / unit time spent today 30 minutes  Greater than 50% of total time was spent with the patient and / or family counseling and / or coordination of care   A description of the counseling / coordination of care:  I have discussed the case with the hospitalist

## 2020-12-30 ENCOUNTER — APPOINTMENT (INPATIENT)
Dept: RADIOLOGY | Facility: HOSPITAL | Age: 75
DRG: 853 | End: 2020-12-30
Payer: MEDICARE

## 2020-12-30 LAB
ANION GAP SERPL CALCULATED.3IONS-SCNC: 10 MMOL/L (ref 4–13)
BACTERIA BLD CULT: ABNORMAL
BACTERIA BLD CULT: ABNORMAL
BACTERIA UR CULT: ABNORMAL
BASOPHILS # BLD AUTO: 0 THOUSANDS/ΜL (ref 0–0.1)
BASOPHILS NFR BLD AUTO: 0 % (ref 0–2)
BUN SERPL-MCNC: 57 MG/DL (ref 7–25)
CA-I BLD-SCNC: 1.26 MMOL/L (ref 1.12–1.32)
CALCIUM SERPL-MCNC: 7.9 MG/DL (ref 8.6–10.5)
CHLORIDE SERPL-SCNC: 111 MMOL/L (ref 98–107)
CO2 SERPL-SCNC: 21 MMOL/L (ref 21–31)
CREAT SERPL-MCNC: 2.33 MG/DL (ref 0.7–1.3)
EOSINOPHIL # BLD AUTO: 0 THOUSAND/ΜL (ref 0–0.61)
EOSINOPHIL NFR BLD AUTO: 0 % (ref 0–5)
ERYTHROCYTE [DISTWIDTH] IN BLOOD BY AUTOMATED COUNT: 13.6 % (ref 11.5–14.5)
GFR SERPL CREATININE-BSD FRML MDRD: 26 ML/MIN/1.73SQ M
GLUCOSE SERPL-MCNC: 143 MG/DL (ref 65–99)
GRAM STN SPEC: ABNORMAL
HCT VFR BLD AUTO: 28.5 % (ref 42–47)
HGB BLD-MCNC: 9.4 G/DL (ref 14–18)
LYMPHOCYTES # BLD AUTO: 0.6 THOUSANDS/ΜL (ref 0.6–4.47)
LYMPHOCYTES NFR BLD AUTO: 7 % (ref 21–51)
MAGNESIUM SERPL-MCNC: 2.2 MG/DL (ref 1.9–2.7)
MCH RBC QN AUTO: 32.2 PG (ref 26–34)
MCHC RBC AUTO-ENTMCNC: 33.1 G/DL (ref 31–37)
MCV RBC AUTO: 97 FL (ref 81–99)
MONOCYTES # BLD AUTO: 0.7 THOUSAND/ΜL (ref 0.17–1.22)
MONOCYTES NFR BLD AUTO: 8 % (ref 2–12)
NEUTROPHILS # BLD AUTO: 8.2 THOUSANDS/ΜL (ref 1.4–6.5)
NEUTS SEG NFR BLD AUTO: 86 % (ref 42–75)
PLATELET # BLD AUTO: 111 THOUSANDS/UL (ref 149–390)
PMV BLD AUTO: 10.2 FL (ref 8.6–11.7)
POTASSIUM SERPL-SCNC: 3.9 MMOL/L (ref 3.5–5.5)
PROCALCITONIN SERPL-MCNC: 33.54 NG/ML
RBC # BLD AUTO: 2.93 MILLION/UL (ref 4.3–5.9)
SODIUM SERPL-SCNC: 142 MMOL/L (ref 134–143)
WBC # BLD AUTO: 9.5 THOUSAND/UL (ref 4.8–10.8)

## 2020-12-30 PROCEDURE — 99232 SBSQ HOSP IP/OBS MODERATE 35: CPT | Performed by: INTERNAL MEDICINE

## 2020-12-30 PROCEDURE — 82330 ASSAY OF CALCIUM: CPT | Performed by: INTERNAL MEDICINE

## 2020-12-30 PROCEDURE — 80048 BASIC METABOLIC PNL TOTAL CA: CPT | Performed by: UROLOGY

## 2020-12-30 PROCEDURE — 92610 EVALUATE SWALLOWING FUNCTION: CPT

## 2020-12-30 PROCEDURE — 84145 PROCALCITONIN (PCT): CPT | Performed by: INTERNAL MEDICINE

## 2020-12-30 PROCEDURE — 74018 RADEX ABDOMEN 1 VIEW: CPT

## 2020-12-30 PROCEDURE — 83735 ASSAY OF MAGNESIUM: CPT | Performed by: UROLOGY

## 2020-12-30 PROCEDURE — 85025 COMPLETE CBC W/AUTO DIFF WBC: CPT | Performed by: UROLOGY

## 2020-12-30 RX ADMIN — FINASTERIDE 5 MG: 5 TABLET, FILM COATED ORAL at 09:09

## 2020-12-30 RX ADMIN — FERROUS SULFATE TAB 325 MG (65 MG ELEMENTAL FE) 325 MG: 325 (65 FE) TAB at 09:09

## 2020-12-30 RX ADMIN — HEPARIN SODIUM 5000 UNITS: 5000 INJECTION INTRAVENOUS; SUBCUTANEOUS at 14:50

## 2020-12-30 RX ADMIN — SERTRALINE HYDROCHLORIDE 50 MG: 50 TABLET ORAL at 09:10

## 2020-12-30 RX ADMIN — OXYCODONE HYDROCHLORIDE AND ACETAMINOPHEN 500 MG: 500 TABLET ORAL at 09:09

## 2020-12-30 RX ADMIN — AMPICILLIN SODIUM 2000 MG: 2 INJECTION, POWDER, FOR SOLUTION INTRAMUSCULAR; INTRAVENOUS at 20:36

## 2020-12-30 RX ADMIN — CALCIUM CARBONATE (ANTACID) CHEW TAB 500 MG 500 MG: 500 CHEW TAB at 20:36

## 2020-12-30 RX ADMIN — HEPARIN SODIUM 5000 UNITS: 5000 INJECTION INTRAVENOUS; SUBCUTANEOUS at 23:06

## 2020-12-30 RX ADMIN — PIPERACILLIN SODIUM AND TAZOBACTAM SODIUM 2.25 G: 2; .25 INJECTION, POWDER, LYOPHILIZED, FOR SOLUTION INTRAVENOUS at 14:50

## 2020-12-30 RX ADMIN — Medication 4000 UNITS: at 09:09

## 2020-12-30 RX ADMIN — MORPHINE SULFATE 15 MG: 15 TABLET ORAL at 18:20

## 2020-12-30 RX ADMIN — PIPERACILLIN SODIUM AND TAZOBACTAM SODIUM 2.25 G: 2; .25 INJECTION, POWDER, LYOPHILIZED, FOR SOLUTION INTRAVENOUS at 09:10

## 2020-12-30 RX ADMIN — HEPARIN SODIUM 5000 UNITS: 5000 INJECTION INTRAVENOUS; SUBCUTANEOUS at 05:51

## 2020-12-30 RX ADMIN — PIPERACILLIN SODIUM AND TAZOBACTAM SODIUM 2.25 G: 2; .25 INJECTION, POWDER, LYOPHILIZED, FOR SOLUTION INTRAVENOUS at 04:13

## 2020-12-30 RX ADMIN — GABAPENTIN 400 MG: 400 CAPSULE ORAL at 23:06

## 2020-12-30 RX ADMIN — SODIUM CHLORIDE, SODIUM GLUCONATE, SODIUM ACETATE, POTASSIUM CHLORIDE, MAGNESIUM CHLORIDE, SODIUM PHOSPHATE, DIBASIC, AND POTASSIUM PHOSPHATE 75 ML/HR: .53; .5; .37; .037; .03; .012; .00082 INJECTION, SOLUTION INTRAVENOUS at 20:28

## 2020-12-30 RX ADMIN — CALCIUM CARBONATE (ANTACID) CHEW TAB 500 MG 500 MG: 500 CHEW TAB at 09:09

## 2020-12-30 RX ADMIN — MORPHINE SULFATE 15 MG: 15 TABLET ORAL at 09:09

## 2020-12-30 NOTE — ASSESSMENT & PLAN NOTE
Previously required up to 4 L via nasal cannula now down to 2 L earlier today -> attempt to wean down to baseline room air as tolerated  Secondary to suspected right basilar pneumonia

## 2020-12-30 NOTE — PROGRESS NOTES
[facility] - Progress Note  Patient: Ra Mcneil 66 y o  male MRN: [id number]  Unit/Bed#: -01 Encounter: [id number]  Primary Care Provider: Joel Campbell DO  Date Of Visit: 12/30/20        Assessment & Plan:    * Severe sepsis (evolving)  Assessment & Plan  With tachycardia/tachypnea coupled leukocytosis and lactic acidosis -> resolved  In the setting of Proteus bacteremia/UTI and right basilar pneumonia (see below)  Monitor vital signs and maintain hemodynamics  Procalcitonin downtrending    Gram-negative bacteremia  Assessment & Plan  Blood cultures from 12/27 growing Proteus species -> likely source is urine due to concurrent UTI  Antibiotic regimen tailored to IV Ampicillin  Repeat blood cultures from 12/28 are preliminarily negative    Gram-negative UTI  Assessment & Plan  Urine culture pathogen only growing Proteus mirabilis - lesser colonies of Enterococcus/Alcaligenes species noted  Sensitivities noted -> transition IV Zosyn to IV Ampicillin today  Chronic Diallo catheter presence noted    Pneumonia of right lower lobe  Assessment & Plan  As seen on CXR imaging  Suspect possibly aspiration secondary to vomiting under encephalopathic state  Empiric IV Zosyn transitioned to IV Ampicillin today due to concurrent urine culture polymicrobial growth sensitivities  Aspiration precautions - speech/swallow therapy recommending normal diet  Maintain oxygen - encourage incentive spirometry    Right hydroureteronephrosis with obstructing calculus  Assessment & Plan  S/p retrograde pyelogram with ureteral stenting yesterday - appreciate urology evaluation  PRN pain/emesis control    Acute metabolic encephalopathy  Assessment & Plan  In the setting of sepsis/infections coupled with acute kidney injury  Improved today  Continue clinical monitoring    Acute respiratory failure with hypoxia  Assessment & Plan  Previously required up to 4 L via nasal cannula now down to 2 L earlier today -> attempt to wean down to baseline room air as tolerated  Secondary to suspected right basilar pneumonia    Acute kidney injury - Chronic kidney disease stage 3  Assessment & Plan  Baseline creatinine approximately 1 2-1 3 - currently @ 2 33 from a 2 71 peak  Exacerbated in the setting of obstructive hydroureteronephrosis - s/p ureteral stenting yesterday  Monitor renal function and urine output - limit/avoid nephrotoxins if possible  Nephrology following - on IV fluids    Hypomagnesemia  Assessment & Plan  Monitor/replete serum magnesium - normalized today  Serum potassium normal    Anemia of chronic disease  Assessment & Plan  Monitor H/H  Continue ferrous sulfate supplementation    Thrombocytopenia   Assessment & Plan  Monitor platelet count - monitor for bleeding    Depression  Assessment & Plan  On Zoloft  Denies any suicidal or homicidal ideation      DVT Prophylaxis:  Heparin SC       Patient Centered Rounds:  I have performed bedside rounds and discussed plan of care with nursing today  Discussions with Specialists or Other Care Team Provider:  see above assessments if applicable    Education and Discussions with Family / Patient: Patient at bedside - family has given choices for home health agencies    Time Spent for Care:  32 minutes  More than 50% of total time spent on counseling and coordination of care as described above  Current Length of Stay: 3 day(s)    Current Patient Status: Inpatient   Certification Statement:  Patient will continue to require additional hospital stay due to assessments as noted above  Code Status: Level 1 - Full Code        Subjective:     Seen/examined earlier in the afternoon  Patient resting fairly comfortably in bed  States pain is better controlled today  Denying fever/chills this time  Weakness/fatigue, although improved, persists          Objective:     Vitals:   Temp (24hrs), Av 8 °F (36 6 °C), Min:97 °F (36 1 °C), Max:98 8 °F (37 1 °C)    Temp:  [97 °F (36 1 °C)-98 8 °F (37 1 °C)] 98 8 °F (37 1 °C)  HR:  [52-66] 61  Resp:  [18] 18  BP: (120-122)/(65-72) 122/72  SpO2:  [92 %-93 %] 93 %  Body mass index is 27 12 kg/m²  Input and Output Summary (last 24 hours): Intake/Output Summary (Last 24 hours) at 12/30/2020 1700  Last data filed at 12/30/2020 1455  Gross per 24 hour   Intake 1120 ml   Output 1550 ml   Net -430 ml       Physical Exam:     GENERAL:  Improved weakness/fatigue - no immediate distress at rest currently  HEAD:  Normocephalic - atraumatic  EYES: PERRL - EOMI   MOUTH:  Mucosa moist  NECK:  Supple - full range of motion  CARDIAC:  Rate controlled - S1/S2 positive  PULMONARY:  Improved at the right base - nonlabored respirations at rest  ABDOMEN:  Soft - nontender/nondistended - active bowel sounds  MUSCULOSKELETAL:  Motor strength/range of motion deconditioned  NEUROLOGIC:  Alert/oriented at baseline currently - hard of hearing  SKIN:  Chronic wrinkles/blemishes   PSYCHIATRIC:  Mood/affect pleasant today      Additional Data:     Labs & Recent Cultures:    Results from last 7 days   Lab Units 12/30/20 0445 12/27/20  0521   WBC Thousand/uL 9 50   < > 10 20   HEMOGLOBIN g/dL 9 4*   < > 11 4*   HEMATOCRIT % 28 5*   < > 33 4*   PLATELETS Thousands/uL 111*   < > 168   BANDS PCT %  --   --  9*   NEUTROS PCT % 86*  --   --    LYMPHS PCT % 7*  --   --    LYMPHO PCT %  --   --  5*   MONOS PCT % 8  --   --    MONO PCT %  --   --  1*   EOS PCT % 0  --   --     < > = values in this interval not displayed       Results from last 7 days   Lab Units 12/30/20  0445 12/29/20  0542   POTASSIUM mmol/L 3 9 3 9   CHLORIDE mmol/L 111* 110*   CO2 mmol/L 21 20*   BUN mg/dL 57* 56*   CREATININE mg/dL 2 33* 2 63*   CALCIUM mg/dL 7 9* 7 8*   ALK PHOS U/L  --  84   ALT U/L  --  39   AST U/L  --  53*     Results from last 7 days   Lab Units 12/27/20  0521   INR  1 39*             Results from last 7 days   Lab Units 12/30/20  0445 12/29/20  0542 12/28/20  8320 12/27/20  1226 12/27/20  1009 12/27/20  0725 12/27/20  0521   LACTIC ACID mmol/L  --   --   --  1 9 3 0* 2 6* 4 0*   PROCALCITONIN ng/ml 33 54* 59 24* 91 90*  --   --   --  3 28*         Results from last 7 days   Lab Units 12/28/20  1236 12/28/20  1227 12/27/20  0528 12/27/20  0521   BLOOD CULTURE  No Growth at 48 hrs  No Growth at 48 hrs  --  Proteus mirabilis*  Enterococcus faecalis*  Proteus mirabilis*   GRAM STAIN RESULT   --   --   --  Gram negative rods*  Gram negative rods*   URINE CULTURE   --   --  80,000-89,000 cfu/ml Proteus mirabilis*  50,000-59,000 cfu/ml Enterococcus faecalis*  50,000-59,000 cfu/ml Alcaligenes faecalis*  --          Last 24 Hours Medication List:   Current Facility-Administered Medications   Medication Dose Route Frequency Provider Last Rate    acetaminophen  650 mg Oral Q4H PRN Maddy Mariscal MD      ampicillin  2,000 mg Intravenous Q6H Nevin uDran MD      ascorbic acid  500 mg Oral Daily Maddy Mariscal MD      calcium carbonate  500 mg Oral BID Maddy Mairscal MD      cholecalciferol  4,000 Units Oral Daily Maddy Mariscal MD      diphenhydrAMINE  50 mg Oral HS PRN Maddy Mariscal MD      ferrous sulfate  325 mg Oral Daily With Breakfast Maddy Mariscal MD      finasteride  5 mg Oral Daily Maddy Mariscal MD      gabapentin  400 mg Oral HS Maddy Mariscal MD      heparin (porcine)  5,000 Units Subcutaneous [facility] Maddy Mariscal MD      morphine  15 mg Oral BID Maddy Mariscal MD      multi-electrolyte  75 mL/hr Intravenous Continuous Bearblossom Rangel MD 75 mL/hr (12/29/20 2311)    ondansetron  4 mg Intravenous Q6H PRN Maddy Mariscal MD      sertraline  50 mg Oral Daily Maddy Mariscal MD                  ** Please Note: This note is constructed using a voice recognition dictation system  An occasional wrong word/phrase or sound-a-like substitution may have been picked up by dictation device due to the inherent limitations of voice recognition software    Read the chart carefully and recognize, using reasonable context, where substitutions may have occurred  **

## 2020-12-30 NOTE — PHYSICAL THERAPY NOTE
Physical Therapy Cancellation Note    Chart review performed  At this time, PT treatment session cancelled as patient is off the floor for testing  PT will follow and provide PT interventions as appropriate         12/30/20 0940   PT Last Visit   PT Visit Date 12/30/20   Note Type   Cancel Reasons Patient off floor/test     Siomara Cameron, PT

## 2020-12-30 NOTE — ASSESSMENT & PLAN NOTE
As seen on CXR imaging  Suspect possibly aspiration secondary to vomiting under encephalopathic state  Empiric IV Zosyn transitioned to IV Ampicillin today due to concurrent urine culture polymicrobial growth sensitivities  Aspiration precautions - speech/swallow therapy recommending normal diet  Maintain oxygen - encourage incentive spirometry

## 2020-12-30 NOTE — ASSESSMENT & PLAN NOTE
With tachycardia/tachypnea coupled leukocytosis and lactic acidosis -> resolved  In the setting of Proteus bacteremia/UTI and right basilar pneumonia (see below)  Monitor vital signs and maintain hemodynamics  Procalcitonin downtrending

## 2020-12-30 NOTE — ASSESSMENT & PLAN NOTE
Urine culture pathogen only growing Proteus mirabilis - lesser colonies of Enterococcus/Alcaligenes species noted  Sensitivities noted -> transition IV Zosyn to IV Ampicillin today  Chronic Diallo catheter presence noted

## 2020-12-30 NOTE — NURSING NOTE
Patient washed at bedside  Patient oriented x4  Denies pain, offers no complaints  Denies SOB  Tolerated PO meds well, eating breakfast, Speech in to see patient  Call bell and belongings within reach, will continue to monitor

## 2020-12-30 NOTE — ASSESSMENT & PLAN NOTE
Blood cultures from 12/27 growing Proteus species -> likely source is urine due to concurrent UTI  Antibiotic regimen tailored to IV Ampicillin  Repeat blood cultures from 12/28 are preliminarily negative

## 2020-12-30 NOTE — OCCUPATIONAL THERAPY NOTE
12/30/20 0945   OT Last Visit   OT Visit Date 12/30/20   Note Type   Cancel Reasons Patient off floor/test     Shade Kehr, COTA/SAVAGE

## 2020-12-30 NOTE — ASSESSMENT & PLAN NOTE
Baseline creatinine approximately 1 2-1 3 - currently @ 2 33 from a 2 71 peak  Exacerbated in the setting of obstructive hydroureteronephrosis - s/p ureteral stenting yesterday  Monitor renal function and urine output - limit/avoid nephrotoxins if possible  Nephrology following - on IV fluids

## 2020-12-30 NOTE — ASSESSMENT & PLAN NOTE
S/p retrograde pyelogram with ureteral stenting yesterday - appreciate urology evaluation  PRN pain/emesis control

## 2020-12-30 NOTE — PROGRESS NOTES
Progress Note - Nephrology   Valorie Calhoun 66 y o  male MRN: [id number]  Unit/Bed#: -01 Encounter: [id number]    A/P:  1  Acute kidney injury  Admitted with a creatinine of 2 47 which dominic to 2 71mg/dl  Found to have a right UPJ obstruction by a stone and underwent a right ureteral stent  Has proteus bacteremia from a urinary source  Had worsening kidney function with infection and obstruction but has improved today to a creatinine of 2 33 mg/dl  Receiving Isolyte at 75 ml/hr and Zosyn dose adjusted for renal dysfunction  Continue current treatment with daily studies  2  Chronic kidney disease stage 3A  Baseline creatinine 1 2-1 3 mg/dl   3  Acute metabolic encephalopathy  Resolved - was due to sepsis/bacteremia  4  Hypocalcemia  Responding to Tums on empty stomach  5  AGMA  Improved      Follow up reason for today's visit: Acute kidney injury    Severe sepsis [facility])    Patient Active Problem List   Diagnosis    Chronic indwelling Cantor catheter    Severe sepsis (evolving)    Acute kidney injury - Chronic kidney disease stage 3    Acute metabolic encephalopathy    Gram-negative UTI    Chronic pain syndrome    Elevated troponin    CKD (chronic kidney disease)    Anemia of chronic disease    Depression    Hypomagnesemia    Bilateral hand pain    Acute respiratory failure with hypoxia    Right hydroureteronephrosis with obstructing calculus    Gram-negative bacteremia    Pneumonia of right lower lobe    Thrombocytopenia          Subjective:   Very [place]> Specifically he denies dizziness, chest pain, dyspnea, abdominal pain , NVD  Has a cantor    Objective:     Vitals: Blood pressure 121/66, pulse (!) 52, temperature (!) 97 °F (36 1 °C), temperature source Temporal, resp  rate 18, height 5' 9" (1 753 m), weight 83 3 kg (183 lb 10 3 oz), SpO2 93 %  ,Body mass index is 27 12 kg/m²      Weight (last 2 days)     None            Intake/Output Summary (Last 24 hours) at 12/30/2020 1014  Last data filed at 12/30/2020 0501  Gross per 24 hour   Intake 1520 ml   Output 1190 ml   Net 330 ml     I/O last 3 completed shifts: In: 1520 [P O :120; I V :1400]  Out: 1415 [Urine:1415]    Urethral Catheter Latex 18 Fr  (Active)   Reasons to continue Urinary Catheter  Chronic urinary catheter 12/30/20 0501   Site Assessment Clean;Skin intact 12/30/20 0501   Collection Container Standard drainage bag 12/30/20 0501   Securement Method Securing device (Describe) 12/30/20 0501   Output (mL) 900 mL 12/30/20 0501       Physical Exam: /66 (BP Location: Left arm)   Pulse (!) 52   Temp (!) 97 °F (36 1 °C) (Temporal)   Resp 18   Ht 5' 9" (1 753 m)   Wt 83 3 kg (183 lb 10 3 oz)   SpO2 93%   BMI 27 12 kg/m²     General Appearance:    Alert, cooperative, no distress, appears stated age   Head:    Normocephalic, without obvious abnormality, atraumatic   Eyes:    Conjunctiva/corneas clear   Ears:    Normal external ears Very hard of hearing   Nose:   Nares normal, septum midline, mucosa normal, no drainage    or sinus tenderness   Throat:   Lips, mucosa, and tongue normal; teeth and gums normal   Neck:   Supple, symmetrical, trachea midline, no adenopathy;        thyroid:  No enlargement/tenderness/nodules; no carotid    bruit or JVD   Back:     Symmetric, no curvature, ROM normal, no CVA tenderness   Lungs:     Decr to auscultation bilaterally, respirations unlabored   Chest wall:    No tenderness or deformity   Heart:    Regular rate and rhythm, S1 and S2 normal, no murmur, rub   or gallop   Abdomen:     Soft, non-tender, bowel sounds active   Extremities:   Extremities normal, atraumatic, no cyanosis or edema   Skin:   Skin color, texture, turgor normal, no rashes or lesions   Lymph nodes:   Cervical normal   Neurologic:   CNII-XII intact            Lab, Imaging and other studies: I have personally reviewed pertinent labs    CBC:   Lab Results   Component Value Date    WBC 9 50 12/30/2020    HGB 9 4 (L) 12/30/2020    HCT 28 5 (L) 12/30/2020    MCV 97 12/30/2020     (L) 12/30/2020    MCH 32 2 12/30/2020    MCHC 33 1 12/30/2020    RDW 13 6 12/30/2020    MPV 10 2 12/30/2020     CMP:   Lab Results   Component Value Date    K 3 9 12/30/2020     (H) 12/30/2020    CO2 21 12/30/2020    BUN 57 (H) 12/30/2020    CREATININE 2 33 (H) 12/30/2020    CALCIUM 7 9 (L) 12/30/2020    EGFR 26 12/30/2020         Results from last 7 days   Lab Units 12/30/20  0445 12/29/20  0542 12/28/20  0521 12/27/20  0523   POTASSIUM mmol/L 3 9 3 9 3 6 3 5   CHLORIDE mmol/L 111* 110* 109* 108*   CO2 mmol/L 21 20* 20* 19*   BUN mg/dL 57* 56* 55* 52*   CREATININE mg/dL 2 33* 2 63* 2 71* 2 47*   CALCIUM mg/dL 7 9* 7 8* 7 6* 8 7   ALK PHOS U/L  --  84  --  67   ALT U/L  --  39  --  31   AST U/L  --  53*  --  36         Phosphorus: No results found for: PHOS  Magnesium:   Lab Results   Component Value Date    MG 2 2 12/30/2020     Urinalysis: No results found for: COLORU, CLARITYU, SPECGRAV, PHUR, LEUKOCYTESUR, NITRITE, PROTEINUA, GLUCOSEU, KETONESU, BILIRUBINUR, BLOODU  Ionized Calcium: No results found for: CAION  Coagulation: No results found for: PT, INR, APTT  Troponin: No results found for: TROPONINI  ABG: No results found for: PHART, VZJ4INA, PO2ART, UDP6YBK, F4VPTQTW, BEART, SOURCE  Radiology review:     IMAGING  Procedure: Ct Abdomen Pelvis Wo Contrast    Result Date: 12/28/2020  Narrative: CT ABDOMEN AND PELVIS WITHOUT IV CONTRAST INDICATION:   Sepsis Acute kidney injury, sepsis  COMPARISON:  8/26/2019  TECHNIQUE:  CT examination of the abdomen and pelvis was performed without intravenous contrast   Axial, sagittal, and coronal 2D reformatted images were created from the source data and submitted for interpretation  Radiation dose length product (DLP) for this visit:  607 4 mGy-cm     This examination, like all CT scans performed in the [place], was performed utilizing techniques to minimize radiation dose exposure, including the use of iterative reconstruction and automated exposure control  Enteric contrast was administered  FINDINGS: ABDOMEN LOWER CHEST:  Right lower lobe opacity is seen suggesting pneumonia  LIVER/BILIARY TREE:  Unremarkable  GALLBLADDER:  No calcified gallstones  No pericholecystic inflammatory change  SPLEEN:  Unremarkable  PANCREAS:  Unremarkable  ADRENAL GLANDS:  Unremarkable  KIDNEYS/URETERS:  8 mm right UPJ calculus is seen with mild right hydronephrosis  7 mm nonobstructing right renal calculus is present  Left kidney is unremarkable  STOMACH AND BOWEL:  Unremarkable  APPENDIX:  No findings to suggest appendicitis  ABDOMINOPELVIC CAVITY:  No ascites  No pneumoperitoneum  No lymphadenopathy  VESSELS:  Unremarkable for patient's age  PELVIS REPRODUCTIVE ORGANS:  Unremarkable for patient's age  URINARY BLADDER:  Diallo catheter is present  ABDOMINAL WALL/INGUINAL REGIONS:  Unremarkable  OSSEOUS STRUCTURES:  No acute fracture or destructive osseous lesion  Impression: 8 mm right UPJ calculus with mild right hydronephrosis  Right lower lobe opacity likely representing pneumonia  Workstation performed: [id number]     Procedure: Xr Chest Portable    Result Date: 12/29/2020  Narrative: CHEST INDICATION:   sob  Covid, influenza A and B, and RSV PCR negative on 12/27/2020  COMPARISON:  Chest radiograph from 12/27/2020 and abdomen CT from 12/28/2020  EXAM PERFORMED/VIEWS:  XR CHEST PORTABLE FINDINGS: Cardiomediastinal silhouette appears unremarkable  Right lower lobe pneumonia  No effusion or pneumothorax  Bilateral shoulder arthroplasty  Impression: Right lower lobe pneumonia, corresponding with the abdomen CT from 12/28/2020  Workstation performed: [id number]     Procedure: Xr Urethrocystogram Retrograde    Result Date: 12/30/2020  Narrative: C-ARM - INDICATION: stent placement  Procedure guidance   COMPARISON:  None TECHNIQUE: FLUOROSCOPY TIME:   1 2 min 9 FLUOROSCOPIC IMAGES FINDINGS: Fluoroscopic guidance provided for surgical procedure  Osseous and soft tissue detail limited by technique  Impression: Fluoroscopic guidance provided for surgical procedure  Please refer to the separate procedure notes for additional details  Workstation performed: [id number]     Procedure: Us Kidney And Bladder    Result Date: 12/29/2020  Narrative: RENAL ULTRASOUND INDICATION:   yann  COMPARISON: CT 12/28/2020 TECHNIQUE:   Ultrasound of the retroperitoneum was performed with a curvilinear transducer utilizing volumetric sweeps and still imaging techniques  FINDINGS: KIDNEYS: Symmetric and normal size  Right kidney:  12 2 x 6 7 x 6 2 cm  Left kidney:  11 9 x 5 4 x 4 7 cm  Right kidney Normal echogenicity and contour  No suspicious masses detected  Mild right hydronephrosis is noted  14 x 7 mm right UVJ calculus is seen  No perinephric fluid collections  Left kidney Normal echogenicity and contour  No suspicious masses detected  1 7 cm simple left renal cyst is noted  No hydronephrosis  No shadowing calculi  No perinephric fluid collections  URETERS: Nonvisualized  BLADDER: Normally distended  No focal thickening or mass lesions  Bilateral ureteral jets detected  Impression: 14 x 7 mm right UPJ calculus with mild right hydronephrosis   Workstation performed: [id number]       Current Facility-Administered Medications   Medication Dose Route Frequency    acetaminophen (TYLENOL) tablet 650 mg  650 mg Oral Q4H PRN    ascorbic acid (VITAMIN C) tablet 500 mg  500 mg Oral Daily    calcium carbonate (TUMS) chewable tablet 500 mg  500 mg Oral BID    cholecalciferol (VITAMIN D3) tablet 4,000 Units  4,000 Units Oral Daily    diphenhydrAMINE (BENADRYL) tablet 50 mg  50 mg Oral HS PRN    ferrous sulfate tablet 325 mg  325 mg Oral Daily With Breakfast    finasteride (PROSCAR) tablet 5 mg  5 mg Oral Daily    gabapentin (NEURONTIN) capsule 400 mg  400 mg Oral HS    heparin (porcine) subcutaneous injection 5,000 Units  5,000 Units Subcutaneous Q8H [address]    morphine (MSIR) IR tablet 15 mg  15 mg Oral BID    multi-electrolyte (PLASMALYTE-A/ISOLYTE-S PH 7 4) IV solution  75 mL/hr Intravenous Continuous    ondansetron (ZOFRAN) injection 4 mg  4 mg Intravenous Q6H PRN    piperacillin-tazobactam (ZOSYN) 2 25 g in sodium chloride 0 9 % 50 mL IVPB  2 25 g Intravenous Q6H    sertraline (ZOLOFT) tablet 50 mg  50 mg Oral Daily     Medications Discontinued During This Encounter   Medication Reason    sodium chloride 0 9 % bolus 1,200 mL     sodium chloride 0 9 % bolus 1,000 mL     sodium chloride 0 9 % bolus 1,000 mL     sodium chloride 0 9 % bolus 1,000 mL     cefTRIAXone (ROCEPHIN) IVPB (premix in dextrose) 1,000 mg 50 mL     piperacillin-tazobactam (ZOSYN) 3 375 g in sodium chloride 0 9 % 100 mL IVPB     sodium chloride 0 9 % bolus 500 mL     sodium chloride 0 9 % infusion     sodium chloride 0 9 % infusion Patient Discharge    sterile water irrigation solution Patient Discharge    iohexol (OMNIPAQUE) 300 mg/mL injection Patient Discharge    fentaNYL (SUBLIMAZE) injection 25 mcg Patient Transfer    ondansetron (ZOFRAN) injection 4 mg Patient Transfer       Sudarshan Ruiz MD      This progress note was produced in part using a dictation device which may document imprecise wording from author's original intent

## 2020-12-30 NOTE — CASE MANAGEMENT
Spoke to the pt at the bedside about SNF choices  Pt states if he does not get into ARU he wants to go home  Pt also did not want [facility] servcies  TC to pt daughter Tiffanie Luna who states they do not want him to go to any SNF  Daughter states if he does not get into ARU she would like him to come home with [facility] as he used them in the past   Pt son would be in the home with him during the day  Family will transport home  A referral was made to [facility]

## 2020-12-30 NOTE — PLAN OF CARE
Problem: Potential for Falls  Goal: Patient will remain free of falls  Description: INTERVENTIONS:  - Assess patient frequently for physical needs  -  Identify cognitive and physical deficits and behaviors that affect risk of falls    -  [place] fall precautions as indicated by assessment   - Educate patient/family on patient safety including physical limitations  - Instruct patient to call for assistance with activity based on assessment  - Modify environment to reduce risk of injury  - Consider OT/PT consult to assist with strengthening/mobility  Outcome: Progressing     Problem: NEUROSENSORY - ADULT  Goal: Achieves stable or improved neurological status  Description: INTERVENTIONS  - Monitor and report changes in neurological status  - Monitor vital signs such as temperature, blood pressure, glucose, and any other labs ordered   - Initiate measures to prevent increased intracranial pressure  - Monitor for seizure activity and implement precautions if appropriate      Outcome: Progressing     Problem: GENITOURINARY - ADULT  Goal: Maintains or returns to baseline urinary function  Description: INTERVENTIONS:  - Assess urinary function  - Encourage oral fluids to ensure adequate hydration if ordered  - Administer IV fluids as ordered to ensure adequate hydration  - Administer ordered medications as needed  - Offer frequent toileting  - Follow urinary retention protocol if ordered  Outcome: Progressing  Goal: Absence of urinary retention  Description: INTERVENTIONS:  - Assess patients ability to void and empty bladder  - Monitor I/O  - Bladder scan as needed  - Discuss with physician/AP medications to alleviate retention as needed  - Discuss catheterization for long term situations as appropriate  Outcome: Progressing  Goal: Urinary catheter remains patent  Description: INTERVENTIONS:  - Assess patency of urinary catheter  - If patient has a chronic cantor, consider changing catheter if non-functioning  - Follow guidelines for intermittent irrigation of non-functioning urinary catheter  Outcome: Progressing     Problem: METABOLIC, FLUID AND ELECTROLYTES - ADULT  Goal: Electrolytes maintained within normal limits  Description: INTERVENTIONS:  - Monitor labs and assess patient for signs and symptoms of electrolyte imbalances  - Administer electrolyte replacement as ordered  - Monitor response to electrolyte replacements, including repeat lab results as appropriate  - Instruct patient on fluid and nutrition as appropriate  Outcome: Progressing

## 2020-12-30 NOTE — ASSESSMENT & PLAN NOTE
In the setting of sepsis/infections coupled with acute kidney injury  Improved today  Continue clinical monitoring

## 2020-12-30 NOTE — SPEECH THERAPY NOTE
Speech-Language Pathology Bedside Swallow Evaluation    Patient Name: Johanna IBARRA Date: 12/30/2020     Problem List  Principal Problem:    Severe sepsis (evolving)  Active Problems:    Chronic indwelling Diallo catheter    Acute kidney injury - Chronic kidney disease stage 3    Acute metabolic encephalopathy    Gram-negative UTI    Chronic pain syndrome    Anemia of chronic disease    Depression    Hypomagnesemia    Acute respiratory failure with hypoxia    Right hydroureteronephrosis with obstructing calculus    Gram-negative bacteremia    Pneumonia of right lower lobe    Thrombocytopenia       Past Medical History  Past Medical History:   Diagnosis Date    Depression     Diallo catheter in place     Hypertension     Prostate enlargement     Psychiatric disorder     Urinary tract infection        Past Surgical History  Past Surgical History:   Procedure Laterality Date    ARTHROSCOPY KNEE      BACK SURGERY      L 4 or 5    CATARACT EXTRACTION Bilateral     CYSTOSCOPY W/ LASER LITHOTRIPSY N/A 8/20/2018    Procedure: LITHOTRISPY HOLMIUM LASER of bladder stones;  Surgeon: Ivanna Chakraborty MD;  Location: [facility] MAIN OR;  Service: Urology    SC CYSTOURETHROSCOPY,URETER CATHETER Right 12/29/2020    Procedure: CYSTOSCOPY RETROGRADE PYELOGRAM WITH INSERTION STENT URETERAL;  Surgeon: Ivanna Chakraborty MD;  Location: [facility] MAIN OR;  Service: Urology    SC TRANSURETHRAL ELEC-SURG PROSTATECTOM N/A 8/20/2018    Procedure: Vivien Galvez; TURP;  Surgeon: Ivanna Chakraborty MD;  Location: [facility] MAIN OR;  Service: Urology    SHOULDER SURGERY Right 05/31/2019       Summary  Pt presented with functional appearing oral and pharyngeal stage swallowing skills with materials administered today  Pt has full dentures which fit well  Trials of thin liquid, puree, soft and regular solids were tolerated without s/s aspiration      Risk/s for Aspiration: low    Recommended Diet: regular diet and thin liquids   Recommended Form of Meds: whole with liquid   Aspiration precautions and swallowing strategies: upright posture      Current Medical Status per MARIAN Zuleta's H&P 12/27/2020  Anabela Wolff is a 66 y o  male who presents with altered mental status  Patient with past medical history of hypertension, depression, chronic pain with continuous opioid dependence, BPH with chronic Diallo and recurrent UTI was brought to the ER secondary to altered mental status, vomiting, issues with the Diallo with cloudy, decreased urinary output  They also noted having chills and rigors on 12/26 these are all similar issues when he had sepsis with his UTI  Was concern for aspiration after vomiting he was hypoxic in the ER  Yesterday, he told daughter was tired  He woke up had breakfast and supper, went to bed early because tired  Daughter checked on him, early this morning he was moaning, she noted nothing in his catheter bag  He vomited  He get changed every 2 weeks w/ Dr Seldon Collet  He was shaking and she could not get temperature  He normally active, drives, ambulates around house ok, uses cane when out and functions independently  Current Precautions:  Fall     Special Studies:  CXR 12/28/2020 FINDINGS:  Cardiomediastinal silhouette appears unremarkable  Right lower lobe pneumonia  No effusion or pneumothorax  Bilateral shoulder arthroplasty  Social/Education/Vocational Hx:  Pt lives with family    Swallow Information   Current Risks for Dysphagia & Aspiration: R lobe PNA  Current Diet: regular diet and thin liquids   Baseline Diet: reg/thin, NPO yesterday for OR      Baseline Assessment   Behavior/Cognition: alert  Speech/Language Status: able to participate in basic conversation, able to follow commands and very [place]  Patient Positioning: upright in bed  Pain Status/Interventions/Response to Interventions: No report of or nonverbal indications of pain         Swallow Mechanism Exam  Facial: symmetrical  Labial: WFL  Lingual: WFL  Velum: symmetrical  Mandible: adequate ROM  Dentition: full dentures  Vocal quality:clear/adequate   Volitional Cough: strong/productive       Consistencies Assessed and Performance   Consistencies Administered: thin liquids, puree, soft solids and hard solids    Oral Stage: WFL  Mastication was adequate with the materials administered today  Bolus formation and transfer were functional with no significant oral residue noted  No overt s/s reduced oral control  Pharyngeal Stage: WFL  Swallow Mechanics: Swallowing initiation appeared prompt  Laryngeal rise was palpated and judged to be within functional limits  No coughing, throat clearing, change in vocal quality or respiratory status noted today  Esophageal Concerns: none reported       Summary and Recommendations (see above)    Results Reviewed with: patient and RN     Treatment Recommended: No additional ST f/u needed at this time

## 2020-12-31 LAB
ANION GAP SERPL CALCULATED.3IONS-SCNC: 8 MMOL/L (ref 4–13)
BASOPHILS # BLD AUTO: 0 THOUSANDS/ΜL (ref 0–0.1)
BASOPHILS NFR BLD AUTO: 0 % (ref 0–2)
BUN SERPL-MCNC: 54 MG/DL (ref 7–25)
CALCIUM SERPL-MCNC: 7.8 MG/DL (ref 8.6–10.5)
CHLORIDE SERPL-SCNC: 111 MMOL/L (ref 98–107)
CO2 SERPL-SCNC: 23 MMOL/L (ref 21–31)
CREAT SERPL-MCNC: 1.96 MG/DL (ref 0.7–1.3)
EOSINOPHIL # BLD AUTO: 0 THOUSAND/ΜL (ref 0–0.61)
EOSINOPHIL NFR BLD AUTO: 0 % (ref 0–5)
ERYTHROCYTE [DISTWIDTH] IN BLOOD BY AUTOMATED COUNT: 13.5 % (ref 11.5–14.5)
GFR SERPL CREATININE-BSD FRML MDRD: 32 ML/MIN/1.73SQ M
GLUCOSE SERPL-MCNC: 102 MG/DL (ref 65–99)
HCT VFR BLD AUTO: 28.2 % (ref 42–47)
HGB BLD-MCNC: 9.5 G/DL (ref 14–18)
LYMPHOCYTES # BLD AUTO: 1 THOUSANDS/ΜL (ref 0.6–4.47)
LYMPHOCYTES NFR BLD AUTO: 12 % (ref 21–51)
MAGNESIUM SERPL-MCNC: 2 MG/DL (ref 1.9–2.7)
MCH RBC QN AUTO: 32.6 PG (ref 26–34)
MCHC RBC AUTO-ENTMCNC: 33.6 G/DL (ref 31–37)
MCV RBC AUTO: 97 FL (ref 81–99)
MONOCYTES # BLD AUTO: 1 THOUSAND/ΜL (ref 0.17–1.22)
MONOCYTES NFR BLD AUTO: 12 % (ref 2–12)
NEUTROPHILS # BLD AUTO: 6.5 THOUSANDS/ΜL (ref 1.4–6.5)
NEUTS SEG NFR BLD AUTO: 76 % (ref 42–75)
PLATELET # BLD AUTO: 135 THOUSANDS/UL (ref 149–390)
PMV BLD AUTO: 9.5 FL (ref 8.6–11.7)
POTASSIUM SERPL-SCNC: 3.7 MMOL/L (ref 3.5–5.5)
RBC # BLD AUTO: 2.91 MILLION/UL (ref 4.3–5.9)
SODIUM SERPL-SCNC: 142 MMOL/L (ref 134–143)
WBC # BLD AUTO: 8.5 THOUSAND/UL (ref 4.8–10.8)

## 2020-12-31 PROCEDURE — 97530 THERAPEUTIC ACTIVITIES: CPT

## 2020-12-31 PROCEDURE — 85025 COMPLETE CBC W/AUTO DIFF WBC: CPT | Performed by: UROLOGY

## 2020-12-31 PROCEDURE — 80048 BASIC METABOLIC PNL TOTAL CA: CPT | Performed by: UROLOGY

## 2020-12-31 PROCEDURE — 83735 ASSAY OF MAGNESIUM: CPT | Performed by: UROLOGY

## 2020-12-31 PROCEDURE — 99232 SBSQ HOSP IP/OBS MODERATE 35: CPT | Performed by: INTERNAL MEDICINE

## 2020-12-31 PROCEDURE — 97535 SELF CARE MNGMENT TRAINING: CPT

## 2020-12-31 PROCEDURE — 97110 THERAPEUTIC EXERCISES: CPT

## 2020-12-31 PROCEDURE — 99233 SBSQ HOSP IP/OBS HIGH 50: CPT | Performed by: FAMILY MEDICINE

## 2020-12-31 RX ADMIN — Medication 4000 UNITS: at 08:19

## 2020-12-31 RX ADMIN — AMPICILLIN SODIUM 2000 MG: 2 INJECTION, POWDER, FOR SOLUTION INTRAMUSCULAR; INTRAVENOUS at 17:38

## 2020-12-31 RX ADMIN — HEPARIN SODIUM 5000 UNITS: 5000 INJECTION INTRAVENOUS; SUBCUTANEOUS at 15:53

## 2020-12-31 RX ADMIN — HEPARIN SODIUM 5000 UNITS: 5000 INJECTION INTRAVENOUS; SUBCUTANEOUS at 21:36

## 2020-12-31 RX ADMIN — OXYCODONE HYDROCHLORIDE AND ACETAMINOPHEN 500 MG: 500 TABLET ORAL at 08:19

## 2020-12-31 RX ADMIN — AMPICILLIN SODIUM 2000 MG: 2 INJECTION, POWDER, FOR SOLUTION INTRAMUSCULAR; INTRAVENOUS at 03:00

## 2020-12-31 RX ADMIN — FERROUS SULFATE TAB 325 MG (65 MG ELEMENTAL FE) 325 MG: 325 (65 FE) TAB at 08:19

## 2020-12-31 RX ADMIN — CALCIUM CARBONATE (ANTACID) CHEW TAB 500 MG 500 MG: 500 CHEW TAB at 08:19

## 2020-12-31 RX ADMIN — SERTRALINE HYDROCHLORIDE 50 MG: 50 TABLET ORAL at 08:19

## 2020-12-31 RX ADMIN — AMPICILLIN SODIUM 2000 MG: 2 INJECTION, POWDER, FOR SOLUTION INTRAMUSCULAR; INTRAVENOUS at 10:52

## 2020-12-31 RX ADMIN — GABAPENTIN 400 MG: 400 CAPSULE ORAL at 21:35

## 2020-12-31 RX ADMIN — MORPHINE SULFATE 15 MG: 15 TABLET ORAL at 17:39

## 2020-12-31 RX ADMIN — FINASTERIDE 5 MG: 5 TABLET, FILM COATED ORAL at 08:19

## 2020-12-31 RX ADMIN — MORPHINE SULFATE 15 MG: 15 TABLET ORAL at 08:19

## 2020-12-31 RX ADMIN — CALCIUM CARBONATE (ANTACID) CHEW TAB 500 MG 500 MG: 500 CHEW TAB at 21:35

## 2020-12-31 RX ADMIN — HEPARIN SODIUM 5000 UNITS: 5000 INJECTION INTRAVENOUS; SUBCUTANEOUS at 05:14

## 2020-12-31 NOTE — ASSESSMENT & PLAN NOTE
Baseline creatinine approximately 1 2-1 3 - currently @ 1 96 from a 2 71 peak  Exacerbated in the setting of obstructive hydroureteronephrosis - s/p ureteral stenting on 12/30/2020  Monitor renal function and urine output - limit/avoid nephrotoxins if possible  Nephrology following - on IV fluids

## 2020-12-31 NOTE — ASSESSMENT & PLAN NOTE
Urine culture pathogen only growing Proteus mirabilis - lesser colonies of Enterococcus/Alcaligenes species noted  Sensitivities noted -> transitioned IV Zosyn to IV Ampicillin on 12/30/2020  Chronic Diallo catheter presence noted

## 2020-12-31 NOTE — PROGRESS NOTES
Progress Note - Nephrology   [facility] Matt 66 y o  male MRN: [id number]  Unit/Bed#: -01 Encounter: [id number]    A/P:  1  Acute kidney injury on top of chronic kidney disease   Continues to improve with medical management and status post right ureteral stent placement to relieve ureteropelvic junction obstruction  Continue avoid nephrotoxins  2  Chronic kidney disease stage 3 with baseline creatinine between 1 2-1 3 mg/dL  3  Hypocalcemia   Continue calcium supplementation, may discontinue supplements once calcium is okay  4  Gram-negative urinary tract infection with bacteremia   Continue treatment according hospitalist  5  Severe sepsis-appears significantly improved   Continue antibiotics according to hospitalist, patient is status post UPJ obstruction relief with ureteral stent  6  Right ureteropelvic junction obstruction   Status post relief via right ureteral stent placement by IR urology colleagues  Follow up reason for today's visit:  Acute kidney injury/chronic kidney disease/hypocalcemia    Severe sepsis [facility])    Patient Active Problem List   Diagnosis    Chronic indwelling Diallo catheter    Severe sepsis (evolving)    Acute kidney injury - Chronic kidney disease stage 3    Acute metabolic encephalopathy    Gram-negative UTI    Chronic pain syndrome    Elevated troponin    CKD (chronic kidney disease)    Anemia of chronic disease    Depression    Hypomagnesemia    Bilateral hand pain    Acute respiratory failure with hypoxia    Right hydroureteronephrosis with obstructing calculus    Gram-negative bacteremia    Pneumonia of right lower lobe    Thrombocytopenia          Subjective:   No acute events overnight    Objective:     Vitals: Blood pressure 153/70, pulse 66, temperature 98 6 °F (37 °C), temperature source Tympanic, resp  rate 18, height 5' 9" (1 753 m), weight 83 3 kg (183 lb 10 3 oz), SpO2 95 %  ,Body mass index is 27 12 kg/m²      Weight (last 2 days)     None Intake/Output Summary (Last 24 hours) at 12/31/2020 1254  Last data filed at 12/31/2020 1206  Gross per 24 hour   Intake 2620 ml   Output 2700 ml   Net -80 ml     I/O last 3 completed shifts: In: 2200 [P O :200; I V :2000]  Out: 3600 [Urine:3600]    Urethral Catheter Latex 18 Fr  (Active)   Reasons to continue Urinary Catheter  Chronic urinary catheter 12/30/20 0501   Site Assessment Clean;Skin intact 12/30/20 0501   Collection Container Standard drainage bag 12/30/20 0501   Securement Method Securing device (Describe) 12/30/20 0501   Output (mL) 750 mL 12/31/20 0554       Physical Exam: /70 (BP Location: Right arm)   Pulse 66   Temp 98 6 °F (37 °C) (Tympanic)   Resp 18   Ht 5' 9" (1 753 m)   Wt 83 3 kg (183 lb 10 3 oz)   SpO2 95%   BMI 27 12 kg/m²     General Appearance:    Alert, cooperative, no distress, appears stated age   Head:    Normocephalic, without obvious abnormality, atraumatic   Eyes:    Conjunctiva/corneas clear   Ears:    Normal external ears   Nose:   Nares normal, septum midline, mucosa normal, no drainage    or sinus tenderness   Throat:   Lips, mucosa, and tongue normal; teeth and gums normal   Neck:   Supple   Back:     Symmetric, no curvature, ROM normal, no CVA tenderness   Lungs:     Clear to auscultation bilaterally, respirations unlabored   Chest wall:    No tenderness or deformity   Heart:    Regular rate and rhythm, S1 and S2 normal, no murmur, rub   or gallop   Abdomen:     Soft, non-tender, bowel sounds active   Extremities:   Extremities normal, atraumatic, no cyanosis, mild bilateral lower extremity edema   Skin:   Skin color, texture, turgor normal, no rashes or lesions   Lymph nodes:   Cervical normal   Neurologic:   CNII-XII intact            Lab, Imaging and other studies: I have personally reviewed pertinent labs    CBC:   Lab Results   Component Value Date    WBC 8 50 12/31/2020    HGB 9 5 (L) 12/31/2020    HCT 28 2 (L) 12/31/2020    MCV 97 12/31/2020    PLT 135 (L) 12/31/2020    MCH 32 6 12/31/2020    MCHC 33 6 12/31/2020    RDW 13 5 12/31/2020    MPV 9 5 12/31/2020     CMP:   Lab Results   Component Value Date    K 3 7 12/31/2020     (H) 12/31/2020    CO2 23 12/31/2020    BUN 54 (H) 12/31/2020    CREATININE 1 96 (H) 12/31/2020    CALCIUM 7 8 (L) 12/31/2020    EGFR 32 12/31/2020         Results from last 7 days   Lab Units 12/31/20  0548 12/30/20  0445 12/29/20  0542  12/27/20  0523   POTASSIUM mmol/L 3 7 3 9 3 9   < > 3 5   CHLORIDE mmol/L 111* 111* 110*   < > 108*   CO2 mmol/L 23 21 20*   < > 19*   BUN mg/dL 54* 57* 56*   < > 52*   CREATININE mg/dL 1 96* 2 33* 2 63*   < > 2 47*   CALCIUM mg/dL 7 8* 7 9* 7 8*   < > 8 7   ALK PHOS U/L  --   --  84  --  67   ALT U/L  --   --  39  --  31   AST U/L  --   --  53*  --  36    < > = values in this interval not displayed  Phosphorus: No results found for: PHOS  Magnesium:   Lab Results   Component Value Date    MG 2 0 12/31/2020     Urinalysis: No results found for: Graydon Nabeel, SPECGRAV, PHUR, LEUKOCYTESUR, NITRITE, PROTEINUA, GLUCOSEU, KETONESU, BILIRUBINUR, BLOODU  Ionized Calcium: No results found for: CAION  Coagulation: No results found for: PT, INR, APTT  Troponin: No results found for: TROPONINI  ABG: No results found for: PHART, XYL2MUA, PO2ART, BTZ3GFJ, Q4BNEZVZ, BEART, SOURCE  Radiology review:     IMAGING  Procedure: Xr Chest Portable    Result Date: 12/29/2020  Narrative: CHEST INDICATION:   sob  Covid, influenza A and B, and RSV PCR negative on 12/27/2020  COMPARISON:  Chest radiograph from 12/27/2020 and abdomen CT from 12/28/2020  EXAM PERFORMED/VIEWS:  XR CHEST PORTABLE FINDINGS: Cardiomediastinal silhouette appears unremarkable  Right lower lobe pneumonia  No effusion or pneumothorax  Bilateral shoulder arthroplasty  Impression: Right lower lobe pneumonia, corresponding with the abdomen CT from 12/28/2020   Workstation performed: [id number]     Procedure: Xr Abdomen 1 View Kub    Result Date: 12/30/2020  Narrative: ABDOMEN INDICATION:   Right UPJ stone  COMPARISON:  CT 12/28/2020 VIEWS:  AP supine FINDINGS: 11 mm calculus is seen adjacent to the right proximal nephroureteral stent, similar position to prior CT  Nonobstructive bowel gas pattern  No acute osseous abnormality is seen  Impression: 11 mm calculus adjacent to right proximal nephroureteral stent, similar position to prior CT and likely within proximal ureter/ UPJ  Workstation performed: [id number]     Procedure: Xr Urethrocystogram Retrograde    Result Date: 12/30/2020  Narrative: C-ARM - INDICATION: stent placement  Procedure guidance  COMPARISON:  None TECHNIQUE: FLUOROSCOPY TIME:   1 2 min 9 FLUOROSCOPIC IMAGES FINDINGS: Fluoroscopic guidance provided for surgical procedure  Osseous and soft tissue detail limited by technique  Impression: Fluoroscopic guidance provided for surgical procedure  Please refer to the separate procedure notes for additional details  Workstation performed: [id number]     Procedure: Us Kidney And Bladder    Result Date: 12/29/2020  Narrative: RENAL ULTRASOUND INDICATION:   yann  COMPARISON: CT 12/28/2020 TECHNIQUE:   Ultrasound of the retroperitoneum was performed with a curvilinear transducer utilizing volumetric sweeps and still imaging techniques  FINDINGS: KIDNEYS: Symmetric and normal size  Right kidney:  12 2 x 6 7 x 6 2 cm  Left kidney:  11 9 x 5 4 x 4 7 cm  Right kidney Normal echogenicity and contour  No suspicious masses detected  Mild right hydronephrosis is noted  14 x 7 mm right UVJ calculus is seen  No perinephric fluid collections  Left kidney Normal echogenicity and contour  No suspicious masses detected  1 7 cm simple left renal cyst is noted  No hydronephrosis  No shadowing calculi  No perinephric fluid collections  URETERS: Nonvisualized  BLADDER: Normally distended  No focal thickening or mass lesions  Bilateral ureteral jets detected       Impression: 14 x 7 mm right UPJ calculus with mild right hydronephrosis   Workstation performed: [id number]       Current Facility-Administered Medications   Medication Dose Route Frequency    acetaminophen (TYLENOL) tablet 650 mg  650 mg Oral Q4H PRN    ampicillin (OMNIPEN) 2,000 mg in sodium chloride 0 9 % 100 mL IVPB  2,000 mg Intravenous Q8H    ascorbic acid (VITAMIN C) tablet 500 mg  500 mg Oral Daily    calcium carbonate (TUMS) chewable tablet 500 mg  500 mg Oral BID    cholecalciferol (VITAMIN D3) tablet 4,000 Units  4,000 Units Oral Daily    diphenhydrAMINE (BENADRYL) tablet 50 mg  50 mg Oral HS PRN    ferrous sulfate tablet 325 mg  325 mg Oral Daily With Breakfast    finasteride (PROSCAR) tablet 5 mg  5 mg Oral Daily    gabapentin (NEURONTIN) capsule 400 mg  400 mg Oral HS    heparin (porcine) subcutaneous injection 5,000 Units  5,000 Units Subcutaneous Q8H [facility]    morphine (MSIR) IR tablet 15 mg  15 mg Oral BID    ondansetron (ZOFRAN) injection 4 mg  4 mg Intravenous Q6H PRN    sertraline (ZOLOFT) tablet 50 mg  50 mg Oral Daily     Medications Discontinued During This Encounter   Medication Reason    sodium chloride 0 9 % bolus 1,200 mL     sodium chloride 0 9 % bolus 1,000 mL     sodium chloride 0 9 % bolus 1,000 mL     sodium chloride 0 9 % bolus 1,000 mL     cefTRIAXone (ROCEPHIN) IVPB (premix in dextrose) 1,000 mg 50 mL     piperacillin-tazobactam (ZOSYN) 3 375 g in sodium chloride 0 9 % 100 mL IVPB     sodium chloride 0 9 % bolus 500 mL     sodium chloride 0 9 % infusion     sodium chloride 0 9 % infusion Patient Discharge    sterile water irrigation solution Patient Discharge    iohexol (OMNIPAQUE) 300 mg/mL injection Patient Discharge    fentaNYL (SUBLIMAZE) injection 25 mcg Patient Transfer    ondansetron (ZOFRAN) injection 4 mg Patient Transfer    piperacillin-tazobactam (ZOSYN) 2 25 g in sodium chloride 0 9 % 50 mL IVPB     ampicillin (OMNIPEN) 2,000 mg in sodium chloride 0 9 % 100 mL IVPB Rian Infante, DO      This progress note was produced in part using a dictation device which may document imprecise wording from author's original intent

## 2020-12-31 NOTE — NURSING NOTE
Patient OOB in chair  Patient stating he is seeing "gerbils" in the corner of the room, also that the year is 2000  Patient reoriented  Physician in to see patient  Will continue to monitor

## 2020-12-31 NOTE — PLAN OF CARE
Problem: Potential for Falls  Goal: Patient will remain free of falls  Description: INTERVENTIONS:  - Assess patient frequently for physical needs  -  Identify cognitive and physical deficits and behaviors that affect risk of falls    -  [place] fall precautions as indicated by assessment   - Educate patient/family on patient safety including physical limitations  - Instruct patient to call for assistance with activity based on assessment  - Modify environment to reduce risk of injury  - Consider OT/PT consult to assist with strengthening/mobility  Outcome: Progressing     Problem: NEUROSENSORY - ADULT  Goal: Achieves stable or improved neurological status  Description: INTERVENTIONS  - Monitor and report changes in neurological status  - Monitor vital signs such as temperature, blood pressure, glucose, and any other labs ordered   - Initiate measures to prevent increased intracranial pressure  - Monitor for seizure activity and implement precautions if appropriate      Outcome: Progressing     Problem: GENITOURINARY - ADULT  Goal: Maintains or returns to baseline urinary function  Description: INTERVENTIONS:  - Assess urinary function  - Encourage oral fluids to ensure adequate hydration if ordered  - Administer IV fluids as ordered to ensure adequate hydration  - Administer ordered medications as needed  - Offer frequent toileting  - Follow urinary retention protocol if ordered  Outcome: Progressing  Goal: Absence of urinary retention  Description: INTERVENTIONS:  - Assess patients ability to void and empty bladder  - Monitor I/O  - Bladder scan as needed  - Discuss with physician/AP medications to alleviate retention as needed  - Discuss catheterization for long term situations as appropriate  Outcome: Progressing  Goal: Urinary catheter remains patent  Description: INTERVENTIONS:  - Assess patency of urinary catheter  - If patient has a chronic cantor, consider changing catheter if non-functioning  - Follow guidelines for intermittent irrigation of non-functioning urinary catheter  Outcome: Progressing     Problem: METABOLIC, FLUID AND ELECTROLYTES - ADULT  Goal: Electrolytes maintained within normal limits  Description: INTERVENTIONS:  - Monitor labs and assess patient for signs and symptoms of electrolyte imbalances  - Administer electrolyte replacement as ordered  - Monitor response to electrolyte replacements, including repeat lab results as appropriate  - Instruct patient on fluid and nutrition as appropriate  Outcome: Progressing     Problem: SKIN/TISSUE INTEGRITY - ADULT  Goal: Skin integrity remains intact  Description: INTERVENTIONS  - Identify patients at risk for skin breakdown  - Assess and monitor skin integrity  - Assess and monitor nutrition and hydration status  - Monitor labs (i e  albumin)  - Assess for incontinence   - Turn and reposition patient  - Assist with mobility/ambulation  - Relieve pressure over bony prominences  - Avoid friction and shearing  - Provide appropriate hygiene as needed including keeping skin clean and dry  - Evaluate need for skin moisturizer/barrier cream  - Collaborate with interdisciplinary team (i e  Nutrition, Rehabilitation, etc )   - Patient/family teaching  Outcome: Progressing     Problem: MUSCULOSKELETAL - ADULT  Goal: Maintain or return mobility to safest level of function  Description: INTERVENTIONS:  - Assess patient's ability to carry out ADLs; assess patient's baseline for ADL function and identify physical deficits which impact ability to perform ADLs (bathing, care of mouth/teeth, toileting, grooming, dressing, etc )  - Assess/evaluate cause of self-care deficits   - Assess range of motion  - Assess patient's mobility  - Assess patient's need for assistive devices and provide as appropriate  - Encourage maximum independence but intervene and supervise when necessary  - Involve family in performance of ADLs  - Assess for home care needs following discharge   - Consider OT consult to assist with ADL evaluation and planning for discharge  - Provide patient education as appropriate  Outcome: Progressing     Problem: PAIN - ADULT  Goal: Verbalizes/displays adequate comfort level or baseline comfort level  Description: Interventions:  - Encourage patient to monitor pain and request assistance  - Assess pain using appropriate pain scale  - Administer analgesics based on type and severity of pain and evaluate response  - Implement non-pharmacological measures as appropriate and evaluate response  - Consider cultural and social influences on pain and pain management  - Notify physician/advanced practitioner if interventions unsuccessful or patient reports new pain  Outcome: Progressing     Problem: INFECTION - ADULT  Goal: Absence or prevention of progression during hospitalization  Description: INTERVENTIONS:  - Assess and monitor for signs and symptoms of infection  - Monitor lab/diagnostic results  - Monitor all insertion sites, i e  indwelling lines, tubes, and drains  - Monitor endotracheal if appropriate and nasal secretions for changes in amount and color  - [place] appropriate cooling/warming therapies per order  - Administer medications as ordered  - Instruct and encourage patient and family to use good hand hygiene technique  - Identify and instruct in appropriate isolation precautions for identified infection/condition  Outcome: Progressing     Problem: SAFETY ADULT  Goal: Patient will remain free of falls  Description: INTERVENTIONS:  - Assess patient frequently for physical needs  -  Identify cognitive and physical deficits and behaviors that affect risk of falls    -  [place] fall precautions as indicated by assessment   - Educate patient/family on patient safety including physical limitations  - Instruct patient to call for assistance with activity based on assessment  - Modify environment to reduce risk of injury  - Consider OT/PT consult to assist with strengthening/mobility  Outcome: Progressing  Goal: Maintain or return to baseline ADL function  Description: INTERVENTIONS:  -  Assess patient's ability to carry out ADLs; assess patient's baseline for ADL function and identify physical deficits which impact ability to perform ADLs (bathing, care of mouth/teeth, toileting, grooming, dressing, etc )  - Assess/evaluate cause of self-care deficits   - Assess range of motion  - Assess patient's mobility; develop plan if impaired  - Assess patient's need for assistive devices and provide as appropriate  - Encourage maximum independence but intervene and supervise when necessary  - Involve family in performance of ADLs  - Assess for home care needs following discharge   - Consider OT consult to assist with ADL evaluation and planning for discharge  - Provide patient education as appropriate  Outcome: Progressing  Goal: Maintain or return mobility status to optimal level  Description: INTERVENTIONS:  - Assess patient's baseline mobility status (ambulation, transfers, stairs, etc )    - Identify cognitive and physical deficits and behaviors that affect mobility  - Identify mobility aids required to assist with transfers and/or ambulation (gait belt, sit-to-stand, lift, walker, cane, etc )  - [place] fall precautions as indicated by assessment  - Record patient progress and toleration of activity level on Mobility SBAR; progress patient to next Phase/Stage  - Instruct patient to call for assistance with activity based on assessment  - Consider rehabilitation consult to assist with strengthening/weightbearing, etc   Outcome: Progressing     Problem: DISCHARGE PLANNING  Goal: Discharge to home or other facility with appropriate resources  Description: INTERVENTIONS:  - Identify barriers to discharge w/patient and caregiver  - Arrange for needed discharge resources and transportation as appropriate  - Identify discharge learning needs (meds, wound care, etc )  - Arrange for interpretive services to assist at discharge as needed  - Refer to Case Management Department for coordinating discharge planning if the patient needs post-hospital services based on physician/advanced practitioner order or complex needs related to functional status, cognitive ability, or social support system  Outcome: Progressing     Problem: Knowledge Deficit  Goal: Patient/family/caregiver demonstrates understanding of disease process, treatment plan, medications, and discharge instructions  Description: Complete learning assessment and assess knowledge base  Interventions:  - Provide teaching at level of understanding  - Provide teaching via preferred learning methods  Outcome: Progressing     Problem: Nutrition/Hydration-ADULT  Goal: Nutrient/Hydration intake appropriate for improving, restoring or maintaining nutritional needs  Description: Monitor and assess patient's nutrition/hydration status for malnutrition  Collaborate with interdisciplinary team and initiate plan and interventions as ordered  Monitor patient's weight and dietary intake as ordered or per policy  Utilize nutrition screening tool and intervene as necessary  Determine patient's food preferences and provide high-protein, high-caloric foods as appropriate       INTERVENTIONS:  - Monitor oral intake, urinary output, labs, and treatment plans  - Assess nutrition and hydration status and recommend course of action  - Evaluate amount of meals eaten  - Assist patient with eating if necessary   - Allow adequate time for meals  - Recommend/ encourage appropriate diets, oral nutritional supplements, and vitamin/mineral supplements  - Order, calculate, and assess calorie counts as needed  - Recommend, monitor, and adjust tube feedings and TPN/PPN based on assessed needs  - Assess need for intravenous fluids  - Provide specific nutrition/hydration education as appropriate  - Include patient/family/caregiver in decisions related to nutrition  Outcome: Progressing

## 2020-12-31 NOTE — PHYSICAL THERAPY NOTE
12/31/20 1137   PT Last Visit   PT Visit Date 12/31/20   Note Type   Note Type Treatment   Pain Assessment   Pain Assessment Tool 0-10   Pain Score 4   Pain Location/Orientation Orientation: Bilateral;Location: Knee   Pain Onset/Description Descriptor: Aching   Multiple Pain Sites No   Restrictions/Precautions   Weight Bearing Precautions Per Order No   Other Precautions Contact/isolation; Chair Alarm; Bed Alarm;Cognitive;Multiple lines; Fall Risk;O2   General   Chart Reviewed Yes   Family/Caregiver Present No   Cognition   Overall Cognitive Status Impaired   Arousal/Participation Alert; Cooperative   Attention Attends with cues to redirect   Orientation Level Oriented X4   Memory Decreased recall of biographical information;Decreased short term memory;Decreased recall of recent events;Decreased recall of precautions   Following Commands Follows one step commands with increased time or repetition   Comments pt agreed to PT treatment   Subjective   Subjective "I feel better today "   Bed Mobility   Supine to Sit 3  Moderate assistance   Additional items Assist x 1;HOB elevated; Bedrails; Increased time required;LE management;Verbal cues   Additional Comments pt OOB in chair to end session   Transfers   Sit to Stand 3  Moderate assistance   Additional items Assist x 1;Bedrails; Increased time required;Verbal cues   Stand to Sit 3  Moderate assistance   Additional items Assist x 1; Armrests; Increased time required;Verbal cues   Stand pivot 3  Moderate assistance   Additional items Assist x 1; Increased time required;Verbal cues   Additional Comments used RW for SPT to chair   Ambulation/Elevation   Gait pattern Improper Weight shift;Decreased foot clearance; Forward Flexion; Inconsistent stephen; Step to;Excessively slow   Gait Assistance 3  Moderate assist   Additional items Assist x 1;Verbal cues; Tactile cues   Assistive Device Rolling walker   Distance 3 feet bed to chair   Stair Management Assistance Not tested   Balance Static Sitting Fair   Dynamic Sitting Fair -   Static Standing Poor +   Dynamic Standing Poor   Ambulatory Poor   Endurance Deficit   Endurance Deficit Yes   Activity Tolerance   Activity Tolerance Patient limited by fatigue  (cognition)   Nurse Made Aware RN Brie   Exercises   Hip Flexion Sitting;20 reps;AROM; Bilateral   Hip Abduction Supine;20 reps;AROM; Bilateral   Hip Adduction Supine;20 reps;AROM; Bilateral   Knee AROM Long Arc Quad Supine;20 reps;AROM; Bilateral   Ankle Pumps Supine;20 reps;AROM; Bilateral   Assessment   Prognosis Fair   Problem List Decreased strength;Decreased endurance; Impaired balance;Decreased mobility; Decreased safety awareness   Assessment Pt seen for PT treatment session this date with interventions consisting of Therapeutic exercise consisting of: AROM 20 reps B LE in sitting position and therapeutic activity consisting of training: supine<>sit transfers, sit<>stand transfers, static sitting tolerance at EOB for 5 minutes w/ B UE support and SPT bed to chair  Pt agreeable to PT treatment session upon arrival, pt found supine in bed w/ HOB elevated, in no apparent distress  In comparison to previous session, pt with improvements in activity tolerance  Post session: all needs in reach and RN notified of session findings/recommendations seated in bedside chair  Continue to recommend STR at time of d/c in order to maximize pt's functional independence and safety w/ mobility  Pt continues to be functioning below baseline level, and remains limited 2* factors listed above and including decreased strength, endurance & safe functional mobility  PT will continue to see pt while here in order to address the deficits listed above and provide interventions consistent w/ POC in effort to achieve STGs     Barriers to Discharge Inaccessible home environment   Goals   Patient Goals to go home   PT Treatment Day 2   Plan   Treatment/Interventions Functional transfer training;LE strengthening/ROM; Therapeutic exercise; Endurance training;Cognitive reorientation;Patient/family training;Equipment eval/education; Bed mobility;Gait training;Spoke to nursing   Progress Slow progress, decreased activity tolerance   PT Frequency   (3-5 x week)   Recommendation   PT Discharge Recommendation Post-Acute Rehabilitation Services   PT - OK to Discharge Yes  (when med cleared to STR)   Max Witt, PTA

## 2020-12-31 NOTE — PLAN OF CARE
Problem: Potential for Falls  Goal: Patient will remain free of falls  Description: INTERVENTIONS:  - Assess patient frequently for physical needs  -  Identify cognitive and physical deficits and behaviors that affect risk of falls    -  [place] fall precautions as indicated by assessment   - Educate patient/family on patient safety including physical limitations  - Instruct patient to call for assistance with activity based on assessment  - Modify environment to reduce risk of injury  - Consider OT/PT consult to assist with strengthening/mobility  Outcome: Progressing     Problem: NEUROSENSORY - ADULT  Goal: Achieves stable or improved neurological status  Description: INTERVENTIONS  - Monitor and report changes in neurological status  - Monitor vital signs such as temperature, blood pressure, glucose, and any other labs ordered   - Initiate measures to prevent increased intracranial pressure  - Monitor for seizure activity and implement precautions if appropriate      Outcome: Progressing     Problem: GENITOURINARY - ADULT  Goal: Maintains or returns to baseline urinary function  Description: INTERVENTIONS:  - Assess urinary function  - Encourage oral fluids to ensure adequate hydration if ordered  - Administer IV fluids as ordered to ensure adequate hydration  - Administer ordered medications as needed  - Offer frequent toileting  - Follow urinary retention protocol if ordered  Outcome: Progressing  Goal: Absence of urinary retention  Description: INTERVENTIONS:  - Assess patients ability to void and empty bladder  - Monitor I/O  - Bladder scan as needed  - Discuss with physician/AP medications to alleviate retention as needed  - Discuss catheterization for long term situations as appropriate  Outcome: Progressing  Goal: Urinary catheter remains patent  Description: INTERVENTIONS:  - Assess patency of urinary catheter  - If patient has a chronic cantor, consider changing catheter if non-functioning  - Follow guidelines for intermittent irrigation of non-functioning urinary catheter  Outcome: Progressing     Problem: METABOLIC, FLUID AND ELECTROLYTES - ADULT  Goal: Electrolytes maintained within normal limits  Description: INTERVENTIONS:  - Monitor labs and assess patient for signs and symptoms of electrolyte imbalances  - Administer electrolyte replacement as ordered  - Monitor response to electrolyte replacements, including repeat lab results as appropriate  - Instruct patient on fluid and nutrition as appropriate  Outcome: Progressing

## 2020-12-31 NOTE — ASSESSMENT & PLAN NOTE
In the setting of sepsis/infections coupled with acute kidney injury  Improving    Patient oriented to place and person but not time  Continue clinical monitoring

## 2020-12-31 NOTE — PLAN OF CARE
Problem: OCCUPATIONAL THERAPY ADULT  Goal: Performs self-care activities at highest level of function for planned discharge setting  See evaluation for individualized goals  Description: Treatment Interventions: ADL retraining, Functional transfer training, UE strengthening/ROM, Endurance training, Cognitive reorientation, Patient/family training, Compensatory technique education, Activityengagement, Energy conservation          See flowsheet documentation for full assessment, interventions and recommendations  Outcome: Progressing  Note: Limitation: Decreased ADL status, Decreased UE strength, Decreased Safe judgement during ADL, Decreased cognition, Decreased endurance, Decreased self-care trans, Decreased high-level ADLs  Prognosis: Fair  Assessment: Patient participated in Skilled OT session this date with interventions consisting of ADL re training with the use of correct body mechnaics,  therapeutic activities to: increase activity tolerance, increase dynamic sit/ stand balance during functional activity , increase postural control, increase trunk control and increase OOB/ sitting tolerance   Patient agreeable to OT treatment session, upon arrival patient was found seated OOB to Chair and in no apparent distress  In comparison to previous session, patient with improvements in endurance  Patient requiring one step directives, frequent rest periods and ocassional safety reminders  Patient continues to be functioning below baseline level, occupational performance remains limited secondary to factors listed above and increased risk for falls and injury  From OT standpoint, recommendation at time of d/c would be Post-Acute Rehabilitation Services  Patient to benefit from continued Occupational Therapy treatment while in the hospital to address deficits as defined above and maximize level of functional independence with ADLs and functional mobility        OT Discharge Recommendation: Post-Acute Rehabilitation Services  OT - OK to Discharge: Yes(Once medically cleared )     Isma Davenport OT

## 2020-12-31 NOTE — PLAN OF CARE
Problem: PHYSICAL THERAPY ADULT  Goal: Performs mobility at highest level of function for planned discharge setting  See evaluation for individualized goals  Description: Treatment/Interventions: Functional transfer training, LE strengthening/ROM, Therapeutic exercise, Endurance training, Patient/family training, Equipment eval/education, Bed mobility, Gait training, Cognitive reorientation, Spoke to nursing, Spoke to case management  Equipment Recommended: (TBD pending further OOB mobility)       See flowsheet documentation for full assessment, interventions and recommendations  Outcome: Progressing  Note: Prognosis: Fair  Problem List: Decreased strength, Decreased endurance, Impaired balance, Decreased mobility, Decreased safety awareness  Assessment: Pt seen for PT treatment session this date with interventions consisting of Therapeutic exercise consisting of: AROM 20 reps B LE in sitting position and therapeutic activity consisting of training: supine<>sit transfers, sit<>stand transfers, static sitting tolerance at EOB for 5 minutes w/ B UE support and SPT bed to chair  Pt agreeable to PT treatment session upon arrival, pt found supine in bed w/ HOB elevated, in no apparent distress  In comparison to previous session, pt with improvements in activity tolerance  Post session: all needs in reach and RN notified of session findings/recommendations seated in bedside chair  Continue to recommend STR at time of d/c in order to maximize pt's functional independence and safety w/ mobility  Pt continues to be functioning below baseline level, and remains limited 2* factors listed above and including decreased strength, endurance & safe functional mobility  PT will continue to see pt while here in order to address the deficits listed above and provide interventions consistent w/ POC in effort to achieve STGs    Barriers to Discharge: Inaccessible home environment     PT Discharge Recommendation: Post-Acute Rehabilitation Services     PT - OK to Discharge: Yes(when med cleared to STR)    See flowsheet documentation for full assessment

## 2020-12-31 NOTE — OCCUPATIONAL THERAPY NOTE
[address] [facility] Treatment Note     Patient Name: Corinne Hernandez  FJJYS'C Date: 12/31/2020  Problem List  Principal Problem:    Severe sepsis (evolving)  Active Problems:    Chronic indwelling Diallo catheter    Acute kidney injury - Chronic kidney disease stage 3    Acute metabolic encephalopathy    Gram-negative UTI    Chronic pain syndrome    Anemia of chronic disease    Depression    Hypomagnesemia    Acute respiratory failure with hypoxia    Right hydroureteronephrosis with obstructing calculus    Gram-negative bacteremia    Pneumonia of right lower lobe    Thrombocytopenia           12/31/20 1413   OT Last Visit   OT Visit Date 12/31/20   Note Type   Note Type Treatment   Restrictions/Precautions   Weight Bearing Precautions Per Order No   Other Precautions Contact/isolation;Cognitive;Multiple lines; Fall Risk;O2   General   Response to Previous Treatment Patient unable to report, no changes reported from family or staff   Pain Assessment   Pain Assessment Tool Pain Assessment not indicated - pt denies pain   Pain Score No Pain   ADL   Toileting Assistance  2  Maximal Assistance   Toileting Deficit Setup;Steadying;Verbal cueing;Supervison/safety; Increased time to complete; Bedside commode;Perineal hygiene   Bed Mobility   Additional Comments DNT bed mobility: pt seated OOB in chair prior to session and at end of session  Transfers   Sit to Stand 3  Moderate assistance   Additional items Assist x 1; Armrests; Increased time required;Verbal cues   Stand to Sit 3  Moderate assistance   Additional items Assist x 1; Increased time required;Verbal cues;Armrests   Stand pivot 3  Moderate assistance   Additional items Assist x 1; Armrests; Increased time required;Verbal cues   Toilet Transfers   Toilet Transfer From Other (Comment)  (Chair )   Toilet Transfer Type To and from   Toilet Transfer to Standard bedside commode   Toilet Transfer Technique Stand pivot   Toilet Transfers Moderate assistance   Cognition Overall Cognitive Status Impaired   Arousal/Participation Alert; Responsive; Cooperative   Attention Attends with cues to redirect   Orientation Level Oriented to person;Oriented to place; Disoriented to time;Disoriented to situation   Memory Decreased recall of precautions;Decreased recall of recent events   Following Commands Follows one step commands with increased time or repetition   Comments pt agreeable to OT session    Activity Tolerance   Activity Tolerance Patient limited by fatigue   Medical Staff Made Aware GELY Henderson verbalized pt appropriate for therapy and made aware of outcomes    Assessment   Assessment Patient participated in Skilled OT session this date with interventions consisting of ADL re training with the use of correct body mechnaics,  therapeutic activities to: increase activity tolerance, increase dynamic sit/ stand balance during functional activity , increase postural control, increase trunk control and increase OOB/ sitting tolerance   Patient agreeable to OT treatment session, upon arrival patient was found seated OOB to Chair and in no apparent distress  In comparison to previous session, patient with improvements in endurance  Patient requiring one step directives, frequent rest periods and ocassional safety reminders  Patient continues to be functioning below baseline level, occupational performance remains limited secondary to factors listed above and increased risk for falls and injury  From OT standpoint, recommendation at time of d/c would be Post-Acute Rehabilitation Services  Patient to benefit from continued Occupational Therapy treatment while in the hospital to address deficits as defined above and maximize level of functional independence with ADLs and functional mobility  Plan   Treatment Interventions ADL retraining;Functional transfer training; Endurance training; Compensatory technique education; Activityengagement   Goal Expiration Date 01/06/21   OT Treatment Day 2   OT Frequency 3-5x/wk   Recommendation   OT Discharge Recommendation Post-Acute Rehabilitation Services   OT - OK to Discharge Yes  (Once medically cleared )      Thelma Ricks OT

## 2020-12-31 NOTE — ASSESSMENT & PLAN NOTE
Likely suspected pneumonia   As seen on CXR imaging  Suspect possibly aspiration secondary to vomiting under encephalopathic state  Empiric IV Zosyn transitioned to IV Ampicillin on 10/30/2020 due to concurrent urine culture polymicrobial growth sensitivities  Aspiration precautions - speech/swallow therapy recommending normal diet  Maintain oxygen - encourage incentive spirometry

## 2020-12-31 NOTE — PROGRESS NOTES
Progress Note - Joslyn Kehr 1945, 66 y o  male MRN: [id number]    Unit/Bed#: -01 Encounter: [id number]    Primary Care Provider: Dottie Lauren DO   Date and time admitted to hospital: 12/27/2020  5:16 AM        * Severe sepsis (evolving)  Assessment & Plan  With tachycardia/tachypnea coupled leukocytosis and lactic acidosis -> resolved  In the setting of Proteus bacteremia/UTI and right basilar pneumonia (see below)  Monitor vital signs and maintain hemodynamics  Procalcitonin downtrending    Thrombocytopenia   Assessment & Plan  Monitor platelet count - monitor for bleeding    Pneumonia of right lower lobe  Assessment & Plan  Likely suspected pneumonia, present on admission   As seen on CXR imaging  Suspect possibly aspiration secondary to vomiting under encephalopathic state  Empiric IV Zosyn transitioned to IV Ampicillin on 10/30/2020 due to concurrent urine culture polymicrobial growth sensitivities  Aspiration precautions - speech/swallow therapy recommending normal diet  Maintain oxygen - encourage incentive spirometry    Gram-negative bacteremia  Assessment & Plan  Blood cultures from 12/27 growing Proteus species -> likely source is urine due to concurrent UTI  Antibiotic regimen tailored to IV Ampicillin  Repeat blood cultures from 12/28 are preliminarily negative    Right hydroureteronephrosis with obstructing calculus  Assessment & Plan  S/p retrograde pyelogram with ureteral stenting on 10/30/2020 - appreciate urology evaluation  PRN pain/emesis control    Acute respiratory failure with hypoxia  Assessment & Plan  Previously required up to 4 L via nasal cannula now down to 2 L earlier today -> attempt to wean down to baseline room air as tolerated  Secondary to suspected right basilar pneumonia    Hypomagnesemia  Assessment & Plan  Monitor/replete p r n      Depression  Assessment & Plan  On Zoloft  Denies any suicidal or homicidal ideation    Anemia of chronic disease  Assessment & Plan  Monitor H/H  Continue ferrous sulfate supplementation    Chronic pain syndrome  Assessment & Plan  · Continuous opioid dependence  · Continue home medication    Gram-negative UTI  Assessment & Plan  Urine culture pathogen only growing Proteus mirabilis - lesser colonies of Enterococcus/Alcaligenes species noted  Sensitivities noted -> transitioned IV Zosyn to IV Ampicillin on 12/30/2020  Chronic Diallo catheter presence noted    Acute metabolic encephalopathy  Assessment & Plan  In the setting of sepsis/infections coupled with acute kidney injury  Improving  Patient oriented to place and person but not time  Continue clinical monitoring    Acute kidney injury - Chronic kidney disease stage 3  Assessment & Plan  Baseline creatinine approximately 1 2-1 3 - currently @ 1 96 from a 2 71 peak  Exacerbated in the setting of obstructive hydroureteronephrosis - s/p ureteral stenting on 12/30/2020  Monitor renal function and urine output - limit/avoid nephrotoxins if possible  Nephrology following - on IV fluids    Chronic indwelling Diallo catheter  Assessment & Plan  · Diallo changed during this admission      VTE Pharmacologic Prophylaxis:   Pharmacologic: Heparin  Mechanical VTE Prophylaxis in Place: Yes    Patient Centered Rounds: I have performed bedside rounds with nursing staff today  Discussions with Specialists or Other Care Team Provider:  With nephrology    Education and Discussions with Family / Patient:  With patient and patient's daughter    Time Spent for Care: 45 minutes  More than 50% of total time spent on counseling and coordination of care as described above  Current Length of Stay: 4 day(s)    Current Patient Status: Inpatient   Certification Statement: The patient will continue to require additional inpatient hospital stay due to IV antibiotics, IV fluids, serial labs    Discharge Plan:  24-48 hours    Code Status: Level 1 - Full Code      Subjective:   Patient seen examined bedside    No acute events overnight  He states he feels better today  Denies any pain  Objective:     Vitals:   Temp (24hrs), Av 9 °F (37 2 °C), Min:98 6 °F (37 °C), Max:99 3 °F (37 4 °C)    Temp:  [98 6 °F (37 °C)-99 3 °F (37 4 °C)] 98 6 °F (37 °C)  HR:  [61-75] 66  Resp:  [16-18] 18  BP: (120-153)/(61-72) 153/70  SpO2:  [93 %-96 %] 95 %  Body mass index is 27 12 kg/m²  Input and Output Summary (last 24 hours): Intake/Output Summary (Last 24 hours) at 2020 1234  Last data filed at 2020 1206  Gross per 24 hour   Intake 2620 ml   Output 2700 ml   Net -80 ml       Physical Exam:     Physical Exam  Vitals signs and nursing note reviewed  Constitutional:       General: He is not in acute distress  Appearance: Normal appearance  HENT:      Head: Normocephalic  Nose: Nose normal       Mouth/Throat:      Mouth: Mucous membranes are moist    Eyes:      Conjunctiva/sclera: Conjunctivae normal    Cardiovascular:      Rate and Rhythm: Normal rate  Heart sounds: Normal heart sounds  No murmur  Pulmonary:      Effort: Pulmonary effort is normal  No respiratory distress  Breath sounds: Normal breath sounds  No wheezing  Abdominal:      General: There is no distension  Tenderness: There is no abdominal tenderness  There is no guarding  Musculoskeletal:         General: No swelling  Right lower leg: No edema  Skin:     General: Skin is warm  Neurological:      General: No focal deficit present  Mental Status: He is alert  He is disoriented        Comments: Oriented to place but not time or person   Psychiatric:         Mood and Affect: Mood normal            Additional Data:     Labs:    Results from last 7 days   Lab Units 20  0548  20  0521   WBC Thousand/uL 8 50   < > 10 20   HEMOGLOBIN g/dL 9 5*   < > 11 4*   HEMATOCRIT % 28 2*   < > 33 4*   PLATELETS Thousands/uL 135*   < > 168   BANDS PCT %  --   --  9*   NEUTROS PCT % 76*   < >  --    LYMPHS PCT % 12*   < >  --    LYMPHO PCT %  --   --  5*   MONOS PCT % 12   < >  --    MONO PCT %  --   --  1*   EOS PCT % 0   < >  --     < > = values in this interval not displayed  Results from last 7 days   Lab Units 12/31/20  0548  12/29/20  0542   SODIUM mmol/L 142   < > 140   POTASSIUM mmol/L 3 7   < > 3 9   CHLORIDE mmol/L 111*   < > 110*   CO2 mmol/L 23   < > 20*   BUN mg/dL 54*   < > 56*   CREATININE mg/dL 1 96*   < > 2 63*   ANION GAP mmol/L 8   < > 10   CALCIUM mg/dL 7 8*   < > 7 8*   ALBUMIN g/dL  --   --  2 9*   TOTAL BILIRUBIN mg/dL  --   --  0 50   ALK PHOS U/L  --   --  84   ALT U/L  --   --  39   AST U/L  --   --  53*   GLUCOSE RANDOM mg/dL 102*   < > 120*    < > = values in this interval not displayed  Results from last 7 days   Lab Units 12/27/20  0521   INR  1 39*             Results from last 7 days   Lab Units 12/30/20  0445 12/29/20  0542 12/28/20  0521 12/27/20  1226 12/27/20  1009 12/27/20  0725 12/27/20  0521   LACTIC ACID mmol/L  --   --   --  1 9 3 0* 2 6* 4 0*   PROCALCITONIN ng/ml 33 54* 59 24* 91 90*  --   --   --  3 28*           * I Have Reviewed All Lab Data Listed Above  * Additional Pertinent Lab Tests Reviewed: Tressa 64 Admission Reviewed    Imaging:    Imaging Reports Reviewed Today Include:  Abdominal x-ray  Imaging Personally Reviewed by Myself Includes:  None    Recent Cultures (last 7 days):     Results from last 7 days   Lab Units 12/29/20  1157 12/28/20  1236 12/28/20  1227 12/27/20  0528 12/27/20  0521   BLOOD CULTURE   --  No Growth at 48 hrs  No Growth at 48 hrs    --  Proteus mirabilis*  Enterococcus faecalis*  Proteus mirabilis*   GRAM STAIN RESULT   --   --   --   --  Gram negative rods*  Gram negative rods*   URINE CULTURE  <10,000 cfu/ml Gram Negative Rambo Enteric Like*  --   --  80,000-89,000 cfu/ml Proteus mirabilis*  50,000-59,000 cfu/ml Enterococcus faecalis*  50,000-59,000 cfu/ml Alcaligenes faecalis*  --        Last 24 Hours Medication List:   Current Facility-Administered Medications   Medication Dose Route Frequency Provider Last Rate    acetaminophen  650 mg Oral Q4H PRN Keaton Mann MD      ampicillin  2,000 mg Intravenous Q8H Ravinder Diego MD 2,000 mg (12/31/20 1052)    ascorbic acid  500 mg Oral Daily Keaton Mann MD      calcium carbonate  500 mg Oral BID Keaton Mann MD      cholecalciferol  4,000 Units Oral Daily Keaton Mann MD      diphenhydrAMINE  50 mg Oral HS PRN Keaton Mann MD      ferrous sulfate  325 mg Oral Daily With Breakfast Keaton Mann MD      finasteride  5 mg Oral Daily Keaton Mann MD      gabapentin  400 mg Oral HS Keaton Mann MD      heparin (porcine)  5,000 Units Subcutaneous [facility] Keaton Mann MD      morphine  15 mg Oral BID Keaton Mann MD      ondansetron  4 mg Intravenous Q6H PRN Keaton Mann MD      sertraline  50 mg Oral Daily Keaton Mann MD          Today, Patient Was Seen By: Camelia Florentino MD    ** Please Note: Dictation voice to text software may have been used in the creation of this document   **

## 2020-12-31 NOTE — ASSESSMENT & PLAN NOTE
S/p retrograde pyelogram with ureteral stenting on 10/30/2020 - appreciate urology evaluation  PRN pain/emesis control

## 2021-01-01 LAB
ANION GAP SERPL CALCULATED.3IONS-SCNC: 7 MMOL/L (ref 4–13)
BACTERIA UR CULT: ABNORMAL
BACTERIA UR CULT: ABNORMAL
BUN SERPL-MCNC: 46 MG/DL (ref 7–25)
CALCIUM SERPL-MCNC: 8.2 MG/DL (ref 8.6–10.5)
CHLORIDE SERPL-SCNC: 110 MMOL/L (ref 98–107)
CO2 SERPL-SCNC: 26 MMOL/L (ref 21–31)
CREAT SERPL-MCNC: 1.71 MG/DL (ref 0.7–1.3)
EOSINOPHIL # BLD AUTO: 0.2 THOUSAND/UL (ref 0–0.61)
EOSINOPHIL NFR BLD MANUAL: 2 % (ref 0–6)
ERYTHROCYTE [DISTWIDTH] IN BLOOD BY AUTOMATED COUNT: 13.4 % (ref 11.5–14.5)
GFR SERPL CREATININE-BSD FRML MDRD: 38 ML/MIN/1.73SQ M
GLUCOSE SERPL-MCNC: 106 MG/DL (ref 65–99)
HCT VFR BLD AUTO: 30.4 % (ref 42–47)
HGB BLD-MCNC: 10.1 G/DL (ref 14–18)
LYMPHOCYTES # BLD AUTO: 0.82 THOUSAND/UL (ref 0.6–4.47)
LYMPHOCYTES # BLD AUTO: 8 % (ref 20–51)
MAGNESIUM SERPL-MCNC: 1.8 MG/DL (ref 1.9–2.7)
MCH RBC QN AUTO: 32.1 PG (ref 26–34)
MCHC RBC AUTO-ENTMCNC: 33.3 G/DL (ref 31–37)
MCV RBC AUTO: 96 FL (ref 81–99)
METAMYELOCYTES NFR BLD MANUAL: 1 % (ref 0–1)
MONOCYTES # BLD AUTO: 0.82 THOUSAND/UL (ref 0–1.22)
MONOCYTES NFR BLD AUTO: 8 % (ref 4–12)
MYELOCYTES NFR BLD MANUAL: 2 % (ref 0–1)
NEUTS SEG # BLD: 8.06 THOUSAND/UL (ref 1.81–6.82)
NEUTS SEG NFR BLD AUTO: 79 % (ref 42–75)
PLATELET # BLD AUTO: 164 THOUSANDS/UL (ref 149–390)
PMV BLD AUTO: 9.7 FL (ref 8.6–11.7)
POTASSIUM SERPL-SCNC: 4 MMOL/L (ref 3.5–5.5)
RBC # BLD AUTO: 3.16 MILLION/UL (ref 4.3–5.9)
SODIUM SERPL-SCNC: 143 MMOL/L (ref 134–143)
TOTAL CELLS COUNTED SPEC: 100
WBC # BLD AUTO: 10.2 THOUSAND/UL (ref 4.8–10.8)

## 2021-01-01 PROCEDURE — 83735 ASSAY OF MAGNESIUM: CPT | Performed by: UROLOGY

## 2021-01-01 PROCEDURE — 80048 BASIC METABOLIC PNL TOTAL CA: CPT | Performed by: UROLOGY

## 2021-01-01 PROCEDURE — 85027 COMPLETE CBC AUTOMATED: CPT | Performed by: UROLOGY

## 2021-01-01 PROCEDURE — 99232 SBSQ HOSP IP/OBS MODERATE 35: CPT | Performed by: NURSE PRACTITIONER

## 2021-01-01 PROCEDURE — 99232 SBSQ HOSP IP/OBS MODERATE 35: CPT | Performed by: INTERNAL MEDICINE

## 2021-01-01 PROCEDURE — 85007 BL SMEAR W/DIFF WBC COUNT: CPT | Performed by: UROLOGY

## 2021-01-01 RX ADMIN — HEPARIN SODIUM 5000 UNITS: 5000 INJECTION INTRAVENOUS; SUBCUTANEOUS at 21:03

## 2021-01-01 RX ADMIN — CALCIUM CARBONATE (ANTACID) CHEW TAB 500 MG 500 MG: 500 CHEW TAB at 08:25

## 2021-01-01 RX ADMIN — MORPHINE SULFATE 15 MG: 15 TABLET ORAL at 08:25

## 2021-01-01 RX ADMIN — MORPHINE SULFATE 15 MG: 15 TABLET ORAL at 17:12

## 2021-01-01 RX ADMIN — FERROUS SULFATE TAB 325 MG (65 MG ELEMENTAL FE) 325 MG: 325 (65 FE) TAB at 08:26

## 2021-01-01 RX ADMIN — HEPARIN SODIUM 5000 UNITS: 5000 INJECTION INTRAVENOUS; SUBCUTANEOUS at 14:28

## 2021-01-01 RX ADMIN — CALCIUM CARBONATE (ANTACID) CHEW TAB 500 MG 500 MG: 500 CHEW TAB at 21:03

## 2021-01-01 RX ADMIN — HEPARIN SODIUM 5000 UNITS: 5000 INJECTION INTRAVENOUS; SUBCUTANEOUS at 05:03

## 2021-01-01 RX ADMIN — AMPICILLIN SODIUM 2000 MG: 2 INJECTION, POWDER, FOR SOLUTION INTRAMUSCULAR; INTRAVENOUS at 01:54

## 2021-01-01 RX ADMIN — OXYCODONE HYDROCHLORIDE AND ACETAMINOPHEN 500 MG: 500 TABLET ORAL at 08:26

## 2021-01-01 RX ADMIN — SERTRALINE HYDROCHLORIDE 50 MG: 50 TABLET ORAL at 08:26

## 2021-01-01 RX ADMIN — AMPICILLIN SODIUM 2000 MG: 2 INJECTION, POWDER, FOR SOLUTION INTRAMUSCULAR; INTRAVENOUS at 11:15

## 2021-01-01 RX ADMIN — FINASTERIDE 5 MG: 5 TABLET, FILM COATED ORAL at 08:26

## 2021-01-01 RX ADMIN — Medication 4000 UNITS: at 08:26

## 2021-01-01 RX ADMIN — AMPICILLIN SODIUM 2000 MG: 2 INJECTION, POWDER, FOR SOLUTION INTRAMUSCULAR; INTRAVENOUS at 17:12

## 2021-01-01 RX ADMIN — GABAPENTIN 400 MG: 400 CAPSULE ORAL at 21:03

## 2021-01-01 NOTE — NURSING NOTE
Pt resting comfortably in bed  Offers no complaints at this time  Side rails up x2, call bell witihin reach, bed alarm on  Will continue to monitor

## 2021-01-01 NOTE — ASSESSMENT & PLAN NOTE
· With tachycardia/tachypnea coupled leukocytosis and lactic acidosis -> resolved  · In the setting of Proteus bacteremia/UTI and right basilar pneumonia (see below)  · Monitor vital signs and maintain hemodynamics  · Procalcitonin downtrending

## 2021-01-01 NOTE — ASSESSMENT & PLAN NOTE
· Blood cultures from 12/27 growing Proteus species -> likely source is urine due to concurrent UTI  · Antibiotic regimen tailored to IV Ampicillin  · Repeat blood cultures from 12/28- no growth to date

## 2021-01-01 NOTE — ASSESSMENT & PLAN NOTE
· In the setting of sepsis/infections coupled with acute kidney injury  · Improving    Patient oriented to place and person but not time  · Continue clinical monitoring

## 2021-01-01 NOTE — ASSESSMENT & PLAN NOTE
· S/p retrograde pyelogram with ureteral stenting on 12/30/2020 - appreciate urology evaluation  · PRN pain/emesis control

## 2021-01-01 NOTE — PROGRESS NOTES
Progress Note - Nephrology   Joslyn Kehr 66 y o  male MRN: [id number]  Unit/Bed#: -01 Encounter: [id number]    A/P:  1  Acute kidney injury on top of chronic kidney disease               creatinine continues to improve, continue with supportive care at this time  2  Chronic kidney disease stage 3 with baseline creatinine between 1 2-1 3 mg/dL  3  Hypocalcemia               continue with calcium supplementation, calcium level stable  4  Proteus mirabilis urinary tract infection with bacteremia              Continue treatment according hospitalist, unclear if Enterococcus and Alcaligenes are active infections at this time  5  Severe sepsis-appears significantly improved               continue to improve, continue current antibiotics according to hospitalist/Infectious Disease colleagues  6  Right ureteropelvic junction obstruction               status post stent placement by Urology , appears to have appropriately relieved obstruction  Follow up reason for today's visit:  Acute kidney injury/chronic kidney disease/hypocalcemia    Severe sepsis [facility])    Patient Active Problem List   Diagnosis    Chronic indwelling Diallo catheter    Severe sepsis (evolving)    Acute kidney injury - Chronic kidney disease stage 3    Acute metabolic encephalopathy    Gram-negative UTI    Chronic pain syndrome    Elevated troponin    CKD (chronic kidney disease)    Anemia of chronic disease    Depression    Hypomagnesemia    Bilateral hand pain    Acute respiratory failure with hypoxia    Right hydroureteronephrosis with obstructing calculus    Gram-negative bacteremia    Pneumonia of right lower lobe    Thrombocytopenia          Subjective:   No acute events overnight    Objective:     Vitals: Blood pressure (!) 171/81, pulse 62, temperature (!) 97 4 °F (36 3 °C), temperature source Temporal, resp  rate 18, height 5' 9" (1 753 m), weight 83 3 kg (183 lb 10 3 oz), SpO2 97 %  ,Body mass index is 27 12 kg/m²  Weight (last 2 days)     None            Intake/Output Summary (Last 24 hours) at 1/1/2021 1141  Last data filed at 12/31/2020 2120  Gross per 24 hour   Intake 420 ml   Output 1400 ml   Net -980 ml     I/O last 3 completed shifts: In: 1442 [P O :720; I V :2000]  Out: 2800 [Urine:2800]    Urethral Catheter Latex 18 Fr  (Active)   Reasons to continue Urinary Catheter  Chronic urinary catheter 12/31/20 2113   Site Assessment Clean;Skin intact 12/31/20 2113   Collection Container Standard drainage bag 12/31/20 2113   Securement Method Securing device (Describe) 12/31/20 2113   Output (mL) 500 mL 12/31/20 2120       Physical Exam: BP (!) 171/81 (BP Location: Left arm)   Pulse 62   Temp (!) 97 4 °F (36 3 °C) (Temporal)   Resp 18   Ht 5' 9" (1 753 m)   Wt 83 3 kg (183 lb 10 3 oz)   SpO2 97%   BMI 27 12 kg/m²     General Appearance:    no distress, appears stated age   Head:    Normocephalic, without obvious abnormality, atraumatic   Eyes:    Conjunctiva/corneas clear   Ears:    Normal external ears   Nose:   Nares normal, septum midline, mucosa normal, no drainage    or sinus tenderness   Throat:   Lips, mucosa, and tongue normal; teeth and gums normal   Neck:   Supple   Back:     Symmetric, no curvature, ROM normal, no CVA tenderness   Lungs:     Clear to auscultation bilaterally, respirations unlabored   Chest wall:    No tenderness or deformity   Heart:    Regular rate and rhythm, S1 and S2 normal, no murmur, rub   or gallop   Abdomen:     Soft, non-tender, bowel sounds active   Extremities:   Extremities normal, atraumatic, no cyanosis or edema   Skin:   Skin color, texture, turgor normal, no rashes or lesions   Lymph nodes:   Cervical normal   Neurologic:   CNII-XII intact            Lab, Imaging and other studies: I have personally reviewed pertinent labs    CBC:   Lab Results   Component Value Date    WBC 10 20 01/01/2021    HGB 10 1 (L) 01/01/2021    HCT 30 4 (L) 01/01/2021    MCV 96 01/01/2021  01/01/2021    MCH 32 1 01/01/2021    MCHC 33 3 01/01/2021    RDW 13 4 01/01/2021    MPV 9 7 01/01/2021     CMP:   Lab Results   Component Value Date    K 4 0 01/01/2021     (H) 01/01/2021    CO2 26 01/01/2021    BUN 46 (H) 01/01/2021    CREATININE 1 71 (H) 01/01/2021    CALCIUM 8 2 (L) 01/01/2021    EGFR 38 01/01/2021         Results from last 7 days   Lab Units 01/01/21  0503 12/31/20  0548 12/30/20  0445 12/29/20  0542  12/27/20  0523   POTASSIUM mmol/L 4 0 3 7 3 9 3 9   < > 3 5   CHLORIDE mmol/L 110* 111* 111* 110*   < > 108*   CO2 mmol/L 26 23 21 20*   < > 19*   BUN mg/dL 46* 54* 57* 56*   < > 52*   CREATININE mg/dL 1 71* 1 96* 2 33* 2 63*   < > 2 47*   CALCIUM mg/dL 8 2* 7 8* 7 9* 7 8*   < > 8 7   ALK PHOS U/L  --   --   --  84  --  67   ALT U/L  --   --   --  39  --  31   AST U/L  --   --   --  53*  --  36    < > = values in this interval not displayed  Phosphorus: No results found for: PHOS  Magnesium:   Lab Results   Component Value Date    MG 1 8 (L) 01/01/2021     Urinalysis: No results found for: Modesta Michael, SPECGRAV, PHUR, LEUKOCYTESUR, NITRITE, PROTEINUA, GLUCOSEU, KETONESU, BILIRUBINUR, BLOODU  Ionized Calcium: No results found for: CAION  Coagulation: No results found for: PT, INR, APTT  Troponin: No results found for: TROPONINI  ABG: No results found for: PHART, UKT8SMW, PO2ART, ZLF9AIY, R7KIZGXC, BEART, SOURCE  Radiology review:     IMAGING  Procedure: Xr Abdomen 1 View Kub    Result Date: 12/30/2020  Narrative: ABDOMEN INDICATION:   Right UPJ stone  COMPARISON:  CT 12/28/2020 VIEWS:  AP supine FINDINGS: 11 mm calculus is seen adjacent to the right proximal nephroureteral stent, similar position to prior CT  Nonobstructive bowel gas pattern  No acute osseous abnormality is seen  Impression: 11 mm calculus adjacent to right proximal nephroureteral stent, similar position to prior CT and likely within proximal ureter/ UPJ    Workstation performed: [id number] Procedure: Xr Urethrocystogram Retrograde    Result Date: 12/30/2020  Narrative: C-ARM - INDICATION: stent placement  Procedure guidance  COMPARISON:  None TECHNIQUE: FLUOROSCOPY TIME:   1 2 min 9 FLUOROSCOPIC IMAGES FINDINGS: Fluoroscopic guidance provided for surgical procedure  Osseous and soft tissue detail limited by technique  Impression: Fluoroscopic guidance provided for surgical procedure  Please refer to the separate procedure notes for additional details   Workstation performed: [id number]       Current Facility-Administered Medications   Medication Dose Route Frequency    acetaminophen (TYLENOL) tablet 650 mg  650 mg Oral Q4H PRN    ampicillin (OMNIPEN) 2,000 mg in sodium chloride 0 9 % 100 mL IVPB  2,000 mg Intravenous Q8H    ascorbic acid (VITAMIN C) tablet 500 mg  500 mg Oral Daily    calcium carbonate (TUMS) chewable tablet 500 mg  500 mg Oral BID    cholecalciferol (VITAMIN D3) tablet 4,000 Units  4,000 Units Oral Daily    diphenhydrAMINE (BENADRYL) tablet 50 mg  50 mg Oral HS PRN    ferrous sulfate tablet 325 mg  325 mg Oral Daily With Breakfast    finasteride (PROSCAR) tablet 5 mg  5 mg Oral Daily    gabapentin (NEURONTIN) capsule 400 mg  400 mg Oral HS    heparin (porcine) subcutaneous injection 5,000 Units  5,000 Units Subcutaneous Q8H [facility]    morphine (MSIR) IR tablet 15 mg  15 mg Oral BID    ondansetron (ZOFRAN) injection 4 mg  4 mg Intravenous Q6H PRN    sertraline (ZOLOFT) tablet 50 mg  50 mg Oral Daily     Medications Discontinued During This Encounter   Medication Reason    sodium chloride 0 9 % bolus 1,200 mL     sodium chloride 0 9 % bolus 1,000 mL     sodium chloride 0 9 % bolus 1,000 mL     sodium chloride 0 9 % bolus 1,000 mL     cefTRIAXone (ROCEPHIN) IVPB (premix in dextrose) 1,000 mg 50 mL     piperacillin-tazobactam (ZOSYN) 3 375 g in sodium chloride 0 9 % 100 mL IVPB     sodium chloride 0 9 % bolus 500 mL     sodium chloride 0 9 % infusion     sodium chloride 0 9 % infusion Patient Discharge    sterile water irrigation solution Patient Discharge    iohexol (OMNIPAQUE) 300 mg/mL injection Patient Discharge    fentaNYL (SUBLIMAZE) injection 25 mcg Patient Transfer    ondansetron (ZOFRAN) injection 4 mg Patient Transfer    piperacillin-tazobactam (ZOSYN) 2 25 g in sodium chloride 0 9 % 50 mL IVPB     ampicillin (OMNIPEN) 2,000 mg in sodium chloride 0 9 % 100 mL IVPB        Rheadoris Shields, DO      This progress note was produced in part using a dictation device which may document imprecise wording from author's original intent

## 2021-01-01 NOTE — ASSESSMENT & PLAN NOTE
· Previously required up to 4 L via nasal cannula now down to 2 L-> attempt to wean down to baseline room air as tolerated  · Secondary to suspected right basilar pneumonia

## 2021-01-01 NOTE — PROGRESS NOTES
Progress Note - Corinne Hernandez 1945, 66 y o  male MRN: [id number]    Unit/Bed#: -01 Encounter: [id number]    Primary Care Provider: Manjula Boykin DO   Date and time admitted to hospital: 12/27/2020  5:16 AM        * Severe sepsis (evolving)  Assessment & Plan  · With tachycardia/tachypnea coupled leukocytosis and lactic acidosis -> resolved  · In the setting of Proteus bacteremia/UTI and right basilar pneumonia (see below)  · Monitor vital signs and maintain hemodynamics  · Procalcitonin downtrending      Gram-negative bacteremia  Assessment & Plan  · Blood cultures from 12/27 growing Proteus species -> likely source is urine due to concurrent UTI  · Antibiotic regimen tailored to IV Ampicillin  · Repeat blood cultures from 12/28- no growth to date    Right hydroureteronephrosis with obstructing calculus  Assessment & Plan  · S/p retrograde pyelogram with ureteral stenting on 12/30/2020 - appreciate urology evaluation  · PRN pain/emesis control    Thrombocytopenia   Assessment & Plan  · Monitor platelet count - monitor for bleeding    Pneumonia of right lower lobe  Assessment & Plan  · Likely suspected pneumonia  · As seen on CXR imaging  · Suspect possibly aspiration secondary to vomiting under encephalopathic state  · Empiric IV Zosyn transitioned to IV Ampicillin on 12/30/2020 due to concurrent urine culture polymicrobial growth sensitivities  · Aspiration precautions - speech/swallow therapy recommending normal diet  · Maintain oxygen - encourage incentive spirometry      Acute respiratory failure with hypoxia  Assessment & Plan  · Previously required up to 4 L via nasal cannula now down to 2 L-> attempt to wean down to baseline room air as tolerated  · Secondary to suspected right basilar pneumonia    Anemia of chronic disease  Assessment & Plan  · Monitor H/H  · Continue ferrous sulfate supplementation    Chronic pain syndrome  Assessment & Plan  · Continuous opioid dependence  · Continue home medication      Gram-negative UTI  Assessment & Plan  · Urine culture pathogen only growing Proteus mirabilis - lesser colonies of Enterococcus/Alcaligenes species noted  · Sensitivities noted -> transitioned IV Zosyn to IV Ampicillin on 2020  · Chronic Diallo catheter presence noted    Acute metabolic encephalopathy  Assessment & Plan  · In the setting of sepsis/infections coupled with acute kidney injury  · Improving  Patient oriented to place and person but not time  · Continue clinical monitoring    Acute kidney injury - Chronic kidney disease stage 3  Assessment & Plan  · Baseline creatinine approximately 1 2-1 3 - currently @ 1 71 from a 2 71 peak  · Exacerbated in the setting of obstructive hydroureteronephrosis - s/p ureteral stenting on 2020  · Monitor renal function and urine output - limit/avoid nephrotoxins if possible  · Nephrology following - on IV fluids    Chronic indwelling Diallo catheter  Assessment & Plan  · Diallo changed during this admission        VTE Prophylaxis:  Heparin    Patient Centered Rounds: I have performed bedside rounds with nursing staff today  Discussions with Specialists or Other Care Team Provider: nursing, PT/OT  Education and Discussions with Family / Patient: patient and daughter via phone     Current Length of Stay: 5 day(s)    Current Patient Status: Inpatient   Certification Statement: The patient will continue to require additional inpatient hospital stay due to sepsis    Discharge Plan: pending hospital course     Code Status: Level 1 - Full Code    Subjective:   Feeling okay  Patient is pleasantly confused and cannot answer any of my questions  Objective:     Vitals:   Temp (24hrs), Av 5 °F (36 9 °C), Min:97 4 °F (36 3 °C), Max:99 7 °F (37 6 °C)    Temp:  [97 4 °F (36 3 °C)-99 7 °F (37 6 °C)] 99 7 °F (37 6 °C)  HR:  [62-78] 72  Resp:  [18-20] 20  BP: (136-171)/(67-81) 138/67  SpO2:  [94 %-97 %] 97 %  Body mass index is 27 12 kg/m²       Input and Output Summary (last 24 hours): Intake/Output Summary (Last 24 hours) at 1/1/2021 1750  Last data filed at 1/1/2021 1300  Gross per 24 hour   Intake    Output 1850 ml   Net -1850 ml       Physical Exam:   Physical Exam    Additional Data:     Labs:    Results from last 7 days   Lab Units 01/01/21  0503 12/31/20  0548   WBC Thousand/uL 10 20 8 50   HEMOGLOBIN g/dL 10 1* 9 5*   HEMATOCRIT % 30 4* 28 2*   PLATELETS Thousands/uL 164 135*   NEUTROS PCT %  --  76*   LYMPHS PCT %  --  12*   LYMPHO PCT % 8*  --    MONOS PCT %  --  12   MONO PCT % 8  --    EOS PCT % 2 0     Results from last 7 days   Lab Units 01/01/21  0503  12/29/20  0542   SODIUM mmol/L 143   < > 140   POTASSIUM mmol/L 4 0   < > 3 9   CHLORIDE mmol/L 110*   < > 110*   CO2 mmol/L 26   < > 20*   BUN mg/dL 46*   < > 56*   CREATININE mg/dL 1 71*   < > 2 63*   CALCIUM mg/dL 8 2*   < > 7 8*   ALK PHOS U/L  --   --  84   ALT U/L  --   --  39   AST U/L  --   --  53*    < > = values in this interval not displayed  Results from last 7 days   Lab Units 12/27/20  0521   INR  1 39*               * I Have Reviewed All Lab Data Listed Above  * Additional Pertinent Lab Tests Reviewed: [facility] 64 Admission  Reviewed    Imaging:  Imaging Reports Reviewed Today Include: n/a     Recent Cultures (last 7 days):     Results from last 7 days   Lab Units 12/29/20  1157 12/28/20  1236 12/28/20  1227 12/27/20  0528 12/27/20  0521   BLOOD CULTURE   --  No Growth After 4 Days  No Growth After 4 Days    --  Proteus mirabilis*  Enterococcus faecalis*  Proteus mirabilis*   GRAM STAIN RESULT   --   --   --   --  Gram negative rods*  Gram negative rods*   URINE CULTURE  <10,000 cfu/ml Proteus mirabilis*  <10,000 cfu/ml Enterococcus faecalis*  --   --  80,000-89,000 cfu/ml Proteus mirabilis*  50,000-59,000 cfu/ml Enterococcus faecalis*  50,000-59,000 cfu/ml Alcaligenes faecalis*  --        Last 24 Hours Medication List:   Current Facility-Administered Medications   Medication Dose Route Frequency Provider Last Rate    acetaminophen  650 mg Oral Q4H PRN Carmelita Louie MD      ampicillin  2,000 mg Intravenous Q8H Felisa Khoury MD 2,000 mg (01/01/21 1712)    ascorbic acid  500 mg Oral Daily Carmelita Louie MD      calcium carbonate  500 mg Oral BID Carmelita Louie MD      cholecalciferol  4,000 Units Oral Daily Carmelita Louie MD      diphenhydrAMINE  50 mg Oral HS PRN Carmelita Louie MD      ferrous sulfate  325 mg Oral Daily With Breakfast Carmelita Louie MD      finasteride  5 mg Oral Daily Carmelita Louie MD      gabapentin  400 mg Oral HS Carmelita Louie MD      heparin (porcine)  5,000 Units Subcutaneous [facility] Carmelita Louie MD      morphine  15 mg Oral BID Carmelita Louie MD      ondansetron  4 mg Intravenous Q6H PRN Carmelita Louie MD      sertraline  50 mg Oral Daily Carmelita Louie MD          Today, Patient Was Seen By: UBALDO Daniels    ** Please Note: Dictation voice to text software may have been used in the creation of this document   **

## 2021-01-01 NOTE — ASSESSMENT & PLAN NOTE
· Urine culture pathogen only growing Proteus mirabilis - lesser colonies of Enterococcus/Alcaligenes species noted  · Sensitivities noted -> transitioned IV Zosyn to IV Ampicillin on 12/30/2020  · Chronic Diallo catheter presence noted

## 2021-01-01 NOTE — ASSESSMENT & PLAN NOTE
· Likely suspected pneumonia  · As seen on CXR imaging  · Suspect possibly aspiration secondary to vomiting under encephalopathic state  · Empiric IV Zosyn transitioned to IV Ampicillin on 12/30/2020 due to concurrent urine culture polymicrobial growth sensitivities  · Aspiration precautions - speech/swallow therapy recommending normal diet  · Maintain oxygen - encourage incentive spirometry

## 2021-01-01 NOTE — PLAN OF CARE
Problem: Potential for Falls  Goal: Patient will remain free of falls  Description: INTERVENTIONS:  - Assess patient frequently for physical needs  -  Identify cognitive and physical deficits and behaviors that affect risk of falls    -  [place] fall precautions as indicated by assessment   - Educate patient/family on patient safety including physical limitations  - Instruct patient to call for assistance with activity based on assessment  - Modify environment to reduce risk of injury  - Consider OT/PT consult to assist with strengthening/mobility  Outcome: Progressing     Problem: NEUROSENSORY - ADULT  Goal: Achieves stable or improved neurological status  Description: INTERVENTIONS  - Monitor and report changes in neurological status  - Monitor vital signs such as temperature, blood pressure, glucose, and any other labs ordered   - Initiate measures to prevent increased intracranial pressure  - Monitor for seizure activity and implement precautions if appropriate      Outcome: Progressing     Problem: GENITOURINARY - ADULT  Goal: Maintains or returns to baseline urinary function  Description: INTERVENTIONS:  - Assess urinary function  - Encourage oral fluids to ensure adequate hydration if ordered  - Administer IV fluids as ordered to ensure adequate hydration  - Administer ordered medications as needed  - Offer frequent toileting  - Follow urinary retention protocol if ordered  Outcome: Progressing  Goal: Absence of urinary retention  Description: INTERVENTIONS:  - Assess patients ability to void and empty bladder  - Monitor I/O  - Bladder scan as needed  - Discuss with physician/AP medications to alleviate retention as needed  - Discuss catheterization for long term situations as appropriate  Outcome: Progressing  Goal: Urinary catheter remains patent  Description: INTERVENTIONS:  - Assess patency of urinary catheter  - If patient has a chronic cantor, consider changing catheter if non-functioning  - Follow guidelines for intermittent irrigation of non-functioning urinary catheter  Outcome: Progressing     Problem: METABOLIC, FLUID AND ELECTROLYTES - ADULT  Goal: Electrolytes maintained within normal limits  Description: INTERVENTIONS:  - Monitor labs and assess patient for signs and symptoms of electrolyte imbalances  - Administer electrolyte replacement as ordered  - Monitor response to electrolyte replacements, including repeat lab results as appropriate  - Instruct patient on fluid and nutrition as appropriate  Outcome: Progressing     Problem: SKIN/TISSUE INTEGRITY - ADULT  Goal: Skin integrity remains intact  Description: INTERVENTIONS  - Identify patients at risk for skin breakdown  - Assess and monitor skin integrity  - Assess and monitor nutrition and hydration status  - Monitor labs (i e  albumin)  - Assess for incontinence   - Turn and reposition patient  - Assist with mobility/ambulation  - Relieve pressure over bony prominences  - Avoid friction and shearing  - Provide appropriate hygiene as needed including keeping skin clean and dry  - Evaluate need for skin moisturizer/barrier cream  - Collaborate with interdisciplinary team (i e  Nutrition, Rehabilitation, etc )   - Patient/family teaching  Outcome: Progressing     Problem: MUSCULOSKELETAL - ADULT  Goal: Maintain or return mobility to safest level of function  Description: INTERVENTIONS:  - Assess patient's ability to carry out ADLs; assess patient's baseline for ADL function and identify physical deficits which impact ability to perform ADLs (bathing, care of mouth/teeth, toileting, grooming, dressing, etc )  - Assess/evaluate cause of self-care deficits   - Assess range of motion  - Assess patient's mobility  - Assess patient's need for assistive devices and provide as appropriate  - Encourage maximum independence but intervene and supervise when necessary  - Involve family in performance of ADLs  - Assess for home care needs following discharge   - Consider OT consult to assist with ADL evaluation and planning for discharge  - Provide patient education as appropriate  Outcome: Progressing     Problem: PAIN - ADULT  Goal: Verbalizes/displays adequate comfort level or baseline comfort level  Description: Interventions:  - Encourage patient to monitor pain and request assistance  - Assess pain using appropriate pain scale  - Administer analgesics based on type and severity of pain and evaluate response  - Implement non-pharmacological measures as appropriate and evaluate response  - Consider cultural and social influences on pain and pain management  - Notify physician/advanced practitioner if interventions unsuccessful or patient reports new pain  Outcome: Progressing     Problem: INFECTION - ADULT  Goal: Absence or prevention of progression during hospitalization  Description: INTERVENTIONS:  - Assess and monitor for signs and symptoms of infection  - Monitor lab/diagnostic results  - Monitor all insertion sites, i e  indwelling lines, tubes, and drains  - Monitor endotracheal if appropriate and nasal secretions for changes in amount and color  - [place] appropriate cooling/warming therapies per order  - Administer medications as ordered  - Instruct and encourage patient and family to use good hand hygiene technique  - Identify and instruct in appropriate isolation precautions for identified infection/condition  Outcome: Progressing     Problem: SAFETY ADULT  Goal: Patient will remain free of falls  Description: INTERVENTIONS:  - Assess patient frequently for physical needs  -  Identify cognitive and physical deficits and behaviors that affect risk of falls    -  [place] fall precautions as indicated by assessment   - Educate patient/family on patient safety including physical limitations  - Instruct patient to call for assistance with activity based on assessment  - Modify environment to reduce risk of injury  - Consider OT/PT consult to assist with strengthening/mobility  Outcome: Progressing  Goal: Maintain or return to baseline ADL function  Description: INTERVENTIONS:  -  Assess patient's ability to carry out ADLs; assess patient's baseline for ADL function and identify physical deficits which impact ability to perform ADLs (bathing, care of mouth/teeth, toileting, grooming, dressing, etc )  - Assess/evaluate cause of self-care deficits   - Assess range of motion  - Assess patient's mobility; develop plan if impaired  - Assess patient's need for assistive devices and provide as appropriate  - Encourage maximum independence but intervene and supervise when necessary  - Involve family in performance of ADLs  - Assess for home care needs following discharge   - Consider OT consult to assist with ADL evaluation and planning for discharge  - Provide patient education as appropriate  Outcome: Progressing  Goal: Maintain or return mobility status to optimal level  Description: INTERVENTIONS:  - Assess patient's baseline mobility status (ambulation, transfers, stairs, etc )    - Identify cognitive and physical deficits and behaviors that affect mobility  - Identify mobility aids required to assist with transfers and/or ambulation (gait belt, sit-to-stand, lift, walker, cane, etc )  - [place] fall precautions as indicated by assessment  - Record patient progress and toleration of activity level on Mobility SBAR; progress patient to next Phase/Stage  - Instruct patient to call for assistance with activity based on assessment  - Consider rehabilitation consult to assist with strengthening/weightbearing, etc   Outcome: Progressing     Problem: DISCHARGE PLANNING  Goal: Discharge to home or other facility with appropriate resources  Description: INTERVENTIONS:  - Identify barriers to discharge w/patient and caregiver  - Arrange for needed discharge resources and transportation as appropriate  - Identify discharge learning needs (meds, wound care, etc )  - Arrange for interpretive services to assist at discharge as needed  - Refer to Case Management Department for coordinating discharge planning if the patient needs post-hospital services based on physician/advanced practitioner order or complex needs related to functional status, cognitive ability, or social support system  Outcome: Progressing     Problem: Knowledge Deficit  Goal: Patient/family/caregiver demonstrates understanding of disease process, treatment plan, medications, and discharge instructions  Description: Complete learning assessment and assess knowledge base  Interventions:  - Provide teaching at level of understanding  - Provide teaching via preferred learning methods  Outcome: Progressing     Problem: Nutrition/Hydration-ADULT  Goal: Nutrient/Hydration intake appropriate for improving, restoring or maintaining nutritional needs  Description: Monitor and assess patient's nutrition/hydration status for malnutrition  Collaborate with interdisciplinary team and initiate plan and interventions as ordered  Monitor patient's weight and dietary intake as ordered or per policy  Utilize nutrition screening tool and intervene as necessary  Determine patient's food preferences and provide high-protein, high-caloric foods as appropriate       INTERVENTIONS:  - Monitor oral intake, urinary output, labs, and treatment plans  - Assess nutrition and hydration status and recommend course of action  - Evaluate amount of meals eaten  - Assist patient with eating if necessary   - Allow adequate time for meals  - Recommend/ encourage appropriate diets, oral nutritional supplements, and vitamin/mineral supplements  - Order, calculate, and assess calorie counts as needed  - Recommend, monitor, and adjust tube feedings and TPN/PPN based on assessed needs  - Assess need for intravenous fluids  - Provide specific nutrition/hydration education as appropriate  - Include patient/family/caregiver in decisions related to nutrition  Outcome: Progressing     Problem: Prexisting or High Potential for Compromised Skin Integrity  Goal: Skin integrity is maintained or improved  Description: INTERVENTIONS:  - Identify patients at risk for skin breakdown  - Assess and monitor skin integrity  - Assess and monitor nutrition and hydration status  - Monitor labs   - Assess for incontinence   - Turn and reposition patient  - Assist with mobility/ambulation  - Relieve pressure over bony prominences  - Avoid friction and shearing  - Provide appropriate hygiene as needed including keeping skin clean and dry  - Evaluate need for skin moisturizer/barrier cream  - Collaborate with interdisciplinary team   - Patient/family teaching  - Consider wound care consult   Outcome: Progressing

## 2021-01-01 NOTE — ASSESSMENT & PLAN NOTE
· Baseline creatinine approximately 1 2-1 3 - currently @ 1 71 from a 2 71 peak  · Exacerbated in the setting of obstructive hydroureteronephrosis - s/p ureteral stenting on 12/30/2020  · Monitor renal function and urine output - limit/avoid nephrotoxins if possible  · Nephrology following - on IV fluids

## 2021-01-02 ENCOUNTER — APPOINTMENT (INPATIENT)
Dept: NON INVASIVE DIAGNOSTICS | Facility: HOSPITAL | Age: 76
DRG: 853 | End: 2021-01-02
Payer: MEDICARE

## 2021-01-02 LAB
ANION GAP SERPL CALCULATED.3IONS-SCNC: 7 MMOL/L (ref 4–13)
BACTERIA BLD CULT: NORMAL
BACTERIA BLD CULT: NORMAL
BUN SERPL-MCNC: 37 MG/DL (ref 7–25)
CALCIUM SERPL-MCNC: 8.2 MG/DL (ref 8.6–10.5)
CHLORIDE SERPL-SCNC: 112 MMOL/L (ref 98–107)
CO2 SERPL-SCNC: 24 MMOL/L (ref 21–31)
CREAT SERPL-MCNC: 1.59 MG/DL (ref 0.7–1.3)
ERYTHROCYTE [DISTWIDTH] IN BLOOD BY AUTOMATED COUNT: 13.3 % (ref 11.5–14.5)
GFR SERPL CREATININE-BSD FRML MDRD: 41 ML/MIN/1.73SQ M
GLUCOSE SERPL-MCNC: 114 MG/DL (ref 65–99)
HCT VFR BLD AUTO: 27.9 % (ref 42–47)
HGB BLD-MCNC: 9.2 G/DL (ref 14–18)
MCH RBC QN AUTO: 31.9 PG (ref 26–34)
MCHC RBC AUTO-ENTMCNC: 33 G/DL (ref 31–37)
MCV RBC AUTO: 97 FL (ref 81–99)
PLATELET # BLD AUTO: 217 THOUSANDS/UL (ref 149–390)
PMV BLD AUTO: 9.5 FL (ref 8.6–11.7)
POTASSIUM SERPL-SCNC: 3.5 MMOL/L (ref 3.5–5.5)
PROCALCITONIN SERPL-MCNC: 4.19 NG/ML
RBC # BLD AUTO: 2.88 MILLION/UL (ref 4.3–5.9)
SODIUM SERPL-SCNC: 143 MMOL/L (ref 134–143)
WBC # BLD AUTO: 11.8 THOUSAND/UL (ref 4.8–10.8)

## 2021-01-02 PROCEDURE — 85027 COMPLETE CBC AUTOMATED: CPT | Performed by: NURSE PRACTITIONER

## 2021-01-02 PROCEDURE — 99232 SBSQ HOSP IP/OBS MODERATE 35: CPT | Performed by: INTERNAL MEDICINE

## 2021-01-02 PROCEDURE — 93306 TTE W/DOPPLER COMPLETE: CPT

## 2021-01-02 PROCEDURE — 84145 PROCALCITONIN (PCT): CPT | Performed by: NURSE PRACTITIONER

## 2021-01-02 PROCEDURE — 99232 SBSQ HOSP IP/OBS MODERATE 35: CPT | Performed by: NURSE PRACTITIONER

## 2021-01-02 PROCEDURE — 80048 BASIC METABOLIC PNL TOTAL CA: CPT | Performed by: NURSE PRACTITIONER

## 2021-01-02 RX ORDER — MAGNESIUM SULFATE HEPTAHYDRATE 40 MG/ML
2 INJECTION, SOLUTION INTRAVENOUS ONCE
Status: COMPLETED | OUTPATIENT
Start: 2021-01-02 | End: 2021-01-02

## 2021-01-02 RX ORDER — POTASSIUM CHLORIDE 20 MEQ/1
40 TABLET, EXTENDED RELEASE ORAL ONCE
Status: COMPLETED | OUTPATIENT
Start: 2021-01-02 | End: 2021-01-02

## 2021-01-02 RX ORDER — CLONIDINE HYDROCHLORIDE 0.1 MG/1
0.1 TABLET ORAL
Status: DISCONTINUED | OUTPATIENT
Start: 2021-01-02 | End: 2021-01-05 | Stop reason: HOSPADM

## 2021-01-02 RX ADMIN — CALCIUM CARBONATE (ANTACID) CHEW TAB 500 MG 500 MG: 500 CHEW TAB at 08:13

## 2021-01-02 RX ADMIN — FERROUS SULFATE TAB 325 MG (65 MG ELEMENTAL FE) 325 MG: 325 (65 FE) TAB at 08:09

## 2021-01-02 RX ADMIN — MORPHINE SULFATE 15 MG: 15 TABLET ORAL at 17:51

## 2021-01-02 RX ADMIN — CALCIUM CARBONATE (ANTACID) CHEW TAB 500 MG 500 MG: 500 CHEW TAB at 22:31

## 2021-01-02 RX ADMIN — CLONIDINE HYDROCHLORIDE 0.1 MG: 0.1 TABLET ORAL at 22:54

## 2021-01-02 RX ADMIN — OXYCODONE HYDROCHLORIDE AND ACETAMINOPHEN 500 MG: 500 TABLET ORAL at 08:09

## 2021-01-02 RX ADMIN — MAGNESIUM SULFATE IN WATER 2 G: 40 INJECTION, SOLUTION INTRAVENOUS at 10:33

## 2021-01-02 RX ADMIN — FINASTERIDE 5 MG: 5 TABLET, FILM COATED ORAL at 08:09

## 2021-01-02 RX ADMIN — CLONIDINE HYDROCHLORIDE 0.1 MG: 0.1 TABLET ORAL at 05:39

## 2021-01-02 RX ADMIN — HEPARIN SODIUM 5000 UNITS: 5000 INJECTION INTRAVENOUS; SUBCUTANEOUS at 13:27

## 2021-01-02 RX ADMIN — MORPHINE SULFATE 15 MG: 15 TABLET ORAL at 08:09

## 2021-01-02 RX ADMIN — Medication 4000 UNITS: at 08:09

## 2021-01-02 RX ADMIN — GABAPENTIN 400 MG: 400 CAPSULE ORAL at 22:31

## 2021-01-02 RX ADMIN — SERTRALINE HYDROCHLORIDE 50 MG: 50 TABLET ORAL at 08:09

## 2021-01-02 RX ADMIN — AMPICILLIN SODIUM 2000 MG: 2 INJECTION, POWDER, FOR SOLUTION INTRAMUSCULAR; INTRAVENOUS at 02:12

## 2021-01-02 RX ADMIN — AMPICILLIN SODIUM 2000 MG: 2 INJECTION, POWDER, FOR SOLUTION INTRAMUSCULAR; INTRAVENOUS at 17:51

## 2021-01-02 RX ADMIN — HEPARIN SODIUM 5000 UNITS: 5000 INJECTION INTRAVENOUS; SUBCUTANEOUS at 05:01

## 2021-01-02 RX ADMIN — POTASSIUM CHLORIDE 40 MEQ: 1500 TABLET, EXTENDED RELEASE ORAL at 10:37

## 2021-01-02 RX ADMIN — HEPARIN SODIUM 5000 UNITS: 5000 INJECTION INTRAVENOUS; SUBCUTANEOUS at 22:32

## 2021-01-02 RX ADMIN — AMPICILLIN SODIUM 2000 MG: 2 INJECTION, POWDER, FOR SOLUTION INTRAMUSCULAR; INTRAVENOUS at 10:53

## 2021-01-02 NOTE — ASSESSMENT & PLAN NOTE
· With tachycardia/tachypnea coupled leukocytosis and lactic acidosis -> resolved  · In the setting of Proteus bacteremia/UTI and right basilar pneumonia (see below)  · Monitor vital signs and maintain hemodynamics  · Clinically is improving

## 2021-01-02 NOTE — CASE MANAGEMENT
I received a call from Muna Mccrary at [facility] and they are unable to accept this pt for [facility], family will need to give more choices of hhc

## 2021-01-02 NOTE — CASE MANAGEMENT
I received a call from [facility], they will continue to follow this pt, ID consult ordered, cm will continue to work on a safe d/c plan

## 2021-01-02 NOTE — NURSING NOTE
Pt resting comfortably in bed  Titrated oxygen from 2L to 1L via nasal canula, sating 92%  Offers no complaints a this time  Side rails up x2, call bell within reach, bed alarm on  Will continue to monitor

## 2021-01-02 NOTE — ASSESSMENT & PLAN NOTE
· Likely suspected pneumonia  · As seen on CT imaging  · Suspect possibly aspiration secondary to vomiting under encephalopathic state  · Empiric IV Zosyn transitioned to IV Ampicillin on 12/30/2020 due to concurrent urine culture polymicrobial growth sensitivities  · Aspiration precautions - speech/swallow therapy recommending normal diet  · Maintain oxygen - encourage incentive spirometry

## 2021-01-02 NOTE — PROGRESS NOTES
Progress Note - Nephrology   [facility] 66 y o  male MRN: [id number]  Unit/Bed#: -01 Encounter: [id number]    A/P:  1  Acute kidney injury on top of chronic kidney disease               creatinine approaching baseline, continue monitor, continue to avoid nephrotoxins and offer supportive care  2  Chronic kidney disease stage 3 with baseline creatinine between 1 2-1 3 mg/dL  3  Hypocalcemia                continue calcium supplementation  4  Proteus mirabilis urinary tract infection with bacteremia              Continue with IV antibiotics for total 2 weeks, patient to be treated through January 11    5  Severe sepsis-appears significantly improved                improved, continue antibiotics according to Infectious Disease and hospitalist   6  Right ureteropelvic junction obstruction                status post right ureteral stent placement           Follow up reason for today's visit:  Acute kidney injury/chronic kidney disease/hypocalcemia    Severe sepsis [facility])    Patient Active Problem List   Diagnosis    Chronic indwelling Diallo catheter    Severe sepsis (HCC)    Acute kidney injury - Chronic kidney disease stage 3    Acute metabolic encephalopathy    Gram-negative UTI    Chronic pain syndrome    Elevated troponin    CKD (chronic kidney disease)    Anemia of chronic disease    Depression    Hypomagnesemia    Bilateral hand pain    Acute respiratory failure with hypoxia    Right hydroureteronephrosis with obstructing calculus    Gram-negative bacteremia    Pneumonia of right lower lobe    Thrombocytopenia          Subjective:   No Acute events overnight    Objective:     Vitals: Blood pressure 144/70, pulse 71, temperature (!) 97 3 °F (36 3 °C), temperature source Temporal, resp  rate 16, height 5' 9" (1 753 m), weight 83 3 kg (183 lb 10 3 oz), SpO2 94 %  ,Body mass index is 27 12 kg/m²      Weight (last 2 days)     None            Intake/Output Summary (Last 24 hours) at 1/2/2021 1348  Last data filed at 1/2/2021 0512  Gross per 24 hour   Intake 240 ml   Output 2125 ml   Net -1885 ml     I/O last 3 completed shifts: In: 240 [P O :240]  Out: 3975 [Urine:3975]    Urethral Catheter Latex 18 Fr  (Active)   Reasons to continue Urinary Catheter  Chronic urinary catheter 01/01/21 1919   Goal for Removal N/A- chronic cantor 01/01/21 1919   Site Assessment Clean;Skin intact 01/01/21 1919   Collection Container Standard drainage bag 01/01/21 1919   Securement Method Securing device (Describe) 01/01/21 1919   Output (mL) 1350 mL 01/01/21 1300       Physical Exam: /70 (BP Location: Right arm)   Pulse 71   Temp (!) 97 3 °F (36 3 °C) (Temporal)   Resp 16   Ht 5' 9" (1 753 m)   Wt 83 3 kg (183 lb 10 3 oz)   SpO2 94%   BMI 27 12 kg/m²     General Appearance:    Alert, cooperative, no distress, appears stated age   Head:    Normocephalic, without obvious abnormality, atraumatic   Eyes:    Conjunctiva/corneas clear   Ears:    Normal external ears   Nose:   Nares normal, septum midline, mucosa normal, no drainage    or sinus tenderness   Throat:   Lips, mucosa, and tongue normal; teeth and gums normal   Neck:   Supple   Back:     Symmetric, no curvature, ROM normal, no CVA tenderness   Lungs:     Clear to auscultation bilaterally, respirations unlabored   Chest wall:    No tenderness or deformity   Heart:    Regular rate and rhythm, S1 and S2 normal, no murmur, rub   or gallop   Abdomen:     Soft, non-tender, bowel sounds active   Extremities:   Extremities normal, atraumatic, no cyanosis or edema   Skin:   Skin color, texture, turgor normal, no rashes or lesions   Lymph nodes:   Cervical normal   Neurologic:   CNII-XII intact            Lab, Imaging and other studies: I have personally reviewed pertinent labs    CBC:   Lab Results   Component Value Date    WBC 11 80 (H) 01/02/2021    HGB 9 2 (L) 01/02/2021    HCT 27 9 (L) 01/02/2021    MCV 97 01/02/2021     01/02/2021    MCH 31 9 01/02/2021 MCHC 33 0 01/02/2021    RDW 13 3 01/02/2021    MPV 9 5 01/02/2021     CMP:   Lab Results   Component Value Date    K 3 5 01/02/2021     (H) 01/02/2021    CO2 24 01/02/2021    BUN 37 (H) 01/02/2021    CREATININE 1 59 (H) 01/02/2021    CALCIUM 8 2 (L) 01/02/2021    EGFR 41 01/02/2021         Results from last 7 days   Lab Units 01/02/21  0437 01/01/21  0503 12/31/20  0548  12/29/20  0542  12/27/20  0523   POTASSIUM mmol/L 3 5 4 0 3 7   < > 3 9   < > 3 5   CHLORIDE mmol/L 112* 110* 111*   < > 110*   < > 108*   CO2 mmol/L 24 26 23   < > 20*   < > 19*   BUN mg/dL 37* 46* 54*   < > 56*   < > 52*   CREATININE mg/dL 1 59* 1 71* 1 96*   < > 2 63*   < > 2 47*   CALCIUM mg/dL 8 2* 8 2* 7 8*   < > 7 8*   < > 8 7   ALK PHOS U/L  --   --   --   --  84  --  67   ALT U/L  --   --   --   --  39  --  31   AST U/L  --   --   --   --  53*  --  36    < > = values in this interval not displayed  Phosphorus: No results found for: PHOS  Magnesium: No results found for: MG  Urinalysis: No results found for: [place] Pollen, SPECGRAV, PHUR, LEUKOCYTESUR, NITRITE, PROTEINUA, GLUCOSEU, KETONESU, BILIRUBINUR, BLOODU  Ionized Calcium: No results found for: CAION  Coagulation: No results found for: PT, INR, APTT  Troponin: No results found for: TROPONINI  ABG: No results found for: PHART, FFN0XFO, PO2ART, TRO2HXN, Z3XYCKSW, BEART, SOURCE  Radiology review:     IMAGING  No results found      Current Facility-Administered Medications   Medication Dose Route Frequency    acetaminophen (TYLENOL) tablet 650 mg  650 mg Oral Q4H PRN    ampicillin (OMNIPEN) 2,000 mg in sodium chloride 0 9 % 100 mL IVPB  2,000 mg Intravenous Q8H    ascorbic acid (VITAMIN C) tablet 500 mg  500 mg Oral Daily    calcium carbonate (TUMS) chewable tablet 500 mg  500 mg Oral BID    cholecalciferol (VITAMIN D3) tablet 4,000 Units  4,000 Units Oral Daily    cloNIDine (CATAPRES) tablet 0 1 mg  0 1 mg Oral Q3H PRN    diphenhydrAMINE (BENADRYL) tablet 50 mg 50 mg Oral HS PRN    ferrous sulfate tablet 325 mg  325 mg Oral Daily With Breakfast    finasteride (PROSCAR) tablet 5 mg  5 mg Oral Daily    gabapentin (NEURONTIN) capsule 400 mg  400 mg Oral HS    heparin (porcine) subcutaneous injection 5,000 Units  5,000 Units Subcutaneous Q8H [address]    morphine (MSIR) IR tablet 15 mg  15 mg Oral BID    ondansetron (ZOFRAN) injection 4 mg  4 mg Intravenous Q6H PRN    sertraline (ZOLOFT) tablet 50 mg  50 mg Oral Daily     Medications Discontinued During This Encounter   Medication Reason    sodium chloride 0 9 % bolus 1,200 mL     sodium chloride 0 9 % bolus 1,000 mL     sodium chloride 0 9 % bolus 1,000 mL     sodium chloride 0 9 % bolus 1,000 mL     cefTRIAXone (ROCEPHIN) IVPB (premix in dextrose) 1,000 mg 50 mL     piperacillin-tazobactam (ZOSYN) 3 375 g in sodium chloride 0 9 % 100 mL IVPB     sodium chloride 0 9 % bolus 500 mL     sodium chloride 0 9 % infusion     sodium chloride 0 9 % infusion Patient Discharge    sterile water irrigation solution Patient Discharge    iohexol (OMNIPAQUE) 300 mg/mL injection Patient Discharge    fentaNYL (SUBLIMAZE) injection 25 mcg Patient Transfer    ondansetron (ZOFRAN) injection 4 mg Patient Transfer    piperacillin-tazobactam (ZOSYN) 2 25 g in sodium chloride 0 9 % 50 mL IVPB     ampicillin (OMNIPEN) 2,000 mg in sodium chloride 0 9 % 100 mL IVPB        Declan Ricketts, DO      This progress note was produced in part using a dictation device which may document imprecise wording from author's original intent

## 2021-01-02 NOTE — ASSESSMENT & PLAN NOTE
· Baseline creatinine approximately 1 2-1 3 mg/dL- currently @ 1 59 from a 2 71 peak  · Exacerbated in the setting of obstructive hydroureteronephrosis - s/p ureteral stenting on 12/30/2020  · Monitor renal function and urine output - limit/avoid nephrotoxins if possible  · Nephrology following - on IV fluids

## 2021-01-02 NOTE — PROGRESS NOTES
Progress Note - Danielle Davis 1945, 66 y o  male MRN: [id number]    Unit/Bed#: -01 Encounter: [id number]    Primary Care Provider: Venkat Shaikh DO   Date and time admitted to hospital: 12/27/2020  5:16 AM        * Severe sepsis [facility])  Assessment & Plan  · With tachycardia/tachypnea coupled leukocytosis and lactic acidosis -> resolved  · In the setting of Proteus bacteremia/UTI and right basilar pneumonia (see below)  · Monitor vital signs and maintain hemodynamics  · Clinically is improving         Gram-negative bacteremia  Assessment & Plan  · Secondary to sepsis  · Blood cultures from 12/27 growing Proteus species -> likely source is urine due to concurrent UTI  · Received 1 dose of ceftriaxone and vancomycin in the ED; then transition to pip/isidro then ampicillin on 12/30  · Repeated blood cultures- 12/28 no growth   · I discussed case via [employer] with ID- recommend to continue with IV antibiotic for total of 2 week (through 1/11/2020) due to Enterococcus bacteremia and obtain echocardiogram  · Will consult Infectious for formal evaluation on Monday       Right hydroureteronephrosis with obstructing calculus  Assessment & Plan  · S/p retrograde pyelogram with ureteral stenting on 12/30/2020 - appreciate urology evaluation  · PRN pain/emesis control      Thrombocytopenia   Assessment & Plan  · Monitor platelet count - monitor for bleeding      Pneumonia of right lower lobe  Assessment & Plan  · Likely suspected pneumonia  · As seen on CT imaging  · Suspect possibly aspiration secondary to vomiting under encephalopathic state  · Empiric IV Zosyn transitioned to IV Ampicillin on 12/30/2020 due to concurrent urine culture polymicrobial growth sensitivities  · Aspiration precautions - speech/swallow therapy recommending normal diet  · Maintain oxygen - encourage incentive spirometry        Acute respiratory failure with hypoxia  Assessment & Plan  · Previously required up to 4 L via nasal cannula now down to 2 L-> attempt to wean down to baseline room air as tolerated  · Secondary to suspected right basilar pneumonia      Hypomagnesemia  Assessment & Plan  · Monitor/replete p r n  Anemia of chronic disease  Assessment & Plan  · Monitor H/H  · Continue ferrous sulfate supplementation      Chronic pain syndrome  Assessment & Plan  · Continuous opioid dependence  · Continue home medication        Gram-negative UTI  Assessment & Plan  · Urine culture pathogen only growing Proteus mirabilis - lesser colonies of Enterococcus/Alcaligenes species noted  · Sensitivities noted -> transitioned IV Zosyn to IV Ampicillin on 12/30/2020  · Chronic Diallo catheter presence noted      Acute metabolic encephalopathy  Assessment & Plan  · In the setting of sepsis/infections coupled with acute kidney injury  · Improving  Patient oriented to place and person but not time  · Continue clinical monitoring      Acute kidney injury - Chronic kidney disease stage 3  Assessment & Plan  · Baseline creatinine approximately 1 2-1 3 mg/dL- currently @ 1 59 from a 2 71 peak  · Exacerbated in the setting of obstructive hydroureteronephrosis - s/p ureteral stenting on 12/30/2020  · Monitor renal function and urine output - limit/avoid nephrotoxins if possible  · Nephrology following - on IV fluids    Chronic indwelling Diallo catheter  Assessment & Plan  · Diallo changed during this admission            VTE Prophylaxis:  Heparin    Patient Centered Rounds: I have performed bedside rounds with nursing staff today  Discussions with Specialists or Other Care Team Provider: ID, nursing, PT/OT  Education and Discussions with Family / Patient: patient and daughter     Current Length of Stay: 6 day(s)    Current Patient Status: Inpatient   Certification Statement: The patient will continue to require additional inpatient hospital stay due to sepsis    Discharge Plan: pending hospital course  Patient will need IV abx through 1/11  Code Status: Level 1 - Full Code    Subjective:   Feeling okay  Denies any pain  Patient is pleasantly confused  Objective:     Vitals:   Temp (24hrs), Av 1 °F (37 3 °C), Min:97 3 °F (36 3 °C), Max:100 2 °F (37 9 °C)    Temp:  [97 3 °F (36 3 °C)-100 2 °F (37 9 °C)] 97 3 °F (36 3 °C)  HR:  [71-74] 71  Resp:  [16-20] 16  BP: (138-172)/(67-91) 144/70  SpO2:  [92 %-97 %] 94 %  Body mass index is 27 12 kg/m²  Input and Output Summary (last 24 hours): Intake/Output Summary (Last 24 hours) at 2021 0925  Last data filed at 2021 0943  Gross per 24 hour   Intake 240 ml   Output 3475 ml   Net -3235 ml       Physical Exam:   Physical Exam  Vitals signs and nursing note reviewed  Constitutional:       General: He is not in acute distress  Appearance: Normal appearance  HENT:      Head: Normocephalic and atraumatic  Right Ear: External ear normal       Left Ear: External ear normal       Nose: Nose normal       Mouth/Throat:      Mouth: Mucous membranes are moist       Pharynx: Oropharynx is clear  Eyes:      General:         Right eye: No discharge  Left eye: No discharge  Extraocular Movements: Extraocular movements intact  Pupils: Pupils are equal, round, and reactive to light  Neck:      Musculoskeletal: Normal range of motion and neck supple  No neck rigidity  Cardiovascular:      Rate and Rhythm: Normal rate and regular rhythm  Pulses: Normal pulses  Heart sounds: Normal heart sounds  No murmur  Pulmonary:      Effort: Pulmonary effort is normal  No respiratory distress  Breath sounds: Normal breath sounds  No wheezing or rales  Abdominal:      General: Bowel sounds are normal  There is no distension  Palpations: Abdomen is soft  There is no mass  Tenderness: There is no abdominal tenderness  Musculoskeletal: Normal range of motion  General: No swelling, tenderness or deformity     Skin:     General: Skin is warm and dry       Capillary Refill: Capillary refill takes less than 2 seconds  Coloration: Skin is not pale  Findings: No erythema  Neurological:      General: No focal deficit present  Mental Status: He is alert  Mental status is at baseline  Comments: Periods of confusion      Psychiatric:         Mood and Affect: Mood normal          Behavior: Behavior normal          Thought Content: Thought content normal          Additional Data:     Labs:    Results from last 7 days   Lab Units 01/02/21  0437 01/01/21  0503 12/31/20  0548   WBC Thousand/uL 11 80* 10 20 8 50   HEMOGLOBIN g/dL 9 2* 10 1* 9 5*   HEMATOCRIT % 27 9* 30 4* 28 2*   PLATELETS Thousands/uL 217 164 135*   NEUTROS PCT %  --   --  76*   LYMPHS PCT %  --   --  12*   LYMPHO PCT %  --  8*  --    MONOS PCT %  --   --  12   MONO PCT %  --  8  --    EOS PCT %  --  2 0     Results from last 7 days   Lab Units 01/02/21  0437  12/29/20  0542   SODIUM mmol/L 143   < > 140   POTASSIUM mmol/L 3 5   < > 3 9   CHLORIDE mmol/L 112*   < > 110*   CO2 mmol/L 24   < > 20*   BUN mg/dL 37*   < > 56*   CREATININE mg/dL 1 59*   < > 2 63*   CALCIUM mg/dL 8 2*   < > 7 8*   ALK PHOS U/L  --   --  84   ALT U/L  --   --  39   AST U/L  --   --  53*    < > = values in this interval not displayed  Results from last 7 days   Lab Units 12/27/20  0521   INR  1 39*               * I Have Reviewed All Lab Data Listed Above  * Additional Pertinent Lab Tests Reviewed: [facility]  [facility]ed    Imaging:  Imaging Reports Reviewed Today Include: n/a     Recent Cultures (last 7 days):     Results from last 7 days   Lab Units 12/29/20  1157 12/28/20  1236 12/28/20  1227 12/27/20  0528 12/27/20  0521   BLOOD CULTURE   --  No Growth After 4 Days  No Growth After 4 Days    --  Proteus mirabilis*  Enterococcus faecalis*  Proteus mirabilis*   GRAM STAIN RESULT   --   --   --   --  Gram negative rods*  Gram negative rods*   URINE CULTURE  <10,000 cfu/ml Proteus mirabilis*  <10,000 cfu/ml Enterococcus faecalis*  --   --  80,000-89,000 cfu/ml Proteus mirabilis*  50,000-59,000 cfu/ml Enterococcus faecalis*  50,000-59,000 cfu/ml Alcaligenes faecalis*  --        Last 24 Hours Medication List:   Current Facility-Administered Medications   Medication Dose Route Frequency Provider Last Rate    acetaminophen  650 mg Oral Q4H PRN Enrique Dixon MD      ampicillin  2,000 mg Intravenous Q8H UBALDO Hatch 2,000 mg (01/02/21 8157)    ascorbic acid  500 mg Oral Daily Enrique Dixon MD      calcium carbonate  500 mg Oral BID Enrique Dixon MD      cholecalciferol  4,000 Units Oral Daily Enrique Dixon MD      cloNIDine  0 1 mg Oral Q3H PRN Manjit Raphael PA-C      diphenhydrAMINE  50 mg Oral HS PRN Enrique Dixon MD      ferrous sulfate  325 mg Oral Daily With Breakfast Enrique Dixon MD      finasteride  5 mg Oral Daily Enrique Dixon MD      gabapentin  400 mg Oral HS Enrique Dixon MD      heparin (porcine)  5,000 Units Subcutaneous [facility] Enrique Dixon MD      magnesium sulfate  2 g Intravenous Once UBALDO Hatch      morphine  15 mg Oral BID Enrique Dixon MD      ondansetron  4 mg Intravenous Q6H PRN Enrique Dixon MD      potassium chloride  40 mEq Oral Once UBALDO Hatch      sertraline  50 mg Oral Daily Enrique Dixon MD          Today, Patient Was Seen By: UBALDO Hatch    ** Please Note: Dictation voice to text software may have been used in the creation of this document   **

## 2021-01-02 NOTE — ASSESSMENT & PLAN NOTE
· Secondary to sepsis  · Blood cultures from 12/27 growing Proteus species -> likely source is urine due to concurrent UTI  · Received 1 dose of ceftriaxone and vancomycin in the ED; then transition to pip/isidro then ampicillin on 12/30  · Repeated blood cultures- 12/28 no growth   · I discussed case via [employer] with ID- recommend to continue with IV antibiotic for total of 2 week (through 1/11/2020) due to Enterococcus bacteremia and obtain echocardiogram  · Will consult Infectious for formal evaluation on Monday

## 2021-01-02 NOTE — PLAN OF CARE
Problem: Potential for Falls  Goal: Patient will remain free of falls  Description: INTERVENTIONS:  - Assess patient frequently for physical needs  -  Identify cognitive and physical deficits and behaviors that affect risk of falls    -  [place] fall precautions as indicated by assessment   - Educate patient/family on patient safety including physical limitations  - Instruct patient to call for assistance with activity based on assessment  - Modify environment to reduce risk of injury  - Consider OT/PT consult to assist with strengthening/mobility  Outcome: Progressing     Problem: NEUROSENSORY - ADULT  Goal: Achieves stable or improved neurological status  Description: INTERVENTIONS  - Monitor and report changes in neurological status  - Monitor vital signs such as temperature, blood pressure, glucose, and any other labs ordered   - Initiate measures to prevent increased intracranial pressure  - Monitor for seizure activity and implement precautions if appropriate      Outcome: Progressing     Problem: GENITOURINARY - ADULT  Goal: Maintains or returns to baseline urinary function  Description: INTERVENTIONS:  - Assess urinary function  - Encourage oral fluids to ensure adequate hydration if ordered  - Administer IV fluids as ordered to ensure adequate hydration  - Administer ordered medications as needed  - Offer frequent toileting  - Follow urinary retention protocol if ordered  Outcome: Progressing  Goal: Absence of urinary retention  Description: INTERVENTIONS:  - Assess patients ability to void and empty bladder  - Monitor I/O  - Bladder scan as needed  - Discuss with physician/AP medications to alleviate retention as needed  - Discuss catheterization for long term situations as appropriate  Outcome: Progressing  Goal: Urinary catheter remains patent  Description: INTERVENTIONS:  - Assess patency of urinary catheter  - If patient has a chronic cantor, consider changing catheter if non-functioning  - Follow guidelines for intermittent irrigation of non-functioning urinary catheter  Outcome: Progressing     Problem: METABOLIC, FLUID AND ELECTROLYTES - ADULT  Goal: Electrolytes maintained within normal limits  Description: INTERVENTIONS:  - Monitor labs and assess patient for signs and symptoms of electrolyte imbalances  - Administer electrolyte replacement as ordered  - Monitor response to electrolyte replacements, including repeat lab results as appropriate  - Instruct patient on fluid and nutrition as appropriate  Outcome: Progressing     Problem: SKIN/TISSUE INTEGRITY - ADULT  Goal: Skin integrity remains intact  Description: INTERVENTIONS  - Identify patients at risk for skin breakdown  - Assess and monitor skin integrity  - Assess and monitor nutrition and hydration status  - Monitor labs (i e  albumin)  - Assess for incontinence   - Turn and reposition patient  - Assist with mobility/ambulation  - Relieve pressure over bony prominences  - Avoid friction and shearing  - Provide appropriate hygiene as needed including keeping skin clean and dry  - Evaluate need for skin moisturizer/barrier cream  - Collaborate with interdisciplinary team (i e  Nutrition, Rehabilitation, etc )   - Patient/family teaching  Outcome: Progressing     Problem: MUSCULOSKELETAL - ADULT  Goal: Maintain or return mobility to safest level of function  Description: INTERVENTIONS:  - Assess patient's ability to carry out ADLs; assess patient's baseline for ADL function and identify physical deficits which impact ability to perform ADLs (bathing, care of mouth/teeth, toileting, grooming, dressing, etc )  - Assess/evaluate cause of self-care deficits   - Assess range of motion  - Assess patient's mobility  - Assess patient's need for assistive devices and provide as appropriate  - Encourage maximum independence but intervene and supervise when necessary  - Involve family in performance of ADLs  - Assess for home care needs following discharge   - Consider OT consult to assist with ADL evaluation and planning for discharge  - Provide patient education as appropriate  Outcome: Progressing     Problem: PAIN - ADULT  Goal: Verbalizes/displays adequate comfort level or baseline comfort level  Description: Interventions:  - Encourage patient to monitor pain and request assistance  - Assess pain using appropriate pain scale  - Administer analgesics based on type and severity of pain and evaluate response  - Implement non-pharmacological measures as appropriate and evaluate response  - Consider cultural and social influences on pain and pain management  - Notify physician/advanced practitioner if interventions unsuccessful or patient reports new pain  Outcome: Progressing     Problem: INFECTION - ADULT  Goal: Absence or prevention of progression during hospitalization  Description: INTERVENTIONS:  - Assess and monitor for signs and symptoms of infection  - Monitor lab/diagnostic results  - Monitor all insertion sites, i e  indwelling lines, tubes, and drains  - Monitor endotracheal if appropriate and nasal secretions for changes in amount and color  - [place] appropriate cooling/warming therapies per order  - Administer medications as ordered  - Instruct and encourage patient and family to use good hand hygiene technique  - Identify and instruct in appropriate isolation precautions for identified infection/condition  Outcome: Progressing     Problem: SAFETY ADULT  Goal: Patient will remain free of falls  Description: INTERVENTIONS:  - Assess patient frequently for physical needs  -  Identify cognitive and physical deficits and behaviors that affect risk of falls    -  [place] fall precautions as indicated by assessment   - Educate patient/family on patient safety including physical limitations  - Instruct patient to call for assistance with activity based on assessment  - Modify environment to reduce risk of injury  - Consider OT/PT consult to assist with strengthening/mobility  Outcome: Progressing  Goal: Maintain or return to baseline ADL function  Description: INTERVENTIONS:  -  Assess patient's ability to carry out ADLs; assess patient's baseline for ADL function and identify physical deficits which impact ability to perform ADLs (bathing, care of mouth/teeth, toileting, grooming, dressing, etc )  - Assess/evaluate cause of self-care deficits   - Assess range of motion  - Assess patient's mobility; develop plan if impaired  - Assess patient's need for assistive devices and provide as appropriate  - Encourage maximum independence but intervene and supervise when necessary  - Involve family in performance of ADLs  - Assess for home care needs following discharge   - Consider OT consult to assist with ADL evaluation and planning for discharge  - Provide patient education as appropriate  Outcome: Progressing  Goal: Maintain or return mobility status to optimal level  Description: INTERVENTIONS:  - Assess patient's baseline mobility status (ambulation, transfers, stairs, etc )    - Identify cognitive and physical deficits and behaviors that affect mobility  - Identify mobility aids required to assist with transfers and/or ambulation (gait belt, sit-to-stand, lift, walker, cane, etc )  - [place] fall precautions as indicated by assessment  - Record patient progress and toleration of activity level on Mobility SBAR; progress patient to next Phase/Stage  - Instruct patient to call for assistance with activity based on assessment  - Consider rehabilitation consult to assist with strengthening/weightbearing, etc   Outcome: Progressing     Problem: DISCHARGE PLANNING  Goal: Discharge to home or other facility with appropriate resources  Description: INTERVENTIONS:  - Identify barriers to discharge w/patient and caregiver  - Arrange for needed discharge resources and transportation as appropriate  - Identify discharge learning needs (meds, wound care, etc )  - Arrange for interpretive services to assist at discharge as needed  - Refer to Case Management Department for coordinating discharge planning if the patient needs post-hospital services based on physician/advanced practitioner order or complex needs related to functional status, cognitive ability, or social support system  Outcome: Progressing     Problem: Knowledge Deficit  Goal: Patient/family/caregiver demonstrates understanding of disease process, treatment plan, medications, and discharge instructions  Description: Complete learning assessment and assess knowledge base  Interventions:  - Provide teaching at level of understanding  - Provide teaching via preferred learning methods  Outcome: Progressing     Problem: Nutrition/Hydration-ADULT  Goal: Nutrient/Hydration intake appropriate for improving, restoring or maintaining nutritional needs  Description: Monitor and assess patient's nutrition/hydration status for malnutrition  Collaborate with interdisciplinary team and initiate plan and interventions as ordered  Monitor patient's weight and dietary intake as ordered or per policy  Utilize nutrition screening tool and intervene as necessary  Determine patient's food preferences and provide high-protein, high-caloric foods as appropriate       INTERVENTIONS:  - Monitor oral intake, urinary output, labs, and treatment plans  - Assess nutrition and hydration status and recommend course of action  - Evaluate amount of meals eaten  - Assist patient with eating if necessary   - Allow adequate time for meals  - Recommend/ encourage appropriate diets, oral nutritional supplements, and vitamin/mineral supplements  - Order, calculate, and assess calorie counts as needed  - Recommend, monitor, and adjust tube feedings and TPN/PPN based on assessed needs  - Assess need for intravenous fluids  - Provide specific nutrition/hydration education as appropriate  - Include patient/family/caregiver in decisions related to nutrition  Outcome: Progressing     Problem: Prexisting or High Potential for Compromised Skin Integrity  Goal: Skin integrity is maintained or improved  Description: INTERVENTIONS:  - Identify patients at risk for skin breakdown  - Assess and monitor skin integrity  - Assess and monitor nutrition and hydration status  - Monitor labs   - Assess for incontinence   - Turn and reposition patient  - Assist with mobility/ambulation  - Relieve pressure over bony prominences  - Avoid friction and shearing  - Provide appropriate hygiene as needed including keeping skin clean and dry  - Evaluate need for skin moisturizer/barrier cream  - Collaborate with interdisciplinary team   - Patient/family teaching  - Consider wound care consult   Outcome: Progressing

## 2021-01-03 LAB
ANION GAP SERPL CALCULATED.3IONS-SCNC: 8 MMOL/L (ref 4–13)
BUN SERPL-MCNC: 31 MG/DL (ref 7–25)
CALCIUM SERPL-MCNC: 7.8 MG/DL (ref 8.6–10.5)
CHLORIDE SERPL-SCNC: 109 MMOL/L (ref 98–107)
CO2 SERPL-SCNC: 24 MMOL/L (ref 21–31)
CREAT SERPL-MCNC: 1.54 MG/DL (ref 0.7–1.3)
ERYTHROCYTE [DISTWIDTH] IN BLOOD BY AUTOMATED COUNT: 13.5 % (ref 11.5–14.5)
GFR SERPL CREATININE-BSD FRML MDRD: 43 ML/MIN/1.73SQ M
GLUCOSE SERPL-MCNC: 101 MG/DL (ref 65–99)
HCT VFR BLD AUTO: 26.1 % (ref 42–47)
HGB BLD-MCNC: 8.7 G/DL (ref 14–18)
MCH RBC QN AUTO: 31.8 PG (ref 26–34)
MCHC RBC AUTO-ENTMCNC: 33.2 G/DL (ref 31–37)
MCV RBC AUTO: 96 FL (ref 81–99)
PLATELET # BLD AUTO: 263 THOUSANDS/UL (ref 149–390)
PMV BLD AUTO: 9.1 FL (ref 8.6–11.7)
POTASSIUM SERPL-SCNC: 3.6 MMOL/L (ref 3.5–5.5)
PROCALCITONIN SERPL-MCNC: 2.38 NG/ML
RBC # BLD AUTO: 2.72 MILLION/UL (ref 4.3–5.9)
SODIUM SERPL-SCNC: 141 MMOL/L (ref 134–143)
WBC # BLD AUTO: 12.4 THOUSAND/UL (ref 4.8–10.8)

## 2021-01-03 PROCEDURE — 80048 BASIC METABOLIC PNL TOTAL CA: CPT | Performed by: NURSE PRACTITIONER

## 2021-01-03 PROCEDURE — 99232 SBSQ HOSP IP/OBS MODERATE 35: CPT | Performed by: NURSE PRACTITIONER

## 2021-01-03 PROCEDURE — 84145 PROCALCITONIN (PCT): CPT | Performed by: NURSE PRACTITIONER

## 2021-01-03 PROCEDURE — 85027 COMPLETE CBC AUTOMATED: CPT | Performed by: NURSE PRACTITIONER

## 2021-01-03 RX ADMIN — OXYCODONE HYDROCHLORIDE AND ACETAMINOPHEN 500 MG: 500 TABLET ORAL at 08:07

## 2021-01-03 RX ADMIN — GABAPENTIN 400 MG: 400 CAPSULE ORAL at 21:29

## 2021-01-03 RX ADMIN — Medication 4000 UNITS: at 08:07

## 2021-01-03 RX ADMIN — HEPARIN SODIUM 5000 UNITS: 5000 INJECTION INTRAVENOUS; SUBCUTANEOUS at 05:55

## 2021-01-03 RX ADMIN — HEPARIN SODIUM 5000 UNITS: 5000 INJECTION INTRAVENOUS; SUBCUTANEOUS at 21:29

## 2021-01-03 RX ADMIN — FINASTERIDE 5 MG: 5 TABLET, FILM COATED ORAL at 08:07

## 2021-01-03 RX ADMIN — CALCIUM CARBONATE (ANTACID) CHEW TAB 500 MG 500 MG: 500 CHEW TAB at 21:29

## 2021-01-03 RX ADMIN — DIPHENHYDRAMINE HCL 50 MG: 25 TABLET ORAL at 21:33

## 2021-01-03 RX ADMIN — HEPARIN SODIUM 5000 UNITS: 5000 INJECTION INTRAVENOUS; SUBCUTANEOUS at 13:41

## 2021-01-03 RX ADMIN — FERROUS SULFATE TAB 325 MG (65 MG ELEMENTAL FE) 325 MG: 325 (65 FE) TAB at 08:07

## 2021-01-03 RX ADMIN — MORPHINE SULFATE 15 MG: 15 TABLET ORAL at 08:07

## 2021-01-03 RX ADMIN — AMPICILLIN SODIUM 2000 MG: 2 INJECTION, POWDER, FOR SOLUTION INTRAMUSCULAR; INTRAVENOUS at 11:08

## 2021-01-03 RX ADMIN — SERTRALINE HYDROCHLORIDE 50 MG: 50 TABLET ORAL at 08:07

## 2021-01-03 RX ADMIN — CALCIUM CARBONATE (ANTACID) CHEW TAB 500 MG 500 MG: 500 CHEW TAB at 08:08

## 2021-01-03 RX ADMIN — MORPHINE SULFATE 15 MG: 15 TABLET ORAL at 17:23

## 2021-01-03 RX ADMIN — AMPICILLIN SODIUM 2000 MG: 2 INJECTION, POWDER, FOR SOLUTION INTRAMUSCULAR; INTRAVENOUS at 01:48

## 2021-01-03 RX ADMIN — AMPICILLIN SODIUM 2000 MG: 2 INJECTION, POWDER, FOR SOLUTION INTRAMUSCULAR; INTRAVENOUS at 17:23

## 2021-01-03 NOTE — ASSESSMENT & PLAN NOTE
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

## 2021-01-03 NOTE — ASSESSMENT & PLAN NOTE
· Previously required up to 4 L via nasal cannula now down to 2 L-> now on RA  · Likely due to pneumonia

## 2021-01-03 NOTE — PLAN OF CARE
Problem: Potential for Falls  Goal: Patient will remain free of falls  Description: INTERVENTIONS:  - Assess patient frequently for physical needs  -  Identify cognitive and physical deficits and behaviors that affect risk of falls    -  [place] fall precautions as indicated by assessment   - Educate patient/family on patient safety including physical limitations  - Instruct patient to call for assistance with activity based on assessment  - Modify environment to reduce risk of injury  - Consider OT/PT consult to assist with strengthening/mobility  Outcome: Progressing     Problem: NEUROSENSORY - ADULT  Goal: Achieves stable or improved neurological status  Description: INTERVENTIONS  - Monitor and report changes in neurological status  - Monitor vital signs such as temperature, blood pressure, glucose, and any other labs ordered   - Initiate measures to prevent increased intracranial pressure  - Monitor for seizure activity and implement precautions if appropriate      Outcome: Progressing     Problem: GENITOURINARY - ADULT  Goal: Maintains or returns to baseline urinary function  Description: INTERVENTIONS:  - Assess urinary function  - Encourage oral fluids to ensure adequate hydration if ordered  - Administer IV fluids as ordered to ensure adequate hydration  - Administer ordered medications as needed  - Offer frequent toileting  - Follow urinary retention protocol if ordered  Outcome: Progressing  Goal: Absence of urinary retention  Description: INTERVENTIONS:  - Assess patients ability to void and empty bladder  - Monitor I/O  - Bladder scan as needed  - Discuss with physician/AP medications to alleviate retention as needed  - Discuss catheterization for long term situations as appropriate  Outcome: Progressing  Goal: Urinary catheter remains patent  Description: INTERVENTIONS:  - Assess patency of urinary catheter  - If patient has a chronic cantor, consider changing catheter if non-functioning  - Follow guidelines for intermittent irrigation of non-functioning urinary catheter  Outcome: Progressing     Problem: METABOLIC, FLUID AND ELECTROLYTES - ADULT  Goal: Electrolytes maintained within normal limits  Description: INTERVENTIONS:  - Monitor labs and assess patient for signs and symptoms of electrolyte imbalances  - Administer electrolyte replacement as ordered  - Monitor response to electrolyte replacements, including repeat lab results as appropriate  - Instruct patient on fluid and nutrition as appropriate  Outcome: Progressing     Problem: SKIN/TISSUE INTEGRITY - ADULT  Goal: Skin integrity remains intact  Description: INTERVENTIONS  - Identify patients at risk for skin breakdown  - Assess and monitor skin integrity  - Assess and monitor nutrition and hydration status  - Monitor labs (i e  albumin)  - Assess for incontinence   - Turn and reposition patient  - Assist with mobility/ambulation  - Relieve pressure over bony prominences  - Avoid friction and shearing  - Provide appropriate hygiene as needed including keeping skin clean and dry  - Evaluate need for skin moisturizer/barrier cream  - Collaborate with interdisciplinary team (i e  Nutrition, Rehabilitation, etc )   - Patient/family teaching  Outcome: Progressing     Problem: MUSCULOSKELETAL - ADULT  Goal: Maintain or return mobility to safest level of function  Description: INTERVENTIONS:  - Assess patient's ability to carry out ADLs; assess patient's baseline for ADL function and identify physical deficits which impact ability to perform ADLs (bathing, care of mouth/teeth, toileting, grooming, dressing, etc )  - Assess/evaluate cause of self-care deficits   - Assess range of motion  - Assess patient's mobility  - Assess patient's need for assistive devices and provide as appropriate  - Encourage maximum independence but intervene and supervise when necessary  - Involve family in performance of ADLs  - Assess for home care needs following discharge   - Consider OT consult to assist with ADL evaluation and planning for discharge  - Provide patient education as appropriate  Outcome: Progressing     Problem: PAIN - ADULT  Goal: Verbalizes/displays adequate comfort level or baseline comfort level  Description: Interventions:  - Encourage patient to monitor pain and request assistance  - Assess pain using appropriate pain scale  - Administer analgesics based on type and severity of pain and evaluate response  - Implement non-pharmacological measures as appropriate and evaluate response  - Consider cultural and social influences on pain and pain management  - Notify physician/advanced practitioner if interventions unsuccessful or patient reports new pain  Outcome: Progressing     Problem: INFECTION - ADULT  Goal: Absence or prevention of progression during hospitalization  Description: INTERVENTIONS:  - Assess and monitor for signs and symptoms of infection  - Monitor lab/diagnostic results  - Monitor all insertion sites, i e  indwelling lines, tubes, and drains  - Monitor endotracheal if appropriate and nasal secretions for changes in amount and color  - [place] appropriate cooling/warming therapies per order  - Administer medications as ordered  - Instruct and encourage patient and family to use good hand hygiene technique  - Identify and instruct in appropriate isolation precautions for identified infection/condition  Outcome: Progressing     Problem: SAFETY ADULT  Goal: Patient will remain free of falls  Description: INTERVENTIONS:  - Assess patient frequently for physical needs  -  Identify cognitive and physical deficits and behaviors that affect risk of falls    -  [place] fall precautions as indicated by assessment   - Educate patient/family on patient safety including physical limitations  - Instruct patient to call for assistance with activity based on assessment  - Modify environment to reduce risk of injury  - Consider OT/PT consult to assist with strengthening/mobility  Outcome: Progressing  Goal: Maintain or return to baseline ADL function  Description: INTERVENTIONS:  -  Assess patient's ability to carry out ADLs; assess patient's baseline for ADL function and identify physical deficits which impact ability to perform ADLs (bathing, care of mouth/teeth, toileting, grooming, dressing, etc )  - Assess/evaluate cause of self-care deficits   - Assess range of motion  - Assess patient's mobility; develop plan if impaired  - Assess patient's need for assistive devices and provide as appropriate  - Encourage maximum independence but intervene and supervise when necessary  - Involve family in performance of ADLs  - Assess for home care needs following discharge   - Consider OT consult to assist with ADL evaluation and planning for discharge  - Provide patient education as appropriate  Outcome: Progressing  Goal: Maintain or return mobility status to optimal level  Description: INTERVENTIONS:  - Assess patient's baseline mobility status (ambulation, transfers, stairs, etc )    - Identify cognitive and physical deficits and behaviors that affect mobility  - Identify mobility aids required to assist with transfers and/or ambulation (gait belt, sit-to-stand, lift, walker, cane, etc )  - [place] fall precautions as indicated by assessment  - Record patient progress and toleration of activity level on Mobility SBAR; progress patient to next Phase/Stage  - Instruct patient to call for assistance with activity based on assessment  - Consider rehabilitation consult to assist with strengthening/weightbearing, etc   Outcome: Progressing     Problem: DISCHARGE PLANNING  Goal: Discharge to home or other facility with appropriate resources  Description: INTERVENTIONS:  - Identify barriers to discharge w/patient and caregiver  - Arrange for needed discharge resources and transportation as appropriate  - Identify discharge learning needs (meds, wound care, etc )  - Arrange for interpretive services to assist at discharge as needed  - Refer to Case Management Department for coordinating discharge planning if the patient needs post-hospital services based on physician/advanced practitioner order or complex needs related to functional status, cognitive ability, or social support system  Outcome: Progressing     Problem: Knowledge Deficit  Goal: Patient/family/caregiver demonstrates understanding of disease process, treatment plan, medications, and discharge instructions  Description: Complete learning assessment and assess knowledge base  Interventions:  - Provide teaching at level of understanding  - Provide teaching via preferred learning methods  Outcome: Progressing     Problem: Nutrition/Hydration-ADULT  Goal: Nutrient/Hydration intake appropriate for improving, restoring or maintaining nutritional needs  Description: Monitor and assess patient's nutrition/hydration status for malnutrition  Collaborate with interdisciplinary team and initiate plan and interventions as ordered  Monitor patient's weight and dietary intake as ordered or per policy  Utilize nutrition screening tool and intervene as necessary  Determine patient's food preferences and provide high-protein, high-caloric foods as appropriate       INTERVENTIONS:  - Monitor oral intake, urinary output, labs, and treatment plans  - Assess nutrition and hydration status and recommend course of action  - Evaluate amount of meals eaten  - Assist patient with eating if necessary   - Allow adequate time for meals  - Recommend/ encourage appropriate diets, oral nutritional supplements, and vitamin/mineral supplements  - Order, calculate, and assess calorie counts as needed  - Recommend, monitor, and adjust tube feedings and TPN/PPN based on assessed needs  - Assess need for intravenous fluids  - Provide specific nutrition/hydration education as appropriate  - Include patient/family/caregiver in decisions related to nutrition  Outcome: Progressing     Problem: Prexisting or High Potential for Compromised Skin Integrity  Goal: Skin integrity is maintained or improved  Description: INTERVENTIONS:  - Identify patients at risk for skin breakdown  - Assess and monitor skin integrity  - Assess and monitor nutrition and hydration status  - Monitor labs   - Assess for incontinence   - Turn and reposition patient  - Assist with mobility/ambulation  - Relieve pressure over bony prominences  - Avoid friction and shearing  - Provide appropriate hygiene as needed including keeping skin clean and dry  - Evaluate need for skin moisturizer/barrier cream  - Collaborate with interdisciplinary team   - Patient/family teaching  - Consider wound care consult   Outcome: Progressing

## 2021-01-03 NOTE — ASSESSMENT & PLAN NOTE
· Secondary to sepsis  · Blood cultures from 12/27 growing Proteus species -> likely source is urine due to concurrent UTI  · Received 1 dose of ceftriaxone and vancomycin in the ED; then transition to pip/isidro then ampicillin on 12/30  · Repeated blood cultures- 12/28 no growth   · I discussed case via [facility] with ID- recommend to continue with IV antibiotic for total of 2 week (through 1/11/2020) due to Enterococcus bacteremia   · Echocardiogram- final result is pending   · Pending Infectious disease for formal evaluation on Monday   · Noted to have leukocytosis from 11 to 12K today; the patient remained afebrile- continue to trend

## 2021-01-03 NOTE — ASSESSMENT & PLAN NOTE
· Likely suspected pneumonia ?viral etiology  No sputum production or fevers     · As seen on CT imaging  · Suspect possibly aspiration secondary to vomiting under encephalopathic state  · Empiric IV Zosyn transitioned to IV Ampicillin on 12/30/2020 due to concurrent urine culture polymicrobial growth sensitivities  · Aspiration precautions - speech/swallow therapy recommending normal diet  · Maintain oxygen - encourage incentive spirometry

## 2021-01-03 NOTE — PROGRESS NOTES
Progress Note - Kamla Bhupendra 1945, 66 y o  male MRN: [id number]    Unit/Bed#: -01 Encounter: [id number]    Primary Care Provider: Shobha Mercado DO   Date and time admitted to hospital: 12/27/2020  5:16 AM        * Severe sepsis [facility])  Assessment & Plan  · With tachycardia/tachypnea coupled leukocytosis and lactic acidosis -> resolved  · In the setting of Proteus bacteremia/UTI and right basilar pneumonia (see below)  · Monitor vital signs and maintain hemodynamics  · Clinically is improving           Gram-negative bacteremia  Assessment & Plan  · Secondary to sepsis  · Blood cultures from 12/27 growing Proteus species -> likely source is urine due to concurrent UTI  · Received 1 dose of ceftriaxone and vancomycin in the ED; then transition to pip/isidro then ampicillin on 12/30  · Repeated blood cultures- 12/28 no growth   · I discussed case via [facility] with ID- recommend to continue with IV antibiotic for total of 2 week (through 1/11/2020) due to Enterococcus bacteremia   · Echocardiogram- final result is pending   · Pending Infectious disease for formal evaluation on Monday   · Noted to have leukocytosis from 11 to 12K today; the patient remained afebrile- continue to trend      Right hydroureteronephrosis with obstructing calculus  Assessment & Plan  · S/p retrograde pyelogram with ureteral stenting on 12/30/2020 - appreciate urology evaluation  · PRN pain/emesis control        Thrombocytopenia   Assessment & Plan  · Monitor platelet count - monitor for bleeding        Pneumonia of right lower lobe  Assessment & Plan  · Likely suspected pneumonia ?viral etiology  No sputum production or fevers     · As seen on CT imaging  · Suspect possibly aspiration secondary to vomiting under encephalopathic state  · Empiric IV Zosyn transitioned to IV Ampicillin on 12/30/2020 due to concurrent urine culture polymicrobial growth sensitivities  · Aspiration precautions - speech/swallow therapy recommending normal diet  · Maintain oxygen - encourage incentive spirometry        Acute respiratory failure with hypoxia  Assessment & Plan  · Previously required up to 4 L via nasal cannula now down to 2 L-> now on RA  · Likely due to pneumonia      Hypomagnesemia  Assessment & Plan  · Monitor/replete p r n  Anemia of chronic disease  Assessment & Plan  · Monitor H/H  · Continue ferrous sulfate supplementation        Chronic pain syndrome  Assessment & Plan  · Continuous opioid dependence  · Continue home medication            Gram-negative UTI  Assessment & Plan  · Urine culture pathogen only growing Proteus mirabilis - lesser colonies of Enterococcus/Alcaligenes species noted  · Sensitivities noted -> transitioned IV Zosyn to IV Ampicillin on 12/30/2020  · Chronic Diallo catheter presence noted        Acute metabolic encephalopathy  Assessment & Plan  · In the setting of sepsis/infections coupled with acute kidney injury  · Improving  Patient oriented to place and person but not time  Patient is very hard of hearing  · Continue clinical monitoring        Acute kidney injury - Chronic kidney disease stage 3  Assessment & Plan  · Baseline creatinine approximately 1 2-1 3 mg/dL- currently @ 1 54 from a 2 71 peak  · Exacerbated in the setting of obstructive hydroureteronephrosis - s/p ureteral stenting on 12/30/2020  · Monitor renal function and urine output - limit/avoid nephrotoxins if possible  · Nephrology following    Chronic indwelling Diallo catheter  Assessment & Plan  · Diallo changed during this admission              VTE Prophylaxis:  Enoxaparin (Lovenox)    Patient Centered Rounds: I have performed bedside rounds with nursing staff today      Discussions with Specialists or Other Care Team Provider:  Nursing, PT and OT  Education and Discussions with Family / Patient:  Patient and daughter via phone    Current Length of Stay: 7 day(s)    Current Patient Status: Inpatient   Certification Statement: The patient will continue to require additional inpatient hospital stay due to Sepsis    Discharge Plan:  Pending hospital course  The patient is pending Infectious Disease evaluation  Code Status: Level 1 - Full Code    Subjective:   Feeling okay  Denies any pain  Objective:     Vitals:   Temp (24hrs), Av 6 °F (37 °C), Min:98 3 °F (36 8 °C), Max:98 8 °F (37 1 °C)    Temp:  [98 3 °F (36 8 °C)-98 8 °F (37 1 °C)] 98 6 °F (37 °C)  HR:  [62-70] 62  Resp:  [16-17] 17  BP: (110-178)/(56-82) 158/74  SpO2:  [90 %-93 %] 91 %  Body mass index is 27 12 kg/m²  Input and Output Summary (last 24 hours): Intake/Output Summary (Last 24 hours) at 1/3/2021 1244  Last data filed at 1/3/2021 0418  Gross per 24 hour   Intake    Output 800 ml   Net -800 ml       Physical Exam:   Physical Exam  Vitals signs and nursing note reviewed  Constitutional:       General: He is not in acute distress  Appearance: Normal appearance  HENT:      Head: Normocephalic and atraumatic  Right Ear: External ear normal       Left Ear: External ear normal       Nose: Nose normal       Mouth/Throat:      Mouth: Mucous membranes are moist       Pharynx: Oropharynx is clear  Eyes:      General:         Right eye: No discharge  Left eye: No discharge  Extraocular Movements: Extraocular movements intact  Pupils: Pupils are equal, round, and reactive to light  Neck:      Musculoskeletal: Normal range of motion and neck supple  No neck rigidity  Cardiovascular:      Rate and Rhythm: Normal rate and regular rhythm  Pulses: Normal pulses  Heart sounds: Normal heart sounds  No murmur  Pulmonary:      Effort: Pulmonary effort is normal  No respiratory distress  Breath sounds: Normal breath sounds  No wheezing or rales  Abdominal:      General: Bowel sounds are normal  There is no distension  Palpations: Abdomen is soft  There is no mass  Tenderness: There is no abdominal tenderness  Musculoskeletal: Normal range of motion  General: No swelling, tenderness or deformity  Skin:     General: Skin is warm and dry  Capillary Refill: Capillary refill takes less than 2 seconds  Coloration: Skin is not pale  Findings: No erythema  Neurological:      General: No focal deficit present  Mental Status: He is alert  Mental status is at baseline  Comments: With period of confusion      Psychiatric:         Mood and Affect: Mood normal          Behavior: Behavior normal          Thought Content: Thought content normal          Additional Data:     Labs:    Results from last 7 days   Lab Units 01/03/21  0556  01/01/21  0503 12/31/20  0548   WBC Thousand/uL 12 40*   < > 10 20 8 50   HEMOGLOBIN g/dL 8 7*   < > 10 1* 9 5*   HEMATOCRIT % 26 1*   < > 30 4* 28 2*   PLATELETS Thousands/uL 263   < > 164 135*   NEUTROS PCT %  --   --   --  76*   LYMPHS PCT %  --   --   --  12*   LYMPHO PCT %  --   --  8*  --    MONOS PCT %  --   --   --  12   MONO PCT %  --   --  8  --    EOS PCT %  --   --  2 0    < > = values in this interval not displayed  Results from last 7 days   Lab Units 01/03/21  0556  12/29/20  0542   SODIUM mmol/L 141   < > 140   POTASSIUM mmol/L 3 6   < > 3 9   CHLORIDE mmol/L 109*   < > 110*   CO2 mmol/L 24   < > 20*   BUN mg/dL 31*   < > 56*   CREATININE mg/dL 1 54*   < > 2 63*   CALCIUM mg/dL 7 8*   < > 7 8*   ALK PHOS U/L  --   --  84   ALT U/L  --   --  39   AST U/L  --   --  53*    < > = values in this interval not displayed  * I Have Reviewed All Lab Data Listed Above  * Additional Pertinent Lab Tests Reviewed: Tressa 64 Admission  Reviewed    Imaging:  Imaging Reports Reviewed Today Include: n/a     Recent Cultures (last 7 days):     Results from last 7 days   Lab Units 12/29/20  1157 12/28/20  1236 12/28/20  1227   BLOOD CULTURE   --  No Growth After 5 Days  No Growth After 5 Days     URINE CULTURE  <10,000 cfu/ml Proteus mirabilis*  <10,000 cfu/ml Enterococcus faecalis*  --   --        Last 24 Hours Medication List:   Current Facility-Administered Medications   Medication Dose Route Frequency Provider Last Rate    acetaminophen  650 mg Oral Q4H PRN Yelena Horton MD      ampicillin  2,000 mg Intravenous Q8H UBALDO Mittal 2,000 mg (01/03/21 1108)    ascorbic acid  500 mg Oral Daily Yelena Horton MD      calcium carbonate  500 mg Oral BID Yelena Horton MD      cholecalciferol  4,000 Units Oral Daily Yelena Horton MD      cloNIDine  0 1 mg Oral Q3H PRN Sam Petty PA-C      diphenhydrAMINE  50 mg Oral HS PRN Yelena Horton MD      ferrous sulfate  325 mg Oral Daily With Breakfast Yelena Horton MD      finasteride  5 mg Oral Daily Yelena Horton MD      gabapentin  400 mg Oral HS Yelena Horton MD      heparin (porcine)  5,000 Units Subcutaneous [facility] Yelena Horton MD      morphine  15 mg Oral BID Yelena Horton MD      ondansetron  4 mg Intravenous Q6H PRN Yelena Horton MD      sertraline  50 mg Oral Daily Yelena Horton MD          Today, Patient Was Seen By: UBALDO Mittal    ** Please Note: Dictation voice to text software may have been used in the creation of this document   **

## 2021-01-03 NOTE — ASSESSMENT & PLAN NOTE
· In the setting of sepsis/infections coupled with acute kidney injury  · Improving  Patient oriented to place and person but not time  Patient is very hard of hearing     · Continue clinical monitoring

## 2021-01-04 PROBLEM — F33.0 MILD EPISODE OF RECURRENT MAJOR DEPRESSIVE DISORDER (HCC): Status: ACTIVE | Noted: 2019-08-21

## 2021-01-04 LAB
ANION GAP SERPL CALCULATED.3IONS-SCNC: 6 MMOL/L (ref 4–13)
BUN SERPL-MCNC: 29 MG/DL (ref 7–25)
CALCIUM SERPL-MCNC: 7.8 MG/DL (ref 8.6–10.5)
CHLORIDE SERPL-SCNC: 106 MMOL/L (ref 98–107)
CO2 SERPL-SCNC: 27 MMOL/L (ref 21–31)
CREAT SERPL-MCNC: 1.58 MG/DL (ref 0.7–1.3)
ERYTHROCYTE [DISTWIDTH] IN BLOOD BY AUTOMATED COUNT: 13.1 % (ref 11.5–14.5)
GFR SERPL CREATININE-BSD FRML MDRD: 42 ML/MIN/1.73SQ M
GLUCOSE SERPL-MCNC: 96 MG/DL (ref 65–99)
HCT VFR BLD AUTO: 25.7 % (ref 42–47)
HGB BLD-MCNC: 8.5 G/DL (ref 14–18)
MAGNESIUM SERPL-MCNC: 1.6 MG/DL (ref 1.9–2.7)
MCH RBC QN AUTO: 32 PG (ref 26–34)
MCHC RBC AUTO-ENTMCNC: 33.2 G/DL (ref 31–37)
MCV RBC AUTO: 96 FL (ref 81–99)
PLATELET # BLD AUTO: 282 THOUSANDS/UL (ref 149–390)
PMV BLD AUTO: 9.3 FL (ref 8.6–11.7)
POTASSIUM SERPL-SCNC: 3.9 MMOL/L (ref 3.5–5.5)
PROCALCITONIN SERPL-MCNC: 1.5 NG/ML
RBC # BLD AUTO: 2.66 MILLION/UL (ref 4.3–5.9)
SODIUM SERPL-SCNC: 139 MMOL/L (ref 134–143)
WBC # BLD AUTO: 13.1 THOUSAND/UL (ref 4.8–10.8)

## 2021-01-04 PROCEDURE — 80048 BASIC METABOLIC PNL TOTAL CA: CPT | Performed by: NURSE PRACTITIONER

## 2021-01-04 PROCEDURE — 97530 THERAPEUTIC ACTIVITIES: CPT

## 2021-01-04 PROCEDURE — 93306 TTE W/DOPPLER COMPLETE: CPT | Performed by: INTERNAL MEDICINE

## 2021-01-04 PROCEDURE — 99232 SBSQ HOSP IP/OBS MODERATE 35: CPT | Performed by: NURSE PRACTITIONER

## 2021-01-04 PROCEDURE — 83735 ASSAY OF MAGNESIUM: CPT | Performed by: NURSE PRACTITIONER

## 2021-01-04 PROCEDURE — 97116 GAIT TRAINING THERAPY: CPT

## 2021-01-04 PROCEDURE — 85027 COMPLETE CBC AUTOMATED: CPT | Performed by: NURSE PRACTITIONER

## 2021-01-04 PROCEDURE — 97110 THERAPEUTIC EXERCISES: CPT

## 2021-01-04 PROCEDURE — G0427 INPT/ED TELECONSULT70: HCPCS | Performed by: INTERNAL MEDICINE

## 2021-01-04 PROCEDURE — 99233 SBSQ HOSP IP/OBS HIGH 50: CPT | Performed by: NURSE PRACTITIONER

## 2021-01-04 PROCEDURE — 84145 PROCALCITONIN (PCT): CPT | Performed by: NURSE PRACTITIONER

## 2021-01-04 RX ORDER — METRONIDAZOLE 500 MG/1
500 TABLET ORAL EVERY 8 HOURS SCHEDULED
Status: DISCONTINUED | OUTPATIENT
Start: 2021-01-04 | End: 2021-01-05 | Stop reason: HOSPADM

## 2021-01-04 RX ORDER — MAGNESIUM SULFATE HEPTAHYDRATE 40 MG/ML
2 INJECTION, SOLUTION INTRAVENOUS ONCE
Status: COMPLETED | OUTPATIENT
Start: 2021-01-04 | End: 2021-01-04

## 2021-01-04 RX ADMIN — CALCIUM CARBONATE (ANTACID) CHEW TAB 500 MG 500 MG: 500 CHEW TAB at 22:36

## 2021-01-04 RX ADMIN — AMPICILLIN SODIUM 2000 MG: 2 INJECTION, POWDER, FOR SOLUTION INTRAMUSCULAR; INTRAVENOUS at 02:43

## 2021-01-04 RX ADMIN — HEPARIN SODIUM 5000 UNITS: 5000 INJECTION INTRAVENOUS; SUBCUTANEOUS at 06:30

## 2021-01-04 RX ADMIN — HEPARIN SODIUM 5000 UNITS: 5000 INJECTION INTRAVENOUS; SUBCUTANEOUS at 14:07

## 2021-01-04 RX ADMIN — CALCIUM CARBONATE (ANTACID) CHEW TAB 500 MG 500 MG: 500 CHEW TAB at 11:23

## 2021-01-04 RX ADMIN — Medication 4000 UNITS: at 11:23

## 2021-01-04 RX ADMIN — HEPARIN SODIUM 5000 UNITS: 5000 INJECTION INTRAVENOUS; SUBCUTANEOUS at 22:37

## 2021-01-04 RX ADMIN — AMPICILLIN SODIUM 2000 MG: 2 INJECTION, POWDER, FOR SOLUTION INTRAMUSCULAR; INTRAVENOUS at 17:47

## 2021-01-04 RX ADMIN — MAGNESIUM SULFATE IN WATER 2 G: 40 INJECTION, SOLUTION INTRAVENOUS at 11:30

## 2021-01-04 RX ADMIN — MORPHINE SULFATE 15 MG: 15 TABLET ORAL at 17:46

## 2021-01-04 RX ADMIN — FERROUS SULFATE TAB 325 MG (65 MG ELEMENTAL FE) 325 MG: 325 (65 FE) TAB at 11:24

## 2021-01-04 RX ADMIN — FINASTERIDE 5 MG: 5 TABLET, FILM COATED ORAL at 11:23

## 2021-01-04 RX ADMIN — OXYCODONE HYDROCHLORIDE AND ACETAMINOPHEN 500 MG: 500 TABLET ORAL at 11:23

## 2021-01-04 RX ADMIN — METRONIDAZOLE 500 MG: 500 TABLET, FILM COATED ORAL at 17:46

## 2021-01-04 RX ADMIN — AMPICILLIN SODIUM 2000 MG: 2 INJECTION, POWDER, FOR SOLUTION INTRAMUSCULAR; INTRAVENOUS at 11:24

## 2021-01-04 RX ADMIN — MORPHINE SULFATE 15 MG: 15 TABLET ORAL at 11:28

## 2021-01-04 RX ADMIN — SERTRALINE HYDROCHLORIDE 50 MG: 50 TABLET ORAL at 11:24

## 2021-01-04 RX ADMIN — METRONIDAZOLE 500 MG: 500 TABLET, FILM COATED ORAL at 22:37

## 2021-01-04 RX ADMIN — GABAPENTIN 400 MG: 400 CAPSULE ORAL at 22:37

## 2021-01-04 NOTE — PLAN OF CARE
Problem: Potential for Falls  Goal: Patient will remain free of falls  Description: INTERVENTIONS:  - Assess patient frequently for physical needs  -  Identify cognitive and physical deficits and behaviors that affect risk of falls    -  [place] fall precautions as indicated by assessment   - Educate patient/family on patient safety including physical limitations  - Instruct patient to call for assistance with activity based on assessment  - Modify environment to reduce risk of injury  - Consider OT/PT consult to assist with strengthening/mobility  1/4/2021 0346 by Agapito Rothman RN  Outcome: Progressing  1/4/2021 0346 by Agapito Rothman RN  Outcome: Progressing     Problem: NEUROSENSORY - ADULT  Goal: Achieves stable or improved neurological status  Description: INTERVENTIONS  - Monitor and report changes in neurological status  - Monitor vital signs such as temperature, blood pressure, glucose, and any other labs ordered   - Initiate measures to prevent increased intracranial pressure  - Monitor for seizure activity and implement precautions if appropriate      1/4/2021 0346 by Agapito Rothman RN  Outcome: Progressing  1/4/2021 0346 by Agapito Rothman RN  Outcome: Progressing     Problem: GENITOURINARY - ADULT  Goal: Maintains or returns to baseline urinary function  Description: INTERVENTIONS:  - Assess urinary function  - Encourage oral fluids to ensure adequate hydration if ordered  - Administer IV fluids as ordered to ensure adequate hydration  - Administer ordered medications as needed  - Offer frequent toileting  - Follow urinary retention protocol if ordered  1/4/2021 0346 by Agapito Rothman RN  Outcome: Progressing  1/4/2021 0346 by Agapito Rothman RN  Outcome: Progressing  Goal: Absence of urinary retention  Description: INTERVENTIONS:  - Assess patients ability to void and empty bladder  - Monitor I/O  - Bladder scan as needed  - Discuss with physician/AP medications to alleviate retention as needed  - Discuss catheterization for long term situations as appropriate  1/4/2021 0346 by Timbo Da Silva RN  Outcome: Progressing  1/4/2021 0346 by Timbo Da Silva RN  Outcome: Progressing  Goal: Urinary catheter remains patent  Description: INTERVENTIONS:  - Assess patency of urinary catheter  - If patient has a chronic cantor, consider changing catheter if non-functioning  - Follow guidelines for intermittent irrigation of non-functioning urinary catheter  1/4/2021 0346 by Timbo Da Silva RN  Outcome: Progressing  1/4/2021 0346 by Timbo Da Silva RN  Outcome: Progressing     Problem: METABOLIC, FLUID AND ELECTROLYTES - ADULT  Goal: Electrolytes maintained within normal limits  Description: INTERVENTIONS:  - Monitor labs and assess patient for signs and symptoms of electrolyte imbalances  - Administer electrolyte replacement as ordered  - Monitor response to electrolyte replacements, including repeat lab results as appropriate  - Instruct patient on fluid and nutrition as appropriate  1/4/2021 0346 by Timbo Da Silva RN  Outcome: Progressing  1/4/2021 0346 by Timbo Da Silva RN  Outcome: Progressing     Problem: SKIN/TISSUE INTEGRITY - ADULT  Goal: Skin integrity remains intact  Description: INTERVENTIONS  - Identify patients at risk for skin breakdown  - Assess and monitor skin integrity  - Assess and monitor nutrition and hydration status  - Monitor labs (i e  albumin)  - Assess for incontinence   - Turn and reposition patient  - Assist with mobility/ambulation  - Relieve pressure over bony prominences  - Avoid friction and shearing  - Provide appropriate hygiene as needed including keeping skin clean and dry  - Evaluate need for skin moisturizer/barrier cream  - Collaborate with interdisciplinary team (i e  Nutrition, Rehabilitation, etc )   - Patient/family teaching  1/4/2021 0346 by Timbo Da Silva RN  Outcome: Progressing  1/4/2021 0346 by Timbo Da Silva RN  Outcome: Progressing     Problem: MUSCULOSKELETAL - ADULT  Goal: Maintain or return mobility to safest level of function  Description: INTERVENTIONS:  - Assess patient's ability to carry out ADLs; assess patient's baseline for ADL function and identify physical deficits which impact ability to perform ADLs (bathing, care of mouth/teeth, toileting, grooming, dressing, etc )  - Assess/evaluate cause of self-care deficits   - Assess range of motion  - Assess patient's mobility  - Assess patient's need for assistive devices and provide as appropriate  - Encourage maximum independence but intervene and supervise when necessary  - Involve family in performance of ADLs  - Assess for home care needs following discharge   - Consider OT consult to assist with ADL evaluation and planning for discharge  - Provide patient education as appropriate  1/4/2021 0346 by Kb Whitaker RN  Outcome: Progressing  1/4/2021 0346 by Kb Whitaker RN  Outcome: Progressing     Problem: PAIN - ADULT  Goal: Verbalizes/displays adequate comfort level or baseline comfort level  Description: Interventions:  - Encourage patient to monitor pain and request assistance  - Assess pain using appropriate pain scale  - Administer analgesics based on type and severity of pain and evaluate response  - Implement non-pharmacological measures as appropriate and evaluate response  - Consider cultural and social influences on pain and pain management  - Notify physician/advanced practitioner if interventions unsuccessful or patient reports new pain  1/4/2021 0346 by Kb Whitaker RN  Outcome: Progressing  1/4/2021 0346 by Kb Whitaker RN  Outcome: Progressing     Problem: INFECTION - ADULT  Goal: Absence or prevention of progression during hospitalization  Description: INTERVENTIONS:  - Assess and monitor for signs and symptoms of infection  - Monitor lab/diagnostic results  - Monitor all insertion sites, i e  indwelling lines, tubes, and drains  - Monitor endotracheal if appropriate and nasal secretions for changes in amount and color  - [place] appropriate cooling/warming therapies per order  - Administer medications as ordered  - Instruct and encourage patient and family to use good hand hygiene technique  - Identify and instruct in appropriate isolation precautions for identified infection/condition  1/4/2021 0346 by Kb Whitaker RN  Outcome: Progressing  1/4/2021 0346 by Kb Whitaker RN  Outcome: Progressing     Problem: SAFETY ADULT  Goal: Patient will remain free of falls  Description: INTERVENTIONS:  - Assess patient frequently for physical needs  -  Identify cognitive and physical deficits and behaviors that affect risk of falls    -  [place] fall precautions as indicated by assessment   - Educate patient/family on patient safety including physical limitations  - Instruct patient to call for assistance with activity based on assessment  - Modify environment to reduce risk of injury  - Consider OT/PT consult to assist with strengthening/mobility  1/4/2021 0346 by Kb Whitaker RN  Outcome: Progressing  1/4/2021 0346 by Kb Whitaker RN  Outcome: Progressing  Goal: Maintain or return to baseline ADL function  Description: INTERVENTIONS:  -  Assess patient's ability to carry out ADLs; assess patient's baseline for ADL function and identify physical deficits which impact ability to perform ADLs (bathing, care of mouth/teeth, toileting, grooming, dressing, etc )  - Assess/evaluate cause of self-care deficits   - Assess range of motion  - Assess patient's mobility; develop plan if impaired  - Assess patient's need for assistive devices and provide as appropriate  - Encourage maximum independence but intervene and supervise when necessary  - Involve family in performance of ADLs  - Assess for home care needs following discharge   - Consider OT consult to assist with ADL evaluation and planning for discharge  - Provide patient education as appropriate  1/4/2021 0346 by Kb Whitaker RN  Outcome: Progressing  1/4/2021 0346 by Aly Leonardo GELY Benson  Outcome: Progressing  Goal: Maintain or return mobility status to optimal level  Description: INTERVENTIONS:  - Assess patient's baseline mobility status (ambulation, transfers, stairs, etc )    - Identify cognitive and physical deficits and behaviors that affect mobility  - Identify mobility aids required to assist with transfers and/or ambulation (gait belt, sit-to-stand, lift, walker, cane, etc )  - [place] fall precautions as indicated by assessment  - Record patient progress and toleration of activity level on Mobility SBAR; progress patient to next Phase/Stage  - Instruct patient to call for assistance with activity based on assessment  - Consider rehabilitation consult to assist with strengthening/weightbearing, etc   1/4/2021 0346 by Ashu Pink RN  Outcome: Progressing  1/4/2021 0346 by Ashu Pink RN  Outcome: Progressing     Problem: DISCHARGE PLANNING  Goal: Discharge to home or other facility with appropriate resources  Description: INTERVENTIONS:  - Identify barriers to discharge w/patient and caregiver  - Arrange for needed discharge resources and transportation as appropriate  - Identify discharge learning needs (meds, wound care, etc )  - Arrange for interpretive services to assist at discharge as needed  - Refer to Case Management Department for coordinating discharge planning if the patient needs post-hospital services based on physician/advanced practitioner order or complex needs related to functional status, cognitive ability, or social support system  1/4/2021 0346 by Ashu Pink RN  Outcome: Progressing  1/4/2021 0346 by Ashu Pink RN  Outcome: Progressing     Problem: Knowledge Deficit  Goal: Patient/family/caregiver demonstrates understanding of disease process, treatment plan, medications, and discharge instructions  Description: Complete learning assessment and assess knowledge base    Interventions:  - Provide teaching at level of understanding  - Provide teaching via preferred learning methods  1/4/2021 0346 by Agapito Rothman RN  Outcome: Progressing  1/4/2021 0346 by Agapito Rothman RN  Outcome: Progressing     Problem: Nutrition/Hydration-ADULT  Goal: Nutrient/Hydration intake appropriate for improving, restoring or maintaining nutritional needs  Description: Monitor and assess patient's nutrition/hydration status for malnutrition  Collaborate with interdisciplinary team and initiate plan and interventions as ordered  Monitor patient's weight and dietary intake as ordered or per policy  Utilize nutrition screening tool and intervene as necessary  Determine patient's food preferences and provide high-protein, high-caloric foods as appropriate       INTERVENTIONS:  - Monitor oral intake, urinary output, labs, and treatment plans  - Assess nutrition and hydration status and recommend course of action  - Evaluate amount of meals eaten  - Assist patient with eating if necessary   - Allow adequate time for meals  - Recommend/ encourage appropriate diets, oral nutritional supplements, and vitamin/mineral supplements  - Order, calculate, and assess calorie counts as needed  - Recommend, monitor, and adjust tube feedings and TPN/PPN based on assessed needs  - Assess need for intravenous fluids  - Provide specific nutrition/hydration education as appropriate  - Include patient/family/caregiver in decisions related to nutrition  1/4/2021 0346 by Agapito Rothman RN  Outcome: Progressing  1/4/2021 0346 by Agapito Rothman RN  Outcome: Progressing     Problem: Prexisting or High Potential for Compromised Skin Integrity  Goal: Skin integrity is maintained or improved  Description: INTERVENTIONS:  - Identify patients at risk for skin breakdown  - Assess and monitor skin integrity  - Assess and monitor nutrition and hydration status  - Monitor labs   - Assess for incontinence   - Turn and reposition patient  - Assist with mobility/ambulation  - Relieve pressure over bony prominences  - Avoid friction and shearing  - Provide appropriate hygiene as needed including keeping skin clean and dry  - Evaluate need for skin moisturizer/barrier cream  - Collaborate with interdisciplinary team   - Patient/family teaching  - Consider wound care consult   1/4/2021 0346 by Jena Jarvis RN  Outcome: Progressing  1/4/2021 0346 by Jena Jarvis RN  Outcome: Progressing     Problem: Potential for Falls  Goal: Patient will remain free of falls  Description: INTERVENTIONS:  - Assess patient frequently for physical needs  -  Identify cognitive and physical deficits and behaviors that affect risk of falls    -  [place] fall precautions as indicated by assessment   - Educate patient/family on patient safety including physical limitations  - Instruct patient to call for assistance with activity based on assessment  - Modify environment to reduce risk of injury  - Consider OT/PT consult to assist with strengthening/mobility  1/4/2021 0348 by Jena Jarvis RN  Outcome: Progressing  1/4/2021 0346 by Jena Jarvis RN  Outcome: Progressing  1/4/2021 0346 by Jena Jarvis RN  Outcome: Progressing     Problem: NEUROSENSORY - ADULT  Goal: Achieves stable or improved neurological status  Description: INTERVENTIONS  - Monitor and report changes in neurological status  - Monitor vital signs such as temperature, blood pressure, glucose, and any other labs ordered   - Initiate measures to prevent increased intracranial pressure  - Monitor for seizure activity and implement precautions if appropriate      1/4/2021 0348 by Jena Jarvis RN  Outcome: Progressing  1/4/2021 0346 by Jena Jarvis RN  Outcome: Progressing  1/4/2021 0346 by Jena Jarvis RN  Outcome: Progressing     Problem: GENITOURINARY - ADULT  Goal: Maintains or returns to baseline urinary function  Description: INTERVENTIONS:  - Assess urinary function  - Encourage oral fluids to ensure adequate hydration if ordered  - Administer IV fluids as ordered to ensure adequate hydration  - Administer ordered medications as needed  - Offer frequent toileting  - Follow urinary retention protocol if ordered  1/4/2021 0348 by Briseida Del Angel RN  Outcome: Progressing  1/4/2021 0346 by Briseida Del Angel RN  Outcome: Progressing  1/4/2021 0346 by Briseida Del Angel RN  Outcome: Progressing  Goal: Absence of urinary retention  Description: INTERVENTIONS:  - Assess patients ability to void and empty bladder  - Monitor I/O  - Bladder scan as needed  - Discuss with physician/AP medications to alleviate retention as needed  - Discuss catheterization for long term situations as appropriate  1/4/2021 0348 by Briseida Del Angel RN  Outcome: Progressing  1/4/2021 0346 by Briseida Del Angel RN  Outcome: Progressing  1/4/2021 0346 by Briseida Del Angel RN  Outcome: Progressing  Goal: Urinary catheter remains patent  Description: INTERVENTIONS:  - Assess patency of urinary catheter  - If patient has a chronic cantor, consider changing catheter if non-functioning  - Follow guidelines for intermittent irrigation of non-functioning urinary catheter  1/4/2021 0348 by Briseida Del Angel RN  Outcome: Progressing  1/4/2021 0346 by Briseida Del Angel RN  Outcome: Progressing  1/4/2021 0346 by Briseida Del Angel RN  Outcome: Progressing     Problem: METABOLIC, FLUID AND ELECTROLYTES - ADULT  Goal: Electrolytes maintained within normal limits  Description: INTERVENTIONS:  - Monitor labs and assess patient for signs and symptoms of electrolyte imbalances  - Administer electrolyte replacement as ordered  - Monitor response to electrolyte replacements, including repeat lab results as appropriate  - Instruct patient on fluid and nutrition as appropriate  1/4/2021 0348 by Briseida Del Angel RN  Outcome: Progressing  1/4/2021 0346 by Briseida Del Angel RN  Outcome: Progressing  1/4/2021 0346 by Briseida Del Angel RN  Outcome: Progressing     Problem: SKIN/TISSUE INTEGRITY - ADULT  Goal: Skin integrity remains intact  Description: INTERVENTIONS  - Identify patients at risk for skin breakdown  - Assess and monitor skin integrity  - Assess and monitor nutrition and hydration status  - Monitor labs (i e  albumin)  - Assess for incontinence   - Turn and reposition patient  - Assist with mobility/ambulation  - Relieve pressure over bony prominences  - Avoid friction and shearing  - Provide appropriate hygiene as needed including keeping skin clean and dry  - Evaluate need for skin moisturizer/barrier cream  - Collaborate with interdisciplinary team (i e  Nutrition, Rehabilitation, etc )   - Patient/family teaching  1/4/2021 0348 by Tita Arreguin RN  Outcome: Progressing  1/4/2021 0346 by Tita Arreguin RN  Outcome: Progressing  1/4/2021 0346 by Tita Arreguin RN  Outcome: Progressing     Problem: MUSCULOSKELETAL - ADULT  Goal: Maintain or return mobility to safest level of function  Description: INTERVENTIONS:  - Assess patient's ability to carry out ADLs; assess patient's baseline for ADL function and identify physical deficits which impact ability to perform ADLs (bathing, care of mouth/teeth, toileting, grooming, dressing, etc )  - Assess/evaluate cause of self-care deficits   - Assess range of motion  - Assess patient's mobility  - Assess patient's need for assistive devices and provide as appropriate  - Encourage maximum independence but intervene and supervise when necessary  - Involve family in performance of ADLs  - Assess for home care needs following discharge   - Consider OT consult to assist with ADL evaluation and planning for discharge  - Provide patient education as appropriate  1/4/2021 0348 by Tita Arreguin RN  Outcome: Progressing  1/4/2021 0346 by Tita Arreguin RN  Outcome: Progressing  1/4/2021 0346 by Tita Arreguin RN  Outcome: Progressing     Problem: PAIN - ADULT  Goal: Verbalizes/displays adequate comfort level or baseline comfort level  Description: Interventions:  - Encourage patient to monitor pain and request assistance  - Assess pain using appropriate pain scale  - Administer analgesics based on type and severity of pain and evaluate response  - Implement non-pharmacological measures as appropriate and evaluate response  - Consider cultural and social influences on pain and pain management  - Notify physician/advanced practitioner if interventions unsuccessful or patient reports new pain  1/4/2021 0348 by Azul Prado RN  Outcome: Progressing  1/4/2021 0346 by Azul Prado RN  Outcome: Progressing  1/4/2021 0346 by Azul Prado RN  Outcome: Progressing     Problem: INFECTION - ADULT  Goal: Absence or prevention of progression during hospitalization  Description: INTERVENTIONS:  - Assess and monitor for signs and symptoms of infection  - Monitor lab/diagnostic results  - Monitor all insertion sites, i e  indwelling lines, tubes, and drains  - Monitor endotracheal if appropriate and nasal secretions for changes in amount and color  - [place] appropriate cooling/warming therapies per order  - Administer medications as ordered  - Instruct and encourage patient and family to use good hand hygiene technique  - Identify and instruct in appropriate isolation precautions for identified infection/condition  1/4/2021 0348 by Azul Prado RN  Outcome: Progressing  1/4/2021 0346 by Azul Prado RN  Outcome: Progressing  1/4/2021 0346 by Azul Prado RN  Outcome: Progressing     Problem: SAFETY ADULT  Goal: Patient will remain free of falls  Description: INTERVENTIONS:  - Assess patient frequently for physical needs  -  Identify cognitive and physical deficits and behaviors that affect risk of falls    -  [place] fall precautions as indicated by assessment   - Educate patient/family on patient safety including physical limitations  - Instruct patient to call for assistance with activity based on assessment  - Modify environment to reduce risk of injury  - Consider OT/PT consult to assist with strengthening/mobility  1/4/2021 0348 by Azul Prado RN  Outcome: Progressing  1/4/2021 0282 by Jeanette Doe RN  Outcome: Progressing  1/4/2021 0346 by Jaenette Doe RN  Outcome: Progressing  Goal: Maintain or return to baseline ADL function  Description: INTERVENTIONS:  -  Assess patient's ability to carry out ADLs; assess patient's baseline for ADL function and identify physical deficits which impact ability to perform ADLs (bathing, care of mouth/teeth, toileting, grooming, dressing, etc )  - Assess/evaluate cause of self-care deficits   - Assess range of motion  - Assess patient's mobility; develop plan if impaired  - Assess patient's need for assistive devices and provide as appropriate  - Encourage maximum independence but intervene and supervise when necessary  - Involve family in performance of ADLs  - Assess for home care needs following discharge   - Consider OT consult to assist with ADL evaluation and planning for discharge  - Provide patient education as appropriate  1/4/2021 0348 by Jeanette Doe RN  Outcome: Progressing  1/4/2021 0346 by Jeanette Doe RN  Outcome: Progressing  1/4/2021 0346 by Jeanette Doe RN  Outcome: Progressing  Goal: Maintain or return mobility status to optimal level  Description: INTERVENTIONS:  - Assess patient's baseline mobility status (ambulation, transfers, stairs, etc )    - Identify cognitive and physical deficits and behaviors that affect mobility  - Identify mobility aids required to assist with transfers and/or ambulation (gait belt, sit-to-stand, lift, walker, cane, etc )  - [place] fall precautions as indicated by assessment  - Record patient progress and toleration of activity level on Mobility SBAR; progress patient to next Phase/Stage  - Instruct patient to call for assistance with activity based on assessment  - Consider rehabilitation consult to assist with strengthening/weightbearing, etc   1/4/2021 0348 by Jeanette Doe RN  Outcome: Progressing  1/4/2021 0346 by Jeanette Doe RN  Outcome: Progressing  1/4/2021 0346 by Jeanette Doe RN  Outcome: Progressing     Problem: DISCHARGE PLANNING  Goal: Discharge to home or other facility with appropriate resources  Description: INTERVENTIONS:  - Identify barriers to discharge w/patient and caregiver  - Arrange for needed discharge resources and transportation as appropriate  - Identify discharge learning needs (meds, wound care, etc )  - Arrange for interpretive services to assist at discharge as needed  - Refer to Case Management Department for coordinating discharge planning if the patient needs post-hospital services based on physician/advanced practitioner order or complex needs related to functional status, cognitive ability, or social support system  1/4/2021 0348 by Jordon Theodore RN  Outcome: Progressing  1/4/2021 0346 by Jordon Theodore RN  Outcome: Progressing  1/4/2021 0346 by Jordon Theodore RN  Outcome: Progressing     Problem: Knowledge Deficit  Goal: Patient/family/caregiver demonstrates understanding of disease process, treatment plan, medications, and discharge instructions  Description: Complete learning assessment and assess knowledge base  Interventions:  - Provide teaching at level of understanding  - Provide teaching via preferred learning methods  1/4/2021 0348 by Jordon Theodore RN  Outcome: Progressing  1/4/2021 0346 by Jordon Theodore RN  Outcome: Progressing  1/4/2021 0346 by Jordon Theodore RN  Outcome: Progressing     Problem: Nutrition/Hydration-ADULT  Goal: Nutrient/Hydration intake appropriate for improving, restoring or maintaining nutritional needs  Description: Monitor and assess patient's nutrition/hydration status for malnutrition  Collaborate with interdisciplinary team and initiate plan and interventions as ordered  Monitor patient's weight and dietary intake as ordered or per policy  Utilize nutrition screening tool and intervene as necessary  Determine patient's food preferences and provide high-protein, high-caloric foods as appropriate       INTERVENTIONS:  - Monitor oral intake, urinary output, labs, and treatment plans  - Assess nutrition and hydration status and recommend course of action  - Evaluate amount of meals eaten  - Assist patient with eating if necessary   - Allow adequate time for meals  - Recommend/ encourage appropriate diets, oral nutritional supplements, and vitamin/mineral supplements  - Order, calculate, and assess calorie counts as needed  - Recommend, monitor, and adjust tube feedings and TPN/PPN based on assessed needs  - Assess need for intravenous fluids  - Provide specific nutrition/hydration education as appropriate  - Include patient/family/caregiver in decisions related to nutrition  1/4/2021 0348 by Elise Wood RN  Outcome: Progressing  1/4/2021 0346 by Elise Wood RN  Outcome: Progressing  1/4/2021 0346 by Elise Wood RN  Outcome: Progressing     Problem: Prexisting or High Potential for Compromised Skin Integrity  Goal: Skin integrity is maintained or improved  Description: INTERVENTIONS:  - Identify patients at risk for skin breakdown  - Assess and monitor skin integrity  - Assess and monitor nutrition and hydration status  - Monitor labs   - Assess for incontinence   - Turn and reposition patient  - Assist with mobility/ambulation  - Relieve pressure over bony prominences  - Avoid friction and shearing  - Provide appropriate hygiene as needed including keeping skin clean and dry  - Evaluate need for skin moisturizer/barrier cream  - Collaborate with interdisciplinary team   - Patient/family teaching  - Consider wound care consult   1/4/2021 0348 by Elise Wood RN  Outcome: Progressing  1/4/2021 0346 by Elise Wood RN  Outcome: Progressing  1/4/2021 0346 by Elise Wood RN  Outcome: Progressing

## 2021-01-04 NOTE — PROGRESS NOTES
Will receive NEPHROLOGY PROGRESS NOTE   Ramonita Aquino 66 y o  male MRN: [id number]  Unit/Bed#: -01 Encounter: [id number]    Assessment/Plan:    67 yo male with CKD 3 being treated for severe sepsis secondary to complicated cystitis, right hydroureteronephrosis with obstructing calculus status post right ureteral stent 12/30/2020, Enterococcus and Proteus bacteremia  Renal following for acute kidney injury which is significantly improved  1  Acute kidney injury (POA) atop chronic kidney disease  · Presented with a creatinine of 2 5 mg/dL -> 1 6 mg/dL today  Etiology likely ATN in the setting of severe sepsis and obstructive uropathy status post right ureteral stent 12/30/2020  · Continue supportive care, continue maximize hemodynamics, continue to avoid nephrotoxins, renally dose medications  2  Stage 3 chronic kidney disease baseline creatinine around 1 2-1 3 mg/dL  3  Hypocalcemia  · Continue oral supplementation (Tums twice a day)  Will check ionized calcium, phosphorus, magnesium in the morning  4  Hypomagnesemia  · Will receive IV supplementation today per primary team  5  Severe sepsis, Complicated UTI, Enterococcus and Proteus bacteremia  · Will have PICC line placed for long-term antibiotics per Infectious Disease specialist  6  Right ureteropelvic junction obstruction  · status post right ureteral stent 12/30/2020    ROS  No physical complaints on exam   A complete 10 point review of systems have been performed and are otherwise negative         Historical Information   Past Medical History:   Diagnosis Date    Depression     Diallo catheter in place     Hypertension     Prostate enlargement     Psychiatric disorder     Urinary tract infection      Past Surgical History:   Procedure Laterality Date    ARTHROSCOPY KNEE      BACK SURGERY      L 4 or 5    CATARACT EXTRACTION Bilateral     CYSTOSCOPY W/ LASER LITHOTRIPSY N/A 8/20/2018    Procedure: LITHOTRISPY HOLMIUM LASER of bladder stones;  Surgeon: Anila Nicole MD;  Location: [address] MAIN OR;  Service: Urology    [place] CYSTOURETHROSCOPY,URETER CATHETER Right 12/29/2020    Procedure: CYSTOSCOPY RETROGRADE PYELOGRAM WITH INSERTION STENT URETERAL;  Surgeon: Anila Nicole MD;  Location: [address] MAIN OR;  Service: Urology    [place] TRANSURETHRAL ELEC-SURG PROSTATECTOM N/A 8/20/2018    Procedure: Maddi Castellanos; TURP;  Surgeon: Anila Nicole MD;  Location: [address] MAIN OR;  Service: Urology    SHOULDER SURGERY Right 05/31/2019     Social History   Social History     Substance and Sexual Activity   Alcohol Use Never    Frequency: Monthly or less    Drinks per session: 1 or 2    Binge frequency: Less than monthly     Social History     Substance and Sexual Activity   Drug Use Never     Social History     Tobacco Use   Smoking Status Former Smoker    Packs/day: 1 00   Smokeless Tobacco Never Used   Tobacco Comment    quit 50 years ago       Family History:   Family History   Problem Relation Age of Onset    Cancer Mother     Diabetes Father        Medications:  Pertinent medications were reviewed  Current Facility-Administered Medications   Medication Dose Route Frequency Provider Last Rate    acetaminophen  650 mg Oral Q4H PRN Anila Nicole MD      ampicillin  2,000 mg Intravenous Q8H UBALDO Osorio 2,000 mg (01/04/21 1124)    ascorbic acid  500 mg Oral Daily Anila Nicole MD      calcium carbonate  500 mg Oral BID Anila Nicole MD      cholecalciferol  4,000 Units Oral Daily Anila Niocle MD      cloNIDine  0 1 mg Oral Q3H PRN Saray Brower PA-C      diphenhydrAMINE  50 mg Oral HS PRN Anila Nicole MD      ferrous sulfate  325 mg Oral Daily With Esther Rodriguez MD      finasteride  5 mg Oral Daily Anila Nicole MD      gabapentin  400 mg Oral HS Anila Nicole MD      heparin (porcine)  5,000 Units Subcutaneous [facility] Anila Nicole MD      morphine  15 mg Oral BID Anila Nicole MD      ondansetron  4 mg Intravenous Q6H PRN MD Darren Mccabe sertraline  50 mg Oral Daily Rosario Hickman MD           No Known Allergies      Vitals:   /69 (BP Location: Left arm)   Pulse 66   Temp 98 °F (36 7 °C) (Temporal)   Resp 18   Ht 5' 9" (1 753 m)   Wt 83 3 kg (183 lb 10 3 oz)   SpO2 93%   BMI 27 12 kg/m²   Body mass index is 27 12 kg/m²  SpO2: 93 %,   SpO2 Activity: At Rest,   O2 Device: None (Room air)      Intake/Output Summary (Last 24 hours) at 1/4/2021 1418  Last data filed at 1/4/2021 1300  Gross per 24 hour   Intake 240 ml   Output 4550 ml   Net -4310 ml     Invasive Devices     Peripheral Intravenous Line            Peripheral IV 01/02/21 Left Antecubital 2 days          Drain            Ureteral Drain/Stent Right ureter 6 Fr  6 days    Urethral Catheter Latex 18 Fr  6 days                Physical Exam  General: conscious, cooperative, in no acute distress  Eyes: conjunctivae pink, anicteric sclerae  ENT: lips and mucous membranes moist  Neck: supple, no JVD, no masses  Chest:  Diminished breath sounds bilaterally, fine expiratory wheezes left anterior chest  CVS: S1 & S2, normal rate, regular rhythm  Abdomen: soft, non-tender, non-distended, normoactive bowel sounds  Extremities: no edema of both legs  Skin: no rash  Neuro: awake, alert, oriented      Diagnostic Data:  Lab: I have personally reviewed pertinent lab results  ,   CBC:  Results from last 7 days   Lab Units 01/04/21  0638   WBC Thousand/uL 13 10*   HEMOGLOBIN g/dL 8 5*   HEMATOCRIT % 25 7*   PLATELETS Thousands/uL 282      CMP:   Lab Results   Component Value Date    SODIUM 139 01/04/2021    K 3 9 01/04/2021     01/04/2021    CO2 27 01/04/2021    BUN 29 (H) 01/04/2021    CREATININE 1 58 (H) 01/04/2021    CALCIUM 7 8 (L) 01/04/2021    EGFR 42 01/04/2021   ,   PT/INR: No results found for: PT, INR,   Magnesium: No components found for: MAG,  Phosphorous: No results found for: PHOS    Microbiology:  @LABRCNTIP,(urinecx:7)@        [facility] UBALDO Martinez    Portions of the record may have been created with voice recognition software  Occasional wrong word or "sound a like" substitutions may have occurred due to the inherent limitations of voice recognition software  Read the chart carefully and recognize, using context, where substitutions have occurred

## 2021-01-04 NOTE — PHYSICAL THERAPY NOTE
01/04/21 0934   PT Last Visit   PT Visit Date 01/04/21   Note Type   Note Type Treatment   Pain Assessment   Pain Assessment Tool Pain Assessment not indicated - pt denies pain   Pain Score No Pain   Restrictions/Precautions   Weight Bearing Precautions Per Order No   Other Precautions Contact/isolation;Cognitive; Fall Risk;Hard of hearing   General   Chart Reviewed Yes   Response to Previous Treatment Patient with no complaints from previous session  Family/Caregiver Present No   Cognition   Overall Cognitive Status Impaired   Arousal/Participation Alert; Responsive; Cooperative   Attention Attends with cues to redirect   Orientation Level Oriented to person;Oriented to place   Memory Decreased short term memory;Decreased recall of recent events;Decreased recall of precautions   Following Commands Follows one step commands without difficulty   Comments pt agreeable to PT session   Bed Mobility   Supine to Sit 3  Moderate assistance   Additional items Assist x 1;HOB elevated; Bedrails; Increased time required;Verbal cues;LE management   Sit to Supine 3  Moderate assistance   Additional items Assist x 1;Bedrails; Increased time required;Verbal cues;LE management   Additional Comments pt sat at EOBx23 minutes to complete BLE ther ex   Transfers   Sit to Stand 3  Moderate assistance   Additional items Assist x 1;Bedrails; Increased time required;Verbal cues   Stand to Sit 3  Moderate assistance   Additional items Assist x 1;Bedrails; Increased time required;Verbal cues   Ambulation/Elevation   Gait pattern Improper Weight shift;Decreased foot clearance; Inconsistent stephen; Foward flexed; Short stride; Excessively slow   Gait Assistance 4  Minimal assist   Additional items Assist x 2;Verbal cues; Tactile cues   Assistive Device Rolling walker   Distance 10 ftx2 with 2 directional turns   Stair Management Assistance Not tested   Balance   Static Sitting Fair +   Dynamic Sitting Fair   Static Standing Fair -   Dynamic Standing Poor +   Ambulatory Poor +   Endurance Deficit   Endurance Deficit Yes   Activity Tolerance   Activity Tolerance Patient limited by fatigue   Nurse Made Aware RN made aware of outcomes   Exercises   Quad Sets Sitting;20 reps;AROM; Bilateral   Heelslides Sitting;20 reps;AROM; Bilateral   Hip Abduction Sitting;20 reps;AROM; Bilateral   Hip Adduction Sitting;20 reps;AROM; Bilateral   Knee AROM Long Arc Quad Sitting;20 reps;AROM; Bilateral   Ankle Pumps Sitting;20 reps;AROM; Bilateral   Marching Sitting;20 reps;AROM; Bilateral   Assessment   Prognosis Fair   Problem List Decreased strength;Decreased endurance; Impaired balance;Decreased mobility; Decreased safety awareness   Assessment Pt seen for PT treatment session this date with interventions consisting of gait training w/ emphasis on improving pt's ability to ambulate level surfaces x 10 ftx2 with min A of 2 provided by therapist with RW, Therapeutic exercise consisting of: AROM 20 reps B LE in seated at eOB position and therapeutic activity consisting of training: bed mobility, supine<>sit transfers, sit<>stand transfers and static sitting tolerance at EOB for 23 minutes w/ min UE support  Pt agreeable to PT treatment session upon arrival, pt found supine in bed w/ HOB elevated, in no apparent distress and responsive  In comparison to previous session, pt with improvements in distance in ambulation  Post session: pt returned BTB, bed alarm engaged, all needs in reach and RN notified of session findings/recommendations Continue to recommend STR at time of d/c in order to maximize pt's functional independence and safety w/ mobility  Pt continues to be functioning below baseline level, and remains limited 2* factors listed above and including decreased functional mobility , decreased activity tolerance and impaired balance   PT will continue to see pt while here in order to address the deficits listed above and provide interventions consistent w/ POC in effort to achieve STGs  Barriers to Discharge Inaccessible home environment   Plan   Treatment/Interventions Functional transfer training;LE strengthening/ROM; Therapeutic exercise; Endurance training;Bed mobility;Gait training   Progress Slow progress, decreased activity tolerance   PT Frequency Other (Comment)  (3-5x/wk)   Recommendation   PT Discharge Recommendation Post-Acute Rehabilitation Services   PT - OK to Discharge Yes  (when medically stable if to STR)   Additional Comments upon conclusion, pt returned to supine in bed with all needs in place

## 2021-01-04 NOTE — ASSESSMENT & PLAN NOTE
· Urine culture pathogen only growing Proteus mirabilis - lesser colonies of Enterococcus/Alcaligenes species noted  · Sensitivities noted -> transitioned IV Zosyn to IV Ampicillin on 12/30/2020  · Chronic Diallo catheter presence noted  · 01/04/2021:  Infectious disease states ampicillin until 01/11/2021 and start Flagyl 500 mg p o  T i d  X3 days

## 2021-01-04 NOTE — PLAN OF CARE
Problem: OCCUPATIONAL THERAPY ADULT  Goal: Performs self-care activities at highest level of function for planned discharge setting  See evaluation for individualized goals  Description: Treatment Interventions: ADL retraining, Functional transfer training, UE strengthening/ROM, Endurance training, Cognitive reorientation, Patient/family training, Compensatory technique education, Activityengagement, Energy conservation          See flowsheet documentation for full assessment, interventions and recommendations  Outcome: Progressing  Note: Limitation: Decreased ADL status, Decreased UE strength, Decreased Safe judgement during ADL, Decreased cognition, Decreased endurance, Decreased self-care trans, Decreased high-level ADLs  Prognosis: Fair  Assessment: Patient participated in Skilled OT session this date with interventions consisting of functional transfer training, Energy Conservation techniques, therapeutic exercise to: increase functional use of BUEs, increase BUE muscle strength  and increase dynamic sit/ stand balance during functional activity    Patient agreeable to OT treatment session, upon arrival patient was found supine in bed    Patient performs      OT Discharge Recommendation: Post-Acute Rehabilitation Services  OT - OK to Discharge: (when medcally cleared)

## 2021-01-04 NOTE — PLAN OF CARE
Problem: Potential for Falls  Goal: Patient will remain free of falls  Description: INTERVENTIONS:  - Assess patient frequently for physical needs  -  Identify cognitive and physical deficits and behaviors that affect risk of falls    -  [place] fall precautions as indicated by assessment   - Educate patient/family on patient safety including physical limitations  - Instruct patient to call for assistance with activity based on assessment  - Modify environment to reduce risk of injury  - Consider OT/PT consult to assist with strengthening/mobility  Outcome: Progressing     Problem: NEUROSENSORY - ADULT  Goal: Achieves stable or improved neurological status  Description: INTERVENTIONS  - Monitor and report changes in neurological status  - Monitor vital signs such as temperature, blood pressure, glucose, and any other labs ordered   - Initiate measures to prevent increased intracranial pressure  - Monitor for seizure activity and implement precautions if appropriate      Outcome: Progressing     Problem: GENITOURINARY - ADULT  Goal: Maintains or returns to baseline urinary function  Description: INTERVENTIONS:  - Assess urinary function  - Encourage oral fluids to ensure adequate hydration if ordered  - Administer IV fluids as ordered to ensure adequate hydration  - Administer ordered medications as needed  - Offer frequent toileting  - Follow urinary retention protocol if ordered  Outcome: Progressing  Goal: Absence of urinary retention  Description: INTERVENTIONS:  - Assess patients ability to void and empty bladder  - Monitor I/O  - Bladder scan as needed  - Discuss with physician/AP medications to alleviate retention as needed  - Discuss catheterization for long term situations as appropriate  Outcome: Progressing  Goal: Urinary catheter remains patent  Description: INTERVENTIONS:  - Assess patency of urinary catheter  - If patient has a chronic cantor, consider changing catheter if non-functioning  - Follow guidelines for intermittent irrigation of non-functioning urinary catheter  Outcome: Progressing     Problem: METABOLIC, FLUID AND ELECTROLYTES - ADULT  Goal: Electrolytes maintained within normal limits  Description: INTERVENTIONS:  - Monitor labs and assess patient for signs and symptoms of electrolyte imbalances  - Administer electrolyte replacement as ordered  - Monitor response to electrolyte replacements, including repeat lab results as appropriate  - Instruct patient on fluid and nutrition as appropriate  Outcome: Progressing     Problem: SKIN/TISSUE INTEGRITY - ADULT  Goal: Skin integrity remains intact  Description: INTERVENTIONS  - Identify patients at risk for skin breakdown  - Assess and monitor skin integrity  - Assess and monitor nutrition and hydration status  - Monitor labs (i e  albumin)  - Assess for incontinence   - Turn and reposition patient  - Assist with mobility/ambulation  - Relieve pressure over bony prominences  - Avoid friction and shearing  - Provide appropriate hygiene as needed including keeping skin clean and dry  - Evaluate need for skin moisturizer/barrier cream  - Collaborate with interdisciplinary team (i e  Nutrition, Rehabilitation, etc )   - Patient/family teaching  Outcome: Progressing     Problem: MUSCULOSKELETAL - ADULT  Goal: Maintain or return mobility to safest level of function  Description: INTERVENTIONS:  - Assess patient's ability to carry out ADLs; assess patient's baseline for ADL function and identify physical deficits which impact ability to perform ADLs (bathing, care of mouth/teeth, toileting, grooming, dressing, etc )  - Assess/evaluate cause of self-care deficits   - Assess range of motion  - Assess patient's mobility  - Assess patient's need for assistive devices and provide as appropriate  - Encourage maximum independence but intervene and supervise when necessary  - Involve family in performance of ADLs  - Assess for home care needs following discharge   - Consider OT consult to assist with ADL evaluation and planning for discharge  - Provide patient education as appropriate  Outcome: Progressing     Problem: PAIN - ADULT  Goal: Verbalizes/displays adequate comfort level or baseline comfort level  Description: Interventions:  - Encourage patient to monitor pain and request assistance  - Assess pain using appropriate pain scale  - Administer analgesics based on type and severity of pain and evaluate response  - Implement non-pharmacological measures as appropriate and evaluate response  - Consider cultural and social influences on pain and pain management  - Notify physician/advanced practitioner if interventions unsuccessful or patient reports new pain  Outcome: Progressing     Problem: INFECTION - ADULT  Goal: Absence or prevention of progression during hospitalization  Description: INTERVENTIONS:  - Assess and monitor for signs and symptoms of infection  - Monitor lab/diagnostic results  - Monitor all insertion sites, i e  indwelling lines, tubes, and drains  - Monitor endotracheal if appropriate and nasal secretions for changes in amount and color  - [place] appropriate cooling/warming therapies per order  - Administer medications as ordered  - Instruct and encourage patient and family to use good hand hygiene technique  - Identify and instruct in appropriate isolation precautions for identified infection/condition  Outcome: Progressing     Problem: SAFETY ADULT  Goal: Patient will remain free of falls  Description: INTERVENTIONS:  - Assess patient frequently for physical needs  -  Identify cognitive and physical deficits and behaviors that affect risk of falls    -  [place] fall precautions as indicated by assessment   - Educate patient/family on patient safety including physical limitations  - Instruct patient to call for assistance with activity based on assessment  - Modify environment to reduce risk of injury  - Consider OT/PT consult to assist with strengthening/mobility  Outcome: Progressing  Goal: Maintain or return to baseline ADL function  Description: INTERVENTIONS:  -  Assess patient's ability to carry out ADLs; assess patient's baseline for ADL function and identify physical deficits which impact ability to perform ADLs (bathing, care of mouth/teeth, toileting, grooming, dressing, etc )  - Assess/evaluate cause of self-care deficits   - Assess range of motion  - Assess patient's mobility; develop plan if impaired  - Assess patient's need for assistive devices and provide as appropriate  - Encourage maximum independence but intervene and supervise when necessary  - Involve family in performance of ADLs  - Assess for home care needs following discharge   - Consider OT consult to assist with ADL evaluation and planning for discharge  - Provide patient education as appropriate  Outcome: Progressing  Goal: Maintain or return mobility status to optimal level  Description: INTERVENTIONS:  - Assess patient's baseline mobility status (ambulation, transfers, stairs, etc )    - Identify cognitive and physical deficits and behaviors that affect mobility  - Identify mobility aids required to assist with transfers and/or ambulation (gait belt, sit-to-stand, lift, walker, cane, etc )  - [place] fall precautions as indicated by assessment  - Record patient progress and toleration of activity level on Mobility SBAR; progress patient to next Phase/Stage  - Instruct patient to call for assistance with activity based on assessment  - Consider rehabilitation consult to assist with strengthening/weightbearing, etc   Outcome: Progressing     Problem: DISCHARGE PLANNING  Goal: Discharge to home or other facility with appropriate resources  Description: INTERVENTIONS:  - Identify barriers to discharge w/patient and caregiver  - Arrange for needed discharge resources and transportation as appropriate  - Identify discharge learning needs (meds, wound care, etc )  - Arrange for interpretive services to assist at discharge as needed  - Refer to Case Management Department for coordinating discharge planning if the patient needs post-hospital services based on physician/advanced practitioner order or complex needs related to functional status, cognitive ability, or social support system  Outcome: Progressing     Problem: Knowledge Deficit  Goal: Patient/family/caregiver demonstrates understanding of disease process, treatment plan, medications, and discharge instructions  Description: Complete learning assessment and assess knowledge base  Interventions:  - Provide teaching at level of understanding  - Provide teaching via preferred learning methods  Outcome: Progressing     Problem: Nutrition/Hydration-ADULT  Goal: Nutrient/Hydration intake appropriate for improving, restoring or maintaining nutritional needs  Description: Monitor and assess patient's nutrition/hydration status for malnutrition  Collaborate with interdisciplinary team and initiate plan and interventions as ordered  Monitor patient's weight and dietary intake as ordered or per policy  Utilize nutrition screening tool and intervene as necessary  Determine patient's food preferences and provide high-protein, high-caloric foods as appropriate       INTERVENTIONS:  - Monitor oral intake, urinary output, labs, and treatment plans  - Assess nutrition and hydration status and recommend course of action  - Evaluate amount of meals eaten  - Assist patient with eating if necessary   - Allow adequate time for meals  - Recommend/ encourage appropriate diets, oral nutritional supplements, and vitamin/mineral supplements  - Order, calculate, and assess calorie counts as needed  - Recommend, monitor, and adjust tube feedings and TPN/PPN based on assessed needs  - Assess need for intravenous fluids  - Provide specific nutrition/hydration education as appropriate  - Include patient/family/caregiver in decisions related to nutrition  Outcome: Progressing     Problem: Prexisting or High Potential for Compromised Skin Integrity  Goal: Skin integrity is maintained or improved  Description: INTERVENTIONS:  - Identify patients at risk for skin breakdown  - Assess and monitor skin integrity  - Assess and monitor nutrition and hydration status  - Monitor labs   - Assess for incontinence   - Turn and reposition patient  - Assist with mobility/ambulation  - Relieve pressure over bony prominences  - Avoid friction and shearing  - Provide appropriate hygiene as needed including keeping skin clean and dry  - Evaluate need for skin moisturizer/barrier cream  - Collaborate with interdisciplinary team   - Patient/family teaching  - Consider wound care consult   Outcome: Progressing

## 2021-01-04 NOTE — PLAN OF CARE
Problem: PHYSICAL THERAPY ADULT  Goal: Performs mobility at highest level of function for planned discharge setting  See evaluation for individualized goals  Description: Treatment/Interventions: Functional transfer training, LE strengthening/ROM, Therapeutic exercise, Endurance training, Patient/family training, Equipment eval/education, Bed mobility, Gait training, Cognitive reorientation, Spoke to nursing, Spoke to case management  Equipment Recommended: (TBD pending further OOB mobility)       See flowsheet documentation for full assessment, interventions and recommendations  Outcome: Progressing  Note: Prognosis: Fair  Problem List: Decreased strength, Decreased endurance, Impaired balance, Decreased mobility, Decreased safety awareness  Assessment: Pt seen for PT treatment session this date with interventions consisting of gait training w/ emphasis on improving pt's ability to ambulate level surfaces x 10 ftx2 with min A of 2 provided by therapist with RW, Therapeutic exercise consisting of: AROM 20 reps B LE in seated at eOB position and therapeutic activity consisting of training: bed mobility, supine<>sit transfers, sit<>stand transfers and static sitting tolerance at EOB for 23 minutes w/ min UE support  Pt agreeable to PT treatment session upon arrival, pt found supine in bed w/ HOB elevated, in no apparent distress and responsive  In comparison to previous session, pt with improvements in distance in ambulation  Post session: pt returned BTB, bed alarm engaged, all needs in reach and RN notified of session findings/recommendations Continue to recommend STR at time of d/c in order to maximize pt's functional independence and safety w/ mobility  Pt continues to be functioning below baseline level, and remains limited 2* factors listed above and including decreased functional mobility , decreased activity tolerance and impaired balance   PT will continue to see pt while here in order to address the deficits listed above and provide interventions consistent w/ POC in effort to achieve STGs  Barriers to Discharge: Inaccessible home environment     PT Discharge Recommendation: [id number]     PT - OK to Discharge: Yes(when medically stable if to STR)    See flowsheet documentation for full assessment

## 2021-01-04 NOTE — CASE MANAGEMENT
Discussed the POC with the jose team   Pt will need IVABX until 1/11/21  Pt is being reviewed for ARU  Pt will need a PICC if going home with [place]  services  [facility] could not accept the pt  A referral was sent to  of [facility]

## 2021-01-04 NOTE — ASSESSMENT & PLAN NOTE
· Secondary to sepsis  · Blood cultures from 12/27 growing Proteus species -> likely source is urine due to concurrent UTI  · Received 1 dose of ceftriaxone and vancomycin in the ED; then transition to pip/isidro then ampicillin on 12/30  · Repeated blood cultures- 12/28 no growth   · I discussed case via [facility] with ID- recommend to continue with IV antibiotic for total of 2 week (through 1/11/2020) due to Enterococcus bacteremia - and add Flagyl 500 mg p o  3 times a day x3 days    · Echocardiogram- final result is pending   · Pending Infectious disease for formal evaluation on Monday   · Noted to have leukocytosis from 11 to 12K today; the patient remained afebrile- continue to trend

## 2021-01-04 NOTE — ASSESSMENT & PLAN NOTE
· In the setting of sepsis/infections coupled with acute kidney injury - gram-negative bacteremia  · Improving  Patient oriented to place and person but not time  Patient is very hard of hearing     · Continue clinical monitoring

## 2021-01-04 NOTE — OCCUPATIONAL THERAPY NOTE
01/04/21 0935   OT Last Visit   OT Visit Date 01/04/21   Note Type   Note Type Treatment   Restrictions/Precautions   Weight Bearing Precautions Per Order No   Other Precautions Contact/isolation;Cognitive; Fall Risk;Hard of hearing   Pain Assessment   Pain Assessment Tool Pain Assessment not indicated - pt denies pain   Pain Score No Pain   Bed Mobility   Supine to Sit 3  Moderate assistance   Additional items Assist x 1;HOB elevated; Bedrails; Increased time required;Verbal cues;LE management   Sit to Supine 3  Moderate assistance   Additional items Assist x 1;Bedrails; Increased time required;Verbal cues;LE management   Additional Comments Pt sat EOB x 23 min to complete UB TE with good sitting balance  Transfers   Sit to Stand 3  Moderate assistance   Additional items Assist x 1;Bedrails; Increased time required;Verbal cues   Stand to Sit 3  Moderate assistance   Additional items Assist x 1;Bedrails; Increased time required;Verbal cues   Stand pivot 3  Moderate assistance   Additional items Assist x 1; Increased time required;Verbal cues   Functional Mobility   Functional Mobility 4  Minimal assistance   Additional Comments 10 feet x 2   Additional items Rolling walker   Therapeutic Excerise-Strength   UE Strength Yes   Right Upper Extremity- Strength   R Shoulder Flexion; Extension;Horizontal ABduction  (horiz  add, scapular pro/ret)   R Elbow Elbow flexion;Elbow extension   R Wrist   (wrist rotation)   R Weight/Reps/Sets 1 pound weight x 20 reps   Left Upper Extremity-Strength   L Weights/Reps/Sets TE same a R UE above   Cognition   Overall Cognitive Status Impaired   Arousal/Participation Alert; Cooperative   Attention Attends with cues to redirect   Orientation Level Oriented to person;Oriented to place   Memory Decreased recall of precautions;Decreased recall of recent events;Decreased short term memory   Following Commands Follows one step commands without difficulty   Comments Pt agreeable to OT treatment, Activity Tolerance   Activity Tolerance Patient limited by fatigue   Assessment   Assessment Patient participated in Skilled OT session this date with interventions consisting of functional transfer training, Energy Conservation techniques, therapeutic exercise to: increase functional use of BUEs, increase BUE muscle strength  and increase dynamic sit/ stand balance during functional activity    Patient agreeable to OT treatment session, upon arrival patient was found supine in bed  Patient performs    Plan   Treatment Interventions UE strengthening/ROM; Functional transfer training;Energy conservation   Goal Expiration Date 01/06/21   OT Treatment Day 3   OT Frequency 3-5x/wk   Recommendation   OT Discharge Recommendation Post-Acute Rehabilitation Services   OT - OK to Discharge   (when medcally cleared)   DACIA Morgan

## 2021-01-04 NOTE — PROGRESS NOTES
Progress Note - Ramonita Cea 1945, 66 y o  male MRN: [id number]    Unit/Bed#: -01 Encounter: [id number]    Primary Care Provider: Chaon Barnes DO   Date and time admitted to hospital: 12/27/2020  5:16 AM        Acute metabolic encephalopathy  Assessment & Plan  · In the setting of sepsis/infections coupled with acute kidney injury - gram-negative bacteremia  · Improving  Patient oriented to place and person but not time  Patient is very hard of hearing  · Continue clinical monitoring        * Severe sepsis (HCC)  Assessment & Plan  · With tachycardia/tachypnea coupled leukocytosis and lactic acidosis -> resolved  · In the setting of Proteus bacteremia/UTI and right basilar pneumonia (see below)  · Monitor vital signs and maintain hemodynamics  · Clinically is improving           Gram-negative UTI  Assessment & Plan  · Urine culture pathogen only growing Proteus mirabilis - lesser colonies of Enterococcus/Alcaligenes species noted  · Sensitivities noted -> transitioned IV Zosyn to IV Ampicillin on 12/30/2020  · Chronic Diallo catheter presence noted  · 01/04/2021:  Infectious disease states ampicillin until 01/11/2021 and start Flagyl 500 mg p o  T i d  X3 days          Chronic indwelling Diallo catheter  Assessment & Plan  · Diallo changed during this admission          Right hydroureteronephrosis with obstructing calculus  Assessment & Plan  · S/p retrograde pyelogram with ureteral stenting on 12/30/2020 - appreciate urology evaluation  · PRN pain/emesis control        Acute respiratory failure with hypoxia  Assessment & Plan  · Previously required up to 4 L via nasal cannula now down to 2 L-> now on RA  · Likely due to pneumonia      Acute kidney injury - Chronic kidney disease stage 3  Assessment & Plan  · Baseline creatinine approximately 1 2-1 3 mg/dL- currently @ 1 54 from a 2 71 peak  · Exacerbated in the setting of obstructive hydroureteronephrosis - s/p ureteral stenting on 12/30/2020  · Monitor renal function and urine output - limit/avoid nephrotoxins if possible  · Nephrology following    Chronic pain syndrome  Assessment & Plan  · Continuous opioid dependence  · Continue home medication            Anemia of chronic disease  Assessment & Plan  · Monitor H/H  · Continue ferrous sulfate supplementation        Hypomagnesemia  Assessment & Plan  · Monitor/replete p r n  Gram-negative bacteremia  Assessment & Plan  · Secondary to sepsis  · Blood cultures from 12/27 growing Proteus species -> likely source is urine due to concurrent UTI  · Received 1 dose of ceftriaxone and vancomycin in the ED; then transition to pip/isidro then ampicillin on 12/30  · Repeated blood cultures- 12/28 no growth   · I discussed case via [facility] with ID- recommend to continue with IV antibiotic for total of 2 week (through 1/11/2020) due to Enterococcus bacteremia - and add Flagyl 500 mg p o  3 times a day x3 days  · Echocardiogram- final result is pending   · Pending Infectious disease for formal evaluation on Monday   · Noted to have leukocytosis from 11 to 12K today; the patient remained afebrile- continue to trend      Pneumonia of right lower lobe  Assessment & Plan  · Likely suspected pneumonia ?viral etiology  No sputum production or fevers  · As seen on CT imaging  · Suspect possibly aspiration secondary to vomiting under encephalopathic state  · Empiric IV Zosyn transitioned to IV Ampicillin on 12/30/2020 due to concurrent urine culture polymicrobial growth sensitivities - ampicillin until 01/11/2021, and Flagyl 500 mg p o  3 times a day for 3 days per Infectious Disease    · Aspiration precautions - speech/swallow therapy recommending normal diet  · Maintain oxygen - encourage incentive spirometry        Thrombocytopenia   Assessment & Plan  · Monitor platelet count - monitor for bleeding        VTE Prophylaxis:  Heparin    Patient Centered Rounds: I have performed bedside rounds with nursing staff today  Discussions with Specialists or Other Care Team Provider: nursing, PT OT, case management, nephrology, speech, urology  Education and Discussions with Family / Patient: discussed with the patient     Current Length of Stay: 8 day(s)    Current Patient Status: Inpatient   Certification Statement: The patient will continue to require additional inpatient hospital stay due to continued need for IV antibiotics due to bacteremia  Discharge Plan:  Pending hospital course, patient will need long-term antibiotics until 2021  Code Status: Level 1 - Full Code    Subjective:   Patient is lying in bed  He states that he has no issues at this time  Objective:     Vitals:   Temp (24hrs), Av °F (36 7 °C), Min:97 9 °F (36 6 °C), Max:98 2 °F (36 8 °C)    Temp:  [97 9 °F (36 6 °C)-98 2 °F (36 8 °C)] 97 9 °F (36 6 °C)  HR:  [62-67] 67  Resp:  [17-18] 18  BP: (150-161)/(69-75) 161/75  SpO2:  [91 %-94 %] 94 %  Body mass index is 27 12 kg/m²  Input and Output Summary (last 24 hours): Intake/Output Summary (Last 24 hours) at 2021 1654  Last data filed at 2021 1300  Gross per 24 hour   Intake 240 ml   Output 4550 ml   Net -4310 ml       Physical Exam:   Physical Exam  Constitutional:       General: He is awake  Appearance: Normal appearance  HENT:      Head: Normocephalic and atraumatic  Nose: Nose normal       Mouth/Throat:      Mouth: Mucous membranes are moist    Eyes:      Extraocular Movements: Extraocular movements intact  Neck:      Musculoskeletal: Normal range of motion  Cardiovascular:      Rate and Rhythm: Normal rate and regular rhythm  Heart sounds: Normal heart sounds  Pulmonary:      Effort: Pulmonary effort is normal       Breath sounds: Normal breath sounds  Abdominal:      General: Bowel sounds are normal    Genitourinary:     Comments: Diallo catheter in place  Musculoskeletal: Normal range of motion        Comments: Some generalized weakness  Skin:     General: Skin is warm  Neurological:      Mental Status: He is alert  Comments: Periods of confusion   Psychiatric:         Attention and Perception: Attention normal          Mood and Affect: Mood normal          Speech: Speech normal          Behavior: Behavior normal  Behavior is cooperative  Additional Data:     Labs:    Results from last 7 days   Lab Units 01/04/21  0638  01/01/21  0503 12/31/20  0548   WBC Thousand/uL 13 10*   < > 10 20 8 50   HEMOGLOBIN g/dL 8 5*   < > 10 1* 9 5*   HEMATOCRIT % 25 7*   < > 30 4* 28 2*   PLATELETS Thousands/uL 282   < > 164 135*   NEUTROS PCT %  --   --   --  76*   LYMPHS PCT %  --   --   --  12*   LYMPHO PCT %  --   --  8*  --    MONOS PCT %  --   --   --  12   MONO PCT %  --   --  8  --    EOS PCT %  --   --  2 0    < > = values in this interval not displayed  Results from last 7 days   Lab Units 01/04/21  0638  12/29/20  0542   SODIUM mmol/L 139   < > 140   POTASSIUM mmol/L 3 9   < > 3 9   CHLORIDE mmol/L 106   < > 110*   CO2 mmol/L 27   < > 20*   BUN mg/dL 29*   < > 56*   CREATININE mg/dL 1 58*   < > 2 63*   CALCIUM mg/dL 7 8*   < > 7 8*   ALK PHOS U/L  --   --  84   ALT U/L  --   --  39   AST U/L  --   --  53*    < > = values in this interval not displayed  * I Have Reviewed All Lab Data Listed Above  * Additional Pertinent Lab Tests Reviewed:  [facility] Admission  Reviewed    Imaging:  Imaging Reports Reviewed Today Include:  None    Recent Cultures (last 7 days):     Results from last 7 days   Lab Units 12/29/20  1157   URINE CULTURE  <10,000 cfu/ml Proteus mirabilis*  <10,000 cfu/ml Enterococcus faecalis*       Last 24 Hours Medication List:   Current Facility-Administered Medications   Medication Dose Route Frequency Provider Last Rate    acetaminophen  650 mg Oral Q4H PRN Elsa Barraza MD      ampicillin  2,000 mg Intravenous Q8H UBALDO Harkins 2,000 mg (01/04/21 1124)    ascorbic acid  500 mg Oral Daily Sherra Mohs, MD      calcium carbonate  500 mg Oral BID Sherra Mohs, MD      cholecalciferol  4,000 Units Oral Daily Sherra Mohs, MD      cloNIDine  0 1 mg Oral Q3H PRN Kip Koyanagi, PA-C      diphenhydrAMINE  50 mg Oral HS PRN Sherra Mohs, MD      ferrous sulfate  325 mg Oral Daily With Breakfast Sherra Mohs, MD      finasteride  5 mg Oral Daily Sherra Mohs, MD      gabapentin  400 mg Oral HS Sherra Mohs, MD      heparin (porcine)  5,000 Units Subcutaneous [facility] Sherra Mohs, MD      metroNIDAZOLE  500 mg Oral Q8H [facility] UBALDO Harkins      morphine  15 mg Oral BID Sherra Mohs, MD      ondansetron  4 mg Intravenous Q6H PRN Sherra Mohs, MD      sertraline  50 mg Oral Daily Sherra Mohs, MD          Today, Patient Was Seen By: UBALDO Carver    ** Please Note: Dictation voice to text software may have been used in the creation of this document   **

## 2021-01-04 NOTE — ASSESSMENT & PLAN NOTE
· Likely suspected pneumonia ?viral etiology  No sputum production or fevers  · As seen on CT imaging  · Suspect possibly aspiration secondary to vomiting under encephalopathic state  · Empiric IV Zosyn transitioned to IV Ampicillin on 12/30/2020 due to concurrent urine culture polymicrobial growth sensitivities - ampicillin until 01/11/2021, and Flagyl 500 mg p o  3 times a day for 3 days per Infectious Disease    · Aspiration precautions - speech/swallow therapy recommending normal diet  · Maintain oxygen - encourage incentive spirometry

## 2021-01-05 ENCOUNTER — HOSPITAL ENCOUNTER (INPATIENT)
Facility: HOSPITAL | Age: 76
LOS: 7 days | Discharge: HOME HEALTH | DRG: 871 | End: 2021-01-12
Attending: FAMILY MEDICINE | Admitting: FAMILY MEDICINE
Payer: MEDICARE

## 2021-01-05 VITALS
RESPIRATION RATE: 18 BRPM | OXYGEN SATURATION: 94 % | BODY MASS INDEX: 27.2 KG/M2 | DIASTOLIC BLOOD PRESSURE: 68 MMHG | TEMPERATURE: 97.6 F | WEIGHT: 183.64 LBS | HEART RATE: 76 BPM | SYSTOLIC BLOOD PRESSURE: 140 MMHG | HEIGHT: 69 IN

## 2021-01-05 DIAGNOSIS — R77.8 ELEVATED TROPONIN: Chronic | ICD-10-CM

## 2021-01-05 DIAGNOSIS — R65.20 SEVERE SEPSIS WITH ACUTE ORGAN DYSFUNCTION DUE TO GRAM NEGATIVE BACTERIA (HCC): ICD-10-CM

## 2021-01-05 DIAGNOSIS — G89.4 CHRONIC PAIN SYNDROME: ICD-10-CM

## 2021-01-05 DIAGNOSIS — N17.9 AKI (ACUTE KIDNEY INJURY) (HCC): Primary | ICD-10-CM

## 2021-01-05 DIAGNOSIS — N20.1 OBSTRUCTION OF URETEROPELVIC JUNCTION (UPJ) DUE TO STONE: ICD-10-CM

## 2021-01-05 DIAGNOSIS — A41.50 SEVERE SEPSIS WITH ACUTE ORGAN DYSFUNCTION DUE TO GRAM NEGATIVE BACTERIA (HCC): ICD-10-CM

## 2021-01-05 LAB
ALBUMIN SERPL BCP-MCNC: 2.7 G/DL (ref 3.5–5.7)
ALP SERPL-CCNC: 59 U/L (ref 55–165)
ALT SERPL W P-5'-P-CCNC: 26 U/L (ref 7–52)
ANION GAP SERPL CALCULATED.3IONS-SCNC: 7 MMOL/L (ref 4–13)
AST SERPL W P-5'-P-CCNC: 16 U/L (ref 13–39)
BILIRUB SERPL-MCNC: 0.3 MG/DL (ref 0.2–1)
BUN SERPL-MCNC: 26 MG/DL (ref 7–25)
CA-I BLD-SCNC: 1.19 MMOL/L (ref 1.12–1.32)
CALCIUM ALBUM COR SERPL-MCNC: 8.9 MG/DL (ref 8.3–10.1)
CALCIUM SERPL-MCNC: 7.9 MG/DL (ref 8.6–10.5)
CHLORIDE SERPL-SCNC: 104 MMOL/L (ref 98–107)
CO2 SERPL-SCNC: 28 MMOL/L (ref 21–31)
CREAT SERPL-MCNC: 1.54 MG/DL (ref 0.7–1.3)
EOSINOPHIL # BLD AUTO: 0.12 THOUSAND/UL (ref 0–0.61)
EOSINOPHIL NFR BLD MANUAL: 1 % (ref 0–6)
ERYTHROCYTE [DISTWIDTH] IN BLOOD BY AUTOMATED COUNT: 13.3 % (ref 11.5–14.5)
FERRITIN SERPL-MCNC: 759 NG/ML (ref 8–388)
FLUAV RNA RESP QL NAA+PROBE: NEGATIVE
FLUBV RNA RESP QL NAA+PROBE: NEGATIVE
GFR SERPL CREATININE-BSD FRML MDRD: 43 ML/MIN/1.73SQ M
GLUCOSE SERPL-MCNC: 94 MG/DL (ref 65–99)
HCT VFR BLD AUTO: 27.3 % (ref 42–47)
HGB BLD-MCNC: 9 G/DL (ref 14–18)
IRON SATN MFR SERPL: 16 %
IRON SERPL-MCNC: 27 UG/DL (ref 65–175)
LYMPHOCYTES # BLD AUTO: 1.35 THOUSAND/UL (ref 0.6–4.47)
LYMPHOCYTES # BLD AUTO: 11 % (ref 20–51)
MAGNESIUM SERPL-MCNC: 1.9 MG/DL (ref 1.9–2.7)
MCH RBC QN AUTO: 31.6 PG (ref 26–34)
MCHC RBC AUTO-ENTMCNC: 32.9 G/DL (ref 31–37)
MCV RBC AUTO: 96 FL (ref 81–99)
METAMYELOCYTES NFR BLD MANUAL: 3 % (ref 0–1)
MONOCYTES # BLD AUTO: 0.49 THOUSAND/UL (ref 0–1.22)
MONOCYTES NFR BLD AUTO: 4 % (ref 4–12)
NEUTS BAND NFR BLD MANUAL: 4 % (ref 0–8)
NEUTS SEG # BLD: 9.96 THOUSAND/UL (ref 1.81–6.82)
NEUTS SEG NFR BLD AUTO: 77 % (ref 42–75)
PHOSPHATE SERPL-MCNC: 3.5 MG/DL (ref 3–5.5)
PLATELET # BLD AUTO: 343 THOUSANDS/UL (ref 149–390)
PLATELET BLD QL SMEAR: ADEQUATE
PMV BLD AUTO: 8.9 FL (ref 8.6–11.7)
POTASSIUM SERPL-SCNC: 3.9 MMOL/L (ref 3.5–5.5)
PROCALCITONIN SERPL-MCNC: 0.98 NG/ML
PROT SERPL-MCNC: 5.3 G/DL (ref 6.4–8.9)
RBC # BLD AUTO: 2.84 MILLION/UL (ref 4.3–5.9)
RBC MORPH BLD: NORMAL
RSV RNA RESP QL NAA+PROBE: NEGATIVE
SARS-COV-2 RNA RESP QL NAA+PROBE: NEGATIVE
SODIUM SERPL-SCNC: 139 MMOL/L (ref 134–143)
TIBC SERPL-MCNC: 168 UG/DL (ref 250–450)
TOTAL CELLS COUNTED SPEC: 100
WBC # BLD AUTO: 12.3 THOUSAND/UL (ref 4.8–10.8)

## 2021-01-05 PROCEDURE — 80053 COMPREHEN METABOLIC PANEL: CPT | Performed by: NURSE PRACTITIONER

## 2021-01-05 PROCEDURE — 82330 ASSAY OF CALCIUM: CPT | Performed by: NURSE PRACTITIONER

## 2021-01-05 PROCEDURE — 83735 ASSAY OF MAGNESIUM: CPT | Performed by: NURSE PRACTITIONER

## 2021-01-05 PROCEDURE — 84145 PROCALCITONIN (PCT): CPT | Performed by: NURSE PRACTITIONER

## 2021-01-05 PROCEDURE — 83540 ASSAY OF IRON: CPT | Performed by: NURSE PRACTITIONER

## 2021-01-05 PROCEDURE — 97535 SELF CARE MNGMENT TRAINING: CPT

## 2021-01-05 PROCEDURE — 82728 ASSAY OF FERRITIN: CPT | Performed by: NURSE PRACTITIONER

## 2021-01-05 PROCEDURE — 84100 ASSAY OF PHOSPHORUS: CPT | Performed by: NURSE PRACTITIONER

## 2021-01-05 PROCEDURE — 99239 HOSP IP/OBS DSCHRG MGMT >30: CPT | Performed by: NURSE PRACTITIONER

## 2021-01-05 PROCEDURE — 97530 THERAPEUTIC ACTIVITIES: CPT

## 2021-01-05 PROCEDURE — 97110 THERAPEUTIC EXERCISES: CPT

## 2021-01-05 PROCEDURE — 85007 BL SMEAR W/DIFF WBC COUNT: CPT | Performed by: NURSE PRACTITIONER

## 2021-01-05 PROCEDURE — 97116 GAIT TRAINING THERAPY: CPT

## 2021-01-05 PROCEDURE — 85027 COMPLETE CBC AUTOMATED: CPT | Performed by: NURSE PRACTITIONER

## 2021-01-05 PROCEDURE — 0241U HB NFCT DS VIR RESP RNA 4 TRGT: CPT | Performed by: NURSE PRACTITIONER

## 2021-01-05 PROCEDURE — 99232 SBSQ HOSP IP/OBS MODERATE 35: CPT | Performed by: INTERNAL MEDICINE

## 2021-01-05 PROCEDURE — 83550 IRON BINDING TEST: CPT | Performed by: NURSE PRACTITIONER

## 2021-01-05 RX ORDER — HEPARIN SODIUM 5000 [USP'U]/ML
5000 INJECTION, SOLUTION INTRAVENOUS; SUBCUTANEOUS EVERY 8 HOURS SCHEDULED
Status: CANCELLED | OUTPATIENT
Start: 2021-01-05

## 2021-01-05 RX ORDER — METRONIDAZOLE 500 MG/1
500 TABLET ORAL EVERY 8 HOURS SCHEDULED
Status: CANCELLED | OUTPATIENT
Start: 2021-01-05 | End: 2021-01-07

## 2021-01-05 RX ORDER — FERROUS SULFATE 325(65) MG
325 TABLET ORAL
Status: DISCONTINUED | OUTPATIENT
Start: 2021-01-06 | End: 2021-01-12 | Stop reason: HOSPADM

## 2021-01-05 RX ORDER — DIPHENHYDRAMINE HCL 25 MG
50 TABLET ORAL
Status: CANCELLED | OUTPATIENT
Start: 2021-01-05

## 2021-01-05 RX ORDER — MELATONIN
4000 DAILY
Status: CANCELLED | OUTPATIENT
Start: 2021-01-06

## 2021-01-05 RX ORDER — DOCUSATE SODIUM 100 MG/1
100 CAPSULE, LIQUID FILLED ORAL 2 TIMES DAILY
Status: DISCONTINUED | OUTPATIENT
Start: 2021-01-05 | End: 2021-01-12 | Stop reason: HOSPADM

## 2021-01-05 RX ORDER — ASCORBIC ACID 500 MG
500 TABLET ORAL DAILY
Status: DISCONTINUED | OUTPATIENT
Start: 2021-01-06 | End: 2021-01-12 | Stop reason: HOSPADM

## 2021-01-05 RX ORDER — ACETAMINOPHEN 325 MG/1
650 TABLET ORAL EVERY 4 HOURS PRN
Status: CANCELLED | OUTPATIENT
Start: 2021-01-05

## 2021-01-05 RX ORDER — CALCIUM CARBONATE 200(500)MG
500 TABLET,CHEWABLE ORAL 2 TIMES DAILY
Status: CANCELLED | OUTPATIENT
Start: 2021-01-05

## 2021-01-05 RX ORDER — ACETAMINOPHEN 325 MG/1
650 TABLET ORAL EVERY 4 HOURS PRN
Status: DISCONTINUED | OUTPATIENT
Start: 2021-01-05 | End: 2021-01-12 | Stop reason: HOSPADM

## 2021-01-05 RX ORDER — ONDANSETRON 2 MG/ML
4 INJECTION INTRAMUSCULAR; INTRAVENOUS EVERY 6 HOURS PRN
Status: DISCONTINUED | OUTPATIENT
Start: 2021-01-05 | End: 2021-01-12 | Stop reason: HOSPADM

## 2021-01-05 RX ORDER — CLONIDINE HYDROCHLORIDE 0.1 MG/1
0.1 TABLET ORAL
Status: DISCONTINUED | OUTPATIENT
Start: 2021-01-05 | End: 2021-01-12 | Stop reason: HOSPADM

## 2021-01-05 RX ORDER — FINASTERIDE 5 MG/1
5 TABLET, FILM COATED ORAL DAILY
Status: DISCONTINUED | OUTPATIENT
Start: 2021-01-06 | End: 2021-01-12 | Stop reason: HOSPADM

## 2021-01-05 RX ORDER — GABAPENTIN 400 MG/1
400 CAPSULE ORAL
Status: CANCELLED | OUTPATIENT
Start: 2021-01-05

## 2021-01-05 RX ORDER — FERROUS SULFATE 325(65) MG
325 TABLET ORAL
Status: CANCELLED | OUTPATIENT
Start: 2021-01-06

## 2021-01-05 RX ORDER — MELATONIN
4000 DAILY
Status: DISCONTINUED | OUTPATIENT
Start: 2021-01-06 | End: 2021-01-12 | Stop reason: HOSPADM

## 2021-01-05 RX ORDER — METRONIDAZOLE 500 MG/1
500 TABLET ORAL EVERY 8 HOURS SCHEDULED
Status: COMPLETED | OUTPATIENT
Start: 2021-01-05 | End: 2021-01-07

## 2021-01-05 RX ORDER — MORPHINE SULFATE 15 MG/1
15 TABLET ORAL 2 TIMES DAILY
Status: CANCELLED | OUTPATIENT
Start: 2021-01-05

## 2021-01-05 RX ORDER — CALCIUM CARBONATE 200(500)MG
500 TABLET,CHEWABLE ORAL 2 TIMES DAILY
Status: DISCONTINUED | OUTPATIENT
Start: 2021-01-05 | End: 2021-01-12 | Stop reason: HOSPADM

## 2021-01-05 RX ORDER — MORPHINE SULFATE 15 MG/1
15 TABLET ORAL 2 TIMES DAILY
Status: DISCONTINUED | OUTPATIENT
Start: 2021-01-05 | End: 2021-01-12 | Stop reason: HOSPADM

## 2021-01-05 RX ORDER — GABAPENTIN 400 MG/1
400 CAPSULE ORAL
Status: DISCONTINUED | OUTPATIENT
Start: 2021-01-05 | End: 2021-01-12 | Stop reason: HOSPADM

## 2021-01-05 RX ORDER — ONDANSETRON 2 MG/ML
4 INJECTION INTRAMUSCULAR; INTRAVENOUS EVERY 6 HOURS PRN
Status: CANCELLED | OUTPATIENT
Start: 2021-01-05

## 2021-01-05 RX ORDER — CLONIDINE HYDROCHLORIDE 0.1 MG/1
0.1 TABLET ORAL
Status: CANCELLED | OUTPATIENT
Start: 2021-01-05

## 2021-01-05 RX ORDER — DIPHENHYDRAMINE HCL 25 MG
50 TABLET ORAL
Status: DISCONTINUED | OUTPATIENT
Start: 2021-01-05 | End: 2021-01-12 | Stop reason: HOSPADM

## 2021-01-05 RX ORDER — HEPARIN SODIUM 5000 [USP'U]/ML
5000 INJECTION, SOLUTION INTRAVENOUS; SUBCUTANEOUS EVERY 8 HOURS SCHEDULED
Status: DISCONTINUED | OUTPATIENT
Start: 2021-01-05 | End: 2021-01-12 | Stop reason: HOSPADM

## 2021-01-05 RX ORDER — FINASTERIDE 5 MG/1
5 TABLET, FILM COATED ORAL DAILY
Status: CANCELLED | OUTPATIENT
Start: 2021-01-06

## 2021-01-05 RX ORDER — ASCORBIC ACID 500 MG
500 TABLET ORAL DAILY
Status: CANCELLED | OUTPATIENT
Start: 2021-01-06

## 2021-01-05 RX ADMIN — MORPHINE SULFATE 15 MG: 15 TABLET ORAL at 10:25

## 2021-01-05 RX ADMIN — FINASTERIDE 5 MG: 5 TABLET, FILM COATED ORAL at 10:25

## 2021-01-05 RX ADMIN — AMPICILLIN SODIUM 2000 MG: 2 INJECTION, POWDER, FOR SOLUTION INTRAMUSCULAR; INTRAVENOUS at 10:25

## 2021-01-05 RX ADMIN — GABAPENTIN 400 MG: 400 CAPSULE ORAL at 21:22

## 2021-01-05 RX ADMIN — MORPHINE SULFATE 15 MG: 15 TABLET ORAL at 18:23

## 2021-01-05 RX ADMIN — HEPARIN SODIUM 5000 UNITS: 5000 INJECTION INTRAVENOUS; SUBCUTANEOUS at 14:28

## 2021-01-05 RX ADMIN — METRONIDAZOLE 500 MG: 500 TABLET, FILM COATED ORAL at 14:28

## 2021-01-05 RX ADMIN — FERROUS SULFATE TAB 325 MG (65 MG ELEMENTAL FE) 325 MG: 325 (65 FE) TAB at 10:26

## 2021-01-05 RX ADMIN — Medication 4000 UNITS: at 10:25

## 2021-01-05 RX ADMIN — DOCUSATE SODIUM 100 MG: 100 CAPSULE, LIQUID FILLED ORAL at 18:23

## 2021-01-05 RX ADMIN — AMPICILLIN SODIUM 2000 MG: 2 INJECTION, POWDER, FOR SOLUTION INTRAMUSCULAR; INTRAVENOUS at 19:12

## 2021-01-05 RX ADMIN — HEPARIN SODIUM 5000 UNITS: 5000 INJECTION INTRAVENOUS; SUBCUTANEOUS at 05:41

## 2021-01-05 RX ADMIN — HEPARIN SODIUM 5000 UNITS: 5000 INJECTION INTRAVENOUS; SUBCUTANEOUS at 21:24

## 2021-01-05 RX ADMIN — OXYCODONE HYDROCHLORIDE AND ACETAMINOPHEN 500 MG: 500 TABLET ORAL at 10:25

## 2021-01-05 RX ADMIN — CALCIUM CARBONATE (ANTACID) CHEW TAB 500 MG 500 MG: 500 CHEW TAB at 21:22

## 2021-01-05 RX ADMIN — METRONIDAZOLE 500 MG: 500 TABLET, FILM COATED ORAL at 21:22

## 2021-01-05 RX ADMIN — SERTRALINE HYDROCHLORIDE 50 MG: 50 TABLET ORAL at 10:26

## 2021-01-05 RX ADMIN — AMPICILLIN SODIUM 2000 MG: 2 INJECTION, POWDER, FOR SOLUTION INTRAMUSCULAR; INTRAVENOUS at 03:00

## 2021-01-05 RX ADMIN — CALCIUM CARBONATE (ANTACID) CHEW TAB 500 MG 500 MG: 500 CHEW TAB at 10:25

## 2021-01-05 RX ADMIN — METRONIDAZOLE 500 MG: 500 TABLET, FILM COATED ORAL at 05:41

## 2021-01-05 NOTE — PROGRESS NOTES
Progress Note - Nephrology   Kamla Bhupendra 66 y o  male MRN: [id number]  Unit/Bed#: -01 Encounter: [id number]    A/P:  1  Acute kidney injury P present on admission atop chronic kidney disease  Presented with a creatinine 2 5 mg/dL  Creatinine has improved to 1 54 mg/dL today  Due to acute tubular necrosis and obstructive uropathy as well as severe sepsis with a complicated UTI with Enterococcus and Proteus bacteremia  He is nonoliguric and ATN appears to be resolving slowly  2  CKD 3B  Baseline creatinine is 1 2-1 3 mg/dL  3  Hypocalcemia  Receiving supplement in corrects to within normal limits with correction 4 hypoalbuminemia  4  Hypomagnesemia  Resolved -> 1 9 mg/dl today  5  Right UPJ obstruction  Status post right ureteral stent 12/30/2020      Follow up reason for today's visit:  Acute kidney injury/CKD 3    Severe sepsis [facility])    Patient Active Problem List   Diagnosis    Chronic indwelling Diallo catheter    Severe sepsis (HCC)    Acute kidney injury - Chronic kidney disease stage 3    Acute metabolic encephalopathy    Gram-negative UTI    Chronic pain syndrome    Elevated troponin    CKD (chronic kidney disease)    Anemia of chronic disease    Mild episode of recurrent major depressive disorder (HCC)    Hypomagnesemia    Bilateral hand pain    Acute respiratory failure with hypoxia    Right hydroureteronephrosis with obstructing calculus    Gram-negative bacteremia    Pneumonia of right lower lobe    Thrombocytopenia          Subjective:   Feels cold today  Denies chest pain shortness of breath abdominal pain nausea vomiting diarrhea  Has a Diallo    Objective:     Vitals: Blood pressure 163/72, pulse (!) 54, temperature 97 7 °F (36 5 °C), temperature source Temporal, resp  rate 18, height 5' 9" (1 753 m), weight 83 3 kg (183 lb 10 3 oz), SpO2 90 %  ,Body mass index is 27 12 kg/m²      Weight (last 2 days)     None            Intake/Output Summary (Last 24 hours) at 1/5/2021 [address] filed at 1/5/2021 0550  Gross per 24 hour   Intake 240 ml   Output 3450 ml   Net -3210 ml     I/O last 3 completed shifts: In: 240 [P O :240]  Out: 5450 [Urine:5450]    Urethral Catheter Latex 18 Fr  (Active)   Reasons to continue Urinary Catheter  Chronic urinary catheter 01/03/21 2008   Goal for Removal N/A- chronic cantor 01/03/21 2008   Site Assessment Clean;Skin intact 01/03/21 2008   Collection Container Standard drainage bag 01/03/21 2008   Securement Method Securing device (Describe) 01/03/21 2008   Output (mL) 1100 mL 01/05/21 0550       Physical Exam: /72 (BP Location: Left arm)   Pulse (!) 54   Temp 97 7 °F (36 5 °C) (Temporal)   Resp 18   Ht 5' 9" (1 753 m)   Wt 83 3 kg (183 lb 10 3 oz)   SpO2 90%   BMI 27 12 kg/m²     General Appearance:    Alert, cooperative, no distress, appears stated age   Head:    Normocephalic, without obvious abnormality, atraumatic   Eyes:    Conjunctiva/corneas clear   Ears:    Normal external ears   Nose:   Nares normal, septum midline, mucosa normal, no drainage    or sinus tenderness   Throat:   Lips, mucosa, and tongue normal; teeth and gums normal   Neck:   Supple, symmetrical, trachea midline, no adenopathy;        thyroid:  No enlargement/tenderness/nodules; no carotid    bruit or JVD   Back:     Symmetric, no curvature, ROM normal, no CVA tenderness   Lungs:     Clear to auscultation bilaterally, respirations unlabored   Chest wall:    No tenderness or deformity   Heart:    Regular rate and rhythm, S1 and S2 normal, no murmur, rub   or gallop   Abdomen:     Soft, non-tender, bowel sounds active  Cantor draining well   Extremities:   Extremities normal, atraumatic, no cyanosis or edema   Skin:   Skin color, texture, turgor normal, no rashes or lesions   Lymph nodes:   Cervical normal   Neurologic:   CNII-XII intact            Lab, Imaging and other studies: I have personally reviewed pertinent labs    CBC:   Lab Results   Component Value Date WBC 12 30 (H) 01/05/2021    HGB 9 0 (L) 01/05/2021    HCT 27 3 (L) 01/05/2021    MCV 96 01/05/2021     01/05/2021    MCH 31 6 01/05/2021    MCHC 32 9 01/05/2021    RDW 13 3 01/05/2021    MPV 8 9 01/05/2021     CMP:   Lab Results   Component Value Date    K 3 9 01/05/2021     01/05/2021    CO2 28 01/05/2021    BUN 26 (H) 01/05/2021    CREATININE 1 54 (H) 01/05/2021    CALCIUM 7 9 (L) 01/05/2021    AST 16 01/05/2021    ALT 26 01/05/2021    ALKPHOS 59 01/05/2021    EGFR 43 01/05/2021         Results from last 7 days   Lab Units 01/05/21  0540 01/04/21  0638 01/03/21  0556   POTASSIUM mmol/L 3 9 3 9 3 6   CHLORIDE mmol/L 104 106 109*   CO2 mmol/L 28 27 24   BUN mg/dL 26* 29* 31*   CREATININE mg/dL 1 54* 1 58* 1 54*   CALCIUM mg/dL 7 9* 7 8* 7 8*   ALK PHOS U/L 59  --   --    ALT U/L 26  --   --    AST U/L 16  --   --          Phosphorus:   Lab Results   Component Value Date    PHOS 3 5 01/05/2021     Magnesium:   Lab Results   Component Value Date    MG 1 9 01/05/2021     Urinalysis: No results found for: COLORU, CLARITYU, SPECGRAV, PHUR, LEUKOCYTESUR, NITRITE, PROTEINUA, GLUCOSEU, KETONESU, BILIRUBINUR, BLOODU  Ionized Calcium: No results found for: CAION  Coagulation: No results found for: PT, INR, APTT  Troponin: No results found for: TROPONINI  ABG: No results found for: PHART, WUQ4HIA, PO2ART, VBN9CRO, W7TUPPVU, BEART, SOURCE  Radiology review:     IMAGING  No results found      Current Facility-Administered Medications   Medication Dose Route Frequency    acetaminophen (TYLENOL) tablet 650 mg  650 mg Oral Q4H PRN    ampicillin (OMNIPEN) 2,000 mg in sodium chloride 0 9 % 100 mL IVPB  2,000 mg Intravenous Q8H    ascorbic acid (VITAMIN C) tablet 500 mg  500 mg Oral Daily    calcium carbonate (TUMS) chewable tablet 500 mg  500 mg Oral BID    cholecalciferol (VITAMIN D3) tablet 4,000 Units  4,000 Units Oral Daily    cloNIDine (CATAPRES) tablet 0 1 mg  0 1 mg Oral Q3H PRN    diphenhydrAMINE (BENADRYL) tablet 50 mg  50 mg Oral HS PRN    ferrous sulfate tablet 325 mg  325 mg Oral Daily With Breakfast    finasteride (PROSCAR) tablet 5 mg  5 mg Oral Daily    gabapentin (NEURONTIN) capsule 400 mg  400 mg Oral HS    heparin (porcine) subcutaneous injection 5,000 Units  5,000 Units Subcutaneous Q8H [facility]    metroNIDAZOLE (FLAGYL) tablet 500 mg  500 mg Oral Q8H [facility]    morphine (MSIR) IR tablet 15 mg  15 mg Oral BID    ondansetron (ZOFRAN) injection 4 mg  4 mg Intravenous Q6H PRN    sertraline (ZOLOFT) tablet 50 mg  50 mg Oral Daily     Medications Discontinued During This Encounter   Medication Reason    sodium chloride 0 9 % bolus 1,200 mL     sodium chloride 0 9 % bolus 1,000 mL     sodium chloride 0 9 % bolus 1,000 mL     sodium chloride 0 9 % bolus 1,000 mL     cefTRIAXone (ROCEPHIN) IVPB (premix in dextrose) 1,000 mg 50 mL     piperacillin-tazobactam (ZOSYN) 3 375 g in sodium chloride 0 9 % 100 mL IVPB     sodium chloride 0 9 % bolus 500 mL     sodium chloride 0 9 % infusion     sodium chloride 0 9 % infusion Patient Discharge    sterile water irrigation solution Patient Discharge    iohexol (OMNIPAQUE) 300 mg/mL injection Patient Discharge    fentaNYL (SUBLIMAZE) injection 25 mcg Patient Transfer    ondansetron (ZOFRAN) injection 4 mg Patient Transfer    piperacillin-tazobactam (ZOSYN) 2 25 g in sodium chloride 0 9 % 50 mL IVPB     ampicillin (OMNIPEN) 2,000 mg in sodium chloride 0 9 % 100 mL IVPB        Sean Arreguin MD      This progress note was produced in part using a dictation device which may document imprecise wording from author's original intent

## 2021-01-05 NOTE — OCCUPATIONAL THERAPY NOTE
01/05/21 0844   OT Last Visit   OT Visit Date 01/05/21   Note Type   Note Type Treatment   Restrictions/Precautions   Weight Bearing Precautions Per Order No   Other Precautions Contact/isolation;Cognitive; Fall Risk;Hard of hearing   Pain Assessment   Pain Assessment Tool 0-10   Pain Score No Pain   ADL   Where Assessed Edge of bed   UB Bathing Assistance 5  Supervision/Setup   UB Bathing Deficit Setup;Verbal cueing;Supervision/safety; Increased time to complete   LB Bathing Assistance 4  Minimal Assistance   LB Bathing Deficit Setup;Supervision/safety; Increased time to complete;Verbal cueing;Right lower leg including foot; Left lower leg including foot; Buttocks   UB Dressing Assistance 4  Minimal Assistance   UB Dressing Deficit Setup;Verbal cueing;Supervision/safety; Increased time to complete; Fasteners   UB Dressing Comments [facility]   LB Dressing Assistance Unable to assess   LB Dressing Comments declined to wash feet   Bed Mobility   Supine to Sit 5  Supervision   Additional items Assist x 1;HOB elevated; Bedrails; Increased time required;Verbal cues;LE management   Additional Comments Pt sat EOB x 25 min to complete ADL with good sitting balance  Transfers   Sit to Stand 3  Moderate assistance   Additional items Assist x 1;Bedrails; Increased time required;Verbal cues   Stand to Sit 3  Moderate assistance   Additional items Assist x 1; Armrests; Increased time required;Verbal cues   Stand pivot 4  Minimal assistance   Additional items Assist x 1; Increased time required;Verbal cues   Cognition   Overall Cognitive Status Impaired   Arousal/Participation Alert; Cooperative   Attention Attends with cues to redirect   Orientation Level Oriented X4   Memory Decreased recall of precautions;Decreased recall of recent events;Decreased short term memory   Following Commands Follows one step commands without difficulty   Comments Pt agreeable to OT treatment,   Activity Tolerance   Activity Tolerance Patient tolerated treatment well   Assessment   Assessment Patient participated in Skilled OT session this date with interventions consisting of functional transfer training, ADL re training with the use of correct body mechnaics, Energy Conservation techniques and increase dynamic sit/ stand balance during functional activity    Patient agreeable to OT treatment session, upon arrival patient was found supine in bed  Patient transfers supine to sit EOB with supervision  Patient waas then set up for ADL seated EOB  Pt tolerated dynamic unsupported sitting x 25 min with good sitting balance noted  Completes ADL as detailed in flow sheet  Patient deferred bathing feet, however, did reach down to pull bboth socks up prior to standing  Patient then transfers sit to stand with Mod A x 1 and pivots to chair with Min A x 1 and VCs for safety  Patient did have slight LOB noted when transferring to chair, just before sitting, slight backwards LOB in which patient required assistance to correct  Patient was seated OOB to chair to end session with SCDs reapplied and all needs met  In comparison to previous session, patient with improvements in self care ADLs, endurance and sitting tolerance  Patient requiring verbal cues for correct technique, verbal cues for pacing thru activity steps, frequent rest periods and ocassional safety reminders  Patient performance  demonstrated varied carryover of learned techniques and strategies to facilitate safety during functional tasks  Patient continues to be functioning below baseline level, occupational performance remains limited secondary to factors listed above and increased risk for falls and injury  From OT standpoint, recommendation at time of d/c would be Short Term Rehab    Patient to benefit from continued Occupational Therapy treatment while in the hospital to address deficits as defined above and maximize level of functional independence with ADLs and functional mobility in order to return to PLOF      Plan   Treatment Interventions ADL retraining;Functional transfer training;Energy conservation   Goal Expiration Date 01/06/21   OT Treatment Day 4   OT Frequency 3-5x/wk   Recommendation   OT Discharge Recommendation Post-Acute Rehabilitation Services   OT - OK to Discharge Yes  (when medically cleared)   DACIA Lopez

## 2021-01-05 NOTE — H&P
PHYSICAL MEDICINE AND REHABILITATION H&P/ADMISSION NOTE  Saul Pacheco 66 y o  male MRN: [id number]  Unit/Bed#: -01 Encounter: [id number]     Rehab Diagnosis: Impairment of mobility, safety and Activities of Daily Living (ADLs) due to Debility:  16  Debility (Non-cardiac/Non-pulmonary)    History of Present Illness:   Saul Pacheco is a 66 y o  male who presented to the [address] [place] [address] ER on 12/27/20 with change in mental status, vomiting, cloudy urine, decreased urinary output, chills and rigors  Pt diagnosed with sepsis secondary to UTI  This was noted by RUTHANN, lactic acidosis, tachycardia and tachypnea  Pt with chronic cantor catheter and h/o recurrent UTIs  Urine culture positive for proteus mirabilis, enterococcus faecalis and alcaligenes faecalis  Pt received 1 dose cefrtiaxone and vancomycin in the ED and was then transitioned to pip/isidro then ampicillin on 12/30  Per ID consult he will continue on ampicillin through 1/11 and Flagyl 500 mg PO TID will be added  Repeat blood cultures on 12/28 were negative  Pt also with acute metabolic encephalopathy  He is currently A&Ox4  Pts hospitilization was also complicated by right hydroureteronephrosis with obstructing calculus for which he had a retrograde pyelogram with ureteral stenting on 12/30, thrombocytopenia, right lower lobe pneumonia, acute respiratory failure with hypoxia for which he required 4 LPM O2 and is now on RA, hypomagnesemia and RUTHANN  Nephrology, urology, and ID were consulted during his hospital course  Pt was recommended for post acute therapy services  Pt arrived to [address] [place] [address] A 1/5/21  Subjective: Pt is doing well  He was slightly confused this morning as per nurses  Nurses stated pt thought he was at home but realized shortly after awakening where he is  Pts only complaint is that after taking the Flagyl he had a odd taste in his mouth  He is motivated to get better and to get home       Review of Systems: Constitutional: Negative  HENT: Negative  CV: Negative  Resp: Negative  GI: Negative  Urinary: Positive for ongoing cantor catheter  Endocrine: Negative  Hematologic: Negative  Neuro: Negative  Psych: Negative    Plan:  Acute metabolic encephalopathy  - Due to sepsis/infections coupled with RUTHANN  -gram negative bacteremia proteus mirabilis and enterococcus faecalis  - Improving    - Cont   Clinical monitoring    Severe sepsis  - Resolved tachycardia and tachypnea with leukocytosis and lactic acidosis  - Monitor vital signs and maintain hemodynamics  - Procalcitonin improving; will monitor    Gram negative UTI  - with proteus mirabilis  - on IV Ampicillin since 12/30/30, may dc on 1/11/21  - Flagyl also started 500 mg po TID x3 days    Chronic indwelling cantor catheter  -Cantor changed in [facility]    Right hydroureternephrosis with obstructing calculus  -s/p retrograde pyelogram with ureteral stenting- 12/30/20  -PRN pain/emesis control    Acute respiratory failure with hypoxia  -on room air currently  -most likely due to pneumonia    RUTHANN- chronic kidney disease stage 3  -Baseline creatinine approx 1 2-1 3 mg/dL   -Currently at 1 54 from a 2 71 peak  - Will monitor creatinine  -Occurred due to hydroureteronephrosis  -Nephrology consulted to cont following  -Monitor urine output   -Limit/avoid nephrotoxins if possible    Chronic pain syndrome  - Continous opioid dependence  -cont home meds    Anemia of chronic disease  - Monitor H/H  - Cont ferrous sulfate supplementation    Mild episode of recurrent major depressive disorder  - Cont zoloft    Hypomagnesemia  -Monitor    Gram negative bacteremia  - Secondary to sepsis  -Received a dose of ceftriaxone and vanco in ED; transitioned to pip/isidro then ampicillian 12/30  -Cont ampicillian till 1/11/21  -Cont flagyl 500 mg po TID x3days   -Echo- pending  - Formal ID eval Monday; will consult  - Leukocytosis noted 1/5/21 11-12 k, pt is afebril, cont to trend Pneumonia of right lower lobe  -on IV ampicillian and flagyl  -aspiration precautions   -maintain O2- encourage incentive spirometry    Thrombocytopenia  -Monitor platelet count  -Monitor for bleeding    Code  -Full code    DVT prophylaxis  -heparin  -SCD        Scheduled Meds:  Current Facility-Administered Medications   Medication Dose Route Frequency Provider Last Rate    acetaminophen  650 mg Oral Q4H PRN Becca [place] PA-C      ampicillin  2,000 mg Intravenous Q8H Lizzette Olivas PA-C      [START ON 1/6/2021] ascorbic acid  500 mg Oral Daily Becca [place] PA-C      calcium carbonate  500 mg Oral BID Becca Moran PA-C      [START ON 1/6/2021] cholecalciferol  4,000 Units Oral Daily Becca Moran PA-C      cloNIDine  0 1 mg Oral Q3H PRN Becca [place] PA-C      diphenhydrAMINE  50 mg Oral HS PRN Becca Moran PA-C      docusate sodium  100 mg Oral BID Becca Moran PA-C      [START ON 1/6/2021] ferrous sulfate  325 mg Oral Daily With Breakfast Becca Moran PA-C      [START ON 1/6/2021] finasteride  5 mg Oral Daily Becca [place] PA-C      gabapentin  400 mg Oral HS Becca [place] PA-C      heparin (porcine)  5,000 Units Subcutaneous [facility] Becca Moran PA-C      metroNIDAZOLE  500 mg Oral [facility] Becca Moran PA-C      morphine  15 mg Oral BID Becca Moran PA-C      ondansetron  4 mg Intravenous Q6H PRN Becca Moran PA-C      [START ON 1/6/2021] sertraline  50 mg Oral Daily Becca [place] PA-C         Restrictions include:  Weight bearing as tolerated Fall precautions      Functional History - Prior to Admission:      Functional Status: Patient was independent with mobility/ambulation, transfers, ADL's, IADL's  Functional Status Upon Admission to [facility]:  Mobility: moderate assistance  Transfers:  Moderate assistance  ADLs: Minimal assistance     Physical Exam:  Temp:  [97 6 °F (36 4 °C)-99 1 °F (37 3 °C)] 99 1 °F (37 3 °C)  HR: [54-76] 62  Resp:  [18-20] 20  BP: (140-163)/(68-84) 145/84  SpO2:  [90 %-97 %] 97 %    General:   alert, no apparent distress, cooperative and comfortable  HEENT:  Head: Normocephalic, no lesions, without obvious abnormality  Eye: Normal external eye, conjunctiva, lidsc cornea  Ears: Normal external ears  Nose: Normal external nose, mucus membranes  CARDIAC:  regular rate and rhythm, S1, S2 normal, no murmur, click, rub or gallop  LUNGS:  no abnormal respiratory pattern, no retractions noted, non-labored breathing   ABDOMEN:  soft, non-tender, non-distended  EXTREMITIES:  extremities normal, warm and well-perfused; no cyanosis, clubbing, or edema  NEURO:  clear speech, following all commands, oriented x3  PSYCH:  Alert and oriented, appropriate affect  Laboratory:    Results from last 7 days   Lab Units 01/05/21  0540 01/04/21  0638 01/03/21  0556   HEMOGLOBIN g/dL 9 0* 8 5* 8 7*   HEMATOCRIT % 27 3* 25 7* 26 1*   WBC Thousand/uL 12 30* 13 10* 12 40*     Results from last 7 days   Lab Units 01/05/21  0540 01/04/21  0638 01/03/21  0556   BUN mg/dL 26* 29* 31*   SODIUM mmol/L 139 139 141   POTASSIUM mmol/L 3 9 3 9 3 6   CHLORIDE mmol/L 104 106 109*   CREATININE mg/dL 1 54* 1 58* 1 54*   AST U/L 16  --   --    ALT U/L 26  --   --             Wt Readings from Last 1 Encounters:   12/27/20 83 3 kg (183 lb 10 3 oz)     Estimated body mass index is 27 12 kg/m² as calculated from the following:    Height as of 12/27/20: 5' 9" (1 753 m)  Weight as of 12/27/20: 83 3 kg (183 lb 10 3 oz)  Imaging: reviewed     Rehabilitation Prognosis: good     Tolerance for three hours of therapy a day: good     Family/Patient Goals:  Patient/family's goals: Return to previous home/apartment  Patient will receive PT and OT 90 minutes each per day, five days per week to achieve rehab goals or participate in 900 minutes of therapy within a 7 day week period  Mobility Goals:  [place]  Transfer Goals: [place]  Activities of Daily Living (ADLs) Goals: [place]    Discharge Planning:  Rehabilitation and discharge goals discussed with the patient and/or family  Case Managment and Social Work to review patient/family resources and to coordinate Discharge Planning  Estimated length of stay: 10 to 14 days    Patient and Family Education and Training:  Rehabilitation and discharge goals discussed with the patient and/or family  Patient/family education/training needs to be discussed in weekly team meeting  Equipment/DME needs: Therapy teams to assess and evaluate for additional equipment/DME needs throughout rehabilitation stay    Past Medical History:   Diagnosis Date    Depression     Diallo catheter in place     Hypertension     Prostate enlargement     Psychiatric disorder     Urinary tract infection      Past Surgical History:   Procedure Laterality Date    ARTHROSCOPY KNEE      BACK SURGERY      L 4 or 5    CATARACT EXTRACTION Bilateral     CYSTOSCOPY W/ LASER LITHOTRIPSY N/A 8/20/2018    Procedure: LITHOTRISPY HOLMIUM LASER of bladder stones;  Surgeon: Carlitos Gallego MD;  Location: [facility] MAIN OR;  Service: Urology    MI CYSTOURETHROSCOPY,URETER CATHETER Right 12/29/2020    Procedure: CYSTOSCOPY RETROGRADE PYELOGRAM WITH INSERTION STENT URETERAL;  Surgeon: Carlitos Gallego MD;  Location: [facility] MAIN OR;  Service: Urology    MI TRANSURETHRAL ELEC-SURG PROSTATECTOM N/A 8/20/2018    Procedure: Jennifer Lema; TURP;  Surgeon: Carlitos Gallego MD;  Location: [facility] MAIN OR;  Service: Urology    SHOULDER SURGERY Right 05/31/2019      Family History   Problem Relation Age of Onset    Cancer Mother     Diabetes Father      Social History     Socioeconomic History    Marital status:       Spouse name: Not on file    Number of children: Not on file    Years of education: Not on file    Highest education level: Not on file   Occupational History    Not on file   Social Needs    Financial resource strain: Not on file    Food insecurity     Worry: Not on file     Inability: Not on file    Transportation needs     Medical: Not on file     Non-medical: Not on file   Tobacco Use    Smoking status: Former Smoker     Packs/day: 1 00    Smokeless tobacco: Never Used    Tobacco comment: quit 50 years ago   Substance and Sexual Activity    Alcohol use: Never     Frequency: Monthly or less     Drinks per session: 1 or 2     Binge frequency: Less than monthly    Drug use: Never    Sexual activity: Not on file   Lifestyle    Physical activity     Days per week: Not on file     Minutes per session: Not on file    Stress: Not on file   Relationships    Social connections     Talks on phone: Not on file     Gets together: Not on file     Attends Religion service: Not on file     Active member of club or organization: Not on file     Attends meetings of clubs or organizations: Not on file     Relationship status: Not on file    Intimate partner violence     Fear of current or ex partner: Not on file     Emotionally abused: Not on file     Physically abused: Not on file     Forced sexual activity: Not on file   Other Topics Concern    Not on file   Social History Narrative    Not on file       Patient Active Problem List   Diagnosis    Chronic indwelling Diallo catheter    Severe sepsis with acute organ dysfunction due to Gram negative bacteria ([facility] [address] )    Acute kidney injury - Chronic kidney disease stage 3    Acute metabolic encephalopathy    Gram-negative UTI    Chronic pain syndrome    Elevated troponin    CKD (chronic kidney disease)    Anemia of chronic disease    Mild episode of recurrent major depressive disorder ([facility] [address] )    Hypomagnesemia    Bilateral hand pain    Acute respiratory failure with hypoxia    Right hydroureteronephrosis with obstructing calculus    Gram-negative bacteremia    Pneumonia of right lower lobe    Thrombocytopenia      No Known Allergies       Medical Necessity Criteria for [facility] Admission: Electrolyte imbalance:  hypomagnesium, IV antibiotics, Acute Kidney Injury, Chronic Kidney Disease, Anemia, with the following plan: monitor H/H, Leukocystosis, Thrombocytopenia, Urinary Tract Infection (UTI), Delirium/Agitation/Encephalopathy and Pneumonia  In addition, the preadmission screen, post-admission physical evaluation, overall plan of care and admissions order demonstrate a reasonable expectation that the following criteria were met at the time of admission to the [facility]  1  The patient requires active and ongoing therapeutic intervention of multiple therapy disciplines (physical therapy, occupational therapy, speech-language pathology, or prosthetics/orthotics), one of which is physical or occupational therapy  2  Patient requires an intensive rehabilitation therapy program, as defined in Chapter 1, section 110 2 2 of the CMS Medicare Policy Manual  This intensive rehabilitation therapy program will consist of at least 3 hours of therapy per day at least 5 days per week or at least 15 hours of intensive rehabilitation therapy within a 7 consecutive day period, beginning with the date of admission to the [facility]  3  The patient is reasonably expected to actively participate in, and benefit significantly from, the intensive rehabilitation therapy program as defined in Chapter 1, section 110 2 2 of the CMS Medicare Policy Manual at this time of admission to the [facility]  He can reasonably be expected to make measurable improvement (that will be of practical value to improve the patients functional capacity or adaptation to impairments) as a result of the rehabilitation treatment, as defined in section 110 3, and such improvement can be expected to be made within the prescribed period of time   As noted in the CMS Medicare Policy Manual, the patient need not be expected to achieve complete independence in the domain of self-care nor be expected to return to his or her prior level of functioning in order to meet this standard  4  The patient must require physician supervision by a rehabilitation physician  As such, a rehabilitation physician will conduct face-to-face visits with the patient at least 3 days per week throughout the patients stay in the [facility] to assess the patient both medically and functionally, as well as to modify the course of treatment as needed to maximize the patients capacity to benefit from the rehabilitation process  5  The patient requires an intensive and coordinated interdisciplinary approach to providing rehabilitation, as defined in Chapter 1, section 110 2 5 of the CMS Medicare Policy Manual  This will be achieved through periodic team conferences, conducted at least once in a 7-day period, and comprising of an interdisciplinary team of medical professionals consisting of: a rehabilitation physician, registered nurse,  and/or , and a licensed/certified therapist from each therapy discipline involved in treating the patient  Changes Since Pre-admission Assessment: None -This patient's participation in rehab continues to be reasonable, necessary and appropriate  CMS Required Post-Admission Physician Evaluation Elements  History and Physical, including medical history, functional history and active comorbidities as in above text  PostAdmission Physician Evaluation:  The patient has the potential to make improvement and is in need of physical, occupational, and/or therapy services  The patient may also need nutritional services  Given the patient's complex medical condition and risk of further medical complications, rehabilitative services cannot be safely provided at a lower level of care, such as a skilled nursing facility  I have reviewed the patient's functional and medical status at the time of the preadmission screening and they are the same as on the day of this admission   I acknowledge that I have personally performed a full physical examination on this patient within 24 hours of admission  The patient and/or family demonstrated understanding the rehabilitation program and the discharge process after we discussed them  Agree in entirety: yes  Minor adaptions: none    Major changes: none     Dylon Duran PA-C  Physical Medicine and Rehabilitation    ** Please Note: Fluency Direct voice to text software may have been used in the creation of this document   **

## 2021-01-05 NOTE — OCCUPATIONAL THERAPY NOTE
01/05/21 0844   OT Last Visit   OT Visit Date 01/05/21   Note Type   Note Type Treatment   Restrictions/Precautions   Weight Bearing Precautions Per Order No   Other Precautions Contact/isolation;Cognitive; Fall Risk;Hard of hearing   Pain Assessment   Pain Assessment Tool 0-10   Pain Score No Pain   ADL   Where Assessed Edge of bed   UB Bathing Assistance 5  Supervision/Setup   UB Bathing Deficit Setup;Verbal cueing;Supervision/safety; Increased time to complete   LB Bathing Assistance 4  Minimal Assistance   LB Bathing Deficit Setup;Supervision/safety; Increased time to complete;Verbal cueing;Right lower leg including foot; Left lower leg including foot; Buttocks   UB Dressing Assistance 4  Minimal Assistance   UB Dressing Deficit Setup;Verbal cueing;Supervision/safety; Increased time to complete; Fasteners   UB Dressing Comments [facility]   LB Dressing Assistance Unable to assess   LB Dressing Comments declined to wash feet   Bed Mobility   Supine to Sit 5  Supervision   Additional items Assist x 1;HOB elevated; Bedrails; Increased time required;Verbal cues;LE management   Additional Comments Pt sat EOB x 25 min to complete ADL with good sitting balance  Transfers   Sit to Stand 3  Moderate assistance   Additional items Assist x 1;Bedrails; Increased time required;Verbal cues   Stand to Sit 3  Moderate assistance   Additional items Assist x 1; Armrests; Increased time required;Verbal cues   Stand pivot 4  Minimal assistance   Additional items Assist x 1; Increased time required;Verbal cues   Cognition   Overall Cognitive Status Impaired   Arousal/Participation Alert; Cooperative   Attention Attends with cues to redirect   Orientation Level Oriented X4   Memory Decreased recall of precautions;Decreased recall of recent events;Decreased short term memory   Following Commands Follows one step commands without difficulty   Comments Pt agreeable to OT treatment,   Activity Tolerance   Activity Tolerance Patient tolerated treatment well   Assessment   Assessment Patient participated in Skilled OT session this date with interventions consisting of functional transfer training, ADL re training with the use of correct body mechnaics, Energy Conservation techniques and increase dynamic sit/ stand balance during functional activity    Patient agreeable to OT treatment session, upon arrival patient was found supine in bed  Patient transfers supine to sit EOB with supervision  Patient waas then set up for ADL seated EOB  Pt tolerated dynamic unsupported sitting x 25 min with good sitting balance noted  Completes ADL as detailed in flow sheet  Patient deferred bathing feet, however, did reach down to pull bboth socks up prior to standing  Patient then transfers sit to stand with Mod A x 1 and pivots to chair with Min A x 1 and VCs for safety  Patient did have slight LOB noted when transferring to chair, just before sitting, slight backwards LOB in which patient required assistance to correct  Patient was seated OOB to chair to end session with SCDs reapplied and all needs met  In comparison to previous session, patient with improvements in self care ADLs, endurance and sitting tolerance  Patient requiring verbal cues for correct technique, verbal cues for pacing thru activity steps, frequent rest periods and ocassional safety reminders  Patient performance  demonstrated varied carryover of learned techniques and strategies to facilitate safety during functional tasks  Patient continues to be functioning below baseline level, occupational performance remains limited secondary to factors listed above and increased risk for falls and injury  From OT standpoint, recommendation at time of d/c would be Short Term Rehab    Patient to benefit from continued Occupational Therapy treatment while in the hospital to address deficits as defined above and maximize level of functional independence with ADLs and functional mobility in order to return to PLOF      Plan   Treatment Interventions ADL retraining;Functional transfer training;Energy conservation   Goal Expiration Date 01/06/21   OT Treatment Day 4   OT Frequency 3-5x/wk   Recommendation   OT Discharge Recommendation Post-Acute Rehabilitation Services   OT - OK to Discharge Yes  (when medically cleared)   DACIA Noriega

## 2021-01-05 NOTE — DISCHARGE SUMMARY
Discharge- Randolph Garcia 1945, 66 y o  male MRN: [id number]    Unit/Bed#: -01 Encounter: [id number]    Primary Care Provider: Luda Benito DO   Date and time admitted to hospital: 12/27/2020  5:16 AM        Acute metabolic encephalopathy  Assessment & Plan  · In the setting of sepsis/infections coupled with acute kidney injury - gram-negative bacteremia proteus mirabilis and enterococcus faecalis  · Improving  Patient oriented to place and person but not time  Patient is very hard of hearing  · Continue clinical monitoring        * Severe sepsis (HCC)  Assessment & Plan  · With tachycardia/tachypnea coupled leukocytosis and lactic acidosis -> resolved  · In the setting of Proteus bacteremia/UTI and right basilar pneumonia (see below)  · Monitor vital signs and maintain hemodynamics  · Clinically is improving   · procalcitonin improving          Gram-negative UTI  Assessment & Plan  · Urine culture pathogen only growing Proteus mirabilis - lesser colonies of Enterococcus/Alcaligenes species noted  · Sensitivities noted -> transitioned IV Zosyn to IV Ampicillin on 12/30/2020  · Chronic Diallo catheter presence noted  · 01/04/2021:  Infectious disease states ampicillin until 01/11/2021 and start Flagyl 500 mg p o  T i d  X3 days          Chronic indwelling Diallo catheter  Assessment & Plan  · Diallo changed during this admission          Right hydroureteronephrosis with obstructing calculus  Assessment & Plan  · S/p retrograde pyelogram with ureteral stenting on 12/30/2020 - appreciate urology evaluation  · PRN pain/emesis control        Acute respiratory failure with hypoxia  Assessment & Plan  · Previously required up to 4 L via nasal cannula now down to 2 L-> now on RA  · Likely due to pneumonia      Acute kidney injury - Chronic kidney disease stage 3  Assessment & Plan  · Baseline creatinine approximately 1 2-1 3 mg/dL- currently @ 1 54 from a 2 71 peak  · Exacerbated in the setting of obstructive hydroureteronephrosis - s/p ureteral stenting on 12/30/2020  · Monitor renal function and urine output - limit/avoid nephrotoxins if possible  · Nephrology following    Chronic pain syndrome  Assessment & Plan  · Continuous opioid dependence  · Continue home medication            Anemia of chronic disease  Assessment & Plan  · Monitor H/H  · Continue ferrous sulfate supplementation        Mild episode of recurrent major depressive disorder ([facility] [address] )  Assessment & Plan  · On Zoloft  · Denies any suicidal or homicidal ideation    Hypomagnesemia  Assessment & Plan  · Monitor/replete p r n  Gram-negative bacteremia  Assessment & Plan  · Secondary to sepsis  · Blood cultures from 12/27 growing Proteus species -> likely source is urine due to concurrent UTI  · Received 1 dose of ceftriaxone and vancomycin in the ED; then transition to pip/isidro then ampicillin on 12/30  · Repeated blood cultures- 12/28 no growth   · I discussed case via [facility] with ID- recommend to continue with IV antibiotic for total of 2 week (through 1/11/2020) due to Enterococcus bacteremia - and add Flagyl 500 mg p o  3 times a day x3 days  · Echocardiogram- final result is pending   · Pending Infectious disease for formal evaluation on Monday   · Noted to have leukocytosis from 11 to 12K today; the patient remained afebrile- continue to trend      Pneumonia of right lower lobe  Assessment & Plan  · Likely suspected pneumonia ?viral etiology  No sputum production or fevers  · As seen on CT imaging  · Suspect possibly aspiration secondary to vomiting under encephalopathic state  · Empiric IV Zosyn transitioned to IV Ampicillin on 12/30/2020 due to concurrent urine culture polymicrobial growth sensitivities - ampicillin until 01/11/2021, and Flagyl 500 mg p o  3 times a day for 3 days (1/7/2021) per Infectious Disease    · Aspiration precautions - speech/swallow therapy recommending normal diet  · Maintain oxygen - encourage incentive spirometry        Thrombocytopenia   Assessment & Plan  · Monitor platelet count - monitor for bleeding          Discharging Physician / Practitioner: UBALDO Carver  PCP: Danielle Luna DO  Admission Date:   Admission Orders (From admission, onward)     Ordered        12/27/20 0925  Inpatient Admission  Once         12/27/20 0726  Place in Observation  Once                   Discharge Date: 01/05/21    Resolved Problems  Date Reviewed: 1/5/2021    None          Consultations During Hospital Stay:  · PT OT  · Case management  · Nephrology  · Speech  · Urology  · Infectious disease    Procedures Performed:   · CXR: No acute cardiopulmonary disease  · CT abdomen and pelvis: 8 mm right UPJ calculus with mild right hydronephrosis  Right lower lobe opacity likely representing pneumonia  · CXR: Right lower lobe pneumonia, corresponding with the abdomen CT from 12/28/2020  · US kidney and bladder: 14 x 7 mm right UPJ calculus with mild right hydronephrosis  · Urethrocystogram retrograde: Fluoroscopic guidance provided for surgical procedure  · KUB: 11 mm calculus adjacent to right proximal nephroureteral stent, similar position to prior CT and likely within proximal ureter/ UPJ  · Echo: Systolic function was normal  Ejection fraction was estimated to be 65 %  There were no regional wall motion abnormalities  Wall thickness was mildly increased  There was mild concentric hypertrophy  Doppler parameters were consistent with abnormal left ventricular relaxation (grade 1 diastolic dysfunction)  Significant Findings / Test Results:   · Blood cultures with Proteus Mirabilis and Enterococcus Faecalis - to be on ampicillin until 1/11/2021 and Flagyl until 1/7/2021  · Urine culture from indwelling cantor positive with proteus mirabilis, enterococcus faecalis, alcaligenes faecalis - to continue ampicillin and flagyl as previously ordered       Incidental Findings:   · As above     Test Results Pending at Discharge (will require follow up):   · Continued follow-up with Infectious Disease     Outpatient Tests Requested:  · None    Complications:     Hospitalization    Reason for Admission: Altered mental status    Hospital Course:     Joe Gutierrez is a 66 y o  male patient who originally presented to the hospital on 12/27/2020 due to altered mental status  Patient with past medical history of hypertension, depression, chronic pain with continuous opioid dependence, BPH with chronic Diallo and recurrent UTI was brought to the ER secondary to altered mental status, vomiting, issues with the Diallo with cloudy, decreased urinary output  They also noted having chills and rigors on 12/26 these are all similar issues when he had sepsis with his UTI  Was concern for aspiration after vomiting he was hypoxic in the ER      Yesterday, he told daughter was tired  He woke up had breakfast and supper, went to bed early because tired  Daughter checked on him, early this morning he was moaning, she noted nothing in his catheter bag  He vomited  He get changed every 2 weeks w/ Dr Linda Florez  He was shaking and she could not get temperature  He normally active, drives, ambulates around house ok, uses cane when out and functions independently  Patient was initially started on Rocephin for his pneumonia, and Zosyn for sepsis  Patient did have a Diallo catheter changed on 12/27/2020 in the emergency department  Patient was found to have an acute kidney injury which prompted the CT of the abdomen and pelvis and ultrasound which did show a large calculus and right sided hydronephrosis urine cultures obtained and patient is on appropriate antibiotic  For the sepsis, patient had blood cultures which grew the same bacteria as his urine culture  And is on the correct antibiotics  Patient will remain on ampicillin IV until 01/11/2021 and Flagyl p o  Until 01/07/2021    Flagyl was added for suspected aspiration pneumonia  Patient has acute kidney injury did improve  Patient felt to require short-term rehab and was accepted at the acute rehab unit in this facility, for which he will be discharged today  Please see above list of diagnoses and related plan for additional information  Condition at Discharge: stable     Discharge Day Visit / Exam:     Subjective:  Patient is sitting in bed  He offers no significant complaints  He does state that he has a little tired, but states he feels well overall  Vitals: Blood Pressure: 163/72 (01/05/21 0729)  Pulse: (!) 54 (01/05/21 0729)  Temperature: 97 7 °F (36 5 °C) (01/05/21 0729)  Temp Source: Temporal (01/05/21 0729)  Respirations: 18 (01/05/21 0729)  Height: 5' 9" (175 3 cm) (12/27/20 0805)  Weight - Scale: 83 3 kg (183 lb 10 3 oz) (12/27/20 0805)  SpO2: 90 % (01/05/21 0729)  Exam:   Physical Exam  Constitutional:       General: He is awake  Appearance: Normal appearance  HENT:      Head: Normocephalic and atraumatic  Nose: Nose normal       Mouth/Throat:      Mouth: Mucous membranes are moist    Eyes:      Extraocular Movements: Extraocular movements intact  Neck:      Musculoskeletal: Normal range of motion  Cardiovascular:      Rate and Rhythm: Normal rate and regular rhythm  Heart sounds: Normal heart sounds  Pulmonary:      Effort: Pulmonary effort is normal       Breath sounds: Normal breath sounds  Abdominal:      General: Bowel sounds are normal       Palpations: Abdomen is soft  Genitourinary:     Comments: Chronic Diallo catheter in place, of note this was changed on 12/27/2020  Musculoskeletal:      Comments: Some generalized weakness   Skin:     General: Skin is warm  Neurological:      Mental Status: He is alert  Mental status is at baseline        Comments: Periods of confusion   Psychiatric:         Attention and Perception: Attention normal          Mood and Affect: Mood normal          Speech: Speech normal  Behavior: Behavior normal  Behavior is cooperative  Discussion with Family:  Discussed with the patient, will discuss with patient's family members  Discharge instructions/Information to patient and family:   See after visit summary for information provided to patient and family  Provisions for Follow-Up Care:  See after visit summary for information related to follow-up care and any pertinent home health orders  Disposition:     Acute Rehab at [facility]      Planned Readmission:    To acute rehab unit  Discharge Statement:  I spent 40 minutes discharging the patient  This time was spent on the day of discharge  I had direct contact with the patient on the day of discharge  Greater than 50% of the total time was spent examining patient, answering all patient questions, arranging and discussing plan of care with patient as well as directly providing post-discharge instructions  Additional time then spent on discharge activities  Discharge Medications:  See after visit summary for reconciled discharge medications provided to patient and family        ** Please Note: This note has been constructed using a voice recognition system **

## 2021-01-05 NOTE — PROGRESS NOTES
PHYSICAL MEDICINE AND REHABILITATION   PREADMISSION ASSESSMENT     Projected [facility] Diagnoses:  Impairment of mobility, safety and Activities of Daily Living (ADLs) due to Debility:  16  Debility (Non-cardiac/Non-pulmonary)  Etiologic Diagnosis: severe sepsis secondary to proteus and enterococcus bacteremia in urine   Date of Onset: 12/27/20   Date of surgery: 12/30/20 retrograde pyelogram with ureteral stenting    PATIENT INFORMATION  Name: Chen Perez Phone #: [phone] (home)   Address: [address] [place] [id number]  YOB: 1945 Age: 66 y o  SS#   Marital Status:   Ethnicity: White  Employment Status: retired  Extended Emergency Contact Information  Primary Emergency Contact: Candice Spencer  Address: [address] Phone: [phone]  Relation: Daughter  Advance Directive: Level 1 Full Code (no ACP docs)    INSURANCE/COVERAGE:     Primary Payor: MEDICARE / Plan: MEDICARE A AND B / Product Type: Medicare A & B Fee for Service /   Secondary Payer: Bora Benitez   Payer Contact:  Payer Contact:   Contact Phone:  Contact Phone:     Authorization #: n/a  Coverage Dates: n/a  LCD: n/a  Medicare #: [id number]  Medicare Days: 60/30/60  Medical Record #: [id number]    REFERRAL SOURCE:   Referring provider: Linda Lacey MD  Referring facility: [address] [place] [address]  Room: [facility] [address]  PCP: Radha Lopez DO PCP phone number: [phone]    MEDICAL INFORMATION  HPI: This is a 66year old male who presented to [address] [place] [address] ER on 12/27/20 with change in mental status, vomiting, cloudy urine, decreased urinary output, chill and rigors  Patient diagnosed with sepsis secondary to UTI  This was noted by RUTHANN, lactic acidosis, tachycardia and tachypnea  Patient with chronic cantor catheter and h/o recurrent UTIs    Urine culture positive for proteus mirabilis, enterococcus faecalis and alcaligenes faecalis  Patient received 1 dose ceftriaxone and vancomycin in the ED and was then transitioned to pip/isidro then ampicillin on 12/30  Per ID consult he will continue on Ampicillin through 1/11 and Flagyl 500 mg PO TID will be added  Repeat blood cultures on 12/28 were negative  Patient also with acute metabolic encephalopathy  He is currently A&Ox4  Patient's hospitalization was also complicated by right hydroureteronephrosis with obstructing calculus for which he had a retrograde pyelogram with ureteral stenting on 12/30, thrombocytopenia, right lower lobe pneumonia, acute respiratory failure with hypoxia for which he required 4 LPM O2 and is now on RA, hypomagnesemia and RUTHANN  Nephrology, urology and ID were consulted during his hospital course  Patient worked with PT and OT and recommended for post acute therapy services  He was accepted at the [facility] and is now medically stable to begin his rehabilitation  Past Medical History:   Past Surgical History:    Allergies:     Past Medical History:   Diagnosis Date    Depression     Diallo catheter in place     Hypertension     Prostate enlargement     Psychiatric disorder     Urinary tract infection     Past Surgical History:   Procedure Laterality Date    ARTHROSCOPY KNEE      BACK SURGERY      L 4 or 5    CATARACT EXTRACTION Bilateral     CYSTOSCOPY W/ LASER LITHOTRIPSY N/A 8/20/2018    Procedure: LITHOTRISPY HOLMIUM LASER of bladder stones;  Surgeon: Kelsi Coronado MD;  Location: [facility] MAIN OR;  Service: Urology    ID CYSTOURETHROSCOPY,URETER CATHETER Right 12/29/2020    Procedure: CYSTOSCOPY RETROGRADE PYELOGRAM WITH INSERTION STENT URETERAL;  Surgeon: Kelsi Coronado MD;  Location: [facility] MAIN OR;  Service: Urology    ID TRANSURETHRAL ELEC-SURG Donnell Kruse N/A 8/20/2018    Procedure: Deedee Rosales; TURP;  Surgeon: Kelsi Coronado MD;  Location: [facility] MAIN OR;  Service: Urology   Kel Maharaj SHOULDER SURGERY Right 05/31/2019     No Known Allergies      Comorbidities:   Vomiting, decreased urinary output  Chills and rigors  Tachycardia  Tachypnea  Acute metabolic encephalopathy  Acute respiratory failure with hypoxia  RUTHANN  Chronic indwelling Diallo Catheter  Right hydroureteronephrosis with obstructing calculus  Chronic pain   Hypomagnesemia  Right lower lobe pneumonia  Thrombocytopenia      Surgeries/Procedures in the last 100 days: 12/30/20 retrograde pyelogram with ureteral stenting      CURRENT VITAL SIGNS:   Temp:  [97 6 °F (36 4 °C)-98 7 °F (37 1 °C)] 97 6 °F (36 4 °C)  HR:  [54-76] 76  Resp:  [18] 18  BP: (140-163)/(68-73) 140/68   Intake/Output Summary (Last 24 hours) at 1/5/2021 1601  Last data filed at 1/5/2021 1300  Gross per 24 hour   Intake 240 ml   Output 3700 ml   Net -3460 ml        LABORATORY RESULTS:      Lab Results   Component Value Date    HGB 9 0 (L) 01/05/2021    HGB 11 4 (L) 06/22/2018    HCT 27 3 (L) 01/05/2021    HCT 34 0 (L) 06/22/2018    WBC 12 30 (H) 01/05/2021    WBC 11 9 (H) 06/22/2018     Lab Results   Component Value Date    BUN 26 (H) 01/05/2021    BUN 26 (H) 06/22/2018     06/22/2018    K 3 9 01/05/2021    K 4 4 06/22/2018     01/05/2021     06/22/2018    CREATININE 1 54 (H) 01/05/2021    CREATININE 1 58 (H) 06/22/2018     Lab Results   Component Value Date    PROTIME 16 9 (H) 12/27/2020    INR 1 39 (H) 12/27/2020        DIAGNOSTIC STUDIES:  Ct Abdomen Pelvis Wo Contrast    Result Date: 12/28/2020  Impression: 8 mm right UPJ calculus with mild right hydronephrosis  Right lower lobe opacity likely representing pneumonia  Workstation performed: [id number]     Xr Chest Portable    Result Date: 12/29/2020  Impression: Right lower lobe pneumonia, corresponding with the abdomen CT from 12/28/2020  Workstation performed: [id number]     Xr Chest Portable - 1 View    Result Date: 12/27/2020  Impression: No acute cardiopulmonary disease   Workstation performed: [id number]     Xr Abdomen 1 View Kub    Result Date: 12/30/2020  Impression: 11 mm calculus adjacent to right proximal nephroureteral stent, similar position to prior CT and likely within proximal ureter/ UPJ  Workstation performed: [id number]     Xr Urethrocystogram Retrograde    Result Date: 12/30/2020  Impression: Fluoroscopic guidance provided for surgical procedure  Please refer to the separate procedure notes for additional details  Workstation performed: [id number]     Us Kidney And Bladder    Result Date: 12/29/2020  Impression: 14 x 7 mm right UPJ calculus with mild right hydronephrosis   Workstation performed: [id number]       PRECAUTIONS/SPECIAL NEEDS:  Isolation Precautions:  Contact, Pain Management, IV: Type: peripheral Location:Left AC Reason:IV antibiotics, Bowel Incontinence: # of accidents 5, Aspiration Risk/Precautions, Dietary Restrictions: regular diet, Hard of Hearing, Visually Impaired, Language Preference: English and fall risk, cantor catheter care    MEDICATIONS:     Current Facility-Administered Medications:     acetaminophen (TYLENOL) tablet 650 mg, 650 mg, Oral, Q4H PRN, Yelena Horton MD, 650 mg at 12/28/20 1549    ampicillin (OMNIPEN) 2,000 mg in sodium chloride 0 9 % 100 mL IVPB, 2,000 mg, Intravenous, Q8H, UBALDO Harkins, Last Rate: 200 mL/hr at 01/05/21 1025, 2,000 mg at 01/05/21 1025    ascorbic acid (VITAMIN C) tablet 500 mg, 500 mg, Oral, Daily, Yelena Horton MD, 500 mg at 01/05/21 1025    calcium carbonate (TUMS) chewable tablet 500 mg, 500 mg, Oral, BID, Yelena Horton MD, 500 mg at 01/05/21 1025    cholecalciferol (VITAMIN D3) tablet 4,000 Units, 4,000 Units, Oral, Daily, Yelena Horton MD, 4,000 Units at 01/05/21 1025    cloNIDine (CATAPRES) tablet 0 1 mg, 0 1 mg, Oral, Q3H PRN, Sam Petty PA-C, 0 1 mg at 01/02/21 8544    diphenhydrAMINE (BENADRYL) tablet 50 mg, 50 mg, Oral, HS PRN, Yelena Horton MD, 50 mg at 01/03/21 1911    ferrous sulfate tablet 325 mg, 325 mg, Oral, Daily With Breakfast, Starla Armando MD, 325 mg at 01/05/21 1026    finasteride (PROSCAR) tablet 5 mg, 5 mg, Oral, Daily, Starla Armando MD, 5 mg at 01/05/21 1025    gabapentin (NEURONTIN) capsule 400 mg, 400 mg, Oral, HS, Starla Armando MD, 400 mg at 01/04/21 2237    heparin (porcine) subcutaneous injection 5,000 Units, 5,000 Units, Subcutaneous, Q8H [address], Starla Armando MD, 5,000 Units at 01/05/21 1428    metroNIDAZOLE (FLAGYL) tablet 500 mg, 500 mg, Oral, Q8H JACLYN, Noemy Holloway, UBALDO, 500 mg at 01/05/21 1428    morphine (MSIR) IR tablet 15 mg, 15 mg, Oral, BID, Starla Armando MD, 15 mg at 01/05/21 1025    ondansetron (ZOFRAN) injection 4 mg, 4 mg, Intravenous, Q6H PRN, Starla Armando MD    sertraline (ZOLOFT) tablet 50 mg, 50 mg, Oral, Daily, Starla Armando MD, 50 mg at 01/05/21 1026    SKIN INTEGRITY:   Skin intact    PRIOR LEVEL OF FUNCTION:  He lives in [address]  Danielle Davis is  and lives with their daughter  Self Care: patient has grab bars in the shower, a shower chair and a toilet raiser  He is mod I with his ADLs  and Indoor Mobility: patient owns a cane and a walker  He uses a cane at baseline  FALLS IN THE LAST 6 MONTHS: unknown    HOME ENVIRONMENT:  The living area: can live on one level  There are 3 steps to enter the home  The patient will have 24 hour supervision/physical assistance available upon discharge  PREVIOUS DME:  Equipment in home (previous DME): Shower Chair, Grab Bars, Standard [employer], Single [employer] and toilet raiser    FUNCTIONAL STATUS:  Physical Therapy Occupational Therapy Speech Therapy   01/05/21 0845    PT Last Visit   PT Visit Date 01/05/21   Note Type   Note Type Treatment   Pain Assessment   Pain Assessment Tool 0-10   Pain Score No Pain   Restrictions/Precautions   Weight Bearing Precautions Per Order No   Other Precautions Contact/isolation;Cognitive; Fall Risk;Hard of hearing   General   Chart Reviewed Yes   Response to Previous Treatment Patient with no complaints from previous session  Family/Caregiver Present No   Cognition   Overall Cognitive Status Impaired   Arousal/Participation Alert; Cooperative   Attention Attends with cues to redirect   Orientation Level Oriented X4   Memory Decreased recall of precautions;Decreased recall of recent events;Decreased short term memory   Following Commands Follows one step commands without difficulty   Comments pt agreeable to PT session   Bed Mobility   Supine to Sit 5  Supervision   Additional items Assist x 1;HOB elevated; Bedrails   Additional Comments pt sat at EOBx25 min utes to perform BLE ther ex   Transfers   Sit to Stand 3  Moderate assistance   Additional items Assist x 1;Bedrails; Increased time required;Verbal cues   Stand to Sit 3  Moderate assistance   Additional items Assist x 1; Armrests; Increased time required;Verbal cues   Stand pivot 4  Minimal assistance   Additional items Assist x 1; Increased time required;Verbal cues   Ambulation/Elevation   Gait pattern Improper Weight shift;Decreased foot clearance; Inconsistent stephen; Foward flexed; Short stride; Excessively slow   Gait Assistance 4  Minimal assist   Additional items Assist x 1;Verbal cues; Tactile cues   Assistive Device Rolling walker   Distance 3 ft bed to chair   Stair Management Assistance Not tested   Balance   Static Sitting Fair +   Dynamic Sitting Fair   Static Standing Fair -   Dynamic Standing Fair -   Ambulatory Poor +   Endurance Deficit   Endurance Deficit Yes   Activity Tolerance   Activity Tolerance Patient limited by fatigue;Treatment limited secondary to medical complications (Comment)   Nurse Made Aware RN made aware of outcomes   Exercises   Quad Sets Sitting;20 reps;AROM; Bilateral   Heelslides Sitting;20 reps;AROM; Bilateral   Hip Abduction Sitting;20 reps;AROM; Bilateral   Hip Adduction Sitting;20 reps;AROM; Bilateral   Knee AROM Long Arc Quad Sitting;20 reps;AROM; Bilateral   Ankle Pumps Sitting;20 reps;AROM; Bilateral   Assessment   Prognosis Fair   Problem List Decreased strength;Decreased endurance; Impaired balance;Decreased mobility; Decreased safety awareness   Assessment Pt seen for PT treatment session this date with interventions consisting of gait training w/ emphasis on improving pt's ability to ambulate level surfaces x 3 ft bed to chair with min A provided by therapist with RW, Therapeutic exercise consisting of: AROM 20 reps B LE in seated at EOB position and therapeutic activity consisting of training: bed mobility, supine<>sit transfers, sit<>stand transfers, static sitting tolerance at EOB for 25  minutes w/ min UE support and vc and tactile cues for static sitting posture faciliation  Pt agreeable to PT treatment session upon arrival, pt found supine in bed w/ HOB elevated, in no apparent distress and responsive  In comparison to previous session, pt with improvements in functional activity tolerance  Post session: pt returned back to recliner, chair alarm engaged, all needs in reach and RN notified of session findings/recommendations Continue to recommend STR at time of d/c in order to maximize pt's functional independence and safety w/ mobility  Pt continues to be functioning below baseline level, and remains limited 2* factors listed above and including impaired balance and deased functional mobility  PT will continue to see pt while here in order to address the deficits listed above and provide interventions consistent w/ POC in effort to achieve STGs     01/05/21 0844    OT Last Visit   OT Visit Date 01/05/21   Note Type   Note Type Treatment   Restrictions/Precautions   Weight Bearing Precautions Per Order No   Other Precautions Contact/isolation;Cognitive; Fall Risk;Hard of hearing   Pain Assessment   Pain Assessment Tool 0-10   Pain Score No Pain   ADL   Where Assessed Edge of bed   UB Bathing Assistance 5  Supervision/Setup   UB Bathing Deficit Setup;Verbal cueing;Supervision/safety; Increased time to complete   LB Bathing Assistance 4  Minimal Assistance   LB Bathing Deficit Setup;Supervision/safety; Increased time to complete;Verbal cueing;Right lower leg including foot; Left lower leg including foot; Buttocks   UB Dressing Assistance 4  Minimal Assistance   UB Dressing Deficit Setup;Verbal cueing;Supervision/safety; Increased time to complete; Fasteners   UB Dressing Comments [facility]   LB Dressing Assistance Unable to assess   LB Dressing Comments declined to wash feet   Bed Mobility   Supine to Sit 5  Supervision   Additional items Assist x 1;HOB elevated; Bedrails; Increased time required;Verbal cues;LE management   Additional Comments Pt sat EOB x 25 min to complete ADL with good sitting balance  Transfers   Sit to Stand 3  Moderate assistance   Additional items Assist x 1;Bedrails; Increased time required;Verbal cues   Stand to Sit 3  Moderate assistance   Additional items Assist x 1; Armrests; Increased time required;Verbal cues   Stand pivot 4  Minimal assistance   Additional items Assist x 1; Increased time required;Verbal cues   Cognition   Overall Cognitive Status Impaired   Arousal/Participation Alert; Cooperative   Attention Attends with cues to redirect   Orientation Level Oriented X4   Memory Decreased recall of precautions;Decreased recall of recent events;Decreased short term memory   Following Commands Follows one step commands without difficulty   Comments Pt agreeable to OT treatment,   Activity Tolerance   Activity Tolerance Patient tolerated treatment well   Assessment   Assessment Patient participated in Skilled OT session this date with interventions consisting of functional transfer training, ADL re training with the use of correct body mechnaics, Energy Conservation techniques and increase dynamic sit/ stand balance during functional activity    Patient agreeable to OT treatment session, upon arrival patient was found supine in bed   Patient transfers supine to sit EOB with supervision  Patient waas then set up for ADL seated EOB  Pt tolerated dynamic unsupported sitting x 25 min with good sitting balance noted  Completes ADL as detailed in flow sheet  Patient deferred bathing feet, however, did reach down to pull bboth socks up prior to standing  Patient then transfers sit to stand with Mod A x 1 and pivots to chair with Min A x 1 and VCs for safety  Patient did have slight LOB noted when transferring to chair, just before sitting, slight backwards LOB in which patient required assistance to correct  Patient was seated OOB to chair to end session with SCDs reapplied and all needs met  In comparison to previous session, patient with improvements in self care ADLs, endurance and sitting tolerance  Patient requiring verbal cues for correct technique, verbal cues for pacing thru activity steps, frequent rest periods and ocassional safety reminders  Patient performance  demonstrated varied carryover of learned techniques and strategies to facilitate safety during functional tasks  Patient continues to be functioning below baseline level, occupational performance remains limited secondary to factors listed above and increased risk for falls and injury  From OT standpoint, recommendation at time of d/c would be Short Term Rehab  Patient to benefit from continued Occupational Therapy treatment while in the hospital to address deficits as defined above and maximize level of functional independence with ADLs and functional mobility in order to return to PLOF  CURRENT GAP IN FUNCTION  Prior to Admission:   Patient has grab bars in the shower, a shower chair and a toilet raiser  He is mod I with his ADLs  He owns a cane and a walker  He uses a cane at baseline  He would carry his laundry to the laundry room  Daughter did his laundry, the cooking and the cleaning  He takes his own medications          Estimated length of stay: 10 to 14 days    Anticipated Post-Discharge Disposition/Treatment  Disposition: Return to previous home/apartment  Outpatient Services: Physical Therapy (PT) and Occupational Therapy (OT)    BARRIERS TO DISCHARGE  Decreased strength, Decreased endurance, Impaired balance, Decreased mobility, Decreased safety awareness,  Decreased ADL status, Decreased UE strength, Decreased Safe judgement during ADL, Decreased cognition, Decreased self-care trans, Decreased high-level ADLs, home accessibility     INTERVENTIONS FOR DISCHARGE  ADL retraining, Functional transfer training, UE strengthening/ROM, Endurance training, Cognitive reorientation, Patient/family training, Compensatory technique education, Activity engagement, Energy conservation, LE strengthening/ROM, Therapeutic exercise, Bed mobility, Gait training, stair navigation      REQUIRED THERAPY:  Patient will require PT and OT 90 minutes each per day, five days per week to achieve rehab goals  REQUIRED FUNCTIONAL AND MEDICAL MANAGEMENT FOR INPATIENT REHABILITATION:  Skin:  Patient will require close monitoring of skin integrity secondary to decreased mobility as well as bowel incontinence  , Pain Management: Overall pain is moderately controlled, Deep Vein Thrombosis (DVT) Prophylaxis:  heparin, PT and OT interventions, any necessary labs, consults, imaging studies and medication changes/additions/discontinuations necessary to manage patient's case while on ARC    RECOMMENDED LEVEL OF CARE:  This is a 66year old male who presented to [address] [place] [address] ER on 12/27/20 with change in mental status, vomiting, cloudy urine, decreased urinary output, chill and rigors  Patient diagnosed with sepsis secondary to UTI  This was noted by RUTHANN, lactic acidosis, tachycardia and tachypnea  Patient with chronic catnor catheter and h/o recurrent UTIs  Urine culture positive for proteus mirabilis, enterococcus faecalis and alcaligenes faecalis    Patient received 1 dose ceftriaxone and vancomycin in the ED and was then transitioned to pip/isidro then ampicillin on 12/30  Per ID consult he will continue on Ampicillin through 1/11 and Flagyl 500 mg PO TID will be added  Repeat blood cultures on 12/28 were negative  Patient also with acute metabolic encephalopathy  He is currently A&Ox4  Patient's hospitalization was also complicated by right hydroureteronephrosis with obstructing calculus for which he had a retrograde pyelogram with ureteral stenting on 12/30, thrombocytopenia, right lower lobe pneumonia, acute respiratory failure with hypoxia for which he required 4 LPM O2 and is now on RA, hypomagnesemia and RUTHANN  Nephrology, urology and ID were consulted during his hospital course  Patient worked with PT and OT and recommended for post acute therapy services  He was accepted at the [facility] and is now medically stable to begin his rehabilitation  Patient lives with his daughter in a ranch style home with 3 NOEMY  Patient has grab bars in the shower, a shower chair and a toilet raiser  He is mod I with his ADLs  He owns a cane and a walker  He uses a cane at baseline  He would carry his laundry to the laundry room  Daughter did his laundry, the cooking and the cleaning  He takes his own medications  He is now requiring supervision to min assistance for his ADLs  He is requiring supervision for supine to sit and sat at the EOB x 25 minutes and was able to perform BLE therapeutic exercises  He requires mod x 1 assistance for sit to stand and stand to sit transfers, min x 1 assist for stand pivot transfers and ambulated 3 feet from bed to the chair with a RW and min x 1 assistance  Gait pattern: improper weight shift, decreased foot clearance, inconsistent stephen, forward flexion, short stride and excessively slow    Patient will require close monitoring and medical management by a physician to monitor labs and other medical conditions, PM&R management, 24/7 rehabilitation nursing care and specialized interdisciplinary therapy services in preparation for discharge which can only be provided in the inpatient acute rehabilitation setting  Nurses will close monitor patient's VS, I/Os, respiratory status, skin integrity, pain level and his overall condition  Nurses will also provide patient with education and therapy carryover during the hours when he is not receiving his scheduled therapy services  Diallo Catheter care will be routinely performed  Inpatient acute rehabilitation is recommended for this patient to maximize his overall strength, endurance, self care and mobility upon discharge home with the assistance of his daughter

## 2021-01-05 NOTE — INCIDENTAL FINDINGS
The following findings require follow up:  Non-Radiographic finding   Finding:  Ultrasound kidney bladder  14 x 7 mm right UPJ calculus with mild right hydronephrosis      Follow up required:  Intervention by Urology   Follow up should be done within 1 day(s)    Please notify the following clinician to assist with the follow up:   Dr Ladonna Hughes, [phone]

## 2021-01-05 NOTE — PLAN OF CARE
Problem: DISCHARGE PLANNING - CARE MANAGEMENT  Goal: Discharge to post-acute care or home with appropriate resources  Description: INTERVENTIONS:  - Conduct assessment to determine patient/family and health care team treatment goals, and need for post-acute services based on payer coverage, community resources, and patient preferences, and barriers to discharge  - Address psychosocial, clinical, and financial barriers to discharge as identified in assessment in conjunction with the patient/family and health care team  - Arrange appropriate level of post-acute services according to patients   needs and preference and payer coverage in collaboration with the physician and health care team  - Communicate with and update the patient/family, physician, and health care team regarding progress on the discharge plan  - Arrange appropriate transportation to post-acute venues  Outcome: Progressing     Problem: PAIN - ADULT  Goal: Verbalizes/displays adequate comfort level or baseline comfort level  Description: Interventions:  - Encourage patient to monitor pain and request assistance  - Assess pain using appropriate pain scale  - Administer analgesics based on type and severity of pain and evaluate response  - Implement non-pharmacological measures as appropriate and evaluate response  - Consider cultural and social influences on pain and pain management  - Notify physician/advanced practitioner if interventions unsuccessful or patient reports new pain  Outcome: Progressing     Problem: INFECTION - ADULT  Goal: Absence or prevention of progression during hospitalization  Description: INTERVENTIONS:  - Assess and monitor for signs and symptoms of infection  - Monitor lab/diagnostic results  - Monitor all insertion sites, i e  indwelling lines, tubes, and drains  - Monitor endotracheal if appropriate and nasal secretions for changes in amount and color  - [place] appropriate cooling/warming therapies per order  - Administer medications as ordered  - Instruct and encourage patient and family to use good hand hygiene technique  - Identify and instruct in appropriate isolation precautions for identified infection/condition  Outcome: Progressing  Goal: Absence of fever/infection during neutropenic period  Description: INTERVENTIONS:  - Monitor WBC    Outcome: Progressing     Problem: SAFETY ADULT  Goal: Patient will remain free of falls  Description: INTERVENTIONS:  - Assess patient frequently for physical needs  -  Identify cognitive and physical deficits and behaviors that affect risk of falls    -  [place] fall precautions as indicated by assessment   - Educate patient/family on patient safety including physical limitations  - Instruct patient to call for assistance with activity based on assessment  - Modify environment to reduce risk of injury  - Consider OT/PT consult to assist with strengthening/mobility  Outcome: Progressing  Goal: Maintain or return to baseline ADL function  Description: INTERVENTIONS:  -  Assess patient's ability to carry out ADLs; assess patient's baseline for ADL function and identify physical deficits which impact ability to perform ADLs (bathing, care of mouth/teeth, toileting, grooming, dressing, etc )  - Assess/evaluate cause of self-care deficits   - Assess range of motion  - Assess patient's mobility; develop plan if impaired  - Assess patient's need for assistive devices and provide as appropriate  - Encourage maximum independence but intervene and supervise when necessary  - Involve family in performance of ADLs  - Assess for home care needs following discharge   - Consider OT consult to assist with ADL evaluation and planning for discharge  - Provide patient education as appropriate  Outcome: Progressing  Goal: Maintain or return mobility status to optimal level  Description: INTERVENTIONS:  - Assess patient's baseline mobility status (ambulation, transfers, stairs, etc )    - Identify cognitive and physical deficits and behaviors that affect mobility  - Identify mobility aids required to assist with transfers and/or ambulation (gait belt, sit-to-stand, lift, walker, cane, etc )  - [place] fall precautions as indicated by assessment  - Record patient progress and toleration of activity level on Mobility SBAR; progress patient to next Phase/Stage  - Instruct patient to call for assistance with activity based on assessment  - Consider rehabilitation consult to assist with strengthening/weightbearing, etc   Outcome: Progressing     Problem: DISCHARGE PLANNING  Goal: Discharge to home or other facility with appropriate resources  Description: INTERVENTIONS:  - Identify barriers to discharge w/patient and caregiver  - Arrange for needed discharge resources and transportation as appropriate  - Identify discharge learning needs (meds, wound care, etc )  - Arrange for interpretive services to assist at discharge as needed  - Refer to Case Management Department for coordinating discharge planning if the patient needs post-hospital services based on physician/advanced practitioner order or complex needs related to functional status, cognitive ability, or social support system  Outcome: Progressing     Problem: Knowledge Deficit  Goal: Patient/family/caregiver demonstrates understanding of disease process, treatment plan, medications, and discharge instructions  Description: Complete learning assessment and assess knowledge base  Interventions:  - Provide teaching at level of understanding  - Provide teaching via preferred learning methods  Outcome: Progressing     Problem: Potential for Falls  Goal: Patient will remain free of falls  Description: INTERVENTIONS:  - Assess patient frequently for physical needs  -  Identify cognitive and physical deficits and behaviors that affect risk of falls    -  [place] fall precautions as indicated by assessment   - Educate patient/family on patient safety including physical limitations  - Instruct patient to call for assistance with activity based on assessment  - Modify environment to reduce risk of injury  - Consider OT/PT consult to assist with strengthening/mobility  Outcome: Progressing     Problem: PAIN - ADULT  Goal: Verbalizes/displays adequate comfort level or baseline comfort level  Description: Interventions:  - Encourage patient to monitor pain and request assistance  - Assess pain using appropriate pain scale  - Administer analgesics based on type and severity of pain and evaluate response  - Implement non-pharmacological measures as appropriate and evaluate response  - Consider cultural and social influences on pain and pain management  - Notify physician/advanced practitioner if interventions unsuccessful or patient reports new pain  Outcome: Progressing     Problem: INFECTION - ADULT  Goal: Absence or prevention of progression during hospitalization  Description: INTERVENTIONS:  - Assess and monitor for signs and symptoms of infection  - Monitor lab/diagnostic results  - Monitor all insertion sites, i e  indwelling lines, tubes, and drains  - Monitor endotracheal if appropriate and nasal secretions for changes in amount and color  - [place] appropriate cooling/warming therapies per order  - Administer medications as ordered  - Instruct and encourage patient and family to use good hand hygiene technique  - Identify and instruct in appropriate isolation precautions for identified infection/condition  Outcome: Progressing  Goal: Absence of fever/infection during neutropenic period  Description: INTERVENTIONS:  - Monitor WBC    Outcome: Progressing     Problem: SAFETY ADULT  Goal: Patient will remain free of falls  Description: INTERVENTIONS:  - Assess patient frequently for physical needs  -  Identify cognitive and physical deficits and behaviors that affect risk of falls    -  [place] fall precautions as indicated by assessment   - Educate patient/family on patient safety including physical limitations  - Instruct patient to call for assistance with activity based on assessment  - Modify environment to reduce risk of injury  - Consider OT/PT consult to assist with strengthening/mobility  Outcome: Progressing  Goal: Maintain or return to baseline ADL function  Description: INTERVENTIONS:  -  Assess patient's ability to carry out ADLs; assess patient's baseline for ADL function and identify physical deficits which impact ability to perform ADLs (bathing, care of mouth/teeth, toileting, grooming, dressing, etc )  - Assess/evaluate cause of self-care deficits   - Assess range of motion  - Assess patient's mobility; develop plan if impaired  - Assess patient's need for assistive devices and provide as appropriate  - Encourage maximum independence but intervene and supervise when necessary  - Involve family in performance of ADLs  - Assess for home care needs following discharge   - Consider OT consult to assist with ADL evaluation and planning for discharge  - Provide patient education as appropriate  Outcome: Progressing  Goal: Maintain or return mobility status to optimal level  Description: INTERVENTIONS:  - Assess patient's baseline mobility status (ambulation, transfers, stairs, etc )    - Identify cognitive and physical deficits and behaviors that affect mobility  - Identify mobility aids required to assist with transfers and/or ambulation (gait belt, sit-to-stand, lift, walker, cane, etc )  - [place] fall precautions as indicated by assessment  - Record patient progress and toleration of activity level on Mobility SBAR; progress patient to next Phase/Stage  - Instruct patient to call for assistance with activity based on assessment  - Consider rehabilitation consult to assist with strengthening/weightbearing, etc   Outcome: Progressing     Problem: DISCHARGE PLANNING  Goal: Discharge to home or other facility with appropriate resources  Description: INTERVENTIONS:  - Identify barriers to discharge w/patient and caregiver  - Arrange for needed discharge resources and transportation as appropriate  - Identify discharge learning needs (meds, wound care, etc )  - Arrange for interpretive services to assist at discharge as needed  - Refer to Case Management Department for coordinating discharge planning if the patient needs post-hospital services based on physician/advanced practitioner order or complex needs related to functional status, cognitive ability, or social support system  Outcome: Progressing

## 2021-01-05 NOTE — PLAN OF CARE
Problem: PHYSICAL THERAPY ADULT  Goal: Performs mobility at highest level of function for planned discharge setting  See evaluation for individualized goals  Description: Treatment/Interventions: Functional transfer training, LE strengthening/ROM, Therapeutic exercise, Endurance training, Patient/family training, Equipment eval/education, Bed mobility, Gait training, Cognitive reorientation, Spoke to nursing, Spoke to case management  Equipment Recommended: (TBD pending further OOB mobility)       See flowsheet documentation for full assessment, interventions and recommendations  Outcome: Progressing  Note: Prognosis: Fair  Problem List: Decreased strength, Decreased endurance, Impaired balance, Decreased mobility, Decreased safety awareness  Assessment: Pt seen for PT treatment session this date with interventions consisting of gait training w/ emphasis on improving pt's ability to ambulate level surfaces x 3 ft bed to chair with min A provided by therapist with RW, Therapeutic exercise consisting of: AROM 20 reps B LE in seated at EOB position and therapeutic activity consisting of training: bed mobility, supine<>sit transfers, sit<>stand transfers, static sitting tolerance at EOB for 25  minutes w/ min UE support and vc and tactile cues for static sitting posture faciliation  Pt agreeable to PT treatment session upon arrival, pt found supine in bed w/ HOB elevated, in no apparent distress and responsive  In comparison to previous session, pt with improvements in functional activity tolerance  Post session: pt returned back to recliner, chair alarm engaged, all needs in reach and RN notified of session findings/recommendations Continue to recommend STR at time of d/c in order to maximize pt's functional independence and safety w/ mobility  Pt continues to be functioning below baseline level, and remains limited 2* factors listed above and including impaired balance and deased functional mobility   PT will continue to see pt while here in order to address the deficits listed above and provide interventions consistent w/ POC in effort to achieve STGs  Barriers to Discharge: Inaccessible home environment     PT Discharge Recommendation: [id number]     PT - OK to Discharge: Yes[facility]    See flowsheet documentation for full assessment

## 2021-01-05 NOTE — PHYSICAL THERAPY NOTE
01/05/21 0845   PT Last Visit   PT Visit Date 01/05/21   Note Type   Note Type Treatment   Pain Assessment   Pain Assessment Tool 0-10   Pain Score No Pain   Restrictions/Precautions   Weight Bearing Precautions Per Order No   Other Precautions Contact/isolation;Cognitive; Fall Risk;Hard of hearing   General   Chart Reviewed Yes   Response to Previous Treatment Patient with no complaints from previous session  Family/Caregiver Present No   Cognition   Overall Cognitive Status Impaired   Arousal/Participation Alert; Cooperative   Attention Attends with cues to redirect   Orientation Level Oriented X4   Memory Decreased recall of precautions;Decreased recall of recent events;Decreased short term memory   Following Commands Follows one step commands without difficulty   Comments pt agreeable to PT session   Bed Mobility   Supine to Sit 5  Supervision   Additional items Assist x 1;HOB elevated; Bedrails   Additional Comments pt sat at EOBx25 min utes to perform BLE ther ex   Transfers   Sit to Stand 3  Moderate assistance   Additional items Assist x 1;Bedrails; Increased time required;Verbal cues   Stand to Sit 3  Moderate assistance   Additional items Assist x 1; Armrests; Increased time required;Verbal cues   Stand pivot 4  Minimal assistance   Additional items Assist x 1; Increased time required;Verbal cues   Ambulation/Elevation   Gait pattern Improper Weight shift;Decreased foot clearance; Inconsistent stephen; Foward flexed; Short stride; Excessively slow   Gait Assistance 4  Minimal assist   Additional items Assist x 1;Verbal cues; Tactile cues   Assistive Device Rolling walker   Distance 3 ft bed to chair   Stair Management Assistance Not tested   Balance   Static Sitting Fair +   Dynamic Sitting Fair   Static Standing Fair -   Dynamic Standing Fair -   Ambulatory Poor +   Endurance Deficit   Endurance Deficit Yes   Activity Tolerance   Activity Tolerance Patient limited by fatigue;Treatment limited secondary to medical complications (Comment)   Nurse Made Aware RN made aware of outcomes   Exercises   Quad Sets Sitting;20 reps;AROM; Bilateral   Heelslides Sitting;20 reps;AROM; Bilateral   Hip Abduction Sitting;20 reps;AROM; Bilateral   Hip Adduction Sitting;20 reps;AROM; Bilateral   Knee AROM Long Arc Quad Sitting;20 reps;AROM; Bilateral   Ankle Pumps Sitting;20 reps;AROM; Bilateral   Assessment   Prognosis Fair   Problem List Decreased strength;Decreased endurance; Impaired balance;Decreased mobility; Decreased safety awareness   Assessment Pt seen for PT treatment session this date with interventions consisting of gait training w/ emphasis on improving pt's ability to ambulate level surfaces x 3 ft bed to chair with min A provided by therapist with RW, Therapeutic exercise consisting of: AROM 20 reps B LE in seated at EOB position and therapeutic activity consisting of training: bed mobility, supine<>sit transfers, sit<>stand transfers, static sitting tolerance at EOB for 25  minutes w/ min UE support and vc and tactile cues for static sitting posture faciliation  Pt agreeable to PT treatment session upon arrival, pt found supine in bed w/ HOB elevated, in no apparent distress and responsive  In comparison to previous session, pt with improvements in functional activity tolerance  Post session: pt returned back to recliner, chair alarm engaged, all needs in reach and RN notified of session findings/recommendations Continue to recommend STR at time of d/c in order to maximize pt's functional independence and safety w/ mobility  Pt continues to be functioning below baseline level, and remains limited 2* factors listed above and including impaired balance and deased functional mobility  PT will continue to see pt while here in order to address the deficits listed above and provide interventions consistent w/ POC in effort to achieve STGs     Barriers to Discharge Inaccessible home environment   Plan   Treatment/Interventions Functional transfer training;LE strengthening/ROM; Therapeutic exercise; Endurance training;Bed mobility;Gait training   Progress Progressing toward goals   PT Frequency Other (Comment)  (3-5x/wk)   Recommendation   PT Discharge Recommendation Post-Acute Rehabilitation Services   PT - OK to Discharge Yes  (when medically cleared if to STR)   Additional Comments upon conclusion, pt returned to seated OOB in chair with all needs in place

## 2021-01-05 NOTE — CASE MANAGEMENT
Pt has been accepted to ARU  COVID completed  TC to pt daughter Awilda Masterson who is agreeable to same  Pt made aware and is agreeable

## 2021-01-05 NOTE — ASSESSMENT & PLAN NOTE
· In the setting of sepsis/infections coupled with acute kidney injury - gram-negative bacteremia proteus mirabilis and enterococcus faecalis  · Improving  Patient oriented to place and person but not time  Patient is very hard of hearing     · Continue clinical monitoring

## 2021-01-05 NOTE — ASSESSMENT & PLAN NOTE
· Likely suspected pneumonia ?viral etiology  No sputum production or fevers  · As seen on CT imaging  · Suspect possibly aspiration secondary to vomiting under encephalopathic state  · Empiric IV Zosyn transitioned to IV Ampicillin on 12/30/2020 due to concurrent urine culture polymicrobial growth sensitivities - ampicillin until 01/11/2021, and Flagyl 500 mg p o  3 times a day for 3 days (1/7/2021) per Infectious Disease    · Aspiration precautions - speech/swallow therapy recommending normal diet  · Maintain oxygen - encourage incentive spirometry

## 2021-01-05 NOTE — INCIDENTAL FINDINGS
The following findings require follow up:  Radiographic finding   Finding:  CT abdomen and pelvis  8 mm right UPJ calculus with mild right hydronephrosis      Right lower lobe opacity likely representing pneumonia     Follow up required:  None suggested   Follow up should be done within 0 day(s)    Please notify the following clinician to assist with the follow up:   Dr Marysol Patel, -[phone]

## 2021-01-05 NOTE — ASSESSMENT & PLAN NOTE
· With tachycardia/tachypnea coupled leukocytosis and lactic acidosis -> resolved  · In the setting of Proteus bacteremia/UTI and right basilar pneumonia (see below)  · Monitor vital signs and maintain hemodynamics  · Clinically is improving   · procalcitonin improving

## 2021-01-05 NOTE — INCIDENTAL FINDINGS
The following findings require follow up:  Radiographic finding   Finding:  Chest x-ray  Right lower lobe pneumonia, corresponding with the abdomen CT from 12/28/2020     Follow up required:  None suggested   Follow up should be done within 0 day(s)    Please notify the following clinician to assist with the follow up:   Dr Rehana Louie,  [phone]

## 2021-01-05 NOTE — PLAN OF CARE
Problem: INFECTION - ADULT  Goal: Absence or prevention of progression during hospitalization  Description: INTERVENTIONS:  - Assess and monitor for signs and symptoms of infection  - Monitor lab/diagnostic results  - Monitor all insertion sites, i e  indwelling lines, tubes, and drains  - Monitor endotracheal if appropriate and nasal secretions for changes in amount and color  - [place] appropriate cooling/warming therapies per order  - Administer medications as ordered  - Instruct and encourage patient and family to use good hand hygiene technique  - Identify and instruct in appropriate isolation precautions for identified infection/condition  Outcome: Progressing

## 2021-01-05 NOTE — PLAN OF CARE
Problem: OCCUPATIONAL THERAPY ADULT  Goal: Performs self-care activities at highest level of function for planned discharge setting  See evaluation for individualized goals  Description: Treatment Interventions: ADL retraining, Functional transfer training, UE strengthening/ROM, Endurance training, Cognitive reorientation, Patient/family training, Compensatory technique education, Activityengagement, Energy conservation          See flowsheet documentation for full assessment, interventions and recommendations  Outcome: Progressing  Note: Limitation: Decreased ADL status, Decreased UE strength, Decreased Safe judgement during ADL, Decreased cognition, Decreased endurance, Decreased self-care trans, Decreased high-level ADLs  Prognosis: Fair  Assessment: Patient participated in Skilled OT session this date with interventions consisting of functional transfer training, ADL re training with the use of correct body mechnaics, Energy Conservation techniques and increase dynamic sit/ stand balance during functional activity    Patient agreeable to OT treatment session, upon arrival patient was found supine in bed  Patient transfers supine to sit EOB with supervision  Patient waas then set up for ADL seated EOB  Pt tolerated dynamic unsupported sitting x 25 min with good sitting balance noted  Completes ADL as detailed in flow sheet  Patient deferred bathing feet, however, did reach down to pull bboth socks up prior to standing  Patient then transfers sit to stand with Mod A x 1 and pivots to chair with Min A x 1 and VCs for safety  Patient did have slight LOB noted when transferring to chair, just before sitting, slight backwards LOB in which patient required assistance to correct  Patient was seated OOB to chair to end session with SCDs reapplied and all needs met  In comparison to previous session, patient with improvements in self care ADLs, endurance and sitting tolerance   Patient requiring verbal cues for correct technique, verbal cues for pacing thru activity steps, frequent rest periods and ocassional safety reminders  Patient performance  demonstrated varied carryover of learned techniques and strategies to facilitate safety during functional tasks  Patient continues to be functioning below baseline level, occupational performance remains limited secondary to factors listed above and increased risk for falls and injury  From OT standpoint, recommendation at time of d/c would be Short Term Rehab  Patient to benefit from continued Occupational Therapy treatment while in the hospital to address deficits as defined above and maximize level of functional independence with ADLs and functional mobility in order to return to PLOF        OT Discharge Recommendation: Post-Acute Rehabilitation Services  OT - OK to Discharge: Yes(when medically cleared)

## 2021-01-05 NOTE — INCIDENTAL FINDINGS
The following findings require follow up:  Radiographic finding   Finding:  KUB  11 mm calculus adjacent to right proximal nephroureteral stent, similar position to prior CT and likely within proximal ureter/ UPJ     Follow up required:  Outpatient Urology   Follow up should be done within 2 week(s)    Please notify the following clinician to assist with the follow up:   Dr Jhon Luevano, Do [phone]

## 2021-01-05 NOTE — PLAN OF CARE
Problem: Potential for Falls  Goal: Patient will remain free of falls  Description: INTERVENTIONS:  - Assess patient frequently for physical needs  -  Identify cognitive and physical deficits and behaviors that affect risk of falls    -  [place] fall precautions as indicated by assessment   - Educate patient/family on patient safety including physical limitations  - Instruct patient to call for assistance with activity based on assessment  - Modify environment to reduce risk of injury  - Consider OT/PT consult to assist with strengthening/mobility  Outcome: Progressing     Problem: NEUROSENSORY - ADULT  Goal: Achieves stable or improved neurological status  Description: INTERVENTIONS  - Monitor and report changes in neurological status  - Monitor vital signs such as temperature, blood pressure, glucose, and any other labs ordered   - Initiate measures to prevent increased intracranial pressure  - Monitor for seizure activity and implement precautions if appropriate      Outcome: Progressing     Problem: GENITOURINARY - ADULT  Goal: Maintains or returns to baseline urinary function  Description: INTERVENTIONS:  - Assess urinary function  - Encourage oral fluids to ensure adequate hydration if ordered  - Administer IV fluids as ordered to ensure adequate hydration  - Administer ordered medications as needed  - Offer frequent toileting  - Follow urinary retention protocol if ordered  Outcome: Progressing  Goal: Absence of urinary retention  Description: INTERVENTIONS:  - Assess patients ability to void and empty bladder  - Monitor I/O  - Bladder scan as needed  - Discuss with physician/AP medications to alleviate retention as needed  - Discuss catheterization for long term situations as appropriate  Outcome: Progressing  Goal: Urinary catheter remains patent  Description: INTERVENTIONS:  - Assess patency of urinary catheter  - If patient has a chronic cantor, consider changing catheter if non-functioning  - Follow guidelines for intermittent irrigation of non-functioning urinary catheter  Outcome: Progressing     Problem: METABOLIC, FLUID AND ELECTROLYTES - ADULT  Goal: Electrolytes maintained within normal limits  Description: INTERVENTIONS:  - Monitor labs and assess patient for signs and symptoms of electrolyte imbalances  - Administer electrolyte replacement as ordered  - Monitor response to electrolyte replacements, including repeat lab results as appropriate  - Instruct patient on fluid and nutrition as appropriate  Outcome: Progressing     Problem: SKIN/TISSUE INTEGRITY - ADULT  Goal: Skin integrity remains intact  Description: INTERVENTIONS  - Identify patients at risk for skin breakdown  - Assess and monitor skin integrity  - Assess and monitor nutrition and hydration status  - Monitor labs (i e  albumin)  - Assess for incontinence   - Turn and reposition patient  - Assist with mobility/ambulation  - Relieve pressure over bony prominences  - Avoid friction and shearing  - Provide appropriate hygiene as needed including keeping skin clean and dry  - Evaluate need for skin moisturizer/barrier cream  - Collaborate with interdisciplinary team (i e  Nutrition, Rehabilitation, etc )   - Patient/family teaching  Outcome: Progressing     Problem: MUSCULOSKELETAL - ADULT  Goal: Maintain or return mobility to safest level of function  Description: INTERVENTIONS:  - Assess patient's ability to carry out ADLs; assess patient's baseline for ADL function and identify physical deficits which impact ability to perform ADLs (bathing, care of mouth/teeth, toileting, grooming, dressing, etc )  - Assess/evaluate cause of self-care deficits   - Assess range of motion  - Assess patient's mobility  - Assess patient's need for assistive devices and provide as appropriate  - Encourage maximum independence but intervene and supervise when necessary  - Involve family in performance of ADLs  - Assess for home care needs following discharge   - Consider OT consult to assist with ADL evaluation and planning for discharge  - Provide patient education as appropriate  Outcome: Progressing     Problem: PAIN - ADULT  Goal: Verbalizes/displays adequate comfort level or baseline comfort level  Description: Interventions:  - Encourage patient to monitor pain and request assistance  - Assess pain using appropriate pain scale  - Administer analgesics based on type and severity of pain and evaluate response  - Implement non-pharmacological measures as appropriate and evaluate response  - Consider cultural and social influences on pain and pain management  - Notify physician/advanced practitioner if interventions unsuccessful or patient reports new pain  Outcome: Progressing     Problem: INFECTION - ADULT  Goal: Absence or prevention of progression during hospitalization  Description: INTERVENTIONS:  - Assess and monitor for signs and symptoms of infection  - Monitor lab/diagnostic results  - Monitor all insertion sites, i e  indwelling lines, tubes, and drains  - Monitor endotracheal if appropriate and nasal secretions for changes in amount and color  - [place] appropriate cooling/warming therapies per order  - Administer medications as ordered  - Instruct and encourage patient and family to use good hand hygiene technique  - Identify and instruct in appropriate isolation precautions for identified infection/condition  Outcome: Progressing     Problem: SAFETY ADULT  Goal: Patient will remain free of falls  Description: INTERVENTIONS:  - Assess patient frequently for physical needs  -  Identify cognitive and physical deficits and behaviors that affect risk of falls    -  [place] fall precautions as indicated by assessment   - Educate patient/family on patient safety including physical limitations  - Instruct patient to call for assistance with activity based on assessment  - Modify environment to reduce risk of injury  - Consider OT/PT consult to assist with strengthening/mobility  Outcome: Progressing  Goal: Maintain or return to baseline ADL function  Description: INTERVENTIONS:  -  Assess patient's ability to carry out ADLs; assess patient's baseline for ADL function and identify physical deficits which impact ability to perform ADLs (bathing, care of mouth/teeth, toileting, grooming, dressing, etc )  - Assess/evaluate cause of self-care deficits   - Assess range of motion  - Assess patient's mobility; develop plan if impaired  - Assess patient's need for assistive devices and provide as appropriate  - Encourage maximum independence but intervene and supervise when necessary  - Involve family in performance of ADLs  - Assess for home care needs following discharge   - Consider OT consult to assist with ADL evaluation and planning for discharge  - Provide patient education as appropriate  Outcome: Progressing  Goal: Maintain or return mobility status to optimal level  Description: INTERVENTIONS:  - Assess patient's baseline mobility status (ambulation, transfers, stairs, etc )    - Identify cognitive and physical deficits and behaviors that affect mobility  - Identify mobility aids required to assist with transfers and/or ambulation (gait belt, sit-to-stand, lift, walker, cane, etc )  - [place] fall precautions as indicated by assessment  - Record patient progress and toleration of activity level on Mobility SBAR; progress patient to next Phase/Stage  - Instruct patient to call for assistance with activity based on assessment  - Consider rehabilitation consult to assist with strengthening/weightbearing, etc   Outcome: Progressing     Problem: DISCHARGE PLANNING  Goal: Discharge to home or other facility with appropriate resources  Description: INTERVENTIONS:  - Identify barriers to discharge w/patient and caregiver  - Arrange for needed discharge resources and transportation as appropriate  - Identify discharge learning needs (meds, wound care, etc )  - Arrange for interpretive services to assist at discharge as needed  - Refer to Case Management Department for coordinating discharge planning if the patient needs post-hospital services based on physician/advanced practitioner order or complex needs related to functional status, cognitive ability, or social support system  Outcome: Progressing     Problem: Knowledge Deficit  Goal: Patient/family/caregiver demonstrates understanding of disease process, treatment plan, medications, and discharge instructions  Description: Complete learning assessment and assess knowledge base  Interventions:  - Provide teaching at level of understanding  - Provide teaching via preferred learning methods  Outcome: Progressing     Problem: Nutrition/Hydration-ADULT  Goal: Nutrient/Hydration intake appropriate for improving, restoring or maintaining nutritional needs  Description: Monitor and assess patient's nutrition/hydration status for malnutrition  Collaborate with interdisciplinary team and initiate plan and interventions as ordered  Monitor patient's weight and dietary intake as ordered or per policy  Utilize nutrition screening tool and intervene as necessary  Determine patient's food preferences and provide high-protein, high-caloric foods as appropriate       INTERVENTIONS:  - Monitor oral intake, urinary output, labs, and treatment plans  - Assess nutrition and hydration status and recommend course of action  - Evaluate amount of meals eaten  - Assist patient with eating if necessary   - Allow adequate time for meals  - Recommend/ encourage appropriate diets, oral nutritional supplements, and vitamin/mineral supplements  - Order, calculate, and assess calorie counts as needed  - Recommend, monitor, and adjust tube feedings and TPN/PPN based on assessed needs  - Assess need for intravenous fluids  - Provide specific nutrition/hydration education as appropriate  - Include patient/family/caregiver in decisions related to nutrition  Outcome: Progressing     Problem: Prexisting or High Potential for Compromised Skin Integrity  Goal: Skin integrity is maintained or improved  Description: INTERVENTIONS:  - Identify patients at risk for skin breakdown  - Assess and monitor skin integrity  - Assess and monitor nutrition and hydration status  - Monitor labs   - Assess for incontinence   - Turn and reposition patient  - Assist with mobility/ambulation  - Relieve pressure over bony prominences  - Avoid friction and shearing  - Provide appropriate hygiene as needed including keeping skin clean and dry  - Evaluate need for skin moisturizer/barrier cream  - Collaborate with interdisciplinary team   - Patient/family teaching  - Consider wound care consult   Outcome: Progressing

## 2021-01-06 LAB
ANION GAP SERPL CALCULATED.3IONS-SCNC: 5 MMOL/L (ref 4–13)
BUN SERPL-MCNC: 28 MG/DL (ref 7–25)
CALCIUM SERPL-MCNC: 8 MG/DL (ref 8.6–10.5)
CHLORIDE SERPL-SCNC: 105 MMOL/L (ref 98–107)
CO2 SERPL-SCNC: 30 MMOL/L (ref 21–31)
CREAT SERPL-MCNC: 1.78 MG/DL (ref 0.7–1.3)
CREAT UR-MCNC: 67.8 MG/DL
ERYTHROCYTE [DISTWIDTH] IN BLOOD BY AUTOMATED COUNT: 13 % (ref 11.5–14.5)
GFR SERPL CREATININE-BSD FRML MDRD: 36 ML/MIN/1.73SQ M
GLUCOSE P FAST SERPL-MCNC: 94 MG/DL (ref 65–99)
GLUCOSE SERPL-MCNC: 94 MG/DL (ref 65–99)
HCT VFR BLD AUTO: 25.3 % (ref 42–47)
HGB BLD-MCNC: 8.4 G/DL (ref 14–18)
MCH RBC QN AUTO: 31.9 PG (ref 26–34)
MCHC RBC AUTO-ENTMCNC: 33.2 G/DL (ref 31–37)
MCV RBC AUTO: 96 FL (ref 81–99)
PLATELET # BLD AUTO: 361 THOUSANDS/UL (ref 149–390)
PMV BLD AUTO: 9.1 FL (ref 8.6–11.7)
POTASSIUM SERPL-SCNC: 4.2 MMOL/L (ref 3.5–5.5)
RBC # BLD AUTO: 2.64 MILLION/UL (ref 4.3–5.9)
SODIUM 24H UR-SCNC: 74 MOL/L
SODIUM SERPL-SCNC: 140 MMOL/L (ref 134–143)
WBC # BLD AUTO: 12.7 THOUSAND/UL (ref 4.8–10.8)

## 2021-01-06 PROCEDURE — 97167 OT EVAL HIGH COMPLEX 60 MIN: CPT

## 2021-01-06 PROCEDURE — 97110 THERAPEUTIC EXERCISES: CPT

## 2021-01-06 PROCEDURE — 97162 PT EVAL MOD COMPLEX 30 MIN: CPT

## 2021-01-06 PROCEDURE — 97530 THERAPEUTIC ACTIVITIES: CPT

## 2021-01-06 PROCEDURE — 99222 1ST HOSP IP/OBS MODERATE 55: CPT | Performed by: INTERNAL MEDICINE

## 2021-01-06 PROCEDURE — 99223 1ST HOSP IP/OBS HIGH 75: CPT | Performed by: PHYSICAL MEDICINE & REHABILITATION

## 2021-01-06 PROCEDURE — 84300 ASSAY OF URINE SODIUM: CPT | Performed by: NURSE PRACTITIONER

## 2021-01-06 PROCEDURE — 80048 BASIC METABOLIC PNL TOTAL CA: CPT | Performed by: PHYSICAL MEDICINE & REHABILITATION

## 2021-01-06 PROCEDURE — 82570 ASSAY OF URINE CREATININE: CPT | Performed by: NURSE PRACTITIONER

## 2021-01-06 PROCEDURE — NC001 PR NO CHARGE: Performed by: INTERNAL MEDICINE

## 2021-01-06 PROCEDURE — 85027 COMPLETE CBC AUTOMATED: CPT | Performed by: PHYSICAL MEDICINE & REHABILITATION

## 2021-01-06 PROCEDURE — 97535 SELF CARE MNGMENT TRAINING: CPT

## 2021-01-06 PROCEDURE — 97537 COMMUNITY/WORK REINTEGRATION: CPT

## 2021-01-06 PROCEDURE — 97116 GAIT TRAINING THERAPY: CPT

## 2021-01-06 RX ADMIN — HEPARIN SODIUM 5000 UNITS: 5000 INJECTION INTRAVENOUS; SUBCUTANEOUS at 14:55

## 2021-01-06 RX ADMIN — AMPICILLIN SODIUM 2000 MG: 2 INJECTION, POWDER, FOR SOLUTION INTRAMUSCULAR; INTRAVENOUS at 18:41

## 2021-01-06 RX ADMIN — MORPHINE SULFATE 15 MG: 15 TABLET ORAL at 18:40

## 2021-01-06 RX ADMIN — AMPICILLIN SODIUM 2000 MG: 2 INJECTION, POWDER, FOR SOLUTION INTRAMUSCULAR; INTRAVENOUS at 02:13

## 2021-01-06 RX ADMIN — DOCUSATE SODIUM 100 MG: 100 CAPSULE, LIQUID FILLED ORAL at 18:40

## 2021-01-06 RX ADMIN — CALCIUM CARBONATE (ANTACID) CHEW TAB 500 MG 500 MG: 500 CHEW TAB at 08:03

## 2021-01-06 RX ADMIN — OXYCODONE HYDROCHLORIDE AND ACETAMINOPHEN 500 MG: 500 TABLET ORAL at 08:03

## 2021-01-06 RX ADMIN — GABAPENTIN 400 MG: 400 CAPSULE ORAL at 21:05

## 2021-01-06 RX ADMIN — SERTRALINE HYDROCHLORIDE 50 MG: 50 TABLET ORAL at 08:03

## 2021-01-06 RX ADMIN — MORPHINE SULFATE 15 MG: 15 TABLET ORAL at 08:03

## 2021-01-06 RX ADMIN — DOCUSATE SODIUM 100 MG: 100 CAPSULE, LIQUID FILLED ORAL at 08:03

## 2021-01-06 RX ADMIN — CALCIUM CARBONATE (ANTACID) CHEW TAB 500 MG 500 MG: 500 CHEW TAB at 21:05

## 2021-01-06 RX ADMIN — METRONIDAZOLE 500 MG: 500 TABLET, FILM COATED ORAL at 05:11

## 2021-01-06 RX ADMIN — FINASTERIDE 5 MG: 5 TABLET, FILM COATED ORAL at 08:03

## 2021-01-06 RX ADMIN — HEPARIN SODIUM 5000 UNITS: 5000 INJECTION INTRAVENOUS; SUBCUTANEOUS at 21:05

## 2021-01-06 RX ADMIN — METRONIDAZOLE 500 MG: 500 TABLET, FILM COATED ORAL at 21:05

## 2021-01-06 RX ADMIN — HEPARIN SODIUM 5000 UNITS: 5000 INJECTION INTRAVENOUS; SUBCUTANEOUS at 05:11

## 2021-01-06 RX ADMIN — FERROUS SULFATE TAB 325 MG (65 MG ELEMENTAL FE) 325 MG: 325 (65 FE) TAB at 08:03

## 2021-01-06 RX ADMIN — METRONIDAZOLE 500 MG: 500 TABLET, FILM COATED ORAL at 14:55

## 2021-01-06 RX ADMIN — Medication 4000 UNITS: at 08:03

## 2021-01-06 NOTE — PROGRESS NOTES
Addendum: Add contact precautions to precautions list due to sepsis  01/06/21 1004   Patient Data   Rehab Impairment Impairment of mobility, safety and Activities of Daily Living (ADLs) due to Debility:  16  Debility (Non-cardiac/Non-pulmonary)   Etiologic Diagnosis Acute metabolic encephalopathy, sepsis and change of mental status   Date of Onset 12/27/20   Support System   Name Johanna Ríos Daughter   Home Setup   Type of Home Single Level   Method of Entry Stairs   Number of Stairs 3  (plus 10 to garage outside)   Number of Stairs in [facility] Full;Combo   First [address]  in [address] [place];Single [place]; Shower Chair   Baseline Information   Transportation    Prior Device(s) Used Single Point Cane   Prior IADL Participation   Money Management Identify Money;Estimate Costs;Estimate Change;Combine Bills;Manage Checkbook   Meal Preparation Partial Participation   Laundry Partial Participation   Home Cleaning Partial Participation   Prior Level of Function   Self-Care 3  Independent - Patient completed the activities by him/herself, with or without an assistive device, with no assistance from a helper  Functional Cognition 3  Independent - Patient completed the activities by him/herself, with or without an assistive device, with no assistance from a helper  Prior Device Used Z   None of the above  [facility])   Falls in the Last Year   Number of falls in the past 12 months 0   Patient Preference   Nickname (Patient Preference) Jame Chi   Restrictions/Precautions   Precautions Fall Risk;Multiple lines  (IV LUE)   Pain Assessment   Pain Assessment Tool Pain Assessment not indicated - pt denies pain   Oral Hygiene   Type of Assistance Needed Set-up / clean-up   Oral Hygiene CARE Score 5   Grooming   Able To Initiate Tasks;Comb/Brush Hair;Wash/Dry Face;Brush/Clean Teeth;Wash/Dry Hands   Findings s/u Shower/Bathe Self   Type of Assistance Needed Physical assistance   Amount of Physical Assistance Provided Less than 25%   Shower/Bathe Self CARE Score 3   Bathing   Assessed Bath Style Sponge Bath   Anticipated D/C Bath Style Shower;Sponge Bath   Able to Giovanny Nickolas No   Able to [employer] Temperature No   Able to Wash/Rinse/Dry (body part) Left Arm;Right Arm;L Upper Leg;L Lower Leg/Foot;R Upper Leg;R Lower Leg/Foot;Chest;Abdomen;Perineal Area   Limitations Noted in Balance; Endurance;ROM;Safety;Strength   Positioning Seated;Standing   Dressing/Undressing Clothing   Remove UB Clothes Pullover Shirt   Don UB Clothes Pullover Shirt   Type of Assistance Needed Supervision   Upper Body Dressing CARE Score 4   Remove LB Clothes Socks   Don LB Clothes Pants; Undergarment;Socks; Shoes   Type of Assistance Needed Physical assistance   Amount of Physical Assistance Provided Less than 25%   Lower Body Dressing CARE Score 3   Limitations Noted In Balance; Endurance; Safety;Problem Solving;Strength;ROM   Positioning Supported Sit;Standing   Putting On/Taking Off Footwear   Type of Assistance Needed Physical assistance   Amount of Physical Assistance Provided Less than 25%   Putting On/Taking Off Footwear CARE Score 3   Transfer Bed/Chair/Wheelchair   Limitations Noted In Balance; Endurance;LE Strength   Type of Assistance Needed Incidental touching   Chair/Bed-to-Chair Transfer CARE Score 4   Sit to Stand   Type of Assistance Needed Physical assistance   Amount of Physical Assistance Provided Less than 25%   Sit to Stand CARE Score 3   Picking Up Object   Type of Assistance Needed Physical assistance   Amount of Physical Assistance Provided 50%-74%   Picking Up Object CARE Score 2   Wheelchair mobility   Type of Wheelchair Used 1  Manual   Method Right upper extremity; Left upper extremity   Distance Level Surface (feet) 55 ft   Findings Supervision gym to room   Comprehension   Comprehension (FIM) 5 - Understands basic directions and conversation   Expression   Expression (FIM) 5 - Needs help/cues only RARELY (< 10% of the time)   Social Interaction   Social Interaction (FIM) 5 - Interacts appropriately with others 90% of time   Problem Solving   Problem solving (FIM) 4 - Solves basic problems 75-89% of time   Memory   Memory (FIM) 4 - Recognizes/recalls/performs 75-89%   RUE Assessment   RUE Assessment WFL  (MMT 4-/5 grossly sh to hand)   LUE Assessment   LUE Assessment WFL  (MMT 4-/5 grossly sh to hand)   Coordination   Movements are Fluid and Coordinated 1   Sensation   Light Touch No apparent deficits   Propioception No apparent deficits   Cognition   Overall Cognitive Status Impaired   Arousal/Participation Alert; Responsive; Cooperative   Attention Attends with cues to redirect   Orientation Level Oriented to person;Oriented to place;Oriented to situation   Memory Decreased short term memory;Decreased recall of precautions;Decreased recall of recent events   Following Commands Follows one step commands with increased time or repetition   Therapeutic Exercise   Therapeutic Exercise/Activity Pt participates in 40 minutes concurrent treatment focusing on BUE therex following eval including fxl reacher use R hand for retireval of pegs and gripper with pegs B hands  BUE therex with 1# wt including shoulder chest press, abd/ add, eblow flexion/ ext and supination/ pronation 2 sets 15 BUE  Discharge Information   Vocational Plan Retired/not working  (enjoys watching TV and playing with dogs)   Patient's Discharge Plan home with daughter   Patient's Rehab Expectations   ("Get better to go home ")   Impressions Pt presents following hospital stay due to Acute metabolic encephalopathy, sepsis and change of mental status  Pt requires assist due to decreased balance, safety, endurance, generalized strength and cognition  Pt will benefit from skilled OT services to increase independence with daily tasks     OT Therapy Minutes   OT Time In 1004   OT Time Out 1200   OT Total Time (minutes) 116   OT Mode of treatment - Individual (minutes) 76   OT Mode of treatment - Concurrent (minutes) 40

## 2021-01-06 NOTE — CASE MANAGEMENT
Initial assessment & orientation to [facility] with Pt & phone contact with Pt's daughter  Pt resides in a SS home with his daughter, 3 steps in  Pt's daughter works, but 1 of his 2 sons will be with him while she is at work & he will have 24 hour supervision  Pt's daughter very anxious for him to be DC'd as soon as IV antibiotic is completed on 1/11/21  Discussed role of team members & reviewed [address] with Pt & Pt's daughter, who expressed understanding & agreement  Discussed that a Tx team meeting will be held on 1/12, and Pt & family would like him DC'd after the meeting if safe to do so  Pt has a RW, SPC, toliet riser & shower chair  The cantor catheter is chronic & family is aware of cantor care  SW will continue to monitor & assist as needed with [address]

## 2021-01-06 NOTE — TREATMENT PLAN
Individualized Plan of [address] 66 y o  male MRN: [id number]  Unit/Bed#: -01 Encounter: [id number]     PATIENT INFORMATION  ADMISSION DATE: 1/5/2021  5:13 PM FERNY CATEGORY:Debility:  16  Debility (Non-cardiac/Non-pulmonary)   ADMISSION DIAGNOSIS: Debility [R53 81]  Sepsis ([facility] [address] ) [A41 9]  EXPECTED LOS: 10-14 days     MEDICAL/FUNCTIONAL PROGNOSIS  Based on my assessment of the patient's medical conditions and current functional status, the prognosis for attaining medical and functional goals or the IRF stay is:  Good    Medical Goals: Patient [facility] be medically stable [facility] completion of rehab program    ANTICIPATED DISCHARGE [place]: Home - alone and Home - Assistance    ANTICIPATED FOLLOW-UP SERVICE:   Outpatient Therapy Services: PT and OT      Home Health Services: PT, OT and Nursing      DISCIPLINE SPECIFIC PLANS:  Required Disciplines & Services: Rehabillitation Nursing and Case Management    REQUIRED THERAPY:  Therapy Hours per Day Days per Week Total Days   Physical Therapy 1 5 5 5   Occupational Therapy 1 5 5 5   NOTE: Additional therapy time(s) may be added as appropriate to meet patient needs and to achieve functional goals  REQUIRED THERAPY:  Therapy Hours per Day Days per Week Total Days   Physical Therapy 1 5 5   Occupational Therapy 1 5 5   Speech/Language Therapy 1 5 5   NOTE: Additional therapy time(s) may be added as appropriate to meet patient needs and to achieve functional goals      Patient will either participate in above therapy regimen or participate in 900 minutes of therapy within 7 day week consisting of PT and OT due to the following medical procedure/condition:Debility:  16  Debility (Non-cardiac/Non-pulmonary)    ANTICIPATED FUNCTIONAL OUTCOMES:  ADL:     Bladder/Bowel:     Transfers:     Locomotion:     Cognitive:       DISCHARGE PLANNING NEEDS  Equipment needs: Discharge needs to be reviewed with team      REHAB ANTICIPATED PARTICIPATION RESTRICTIONS:  Assist with Bathing Shower, Assist with Mobility, Assist with Tub Shower Transfer, Rquires Assist with ADLS, Requires Assit with Homemaking, Requires Assit with Steps and Weight Bearing as tolerated

## 2021-01-06 NOTE — CONSULTS
Leland Spencer 66 y o  male MRN: [id number]  Unit/Bed#: -01 Encounter: [id number]        Assessment and Plan:    1  Acute kidney injury (POA) atop chronic kidney disease  · Creatinine peaked 2 71 mg/dL on 12/28/2020 in the setting of severe sepsis and obstructive uropathy likely resulting in ATN  Renal function had improved to creatinine of 1 54 mg/dL yesterday however now increased back up to 1 78 mg/dL today  · No exposure to nephrotoxins, blood pressure acceptable, indwelling urinary catheter patent  Etiology of worsening renal function may be based upon volume as he does have a history of G1DD and received aggressive volume expansion in acute care versus possible kink in urinary catheter  · Plan:  · Check urine studies, continue daily weights, monitor closely for kinks or dysfunction of indwelling catheter, avoid nephrotoxins, renally dose medications  2  Stage 3 Chronic Kidney disease with baseline creatinine 1 2-1 3 mg/dL  3  Proteinuria  · Re quantify and rule out monoclonal gammopathy in steady state  Low dose ace may be useful in future but precluded at this time  4  Hypocalcemia  · Ionized calcium normal yesterday  Continue Tums BID and will follow the calcium level  5  Multifactorial Anemia   · Of iron deficiency and acute/chronic illness  IV iron is precluded by infection but may be indicated once infection clears  On oral iron  6  Severe Sepsis (resolved), Enterococcus and proteus bacteremia, complicated UTI  · Receiving ampicillin 2 grams q8h and flagyl 500 mg TID x 3 days  7  Right Ureteropelvic Junction Obstruction  · S/p right ureteral stent placement on 12/29      HPI:    Valorie Calhoun is a 66 y o  male with an active problem list significant for CKD 3, chronic pain syndrome, anemia of chronic disease, chronic urinary retention managed with indwelling urinary catheter, grade 1 diastolic dysfunction        Patient status post acute care stay at [facility] from 12/27-1/5/21 for urosepsis, obstructive uropathy, acute kidney injury due to UPJ obstruction associated with mild-moderate hydronephrosis s/p cystoscopy and right ureteral stent placement on 12/29/20  Renal followed along for acute kidney injury, likely ATN, in the setting of severe sepsis and obstructive uropathy  He is now admitted to inpatient rehabilitation unit at [facility] and a renal consultation was requested  Upon discussion with the patient, He relays HPI as above  He states he is feeling well and actively participating in therapies without problems  He denies nausea, vomiting, diarrhea  Denies problems with indwelling urinary catheter  States his left lower extremity has always been larger than his right lower extremity since an injury a long time ago  Denies any trouble with breathing or chest pain  From a renal standpoint, has CKD 3 with baseline creatinine around 1 2-1 3 mg/dL  Risk factors for chronicity include older age and chronic obstructive uropathy requiring indwelling urinary catheter and recurrent UTI  The medical record, including Care Everywhere and media tabs were reviewed  Reason for Consult: RUTHANN on CKD    Review of Systems:  No physical complaints on exam   A complete 10-point review of systems was performed  Aside from what was mentioned in the HPI, it is otherwise negative        Historical Information   Past Medical History:   Diagnosis Date    Depression     Diallo catheter in place     Hypertension     Prostate enlargement     Psychiatric disorder     Urinary tract infection      Past Surgical History:   Procedure Laterality Date    ARTHROSCOPY KNEE      BACK SURGERY      L 4 or 5    CATARACT EXTRACTION Bilateral     CYSTOSCOPY W/ LASER LITHOTRIPSY N/A 8/20/2018    Procedure: LITHOTRISPY HOLMIUM LASER of bladder stones;  Surgeon: Sherra Mohs, MD;  Location: [facility] MAIN OR;  Service: Urology    [place] CYSTOURETHROSCOPY,URETER CATHETER Right 12/29/2020    Procedure: CYSTOSCOPY RETROGRADE PYELOGRAM WITH INSERTION STENT URETERAL;  Surgeon: Tara Hinojosa MD;  Location: [address] MAIN OR;  Service: Urology    HI TRANSURETHRAL ELEC-SURG PROSTATECTOM N/A 8/20/2018    Procedure: Vanessa Calderonith; TURP;  Surgeon: Tara Hinojosa MD;  Location: [address] MAIN OR;  Service: Urology    SHOULDER SURGERY Right 05/31/2019     Social History   Social History     Substance and Sexual Activity   Alcohol Use Never    Frequency: Monthly or less    Drinks per session: 1 or 2    Binge frequency: Less than monthly     Social History     Substance and Sexual Activity   Drug Use Never     Social History     Tobacco Use   Smoking Status Former Smoker    Packs/day: 1 00   Smokeless Tobacco Never Used   Tobacco Comment    quit 50 years ago       Family History:   Family History   Problem Relation Age of Onset    Cancer Mother     Diabetes Father        Medications:  Pertinent medications were reviewed  Current Facility-Administered Medications   Medication Dose Route Frequency Provider Last Rate    acetaminophen  650 mg Oral Q4H PRN Aidee Celaya, PA-C      ampicillin  2,000 mg Intravenous Q8H MAURICIO Smith-C Stopped (01/06/21 0304)    ascorbic acid  500 mg Oral Daily Aidee Celaya, PA-C      calcium carbonate  500 mg Oral BID Aidee Celaya, PA-C      cholecalciferol  4,000 Units Oral Daily Aidee Celaya, PA-C      cloNIDine  0 1 mg Oral Q3H PRN Aidee Celaya, PA-C      diphenhydrAMINE  50 mg Oral HS PRN Aidee Celaya, PA-C      docusate sodium  100 mg Oral BID Aidee Celaya, PA-C      ferrous sulfate  325 mg Oral Daily With Breakfast Aidee Celaya PA-C      finasteride  5 mg Oral Daily Aidee Celaya, PA-C      gabapentin  400 mg Oral HS Aidee Celaya, PA-C      heparin (porcine)  5,000 Units [facility]cutaneous [facility] L[facility] Ol[facility] PA-C      metroNIDAZOLE  500 mg Oral Q8H [address] Aidee Celaya, PA-C      morphine  15 mg Oral BID Aidee Celaya, PA-C      ondansetron 4 mg Intravenous Q6H PRN Aidee Celaya PA-C      sertraline  50 mg Oral Daily Aidee Celaya PA-C           No Known Allergies      Vitals:   /62 (BP Location: Left arm)   Pulse 57   Temp 99 3 °F (37 4 °C) (Temporal)   Resp 20   Ht 5' 9" (1 753 m)   Wt 85 1 kg (187 lb 9 6 oz)   SpO2 94%   BMI 27 70 kg/m²   Body mass index is 27 7 kg/m²  SpO2: 94 %,   SpO2 Activity: At Rest,   O2 Device: None (Room air)      Intake/Output Summary (Last 24 hours) at 1/6/2021 1408  Last data filed at 1/6/2021 5446  Gross per 24 hour   Intake 360 ml   Output 2300 ml   Net -1940 ml     Invasive Devices     Peripheral Intravenous Line            Peripheral IV 01/02/21 Left Antecubital 4 days          Drain            Ureteral Drain/Stent Right ureter 6 Fr  8 days    Urethral Catheter Latex 18 Fr  8 days                Physical Exam:  General: conscious, cooperative, in no acute distress  Eyes: conjunctivae pink, anicteric sclerae  ENT: lips and mucous membranes moist  Neck: supple, no JVD, no masses  Chest: diminished breath sounds bilateral, no crackles, ronchus or wheezings  CVS: S1 & S2, normal rate, regular rhythm  Abdomen: soft, non-tender, non-distended, normoactive bowel sounds  Extremities: left leg mild edema, right leg trace edema  Skin: no rash  Neuro: awake, alert, oriented      Diagnostic Data:  Lab: I have personally reviewed pertinent lab results  ,   CBC:  Results from last 7 days   Lab Units 01/06/21  0514   WBC Thousand/uL 12 70*   HEMOGLOBIN g/dL 8 4*   HEMATOCRIT % 25 3*   PLATELETS Thousands/uL 361      CMP:   Lab Results   Component Value Date    SODIUM 140 01/06/2021    K 4 2 01/06/2021     01/06/2021    CO2 30 01/06/2021    BUN 28 (H) 01/06/2021    CREATININE 1 78 (H) 01/06/2021    CALCIUM 8 0 (L) 01/06/2021    EGFR 36 01/06/2021   ,   PT/INR: No results found for: PT, INR,   Magnesium: No components found for: MAG,  Phosphorous: No results found for: PHOS    Microbiology:  @[facility],(urinecx:7)@        [facility] [place]    Portions of the record may have been created with voice recognition software  Occasional wrong word or "sound a like" substitutions may have occurred due to the inherent limitations of voice recognition software  Read the chart carefully and recognize, using context, where substitutions have occurred

## 2021-01-06 NOTE — PROGRESS NOTES
01/06/21 1310   Patient Data   Rehab Impairment debility   Etiologic Diagnosis sepsis, acute metabolic encephalopathy   Support System   Name [address]   Relationship daughter   Health Status good   Able to provide 24 hour supervision No  (works at [employer])   Able to provide physical help? Yes   Home Setup   Type of Home Single Level   Method of Entry Stairs;Hand Rail Bilateral   Number of Stairs 3   Number of Stairs in Home 0   First Floor Setup Available Yes   Home Modification Comment 2 sons live close by   Available Equipment Roller Walker;Single Point [facility]; Shower Chair;Bedside Commode   Baseline Information   Prior Device(s) Used Single [employer] [place]  (at store)   Prior Level of Function   Indoor-Mobility (Ambulation) 3  Independent - Patient completed the activities by him/herself, with or without an assistive device, with no assistance from a helper  Stairs 3  Independent - Patient completed the activities by him/herself, with or without an assistive device, with no assistance from a helper  Prior Device Used Z  None of the above  (SPC at store)   Patient Preference   Nickname (Patient Preference) Kiel Kelly   Restrictions/Precautions   Precautions Fall Risk; Diallo;Contact/isolation   Weight Bearing Restrictions No   ROM Restrictions No   Pain Assessment   Pain Assessment Tool Pain Assessment not indicated - pt denies pain   Transfer Bed/Chair/Wheelchair   Limitations Noted In Balance; Endurance;LE Strength   Adaptive Equipment Roller Walker   Type of Assistance Needed Incidental touching;Verbal cues   Amount of Physical Assistance Provided Less than 25%   Comment CGA   Chair/Bed-to-Chair Transfer CARE Score 3   Roll Left and Right   Type of Assistance Needed Independent   Amount of Physical Assistance Provided No physical assistance   Roll Left and Right CARE Score 6   Sit to Lying   Type of Assistance Needed Supervision   Amount of Physical Assistance Provided No physical assistance   Sit to Lying CARE Score 4   Lying to Sitting on Side of Bed   Type of Assistance Needed Supervision   Amount of Physical Assistance Provided No physical assistance   Lying to Sitting on Side of Bed CARE Score 4   Sit to Stand   Type of Assistance Needed Incidental touching;Verbal cues   Amount of Physical Assistance Provided Less than 25%   Comment CGA   Sit to Stand CARE Score 3   Car Transfer   Reason if not Attempted Safety concerns   Car Transfer CARE Score 88   Ambulation   Primary Mode of Locomotion Prior to Admission Walk   Distance Walked (feet) 165 ft  (113', 165', 144')   Assist Device Roller Walker   Gait Pattern Inconsistant [place]; Slow [place];Decreased foot clearance;Narrow KAREEM;Step through   Limitations Noted In Balance; Endurance; Heel Strike; Safety;Speed;Strength   Provided Assistance with: Balance   Walk Assist Level Contact Guard; Chair Follow   Findings level & uneven   Walk 10 Feet   Type of Assistance Needed Incidental touching;Verbal cues   Amount of Physical Assistance Provided Less than 25%   Comment CGA   Walk 10 Feet CARE Score 3   Walk 50 Feet with Two Turns   Type of Assistance Needed Incidental touching;Verbal cues   Amount of Physical Assistance Provided Less than 25%   Comment CGA   Walk 50 Feet with Two Turns CARE Score 3   Walk 150 Feet   Type of Assistance Needed Incidental touching;Verbal cues   Amount of Physical Assistance Provided Less than 25%   Comment CGA   Walk 150 Feet CARE Score 3   Walking 10 Feet on Uneven Surfaces   Type of Assistance Needed Incidental touching;Verbal cues   Amount of Physical Assistance Provided Less than 25%   Comment CGA   Walking 10 Feet on Uneven Surfaces CARE Score 3   Wheelchair mobility   Type of Wheelchair Used 1  Manual   Method Right lower extremity; Left lower extremity   Assistance Provided For Locking Brakes;Obstacles   Distance Level Surface (feet) 85 ft   Distance Wheeled 3% Grade 0 ft   Findings level; Supervision   Wheel 50 Feet with Two Turns   Type of Assistance Needed Supervision   Amount of Physical Assistance Provided No physical assistance   Wheel 50 Feet with Two Turns CARE Score 4   Wheel 150 Feet   Reason if not Attempted Activity not applicable   Wheel 521 Feet CARE Score 9   Curb or Single Stair   Style negotiated Single stair   Type of Assistance Needed Incidental touching;Verbal cues   Amount of Physical Assistance Provided Less than 25%   Comment CGA   1 Step (Curb) CARE Score 3   4 Steps   Type of Assistance Needed Incidental touching;Verbal cues   Amount of Physical Assistance Provided Less than 25%   Comment CGA   4 Steps CARE Score 3   12 Steps   Type of Assistance Needed Incidental touching;Verbal cues   Amount of Physical Assistance Provided Less than 25%   Comment CGA   12 Steps CARE Score 3   Stairs   Type Stairs   # of Steps 14   Assist Devices Bilateral Rail   Findings CGA   RLE Assessment   RLE Assessment WFL   Strength RLE   RLE Overall Strength 4+/5   R Hip Flexion 4+/5   R Hip Extension 4+/5   R Knee Flexion 4+/5   R Knee Extension 4+/5   R Ankle Dorsiflexion 3-/5   R Ankle Plantar Flexion 4/5   Tone RLE   RLE Tone WFL   LLE Assessment   LLE Assessment WFL   Strength LLE   LLE Overall Strength 4+/5   L Hip Flexion 4+/5   L Hip Extension 4+/5   L Knee Flexion 4+/5   L Knee Extension 4+/5   L Ankle Dorsiflexion 3-/5   L Ankle Plantar Flexion 4/5   Tone LLE   LLE Tone WFL   Coordination   Movements are Fluid and Coordinated 1   Sensation   Light Touch No apparent deficits   Propioception No apparent deficits   Cognition   Overall Cognitive Status Impaired   Orientation Level Oriented X4   Objective Measure   PT Findings Pt presents to [facility] for PT IE with debility diagnosis s/p sepsis and acute metabolic encephalopathy   Pt performs bed mobility independently and with Supervision, transfers with CGA and RW, ambulation up to 165' with RW and CGA over level and uneven surfaces, stair negotiation of 14 steps with B hand rails and CGA, and w/c mobility of 80' with Supervision  Pt requires VCs for safety during transfers  Pt presents with decreased balance, endurance, strength (decreased B dorsiflexion), ROM, safety awareness, contact precautions, and functional mobility  Pt would continue to benefit from skilled [facility] PT to address these deficits prior to safe d/c home     Therapeutic Exercise   Therapeutic Exercise/Activity seated BLE therex x30; rest breaks taken as needed   Discharge [address] Retired/not working  (enjoys playing with dogs)   Patient's Discharge Plan home with daughter support   Patient's Rehab Expectations "To get home "   PT Therapy Minutes   PT Time In 1310   PT Time Out 1446   PT Total Time (minutes) 96   PT Mode of treatment - Individual (minutes) 96   PT Mode of treatment - Concurrent (minutes) 0   PT Mode of treatment - Group (minutes) 0   PT Mode of treatment - Co-treat (minutes) 0   PT Mode of Treatment - Total time(minutes) 96 minutes   PT Cumulative Minutes 96   Cumulative Minutes   Cumulative therapy minutes 96

## 2021-01-07 ENCOUNTER — PATIENT OUTREACH (OUTPATIENT)
Dept: CASE MANAGEMENT | Facility: OTHER | Age: 76
End: 2021-01-07

## 2021-01-07 LAB
ANION GAP SERPL CALCULATED.3IONS-SCNC: 7 MMOL/L (ref 4–13)
BUN SERPL-MCNC: 27 MG/DL (ref 7–25)
CALCIUM SERPL-MCNC: 8.3 MG/DL (ref 8.6–10.5)
CHLORIDE SERPL-SCNC: 103 MMOL/L (ref 98–107)
CO2 SERPL-SCNC: 30 MMOL/L (ref 21–31)
CREAT SERPL-MCNC: 1.7 MG/DL (ref 0.7–1.3)
GFR SERPL CREATININE-BSD FRML MDRD: 38 ML/MIN/1.73SQ M
GLUCOSE SERPL-MCNC: 90 MG/DL (ref 65–99)
MAGNESIUM SERPL-MCNC: 1.9 MG/DL (ref 1.9–2.7)
PHOSPHATE SERPL-MCNC: 3.5 MG/DL (ref 3–5.5)
POTASSIUM SERPL-SCNC: 4.2 MMOL/L (ref 3.5–5.5)
SODIUM SERPL-SCNC: 140 MMOL/L (ref 134–143)

## 2021-01-07 PROCEDURE — 84100 ASSAY OF PHOSPHORUS: CPT | Performed by: NURSE PRACTITIONER

## 2021-01-07 PROCEDURE — 97110 THERAPEUTIC EXERCISES: CPT

## 2021-01-07 PROCEDURE — 83735 ASSAY OF MAGNESIUM: CPT | Performed by: NURSE PRACTITIONER

## 2021-01-07 PROCEDURE — 99232 SBSQ HOSP IP/OBS MODERATE 35: CPT | Performed by: PHYSICAL MEDICINE & REHABILITATION

## 2021-01-07 PROCEDURE — 97116 GAIT TRAINING THERAPY: CPT

## 2021-01-07 PROCEDURE — 99232 SBSQ HOSP IP/OBS MODERATE 35: CPT | Performed by: INTERNAL MEDICINE

## 2021-01-07 PROCEDURE — 97530 THERAPEUTIC ACTIVITIES: CPT

## 2021-01-07 PROCEDURE — 80048 BASIC METABOLIC PNL TOTAL CA: CPT | Performed by: NURSE PRACTITIONER

## 2021-01-07 PROCEDURE — 97537 COMMUNITY/WORK REINTEGRATION: CPT

## 2021-01-07 RX ADMIN — AMPICILLIN SODIUM 2000 MG: 2 INJECTION, POWDER, FOR SOLUTION INTRAMUSCULAR; INTRAVENOUS at 11:43

## 2021-01-07 RX ADMIN — Medication 4000 UNITS: at 09:20

## 2021-01-07 RX ADMIN — SERTRALINE HYDROCHLORIDE 50 MG: 50 TABLET ORAL at 09:19

## 2021-01-07 RX ADMIN — CALCIUM CARBONATE (ANTACID) CHEW TAB 500 MG 500 MG: 500 CHEW TAB at 21:17

## 2021-01-07 RX ADMIN — AMPICILLIN SODIUM 2000 MG: 2 INJECTION, POWDER, FOR SOLUTION INTRAMUSCULAR; INTRAVENOUS at 02:19

## 2021-01-07 RX ADMIN — OXYCODONE HYDROCHLORIDE AND ACETAMINOPHEN 500 MG: 500 TABLET ORAL at 09:19

## 2021-01-07 RX ADMIN — CALCIUM CARBONATE (ANTACID) CHEW TAB 500 MG 500 MG: 500 CHEW TAB at 09:19

## 2021-01-07 RX ADMIN — FINASTERIDE 5 MG: 5 TABLET, FILM COATED ORAL at 09:20

## 2021-01-07 RX ADMIN — HEPARIN SODIUM 5000 UNITS: 5000 INJECTION INTRAVENOUS; SUBCUTANEOUS at 14:06

## 2021-01-07 RX ADMIN — METRONIDAZOLE 500 MG: 500 TABLET, FILM COATED ORAL at 06:02

## 2021-01-07 RX ADMIN — MORPHINE SULFATE 15 MG: 15 TABLET ORAL at 09:20

## 2021-01-07 RX ADMIN — MORPHINE SULFATE 15 MG: 15 TABLET ORAL at 17:32

## 2021-01-07 RX ADMIN — HEPARIN SODIUM 5000 UNITS: 5000 INJECTION INTRAVENOUS; SUBCUTANEOUS at 06:02

## 2021-01-07 RX ADMIN — FERROUS SULFATE TAB 325 MG (65 MG ELEMENTAL FE) 325 MG: 325 (65 FE) TAB at 07:20

## 2021-01-07 RX ADMIN — GABAPENTIN 400 MG: 400 CAPSULE ORAL at 21:17

## 2021-01-07 RX ADMIN — HEPARIN SODIUM 5000 UNITS: 5000 INJECTION INTRAVENOUS; SUBCUTANEOUS at 21:17

## 2021-01-07 NOTE — PROGRESS NOTES
01/07/21 1130   Pain Assessment   Pain Assessment Tool Pain Assessment not indicated - pt denies pain   Restrictions/Precautions   Precautions Cognitive; Fall Risk;Hard of hearing;Contact/isolation;Limb alert  (IV RUE)   Eating   Type of Assistance Needed Set-up / clean-up   Eating CARE Score 5   Eating Assessment   Food To Mouth Yes   Able To Cut Yes   Meal Assessed Breakfast   Opens Packages Yes   Findings pt supervised occasionally during meal due to fatigue from OT and PT sessions this morning   Left Upper Extremity-Strength   L Weights/Reps/Sets BUE 2 sets 15 elbow flexion/ extension, supination/ pronation and wrist flexion/ext with 2# wt   Exercise Tools   Exercise Tools Yes   Hand Gripper grippper with pegs L hand   Other Exercise Tool 1 Sanderbox 2 sets 15 all directions with BUE and 2# wt   Other Exercise Tool 2 card match reaching BUE while sitting   Other Exercise Tool 3 fxl reacher use for retrieval of pegs from the floor RUE   Cognition   Arousal/Participation Alert; Responsive; Cooperative   Attention Attends with cues to redirect   Orientation Level Oriented X4   Memory Decreased recall of precautions   Following Commands Follows one step commands without difficulty   Additional Activities   Additional Activities Other (Comment)   Additional Activities Comments w/c mobility in room and hallway S BUE and BLE   Other Comments   Assessment Pt participates in 29 minutes concurrent treatment focusing on BUE therex with similar goals as another to increase strength and activity tolerance for ADL completion   Assessment   Treatment Assessment Pt participates in w/c mobility and BUE therex  Pt requires rest breaks due to fatigue, cues for attention to task due to decreased cogniton and is agreeable to finishing OT session prior to lunch so he can rest after lunch  Pt returns to room with w/c and occ monitoring provided during lunch   Pt will benefit from continued skilled OT servcies to increase independence with daily tasks  Problem List Decreased strength;Decreased range of motion;Decreased endurance; Impaired balance;Decreased cognition;Decreased safety awareness   Plan   Treatment/Interventions ADL retraining;Functional transfer training; Therapeutic exercise; Endurance training;Cognitive reorientation;Patient/family training;Equipment eval/education; Compensatory technique education   Progress Progressing toward goals   OT Therapy Minutes   OT Time In 1130   OT Time Out 1301   OT Total Time (minutes) 91   OT Mode of treatment - Individual (minutes) 62   OT Mode of treatment - Concurrent (minutes) 29   Therapy Time missed   Time missed?  No

## 2021-01-07 NOTE — PLAN OF CARE
Problem: DISCHARGE PLANNING - CARE MANAGEMENT  Goal: Discharge to post-acute care or home with appropriate resources  Description: INTERVENTIONS:  - Conduct assessment to determine patient/family and health care team treatment goals, and need for post-acute services based on payer coverage, community resources, and patient preferences, and barriers to discharge  - Address psychosocial, clinical, and financial barriers to discharge as identified in assessment in conjunction with the patient/family and health care team  - Arrange appropriate level of post-acute services according to patients   needs and preference and payer coverage in collaboration with the physician and health care team  - Communicate with and update the patient/family, physician, and health care team regarding progress on the discharge plan  - Arrange appropriate transportation to post-acute venues  Outcome: Progressing     Problem: PAIN - ADULT  Goal: Verbalizes/displays adequate comfort level or baseline comfort level  Description: Interventions:  - Encourage patient to monitor pain and request assistance  - Assess pain using appropriate pain scale  - Administer analgesics based on type and severity of pain and evaluate response  - Implement non-pharmacological measures as appropriate and evaluate response  - Consider cultural and social influences on pain and pain management  - Notify physician/advanced practitioner if interventions unsuccessful or patient reports new pain  Outcome: Progressing     Problem: INFECTION - ADULT  Goal: Absence or prevention of progression during hospitalization  Description: INTERVENTIONS:  - Assess and monitor for signs and symptoms of infection  - Monitor lab/diagnostic results  - Monitor all insertion sites, i e  indwelling lines, tubes, and drains  - Monitor endotracheal if appropriate and nasal secretions for changes in amount and color  - [place] appropriate cooling/warming therapies per order  - Administer medications as ordered  - Instruct and encourage patient and family to use good hand hygiene technique  - Identify and instruct in appropriate isolation precautions for identified infection/condition  Outcome: Progressing  Goal: Absence of fever/infection during neutropenic period  Description: INTERVENTIONS:  - Monitor WBC    Outcome: Progressing     Problem: SAFETY ADULT  Goal: Patient will remain free of falls  Description: INTERVENTIONS:  - Assess patient frequently for physical needs  -  Identify cognitive and physical deficits and behaviors that affect risk of falls    -  [place] fall precautions as indicated by assessment   - Educate patient/family on patient safety including physical limitations  - Instruct patient to call for assistance with activity based on assessment  - Modify environment to reduce risk of injury  - Consider OT/PT consult to assist with strengthening/mobility  Outcome: Progressing  Goal: Maintain or return to baseline ADL function  Description: INTERVENTIONS:  -  Assess patient's ability to carry out ADLs; assess patient's baseline for ADL function and identify physical deficits which impact ability to perform ADLs (bathing, care of mouth/teeth, toileting, grooming, dressing, etc )  - Assess/evaluate cause of self-care deficits   - Assess range of motion  - Assess patient's mobility; develop plan if impaired  - Assess patient's need for assistive devices and provide as appropriate  - Encourage maximum independence but intervene and supervise when necessary  - Involve family in performance of ADLs  - Assess for home care needs following discharge   - Consider OT consult to assist with ADL evaluation and planning for discharge  - Provide patient education as appropriate  Outcome: Progressing  Goal: Maintain or return mobility status to optimal level  Description: INTERVENTIONS:  - Assess patient's baseline mobility status (ambulation, transfers, stairs, etc )    - Identify cognitive and physical deficits and behaviors that affect mobility  - Identify mobility aids required to assist with transfers and/or ambulation (gait belt, sit-to-stand, lift, walker, cane, etc )  - [place] fall precautions as indicated by assessment  - Record patient progress and toleration of activity level on Mobility SBAR; progress patient to next Phase/Stage  - Instruct patient to call for assistance with activity based on assessment  - Consider rehabilitation consult to assist with strengthening/weightbearing, etc   Outcome: Progressing     Problem: DISCHARGE PLANNING  Goal: Discharge to home or other facility with appropriate resources  Description: INTERVENTIONS:  - Identify barriers to discharge w/patient and caregiver  - Arrange for needed discharge resources and transportation as appropriate  - Identify discharge learning needs (meds, wound care, etc )  - Arrange for interpretive services to assist at discharge as needed  - Refer to Case Management Department for coordinating discharge planning if the patient needs post-hospital services based on physician/advanced practitioner order or complex needs related to functional status, cognitive ability, or social support system  Outcome: Progressing     Problem: Knowledge Deficit  Goal: Patient/family/caregiver demonstrates understanding of disease process, treatment plan, medications, and discharge instructions  Description: Complete learning assessment and assess knowledge base  Interventions:  - Provide teaching at level of understanding  - Provide teaching via preferred learning methods  Outcome: Progressing     Problem: Potential for Falls  Goal: Patient will remain free of falls  Description: INTERVENTIONS:  - Assess patient frequently for physical needs  -  Identify cognitive and physical deficits and behaviors that affect risk of falls    -  [place] fall precautions as indicated by assessment   - Educate patient/family on patient safety including physical limitations  - Instruct patient to call for assistance with activity based on assessment  - Modify environment to reduce risk of injury  - Consider OT/PT consult to assist with strengthening/mobility  Outcome: Progressing     Problem: PAIN - ADULT  Goal: Verbalizes/displays adequate comfort level or baseline comfort level  Description: Interventions:  - Encourage patient to monitor pain and request assistance  - Assess pain using appropriate pain scale  - Administer analgesics based on type and severity of pain and evaluate response  - Implement non-pharmacological measures as appropriate and evaluate response  - Consider cultural and social influences on pain and pain management  - Notify physician/advanced practitioner if interventions unsuccessful or patient reports new pain  Outcome: Progressing     Problem: INFECTION - ADULT  Goal: Absence or prevention of progression during hospitalization  Description: INTERVENTIONS:  - Assess and monitor for signs and symptoms of infection  - Monitor lab/diagnostic results  - Monitor all insertion sites, i e  indwelling lines, tubes, and drains  - Monitor endotracheal if appropriate and nasal secretions for changes in amount and color  - [place] appropriate cooling/warming therapies per order  - Administer medications as ordered  - Instruct and encourage patient and family to use good hand hygiene technique  - Identify and instruct in appropriate isolation precautions for identified infection/condition  Outcome: Progressing  Goal: Absence of fever/infection during neutropenic period  Description: INTERVENTIONS:  - Monitor WBC    Outcome: Progressing     Problem: SAFETY ADULT  Goal: Patient will remain free of falls  Description: INTERVENTIONS:  - Assess patient frequently for physical needs  -  Identify cognitive and physical deficits and behaviors that affect risk of falls    -  [place] fall precautions as indicated by assessment   - Educate patient/family on patient safety including physical limitations  - Instruct patient to call for assistance with activity based on assessment  - Modify environment to reduce risk of injury  - Consider OT/PT consult to assist with strengthening/mobility  Outcome: Progressing  Goal: Maintain or return to baseline ADL function  Description: INTERVENTIONS:  -  Assess patient's ability to carry out ADLs; assess patient's baseline for ADL function and identify physical deficits which impact ability to perform ADLs (bathing, care of mouth/teeth, toileting, grooming, dressing, etc )  - Assess/evaluate cause of self-care deficits   - Assess range of motion  - Assess patient's mobility; develop plan if impaired  - Assess patient's need for assistive devices and provide as appropriate  - Encourage maximum independence but intervene and supervise when necessary  - Involve family in performance of ADLs  - Assess for home care needs following discharge   - Consider OT consult to assist with ADL evaluation and planning for discharge  - Provide patient education as appropriate  Outcome: Progressing  Goal: Maintain or return mobility status to optimal level  Description: INTERVENTIONS:  - Assess patient's baseline mobility status (ambulation, transfers, stairs, etc )    - Identify cognitive and physical deficits and behaviors that affect mobility  - Identify mobility aids required to assist with transfers and/or ambulation (gait belt, sit-to-stand, lift, walker, cane, etc )  - [place] fall precautions as indicated by assessment  - Record patient progress and toleration of activity level on Mobility SBAR; progress patient to next Phase/Stage  - Instruct patient to call for assistance with activity based on assessment  - Consider rehabilitation consult to assist with strengthening/weightbearing, etc   Outcome: Progressing     Problem: DISCHARGE PLANNING  Goal: Discharge to home or other facility with appropriate resources  Description: INTERVENTIONS:  - Identify barriers to discharge w/patient and caregiver  - Arrange for needed discharge resources and transportation as appropriate  - Identify discharge learning needs (meds, wound care, etc )  - Arrange for interpretive services to assist at discharge as needed  - Refer to Case Management Department for coordinating discharge planning if the patient needs post-hospital services based on physician/advanced practitioner order or complex needs related to functional status, cognitive ability, or social support system  Outcome: Progressing     Problem: Prexisting or High Potential for Compromised Skin Integrity  Goal: Skin integrity is maintained or improved  Description: INTERVENTIONS:  - Identify patients at risk for skin breakdown  - Assess and monitor skin integrity  - Assess and monitor nutrition and hydration status  - Monitor labs   - Assess for incontinence   - Turn and reposition patient  - Assist with mobility/ambulation  - Relieve pressure over bony prominences  - Avoid friction and shearing  - Provide appropriate hygiene as needed including keeping skin clean and dry  - Evaluate need for skin moisturizer/barrier cream  - Collaborate with interdisciplinary team   - Patient/family teaching  - Consider wound care consult   Outcome: Progressing

## 2021-01-07 NOTE — PROGRESS NOTES
Progress Note - Nephrology   Frankey Laming 66 y o  male MRN: [id number]  Unit/Bed#: [facility] 210-01 Encounter: [id number]    A/P:  1  Acute kidney injury  Presented with a creatinine of 2 5 mg/dl with a baseline creatinine of 1 2-1 3 mg/dl  He has improved to a creatinine of 1 7 mg/dl today  Due to ATN and obstructive nephropathy with sepsis due to enterococcal and proteus bacteremia  Slowly resolving   2  Chronic kidney disease stage 3  Baseline creatinine as noted above  3  Obstructive uropathy  S/p right UPJ obstruction stented  4  Bacteremia  Receiving IV abx  5  Hypocalcemia  Improved with Tums  Has vitamin D deficiency which will be treated  Follow up reason for today's visit: Acute kidney injury    Severe sepsis with acute organ dysfunction due to Gram negative bacteria [facility])    Patient Active Problem List   Diagnosis    Chronic indwelling Cantor catheter    Severe sepsis with acute organ dysfunction due to Gram negative bacteria (HCC)    Acute kidney injury - Chronic kidney disease stage 3    Acute metabolic encephalopathy    Gram-negative UTI    Chronic pain syndrome    Elevated troponin    CKD (chronic kidney disease)    Anemia of chronic disease    Mild episode of recurrent major depressive disorder (HCC)    Hypomagnesemia    Bilateral hand pain    Acute respiratory failure with hypoxia    Right hydroureteronephrosis with obstructing calculus    Gram-negative bacteremia    Pneumonia of right lower lobe    Thrombocytopenia          Subjective:   Feels well  Denies dizziness, chest pain, dyspnea, abdominal pain, NVD  Has a cantor    Objective:     Vitals: Blood pressure 129/67, pulse 58, temperature 99 4 °F (37 4 °C), temperature source Tympanic, resp  rate 18, height 5' 9" (1 753 m), weight 86 1 kg (189 lb 13 1 oz), SpO2 92 %  ,Body mass index is 28 03 kg/m²      Weight (last 2 days)     Date/Time   Weight    01/07/21 0500   86 1 (189 82)    01/05/21 1635   85 1 (187 6) Intake/Output Summary (Last 24 hours) at 1/7/2021 1012  Last data filed at 1/7/2021 0848  Gross per 24 hour   Intake 600 ml   Output 2450 ml   Net -1850 ml     I/O last 3 completed shifts: In: 5 [P O :720]  Out: 4750 [Urine:4750]    Urethral Catheter Latex 18 Fr  (Active)   Reasons to continue Urinary Catheter  Chronic urinary catheter 01/03/21 2008   Goal for Removal N/A- chronic cantor 01/03/21 2008   Site Assessment Clean;Skin intact 01/03/21 2008   Collection Container Standard drainage bag 01/03/21 2008   Securement Method Securing device (Describe) 01/03/21 2008   Output (mL) 700 mL 01/07/21 0500       Physical Exam: /67 (BP Location: Right arm)   Pulse 58   Temp 99 4 °F (37 4 °C) (Tympanic)   Resp 18   Ht 5' 9" (1 753 m)   Wt 86 1 kg (189 lb 13 1 oz)   SpO2 92%   BMI 28 03 kg/m²     General Appearance:    Alert, cooperative, no distress, appears stated age   Head:    Normocephalic, without obvious abnormality, atraumatic   Eyes:    Conjunctiva/corneas clear   Ears:    Normal external ears   Nose:   Nares normal, septum midline, mucosa normal, no drainage    or sinus tenderness   Throat:   Lips, mucosa, and tongue normal; teeth and gums normal   Neck:   Supple, symmetrical, trachea midline, no adenopathy;        thyroid:  No enlargement/tenderness/nodules; no carotid    bruit or JVD   Back:     Symmetric, no curvature, ROM normal, no CVA tenderness   Lungs:     Clear to auscultation bilaterally, respirations unlabored   Chest wall:    No tenderness or deformity   Heart:    Regular rate and rhythm, S1 and S2 normal, no murmur, rub   or gallop   Abdomen:     Soft, non-tender, bowel sounds active Cantor draining clear yellow urine   Extremities:   Extremities normal, atraumatic, no cyanosis or edema   Skin:   Skin color, texture, turgor normal, no rashes or lesions   Lymph nodes:   Cervical normal   Neurologic:   CNII-XII intact - ambulating and conversant   Nurses report confusion Lab, Imaging and other studies: I have personally reviewed pertinent labs  CBC: No results found for: WBC, HGB, HCT, MCV, PLT, ADJUSTEDWBC, MCH, MCHC, RDW, MPV, NRBC  CMP:   Lab Results   Component Value Date    K 4 2 01/07/2021     01/07/2021    CO2 30 01/07/2021    BUN 27 (H) 01/07/2021    CREATININE 1 70 (H) 01/07/2021    CALCIUM 8 3 (L) 01/07/2021    EGFR 38 01/07/2021         Results from last 7 days   Lab Units 01/07/21  0610 01/06/21  0514 01/05/21  0540   POTASSIUM mmol/L 4 2 4 2 3 9   CHLORIDE mmol/L 103 105 104   CO2 mmol/L 30 30 28   BUN mg/dL 27* 28* 26*   CREATININE mg/dL 1 70* 1 78* 1 54*   CALCIUM mg/dL 8 3* 8 0* 7 9*   ALK PHOS U/L  --   --  59   ALT U/L  --   --  26   AST U/L  --   --  16         Phosphorus:   Lab Results   Component Value Date    PHOS 3 5 01/07/2021     Magnesium:   Lab Results   Component Value Date    MG 1 9 01/07/2021     Urinalysis: No results found for: COLORU, CLARITYU, SPECGRAV, PHUR, LEUKOCYTESUR, NITRITE, PROTEINUA, GLUCOSEU, KETONESU, BILIRUBINUR, BLOODU  Ionized Calcium: No results found for: CAION  Coagulation: No results found for: PT, INR, APTT  Troponin: No results found for: TROPONINI  ABG: No results found for: PHART, QXV7DXN, PO2ART, SBA9SNQ, F5EIPXZN, BEART, SOURCE  Radiology review:     IMAGING  No results found      Current Facility-Administered Medications   Medication Dose Route Frequency    acetaminophen (TYLENOL) tablet 650 mg  650 mg Oral Q4H PRN    ampicillin (OMNIPEN) 2,000 mg in sodium chloride 0 9 % 100 mL IVPB  2,000 mg Intravenous Q8H    ascorbic acid (VITAMIN C) tablet 500 mg  500 mg Oral Daily    calcium carbonate (TUMS) chewable tablet 500 mg  500 mg Oral BID    cholecalciferol (VITAMIN D3) tablet 4,000 Units  4,000 Units Oral Daily    cloNIDine (CATAPRES) tablet 0 1 mg  0 1 mg Oral Q3H PRN    diphenhydrAMINE (BENADRYL) tablet 50 mg  50 mg Oral HS PRN    docusate sodium (COLACE) capsule 100 mg  100 mg Oral BID    ferrous sulfate tablet 325 mg  325 mg Oral Daily With Breakfast    finasteride (PROSCAR) tablet 5 mg  5 mg Oral Daily    gabapentin (NEURONTIN) capsule 400 mg  400 mg Oral HS    heparin (porcine) subcutaneous injection 5,000 Units  5,000 Units Subcutaneous Q8H [facility]    morphine (MSIR) IR tablet 15 mg  15 mg Oral BID    ondansetron (ZOFRAN) injection 4 mg  4 mg Intravenous Q6H PRN    sertraline (ZOLOFT) tablet 50 mg  50 mg Oral Daily     There are no discontinued medications  Hanny Singh MD      This progress note was produced in part using a dictation device which may document imprecise wording from author's original intent

## 2021-01-07 NOTE — PROGRESS NOTES
Physical Medicine and Rehabilitation Progress Note  Geri Jones 66 y o  male MRN: [id number]  Unit/Bed#: -01 Encounter: [id number]    HPI:   Geri Jones is a 66 y o  male who presented to the [address] [place] [address] ER on 12/27/20 with change in mental status, vomiting, cloudy urine, decreased urinary output, chills and rigors  Pt diagnosed with sepsis secondary to UTI  This was noted by RUTHANN, lactic acidosis, tachycardia and tachypnea  Pt with chronic cantor catheter and h/o recurrent UTIs  Urine culture positive for proteus mirabilis, enterococcus faecalis and alcaligenes faecalis  Pt received 1 dose cefrtiaxone and vancomycin in the ED and was then transitioned to pip/isidro then ampicillin on 12/30  Per ID consult he will continue on ampicillin through 1/11 and Flagyl 500 mg PO TID will be added  Repeat blood cultures on 12/28 were negative  Pt also with acute metabolic encephalopathy  He is currently A&Ox4  Pts hospitilization was also complicated by right hydroureteronephrosis with obstructing calculus for which he had a retrograde pyelogram with ureteral stenting on 12/30, thrombocytopenia, right lower lobe pneumonia, acute respiratory failure with hypoxia for which he required 4 LPM O2 and is now on RA, hypomagnesemia and RUTHANN  Nephrology, urology, and ID were consulted during his hospital course  Pt was recommended for post acute therapy services  Pt arrived to [address] [place] [address] A 1/5/21  Subjective:  Pt is motivated to get home  Pt's family has requested that pt comes home 1/12/21 after his IV antibiotics are done  Therapist feel this may be appropriate  Will discuss pt in team on Tuesday or Wednesday next week  ROS: A 10 point ROS was performed; negative except as noted above  Assessment/Plan:  Acute metabolic encephalopathy  - Due to sepsis/infections coupled with RUTHANN  -gram negative bacteremia proteus mirabilis and enterococcus faecalis  - Improving    - Cont   Clinical monitoring  -Acute chomprehensive interdisciplinary inpatient rehabilitation to include intensive skilled therapies as outlines with oversight and management by a rehabilitation Physician Assistant overseen by rehabilitation physician as well as inpatient rehabilitation nursing, case management and weekly interdisciplinary team meeting       Severe sepsis  - Resolved tachycardia and tachypnea with leukocytosis and lactic acidosis  - Monitor vital signs and maintain hemodynamics  - Procalcitonin improving; will monitor     Gram negative UTI  - with proteus mirabilis  - on IV Ampicillin since 12/30/30, may dc on 1/11/21  - Flagyl also started 500 mg po TID x3 days     Chronic indwelling cantor catheter  -Cantor changed in [facility]     Right hydroureternephrosis with obstructing calculus  -s/p retrograde pyelogram with ureteral stenting- 12/30/20  -PRN pain/emesis control     Acute respiratory failure with hypoxia  -on room air currently  -most likely due to pneumonia     RUTHANN- chronic kidney disease stage 3  -Baseline creatinine approx 1 2-1 3 mg/dL   -Currently at 1 54 from a 2 71 peak  - Will monitor creatinine  -Occurred due to hydroureteronephrosis  -Nephrology consulted to cont following  -Monitor urine output   -Limit/avoid nephrotoxins if possible     Chronic pain syndrome  - Continous opioid dependence  -cont home meds     Anemia of chronic disease  - Monitor H/H  - Cont ferrous sulfate supplementation     Mild episode of recurrent major depressive disorder  - Cont zoloft     Hypomagnesemia  -Monitor     Gram negative bacteremia  - Secondary to sepsis  -Received a dose of ceftriaxone and vanco in ED; transitioned to pip/isidro then ampicillian 12/30  -Cont ampicillian till 1/11/21  -Cont flagyl 500 mg po TID x3days   -Echo- pending  - Formal ID eval Monday; will consult  - Leukocytosis noted 1/5/21 11-12 k, pt is afebril, cont to trend     Pneumonia of right lower lobe  -on IV ampicillian and flagyl  -aspiration precautions   -maintain O2- encourage incentive spirometry     Thrombocytopenia  -Monitor platelet count  -Monitor for bleeding     Code  -Full code     DVT prophylaxis  -heparin  -SCD                   Scheduled Meds:  Current Facility-Administered Medications   Medication Dose Route Frequency Provider Last Rate    acetaminophen  650 mg Oral Q4H PRN Ebony Marking, PA-C      ampicillin  2,000 mg Intravenous Q8H Lizzette Olivas, PA-C 2,000 mg (01/07/21 0219)    ascorbic acid  500 mg Oral Daily Ebony Marking, PA-C      calcium carbonate  500 mg Oral BID Ebony Marking, PA-C      cholecalciferol  4,000 Units Oral Daily Ebony Marking, PA-C      cloNIDine  0 1 mg Oral Q3H PRN Ebony Marking, PA-C      diphenhydrAMINE  50 mg Oral HS PRN Ebony Marking, PA-C      docusate sodium  100 mg Oral BID Ebony Marking, PA-C      ferrous sulfate  325 mg Oral Daily With Breakfast Ebony Marking, PA-C      finasteride  5 mg Oral Daily Ebony Marking, PA-C      gabapentin  400 mg Oral HS Ebony Marking, PA-C      heparin (porcine)  5,000 Units Subcutaneous [facility] Ebony Marking, PA-C      morphine  15 mg Oral BID Ebony Marking, PA-C      ondansetron  4 mg Intravenous Q6H PRN Ebony Marking, PA-C      sertraline  50 mg Oral Daily Ebony Marking, PA-C         Objective:    Functional Update:  Mobility: moderate assistance  Transfers: Moderate assistance  ADLs: Minimal assistance      Physical Exam:  Temp:  [98 1 °F (36 7 °C)-99 4 °F (37 4 °C)] 99 4 °F (37 4 °C)  HR:  [58-66] 58  Resp:  [18] 18  BP: (129-157)/(67-72) 129/67  SpO2:  [92 %-97 %] 92 %    General: alert, no apparent distress, cooperative and comfortable  HEENT:  Head: Normocephalic, no lesions, without obvious abnormality    Eye: Normal external eye, conjunctiva, lidsc cornea  Ears: Normal external ears  Nose: Normal external nose, mucus membranes  CARDIAC:  regular rate and rhythm, S1, S2 normal, no murmur, click, rub or gallop  LUNGS:  no abnormal respiratory pattern, no retractions noted, non-labored breathing   ABDOMEN:  soft, non-tender, non-distended  EXTREMITIES:  extremities normal, warm and well-perfused; no cyanosis, clubbing, or edema  NEURO:  clear speech, following all commands, oriented x4  PSYCH:  Alert and oriented, appropriate affect  Diagnostic Studies: Labs reviewed  No orders to display       Laboratory: Labs reviewed  Results from last 7 days   Lab Units 01/06/21  0514 01/05/21  0540 01/04/21  0638   HEMOGLOBIN g/dL 8 4* 9 0* 8 5*   HEMATOCRIT % 25 3* 27 3* 25 7*   WBC Thousand/uL 12 70* 12 30* 13 10*     Results from last 7 days   Lab Units 01/07/21  0610 01/06/21  0514 01/05/21  0540   BUN mg/dL 27* 28* 26*   SODIUM mmol/L 140 140 139   POTASSIUM mmol/L 4 2 4 2 3 9   CHLORIDE mmol/L 103 105 104   CREATININE mg/dL 1 70* 1 78* 1 54*   AST U/L  --   --  16   ALT U/L  --   --  26            ** Please Note: Fluency Direct voice to text software may have been used in the creation of this document   **

## 2021-01-07 NOTE — NURSING NOTE
Patient resting comfortably in bed at this time  No signs of distress noted  Bed alarm in place to promote patient safety  Patient was able to stand pivot transfer with one assist and walker overnight  Patient refused SCDs overnight patient educated on risks of refusal   Call bell within reach  Will continue to monitor patient and follow plan of care

## 2021-01-07 NOTE — PROGRESS NOTES
01/07/21 0722   Charting Type   Charting Type Shift assessment   Neurological   Neuro (WDL) X   Level of Consciousness Alert/awake   Orientation Level Oriented X4   Cognition Appropriate judgement; Follows commands   Extraocular Movements Full   Speech Clear   RUE Motor Response Responds to commands   RUE Sensation Full sensation   RUE Muscle Strength 5- Normal strength   LUE Motor Response Responds to commands   LUE Sensation Full sensation   LUE Muscle Strength 5- Normal strength   RLE Motor Response Responds to commands   RLE Sensation Full sensation   RLE Muscle Strength 4- Movement against gravity and limited resistance   LLE Motor Response Responds to commands   LLE Sensation Full sensation   LLE Muscle Strength 4- Movement against gravity and limited resistance   Neuro Symptoms Forgetful   Relieved by Rest   Neuro Additional Assessments No   Delirium Assessment- CAM    Acute Onset and Fluctuating Course (1) No   Inattention (2) No   Disorganized Thinking (3) No   Rate Patient's Level of Consciousness (4) Alert (Normal), No   Delirium Present No   HEENT   HEENT (WDL) X   R Eye Mildly impaired vision   L Eye Mildly impaired vision   Vision - Corrective Lenses (at bedside) Glasses   R Ear Moderately impaired hearing   L Ear Moderately impaired hearing   Teeth Dentures upper;Dentures lower   Respiratory   Respiratory (WDL) X   Respiratory Pattern Normal   Chest Assessment Chest expansion symmetrical   Bilateral Breath Sounds Clear   Respiratory Additional Assessments Yes   Localized Breath Sounds   L Basilar Clear;Diminished   R Basilar Clear;Diminished   Respiratory Interventions   Respiratory Interventions Cough and deep breathe;Incentive spirometry   Cough and Deep Breathe   Cough and Deep Breathe Yes   Cardiac   Cardiac (WDL) X   Cardiac Regularity Regular   Heart Sounds No adventitious heart sounds   Jugular Venous Distention (JVD) No   Cardiac Symptoms None   Chest Pain Present No   Pacemaker/ICD No Pain Assessment   Pain Assessment Tool 0-10   Pain Score 3   Peripheral Vascular   Peripheral Vascular (WDL) X   Cyanosis None   RLE Edema Trace  (+1 pedal edema)   LLE Edema +1   PVS Additional Assessments No   RUE Neurovascular Assessment   R Radial Pulse +2   LUE Neurovascular Assessment   L Radial Pulse +2   RLE Neurovascular Assessment   R Pedal Pulse +1   LLE Neurovascular Assessment   L Pedal Pulse +1   Integumentary   Integumentary (WDL) X   Skin Color Appropriate for ethnicity   Skin Condition/Temp Warm;Dry   Skin Turgor Non-tenting   Integumentary Additional Assessments No   Tattoos/Piercings   Does patient have tattoos?  No   Piercings Remaining No   David Scale   Sensory Perceptions 4   Moisture 4   Activity 3   Mobility 3   Nutrition 3   Friction and Shear 2   David Scale Score 19   Musculoskeletal   Musculoskeletal (WDL) X   Level of Assistance Moderate assist, patient does 50-74%   Assistive Device Front wheel walker   RLE Limited movement   LLE Limited movement   Musculoskeletal Additional Assessments No   Gastrointestinal   Gastrointestinal (WDL) X   Abdomen Inspection Soft;Nondistended   Bowel Sounds (All Quadrants) Normoactive   Tenderness Soft;Nontender   Last BM Date 01/05/21   Passing Flatus Yes   GI Symptoms None   Gastrointestinal Additional Assessments No   Bowel Shift Assessment   Assistance Needed None   Bowel Incontinence No   Stool Assessment   Bowel Incontinence No   Genitourinary   Genitourinary (WDL) X   Genitourinary Symptoms Indwelling catheter   Bladder Shift Assessment   Bladder Device Diallo   Bladder Incontinence No   Bladder Management Maximal assistance   Bladder Management Deficit Emptied by staff   Urine Assessment   Urinary Incontinence No   Urine Color Yellow/straw   Urine Appearance Clear   Genitalia   Male Genitalia Intact   Genitourinary Additional Assessments   Genitourinary Additional Assessments No   Anal/Rectal   Anal/Rectal (WDL) WDL   Psychosocial   Psychosocial (WDL) X   Needs Expressed Physical   Ability to Express Feelings Able to express   Ability to Express Needs Able to express   Ability to Express Thoughts Able to express   Ability to Understand Others Usually understands   Scott Suicide Severity Rating Scale   1  Wish to be Dead No   Cough   Cough None     Rates chronic low back pain 3 out of 1-10 pain scale  Carlos Muñoz Routine scheduled pain med given for same and effectual per pt  Chronic cantor cath maintained and draining qs   Contact precautions maintained for + ESBL  Supervision with transfers  Tolerating therapy  Continue same plan of care

## 2021-01-07 NOTE — PROGRESS NOTES
01/07/21 0854   Pain Assessment   Pain Assessment Tool Pain Assessment not indicated - pt denies pain   Restrictions/Precautions   Precautions Fall Risk; Diallo; Hard of hearing;Contact/isolation;Limb alert  (LUE IV)   Weight Bearing Restrictions No   ROM Restrictions No   Cognition   Overall Cognitive Status WFL   Orientation Level Oriented X4   Subjective   Subjective "I'm ready when you are "   Sit to Stand   Type of Assistance Needed Incidental touching;Verbal cues   Amount of Physical Assistance Provided Less than 25%   Comment Min A first trial; CGA all other trials   Sit to Stand CARE Score 3   Bed-Chair Transfer   Type of Assistance Needed Incidental touching;Verbal cues   Amount of Physical Assistance Provided Less than 25%   Comment CGA   Chair/Bed-to-Chair Transfer CARE Score 3   Transfer Bed/Chair/Wheelchair   Limitations Noted In Balance; Endurance;LE Strength   Adaptive Equipment Roller Walker   Stand Pivot Contact Guard   Sit to [employer]  (Min A first trial; CGA all other trials)   Stand to [facility]   Walk 10 Feet   Type of Assistance Needed Incidental touching;Verbal cues   Amount of Physical Assistance Provided Less than 25%   Comment CGA   Walk 10 Feet CARE Score 3   Walk 50 Feet with Two Turns   Type of Assistance Needed Incidental touching;Verbal cues   Amount of Physical Assistance Provided Less than 25%   Comment CGA   Walk 50 Feet with Two Turns CARE Score 3   Walk 150 Feet   Type of Assistance Needed Incidental touching;Verbal cues   Amount of Physical Assistance Provided Less than 25%   Comment CGA   Walk 150 Feet CARE Score 3   Walking 10 Feet on Uneven Surfaces   Type of Assistance Needed Incidental touching;Verbal cues   Amount of Physical Assistance Provided Less than 25%   Comment CGA   Walking 10 Feet on Uneven Surfaces CARE Score 3   Ambulation   Does the patient walk? 2   Yes   Primary Mode of Locomotion Prior to Admission Walk   Distance Walked (feet) 327 ft  (219', 327', 87')   Assist Device Roller Walker   Gait Pattern Inconsistant [place]; Slow [place];Decreased foot clearance; Forward Flexion;Narrow KAREEM;Step through   Limitations Noted In Balance; Endurance; Heel Strike; Safety;Strength;Speed   Provided Assistance with: Balance   Walk Assist Level Contact Guard; Chair Follow   Findings level & uneven   Curb or Single Stair   Style negotiated Single stair   Type of Assistance Needed Incidental touching;Verbal cues   Amount of Physical Assistance Provided Less than 25%   Comment CGA   1 Step (Curb) CARE Score 3   4 Steps   Type of Assistance Needed Incidental touching;Verbal cues   Amount of Physical Assistance Provided Less than 25%   Comment CGA   4 Steps CARE Score 3   12 Steps   Type of Assistance Needed Incidental touching;Verbal cues   Amount of Physical Assistance Provided Less than 25%   Comment CGA   12 Steps CARE Score 3   Stairs   Type Stairs; Ramp   # of Steps 14   Assist Devices Bilateral Rail;Roller Walker   Findings CGA stairs and ramp   Therapeutic Interventions   Strengthening seated and standing BLE therex x30; rest breaks taken as needed   Balance transfers, ambulation, stair/ramp negotiation   Assessment   Treatment Assessment Pt tolerated PT session well this morning  Pt received sitting in w/c following breakfast  Pt performed STS with Min A and RW prior to ambulating 219' with RW and CGA to PT room  Pt required CGA for all other STS transfers throughout PT session  Pt rested and performed seated BLE therex to improve strength & ROM  Pt then performed stair negotiation of 14 steps with B hand rails and CGA and then ramp negotiation with RW and CGA to improve functional mobility with no rest break taken between  Pt rested and performed ambulation of 327' with RW and CGA over level and uneven surfaces to improve functional mobility  Pt returned to PT room to perform standing BLE therex to improve balance and strength   Pt ambulated 80' back to room with RW and CGA  Pt presents with decreased balance, endurance, strength, ROM, and safety awareness  Pt would continue to benefit from skilled [facility] PT to address these deficits prior to safe d/c home  Problem List Decreased strength;Decreased range of motion;Decreased endurance; Impaired balance;Decreased safety awareness   Barriers to Discharge Inaccessible home environment   PT Barriers   Physical Impairment Decreased strength;Decreased range of motion;Decreased endurance; Impaired balance;Decreased safety awareness   Functional Limitation Standing;Transfers; Walking;Stair negotiation;Ramp negotiation;Car transfers   Plan   Treatment/Interventions ADL retraining;Functional transfer training;LE strengthening/ROM; Elevations; Therapeutic exercise; Endurance training;Patient/family training;Equipment eval/education;Gait training; Compensatory technique education   Progress Progressing toward goals   PT Therapy Minutes   PT Time In 0854   PT Time Out 1030   PT Total Time (minutes) 96   PT Mode of treatment - Individual (minutes) 96   PT Mode of treatment - Concurrent (minutes) 0   PT Mode of treatment - Group (minutes) 0   PT Mode of treatment - Co-treat (minutes) 0   PT Mode of Treatment - Total time(minutes) 96 minutes   PT Cumulative Minutes 192   Therapy Time missed   Time missed?  No

## 2021-01-08 LAB
ANION GAP SERPL CALCULATED.3IONS-SCNC: 5 MMOL/L (ref 4–13)
BUN SERPL-MCNC: 26 MG/DL (ref 7–25)
CALCIUM SERPL-MCNC: 8.4 MG/DL (ref 8.6–10.5)
CHLORIDE SERPL-SCNC: 106 MMOL/L (ref 98–107)
CO2 SERPL-SCNC: 29 MMOL/L (ref 21–31)
CREAT SERPL-MCNC: 1.84 MG/DL (ref 0.7–1.3)
GFR SERPL CREATININE-BSD FRML MDRD: 35 ML/MIN/1.73SQ M
GLUCOSE SERPL-MCNC: 90 MG/DL (ref 65–99)
POTASSIUM SERPL-SCNC: 3.9 MMOL/L (ref 3.5–5.5)
SODIUM SERPL-SCNC: 140 MMOL/L (ref 134–143)

## 2021-01-08 PROCEDURE — 97530 THERAPEUTIC ACTIVITIES: CPT

## 2021-01-08 PROCEDURE — 99231 SBSQ HOSP IP/OBS SF/LOW 25: CPT | Performed by: PHYSICAL MEDICINE & REHABILITATION

## 2021-01-08 PROCEDURE — 97110 THERAPEUTIC EXERCISES: CPT

## 2021-01-08 PROCEDURE — 97537 COMMUNITY/WORK REINTEGRATION: CPT

## 2021-01-08 PROCEDURE — 80048 BASIC METABOLIC PNL TOTAL CA: CPT | Performed by: INTERNAL MEDICINE

## 2021-01-08 PROCEDURE — 97116 GAIT TRAINING THERAPY: CPT

## 2021-01-08 PROCEDURE — 99232 SBSQ HOSP IP/OBS MODERATE 35: CPT | Performed by: NURSE PRACTITIONER

## 2021-01-08 PROCEDURE — 97535 SELF CARE MNGMENT TRAINING: CPT

## 2021-01-08 RX ADMIN — FINASTERIDE 5 MG: 5 TABLET, FILM COATED ORAL at 09:06

## 2021-01-08 RX ADMIN — CALCIUM CARBONATE (ANTACID) CHEW TAB 500 MG 500 MG: 500 CHEW TAB at 21:00

## 2021-01-08 RX ADMIN — FERROUS SULFATE TAB 325 MG (65 MG ELEMENTAL FE) 325 MG: 325 (65 FE) TAB at 07:18

## 2021-01-08 RX ADMIN — HEPARIN SODIUM 5000 UNITS: 5000 INJECTION INTRAVENOUS; SUBCUTANEOUS at 05:25

## 2021-01-08 RX ADMIN — Medication 4000 UNITS: at 09:06

## 2021-01-08 RX ADMIN — AMPICILLIN SODIUM 2000 MG: 2 INJECTION, POWDER, FOR SOLUTION INTRAMUSCULAR; INTRAVENOUS at 17:51

## 2021-01-08 RX ADMIN — SERTRALINE HYDROCHLORIDE 50 MG: 50 TABLET ORAL at 09:10

## 2021-01-08 RX ADMIN — OXYCODONE HYDROCHLORIDE AND ACETAMINOPHEN 500 MG: 500 TABLET ORAL at 09:06

## 2021-01-08 RX ADMIN — HEPARIN SODIUM 5000 UNITS: 5000 INJECTION INTRAVENOUS; SUBCUTANEOUS at 21:00

## 2021-01-08 RX ADMIN — HEPARIN SODIUM 5000 UNITS: 5000 INJECTION INTRAVENOUS; SUBCUTANEOUS at 14:06

## 2021-01-08 RX ADMIN — DOCUSATE SODIUM 100 MG: 100 CAPSULE, LIQUID FILLED ORAL at 09:06

## 2021-01-08 RX ADMIN — MORPHINE SULFATE 15 MG: 15 TABLET ORAL at 17:51

## 2021-01-08 RX ADMIN — AMPICILLIN SODIUM 2000 MG: 2 INJECTION, POWDER, FOR SOLUTION INTRAMUSCULAR; INTRAVENOUS at 10:09

## 2021-01-08 RX ADMIN — CALCIUM CARBONATE (ANTACID) CHEW TAB 500 MG 500 MG: 500 CHEW TAB at 09:06

## 2021-01-08 RX ADMIN — MORPHINE SULFATE 15 MG: 15 TABLET ORAL at 09:06

## 2021-01-08 RX ADMIN — AMPICILLIN SODIUM 2000 MG: 2 INJECTION, POWDER, FOR SOLUTION INTRAMUSCULAR; INTRAVENOUS at 02:03

## 2021-01-08 RX ADMIN — DOCUSATE SODIUM 100 MG: 100 CAPSULE, LIQUID FILLED ORAL at 17:51

## 2021-01-08 RX ADMIN — GABAPENTIN 400 MG: 400 CAPSULE ORAL at 21:00

## 2021-01-08 NOTE — PLAN OF CARE
Problem: DISCHARGE PLANNING - CARE MANAGEMENT  Goal: Discharge to post-acute care or home with appropriate resources  Description: INTERVENTIONS:  - Conduct assessment to determine patient/family and health care team treatment goals, and need for post-acute services based on payer coverage, community resources, and patient preferences, and barriers to discharge  - Address psychosocial, clinical, and financial barriers to discharge as identified in assessment in conjunction with the patient/family and health care team  - Arrange appropriate level of post-acute services according to patients   needs and preference and payer coverage in collaboration with the physician and health care team  - Communicate with and update the patient/family, physician, and health care team regarding progress on the discharge plan  - Arrange appropriate transportation to post-acute venues  Outcome: Progressing     Problem: PAIN - ADULT  Goal: Verbalizes/displays adequate comfort level or baseline comfort level  Description: Interventions:  - Encourage patient to monitor pain and request assistance  - Assess pain using appropriate pain scale  - Administer analgesics based on type and severity of pain and evaluate response  - Implement non-pharmacological measures as appropriate and evaluate response  - Consider cultural and social influences on pain and pain management  - Notify physician/advanced practitioner if interventions unsuccessful or patient reports new pain  Outcome: Progressing     Problem: INFECTION - ADULT  Goal: Absence or prevention of progression during hospitalization  Description: INTERVENTIONS:  - Assess and monitor for signs and symptoms of infection  - Monitor lab/diagnostic results  - Monitor all insertion sites, i e  indwelling lines, tubes, and drains  - Monitor endotracheal if appropriate and nasal secretions for changes in amount and color  - [place] appropriate cooling/warming therapies per order  - Administer medications as ordered  - Instruct and encourage patient and family to use good hand hygiene technique  - Identify and instruct in appropriate isolation precautions for identified infection/condition  Outcome: Progressing  Goal: Absence of fever/infection during neutropenic period  Description: INTERVENTIONS:  - Monitor WBC    Outcome: Progressing     Problem: SAFETY ADULT  Goal: Patient will remain free of falls  Description: INTERVENTIONS:  - Assess patient frequently for physical needs  -  Identify cognitive and physical deficits and behaviors that affect risk of falls    -  [place] fall precautions as indicated by assessment   - Educate patient/family on patient safety including physical limitations  - Instruct patient to call for assistance with activity based on assessment  - Modify environment to reduce risk of injury  - Consider OT/PT consult to assist with strengthening/mobility  Outcome: Progressing  Goal: Maintain or return to baseline ADL function  Description: INTERVENTIONS:  -  Assess patient's ability to carry out ADLs; assess patient's baseline for ADL function and identify physical deficits which impact ability to perform ADLs (bathing, care of mouth/teeth, toileting, grooming, dressing, etc )  - Assess/evaluate cause of self-care deficits   - Assess range of motion  - Assess patient's mobility; develop plan if impaired  - Assess patient's need for assistive devices and provide as appropriate  - Encourage maximum independence but intervene and supervise when necessary  - Involve family in performance of ADLs  - Assess for home care needs following discharge   - Consider OT consult to assist with ADL evaluation and planning for discharge  - Provide patient education as appropriate  Outcome: Progressing  Goal: Maintain or return mobility status to optimal level  Description: INTERVENTIONS:  - Assess patient's baseline mobility status (ambulation, transfers, stairs, etc )    - Identify cognitive and physical deficits and behaviors that affect mobility  - Identify mobility aids required to assist with transfers and/or ambulation (gait belt, sit-to-stand, lift, walker, cane, etc )  - [place] fall precautions as indicated by assessment  - Record patient progress and toleration of activity level on Mobility SBAR; progress patient to next Phase/Stage  - Instruct patient to call for assistance with activity based on assessment  - Consider rehabilitation consult to assist with strengthening/weightbearing, etc   Outcome: Progressing     Problem: DISCHARGE PLANNING  Goal: Discharge to home or other facility with appropriate resources  Description: INTERVENTIONS:  - Identify barriers to discharge w/patient and caregiver  - Arrange for needed discharge resources and transportation as appropriate  - Identify discharge learning needs (meds, wound care, etc )  - Arrange for interpretive services to assist at discharge as needed  - Refer to Case Management Department for coordinating discharge planning if the patient needs post-hospital services based on physician/advanced practitioner order or complex needs related to functional status, cognitive ability, or social support system  Outcome: Progressing     Problem: Knowledge Deficit  Goal: Patient/family/caregiver demonstrates understanding of disease process, treatment plan, medications, and discharge instructions  Description: Complete learning assessment and assess knowledge base  Interventions:  - Provide teaching at level of understanding  - Provide teaching via preferred learning methods  Outcome: Progressing     Problem: Potential for Falls  Goal: Patient will remain free of falls  Description: INTERVENTIONS:  - Assess patient frequently for physical needs  -  Identify cognitive and physical deficits and behaviors that affect risk of falls    -  [place] fall precautions as indicated by assessment   - Educate patient/family on patient safety including physical limitations  - Instruct patient to call for assistance with activity based on assessment  - Modify environment to reduce risk of injury  - Consider OT/PT consult to assist with strengthening/mobility  Outcome: Progressing     Problem: PAIN - ADULT  Goal: Verbalizes/displays adequate comfort level or baseline comfort level  Description: Interventions:  - Encourage patient to monitor pain and request assistance  - Assess pain using appropriate pain scale  - Administer analgesics based on type and severity of pain and evaluate response  - Implement non-pharmacological measures as appropriate and evaluate response  - Consider cultural and social influences on pain and pain management  - Notify physician/advanced practitioner if interventions unsuccessful or patient reports new pain  Outcome: Progressing     Problem: INFECTION - ADULT  Goal: Absence or prevention of progression during hospitalization  Description: INTERVENTIONS:  - Assess and monitor for signs and symptoms of infection  - Monitor lab/diagnostic results  - Monitor all insertion sites, i e  indwelling lines, tubes, and drains  - Monitor endotracheal if appropriate and nasal secretions for changes in amount and color  - [place] appropriate cooling/warming therapies per order  - Administer medications as ordered  - Instruct and encourage patient and family to use good hand hygiene technique  - Identify and instruct in appropriate isolation precautions for identified infection/condition  Outcome: Progressing  Goal: Absence of fever/infection during neutropenic period  Description: INTERVENTIONS:  - Monitor WBC    Outcome: Progressing     Problem: SAFETY ADULT  Goal: Patient will remain free of falls  Description: INTERVENTIONS:  - Assess patient frequently for physical needs  -  Identify cognitive and physical deficits and behaviors that affect risk of falls    -  [place] fall precautions as indicated by assessment   - Educate patient/family on patient safety including physical limitations  - Instruct patient to call for assistance with activity based on assessment  - Modify environment to reduce risk of injury  - Consider OT/PT consult to assist with strengthening/mobility  Outcome: Progressing  Goal: Maintain or return to baseline ADL function  Description: INTERVENTIONS:  -  Assess patient's ability to carry out ADLs; assess patient's baseline for ADL function and identify physical deficits which impact ability to perform ADLs (bathing, care of mouth/teeth, toileting, grooming, dressing, etc )  - Assess/evaluate cause of self-care deficits   - Assess range of motion  - Assess patient's mobility; develop plan if impaired  - Assess patient's need for assistive devices and provide as appropriate  - Encourage maximum independence but intervene and supervise when necessary  - Involve family in performance of ADLs  - Assess for home care needs following discharge   - Consider OT consult to assist with ADL evaluation and planning for discharge  - Provide patient education as appropriate  Outcome: Progressing  Goal: Maintain or return mobility status to optimal level  Description: INTERVENTIONS:  - Assess patient's baseline mobility status (ambulation, transfers, stairs, etc )    - Identify cognitive and physical deficits and behaviors that affect mobility  - Identify mobility aids required to assist with transfers and/or ambulation (gait belt, sit-to-stand, lift, walker, cane, etc )  - [place] fall precautions as indicated by assessment  - Record patient progress and toleration of activity level on Mobility SBAR; progress patient to next Phase/Stage  - Instruct patient to call for assistance with activity based on assessment  - Consider rehabilitation consult to assist with strengthening/weightbearing, etc   Outcome: Progressing     Problem: DISCHARGE PLANNING  Goal: Discharge to home or other facility with appropriate resources  Description: INTERVENTIONS:  - Identify barriers to discharge w/patient and caregiver  - Arrange for needed discharge resources and transportation as appropriate  - Identify discharge learning needs (meds, wound care, etc )  - Arrange for interpretive services to assist at discharge as needed  - Refer to Case Management Department for coordinating discharge planning if the patient needs post-hospital services based on physician/advanced practitioner order or complex needs related to functional status, cognitive ability, or social support system  Outcome: Progressing     Problem: Prexisting or High Potential for Compromised Skin Integrity  Goal: Skin integrity is maintained or improved  Description: INTERVENTIONS:  - Identify patients at risk for skin breakdown  - Assess and monitor skin integrity  - Assess and monitor nutrition and hydration status  - Monitor labs   - Assess for incontinence   - Turn and reposition patient  - Assist with mobility/ambulation  - Relieve pressure over bony prominences  - Avoid friction and shearing  - Provide appropriate hygiene as needed including keeping skin clean and dry  - Evaluate need for skin moisturizer/barrier cream  - Collaborate with interdisciplinary team   - Patient/family teaching  - Consider wound care consult   Outcome: Progressing

## 2021-01-08 NOTE — NURSING NOTE
No c/o pain or distress  Pt participated pleasantly with PT/OT  Diallo care complete, stat lock intact  IV flushed, dressing CDI  New order for daily wrights  Pt currently resting in bed  Call bell in [facility]  Will continue to monitor and follow plan of care

## 2021-01-08 NOTE — PROGRESS NOTES
NEPHROLOGY PROGRESS NOTE   Yusef Hawkins 66 y o  male MRN: [id number]  Unit/Bed#: -01 Encounter: [id number]    Assessment/Plan:    65 yo male status post acute care stay from 12/27-01/05/2021 for urosepsis, obstructive uropathy, acute kidney injury due to UPJ obstruction associated mild-to-moderate hydronephrosis status post cystoscopy and right ureteral stent placement on 12/29  Now residing in inpatient rehabilitation unit on the 2nd floor  Renal following along for ATN acute kidney injury  1  ATN Acute kidney injury (POA) atop chronic kidney disease   · Repeat urine studies indicate ATN  GFR did decline a bit from 38 mils per minute to 35 mils per minute today  Weights are stable but they are bed weights  Indwelling urinary catheter is patent and draining clear yellow urine  He is normotensive on review of chart  · Continue daily weights, continue maximize hemodynamics and avoid nephrotoxins  Will await renal recovery  2  Stage 3 chronic kidney disease with baseline creatinine around 1 2-1 3 mg/dL  3  Proteinuria  · Will rule out monoclonal gammopathy in the outpatient setting  A/are precluded right now due to worsening GFR  4  Hypocalcemia  · Continue vitamin-D replacement and oral calcium supplementation  Avoid giving oral calcium supplementation with meals as this will bind with the phosphorus  Check ical due to low albumin stores  5  Lower extremity edema  · Left greater than right  Patient states left leg is always more swollen right leg due to old injury  Will monitor closely  Continue low-sodium diet and daily weights  6  Severe sepsis (resolved), Enterococcus and Proteus bacteremia, complicated UTI  · Antibiotic therapy per Infectious Disease  7  Right ureteropelvic junction obstruction status post right ureteral stent placement on 12/29/2020      ROS  No physical complaints on exam   A complete 10 point review of systems have been performed and are otherwise negative  Historical Information   Past Medical History:   Diagnosis Date    Depression     Diallo catheter in place     Hypertension     Prostate enlargement     Psychiatric disorder     Urinary tract infection      Past Surgical History:   Procedure Laterality Date    ARTHROSCOPY KNEE      BACK SURGERY      L 4 or 5    CATARACT EXTRACTION Bilateral     CYSTOSCOPY W/ LASER LITHOTRIPSY N/A 8/20/2018    Procedure: LITHOTRISPY HOLMIUM LASER of bladder stones;  Surgeon: Annabel Zarate MD;  Location: [address] [place] [address] OR;  Service: Urology    HI CYSTOURETHROSCOPY,URETER CATHETER Right 12/29/2020    Procedure: CYSTOSCOPY RETROGRADE PYELOGRAM WITH INSERTION STENT URETERAL;  Surgeon: Annabel Zarate MD;  Location: [address] [place] [address] OR;  Service: Urology    HI TRANSURETHRAL ELEC-SURG PROSTATECTOM N/A 8/20/2018    Procedure: Marita Money; TURP;  Surgeon: Annabel Zarate MD;  Location: [address] [place] [address] OR;  Service: Urology    SHOULDER SURGERY Right 05/31/2019     Social History   Social History     Substance and Sexual Activity   Alcohol Use Never    Frequency: Monthly or less    Drinks per session: 1 or 2    Binge frequency: Less than monthly     Social History     Substance and Sexual Activity   Drug Use Never     Social History     Tobacco Use   Smoking Status Former Smoker    Packs/day: 1 00   Smokeless Tobacco Never Used   Tobacco Comment    quit 50 years ago       Family History:   Family History   Problem Relation Age of Onset    Cancer Mother     Diabetes Father        Medications:  Pertinent medications were reviewed  Current Facility-Administered Medications   Medication Dose Route Frequency Provider Last Rate    acetaminophen  650 mg Oral Q4H PRN Homa Gloria PA-C      ampicillin  2,000 mg Intravenous Q8H Lizzette Olivas PA-C 2,000 mg (01/08/21 1009)    ascorbic acid  500 mg Oral Daily Homa Gloria PA-C      calcium carbonate  500 mg Oral BID Homa Gloria PA-C      cholecalciferol  4,000 Units Oral Daily Chana Pollock Brendon, PA-C      cloNIDine  0 1 mg Oral Q3H PRN [employer], PA-C      diphenhydrAMINE  50 mg Oral HS PRN [employer], PA-C      docusate sodium  100 mg Oral BID [employer], PA-C      ferrous sulfate  325 mg Oral Daily With Breakfast [employer], PA-C      finasteride  5 mg Oral Daily [employer], PA-C      gabapentin  400 mg Oral HS [employer], PA-C      heparin (porcine)  5,000 Units Subcutaneous [facility] [employer], PA-C      morphine  15 mg Oral BID [employer], PA-C      ondansetron  4 mg Intravenous Q6H PRN [employer], PA-C      sertraline  50 mg Oral Daily [employer], PA-C           No Known Allergies      Vitals:   /73 (BP Location: Left arm)   Pulse 67   Temp 98 6 °F (37 °C) (Temporal)   Resp 18   Ht 5' 9" (1 753 m)   Wt 84 5 kg (186 lb 4 8 oz)   SpO2 96%   BMI 27 51 kg/m²   Body mass index is 27 51 kg/m²  SpO2: 96 %,   SpO2 Activity: At Rest,   O2 Device: None (Room air)      Intake/Output Summary (Last 24 hours) at 1/8/2021 1105  Last data filed at 1/8/2021 0541  Gross per 24 hour   Intake 652 ml   Output 3900 ml   Net -3248 ml     Invasive Devices     Peripheral Intravenous Line            Peripheral IV 01/06/21 Right Forearm 1 day          Drain            Ureteral Drain/Stent Right ureter 6 Fr  9 days    Urethral Catheter Latex 18 Fr  9 days                Physical Exam  General: conscious, cooperative, in no acute distress  Eyes: conjunctivae pink, anicteric sclerae  ENT: lips and mucous membranes moist  Neck: supple, no JVD, no masses  Chest:  Diminished breath sounds bilateral, no crackles, ronchus or wheezings  CVS: S1 & S2, normal rate, regular rhythm  Abdomen: soft, non-tender, non-distended, normoactive bowel sounds  Extremities:  Mild left leg edema, trace right leg edema  Skin: no rash  Neuro: awake, alert, oriented      Diagnostic Data:  Lab: I have personally reviewed pertinent lab results  ,   CBC:  Results from last 7 days   Lab Units 01/06/21  0514   WBC Thousand/uL 12 70*   HEMOGLOBIN g/dL 8 4*   HEMATOCRIT % 25 3*   PLATELETS Thousands/uL 361      CMP:   Lab Results   Component Value Date    SODIUM 140 01/08/2021    K 3 9 01/08/2021     01/08/2021    CO2 29 01/08/2021    BUN 26 (H) 01/08/2021    CREATININE 1 84 (H) 01/08/2021    CALCIUM 8 4 (L) 01/08/2021    EGFR 35 01/08/2021   ,   PT/INR: No results found for: PT, INR,   Magnesium: No components found for: MAG,  Phosphorous: No results found for: PHOS    Microbiology:  @LABRCNTIP,(urinecx:7)@        UBALDO Parada    Portions of the record may have been created with voice recognition software  Occasional wrong word or "sound a like" substitutions may have occurred due to the inherent limitations of voice recognition software  Read the chart carefully and recognize, using context, where substitutions have occurred

## 2021-01-08 NOTE — PROGRESS NOTES
01/08/21 0857   Pain Assessment   Pain Assessment Tool Pain Assessment not indicated - pt denies pain   Restrictions/Precautions   Precautions Cognitive; Diallo; Fall Risk;Limb alert  (IV RUE)   Eating   Type of Assistance Needed Set-up / clean-up   Eating CARE Score 5   Eating Assessment   Food To Mouth Yes   Positioning Upright;Out of Bed   Meal Assessed Breakfast   Opens Packages Yes   Findings pt reports having oatmeal and a banana at beginning of session however CNA brings breakfast tray to room stating pt did not have breakfast earlier   Upper Body Dressing   Type of Assistance Needed Set-up / clean-up   Upper Body Dressing CARE Score 5   Dressing/Undressing Clothing   Don UB Clothes Button Shirt   Limitations Noted In Balance; Endurance; Safety   Positioning Supported Sit   Findings pt prefers to sit due to decreased balance   Sit to Lying   Type of Assistance Needed Supervision   Sit to Lying CARE Score 4   Sit to Stand   Type of Assistance Needed Physical assistance   Amount of Physical Assistance Provided Less than 25%   Comment lower surfaces   Sit to Stand CARE Score 3   Bed-Chair Transfer   Type of Assistance Needed Incidental touching   Chair/Bed-to-Chair Transfer CARE Score 4   Toilet Transfer   Type of Assistance Needed Physical assistance   Amount of Physical Assistance Provided Less than 25%   Comment min A lower toilet with grab bars,    Toilet Transfer CARE Score 3   Toilet Transfer   Surface Assessed Standard Toilet  (raised toilet placed to increase ability to rise without A)   Transfer Technique Stand Pivot   Limitations Noted In Balance; Endurance;LE Strength   Adaptive Equipment Grab Bar;Walker   Findings pt S for sit to stand transfers to and from toilet with riser increasing ease   Exercise Tools   Hand Gripper gripper with pegs B hands   Other Exercise Tool 1 fxl reacher use for retrieval of the pegs from the floor with R hand   Cognition   Overall Cognitive Status Impaired Arousal/Participation Alert; Responsive; Cooperative   Attention Attends with cues to redirect   Orientation Level Oriented X4   Memory Decreased recall of precautions   Following Commands Follows one step commands without difficulty   Comments forgetful   Additional Activities   Additional Activities Other (Comment)   Additional Activities Comments fxl mobility to and form the BR S/ CGA with RW   Assessment   Treatment Assessment Pt participates in toilet transfers/ fxl mobility, meal completion and BUE therex during session  Pt tolerates session without complaints excepts states he is fatigued with pt returned to bed to rest after session  Pt requires reminders due to forgetfulness and assist due to decreased strength, endurance, balance and safety  Pt will benefit from continued skilled OT services to increase independence with daily tasks  Problem List Impaired balance;Decreased endurance;Decreased cognition;Decreased safety awareness;Decreased strength;Decreased range of motion   Plan   Treatment/Interventions ADL retraining;Functional transfer training; Therapeutic exercise; Endurance training;Cognitive reorientation;Patient/family training;Equipment eval/education; Compensatory technique education   Progress Progressing toward goals   OT Therapy Minutes   OT Time In 6151   OT Time Out 0958   OT Total Time (minutes) 61   OT Mode of treatment - Individual (minutes) 61   Therapy Time missed   Time missed? No   Addendum: Add contact prec to precaution list stated above

## 2021-01-08 NOTE — PROGRESS NOTES
PHYSICAL MEDICINE AND REHABILITATION H&P/ADMISSION NOTE  Chen Perez 66 y o  male MRN: [id number]  Unit/Bed#: -01 Encounter: [id number]     Rehab Diagnosis: Impairment of mobility, safety and Activities of Daily Living (ADLs) due to Debility:  16  Debility (Non-cardiac/Non-pulmonary)    History of Present Illness:   Chen Perez is a 66 y o  male who presented to the [address] [place] [address] ER on 12/27/20 with change in mental status, vomiting, cloudy urine, decreased urinary output, chills and rigors  Pt diagnosed with sepsis secondary to UTI  This was noted by RUTHANN, lactic acidosis, tachycardia and tachypnea  Pt with chronic cantor catheter and h/o recurrent UTIs  Urine culture positive for proteus mirabilis, enterococcus faecalis and alcaligenes faecalis  Pt received 1 dose cefrtiaxone and vancomycin in the ED and was then transitioned to pip/isidro then ampicillin on 12/30  Per ID consult he will continue on ampicillin through 1/11 and Flagyl 500 mg PO TID will be added  Repeat blood cultures on 12/28 were negative  Pt also with acute metabolic encephalopathy  He is currently A&Ox4  Pts hospitilization was also complicated by right hydroureteronephrosis with obstructing calculus for which he had a retrograde pyelogram with ureteral stenting on 12/30, thrombocytopenia, right lower lobe pneumonia, acute respiratory failure with hypoxia for which he required 4 LPM O2 and is now on RA, hypomagnesemia and RUTHANN  Nephrology, urology, and ID were consulted during his hospital course  Pt was recommended for post acute therapy services  Pt arrived to [address] [place] [address] A 1/5/21  Subjective: Pt is doing well today  He is motivated to get home  His family has requested for him to come home after his IV antibiotics are done, which is 1/11/21  Will discuss pt in team on Tuesday  Pt has no complaints or questions at this time       Review of Systems:   Constitutional: Negative  HENT: Negative  CV: Negative  Resp: Negative  GI: Negative  Urinary: Positive for ongoing cantor catheter  Endocrine: Negative  Hematologic: Negative  Neuro: Negative  Psych: Negative    Plan:  Acute metabolic encephalopathy  - Due to sepsis/infections coupled with RUTHANN  -gram negative bacteremia proteus mirabilis and enterococcus faecalis  - Improving    - Cont   Clinical monitoring    Severe sepsis  - Resolved tachycardia and tachypnea with leukocytosis and lactic acidosis  - Monitor vital signs and maintain hemodynamics  - Procalcitonin improving; will monitor    Gram negative UTI  - with proteus mirabilis  - on IV Ampicillin since 12/30/30, may dc on 1/11/21  - Flagyl also started 500 mg po TID x3 days    Chronic indwelling cantor catheter  -Cantor changed in [facility]    Right hydroureternephrosis with obstructing calculus  -s/p retrograde pyelogram with ureteral stenting- 12/30/20  -PRN pain/emesis control    Acute respiratory failure with hypoxia  -on room air currently  -most likely due to pneumonia    RUTHANN- chronic kidney disease stage 3  -Baseline creatinine approx 1 2-1 3 mg/dL   -Currently at 1 54 from a 2 71 peak  - Will monitor creatinine  -Occurred due to hydroureteronephrosis  -Nephrology consulted to cont following  -Monitor urine output   -Limit/avoid nephrotoxins if possible    Chronic pain syndrome  - Continous opioid dependence  -cont home meds    Anemia of chronic disease  - Monitor H/H  - Cont ferrous sulfate supplementation    Mild episode of recurrent major depressive disorder  - Cont zoloft    Hypomagnesemia  -Monitor    Gram negative bacteremia  - Secondary to sepsis  -Received a dose of ceftriaxone and vanco in ED; transitioned to pip/isidro then ampicillian 12/30  -Cont ampicillian till 1/11/21  -Cont flagyl 500 mg po TID x3days   -Echo- pending  - Formal ID eval Monday; will consult  - Leukocytosis noted 1/5/21 11-12 k, pt is afebril, cont to trend     Pneumonia of right lower lobe  -on IV ampicillian and flagyl  -aspiration precautions   -maintain O2- encourage incentive spirometry    Thrombocytopenia  -Monitor platelet count  -Monitor for bleeding    Code  -Full code    DVT prophylaxis  -heparin  -SCD        Scheduled Meds:  Current Facility-Administered Medications   Medication Dose Route Frequency Provider Last Rate    acetaminophen  650 mg Oral Q4H PRN Augustus [place], PA-C      ampicillin  2,000 mg Intravenous Q8H Lizzettesofia Olivas, PA-C 2,000 mg (01/08/21 1009)    ascorbic acid  500 mg Oral Daily Augustus [place], PA-C      calcium carbonate  500 mg Oral BID Augustus [place], PA-C      cholecalciferol  4,000 Units Oral Daily Augustus Early[place], PA-C      cloNIDine  0 1 mg Oral Q3H PRN Augustus Earlys[place], PA-C      diphenhydrAMINE  50 mg Oral HS PRN Augusuts Earlysville, PA-C      docusate sodium  100 mg Oral BID Augustus Early[place], PA-C      ferrous sulfate  325 mg Oral Daily With Breakfast Augustus Early[place], PA-C      finasteride  5 mg Oral Daily Augustus Earlys[place], PA-C      gabapentin  400 mg Oral HS Augustus Early[place], PA-C      heparin (porcine)  5,000 Units Subcutaneous [facility] Augustus Early[place], PA-C      morphine  15 mg Oral BID Augustus Earlysville, PA-C      ondansetron  4 mg Intravenous Q6H PRN Augustus Early[place], PA-C      sertraline  50 mg Oral Daily Augustus Early[place], PA-C         Restrictions include:  Weight bearing as tolerated Fall precautions      Functional History - Prior to Admission:      Functional Status: Patient was independent with mobility/ambulation, transfers, ADL's, IADL's  Functional Status Upon Admission to [facility]:  Mobility: moderate assistance  Transfers:  Moderate assistance  ADLs: Minimal assistance     Physical Exam:  Temp:  [98 6 °F (37 °C)-98 7 °F (37 1 °C)] 98 6 °F (37 °C)  HR:  [67-68] 67  Resp:  [18] 18  BP: (117-157)/(67-73) 157/73  SpO2:  [96 %-97 %] 96 %    General:   alert, no apparent distress, cooperative and comfortable  HEENT:  Head: Normocephalic, no lesions, without obvious abnormality  Eye: Normal external eye, conjunctiva, lidsc cornea  Ears: Normal external ears  Nose: Normal external nose, mucus membranes  CARDIAC:  regular rate and rhythm, S1, S2 normal, no murmur, click, rub or gallop  LUNGS:  no abnormal respiratory pattern, no retractions noted, non-labored breathing   ABDOMEN:  soft, non-tender, non-distended  EXTREMITIES:  extremities normal, warm and well-perfused; no cyanosis, clubbing, or edema  NEURO:  clear speech, following all commands, oriented x3  PSYCH:  Alert and oriented, appropriate affect  Laboratory:    Results from last 7 days   Lab Units 01/06/21  0514 01/05/21  0540 01/04/21  0638   HEMOGLOBIN g/dL 8 4* 9 0* 8 5*   HEMATOCRIT % 25 3* 27 3* 25 7*   WBC Thousand/uL 12 70* 12 30* 13 10*     Results from last 7 days   Lab Units 01/08/21  0531 01/07/21  0610 01/06/21  0514 01/05/21  0540   BUN mg/dL 26* 27* 28* 26*   SODIUM mmol/L 140 140 140 139   POTASSIUM mmol/L 3 9 4 2 4 2 3 9   CHLORIDE mmol/L 106 103 105 104   CREATININE mg/dL 1 84* 1 70* 1 78* 1 54*   AST U/L  --   --   --  16   ALT U/L  --   --   --  26            Wt Readings from Last 1 Encounters:   01/08/21 84 5 kg (186 lb 4 8 oz)     Estimated body mass index is 27 51 kg/m² as calculated from the following:    Height as of this encounter: 5' 9" (1 753 m)  Weight as of this encounter: 84 5 kg (186 lb 4 8 oz)  Imaging: reviewed     Rehabilitation Prognosis: good     Tolerance for three hours of therapy a day: good     Family/Patient Goals:  Patient/family's goals: Return to previous home/apartment  Patient will receive PT and OT 90 minutes each per day, five days per week to achieve rehab goals or participate in 900 minutes of therapy within a 7 day week period  Mobility Goals: [place]  Transfer Goals: [place]  Activities of Daily Living (ADLs) Goals:  [place]    Discharge Planning:  Rehabilitation and discharge goals discussed with the patient and/or family  Case Managment and Social Work to review patient/family resources and to coordinate Discharge Planning  Estimated length of stay: 10 to 14 days    Patient and Family Education and Training:  Rehabilitation and discharge goals discussed with the patient and/or family  Patient/family education/training needs to be discussed in weekly team meeting  Equipment/DME needs: Therapy teams to assess and evaluate for additional equipment/DME needs throughout rehabilitation stay    Past Medical History:   Diagnosis Date    Depression     Diallo catheter in place     Hypertension     Prostate enlargement     Psychiatric disorder     Urinary tract infection      Past Surgical History:   Procedure Laterality Date    ARTHROSCOPY KNEE      BACK SURGERY      L 4 or 5    CATARACT EXTRACTION Bilateral     CYSTOSCOPY W/ LASER LITHOTRIPSY N/A 8/20/2018    Procedure: LITHOTRISPY HOLMIUM LASER of bladder stones;  Surgeon: Carmelita Louie MD;  [facility] MAIN OR;  Service: Urology    FL CYSTOURETHROSCOPY,URETER CATHETER Right 12/29/2020    Procedure: CYSTOSCOPY RETROGRADE PYELOGRAM WITH INSERTION STENT URETERAL;  Surgeon: aCrmelita Louie MD;  Location: [facility] MAIN OR;  Service: Urology    FL TRANSURETHRAL ELEC-SURG PROSTATECTOM N/A 8/20/2018    Procedure: Wyatt Kendrick; TURP;  Surgeon: Carmelita Louie MD;  Location: [facility] MAIN OR;  Service: Urology    SHOULDER SURGERY Right 05/31/2019      Family History   Problem Relation Age of Onset    Cancer Mother     Diabetes Father      Social History     Socioeconomic History    Marital status:       Spouse name: None    Number of children: None    Years of education: None    Highest education level: None   Occupational History    None   Social Needs    Financial resource strain: None    Food insecurity     Worry: None     Inability: None    Transportation needs     Medical: None     Non-medical: None   Tobacco Use    Smoking status: Former Smoker     Packs/day: 1 00  Smokeless tobacco: Never Used    Tobacco comment: quit 50 years ago   Substance and Sexual Activity    Alcohol use: Never     Frequency: Monthly or less     Drinks per session: 1 or 2     Binge frequency: Less than monthly    Drug use: Never    Sexual activity: Not Currently   Lifestyle    Physical activity     Days per week: None     Minutes per session: None    Stress: None   Relationships    Social connections     Talks on phone: None     Gets together: None     Attends Protestant service: None     Active member of club or organization: None     Attends meetings of clubs or organizations: None     Relationship status: None    Intimate partner violence     Fear of current or ex partner: None     Emotionally abused: None     Physically abused: None     Forced sexual activity: None   Other Topics Concern    None   Social History Narrative    None       Patient Active Problem List   Diagnosis    Chronic indwelling Diallo catheter    Severe sepsis with acute organ dysfunction due to Gram negative bacteria ([facility] [address] )    Acute kidney injury - Chronic kidney disease stage 3    Acute metabolic encephalopathy    Gram-negative UTI    Chronic pain syndrome    Elevated troponin    CKD (chronic kidney disease)    Anemia of chronic disease    Mild episode of recurrent major depressive disorder (HCC)    Hypomagnesemia    Bilateral hand pain    Acute respiratory failure with hypoxia    Right hydroureteronephrosis with obstructing calculus    Gram-negative bacteremia    Pneumonia of right lower lobe    Thrombocytopenia      No Known Allergies       Medical Necessity Criteria for [facility] Admission: Electrolyte imbalance:  hypomagnesium, IV antibiotics, Acute Kidney Injury, Chronic Kidney Disease, Anemia, with the following plan: monitor H/H, Leukocystosis, Thrombocytopenia, Urinary Tract Infection (UTI), Delirium/Agitation/Encephalopathy and Pneumonia   In addition, the preadmission screen, post-admission physical evaluation, overall plan of care and admissions order demonstrate a reasonable expectation that the following criteria were met at the time of admission to the [facility]  1  The patient requires active and ongoing therapeutic intervention of multiple therapy disciplines (physical therapy, occupational therapy, speech-language pathology, or prosthetics/orthotics), one of which is physical or occupational therapy  2  Patient requires an intensive rehabilitation therapy program, as defined in Chapter 1, section 110 2 2 of the CMS Medicare Policy Manual  This intensive rehabilitation therapy program will consist of at least 3 hours of therapy per day at least 5 days per week or at least 15 hours of intensive rehabilitation therapy within a 7 consecutive day period, beginning with the date of admission to the [facility]  3  The patient is reasonably expected to actively participate in, and benefit significantly from, the intensive rehabilitation therapy program as defined in Chapter 1, section 110 2 2 of the CMS Medicare Policy Manual at this time of admission to the [facility]  He can reasonably be expected to make measurable improvement (that will be of practical value to improve the patients functional capacity or adaptation to impairments) as a result of the rehabilitation treatment, as defined in section 110 3, and such improvement can be expected to be made within the prescribed period of time  As noted in the CMS Medicare Policy Manual, the patient need not be expected to achieve complete independence in the domain of self-care nor be expected to return to his or her prior level of functioning in order to meet this standard  4  The patient must require physician supervision by a rehabilitation physician   As such, a rehabilitation physician will conduct face-to-face visits with the patient at least 3 days per week throughout the patients stay in the [facility] to assess the patient both medically and functionally, as well as to modify the course of treatment as needed to maximize the patients capacity to benefit from the rehabilitation process  5  The patient requires an intensive and coordinated interdisciplinary approach to providing rehabilitation, as defined in Chapter 1, section 110 2 5 of the CMS Medicare Policy Manual  This will be achieved through periodic team conferences, conducted at least once in a 7-day period, and comprising of an interdisciplinary team of medical professionals consisting of: a rehabilitation physician, registered nurse,  and/or , and a licensed/certified therapist from each therapy discipline involved in treating the patient  Changes Since Pre-admission Assessment: None -This patient's participation in rehab continues to be reasonable, necessary and appropriate  CMS Required Post-Admission Physician Evaluation Elements  History and Physical, including medical history, functional history and active comorbidities as in above text  PostAdmission Physician Evaluation:  The patient has the potential to make improvement and is in need of physical, occupational, and/or therapy services  The patient may also need nutritional services  Given the patient's complex medical condition and risk of further medical complications, rehabilitative services cannot be safely provided at a lower level of care, such as a skilled nursing facility  I have reviewed the patient's functional and medical status at the time of the preadmission screening and they are the same as on the day of this admission  I acknowledge that I have personally performed a full physical examination on this patient within 24 hours of admission  The patient and/or family demonstrated understanding the rehabilitation program and the discharge process after we discussed them       Agree in entirety: yes  Minor adaptions: none    Major changes: none     Francia Hoffman PA-C  Physical Medicine and Rehabilitation    ** Please Note: Fluency Direct voice to text software may have been used in the creation of this document   **

## 2021-01-08 NOTE — PROGRESS NOTES
01/08/21 1327   Pain Assessment   Pain Assessment Tool Pain Assessment not indicated - pt denies pain   Restrictions/Precautions   Precautions Cognitive; Fall Risk;Contact/isolation;Limb alert; Diallo  (IV RUE)   Exercise Tools   Other Exercise Tool 1 2# exercises 2 sets 15 BUE shoulder chest press, abd/ add, elbow flexion/ extension, supination/ pronation and wrist flexion/ extension   Other Exercise Tool 2 yellow flex bar 2 sets 15 up and down BUE   Additional Activities   Additional Activities Other (Comment)   Additional Activities Comments w/c mobility to and from OT gym S with BUE and feet    Other Comments   Assessment Pt participates in 33 minutes concurrent treatment focusing on BUE therex for UB strength and activity tolerance with similar goals as another   Assessment   Treatment Assessment Pt participates in w/c mobility and BUE therex Pt tolerates session without complaints and is making gains towards goals  Pt will benefit from continued skilled OT services to increase independence with daily tasks  Problem List Decreased strength;Decreased endurance;Decreased range of motion; Impaired balance;Decreased safety awareness;Decreased cognition   Plan   Treatment/Interventions ADL retraining;Functional transfer training; Therapeutic exercise; Endurance training;Cognitive reorientation;Equipment eval/education;Patient/family training; Compensatory technique education   Progress Progressing toward goals   OT Therapy Minutes   OT Time In 1327   OT Time Out 1400   OT Total Time (minutes) 33   OT Mode of treatment - Concurrent (minutes) 33   Therapy Time missed   Time missed?  No

## 2021-01-08 NOTE — PROGRESS NOTES
01/08/21 1030   Pain Assessment   Pain Assessment Tool Pain Assessment not indicated - pt denies pain   Restrictions/Precautions   Precautions Cognitive; Fall Risk;Contact/isolation;Limb alert; Diallo  (RUE IV)   Weight Bearing Restrictions No   ROM Restrictions No   Cognition   Overall Cognitive Status Impaired   Orientation Level Oriented X4   Subjective   Subjective "I'm ready for therapy "   Roll Left and Right   Type of Assistance Needed Independent   Amount of Physical Assistance Provided No physical assistance   Comment HOB elevated; use of bed rails   Roll Left and Right CARE Score 6   Lying to Sitting on Side of Bed   Type of Assistance Needed Independent   Amount of Physical Assistance Provided No physical assistance   Comment HOB elevated; use of bed rails   Lying to Sitting on Side of Bed CARE Score 6   Sit to Stand   Type of Assistance Needed Incidental touching;Verbal cues   Amount of Physical Assistance Provided Less than 25%   Comment CGA   Sit to Stand CARE Score 3   Bed-Chair Transfer   Type of Assistance Needed Incidental touching;Verbal cues   Amount of Physical Assistance Provided Less than 25%   Comment CGA   Chair/Bed-to-Chair Transfer CARE Score 3   Transfer Bed/Chair/Wheelchair   Limitations Noted In Balance; Endurance;LE Strength   Adaptive Equipment Roller Walker   Stand Pivot Contact Guard   Sit to [facility]   Stand to [facility]   Supine to Sit Modified Independent   Walk 10 Feet   Type of Assistance Needed Incidental touching;Verbal cues   Amount of Physical Assistance Provided Less than 25%   Comment CGA   Walk 10 Feet CARE Score 3   Walk 50 Feet with Two Turns   Type of Assistance Needed Incidental touching;Verbal cues   Amount of Physical Assistance Provided Less than 25%   Comment CGA   Walk 50 Feet with Two Turns CARE Score 3   Walk 150 Feet   Type of Assistance Needed Incidental touching;Verbal cues   Amount of Physical Assistance Provided Less than 25%   Comment CGA   Walk 150 Feet CARE Score 3   Walking 10 Feet on Uneven Surfaces   Type of Assistance Needed Incidental touching;Verbal cues   Amount of Physical Assistance Provided Less than 25%   Comment CGA   Walking 10 Feet on Uneven Surfaces CARE Score 3   Ambulation   Does the patient walk? 2  Yes   Primary Mode of Locomotion Prior to Admission Walk   Distance Walked (feet) 442 ft  (442', 218')   Assist Device Roller Walker   Gait Pattern Inconsistant [place]; Slow [place];Decreased foot clearance; Forward Flexion;Narrow KAREEM;Step through   Limitations Noted In Balance; Endurance; Heel Strike;Posture; Safety;Speed;Strength;Swing   Provided Assistance with: Balance   Walk Assist Level Contact Guard; Chair Follow   Findings level & uneven   Curb or Single Stair   Style negotiated Single stair   Type of Assistance Needed Incidental touching;Verbal cues   Amount of Physical Assistance Provided Less than 25%   Comment CGA   1 Step (Curb) CARE Score 3   4 Steps   Type of Assistance Needed Incidental touching;Verbal cues   Amount of Physical Assistance Provided Less than 25%   Comment CGA   4 Steps CARE Score 3   12 Steps   Type of Assistance Needed Incidental touching;Verbal cues   Amount of Physical Assistance Provided Less than 25%   Comment CGA   12 Steps CARE Score 3   Stairs   Type Stairs; Ramp   # of Steps 14   Assist Devices Bilateral Rail;Roller Walker   Findings CGA stairs and ramp   Therapeutic Interventions   Strengthening seated, supine, standing BLE therex x30; rest breaks taken as needed   Balance transfers, ambulation, stair/ramp negotiation   Assessment   Treatment Assessment Pt tolerated PT session well this morning  Pt received supine in bed while IV antibiotics finished  Pt performed supine therex until IV finished drip  Pt then performed bed mobility with Mod I with HOB elevated and use of bed rails prior to performing STS with RW and CGA   Pt ambulated 56' with RW and CGA to PT room over level and uneven surfaces to improve endurance  Pt rested and performed stair negotiation of 14 steps with CGA and B hand rails and then ramp negotiation with RW and CGA to improve functional mobility  Pt rested and performed seated and standing BLE therex to improve strength & ROM  Pt required rest breaks throughout PT session due to decreased endurance  Pt then ambulated 218' with RW and CGA back to room  Pt appropriate for concurrent PT session to promote motivation and improve functional mobility via similar therex and therapeutic activities  Pt presents with decreased balance, endurance, strength, ROM, safety awareness, functional mobility, and cognition  Pt would continue to benefit from skilled [facility] PT to address these deficits prior to safe d/c home  Problem List Decreased strength;Decreased range of motion;Decreased endurance; Impaired balance;Decreased mobility; Decreased cognition;Decreased safety awareness   Barriers to Discharge Inaccessible home environment   PT Barriers   Physical Impairment Decreased strength;Decreased range of motion;Decreased endurance; Impaired balance;Decreased mobility; Decreased cognition;Decreased safety awareness   Functional Limitation Standing;Transfers; Walking;Stair negotiation;Ramp negotiation;Car transfers   Plan   Treatment/Interventions ADL retraining;Functional transfer training;LE strengthening/ROM; Elevations; Therapeutic exercise; Endurance training;Cognitive reorientation;Patient/family training;Equipment eval/education; Bed mobility;Gait training; Compensatory technique education   Progress Progressing toward goals   PT Therapy Minutes   PT Time In 1030   PT Time Out 1200   PT Total Time (minutes) 90   PT Mode of treatment - Individual (minutes) 60   PT Mode of treatment - Concurrent (minutes) 30   PT Mode of treatment - Group (minutes) 0   PT Mode of treatment - Co-treat (minutes) 0   PT Mode of Treatment - Total time(minutes) 90 minutes   PT Cumulative Minutes 282   Therapy Time missed   Time missed?  No

## 2021-01-09 PROCEDURE — 97110 THERAPEUTIC EXERCISES: CPT

## 2021-01-09 PROCEDURE — 97537 COMMUNITY/WORK REINTEGRATION: CPT

## 2021-01-09 PROCEDURE — 97530 THERAPEUTIC ACTIVITIES: CPT

## 2021-01-09 PROCEDURE — 97116 GAIT TRAINING THERAPY: CPT

## 2021-01-09 PROCEDURE — 97535 SELF CARE MNGMENT TRAINING: CPT

## 2021-01-09 PROCEDURE — 99232 SBSQ HOSP IP/OBS MODERATE 35: CPT | Performed by: INTERNAL MEDICINE

## 2021-01-09 RX ADMIN — DOCUSATE SODIUM 100 MG: 100 CAPSULE, LIQUID FILLED ORAL at 08:30

## 2021-01-09 RX ADMIN — AMPICILLIN SODIUM 2000 MG: 2 INJECTION, POWDER, FOR SOLUTION INTRAMUSCULAR; INTRAVENOUS at 18:28

## 2021-01-09 RX ADMIN — GABAPENTIN 400 MG: 400 CAPSULE ORAL at 21:04

## 2021-01-09 RX ADMIN — HEPARIN SODIUM 5000 UNITS: 5000 INJECTION INTRAVENOUS; SUBCUTANEOUS at 14:25

## 2021-01-09 RX ADMIN — AMPICILLIN SODIUM 2000 MG: 2 INJECTION, POWDER, FOR SOLUTION INTRAMUSCULAR; INTRAVENOUS at 02:29

## 2021-01-09 RX ADMIN — Medication 4000 UNITS: at 08:30

## 2021-01-09 RX ADMIN — HEPARIN SODIUM 5000 UNITS: 5000 INJECTION INTRAVENOUS; SUBCUTANEOUS at 21:04

## 2021-01-09 RX ADMIN — OXYCODONE HYDROCHLORIDE AND ACETAMINOPHEN 500 MG: 500 TABLET ORAL at 08:30

## 2021-01-09 RX ADMIN — CALCIUM CARBONATE (ANTACID) CHEW TAB 500 MG 500 MG: 500 CHEW TAB at 08:30

## 2021-01-09 RX ADMIN — FINASTERIDE 5 MG: 5 TABLET, FILM COATED ORAL at 08:31

## 2021-01-09 RX ADMIN — MORPHINE SULFATE 15 MG: 15 TABLET ORAL at 08:30

## 2021-01-09 RX ADMIN — FERROUS SULFATE TAB 325 MG (65 MG ELEMENTAL FE) 325 MG: 325 (65 FE) TAB at 08:30

## 2021-01-09 RX ADMIN — AMPICILLIN SODIUM 2000 MG: 2 INJECTION, POWDER, FOR SOLUTION INTRAMUSCULAR; INTRAVENOUS at 10:58

## 2021-01-09 RX ADMIN — HEPARIN SODIUM 5000 UNITS: 5000 INJECTION INTRAVENOUS; SUBCUTANEOUS at 05:58

## 2021-01-09 RX ADMIN — CALCIUM CARBONATE (ANTACID) CHEW TAB 500 MG 500 MG: 500 CHEW TAB at 21:04

## 2021-01-09 RX ADMIN — MORPHINE SULFATE 15 MG: 15 TABLET ORAL at 18:13

## 2021-01-09 RX ADMIN — SERTRALINE HYDROCHLORIDE 50 MG: 50 TABLET ORAL at 08:30

## 2021-01-09 RX ADMIN — DOCUSATE SODIUM 100 MG: 100 CAPSULE, LIQUID FILLED ORAL at 18:13

## 2021-01-09 NOTE — PROGRESS NOTES
OT Daily Progress       01/09/21 1300   Pain Assessment   Pain Assessment Tool Pain Assessment not indicated - pt denies pain   Pain Score No Pain   Sit to Stand   Type of Assistance Needed Supervision   Amount of Physical Assistance Provided No physical assistance   Comment CG GENEVIEVE    Sit to Stand CARE Score 4   Therapeutic Exercise - ROM   UE-ROM Yes   Therapeutic Excerise-Strength   UE Strength Yes   Activity Tolerance   Activity Tolerance Patient limited by fatigue;Patient tolerated treatment well   OT Therapy Minutes   OT Time In 1300   OT Time Out 1400   OT Total Time (minutes) 60   OT Mode of treatment - Individual (minutes) 60       Pt seen for follow up visit  Pt in bed, awake, alert, agreeable to tx  Bed mob and transfer to wheelchair with CG GENEVIEVE  W/c mob to OT gym with supervision  UE exercises for endurance and funct transfers including arm bike 3 mins forward and back with rest break between sets  2# free weight for bicep curls, pro/sup, and wrist curls x 20 reps/2 sets with rest break between sets  Yellow flex bar x 30 reps  5# gripper with 5 second hold bilat hands x 30 reps  Funct mob with RW into and around hallway with CG GENEVIEVE and rolling walker after supervision sit to stand  W/c mob back to room with supervision  Left in room with needs in reach  Tolerating fair - does fatigue quickly  Improved transfers since AM session  Continue to progress as status allows

## 2021-01-09 NOTE — PROGRESS NOTES
01/09/21 0830   Pain Assessment   Pain Score No Pain   Subjective   Subjective "I'm ready to go "   Transfer Bed/Chair/Wheelchair   Stand Pivot Contact Guard   Sit to Stand Contact Guard   Stand to [facility] Guard   Supine to [facility]   Ambulation   Primary Mode of Locomotion Prior to Admission Walk   Distance Walked (feet) 350 ft  (x2)   Assist Device Roller Walker   Gait Pattern Slow [place];Narrow KAREEM; Inconsistant [place]   Limitations Noted In Balance; Endurance; Safety;Speed;Strength   Stairs   Type Stairs; Ramp   # of Steps 16   Assist Devices Bilateral Rail   Therapeutic Interventions   Strengthening 1 1/2# bilateral seated LE    Assessment   Treatment Assessment Pt willing to participate in all therapy activities  Pt able to tolerate added weight without incident  Pt able to amb various distances with occasional standing rest   Cueing for safety to avoid railings and door handles  Problem List Decreased strength;Decreased endurance;Decreased mobility; Decreased safety awareness   Plan   Treatment/Interventions LE strengthening/ROM; Therapeutic exercise; Endurance training;Gait training   Progress Progressing toward goals   PT Therapy Minutes   PT Time In 0830   PT Time Out 0930   PT Total Time (minutes) 60   PT Mode of treatment - Individual (minutes) 60   Therapy Time missed   Time missed?  No

## 2021-01-09 NOTE — PLAN OF CARE
Problem: DISCHARGE PLANNING - CARE MANAGEMENT  Goal: Discharge to post-acute care or home with appropriate resources  Description: INTERVENTIONS:  - Conduct assessment to determine patient/family and health care team treatment goals, and need for post-acute services based on payer coverage, community resources, and patient preferences, and barriers to discharge  - Address psychosocial, clinical, and financial barriers to discharge as identified in assessment in conjunction with the patient/family and health care team  - Arrange appropriate level of post-acute services according to patients   needs and preference and payer coverage in collaboration with the physician and health care team  - Communicate with and update the patient/family, physician, and health care team regarding progress on the discharge plan  - Arrange appropriate transportation to post-acute venues  Outcome: Progressing     Problem: PAIN - ADULT  Goal: Verbalizes/displays adequate comfort level or baseline comfort level  Description: Interventions:  - Encourage patient to monitor pain and request assistance  - Assess pain using appropriate pain scale  - Administer analgesics based on type and severity of pain and evaluate response  - Implement non-pharmacological measures as appropriate and evaluate response  - Consider cultural and social influences on pain and pain management  - Notify physician/advanced practitioner if interventions unsuccessful or patient reports new pain  Outcome: Progressing     Problem: INFECTION - ADULT  Goal: Absence or prevention of progression during hospitalization  Description: INTERVENTIONS:  - Assess and monitor for signs and symptoms of infection  - Monitor lab/diagnostic results  - Monitor all insertion sites, i e  indwelling lines, tubes, and drains  - Monitor endotracheal if appropriate and nasal secretions for changes in amount and color  - [place] appropriate cooling/warming therapies per order  - Administer medications as ordered  - Instruct and encourage patient and family to use good hand hygiene technique  - Identify and instruct in appropriate isolation precautions for identified infection/condition  Outcome: Progressing  Goal: Absence of fever/infection during neutropenic period  Description: INTERVENTIONS:  - Monitor WBC    Outcome: Progressing     Problem: SAFETY ADULT  Goal: Patient will remain free of falls  Description: INTERVENTIONS:  - Assess patient frequently for physical needs  -  Identify cognitive and physical deficits and behaviors that affect risk of falls    -  [place] fall precautions as indicated by assessment   - Educate patient/family on patient safety including physical limitations  - Instruct patient to call for assistance with activity based on assessment  - Modify environment to reduce risk of injury  - Consider OT/PT consult to assist with strengthening/mobility  Outcome: Progressing  Goal: Maintain or return to baseline ADL function  Description: INTERVENTIONS:  -  Assess patient's ability to carry out ADLs; assess patient's baseline for ADL function and identify physical deficits which impact ability to perform ADLs (bathing, care of mouth/teeth, toileting, grooming, dressing, etc )  - Assess/evaluate cause of self-care deficits   - Assess range of motion  - Assess patient's mobility; develop plan if impaired  - Assess patient's need for assistive devices and provide as appropriate  - Encourage maximum independence but intervene and supervise when necessary  - Involve family in performance of ADLs  - Assess for home care needs following discharge   - Consider OT consult to assist with ADL evaluation and planning for discharge  - Provide patient education as appropriate  Outcome: Progressing  Goal: Maintain or return mobility status to optimal level  Description: INTERVENTIONS:  - Assess patient's baseline mobility status (ambulation, transfers, stairs, etc )    - Identify cognitive and physical deficits and behaviors that affect mobility  - Identify mobility aids required to assist with transfers and/or ambulation (gait belt, sit-to-stand, lift, walker, cane, etc )  - [place] fall precautions as indicated by assessment  - Record patient progress and toleration of activity level on Mobility SBAR; progress patient to next Phase/Stage  - Instruct patient to call for assistance with activity based on assessment  - Consider rehabilitation consult to assist with strengthening/weightbearing, etc   Outcome: Progressing     Problem: DISCHARGE PLANNING  Goal: Discharge to home or other facility with appropriate resources  Description: INTERVENTIONS:  - Identify barriers to discharge w/patient and caregiver  - Arrange for needed discharge resources and transportation as appropriate  - Identify discharge learning needs (meds, wound care, etc )  - Arrange for interpretive services to assist at discharge as needed  - Refer to Case Management Department for coordinating discharge planning if the patient needs post-hospital services based on physician/advanced practitioner order or complex needs related to functional status, cognitive ability, or social support system  Outcome: Progressing     Problem: Knowledge Deficit  Goal: Patient/family/caregiver demonstrates understanding of disease process, treatment plan, medications, and discharge instructions  Description: Complete learning assessment and assess knowledge base  Interventions:  - Provide teaching at level of understanding  - Provide teaching via preferred learning methods  Outcome: Progressing     Problem: Potential for Falls  Goal: Patient will remain free of falls  Description: INTERVENTIONS:  - Assess patient frequently for physical needs  -  Identify cognitive and physical deficits and behaviors that affect risk of falls    -  [place] fall precautions as indicated by assessment   - Educate patient/family on patient safety including physical limitations  - Instruct patient to call for assistance with activity based on assessment  - Modify environment to reduce risk of injury  - Consider OT/PT consult to assist with strengthening/mobility  Outcome: Progressing     Problem: PAIN - ADULT  Goal: Verbalizes/displays adequate comfort level or baseline comfort level  Description: Interventions:  - Encourage patient to monitor pain and request assistance  - Assess pain using appropriate pain scale  - Administer analgesics based on type and severity of pain and evaluate response  - Implement non-pharmacological measures as appropriate and evaluate response  - Consider cultural and social influences on pain and pain management  - Notify physician/advanced practitioner if interventions unsuccessful or patient reports new pain  Outcome: Progressing     Problem: INFECTION - ADULT  Goal: Absence or prevention of progression during hospitalization  Description: INTERVENTIONS:  - Assess and monitor for signs and symptoms of infection  - Monitor lab/diagnostic results  - Monitor all insertion sites, i e  indwelling lines, tubes, and drains  - Monitor endotracheal if appropriate and nasal secretions for changes in amount and color  - [place] appropriate cooling/warming therapies per order  - Administer medications as ordered  - Instruct and encourage patient and family to use good hand hygiene technique  - Identify and instruct in appropriate isolation precautions for identified infection/condition  Outcome: Progressing  Goal: Absence of fever/infection during neutropenic period  Description: INTERVENTIONS:  - Monitor WBC    Outcome: Progressing     Problem: SAFETY ADULT  Goal: Patient will remain free of falls  Description: INTERVENTIONS:  - Assess patient frequently for physical needs  -  Identify cognitive and physical deficits and behaviors that affect risk of falls    -  [place] fall precautions as indicated by assessment   - Educate patient/family on patient safety including physical limitations  - Instruct patient to call for assistance with activity based on assessment  - Modify environment to reduce risk of injury  - Consider OT/PT consult to assist with strengthening/mobility  Outcome: Progressing  Goal: Maintain or return to baseline ADL function  Description: INTERVENTIONS:  -  Assess patient's ability to carry out ADLs; assess patient's baseline for ADL function and identify physical deficits which impact ability to perform ADLs (bathing, care of mouth/teeth, toileting, grooming, dressing, etc )  - Assess/evaluate cause of self-care deficits   - Assess range of motion  - Assess patient's mobility; develop plan if impaired  - Assess patient's need for assistive devices and provide as appropriate  - Encourage maximum independence but intervene and supervise when necessary  - Involve family in performance of ADLs  - Assess for home care needs following discharge   - Consider OT consult to assist with ADL evaluation and planning for discharge  - Provide patient education as appropriate  Outcome: Progressing  Goal: Maintain or return mobility status to optimal level  Description: INTERVENTIONS:  - Assess patient's baseline mobility status (ambulation, transfers, stairs, etc )    - Identify cognitive and physical deficits and behaviors that affect mobility  - Identify mobility aids required to assist with transfers and/or ambulation (gait belt, sit-to-stand, lift, walker, cane, etc )  - [place] fall precautions as indicated by assessment  - Record patient progress and toleration of activity level on Mobility SBAR; progress patient to next Phase/Stage  - Instruct patient to call for assistance with activity based on assessment  - Consider rehabilitation consult to assist with strengthening/weightbearing, etc   Outcome: Progressing     Problem: DISCHARGE PLANNING  Goal: Discharge to home or other facility with appropriate resources  Description: INTERVENTIONS:  - Identify barriers to discharge w/patient and caregiver  - Arrange for needed discharge resources and transportation as appropriate  - Identify discharge learning needs (meds, wound care, etc )  - Arrange for interpretive services to assist at discharge as needed  - Refer to Case Management Department for coordinating discharge planning if the patient needs post-hospital services based on physician/advanced practitioner order or complex needs related to functional status, cognitive ability, or social support system  Outcome: Progressing     Problem: Prexisting or High Potential for Compromised Skin Integrity  Goal: Skin integrity is maintained or improved  Description: INTERVENTIONS:  - Identify patients at risk for skin breakdown  - Assess and monitor skin integrity  - Assess and monitor nutrition and hydration status  - Monitor labs   - Assess for incontinence   - Turn and reposition patient  - Assist with mobility/ambulation  - Relieve pressure over bony prominences  - Avoid friction and shearing  - Provide appropriate hygiene as needed including keeping skin clean and dry  - Evaluate need for skin moisturizer/barrier cream  - Collaborate with interdisciplinary team   - Patient/family teaching  - Consider wound care consult   Outcome: Progressing

## 2021-01-09 NOTE — PROGRESS NOTES
Progress Note - Nephrology   Vinay Part 66 y o  male MRN: [id number]  Unit/Bed#: [facility] 210-01 Encounter: [id number]    A/P:  1  Acute kidney injury present on admission atop chronic kidney disease  Urine studies demonstrated acute tubular necrosis  He did have a rise in his creatinine but will continue to follow  Avoid nephrotoxic agents  2  Stage 3 chronic kidney disease baseline creatinine 1 2-1 3 mg/dL  3  Hypocalcemia  Patient is receiving vitamin-D replacement and oral calcium supplements  Tums are ordered in between meals on an empty stomach  4  Lower extremity edema  Left greater than right due to old injury  5  Severe sepsis with Enterococcus and Proteus bacteremia complicated UTI  Receiving antibiotics as per Infectious Disease which has improved  6  Right UPJ obstruction status post  right ureteral stent placement on 12/29/2020        Follow up reason for today's visit:  Acute kidney injury/urosepsis/obstructive uropathy    Severe sepsis with acute organ dysfunction due to Gram negative bacteria [facility])    Patient Active Problem List   Diagnosis    Chronic indwelling Dial[place] catheter    Severe sepsis with acute organ dysfunction due to Gram negative bacteria (HCC)    Acute kidney injury - Chronic kidney disease stage 3    Acute metabolic encephalopathy    Gram-negative UTI    Chronic pain syndrome    Elevated troponin    CKD (chronic kidney disease)    Anemia of chronic disease    Mild episode of recurrent major depressive disorder (HCC)    Hypomagnesemia    Bilateral hand pain    Acute respiratory failure with hypoxia    Right hydroureteronephrosis with obstructing calculus    Gram-negative bacteremia    Pneumonia of right lower lobe    Thrombocytopenia          Subjective:   Feels well and wants to go home  Denies dizziness chest pain shortness of breath abdominal pain nausea vomiting    Diallo is draining well    Objective:     Vitals: Blood pressure 114/64, pulse 65, temperature 99 5 °F (37 5 °C), temperature source Temporal, resp  rate 18, height 5' 9" (1 753 m), weight 85 2 kg (187 lb 14 4 oz), SpO2 98 %  ,Body mass index is 27 75 kg/m²  Weight (last 2 days)     Date/Time   Weight    01/09/21 0600   85 2 (187 9)    01/08/21 0535   84 5 (186 3)    01/07/21 0500   86 1 (189 82)                Intake/Output Summary (Last 24 hours) at 1/9/2021 1055  Last data filed at 1/9/2021 0901  Gross per 24 hour   Intake 450 ml   Output 3350 ml   Net -2900 ml     I/O last 3 completed shifts: In: 330 [P O :330]  Out: 4850 [Urine:4850]    Urethral Catheter Latex 18 Fr   (Active)   Reasons to continue Urinary Catheter  Chronic urinary catheter 01/09/21 0901   Goal for Removal N/A- chronic cantor 01/09/21 0901   Site Assessment Clean;Skin intact 01/09/21 0901   Collection Container Standard drainage bag 01/09/21 0901   Securement Method Securing device (Describe) 01/09/21 0901   Output (mL) 650 mL 01/09/21 0600       Physical Exam: /64 (BP Location: Left arm)   Pulse 65   Temp 99 5 °F (37 5 °C) (Temporal)   Resp 18   Ht 5' 9" (1 753 m)   Wt 85 2 kg (187 lb 14 4 oz)   SpO2 98%   BMI 27 75 kg/m²     General Appearance:    Alert, cooperative, no distress, appears stated age   Head:    Normocephalic, without obvious abnormality, atraumatic   Eyes:    Conjunctiva/corneas clear   Ears:    Normal external ears   Nose:   Nares normal, septum midline, mucosa normal, no drainage    or sinus tenderness   Throat:   Lips, mucosa, and tongue normal; teeth and gums normal   Neck:   Supple, symmetrical, trachea midline, no adenopathy;        thyroid:  No enlargement/tenderness/nodules; no carotid    bruit or JVD   Back:     Symmetric, no curvature, ROM normal, no CVA tenderness   Lungs:     Clear to auscultation bilaterally, respirations unlabored   Chest wall:    No tenderness or deformity   Heart:    Regular rate and rhythm, S1 and S2 normal, no murmur, rub   or gallop   Abdomen:     Soft, non-tender, bowel sounds active   Extremities:   Extremities normal, atraumatic, no cyanosis mild edema right and 1+ left (chronic)or edema   Skin:   Skin color, texture, turgor normal, no rashes or lesions   Lymph nodes:   Cervical normal   Neurologic:   CNII-XII intact            Lab, Imaging and other studies: I have personally reviewed pertinent labs  CBC: No results found for: WBC, HGB, HCT, MCV, PLT, ADJUSTEDWBC, MCH, MCHC, RDW, MPV, NRBC  CMP: No results found for: NA, K, CL, CO2, ANIONGAP, BUN, CREATININE, GLUCOSE, CALCIUM, AST, ALT, ALKPHOS, PROT, BILITOT, EGFR      Results from last 7 days   Lab Units 01/08/21  0531 01/07/21  0610 01/06/21  0514 01/05/21  0540   POTASSIUM mmol/L 3 9 4 2 4 2 3 9   CHLORIDE mmol/L 106 103 105 104   CO2 mmol/L 29 30 30 28   BUN mg/dL 26* 27* 28* 26*   CREATININE mg/dL 1 84* 1 70* 1 78* 1 54*   CALCIUM mg/dL 8 4* 8 3* 8 0* 7 9*   ALK PHOS U/L  --   --   --  59   ALT U/L  --   --   --  26   AST U/L  --   --   --  16         Phosphorus: No results found for: PHOS  Magnesium: No results found for: MG  Urinalysis: No results found for: COLORU, CLARITYU, SPECGRAV, PHUR, LEUKOCYTESUR, NITRITE, PROTEINUA, GLUCOSEU, KETONESU, BILIRUBINUR, BLOODU  Ionized Calcium: No results found for: CAION  Coagulation: No results found for: PT, INR, APTT  Troponin: No results found for: TROPONINI  ABG: No results found for: PHART, KUW3WRL, PO2ART, HZD0KYC, L6NVLCVS, BEART, SOURCE  Radiology review:     IMAGING  No results found      Current Facility-Administered Medications   Medication Dose Route Frequency    acetaminophen (TYLENOL) tablet 650 mg  650 mg Oral Q4H PRN    ampicillin (OMNIPEN) 2,000 mg in sodium chloride 0 9 % 100 mL IVPB  2,000 mg Intravenous Q8H    ascorbic acid (VITAMIN C) tablet 500 mg  500 mg Oral Daily    calcium carbonate (TUMS) chewable tablet 500 mg  500 mg Oral BID    cholecalciferol (VITAMIN D3) tablet 4,000 Units  4,000 Units Oral Daily    cloNIDine (CATAPRES) tablet 0 1 mg  0 1 mg Oral Q3H PRN    diphenhydrAMINE (BENADRYL) tablet 50 mg  50 mg Oral HS PRN    docusate sodium (COLACE) capsule 100 mg  100 mg Oral BID    ferrous sulfate tablet 325 mg  325 mg Oral Daily With Breakfast    finasteride (PROSCAR) tablet 5 mg  5 mg Oral Daily    gabapentin (NEURONTIN) capsule 400 mg  400 mg Oral HS    heparin (porcine) subcutaneous injection 5,000 Units  5,000 Units Subcutaneous Q8H [address]    morphine (MSIR) IR tablet 15 mg  15 mg Oral BID    ondansetron (ZOFRAN) injection 4 mg  4 mg Intravenous Q6H PRN    sertraline (ZOLOFT) tablet 50 mg  50 mg Oral Daily     There are no discontinued medications  Veto Montague MD      This progress note was produced in part using a dictation device which may document imprecise wording from author's original intent

## 2021-01-09 NOTE — PROGRESS NOTES
OT ADL/Daily Progress       01/09/21 0700   Pain Assessment   Pain Assessment Tool Pain Assessment not indicated - pt denies pain   Pain Score No Pain   Grooming   Able To Wash/Dry Face;Wash/Dry Hands   Shower/Bathe Self   Type of Assistance Needed Set-up / clean-up;Supervision   Amount of Physical Assistance Provided Less than 25%   Comment min A to dry feet, CG A for shower transfer and safety   Shower/Bathe Self CARE Score 3   Bathing   Assessed Bath Style Shower   Anticipated D/C Bath Style Shower;Sponge Bath   Able to [place] No   Able to [employer] Temperature Yes   Able to Wash/Rinse/Dry (body part) Left Arm;Right Arm;L Upper Leg;R Upper Leg;L Lower Leg/Foot;R Lower Leg/Foot;Chest;Abdomen;Perineal Area; Buttocks   Limitations Noted in Balance; Endurance;Strength   Positioning Seated;Standing   Adaptive Equipment Tub Bench;Hand Held Shower   Findings  required some CG A with tranfer from sit to  shower    Tub/Shower Transfer   Limitations Noted In Balance; Endurance   Adaptive Equipment Grab Bars;Seat with Back   Assessed Tub/shower combo   Upper Body Dressing   Type of Assistance Needed Set-up / clean-up   Amount of Physical Assistance Provided No physical assistance   Comment able to doff button down shirt and T-shirt, sergei clean t-shirt after shower    Upper Body Dressing CARE Score 5   Lower Body Dressing   Type of Assistance Needed Physical assistance   Amount of Physical Assistance Provided Less than 25%   Comment for socks due to fatigue post shower  Able to doff pants/socks independently    Min A to thread cantor through sweatpants    Lower Body Dressing CARE Score 3   Sit to Lying   Type of Assistance Needed Supervision   Amount of Physical Assistance Provided No physical assistance   Sit to Lying CARE Score 4   Lying to Sitting on Side of Bed   Type of Assistance Needed Supervision   Amount of Physical Assistance Provided No physical assistance   Comment HOB slightly raised    Lying to Sitting on Side of Bed CARE Score 4   Sit to Stand   Type of Assistance Needed Supervision   Amount of Physical Assistance Provided No physical assistance   Comment CG A    Sit to Stand CARE Score 4   Bed-Chair Transfer   Type of Assistance Needed Set-up / clean-up   Amount of Physical Assistance Provided No physical assistance   Chair/Bed-to-Chair Transfer CARE Score 5   Activity Tolerance   Activity Tolerance Patient tolerated treatment well;Patient limited by fatigue   OT Therapy Minutes   OT Time In 0700   OT Time Out 0800   OT Total Time (minutes) 60   OT Mode of treatment - Individual (minutes) 60     Pt seen for ADL session this AM   Pt agreeable to shower after cleared with RN  RA PALMA IV covered and secured with plastic and paper tape to prevent water infiltration  Pt able to doff UB and LB clothing at bedside  Min A for cantor management to thread through underwear and sweatpants  Funct mob in room from bed to tub/shower with CG A and RW  Shower transfer via tub bench with supervision  Bathing as noted above  Funct mob back to bed, dressing at EOB as noted above  Transfers to wheelchair with CG A and RW  Left OOB in wheelchair for breakfast, needs in reach  Tolerating well - fatigued post shower  Does require some assist for safety and balance, as well as cantor management with LB dressing  Continue to progress treatment as status allows

## 2021-01-09 NOTE — NURSING NOTE
Pt resting in bed watching TV  Voices no complaints at this time  No s/s of pain or distress  Chronic cantor draining to gravity  Actively turns self in bed  IV in R FA CDI, flushes  Assessment otherwise unchanged  Continuing to monitor and follow plan of care  Bed alarm intact for safety

## 2021-01-10 LAB
ANION GAP SERPL CALCULATED.3IONS-SCNC: 7 MMOL/L (ref 4–13)
BUN SERPL-MCNC: 28 MG/DL (ref 7–25)
CA-I BLD-SCNC: 1.29 MMOL/L (ref 1.12–1.32)
CALCIUM SERPL-MCNC: 8.7 MG/DL (ref 8.6–10.5)
CHLORIDE SERPL-SCNC: 104 MMOL/L (ref 98–107)
CO2 SERPL-SCNC: 29 MMOL/L (ref 21–31)
CREAT SERPL-MCNC: 1.69 MG/DL (ref 0.7–1.3)
EOSINOPHIL # BLD AUTO: 0.23 THOUSAND/UL (ref 0–0.61)
EOSINOPHIL NFR BLD MANUAL: 3 % (ref 0–6)
ERYTHROCYTE [DISTWIDTH] IN BLOOD BY AUTOMATED COUNT: 13.2 % (ref 11.5–14.5)
GFR SERPL CREATININE-BSD FRML MDRD: 38 ML/MIN/1.73SQ M
GLUCOSE SERPL-MCNC: 90 MG/DL (ref 65–99)
HCT VFR BLD AUTO: 28.3 % (ref 42–47)
HGB BLD-MCNC: 9.4 G/DL (ref 14–18)
LG PLATELETS BLD QL SMEAR: PRESENT
LYMPHOCYTES # BLD AUTO: 1.72 THOUSAND/UL (ref 0.6–4.47)
LYMPHOCYTES # BLD AUTO: 22 % (ref 20–51)
MCH RBC QN AUTO: 32.1 PG (ref 26–34)
MCHC RBC AUTO-ENTMCNC: 33.2 G/DL (ref 31–37)
MCV RBC AUTO: 97 FL (ref 81–99)
METAMYELOCYTES NFR BLD MANUAL: 1 % (ref 0–1)
MONOCYTES # BLD AUTO: 0.7 THOUSAND/UL (ref 0–1.22)
MONOCYTES NFR BLD AUTO: 9 % (ref 4–12)
NEUTS BAND NFR BLD MANUAL: 4 % (ref 0–8)
NEUTS SEG # BLD: 5.07 THOUSAND/UL (ref 1.81–6.82)
NEUTS SEG NFR BLD AUTO: 61 % (ref 42–75)
PLATELET # BLD AUTO: 437 THOUSANDS/UL (ref 149–390)
PLATELET BLD QL SMEAR: ADEQUATE
PMV BLD AUTO: 8.6 FL (ref 8.6–11.7)
POTASSIUM SERPL-SCNC: 4.1 MMOL/L (ref 3.5–5.5)
RBC # BLD AUTO: 2.92 MILLION/UL (ref 4.3–5.9)
RBC MORPH BLD: NORMAL
SODIUM SERPL-SCNC: 140 MMOL/L (ref 134–143)
TOTAL CELLS COUNTED SPEC: 100
WBC # BLD AUTO: 7.8 THOUSAND/UL (ref 4.8–10.8)

## 2021-01-10 PROCEDURE — 85007 BL SMEAR W/DIFF WBC COUNT: CPT | Performed by: PHYSICAL MEDICINE & REHABILITATION

## 2021-01-10 PROCEDURE — 80048 BASIC METABOLIC PNL TOTAL CA: CPT | Performed by: PHYSICAL MEDICINE & REHABILITATION

## 2021-01-10 PROCEDURE — 85027 COMPLETE CBC AUTOMATED: CPT | Performed by: PHYSICAL MEDICINE & REHABILITATION

## 2021-01-10 PROCEDURE — 97530 THERAPEUTIC ACTIVITIES: CPT

## 2021-01-10 PROCEDURE — 99232 SBSQ HOSP IP/OBS MODERATE 35: CPT | Performed by: INTERNAL MEDICINE

## 2021-01-10 PROCEDURE — 97110 THERAPEUTIC EXERCISES: CPT

## 2021-01-10 PROCEDURE — 82330 ASSAY OF CALCIUM: CPT | Performed by: NURSE PRACTITIONER

## 2021-01-10 RX ADMIN — CALCIUM CARBONATE (ANTACID) CHEW TAB 500 MG 500 MG: 500 CHEW TAB at 21:02

## 2021-01-10 RX ADMIN — HEPARIN SODIUM 5000 UNITS: 5000 INJECTION INTRAVENOUS; SUBCUTANEOUS at 21:02

## 2021-01-10 RX ADMIN — MORPHINE SULFATE 15 MG: 15 TABLET ORAL at 08:48

## 2021-01-10 RX ADMIN — DOCUSATE SODIUM 100 MG: 100 CAPSULE, LIQUID FILLED ORAL at 08:48

## 2021-01-10 RX ADMIN — HEPARIN SODIUM 5000 UNITS: 5000 INJECTION INTRAVENOUS; SUBCUTANEOUS at 14:44

## 2021-01-10 RX ADMIN — MORPHINE SULFATE 15 MG: 15 TABLET ORAL at 18:26

## 2021-01-10 RX ADMIN — DOCUSATE SODIUM 100 MG: 100 CAPSULE, LIQUID FILLED ORAL at 18:26

## 2021-01-10 RX ADMIN — GABAPENTIN 400 MG: 400 CAPSULE ORAL at 21:02

## 2021-01-10 RX ADMIN — AMPICILLIN SODIUM 2000 MG: 2 INJECTION, POWDER, FOR SOLUTION INTRAMUSCULAR; INTRAVENOUS at 18:27

## 2021-01-10 RX ADMIN — AMPICILLIN SODIUM 2000 MG: 2 INJECTION, POWDER, FOR SOLUTION INTRAMUSCULAR; INTRAVENOUS at 02:17

## 2021-01-10 RX ADMIN — CALCIUM CARBONATE (ANTACID) CHEW TAB 500 MG 500 MG: 500 CHEW TAB at 08:48

## 2021-01-10 RX ADMIN — Medication 4000 UNITS: at 08:48

## 2021-01-10 RX ADMIN — SERTRALINE HYDROCHLORIDE 50 MG: 50 TABLET ORAL at 08:48

## 2021-01-10 RX ADMIN — FINASTERIDE 5 MG: 5 TABLET, FILM COATED ORAL at 08:48

## 2021-01-10 RX ADMIN — HEPARIN SODIUM 5000 UNITS: 5000 INJECTION INTRAVENOUS; SUBCUTANEOUS at 05:12

## 2021-01-10 RX ADMIN — FERROUS SULFATE TAB 325 MG (65 MG ELEMENTAL FE) 325 MG: 325 (65 FE) TAB at 07:33

## 2021-01-10 RX ADMIN — AMPICILLIN SODIUM 2000 MG: 2 INJECTION, POWDER, FOR SOLUTION INTRAMUSCULAR; INTRAVENOUS at 10:31

## 2021-01-10 RX ADMIN — OXYCODONE HYDROCHLORIDE AND ACETAMINOPHEN 500 MG: 500 TABLET ORAL at 08:48

## 2021-01-10 NOTE — NURSING NOTE
Pt awake resting in bed  Slept during the night  IV to L FA CDI, antibiotics admin as ordered IV flushed without difficulty  Diallo draining to gravity clear yellow urine  Voices no complaints  Assessment unchanged  Continuing to monitor and follow plan of care  All items within reach and bed alarm intact

## 2021-01-10 NOTE — PROGRESS NOTES
01/10/21 1035   Pain Assessment   Pain Assessment Tool Pain Assessment not indicated - pt denies pain   Restrictions/Precautions   Precautions Fall Risk;Multiple lines  (IV LUE)   Sit to Lying   Type of Assistance Needed Supervision   Amount of Physical Assistance Provided No physical assistance   Sit to Lying CARE Score 4   Sit to Stand   Type of Assistance Needed Supervision   Comment CGA   Sit to Stand CARE Score 4   Bed-Chair Transfer   Type of Assistance Needed Supervision   Comment CGA   Chair/Bed-to-Chair Transfer CARE Score 4   Transfer Bed/Chair/Wheelchair   Limitations Noted In Balance; Endurance;LE Strength   Functional Standing Tolerance   Time 5 and a half minutes   Activity placed and removed resisitive clips   Comments supervision/CGA   Right Upper Extremity- Strength   RUE Strength Comment x30 elbow flexion/extension, ABD/ADD, protraction/retraction, pronation/supination, shoulder flexion/extension  x17 lateral raises   Left Upper Extremity-Strength   LUE Strength Comment x30 elbow flexion/extension, ABD/ADD, protraction/retraction, pronation/supination, shoulder flexion/extension  x15 lateral raises   Exercise Tools   Hand Gripper 5lb gripper x30 bilat hand   Other Exercise Tool 1 used functional reacher to retrieve resisitive clips scattered on floor   Cognition   Overall Cognitive Status Impaired   Arousal/Participation Alert; Responsive; Cooperative   Attention Within functional limits   Orientation Level Oriented X4   Memory Decreased short term memory;Decreased recall of recent events   Following Commands Follows one step commands without difficulty   Assessment   Treatment Assessment Pt agreeable to OT session this AM, received sitting upright in w/c  Pt completed various BUE strengthening exercises, these exercises are listed in their respective sections  Pt notes no increase pain during session, however notes increase fatigue   Pt demonstrates improved activity tolerance as he required less rest breaks between exercises  Pt requested at end of session to lay in bed and rest  Pt would benefit from continue skilled OT services to increase independence with self-care/daily tasks, functional mobility/transfers and activity tolerance  Prognosis Good   Problem List Decreased strength;Decreased endurance; Impaired balance;Decreased safety awareness   Plan   Treatment/Interventions ADL retraining;Functional transfer training; Therapeutic exercise; Endurance training;Patient/family training;Equipment eval/education; Compensatory technique education;OT   Progress Progressing toward goals   OT Therapy Minutes   OT Time In 1035   OT Time Out 1130   OT Total Time (minutes) 55   OT Mode of treatment - Individual (minutes) 55   Therapy Time missed   Time missed?  No

## 2021-01-10 NOTE — PROGRESS NOTES
Progress Note - Nephrology   Frankey Laming 66 y o  male MRN: [id number]  Unit/Bed#: [facility] 210-01 Encounter: [id number]    A/P:  1  Acute kidney injury present on admission atop chronic kidney disease  Patient is recovering from acute tubular necrosis due to bacteremia and obstructive uropathy  His creatinine today is 1 69 mg/dL  Avoid nephrotoxins and maintain adequate blood pressures with  mean arterial pressure greater than 65  2  Chronic kidney disease stage 3   Baseline creatinine 1 2-1 3 mg/dL  3  Hypocalcemia  Improving with vitamin-D supplements as well as Tums  4  Lower extremity edema  Appears improved with bed rest  5  Severe sepsis with Enterococcus and Proteus bacteremia complicated UTI  Receiving ampicillin 2 g every 8 hours        Follow up reason for today's visit:  Acute kidney injury    Severe sepsis with acute organ dysfunction due to Gram negative bacteria [facility])    Patient Active Problem List   Diagnosis    Chronic indwelling Diallo catheter    Severe sepsis with acute organ dysfunction due to Gram negative bacteria (HCC)    Acute kidney injury - Chronic kidney disease stage 3    Acute metabolic encephalopathy    Gram-negative UTI    Chronic pain syndrome    Elevated troponin    CKD (chronic kidney disease)    Anemia of chronic disease    Mild episode of recurrent major depressive disorder (HCC)    Hypomagnesemia    Bilateral hand pain    Acute respiratory failure with hypoxia    Right hydroureteronephrosis with obstructing calculus    Gram-negative bacteremia    Pneumonia of right lower lobe    Thrombocytopenia          Subjective:   He feels well today  He denies dizziness chest pain shortness of breath abdominal pain nausea vomiting or diarrhea    Objective:     Vitals: Blood pressure 102/58, pulse 63, temperature 97 5 °F (36 4 °C), temperature source Temporal, resp  rate 16, height 5' 9" (1 753 m), weight 84 1 kg (185 lb 5 oz), SpO2 95 %  ,Body mass index is 27 37 kg/m²  Weight (last 2 days)     Date/Time   Weight    01/10/21 0541   84 1 (185 31)    01/09/21 0600   85 2 (187 9)    01/08/21 0535   84 5 (186 3)                Intake/Output Summary (Last 24 hours) at 1/10/2021 1425  Last data filed at 1/10/2021 1409  Gross per 24 hour   Intake 120 ml   Output 2900 ml   Net -2780 ml     I/O last 3 completed shifts: In: 360 [P O :360]  Out: 4800 [Urine:4800]    Urethral Catheter Latex 18 Fr   (Active)   Reasons to continue Urinary Catheter  Chronic urinary catheter 01/09/21 2201   Goal for Removal N/A- chronic cantor 01/09/21 2201   Site Assessment Clean;Skin intact 01/09/21 2201   Collection Container Standard drainage bag 01/09/21 2201   Securement Method Securing device (Describe) 01/09/21 2201   Output (mL) 1500 mL 01/10/21 0300       Physical Exam: /58 (BP Location: Right arm)   Pulse 63   Temp 97 5 °F (36 4 °C) (Temporal)   Resp 16   Ht 5' 9" (1 753 m)   Wt 84 1 kg (185 lb 5 oz)   SpO2 95%   BMI 27 37 kg/m²     General Appearance:    Alert, cooperative, no distress, appears stated age   Head:    Normocephalic, without obvious abnormality, atraumatic   Eyes:    Conjunctiva/corneas clear   Ears:    Normal external ears   Nose:   Nares normal, septum midline, mucosa normal, no drainage    or sinus tenderness   Throat:   Lips, mucosa, and tongue normal; teeth and gums normal   Neck:   Supple, symmetrical, trachea midline, no adenopathy;        thyroid:  No enlargement/tenderness/nodules; no carotid    bruit or JVD   Back:     Symmetric, no curvature, ROM normal, no CVA tenderness   Lungs:     Clear to auscultation bilaterally, respirations unlabored   Chest wall:    No tenderness or deformity   Heart:    Regular rate and rhythm, S1 and S2 normal, no murmur, rub   or gallop   Abdomen:     Soft, non-tender, bowel sounds active cantor draining well   Extremities:   Extremities normal, atraumatic, no cyanosis or edema   Skin:   Skin color, texture, turgor normal, no rashes or lesions   Lymph nodes:   Cervical normal   Neurologic:   CNII-XII intact            Lab, Imaging and other studies: I have personally reviewed pertinent labs  CBC:   Lab Results   Component Value Date    WBC 7 80 01/10/2021    HGB 9 4 (L) 01/10/2021    HCT 28 3 (L) 01/10/2021    MCV 97 01/10/2021     (H) 01/10/2021    MCH 32 1 01/10/2021    MCHC 33 2 01/10/2021    RDW 13 2 01/10/2021    MPV 8 6 01/10/2021     CMP:   Lab Results   Component Value Date    K 4 1 01/10/2021     01/10/2021    CO2 29 01/10/2021    BUN 28 (H) 01/10/2021    CREATININE 1 69 (H) 01/10/2021    CALCIUM 8 7 01/10/2021    EGFR 38 01/10/2021         Results from last 7 days   Lab Units 01/10/21  0503 01/08/21  0531 01/07/21  0610  01/05/21  0540   POTASSIUM mmol/L 4 1 3 9 4 2   < > 3 9   CHLORIDE mmol/L 104 106 103   < > 104   CO2 mmol/L 29 29 30   < > 28   BUN mg/dL 28* 26* 27*   < > 26*   CREATININE mg/dL 1 69* 1 84* 1 70*   < > 1 54*   CALCIUM mg/dL 8 7 8 4* 8 3*   < > 7 9*   ALK PHOS U/L  --   --   --   --  59   ALT U/L  --   --   --   --  26   AST U/L  --   --   --   --  16    < > = values in this interval not displayed  Phosphorus: No results found for: PHOS  Magnesium: No results found for: MG  Urinalysis: No results found for: [place] Michael, SPECGRAV, PHUR, LEUKOCYTESUR, NITRITE, PROTEINUA, GLUCOSEU, KETONESU, BILIRUBINUR, BLOODU  Ionized Calcium: No results found for: CAION  Coagulation: No results found for: PT, INR, APTT  Troponin: No results found for: TROPONINI  ABG: No results found for: PHART, NDP1ZBE, PO2ART, FBY4SIV, A8IZYFQW, BEART, SOURCE  Radiology review:     IMAGING  No results found      Current Facility-Administered Medications   Medication Dose Route Frequency    acetaminophen (TYLENOL) tablet 650 mg  650 mg Oral Q4H PRN    ampicillin (OMNIPEN) 2,000 mg in sodium chloride 0 9 % 100 mL IVPB  2,000 mg Intravenous Q8H    ascorbic acid (VITAMIN C) tablet 500 mg  500 mg Oral Daily    calcium carbonate (TUMS) chewable tablet 500 mg  500 mg Oral BID    cholecalciferol (VITAMIN D3) tablet 4,000 Units  4,000 Units Oral Daily    cloNIDine (CATAPRES) tablet 0 1 mg  0 1 mg Oral Q3H PRN    diphenhydrAMINE (BENADRYL) tablet 50 mg  50 mg Oral HS PRN    docusate sodium (COLACE) capsule 100 mg  100 mg Oral BID    ferrous sulfate tablet 325 mg  325 mg Oral Daily With Breakfast    finasteride (PROSCAR) tablet 5 mg  5 mg Oral Daily    gabapentin (NEURONTIN) capsule 400 mg  400 mg Oral HS    heparin (porcine) subcutaneous injection 5,000 Units  5,000 Units Subcutaneous Q8H [address]    morphine (MSIR) IR tablet 15 mg  15 mg Oral BID    ondansetron (ZOFRAN) injection 4 mg  4 mg Intravenous Q6H PRN    sertraline (ZOLOFT) tablet 50 mg  50 mg Oral Daily     There are no discontinued medications  Clarence Blandon MD      This progress note was produced in part using a dictation device which may document imprecise wording from author's original intent

## 2021-01-10 NOTE — PLAN OF CARE
Problem: DISCHARGE PLANNING - CARE MANAGEMENT  Goal: Discharge to post-acute care or home with appropriate resources  Description: INTERVENTIONS:  - Conduct assessment to determine patient/family and health care team treatment goals, and need for post-acute services based on payer coverage, community resources, and patient preferences, and barriers to discharge  - Address psychosocial, clinical, and financial barriers to discharge as identified in assessment in conjunction with the patient/family and health care team  - Arrange appropriate level of post-acute services according to patients   needs and preference and payer coverage in collaboration with the physician and health care team  - Communicate with and update the patient/family, physician, and health care team regarding progress on the discharge plan  - Arrange appropriate transportation to post-acute venues  Outcome: Progressing     Problem: PAIN - ADULT  Goal: Verbalizes/displays adequate comfort level or baseline comfort level  Description: Interventions:  - Encourage patient to monitor pain and request assistance  - Assess pain using appropriate pain scale  - Administer analgesics based on type and severity of pain and evaluate response  - Implement non-pharmacological measures as appropriate and evaluate response  - Consider cultural and social influences on pain and pain management  - Notify physician/advanced practitioner if interventions unsuccessful or patient reports new pain  Outcome: Progressing     Problem: INFECTION - ADULT  Goal: Absence or prevention of progression during hospitalization  Description: INTERVENTIONS:  - Assess and monitor for signs and symptoms of infection  - Monitor lab/diagnostic results  - Monitor all insertion sites, i e  indwelling lines, tubes, and drains  - Monitor endotracheal if appropriate and nasal secretions for changes in amount and color  - [place] appropriate cooling/warming therapies per order  - Administer medications as ordered  - Instruct and encourage patient and family to use good hand hygiene technique  - Identify and instruct in appropriate isolation precautions for identified infection/condition  Outcome: Progressing  Goal: Absence of fever/infection during neutropenic period  Description: INTERVENTIONS:  - Monitor WBC    Outcome: Progressing     Problem: SAFETY ADULT  Goal: Patient will remain free of falls  Description: INTERVENTIONS:  - Assess patient frequently for physical needs  -  Identify cognitive and physical deficits and behaviors that affect risk of falls    -  [place] fall precautions as indicated by assessment   - Educate patient/family on patient safety including physical limitations  - Instruct patient to call for assistance with activity based on assessment  - Modify environment to reduce risk of injury  - Consider OT/PT consult to assist with strengthening/mobility  Outcome: Progressing  Goal: Maintain or return to baseline ADL function  Description: INTERVENTIONS:  -  Assess patient's ability to carry out ADLs; assess patient's baseline for ADL function and identify physical deficits which impact ability to perform ADLs (bathing, care of mouth/teeth, toileting, grooming, dressing, etc )  - Assess/evaluate cause of self-care deficits   - Assess range of motion  - Assess patient's mobility; develop plan if impaired  - Assess patient's need for assistive devices and provide as appropriate  - Encourage maximum independence but intervene and supervise when necessary  - Involve family in performance of ADLs  - Assess for home care needs following discharge   - Consider OT consult to assist with ADL evaluation and planning for discharge  - Provide patient education as appropriate  Outcome: Progressing  Goal: Maintain or return mobility status to optimal level  Description: INTERVENTIONS:  - Assess patient's baseline mobility status (ambulation, transfers, stairs, etc )    - Identify cognitive and physical deficits and behaviors that affect mobility  - Identify mobility aids required to assist with transfers and/or ambulation (gait belt, sit-to-stand, lift, walker, cane, etc )  - [place] fall precautions as indicated by assessment  - Record patient progress and toleration of activity level on Mobility SBAR; progress patient to next Phase/Stage  - Instruct patient to call for assistance with activity based on assessment  - Consider rehabilitation consult to assist with strengthening/weightbearing, etc   Outcome: Progressing     Problem: DISCHARGE PLANNING  Goal: Discharge to home or other facility with appropriate resources  Description: INTERVENTIONS:  - Identify barriers to discharge w/patient and caregiver  - Arrange for needed discharge resources and transportation as appropriate  - Identify discharge learning needs (meds, wound care, etc )  - Arrange for interpretive services to assist at discharge as needed  - Refer to Case Management Department for coordinating discharge planning if the patient needs post-hospital services based on physician/advanced practitioner order or complex needs related to functional status, cognitive ability, or social support system  Outcome: Progressing     Problem: Knowledge Deficit  Goal: Patient/family/caregiver demonstrates understanding of disease process, treatment plan, medications, and discharge instructions  Description: Complete learning assessment and assess knowledge base  Interventions:  - Provide teaching at level of understanding  - Provide teaching via preferred learning methods  Outcome: Progressing     Problem: Potential for Falls  Goal: Patient will remain free of falls  Description: INTERVENTIONS:  - Assess patient frequently for physical needs  -  Identify cognitive and physical deficits and behaviors that affect risk of falls    -  [place] fall precautions as indicated by assessment   - Educate patient/family on patient safety including physical limitations  - Instruct patient to call for assistance with activity based on assessment  - Modify environment to reduce risk of injury  - Consider OT/PT consult to assist with strengthening/mobility  Outcome: Progressing     Problem: PAIN - ADULT  Goal: Verbalizes/displays adequate comfort level or baseline comfort level  Description: Interventions:  - Encourage patient to monitor pain and request assistance  - Assess pain using appropriate pain scale  - Administer analgesics based on type and severity of pain and evaluate response  - Implement non-pharmacological measures as appropriate and evaluate response  - Consider cultural and social influences on pain and pain management  - Notify physician/advanced practitioner if interventions unsuccessful or patient reports new pain  Outcome: Progressing     Problem: INFECTION - ADULT  Goal: Absence or prevention of progression during hospitalization  Description: INTERVENTIONS:  - Assess and monitor for signs and symptoms of infection  - Monitor lab/diagnostic results  - Monitor all insertion sites, i e  indwelling lines, tubes, and drains  - Monitor endotracheal if appropriate and nasal secretions for changes in amount and color  - [place] appropriate cooling/warming therapies per order  - Administer medications as ordered  - Instruct and encourage patient and family to use good hand hygiene technique  - Identify and instruct in appropriate isolation precautions for identified infection/condition  Outcome: Progressing  Goal: Absence of fever/infection during neutropenic period  Description: INTERVENTIONS:  - Monitor WBC    Outcome: Progressing     Problem: SAFETY ADULT  Goal: Patient will remain free of falls  Description: INTERVENTIONS:  - Assess patient frequently for physical needs  -  Identify cognitive and physical deficits and behaviors that affect risk of falls    -  [place] fall precautions as indicated by assessment   - Educate patient/family on patient safety including physical limitations  - Instruct patient to call for assistance with activity based on assessment  - Modify environment to reduce risk of injury  - Consider OT/PT consult to assist with strengthening/mobility  Outcome: Progressing  Goal: Maintain or return to baseline ADL function  Description: INTERVENTIONS:  -  Assess patient's ability to carry out ADLs; assess patient's baseline for ADL function and identify physical deficits which impact ability to perform ADLs (bathing, care of mouth/teeth, toileting, grooming, dressing, etc )  - Assess/evaluate cause of self-care deficits   - Assess range of motion  - Assess patient's mobility; develop plan if impaired  - Assess patient's need for assistive devices and provide as appropriate  - Encourage maximum independence but intervene and supervise when necessary  - Involve family in performance of ADLs  - Assess for home care needs following discharge   - Consider OT consult to assist with ADL evaluation and planning for discharge  - Provide patient education as appropriate  Outcome: Progressing  Goal: Maintain or return mobility status to optimal level  Description: INTERVENTIONS:  - Assess patient's baseline mobility status (ambulation, transfers, stairs, etc )    - Identify cognitive and physical deficits and behaviors that affect mobility  - Identify mobility aids required to assist with transfers and/or ambulation (gait belt, sit-to-stand, lift, walker, cane, etc )  - [place] fall precautions as indicated by assessment  - Record patient progress and toleration of activity level on Mobility SBAR; progress patient to next Phase/Stage  - Instruct patient to call for assistance with activity based on assessment  - Consider rehabilitation consult to assist with strengthening/weightbearing, etc   Outcome: Progressing     Problem: DISCHARGE PLANNING  Goal: Discharge to home or other facility with appropriate resources  Description: INTERVENTIONS:  - Identify barriers to discharge w/patient and caregiver  - Arrange for needed discharge resources and transportation as appropriate  - Identify discharge learning needs (meds, wound care, etc )  - Arrange for interpretive services to assist at discharge as needed  - Refer to Case Management Department for coordinating discharge planning if the patient needs post-hospital services based on physician/advanced practitioner order or complex needs related to functional status, cognitive ability, or social support system  Outcome: Progressing     Problem: Prexisting or High Potential for Compromised Skin Integrity  Goal: Skin integrity is maintained or improved  Description: INTERVENTIONS:  - Identify patients at risk for skin breakdown  - Assess and monitor skin integrity  - Assess and monitor nutrition and hydration status  - Monitor labs   - Assess for incontinence   - Turn and reposition patient  - Assist with mobility/ambulation  - Relieve pressure over bony prominences  - Avoid friction and shearing  - Provide appropriate hygiene as needed including keeping skin clean and dry  - Evaluate need for skin moisturizer/barrier cream  - Collaborate with interdisciplinary team   - Patient/family teaching  - Consider wound care consult   Outcome: Progressing

## 2021-01-11 LAB
ANION GAP SERPL CALCULATED.3IONS-SCNC: 6 MMOL/L (ref 4–13)
BUN SERPL-MCNC: 27 MG/DL (ref 7–25)
CALCIUM SERPL-MCNC: 9 MG/DL (ref 8.6–10.5)
CHLORIDE SERPL-SCNC: 102 MMOL/L (ref 98–107)
CO2 SERPL-SCNC: 31 MMOL/L (ref 21–31)
CREAT SERPL-MCNC: 1.78 MG/DL (ref 0.7–1.3)
GFR SERPL CREATININE-BSD FRML MDRD: 36 ML/MIN/1.73SQ M
GLUCOSE P FAST SERPL-MCNC: 94 MG/DL (ref 65–99)
GLUCOSE SERPL-MCNC: 94 MG/DL (ref 65–99)
POTASSIUM SERPL-SCNC: 4.7 MMOL/L (ref 3.5–5.5)
SODIUM SERPL-SCNC: 139 MMOL/L (ref 134–143)

## 2021-01-11 PROCEDURE — 97535 SELF CARE MNGMENT TRAINING: CPT

## 2021-01-11 PROCEDURE — 99231 SBSQ HOSP IP/OBS SF/LOW 25: CPT | Performed by: PHYSICAL MEDICINE & REHABILITATION

## 2021-01-11 PROCEDURE — 80048 BASIC METABOLIC PNL TOTAL CA: CPT | Performed by: INTERNAL MEDICINE

## 2021-01-11 PROCEDURE — 97116 GAIT TRAINING THERAPY: CPT

## 2021-01-11 PROCEDURE — 97530 THERAPEUTIC ACTIVITIES: CPT

## 2021-01-11 PROCEDURE — 97537 COMMUNITY/WORK REINTEGRATION: CPT

## 2021-01-11 PROCEDURE — 99232 SBSQ HOSP IP/OBS MODERATE 35: CPT | Performed by: NURSE PRACTITIONER

## 2021-01-11 PROCEDURE — 97110 THERAPEUTIC EXERCISES: CPT

## 2021-01-11 RX ADMIN — HEPARIN SODIUM 5000 UNITS: 5000 INJECTION INTRAVENOUS; SUBCUTANEOUS at 05:00

## 2021-01-11 RX ADMIN — AMPICILLIN SODIUM 2000 MG: 2 INJECTION, POWDER, FOR SOLUTION INTRAMUSCULAR; INTRAVENOUS at 02:14

## 2021-01-11 RX ADMIN — OXYCODONE HYDROCHLORIDE AND ACETAMINOPHEN 500 MG: 500 TABLET ORAL at 08:34

## 2021-01-11 RX ADMIN — HEPARIN SODIUM 5000 UNITS: 5000 INJECTION INTRAVENOUS; SUBCUTANEOUS at 21:19

## 2021-01-11 RX ADMIN — HEPARIN SODIUM 5000 UNITS: 5000 INJECTION INTRAVENOUS; SUBCUTANEOUS at 14:07

## 2021-01-11 RX ADMIN — GABAPENTIN 400 MG: 400 CAPSULE ORAL at 21:19

## 2021-01-11 RX ADMIN — SERTRALINE HYDROCHLORIDE 50 MG: 50 TABLET ORAL at 08:34

## 2021-01-11 RX ADMIN — MORPHINE SULFATE 15 MG: 15 TABLET ORAL at 08:34

## 2021-01-11 RX ADMIN — CALCIUM CARBONATE (ANTACID) CHEW TAB 500 MG 500 MG: 500 CHEW TAB at 21:19

## 2021-01-11 RX ADMIN — Medication 4000 UNITS: at 08:33

## 2021-01-11 RX ADMIN — CALCIUM CARBONATE (ANTACID) CHEW TAB 500 MG 500 MG: 500 CHEW TAB at 08:33

## 2021-01-11 RX ADMIN — AMPICILLIN SODIUM 2000 MG: 2 INJECTION, POWDER, FOR SOLUTION INTRAMUSCULAR; INTRAVENOUS at 09:46

## 2021-01-11 RX ADMIN — FINASTERIDE 5 MG: 5 TABLET, FILM COATED ORAL at 08:34

## 2021-01-11 RX ADMIN — MORPHINE SULFATE 15 MG: 15 TABLET ORAL at 17:34

## 2021-01-11 RX ADMIN — AMPICILLIN SODIUM 2000 MG: 2 INJECTION, POWDER, FOR SOLUTION INTRAMUSCULAR; INTRAVENOUS at 17:35

## 2021-01-11 RX ADMIN — FERROUS SULFATE TAB 325 MG (65 MG ELEMENTAL FE) 325 MG: 325 (65 FE) TAB at 07:04

## 2021-01-11 NOTE — PCC PHYSICAL THERAPY
1/11/2021:  Pt participating in and making gains with [facility] PT  Pt performs bed mobility with Supervision, transfers with RW and S, ambulation up to 56' with RW and Supervision, stair negotiation of 14 steps with B hand rails and Supervision, and ramp negotiation with RW and Supervision  Pt requires assist to manage cantor bag and VCs for safety during all functional mobility  Pt presents with decreased balance, endurance, strength, safety awareness, and cognition  Pt would continue to benefit from skilled ARC PT to address these deficits prior to safe and independent d/c home planned for tomorrow with family support

## 2021-01-11 NOTE — PLAN OF CARE
Problem: DISCHARGE PLANNING - CARE MANAGEMENT  Goal: Discharge to post-acute care or home with appropriate resources  Description: INTERVENTIONS:  - Conduct assessment to determine patient/family and health care team treatment goals, and need for post-acute services based on payer coverage, community resources, and patient preferences, and barriers to discharge  - Address psychosocial, clinical, and financial barriers to discharge as identified in assessment in conjunction with the patient/family and health care team  - Arrange appropriate level of post-acute services according to patients   needs and preference and payer coverage in collaboration with the physician and health care team  - Communicate with and update the patient/family, physician, and health care team regarding progress on the discharge plan  - Arrange appropriate transportation to post-acute venues  Outcome: Progressing     Problem: PAIN - ADULT  Goal: Verbalizes/displays adequate comfort level or baseline comfort level  Description: Interventions:  - Encourage patient to monitor pain and request assistance  - Assess pain using appropriate pain scale  - Administer analgesics based on type and severity of pain and evaluate response  - Implement non-pharmacological measures as appropriate and evaluate response  - Consider cultural and social influences on pain and pain management  - Notify physician/advanced practitioner if interventions unsuccessful or patient reports new pain  Outcome: Progressing     Problem: INFECTION - ADULT  Goal: Absence or prevention of progression during hospitalization  Description: INTERVENTIONS:  - Assess and monitor for signs and symptoms of infection  - Monitor lab/diagnostic results  - Monitor all insertion sites, i e  indwelling lines, tubes, and drains  - Monitor endotracheal if appropriate and nasal secretions for changes in amount and color  - [facility] appropriate cooling/warming therapies per order  - Administer medications as ordered  - Instruct and encourage patient and family to use good hand hygiene technique  - Identify and instruct in appropriate isolation precautions for identified infection/condition  Outcome: Progressing  Goal: Absence of fever/infection during neutropenic period  Description: INTERVENTIONS:  - Monitor WBC    Outcome: Progressing     Problem: SAFETY ADULT  Goal: Patient will remain free of falls  Description: INTERVENTIONS:  - Assess patient frequently for physical needs  -  Identify cognitive and physical deficits and behaviors that affect risk of falls    -  [place] fall precautions as indicated by assessment   - Educate patient/family on patient safety including physical limitations  - Instruct patient to call for assistance with activity based on assessment  - Modify environment to reduce risk of injury  - Consider OT/PT consult to assist with strengthening/mobility  Outcome: Progressing  Goal: Maintain or return to baseline ADL function  Description: INTERVENTIONS:  -  Assess patient's ability to carry out ADLs; assess patient's baseline for ADL function and identify physical deficits which impact ability to perform ADLs (bathing, care of mouth/teeth, toileting, grooming, dressing, etc )  - Assess/evaluate cause of self-care deficits   - Assess range of motion  - Assess patient's mobility; develop plan if impaired  - Assess patient's need for assistive devices and provide as appropriate  - Encourage maximum independence but intervene and supervise when necessary  - Involve family in performance of ADLs  - Assess for home care needs following discharge   - Consider OT consult to assist with ADL evaluation and planning for discharge  - Provide patient education as appropriate  Outcome: Progressing  Goal: Maintain or return mobility status to optimal level  Description: INTERVENTIONS:  - Assess patient's baseline mobility status (ambulation, transfers, stairs, etc )    - Identify cognitive and physical deficits and behaviors that affect mobility  - Identify mobility aids required to assist with transfers and/or ambulation (gait belt, sit-to-stand, lift, walker, cane, etc )  - [place] fall precautions as indicated by assessment  - Record patient progress and toleration of activity level on Mobility SBAR; progress patient to next Phase/Stage  - Instruct patient to call for assistance with activity based on assessment  - Consider rehabilitation consult to assist with strengthening/weightbearing, etc   Outcome: Progressing     Problem: DISCHARGE PLANNING  Goal: Discharge to home or other facility with appropriate resources  Description: INTERVENTIONS:  - Identify barriers to discharge w/patient and caregiver  - Arrange for needed discharge resources and transportation as appropriate  - Identify discharge learning needs (meds, wound care, etc )  - Arrange for interpretive services to assist at discharge as needed  - Refer to Case Management Department for coordinating discharge planning if the patient needs post-hospital services based on physician/advanced practitioner order or complex needs related to functional status, cognitive ability, or social support system  Outcome: Progressing     Problem: Knowledge Deficit  Goal: Patient/family/caregiver demonstrates understanding of disease process, treatment plan, medications, and discharge instructions  Description: Complete learning assessment and assess knowledge base  Interventions:  - Provide teaching at level of understanding  - Provide teaching via preferred learning methods  Outcome: Progressing     Problem: Potential for Falls  Goal: Patient will remain free of falls  Description: INTERVENTIONS:  - Assess patient frequently for physical needs  -  Identify cognitive and physical deficits and behaviors that affect risk of falls    -  [place] fall precautions as indicated by assessment   - Educate patient/family on patient safety including physical limitations  - Instruct patient to call for assistance with activity based on assessment  - Modify environment to reduce risk of injury  - Consider OT/PT consult to assist with strengthening/mobility  Outcome: Progressing     Problem: PAIN - ADULT  Goal: Verbalizes/displays adequate comfort level or baseline comfort level  Description: Interventions:  - Encourage patient to monitor pain and request assistance  - Assess pain using appropriate pain scale  - Administer analgesics based on type and severity of pain and evaluate response  - Implement non-pharmacological measures as appropriate and evaluate response  - Consider cultural and social influences on pain and pain management  - Notify physician/advanced practitioner if interventions unsuccessful or patient reports new pain  Outcome: Progressing     Problem: INFECTION - ADULT  Goal: Absence or prevention of progression during hospitalization  Description: INTERVENTIONS:  - Assess and monitor for signs and symptoms of infection  - Monitor lab/diagnostic results  - Monitor all insertion sites, i e  indwelling lines, tubes, and drains  - Monitor endotracheal if appropriate and nasal secretions for changes in amount and color  - [place] appropriate cooling/warming therapies per order  - Administer medications as ordered  - Instruct and encourage patient and family to use good hand hygiene technique  - Identify and instruct in appropriate isolation precautions for identified infection/condition  Outcome: Progressing  Goal: Absence of fever/infection during neutropenic period  Description: INTERVENTIONS:  - Monitor WBC    Outcome: Progressing     Problem: SAFETY ADULT  Goal: Patient will remain free of falls  Description: INTERVENTIONS:  - Assess patient frequently for physical needs  -  Identify cognitive and physical deficits and behaviors that affect risk of falls    -  [place] fall precautions as indicated by assessment   - Educate patient/family on patient safety including physical limitations  - Instruct patient to call for assistance with activity based on assessment  - Modify environment to reduce risk of injury  - Consider OT/PT consult to assist with strengthening/mobility  Outcome: Progressing  Goal: Maintain or return to baseline ADL function  Description: INTERVENTIONS:  -  Assess patient's ability to carry out ADLs; assess patient's baseline for ADL function and identify physical deficits which impact ability to perform ADLs (bathing, care of mouth/teeth, toileting, grooming, dressing, etc )  - Assess/evaluate cause of self-care deficits   - Assess range of motion  - Assess patient's mobility; develop plan if impaired  - Assess patient's need for assistive devices and provide as appropriate  - Encourage maximum independence but intervene and supervise when necessary  - Involve family in performance of ADLs  - Assess for home care needs following discharge   - Consider OT consult to assist with ADL evaluation and planning for discharge  - Provide patient education as appropriate  Outcome: Progressing  Goal: Maintain or return mobility status to optimal level  Description: INTERVENTIONS:  - Assess patient's baseline mobility status (ambulation, transfers, stairs, etc )    - Identify cognitive and physical deficits and behaviors that affect mobility  - Identify mobility aids required to assist with transfers and/or ambulation (gait belt, sit-to-stand, lift, walker, cane, etc )  - [place] fall precautions as indicated by assessment  - Record patient progress and toleration of activity level on Mobility SBAR; progress patient to next Phase/Stage  - Instruct patient to call for assistance with activity based on assessment  - Consider rehabilitation consult to assist with strengthening/weightbearing, etc   Outcome: Progressing     Problem: DISCHARGE PLANNING  Goal: Discharge to home or other facility with appropriate resources  Description: INTERVENTIONS:  - Identify barriers to discharge w/patient and caregiver  - Arrange for needed discharge resources and transportation as appropriate  - Identify discharge learning needs (meds, wound care, etc )  - Arrange for interpretive services to assist at discharge as needed  - Refer to Case Management Department for coordinating discharge planning if the patient needs post-hospital services based on physician/advanced practitioner order or complex needs related to functional status, cognitive ability, or social support system  Outcome: Progressing     Problem: Prexisting or High Potential for Compromised Skin Integrity  Goal: Skin integrity is maintained or improved  Description: INTERVENTIONS:  - Identify patients at risk for skin breakdown  - Assess and monitor skin integrity  - Assess and monitor nutrition and hydration status  - Monitor labs   - Assess for incontinence   - Turn and reposition patient  - Assist with mobility/ambulation  - Relieve pressure over bony prominences  - Avoid friction and shearing  - Provide appropriate hygiene as needed including keeping skin clean and dry  - Evaluate need for skin moisturizer/barrier cream  - Collaborate with interdisciplinary team   - Patient/family teaching  - Consider wound care consult   Outcome: Progressing

## 2021-01-11 NOTE — PROGRESS NOTES
NEPHROLOGY PROGRESS NOTE   Katherine Cordero 66 y o  male MRN: [id number]  Unit/Bed#: [facility] 210-01 Encounter: [id number]    Assessment/Plan:    65 yo male status post acute care stay from 12/27-01/05/2021 for urosepsis, obstructive uropathy status post retrograde pyelogram and ureteral stenting 12/29/2020, acute kidney injury now residing in inpatient rehabilitation  Renal following along for ATN acute kidney injury which is unresolved  He is tentative discharge for tomorrow  1  ATN acute kidney injury (POA) atop chronic kidney disease  · Unresolved acute kidney injury due to acute tubular necrosis  Renal function remains above baseline but stable  Urinary catheter is patent and draining  · Will continue to follow on in the outpatient setting  · Avoid nephrotoxins avoid wide variation in blood pressure  2  Stage 3 chronic kidney disease with baseline creatinine around 1 2-1 3 mg/dL  3  Proteinuria  · Will rule out monoclonal gammopathy in the outpatient setting  ACE-inhibitor/arb precluded at this time  4  Hypocalcemia  · And improving with vitamin-D supplementation and Tums in-between meals  Most recent ionized calcium is normal  5  Lower extremity edema  · Left greater than right and improved with bed rest   No need for diuretics at this time  Continue low-sodium diet  6  Severe sepsis (resolved), Enterococcus and Proteus bacteremia, complicated UTI  · Management per Infectious Disease  7  Right ureteropelvic junction obstruction status post right ureteral stent placement on 12/29/2020  · Will follow with Urology as an outpatient    ROS  No physical complaints on exam A complete 10 point review of systems have been performed and are otherwise negative         Historical Information   Past Medical History:   Diagnosis Date    Depression     Diallo catheter in place     Hypertension     Prostate enlargement     Psychiatric disorder     Urinary tract infection      Past Surgical History:   Procedure Laterality Date    ARTHROSCOPY KNEE      BACK SURGERY      L 4 or 5    CATARACT EXTRACTION Bilateral     CYSTOSCOPY W/ LASER LITHOTRIPSY N/A 8/20/2018    Procedure: LITHOTRISPY HOLMIUM LASER of bladder stones;  Surgeon: Yelena Horton MD;  Location: [facility] MAIN OR;  Service: Urology    [place] CYSTOURETHROSCOPY,URETER CATHETER Right 12/29/2020    Procedure: CYSTOSCOPY RETROGRADE PYELOGRAM WITH INSERTION STENT URETERAL;  Surgeon: Yelena Horton MD;  Location: [facility] MAIN OR;  Service: Urology    [place] TRANSURETHRAL ELEC-SURG PROSTATECTOM N/A 8/20/2018    Procedure: Tona Mcdonough; TURP;  Surgeon: Yelena Horton MD;  Location: [facility] MAIN OR;  Service: Urology    SHOULDER SURGERY Right 05/31/2019     Social History   Social History     Substance and Sexual Activity   Alcohol Use Never    Frequency: Monthly or less    Drinks per session: 1 or 2    Binge frequency: Less than monthly     Social History     Substance and Sexual Activity   Drug Use Never     Social History     Tobacco Use   Smoking Status Former Smoker    Packs/day: 1 00   Smokeless Tobacco Never Used   Tobacco Comment    quit 50 years ago       Family History:   Family History   Problem Relation Age of Onset    Cancer Mother     Diabetes Father        Medications:  Pertinent medications were reviewed  Current Facility-Administered Medications   Medication Dose Route Frequency Provider Last Rate    acetaminophen  650 mg Oral Q4H PRN Mary Parrish PA-C      ampicillin  2,000 mg Intravenous Q8H Lizzette Olivas PA-C 2,000 mg (01/11/21 0946)    ascorbic acid  500 mg Oral Daily Mary Parrish PA-C      calcium carbonate  500 mg Oral BID Mary Parrish PA-C      cholecalciferol  4,000 Units Oral Daily Mary Parrish PA-C      cloNIDine  0 1 mg Oral Q3H PRN Mary Parrish PA-C      diphenhydrAMINE  50 mg Oral HS PRN Mary Parrish PA-C      docusate sodium  100 mg Oral BID Mary Parrish PA-C      ferrous sulfate  325 mg Oral Daily With Breakfast Ruel SchwartzMARIAN      finasteride  5 mg Oral Daily Ruel SchwartzMARIAN      gabapentin  400 mg Oral HS Ruel SchwartzMARIAN      heparin (porcine)  5,000 Units Subcutaneous [facility] Braulio Baum      morphine  15 mg Oral BID Ruel SchwartzMARIAN      ondansetron  4 mg Intravenous Q6H PRN Ruel SchwartzMARIAN      sertraline  50 mg Oral Daily Ruel SchwartzMARIAN           No Known Allergies      Vitals:   /69 (BP Location: Left arm)   Pulse (!) 54   Temp 97 6 °F (36 4 °C) (Temporal)   Resp 20   Ht 5' 9" (1 753 m)   Wt 82 4 kg (181 lb 11 2 oz)   SpO2 95%   BMI 26 83 kg/m²   Body mass index is 26 83 kg/m²  SpO2: 95 %,   SpO2 Activity: At Rest,   O2 Device: None (Room air)      Intake/Output Summary (Last 24 hours) at 1/11/2021 1140  Last data filed at 1/11/2021 0858  Gross per 24 hour   Intake 240 ml   Output 2600 ml   Net -2360 ml     Invasive Devices     Peripheral Intravenous Line            Peripheral IV 01/09/21 Dorsal (posterior); Left Forearm 1 day          Drain            Ureteral Drain/Stent Right ureter 6 Fr  12 days    Urethral Catheter Latex 18 Fr  12 days                Physical Exam  General: conscious, cooperative, in no acute distress  Eyes: conjunctivae pink, anicteric sclerae  ENT: lips and mucous membranes moist  Neck: supple, no JVD, no masses  Chest: clear breath sounds bilateral, no crackles, ronchus or wheezings  CVS: distinct S1 & S2, normal rate, regular rhythm  Abdomen: soft, non-tender, non-distended, normoactive bowel sounds  Extremities:  Lower Extremity edema left greater than right  Skin: no rash  Neuro: awake, alert, oriented very hard of hearing      Diagnostic Data:  Lab: I have personally reviewed pertinent lab results  ,   CBC:  Results from last 7 days   Lab Units 01/10/21  0503   WBC Thousand/uL 7 80   HEMOGLOBIN g/dL 9 4*   HEMATOCRIT % 28 3*   PLATELETS Thousands/uL 437*      CMP:   Lab Results   Component Value Date    SODIUM 139 01/11/2021    K 4 7 01/11/2021     01/11/2021    CO2 31 01/11/2021    BUN 27 (H) 01/11/2021    CREATININE 1 78 (H) 01/11/2021    CALCIUM 9 0 01/11/2021    EGFR 36 01/11/2021   ,   PT/INR: No results found for: PT, INR,   Magnesium: No components found for: MAG,  Phosphorous: No results found for: PHOS    Microbiology:  @[facility],(urinecx:7)@        UBALDO Onofre    Portions of the record may have been created with voice recognition software  Occasional wrong word or "sound a like" substitutions may have occurred due to the inherent limitations of voice recognition software  Read the chart carefully and recognize, using context, where substitutions have occurred

## 2021-01-11 NOTE — PCC OCCUPATIONAL THERAPY
1/11/21: Pt participates in ADLs, transfers/ mobility and BUE therex  Pt is making gains towards goals requiring assist due to decreased balance, safety, endurance, cognition and cantor use  Pt is making gains towards goals and has stated he prefers to leave tomorrow however continued assist and supervision required  Pt will benefit from continued skilled OT services to increase independence and will have assist from daughter and HHCS during transition to home setting

## 2021-01-11 NOTE — PROGRESS NOTES
Physical Medicine and Rehabilitation Progress Note  Valorie Calhoun 66 y o  male MRN: [id number]  Unit/Bed#: -01 Encounter: [id number]    HPI:   Valorie Calhoun is a 66 y o  male who presented to the [facility] ER on 12/27/20 with change in mental status, vomiting, cloudy urine, decreased urinary output, chills and rigors  Pt diagnosed with sepsis secondary to UTI  This was noted by RUTHANN, lactic acidosis, tachycardia and tachypnea  Pt with chronic cantor catheter and h/o recurrent UTIs  Urine culture positive for proteus mirabilis, enterococcus faecalis and alcaligenes faecalis  Pt received 1 dose cefrtiaxone and vancomycin in the ED and was then transitioned to pip/isidro then ampicillin on 12/30  Per ID consult he will continue on ampicillin through 1/11 and Flagyl 500 mg PO TID will be added  Repeat blood cultures on 12/28 were negative  Pt also with acute metabolic encephalopathy  He is currently A&Ox4  Pts hospitilization was also complicated by right hydroureteronephrosis with obstructing calculus for which he had a retrograde pyelogram with ureteral stenting on 12/30, thrombocytopenia, right lower lobe pneumonia, acute respiratory failure with hypoxia for which he required 4 LPM O2 and is now on RA, hypomagnesemia and RUTHANN  Nephrology, urology, and ID were consulted during his hospital course  Pt was recommended for post acute therapy services  Pt arrived to [address] [place] [address] A 1/5/21  Subjective:  Pt is doing well  Pt has received full dose of IV antibiotics and this will be discontinued after today  Pt is aware that we will have a team meeting to discuss his progress in acute rehab and determine if it is appropriate we send him home  He states that he is tired today but still looks forward to therapies  ROS: A 10 point ROS was performed; negative except as noted above         Assessment/Plan:  Acute metabolic encephalopathy  - Due to sepsis/infections coupled with RUTHANN  -gram negative bacteremia proteus mirabilis and enterococcus faecalis  - Improving    - Cont   Clinical monitoring  -Acute chomprehensive interdisciplinary inpatient rehabilitation to include intensive skilled therapies as outlines with oversight and management by a rehabilitation Physician Assistant overseen by rehabilitation physician as well as inpatient rehabilitation nursing, case management and weekly interdisciplinary team meeting       Severe sepsis  - Resolved tachycardia and tachypnea with leukocytosis and lactic acidosis  - Monitor vital signs and maintain hemodynamics  - Procalcitonin improving; will monitor     Gram negative UTI  - with proteus mirabilis  - on IV Ampicillin since 12/30/30, may dc on 1/11/21  - Flagyl also started 500 mg po TID x3 days     Chronic indwelling cantor catheter  -Cantor changed in [facility]     Right hydroureternephrosis with obstructing calculus  -s/p retrograde pyelogram with ureteral stenting- 12/30/20  -PRN pain/emesis control     Acute respiratory failure with hypoxia  -on room air currently  -most likely due to pneumonia     RUTHANN- chronic kidney disease stage 3  -Baseline creatinine approx 1 2-1 3 mg/dL   -Currently at 1 54 from a 2 71 peak  - Will monitor creatinine  -Occurred due to hydroureteronephrosis  -Nephrology consulted to cont following  -Monitor urine output   -Limit/avoid nephrotoxins if possible     Chronic pain syndrome  - Continous opioid dependence  -cont home meds     Anemia of chronic disease  - Monitor H/H  - Cont ferrous sulfate supplementation     Mild episode of recurrent major depressive disorder  - Cont zoloft     Hypomagnesemia  -Monitor     Gram negative bacteremia  - Secondary to sepsis  -Received a dose of ceftriaxone and vanco in ED; transitioned to pip/isidro then ampicillian 12/30  -Cont ampicillian till 1/11/21  -Cont flagyl 500 mg po TID x3days   -Echo- pending  - Formal ID eval Monday; will consult  - Leukocytosis noted 1/5/21 11-12 k, pt is afebril, cont to trend     Pneumonia of right lower lobe  -on IV ampicillian and flagyl  -aspiration precautions   -maintain O2- encourage incentive spirometry     Thrombocytopenia  -Monitor platelet count  -Monitor for bleeding     Code  -Full code     DVT prophylaxis  -heparin  -SCD                   Scheduled Meds:  Current Facility-Administered Medications   Medication Dose Route Frequency Provider Last Rate    acetaminophen  650 mg Oral Q4H PRN Julee Garner PA-C      ampicillin  2,000 mg Intravenous Q8H MAURICIO Kraus-C 2,000 mg (01/11/21 0214)    ascorbic acid  500 mg Oral Daily Julee Garner PA-C      calcium carbonate  500 mg Oral BID Julee Garner PA-C      cholecalciferol  4,000 Units Oral Daily Julee Garner PA-C      cloNIDine  0 1 mg Oral Q3H PRN Julee Garner PA-C      diphenhydrAMINE  50 mg Oral HS PRN Julee Garner PA-C      docusate sodium  100 mg Oral BID Julee Garner PA-C      ferrous sulfate  325 mg Oral Daily With Breakfast Julee Garner PA-C      finasteride  5 mg Oral Daily Julee Garner PA-C      gabapentin  400 mg Oral HS Julee Garner PA-C      heparin (porcine)  5,000 Units Subcutaneous [facility] Julee Garner PA-C      morphine  15 mg Oral BID Julee Garner PA-C      ondansetron  4 mg Intravenous Q6H PRN Julee Garner PA-C      sertraline  50 mg Oral Daily Julee Garner PA-C         Objective:    Functional Update:  Mobility: moderate assistance  Transfers: Moderate assistance  ADLs: Minimal assistance      Physical Exam:  Temp:  [97 6 °F (36 4 °C)-98 1 °F (36 7 °C)] 97 6 °F (36 4 °C)  HR:  [54-59] 54  Resp:  [18-20] 20  BP: (121-149)/(69) 121/69  SpO2:  [95 %-98 %] 95 %    General: alert, no apparent distress, cooperative and comfortable  HEENT:  Head: Normocephalic, no lesions, without obvious abnormality    Eye: Normal external eye, conjunctiva, lidsc cornea  Ears: Normal external ears  Nose: Normal external nose, mucus membranes  CARDIAC:  regular rate and rhythm, S1, S2 normal, no murmur, click, rub or gallop  LUNGS:  no abnormal respiratory pattern, no retractions noted, non-labored breathing   ABDOMEN:  soft, non-tender, non-distended  EXTREMITIES:  extremities normal, warm and well-perfused; no cyanosis, clubbing, or edema  NEURO:  clear speech, following all commands, oriented x4  PSYCH:  Alert and oriented, appropriate affect  Diagnostic Studies: Labs reviewed  No orders to display       Laboratory: Labs reviewed  Results from last 7 days   Lab Units 01/10/21  0503 01/06/21  0514 01/05/21  0540   HEMOGLOBIN g/dL 9 4* 8 4* 9 0*   HEMATOCRIT % 28 3* 25 3* 27 3*   WBC Thousand/uL 7 80 12 70* 12 30*     Results from last 7 days   Lab Units 01/11/21  0447 01/10/21  0503 01/08/21  0531  01/05/21  0540   BUN mg/dL 27* 28* 26*   < > 26*   SODIUM mmol/L 139 140 140   < > 139   POTASSIUM mmol/L 4 7 4 1 3 9   < > 3 9   CHLORIDE mmol/L 102 104 106   < > 104   CREATININE mg/dL 1 78* 1 69* 1 84*   < > 1 54*   AST U/L  --   --   --   --  16   ALT U/L  --   --   --   --  26    < > = values in this interval not displayed  ** Please Note: Fluency Direct voice to text software may have been used in the creation of this document   **

## 2021-01-11 NOTE — CASE MANAGEMENT
DC instructions reviewed with Pt & Pt's daughter, who expressed an understanding & agreement  Pt being [address] home tomorrow at 2 PM, being transported by family via car, which tx team has determined to be a safe mode of transport  [facility] appts indicated on DC instructions, to be scheduled by Pt per scheduling preferences  HHC arranged with [facility] (Nsg, PT, OT), per Pt preferences from choice list provided  The Pt's current course of Tx & post DC goals of care have been shared with Post-acute care service providers  [facility] IMM reviewed & signed

## 2021-01-11 NOTE — PROGRESS NOTES
01/11/21 1308   Pain Assessment   Pain Assessment Tool Pain Assessment not indicated - pt denies pain   Restrictions/Precautions   Precautions Cognitive; Fall Risk;Limb alert; Diallo  (RUE IV)   Weight Bearing Restrictions No   ROM Restrictions No   Cognition   Overall Cognitive Status Impaired   Orientation Level Oriented X4   Subjective   Subjective "I'm ready to walk "   Sit to Stand   Type of Assistance Needed Supervision;Verbal cues   Amount of Physical Assistance Provided No physical assistance   Sit to Stand CARE Score 4   Bed-Chair Transfer   Type of Assistance Needed Supervision;Verbal cues   Amount of Physical Assistance Provided No physical assistance   Chair/Bed-to-Chair Transfer CARE Score 4   Transfer Bed/Chair/Wheelchair   Limitations Noted In Balance; Endurance;LE Strength   Adaptive Equipment Roller Walker   Stand Pivot Supervision   Sit to Stand Supervision   Stand to Sit Supervision   Walk 10 Feet   Type of Assistance Needed Supervision;Verbal cues   Amount of Physical Assistance Provided No physical assistance   Walk 10 Feet CARE Score 4   Walk 50 Feet with Two Turns   Type of Assistance Needed Supervision;Verbal cues   Amount of Physical Assistance Provided No physical assistance   Walk 50 Feet with Two Turns CARE Score 4   Walk 150 Feet   Type of Assistance Needed Supervision;Verbal cues   Amount of Physical Assistance Provided No physical assistance   Walk 150 Feet CARE Score 4   Walking 10 Feet on Uneven Surfaces   Type of Assistance Needed Supervision;Verbal cues   Amount of Physical Assistance Provided No physical assistance   Walking 10 Feet on Uneven Surfaces CARE Score 4   Ambulation   Does the patient walk? 2  Yes   Primary Mode of Locomotion Prior to Admission Walk   Distance Walked (feet) 278 ft  (278', 249')   Assist Device Roller Walker   Gait Pattern Inconsistant [place]; Slow [place];Decreased foot clearance;Narrow KAREEM;Step through; Forward Flexion   Limitations Noted In Balance; Endurance; Heel Strike; Safety;Posture;Speed;Strength;Swing   Provided Assistance with: Balance   Walk Assist Level Supervision; Chair Follow   Findings level & uneven   Curb or Single Stair   Style negotiated Single stair   Type of Assistance Needed Supervision;Verbal cues   Amount of Physical Assistance Provided No physical assistance   1 Step (Curb) CARE Score 4   4 Steps   Type of Assistance Needed Supervision;Verbal cues   Amount of Physical Assistance Provided No physical assistance   4 Steps CARE Score 4   12 Steps   Type of Assistance Needed Supervision;Verbal cues   Amount of Physical Assistance Provided No physical assistance   12 Steps CARE Score 4   Stairs   Type Stairs; Ramp   # of Steps 14   Assist Devices Bilateral Rail;Roller Walker   Findings CS steps and ramp   Therapeutic Interventions   Strengthening seated BLE therex x30; rest breaks taken as needed   Balance transfers, ambulation, stair/ramp negotiation   Assessment   Treatment Assessment Pt tolerated PT session well this afternoon  Pt received sitting EOB following lunch and having difficulty ordering lunch due to being unable to hear staff member from dietary  Pt noted to have hard time hearing PT today and reported his HA batteries needed to be changed but did not have any new batteries; PT searched and could not find any  Pt requires VCs and visual cues for safety during this session since pt had difficulty hearing PT today  Pt requires assist to manage cantor bag during transfers, ambulation, and stair negotiation  Pt performed ambulation of 278' with RW and S over level and uneven surfaces to PT room to improve endurance  Pt performed stair negotiation of 14 steps with B hand rails and Supervision to improve functional mobility  Pt rested and performed seated BLE therex to improve strength & ROM  Pt required rest breaks throughout PT session due to c/o fatigue today   Pt performed ramp negotiation with RW and S prior to ambulating 249' with RW and S back to his room  Pt returned to supine in bed with Supervision  Pt presents with decreased balance, endurance, strength, safety awareness, and cognition  Pt would continue to benefit from skilled ARC PT to address these deficits prior to safe d/c home planned for tomorrow  Pt appropriate for concurrent PT session to promote motivation and improve functional mobility via similar therex and therapeutic activities  Problem List Decreased strength;Decreased endurance; Impaired balance;Decreased cognition;Decreased safety awareness   Barriers to Discharge Inaccessible home environment   PT Barriers   Physical Impairment Decreased strength;Decreased endurance; Impaired balance;Decreased cognition;Decreased safety awareness   Functional Limitation Standing;Transfers; Walking;Stair negotiation;Ramp negotiation;Car transfers   Plan   Treatment/Interventions ADL retraining;Functional transfer training;LE strengthening/ROM; Elevations; Therapeutic exercise; Endurance training;Cognitive reorientation;Patient/family training;Equipment eval/education;Gait training; Compensatory technique education   Progress Progressing toward goals   PT Therapy Minutes   PT Time In 1308   PT Time Out 1440   PT Total Time (minutes) 92   PT Mode of treatment - Individual (minutes) 52   PT Mode of treatment - Concurrent (minutes) 40   PT Mode of treatment - Group (minutes) 0   PT Mode of treatment - Co-treat (minutes) 0   PT Mode of Treatment - Total time(minutes) 92 minutes   PT Cumulative Minutes 374   Therapy Time missed   Time missed?  No

## 2021-01-11 NOTE — PROGRESS NOTES
01/11/21 0709   Charting Type   Charting Type Shift assessment   Neurological   Neuro (WDL) X   Level of Consciousness Alert/awake   Orientation Level Oriented X4   Cognition Appropriate judgement; Follows commands   Extraocular Movements Full   Facial Symmetry Symmetrical   Swallow Able to swallow solids and liquids without difficulty   RUE Muscle Strength 5- Normal strength   LUE Muscle Strength 5- Normal strength   RLE Motor Response Responds to commands   RLE Sensation Full sensation   RLE Muscle Strength 4- Movement against gravity and limited resistance   LLE Motor Response Responds to commands   LLE Sensation Full sensation   LLE Muscle Strength 4- Movement against gravity and limited resistance   Neuro Symptoms Forgetful   Relieved by Rest   Neuro Additional Assessments No   HEENT   HEENT (WDL) X   R Eye Mildly impaired vision   L Eye Mildly impaired vision   Vision - Corrective Lenses (at bedside) Glasses   R Ear Hearing aid; Moderately impaired hearing   L Ear Moderately impaired hearing   Teeth Dentures upper;Dentures lower   Respiratory   Respiratory (WDL) WDL   Respiratory Pattern Normal   Chest Assessment Chest expansion symmetrical   Bilateral Breath Sounds Clear;Diminished   Respiratory Additional Assessments No   Respiratory Interventions   Respiratory Interventions Cough and deep breathe;Incentive spirometry   Cough and Deep Breathe   Cough and Deep Breathe Yes   Cardiac   Cardiac (WDL) WDL   Cardiac Regularity Regular   Heart Sounds No adventitious heart sounds   Jugular Venous Distention (JVD) No   Cardiac Symptoms None   Chest Pain Present No   Pacemaker/ICD No   Pain Assessment   Pain Assessment Tool 0-10   Pain Score 3   Pain Location/Orientation Orientation: Lower; Location: Back   Hospital Pain Intervention(s) Medication (See MAR); Repositioned; Rest   Multiple Pain Sites No   Peripheral Vascular   Peripheral Vascular (WDL) X   Cyanosis None   RLE Edema Trace   LLE Edema Trace   PVS Additional Assessments No   RUE Neurovascular Assessment   R Radial Pulse +2   LUE Neurovascular Assessment   L Radial Pulse +2   RLE Neurovascular Assessment   R Pedal Pulse +2   LLE Neurovascular Assessment   L Pedal Pulse +2   Integumentary   Integumentary (WDL) X   Skin Color Appropriate for ethnicity   Skin Condition/Temp Warm;Dry   Skin Integrity Intact   Skin Turgor Non-tenting   Integumentary Additional Assessments No   Tattoos/Piercings   Does patient have tattoos? No   Piercings Remaining No   David Scale   Sensory Perceptions 4   Moisture 4   Activity 3   Mobility 3   Nutrition 3   Friction and Shear 3   David Scale Score 20   Musculoskeletal   Musculoskeletal (WDL) X   Assistive Device Front wheel walker   RLE Limited movement   LLE Limited movement   Musculoskeletal Additional Assessments No   Gastrointestinal   Gastrointestinal (WDL) X   Abdomen Inspection Soft;Nondistended   Bowel Sounds (All Quadrants) Normoactive   Tenderness Soft;Nontender   Last BM Date 01/11/21   GI Symptoms None   Gastrointestinal Additional Assessments No   Bowel Shift Assessment   Assistance Needed None   Bowel Incontinence No   Stool Assessment   Bowel Incontinence No   Constipation   Constipation Precipitating Factors Medication (Iron supplements, opioids, antidepressants, etc )   Current Interventions Encourage fluid intake   Genitourinary   Genitourinary (WDL) X   Genitourinary Symptoms Indwelling catheter   Bladder Shift Assessment   Bladder Device Diallo   Bladder Incontinence No   Bladder Management Maximal assistance   Bladder Management Deficit Emptied by staff   Urine Assessment   Urinary Incontinence No   Urine Color Yellow/straw   Urine Appearance Clear   Genitalia   Male Genitalia Intact   Genitourinary Additional Assessments   Genitourinary Additional Assessments No   Anal/Rectal   Anal/Rectal (WDL) WDL   Psychosocial   Psychosocial (WDL) WDL   Patient Behaviors/Mood Calm; Cooperative   Needs Expressed Denies   Ability to Express Feelings Able to express   Ability to Express Needs Able to express   Ability to Express Thoughts Able to express   Ability to Understand Others Understands   Scott Suicide Severity Rating Scale   1  Wish to be Dead No   Cough   Cough None     C/o generalized stiffness upon waking up this a m  Scheduled morphine po given for chronic low back pain effectual   Chronic cantor maintained and draining qs  , teaching regarding same  CGA/RW with transfers  Tolerating therapy  Continue same plan of care

## 2021-01-11 NOTE — PROGRESS NOTES
01/11/21 0701   Pain Assessment   Pain Assessment Tool Pain Assessment not indicated - pt denies pain   Restrictions/Precautions   Precautions Cognitive; Fall Risk;Multiple lines; Diallo  (IV RUE)   Eating   Type of Assistance Needed Set-up / clean-up   Eating CARE Score 5   Eating Assessment   Food To Mouth Yes   Positioning Upright;Out of Bed   Meal Assessed Breakfast   Opens Packages Yes   Oral Hygiene   Type of Assistance Needed Set-up / clean-up   Oral Hygiene CARE Score 5   Grooming   Able To Initiate Tasks;Comb/Brush Hair;Wash/Dry Face;Brush/Clean Teeth;Wash/Dry Hands   Findings s/u   Shower/Bathe Self   Type of Assistance Needed Supervision   Shower/Bathe Self CARE Score 4   Bathing   Assessed Bath Style Sponge Bath   Anticipated D/C Bath Style Sponge Bath; Shower   Able to [place] No   Able to [employer] Temperature No   Able to Wash/Rinse/Dry (body part) Left Arm;Right Arm;L Upper Leg;R Upper Leg;L Lower Leg/Foot;R Lower Leg/Foot;Chest;Abdomen;Perineal Area; Buttocks   Limitations Noted in Balance; Endurance;Problem Solving; Safety   Positioning Seated;Standing   Tub/Shower Transfer   Findings pt prefers to sponge bathe   Upper Body Dressing   Type of Assistance Needed Set-up / clean-up   Upper Body Dressing CARE Score 5   Lower Body Dressing   Type of Assistance Needed Physical assistance   Amount of Physical Assistance Provided Less than 25%   Comment assist to manage catheter to position correctly    Lower Body Dressing CARE Score 3   Putting On/Taking Off Footwear   Type of Assistance Needed Set-up / clean-up   Putting On/Taking Off Footwear CARE Score 5   Picking Up Object   Type of Assistance Needed Incidental touching   Comment supporting on walker while bending forward for retrieval   Picking Up Object CARE Score 4   Dressing/Undressing Clothing   Remove UB Clothes Pullover [address]; Undergarment;Socks   [address] [facility] Pants;Undergarment;Socks; Shoes   Limitations Noted In Balance; Endurance; Safety;Problem Solving   Positioning Supported Sit;Standing   Lying to Sitting on Side of Bed   Type of Assistance Needed Independent   Lying to Sitting on Side of Bed CARE Score 6   Sit to Stand   Type of Assistance Needed Supervision   Sit to Stand CARE Score 4   Bed-Chair Transfer   Type of Assistance Needed Supervision   Chair/Bed-to-Chair Transfer CARE Score 4   Toileting Hygiene   Type of Assistance Needed Incidental touching   Toileting Hygiene CARE Score 4   Toileting   Able to [address] down yes, up yes  Manage Hygiene Bowel   Limitations Noted In Balance; Safety;Problem Solving   Adaptive Equipment Grab Bar   Toilet Transfer   Type of Assistance Needed Supervision   Toilet Transfer CARE Score 4   Toilet Transfer   Surface Assessed Raised Toilet   Transfer Technique Stand Pivot   Limitations Noted In Balance; Endurance; Safety   Adaptive Equipment Grab Bar;Walker   Light Housekeeping   Light Housekeeping Level Wheelchair   Light Housekeeping Level of Assistance Distant supervision   Light Housekeeping collection of clothing for dressing   Cognition   Arousal/Participation Alert; Responsive; Cooperative   Attention Attends with cues to redirect   Orientation Level Oriented X4   Memory   (forgetful)   Following Commands Follows one step commands without difficulty   Additional Activities   Additional Activities Other (Comment)   Additional Activities Comments w/c mobility S/ Mod I with occ cues to lock brakes   Other Comments   Assessment Pt participates in 25 minutes concurrent treatment focusing on ADL tasks with similar goals as another patient to increase independence by allowing self pacing   Assessment   Treatment Assessment Pt participates in ADLs, transfers and mobility for retireval prior to breakfast, Pt tolerates session reporting fatigue by end of session   Pt requires assist due to decreased balance, safety, endurance and difficulty to manage cantor bag while progressing towards goals  Pt has IV LUE and hygiene wipes for cantor care completed  Pt will benefit from continued skilled OT services to increase independence with daily tasks  Problem List Decreased endurance; Impaired balance;Decreased safety awareness;Decreased cognition   Plan   Treatment/Interventions ADL retraining;Functional transfer training; Therapeutic exercise; Endurance training;Cognitive reorientation;Patient/family training;Equipment eval/education; Compensatory technique education   Progress Progressing toward goals   OT Therapy Minutes   OT Time In 0701   OT Time Out 0833   OT Total Time (minutes) 92   OT Mode of treatment - Individual (minutes) 67   OT Mode of treatment - Concurrent (minutes) 25   Therapy Time missed   Time missed?  No

## 2021-01-12 ENCOUNTER — TELEPHONE (OUTPATIENT)
Dept: NEUROLOGY | Facility: CLINIC | Age: 76
End: 2021-01-12

## 2021-01-12 ENCOUNTER — PATIENT OUTREACH (OUTPATIENT)
Dept: CASE MANAGEMENT | Facility: OTHER | Age: 76
End: 2021-01-12

## 2021-01-12 ENCOUNTER — EPISODE CHANGES (OUTPATIENT)
Dept: CASE MANAGEMENT | Facility: HOSPITAL | Age: 76
End: 2021-01-12

## 2021-01-12 VITALS
WEIGHT: 183.42 LBS | TEMPERATURE: 98.2 F | SYSTOLIC BLOOD PRESSURE: 146 MMHG | HEIGHT: 69 IN | OXYGEN SATURATION: 99 % | RESPIRATION RATE: 18 BRPM | BODY MASS INDEX: 27.17 KG/M2 | HEART RATE: 51 BPM | DIASTOLIC BLOOD PRESSURE: 69 MMHG

## 2021-01-12 PROBLEM — A41.50 SEVERE SEPSIS WITH ACUTE ORGAN DYSFUNCTION DUE TO GRAM NEGATIVE BACTERIA (HCC): Status: RESOLVED | Noted: 2019-08-16 | Resolved: 2021-01-12

## 2021-01-12 PROBLEM — R65.20 SEVERE SEPSIS WITH ACUTE ORGAN DYSFUNCTION DUE TO GRAM NEGATIVE BACTERIA (HCC): Status: RESOLVED | Noted: 2019-08-16 | Resolved: 2021-01-12

## 2021-01-12 PROCEDURE — 97537 COMMUNITY/WORK REINTEGRATION: CPT

## 2021-01-12 PROCEDURE — 97530 THERAPEUTIC ACTIVITIES: CPT

## 2021-01-12 PROCEDURE — 97535 SELF CARE MNGMENT TRAINING: CPT

## 2021-01-12 PROCEDURE — 99238 HOSP IP/OBS DSCHRG MGMT 30/<: CPT | Performed by: PHYSICAL MEDICINE & REHABILITATION

## 2021-01-12 PROCEDURE — 97110 THERAPEUTIC EXERCISES: CPT

## 2021-01-12 PROCEDURE — 97116 GAIT TRAINING THERAPY: CPT

## 2021-01-12 PROCEDURE — NC001 PR NO CHARGE: Performed by: PHYSICAL MEDICINE & REHABILITATION

## 2021-01-12 RX ORDER — CLONIDINE HYDROCHLORIDE 0.1 MG/1
0.1 TABLET ORAL EVERY 12 HOURS SCHEDULED
Qty: 30 TABLET | Refills: 0 | Status: SHIPPED | OUTPATIENT
Start: 2021-01-12 | End: 2021-02-17

## 2021-01-12 RX ORDER — CLONIDINE HYDROCHLORIDE 0.1 MG/1
0.1 TABLET ORAL
Qty: 30 TABLET | Refills: 0 | Status: SHIPPED | OUTPATIENT
Start: 2021-01-12 | End: 2021-01-12

## 2021-01-12 RX ORDER — CALCIUM CARBONATE 200(500)MG
500 TABLET,CHEWABLE ORAL 2 TIMES DAILY
Qty: 60 TABLET | Refills: 0 | Status: SHIPPED | OUTPATIENT
Start: 2021-01-12 | End: 2021-05-19

## 2021-01-12 RX ORDER — GABAPENTIN 400 MG/1
400 CAPSULE ORAL
Qty: 30 CAPSULE | Refills: 0 | Status: SHIPPED | OUTPATIENT
Start: 2021-01-12

## 2021-01-12 RX ADMIN — SERTRALINE HYDROCHLORIDE 50 MG: 50 TABLET ORAL at 08:17

## 2021-01-12 RX ADMIN — CALCIUM CARBONATE (ANTACID) CHEW TAB 500 MG 500 MG: 500 CHEW TAB at 08:12

## 2021-01-12 RX ADMIN — MORPHINE SULFATE 15 MG: 15 TABLET ORAL at 08:11

## 2021-01-12 RX ADMIN — FERROUS SULFATE TAB 325 MG (65 MG ELEMENTAL FE) 325 MG: 325 (65 FE) TAB at 08:12

## 2021-01-12 RX ADMIN — Medication 4000 UNITS: at 08:12

## 2021-01-12 RX ADMIN — HEPARIN SODIUM 5000 UNITS: 5000 INJECTION INTRAVENOUS; SUBCUTANEOUS at 05:30

## 2021-01-12 RX ADMIN — DOCUSATE SODIUM 100 MG: 100 CAPSULE, LIQUID FILLED ORAL at 08:12

## 2021-01-12 RX ADMIN — OXYCODONE HYDROCHLORIDE AND ACETAMINOPHEN 500 MG: 500 TABLET ORAL at 08:12

## 2021-01-12 RX ADMIN — FINASTERIDE 5 MG: 5 TABLET, FILM COATED ORAL at 08:11

## 2021-01-12 NOTE — PROGRESS NOTES
01/12/21 0966   Pain Assessment   Pain Assessment Tool Pain Assessment not indicated - pt denies pain   Restrictions/Precautions   Precautions Diallo   Eating   Type of Assistance Needed Independent   Eating CARE Score 6   Eating Assessment   Food To Mouth Yes   Able To Cut Yes   Positioning Upright;Out of Bed   Meal Assessed Breakfast   Opens Packages Yes   Oral Hygiene   Type of Assistance Needed Independent   Oral Hygiene CARE Score 6   Grooming   Able To Initiate Tasks;Comb/Brush Hair;Wash/Dry Face;Brush/Clean Teeth;Wash/Dry Hands   Findings Independent at sink   Shower/Bathe Self   Type of Assistance Needed Independent   Shower/Bathe Self CARE Score 6   Bathing   Assessed Bath Style Sponge Bath   Anticipated D/C Bath Style Sponge Bath   Able to Gather/Transport Yes   Able to Adjust Water Temperature Yes   Able to Wash/Rinse/Dry (body part) Left Arm;Right Arm;L Upper Leg;R Upper Leg;L Lower Leg/Foot;R Lower Leg/Foot;Chest;Abdomen;Perineal Area; Buttocks   Limitations Noted in Safety   Positioning Seated;Standing   Tub/Shower Transfer   Findings pt declined a shower   Upper Body Dressing   Type of Assistance Needed Independent   Upper Body Dressing CARE Score 6   Lower Body Dressing   Type of Assistance Needed Independent   Comment pt does require assist to manage catheter bag through leg of pants due to large collection container which is different than what patient will use at home   Lower Body Dressing CARE Score 6   Putting On/Taking Off Footwear   Type of Assistance Needed Independent   Putting On/Taking Off Footwear CARE Score 6   Picking Up Object   Type of Assistance Needed Independent   Comment holding one limb and reaching with the other to avoid loosing balance backwards   Picking Up Object CARE Score 6   Dressing/Undressing Clothing   Remove UB Clothes Pullover Shirt   Don UB Clothes Pullover Shirt   Remove LB Clothes Pants; Undergarment;Socks; Shoes   Don LB Clothes Pants;Socks; Undergarment; Shoes Limitations Noted In Safety   Adaptive Equipment Reacher   Lying to Sitting on Side of Bed   Type of Assistance Needed Independent   Lying to Sitting on Side of Bed CARE Score 6   Sit to Stand   Type of Assistance Needed Independent   Sit to Stand CARE Score 6   Bed-Chair Transfer   Type of Assistance Needed Independent   Chair/Bed-to-Chair Transfer CARE Score 6   [facility] Score 6   Toileting   Able to [address] down yes, up yes  Manage Hygiene Bladder; Bowel   Limitations Noted In Safety   Adaptive Equipment Grab Bar   Findings pt educated to use grab bar to support as much as possible while emptying cantor to increase safety with stance   Toilet Transfer   Type of Assistance Needed Independent   Toilet Transfer CARE Score 6   Toilet Transfer   Surface Assessed Raised Toilet   Transfer Technique Stand Pivot   Limitations Noted In Safety   Adaptive Equipment Grab Bar;Walker   Light Housekeeping   Light Housekeeping Level [employer] Level of Assistance Modified independent   Light Housekeeping Mod I retireval and transport items required for ADL completion   Cognition   Overall Cognitive Status WFL   Arousal/Participation Alert; Responsive; Cooperative   Attention Within functional limits   Orientation Level Oriented X4   Memory Decreased recall of precautions  (forgetful)   Following Commands Follows all commands and directions without difficulty   Additional Activities   Additional Activities Other (Comment)   Additional Activities Comments fxl mobility Mod I to and from BR with RW for ADLs   Other Comments   Assessment Pt participates in 30 minutes of concurrent therapy addressing therepeutic activity to increase independence with ADL completion   Assessment   Treatment Assessment Pt presents resting supine   Agreeable to OT session including transfers/ mobility with RW, ADLs with increased ease to manage cantor bag this day and light homemaking for retrieval of items for ADL tasks  Pt scheduled for completion of session later this day prior to return to home with support of family  Problem List Decreased safety awareness  (devaughn)   Plan   Treatment/Interventions ADL retraining;Functional transfer training; Therapeutic exercise; Endurance training;Cognitive reorientation;Patient/family training;Equipment eval/education; Compensatory technique education   Progress Progressing toward goals   OT Therapy Minutes   OT Time In 0930   OT Time Out 1030   OT Total Time (minutes) 60   OT Mode of treatment - Individual (minutes) 30   OT Mode of treatment - Concurrent (minutes) 30   Therapy Time missed   Time missed?  No

## 2021-01-12 NOTE — DISCHARGE INSTRUCTIONS
Acinetobacter baumannii Infection   WHAT YOU NEED TO KNOW:   An Acinetobacter baumannii infection is caused by the Acinetobacter baumannii bacteria (germ)  Acinetobacter baumannii can cause serious infections in the lungs, blood, and brain  It may also cause urinary tract and wound infections  This germ may be found on skin or in food, water, or soil  It may also be found in hospitals  Acinetobacter baumannii is highly contagious  DISCHARGE INSTRUCTIONS:   Medicines:   · Antibiotics: This medicine is given to fight or prevent an infection caused by bacteria  Always take your antibiotics exactly as ordered by your healthcare provider  Do not stop taking your medicine unless directed by your healthcare provider  Never save antibiotics or take leftover antibiotics that were given to you for another illness  · Take your medicine as directed  Contact your healthcare provider if you think your medicine is not helping or if you have side effects  Tell him or her if you are allergic to any medicine  Keep a list of the medicines, vitamins, and herbs you take  Include the amounts, and when and why you take them  Bring the list or the pill bottles to follow-up visits  Carry your medicine list with you in case of an emergency  Follow up with your healthcare provider as directed:  Write down your questions so you remember to ask them during your visits  Prevent Acinetobacter baumannii from spreading:   · Keep wounds clean and covered:  Do not touch any wounds or items that were used to clean wounds  Place used bandages in a sealed plastic bag when you throw them away  · Wash your hands:  Use soap and water to wash your hands after you use the toilet or change a child's diaper  Wash your hands before you touch your face, nose, and mouth and before you prepare or serve food  Germ-killing hand lotion or gel may be used to clean your hands if you do not have water      · Cover your mouth when you cough:  Cough into a tissue or your shirtsleeve, rather than into your hand  · Avoid sharing personal items: This includes eating and drinking utensils, towels, or other personal items  Contact your healthcare provider if:   · You have a fever  · You have chills or a cough or feel weak and achy  · You have new or more pain, redness, or swelling in the area of a wound  · Your urine is dark in color, or you are urinating less or less often than usual      · You have questions or concerns about your infection, treatment, or care  Seek care immediately or call 911 if:   · You have sudden or new chest pain or a fast heartbeat  · You have sudden trouble breathing  · Your lips and fingernails turn blue  · You are sleepy, confused, and have trouble answering simple questions  · You have sudden numbness or weakness in your arms or legs  © 2017 [employer] [employer]  Information is for End User's use only and may not be sold, redistributed or otherwise used for commercial purposes  All illustrations and images included in CareNotes® are the copyrighted property of A D A M , Inc  or Miguel Angel Rosenbaum  The above information is an  only  It is not intended as medical advice for individual conditions or treatments  Talk to your doctor, nurse or pharmacist before following any medical regimen to see if it is safe and effective for you

## 2021-01-12 NOTE — CASE MANAGEMENT
DC instructions reviewed with Pt & Pt's daughter, who expressed an understanding & agreement  Pt being [address] home today at 2 PM, being transported by family via car, which tx team has determined to be a safe mode of transport  [facility] appts indicated on DC instructions, to be scheduled by Pt per scheduling preferences  HHC arranged with [facility] (Nsg, PT, OT), per Pt preferences from choice list provided  The Pt's current course of Tx & post DC goals of care have been shared with Post-acute care service providers  [facility] IMM reviewed & signed

## 2021-01-12 NOTE — DISCHARGE SUMMARY
Discharge Summary - John Lema 66 y o  male MRN: [id number]  Unit/Bed#: [facility] 210-01 Encounter: [id number]    Admission Date: 1/5/2021     Discharge Date: 1/12/2021    Etiologic/Rehabilitation Diagnosis: Impairment of mobility, safety and Activities of Daily Living (ADLs) due to Debility:  16  Debility (Non-cardiac/Non-pulmonary)    HPI:   Valorie Calhoun is a 66 y o  male who presented to the [address] [place] [address] ER on 12/27/20 with change in mental status, vomiting, cloudy urine, decreased urinary output, chills and rigors  Pt diagnosed with sepsis secondary to UTI  This was noted by RUTHANN, lactic acidosis, tachycardia and tachypnea  Pt with chronic cantor catheter and h/o recurrent UTIs  Urine culture positive for proteus mirabilis, enterococcus faecalis and alcaligenes faecalis  Pt received 1 dose cefrtiaxone and vancomycin in the ED and was then transitioned to pip/isidro then ampicillin on 12/30  Per ID consult he will continue on ampicillin through 1/11 and Flagyl 500 mg PO TID will be added  Repeat blood cultures on 12/28 were negative  Pt also with acute metabolic encephalopathy  He is currently A&Ox4  Pts hospitilization was also complicated by right hydroureteronephrosis with obstructing calculus for which he had a retrograde pyelogram with ureteral stenting on 12/30, thrombocytopenia, right lower lobe pneumonia, acute respiratory failure with hypoxia for which he required 4 LPM O2 and is now on RA, hypomagnesemia and RUTHANN  Nephrology, urology, and ID were consulted during his hospital course  Pt was recommended for post acute therapy services  Pt arrived to [address] [place] [address] A 1/5/21  Procedures Performed During [facility] Admission: none    Acute Rehabilitation Center Course: Patient participated in a comprehensive interdisciplinary inpatient rehabilitation program which included involvment of MD, therapies (PT, OT, and/or SLP), RN, CM, SW, dietary, and psychology services   He was able to be advanced to an overall supervision level of assist and considered safe for discharge home with family  Please see below for patient's day to day management of medical needs  No new Assessment & Plan notes have been filed under this hospital service since the last note was generated  Service: Neurology      Discharge Physical Examination: 1/12/2021    Significant Findings, Care, Treatment and Services Provided: No significant findings  Pt participated in PT/OT three hours a day  Pt received IV antibiotics for ESBL infection  Complications: None    Functional Status Upon Admission to [facility]:  Mobility: moderate assistance  Transfers:  Moderate assistance  ADLs: Minimal assistance    Functional Status Upon Discharge from [facility]:   Physical Therapy:     Weight Bearing Status: Full Weight Bearing  Transfers: Supervision  Bed Mobility: Supervision  Amulation Distance (ft): 442 feet  Ambulation: Supervision  Assistive Device for Ambulation: Roller Walker  Number of Stairs: 14  Assistive Device for Stairs: Bilateral [place]  Stair Assistance: Supervision  Ramp: Supervision  Assistive Device for Ramp: Roller Walker  Discharge Recommendations: Home with:  [address] with[de-identified] 24 Hour Supervision, 24 Hour Assisteance, Family Support, Home Physical Therapy, First Floor Setup  Occupational Therapy:  Eating: Supervision  Grooming: Supervision  Bathing: Supervision  Bathing: Supervision  Upper Body Dressing: Supervision  Lower Body Dressing: Minimal Assistance  Toileting: Incidental Touching  Tub/Shower Transfer: (TBA, pt prefers to sponge bathe)  Toilet Transfer: Supervision  Cognition: Exceptions to WNL  Cognition: Decreased Memory, Decreased Safety  Orientation: Person, Place, Time, Situation  Discharge Recommendations: Home with:  [address] with[de-identified] Family Support, First Floor Setup, Home Occupational Therapy  Discharge Diagnosis: Impairment of mobility, safety and Activities of Daily Living (ADLs) due to Debility:  16  Debility (Non-cardiac/Non-pulmonary)    Discharge Medications:   See after visit summary for reconciled discharge medications provided to patient and family  Condition at Discharge: good     Discharge instructions/Information to patient and family:   See after visit summary for information provided to patient and family  Provisions for Follow-Up Care:  See after visit summary for information related to follow-up care and any pertinent home health orders  No future appointments  Disposition: See After Visit Summary for discharge disposition information  Planned Readmission: No    Discharge Statement   I spent 30 minutes or less discharging the patient  This time was spent on the day of discharge  I had direct contact with the patient on the day of discharge  Greater than 50% of the total time was spent examining patient, answering all patient questions, arranging and discussing plan of care with patient as well as directly providing post-discharge instructions  Additional time then spent on discharge activities  Discharge Medications:  See after visit summary for reconciled discharge medications provided to patient and family

## 2021-01-12 NOTE — OCCUPATIONAL THERAPY NOTE
OT spoke with son regarding assist required with large collection area in cantor bag prior to discharge this day  No other concerns expressed regarding transition to home noted by son or patient

## 2021-01-12 NOTE — PCC NURSING
1/11/21 Patient remains alert and oriented  Lungs are clear on room air  Patient continues with chronic Diallo catheter for urinary retention  Patient received full course of IV antibiotics finishing today patient remains on PO Keflex at this time as ordered by the infectious disease specialist   Patient is able to stand pivot transfer with one assist and walker  Patient remains on contact precautions due to history of ESBL in the urine  Patient scheduled for discharge home tomorrow

## 2021-01-12 NOTE — PROGRESS NOTES
01/12/21 1132   Pain Assessment   Pain Assessment Tool Pain Assessment not indicated - pt denies pain   Restrictions/Precautions   Precautions Cantor   Lying to Sitting on Side of Bed   Type of Assistance Needed Independent   Lying to Sitting on Side of Bed CARE Score 6   Sit to Stand   Type of Assistance Needed Independent   Sit to Stand CARE Score 6   Bed-Chair Transfer   Type of Assistance Needed Independent   Chair/Bed-to-Chair Transfer CARE Score 6   Exercise Tools   Hand Gripper gripper with pegs B hands    Other Exercise Tool 1 2# wt exercises 2 sets 15 BUE for shoulder flexion/ ext, chest press and abd/ add, elbow flexion/ ext and supination/ pronation and wrist flexion/ ext   Cognition   Comments cognition is [facility] however patient forgetful at times   Additional Activities   Additional Activities Other (Comment)   Additional Activities Comments fxl mobility to and from OT room Mod I with RW demonstrating ability to manage cantor bag without difficulty   Assessment   Treatment Assessment Pt participates in transfers/ mobility and BUE therex  Pt is able to manage cantor bag during tasks and tolerates session without complaints  Pt eager for discharge later this afternoon  Problem List Decreased safety awareness   Recommendation   OT - OK to Discharge Yes   Discharge Summary Pt participates in ADLs, transfers/ mobility, light homemaking and BUE therex  Pt has made great progress towards goals of Mod I with ability to manage placement of cantor bag noted during tasks  Pt is scheduled for discharge to home with family 1/12/21 with DME in [facility] and [address] [place] [address] to assist as well with transition into home  OT Therapy Minutes   OT Time In 1901   OT Time Out 1205   OT Total Time (minutes) 33   OT Mode of treatment - Individual (minutes) 33   Therapy Time missed   Time missed?  No

## 2021-01-12 NOTE — PROGRESS NOTES
01/12/21 1308   Pain Assessment   Pain Assessment Tool Pain Assessment not indicated - pt denies pain   Restrictions/Precautions   Precautions Diallo   Weight Bearing Restrictions No   ROM Restrictions No   Cognition   Overall Cognitive Status Impaired   Orientation Level Oriented X4   Subjective   Subjective "My son is coming soon "   Sit to Stand   Type of Assistance Needed Independent   Amount of Physical Assistance Provided No physical assistance   Sit to Stand CARE Score 6   Bed-Chair Transfer   Type of Assistance Needed Independent   Amount of Physical Assistance Provided No physical assistance   Chair/Bed-to-Chair Transfer CARE Score 6   Transfer Bed/Chair/Wheelchair   Limitations Noted In Balance; Endurance;LE Strength   Adaptive Equipment Roller Walker   Stand Pivot Modified Independent   Sit to Stand Modified Independent   Stand to Sit Modified Independent   Walk 10 Feet   Type of Assistance Needed Independent   Amount of Physical Assistance Provided No physical assistance   Walk 10 Feet CARE Score 6   Walk 50 Feet with Two Turns   Type of Assistance Needed Independent   Amount of Physical Assistance Provided No physical assistance   Walk 50 Feet with Two Turns CARE Score 6   Walk 150 Feet   Type of Assistance Needed Independent   Amount of Physical Assistance Provided No physical assistance   Walk 150 Feet CARE Score 6   Walking 10 Feet on Uneven Surfaces   Type of Assistance Needed Independent   Amount of Physical Assistance Provided No physical assistance   Walking 10 Feet on Uneven Surfaces CARE Score 6   Ambulation   Does the patient walk? 2  Yes   Primary Mode of Locomotion Prior to Admission Walk   Distance Walked (feet) 365 ft  (365', 114', 150')   Assist Device Roller Walker   Gait Pattern Inconsistant [place];Decreased foot clearance; Forward Flexion;Narrow KAREEM;Step through   Limitations Noted In Balance; Endurance; Heel Strike; Safety;Strength   Provided Assistance with: Balance   Walk Assist Level Modified Independent   Findings level & uneven   Curb or Single Stair   Style negotiated Single stair   Type of Assistance Needed Independent   Amount of Physical Assistance Provided No physical assistance   1 Step (Curb) CARE Score 6   4 Steps   Type of Assistance Needed Independent   Amount of Physical Assistance Provided No physical assistance   4 Steps CARE Score 6   12 Steps   Type of Assistance Needed Independent   Amount of Physical Assistance Provided No physical assistance   12 Steps CARE Score 6   Stairs   Type Stairs; Ramp   # of Steps 14   Assist Devices Bilateral Rail;Roller Walker   Findings Mod I steps and ramp   Therapeutic Interventions   Strengthening seated BLE therex x30; rest breaks taken as needed   Balance transfers, ambulation, stair/ramp negotiation   Assessment   Treatment Assessment Pt tolerated PT session well this afternoon  Pt performed ambulation to PT room of 365' and 114' with RW and Mod I over level and uneven surfaces with seated rest break taken between trials to improve endurance  Pt able to manage cantor during ambulation via hooking it onto RW  Pt rested and performed stair negotiation of 14 steps with B hand rails and Mod I to improve functional mobility  Pt required rest breaks throughout PT session  Pt then performed seated BLE therex to improve strength & ROM  Pt performed ramp negotiation with RW and Mod I prior to ambulating 150' with RW and Mod I back to room to prepare for d/c  Pt has met all [facility] PT goals of Mod I with RW  Pt performs bed mobility Independently, transfers with RW and Mod I, ambulation up to 365' with RW and Mod I over level and uneven surfaces, stair negotiation of 14 steps with B hand rails and Mod I, and ramp negotiation with RW and Mod I  Pt has all DME in place at home  Pt to receive  PT following d/c home with family support planned for later today  Pt is safe to d/c home from [facility] PT at this time     Problem List Decreased strength;Decreased endurance; Impaired balance;Decreased safety awareness   Barriers to Discharge Inaccessible home environment   PT Barriers   Physical Impairment Decreased strength;Decreased endurance; Impaired balance;Decreased safety awareness   Functional Limitation Standing;Transfers; Walking;Stair negotiation;Ramp negotiation;Car transfers   Plan   Treatment/Interventions ADL retraining;Functional transfer training;LE strengthening/ROM; Elevations; Therapeutic exercise; Endurance training;Cognitive reorientation;Patient/family training;Equipment eval/education;Gait training; Compensatory technique education   Progress Progressing toward goals   Recommendation   PT Discharge Recommendation Home with skilled therapy   Equipment Recommended Walker   PT - OK to Discharge Yes   Discharge Summary Pt has met all [facility] PT goals of Mod I with RW  Pt performs bed mobility Independently, transfers with RW and Mod I, ambulation up to 365' with RW and Mod I over level and uneven surfaces, stair negotiation of 14 steps with B hand rails and Mod I, and ramp negotiation with RW and Mod I  Pt has all DME in place at home  Pt to receive HH PT following d/c home with family support planned for later today  Pt is safe to d/c home from [facility] PT at this time  PT Therapy Minutes   PT Time In 1308   PT Time Out 1400   PT Total Time (minutes) 52   PT Mode of treatment - Individual (minutes) 24   PT Mode of treatment - Concurrent (minutes) 28   PT Mode of treatment - Group (minutes) 0   PT Mode of treatment - Co-treat (minutes) 0   PT Mode of Treatment - Total time(minutes) 52 minutes   PT Cumulative Minutes 426   Therapy Time missed   Time missed?  No

## 2021-01-12 NOTE — TELEPHONE ENCOUNTER
Recevied a call from [facility] to confirm dosing for Clonidine  Sent TT to Clayton Ortiz,     Received message back "Yes  While inpatient he was getting blood pressure checks every three hours and our nurse felt this wasnt necessary for at home since he most likely wont be compliant with it  I figured q12 hours would be better  His blood pressure has been stable here as well "    [employer] and made Jose Garcia aware of above

## 2021-01-12 NOTE — NURSING NOTE
Patient resting comfortably in bed at this time  No signs of distress noted  Bed alarm in place to promote patient safety  Patient was able to stand pivot transfer with one assist and walker overnight  Patient wore SCDs overnight as ordered for DVT prevention  Diallo catheter draining clear yellow urine  Call bell within reach  Will continue to monitor patient and follow plan of care

## 2021-01-12 NOTE — TEAM CONFERENCE
Acute RehabilitationTeam Conference Note  Date: 1/12/2021   Time: 10:55 AM       Patient Name:  Joe Gutierrez       Medical Record Number: [id number]   YOB: 1945  Sex:  Male          Room/Bed:  /[facility] 210-01  Payor Info:  Payor: John Murillo / Plan: MEDICARE A AND B / Product Type: Medicare A & B Fee for Service /      Admitting Diagnosis: Debility [R53 81]  Sepsis ([facility] ) [A41 9]   Admit Date/Time:  1/5/2021  5:13 PM  Admission Comments: No comment available     Primary Diagnosis:  Severe sepsis with acute organ dysfunction due to Gram negative bacteria ([facility] )  Principal Problem: Severe sepsis with acute organ dysfunction due to Gram negative bacteria [facility])    Patient Active Problem List    Diagnosis Date Noted    Gram-negative bacteremia 12/29/2020    Pneumonia of right lower lobe 12/29/2020    Thrombocytopenia  12/29/2020    Right hydroureteronephrosis with obstructing calculus 12/28/2020    Acute respiratory failure with hypoxia 12/27/2020    Bilateral hand pain 09/03/2019    Hypomagnesemia 08/30/2019    Elevated troponin 08/21/2019    CKD (chronic kidney disease) 08/21/2019    Anemia of chronic disease 08/21/2019    Mild episode of recurrent major depressive disorder ([facility] ) 08/21/2019    Acute kidney injury - Chronic kidney disease stage 3 08/16/2019    Acute metabolic encephalopathy 02/49/1529    Gram-negative UTI 08/16/2019    Chronic pain syndrome 08/16/2019    Chronic indwelling Diallo catheter 08/02/2019       Physical Therapy:    Weight Bearing Status: Full Weight Bearing  Transfers: Supervision  Bed Mobility: Supervision  Amulation Distance (ft): 442 feet  Ambulation: Supervision  Assistive Device for Ambulation: Roller Walker  Number of Stairs: 14  Assistive Device for Stairs: Bilateral [employer]  Stair Assistance: Supervision  Ramp: Supervision  Assistive Device for Ramp: Roller Walker  Discharge Recommendations: Home with:  [address] with[de-identified] 24 Hour Supervision, 24 Hour Assisteance, Family Support, Home Physical Therapy, First Floor Setup    1/11/2021:  Pt participating in and making gains with ARC PT  Pt performs bed mobility with Supervision, transfers with RW and S, ambulation up to 56' with RW and Supervision, stair negotiation of 14 steps with B hand rails and Supervision, and ramp negotiation with RW and Supervision  Pt requires assist to manage cantor bag and VCs for safety during all functional mobility  Pt presents with decreased balance, endurance, strength, safety awareness, and cognition  Pt would continue to benefit from skilled ARC PT to address these deficits prior to safe and independent d/c home planned for tomorrow with family support  Occupational Therapy:  Eating: Supervision  Grooming: Supervision  Bathing: Supervision  Bathing: Supervision  Upper Body Dressing: Supervision  Lower Body Dressing: Minimal Assistance  Toileting: Incidental Touching  Tub/Shower Transfer: (TBA, pt prefers to sponge bathe)  Toilet Transfer: Supervision  Cognition: Exceptions to WNL  Cognition: Decreased Memory, Decreased Safety  Orientation: Person, Place, Time, Situation  Discharge Recommendations: Home with:  [address] with[de-identified] Family Support, First Floor Setup, Home Occupational Therapy       1/11/21: Pt participates in ADLs, transfers/ mobility and BUE therex  Pt is making gains towards goals requiring assist due to decreased balance, safety, endurance, cognition and cantor use  Pt is making gains towards goals and has stated he prefers to leave tomorrow however continued assist and supervision required  Pt will benefit from continued skilled OT services to increase independence and will have assist from daughter and HHCS during transition to home setting  Speech Therapy:           No notes on file    Nursing Notes:  Appetite: Good  Diet Type: Regular/House, 2 gram sodium diet                      Diet Patient/Family Education Complete:  Yes                         Type of Wound Patient/Family Education: Yes  Bladder: Catheter     Bladder Patient/Family Education: Yes  Bowel: Continent     Bowel Patient/Family Education: Yes  Pain Location/Orientation: Orientation: Lower, Location: Back  Pain Score: 0                       Hospital Pain Intervention(s): Medication (See MAR), Repositioned, Rest  Pain Patient/Family Education: Yes       1/11/21 Patient remains alert and oriented  Lungs are clear on room air  Patient continues with chronic Cantor catheter for urinary retention  Patient received full course of IV antibiotics finishing today patient remains on PO Keflex at this time as ordered by the infectious disease specialist   Patient is able to stand pivot transfer with one assist and walker  Patient remains on contact precautions due to history of ESBL in the urine  Patient scheduled for discharge home tomorrow  Case Management:     Discharge Planning  Living Arrangements: Children  Support Systems: Children, Family members  Assistance Needed: home care when discharged  Type of Current Residence: Private residence  Current Home Care Services: No  Initial assessment & orientation to [facility] with Pt & phone contact with Pt's daughter  Pt resides in a  home with his daughter, 3 steps in  Pt's daughter works, but 1 of his 2 sons will be with him while she is at work & he will have 24 hour supervision  Pt's daughter very anxious for him to be DC'd as soon as IV antibiotic is completed on 1/11/21  Discussed role of team members & reviewed [address] with Pt & Pt's daughter, who expressed understanding & agreement  Discussed that a Tx team meeting will be held on 1/12, and Pt & family would like him DC'd after the meeting if safe to do so  Pt has a RW, SPC, toliet riser & shower chair  The cantor catheter is chronic & family is aware of cantor care  SW will continue to monitor & assist as needed with [address]  Is the patient actively participating in therapies?  yes  List any modifications to the treatment plan: na    Barriers Interventions   Decreased safety Cues, ADL, transfer, gait training   Decreased end/balance Therapeutic exercise, therapeutic activity                 Is the patient making expected progress toward goals?  yes  List any update or changes to goals: na    Medical Goals: Patient will be medically stable for discharge to [facility] upon completion of rehab program    Weekly Team Goals:   Rehab Team Goals  ADL Team Goal: Patient will be independent with ADLs with least restrictive device upon completion of rehab program  Bowel/Bladder Team Goal: Patient will return to premorbid level for bladder/bowel management upon completion of rehab program  Transfer Team Goal: Patient will be independent with transfers with least restrictive device upon completion of rehab program  Locomotion Team Goal: Patient will be independent with locomotion with least restrictive device upon completion of rehab program  Cognitive Team Goal: Patient will return to premorbid level of cognitive activity upon completion of rehab program     Mod I transfers, mobility, self care    Health and wellness: to be able to return home and care for dogs    Discussion: Plan for return home with family support with [facility] for PT, OT, and nursing     Anticipated Discharge Date:  Jan 12, 2021

## 2021-01-12 NOTE — NURSING NOTE
Instructions for DC discussed with patient   All posed questions answered to patients satisfaction  Pt stated he has been thru Rehab before, and will follow thru as directed  Interdisciplinary care team determined safest means of transportation on DC to be Private Vehicle with Family  Patient DCd from the unit

## 2021-01-12 NOTE — PROGRESS NOTES
This CM Assistant received a Inbasket indicating the the bundle episode was closed due to   --   INFECTIOUS AND PARASITIC DISEASES WITH O R  PROCEDURE WITH halfway Is Ineligible  I have emailed the facility and will no longer be following the patient

## 2021-01-12 NOTE — PHYSICAL THERAPY NOTE
PT DISCHARGE NOTE:    Pt has met all ARC PT goals of Mod I with RW  Pt performs bed mobility Independently, transfers with RW and Mod I, ambulation up to 365' with RW and Mod I over level and uneven surfaces, stair negotiation of 14 steps with B hand rails and Mod I, and ramp negotiation with RW and Mod I  Pt has all DME in place at home  Pt to receive HH PT following d/c home with family support planned for later today  Pt is safe to d/c home from [facility] PT at this time  Pt performed car transfer with Supervision, per PCA

## 2021-01-25 ENCOUNTER — TRANSCRIBE ORDERS (OUTPATIENT)
Dept: ADMINISTRATIVE | Facility: HOSPITAL | Age: 76
End: 2021-01-25

## 2021-01-25 ENCOUNTER — LAB (OUTPATIENT)
Dept: LAB | Facility: HOSPITAL | Age: 76
End: 2021-01-25
Attending: UROLOGY
Payer: MEDICARE

## 2021-01-25 ENCOUNTER — OFFICE VISIT (OUTPATIENT)
Dept: LAB | Facility: HOSPITAL | Age: 76
End: 2021-01-25
Attending: UROLOGY
Payer: MEDICARE

## 2021-01-25 ENCOUNTER — HOSPITAL ENCOUNTER (OUTPATIENT)
Dept: RADIOLOGY | Facility: HOSPITAL | Age: 76
Discharge: HOME | End: 2021-01-25
Attending: UROLOGY
Payer: MEDICARE

## 2021-01-25 DIAGNOSIS — N39.0 URINARY TRACT INFECTION WITHOUT HEMATURIA, SITE UNSPECIFIED: Primary | ICD-10-CM

## 2021-01-25 DIAGNOSIS — I10 HYPERTENSION, UNSPECIFIED TYPE: ICD-10-CM

## 2021-01-25 DIAGNOSIS — Z01.818 PRE-OP TESTING: ICD-10-CM

## 2021-01-25 DIAGNOSIS — R31.0 GROSS HEMATURIA: Primary | ICD-10-CM

## 2021-01-25 DIAGNOSIS — R31.0 GROSS HEMATURIA: ICD-10-CM

## 2021-01-25 DIAGNOSIS — N39.0 URINARY TRACT INFECTION WITHOUT HEMATURIA, SITE UNSPECIFIED: ICD-10-CM

## 2021-01-25 DIAGNOSIS — N20.0 URIC ACID NEPHROLITHIASIS: ICD-10-CM

## 2021-01-25 DIAGNOSIS — N20.0 KIDNEY STONE: ICD-10-CM

## 2021-01-25 DIAGNOSIS — N20.0 KIDNEY STONE: Primary | ICD-10-CM

## 2021-01-25 LAB
ALBUMIN SERPL BCP-MCNC: 4.1 G/DL (ref 3.5–5.7)
ALP SERPL-CCNC: 79 U/L (ref 55–165)
ALT SERPL W P-5'-P-CCNC: 20 U/L (ref 7–52)
ANION GAP SERPL CALCULATED.3IONS-SCNC: 9 MMOL/L (ref 4–13)
APTT PPP: 32 SECONDS (ref 23–37)
AST SERPL W P-5'-P-CCNC: 22 U/L (ref 13–39)
BACTERIA UR QL AUTO: ABNORMAL /HPF
BILIRUB SERPL-MCNC: 0.4 MG/DL (ref 0.2–1)
BILIRUB UR QL STRIP: NEGATIVE
BUN SERPL-MCNC: 30 MG/DL (ref 7–25)
CALCIUM SERPL-MCNC: 10.4 MG/DL (ref 8.6–10.5)
CAOX CRY URNS QL MICRO: ABNORMAL /HPF
CHLORIDE SERPL-SCNC: 102 MMOL/L (ref 98–107)
CLARITY UR: ABNORMAL
CO2 SERPL-SCNC: 27 MMOL/L (ref 21–31)
COLOR UR: YELLOW
CREAT SERPL-MCNC: 1.85 MG/DL (ref 0.7–1.3)
ERYTHROCYTE [DISTWIDTH] IN BLOOD BY AUTOMATED COUNT: 14.2 % (ref 11.5–14.5)
GFR SERPL CREATININE-BSD FRML MDRD: 34 ML/MIN/1.73SQ M
GLUCOSE P FAST SERPL-MCNC: 95 MG/DL (ref 65–99)
GLUCOSE UR STRIP-MCNC: NEGATIVE MG/DL
HCT VFR BLD AUTO: 34.8 % (ref 42–47)
HGB BLD-MCNC: 11.4 G/DL (ref 14–18)
HGB UR QL STRIP.AUTO: ABNORMAL
INR PPP: 1.07 (ref 0.84–1.19)
KETONES UR STRIP-MCNC: NEGATIVE MG/DL
LEUKOCYTE ESTERASE UR QL STRIP: ABNORMAL
MCH RBC QN AUTO: 32.2 PG (ref 26–34)
MCHC RBC AUTO-ENTMCNC: 32.8 G/DL (ref 31–37)
MCV RBC AUTO: 98 FL (ref 81–99)
NITRITE UR QL STRIP: NEGATIVE
NON-SQ EPI CELLS URNS QL MICRO: ABNORMAL /HPF
PH UR STRIP.AUTO: 6 [PH]
PLATELET # BLD AUTO: 224 THOUSANDS/UL (ref 149–390)
PMV BLD AUTO: 9.3 FL (ref 8.6–11.7)
POTASSIUM SERPL-SCNC: 4.6 MMOL/L (ref 3.5–5.5)
PROT SERPL-MCNC: 7.5 G/DL (ref 6.4–8.9)
PROT UR STRIP-MCNC: ABNORMAL MG/DL
PROTHROMBIN TIME: 13.8 SECONDS (ref 11.6–14.5)
RBC # BLD AUTO: 3.54 MILLION/UL (ref 4.3–5.9)
RBC #/AREA URNS AUTO: ABNORMAL /HPF
SODIUM SERPL-SCNC: 138 MMOL/L (ref 134–143)
SP GR UR STRIP.AUTO: 1.02 (ref 1–1.03)
UROBILINOGEN UR QL STRIP.AUTO: 0.2 E.U./DL
WBC # BLD AUTO: 7.3 THOUSAND/UL (ref 4.8–10.8)
WBC #/AREA URNS AUTO: ABNORMAL /HPF

## 2021-01-25 PROCEDURE — 85027 COMPLETE CBC AUTOMATED: CPT

## 2021-01-25 PROCEDURE — 81001 URINALYSIS AUTO W/SCOPE: CPT

## 2021-01-25 PROCEDURE — 81003 URINALYSIS AUTO W/O SCOPE: CPT

## 2021-01-25 PROCEDURE — 36415 COLL VENOUS BLD VENIPUNCTURE: CPT

## 2021-01-25 PROCEDURE — 80053 COMPREHEN METABOLIC PANEL: CPT

## 2021-01-25 PROCEDURE — 87086 URINE CULTURE/COLONY COUNT: CPT

## 2021-01-25 PROCEDURE — 85730 THROMBOPLASTIN TIME PARTIAL: CPT

## 2021-01-25 PROCEDURE — 85610 PROTHROMBIN TIME: CPT

## 2021-01-25 PROCEDURE — 93005 ELECTROCARDIOGRAM TRACING: CPT

## 2021-01-25 PROCEDURE — 74018 RADEX ABDOMEN 1 VIEW: CPT

## 2021-01-26 LAB — BACTERIA UR CULT: NORMAL

## 2021-01-28 LAB
ATRIAL RATE: 67 BPM
P AXIS: 3 DEGREES
PR INTERVAL: 160 MS
QRS AXIS: 25 DEGREES
QRSD INTERVAL: 92 MS
QT INTERVAL: 370 MS
QTC INTERVAL: 390 MS
T WAVE AXIS: 19 DEGREES
VENTRICULAR RATE: 67 BPM

## 2021-01-28 PROCEDURE — 93010 ELECTROCARDIOGRAM REPORT: CPT | Performed by: INTERNAL MEDICINE

## 2021-03-10 PROCEDURE — 87077 CULTURE AEROBIC IDENTIFY: CPT | Performed by: UROLOGY

## 2021-03-10 PROCEDURE — 87086 URINE CULTURE/COLONY COUNT: CPT | Performed by: UROLOGY

## 2021-03-10 PROCEDURE — 87186 SC STD MICRODIL/AGAR DIL: CPT | Performed by: UROLOGY

## 2021-03-11 ENCOUNTER — LAB REQUISITION (OUTPATIENT)
Dept: LAB | Facility: HOSPITAL | Age: 76
End: 2021-03-11
Payer: MEDICARE

## 2021-03-11 DIAGNOSIS — N39.0 URINARY TRACT INFECTION, SITE NOT SPECIFIED: ICD-10-CM

## 2021-03-12 ENCOUNTER — TRANSCRIBE ORDERS (OUTPATIENT)
Dept: ADMINISTRATIVE | Facility: HOSPITAL | Age: 76
End: 2021-03-12

## 2021-03-12 ENCOUNTER — APPOINTMENT (OUTPATIENT)
Dept: LAB | Facility: HOSPITAL | Age: 76
End: 2021-03-12
Attending: UROLOGY
Payer: MEDICARE

## 2021-03-12 ENCOUNTER — HOSPITAL ENCOUNTER (OUTPATIENT)
Dept: RADIOLOGY | Facility: HOSPITAL | Age: 76
Discharge: HOME | End: 2021-03-12
Attending: UROLOGY
Payer: MEDICARE

## 2021-03-12 DIAGNOSIS — N20.0 URIC ACID NEPHROLITHIASIS: ICD-10-CM

## 2021-03-12 DIAGNOSIS — N20.0 URIC ACID NEPHROLITHIASIS: Primary | ICD-10-CM

## 2021-03-12 LAB
ANION GAP SERPL CALCULATED.3IONS-SCNC: 11 MMOL/L (ref 4–13)
BUN SERPL-MCNC: 39 MG/DL (ref 7–25)
CALCIUM SERPL-MCNC: 10 MG/DL (ref 8.6–10.5)
CHLORIDE SERPL-SCNC: 106 MMOL/L (ref 98–107)
CO2 SERPL-SCNC: 24 MMOL/L (ref 21–31)
CREAT SERPL-MCNC: 1.98 MG/DL (ref 0.7–1.3)
ERYTHROCYTE [DISTWIDTH] IN BLOOD BY AUTOMATED COUNT: 13.3 % (ref 11.5–14.5)
GFR SERPL CREATININE-BSD FRML MDRD: 32 ML/MIN/1.73SQ M
GLUCOSE P FAST SERPL-MCNC: 100 MG/DL (ref 65–99)
HCT VFR BLD AUTO: 34.8 % (ref 42–47)
HGB BLD-MCNC: 11.6 G/DL (ref 14–18)
MCH RBC QN AUTO: 32.7 PG (ref 26–34)
MCHC RBC AUTO-ENTMCNC: 33.2 G/DL (ref 31–37)
MCV RBC AUTO: 99 FL (ref 81–99)
PLATELET # BLD AUTO: 246 THOUSANDS/UL (ref 149–390)
PMV BLD AUTO: 8.6 FL (ref 8.6–11.7)
POTASSIUM SERPL-SCNC: 4.6 MMOL/L (ref 3.5–5.5)
RBC # BLD AUTO: 3.53 MILLION/UL (ref 4.3–5.9)
SODIUM SERPL-SCNC: 141 MMOL/L (ref 134–143)
WBC # BLD AUTO: 6.5 THOUSAND/UL (ref 4.8–10.8)

## 2021-03-12 PROCEDURE — 74018 RADEX ABDOMEN 1 VIEW: CPT

## 2021-03-12 PROCEDURE — 85027 COMPLETE CBC AUTOMATED: CPT

## 2021-03-12 PROCEDURE — 80048 BASIC METABOLIC PNL TOTAL CA: CPT

## 2021-03-13 LAB — BACTERIA UR CULT: ABNORMAL

## 2021-03-17 RX ORDER — CEPHALEXIN 500 MG/1
500 CAPSULE ORAL EVERY 6 HOURS SCHEDULED
COMMUNITY
End: 2021-05-19

## 2021-03-17 NOTE — PRE-PROCEDURE INSTRUCTIONS
Pre-Surgery Instructions:   Medication Instructions    acetaminophen (TYLENOL) 325 mg tablet Instructed patient per Anesthesia Guidelines   ascorbic acid (VITAMIN C) 500 MG tablet Instructed patient per Anesthesia Guidelines   B Complex Vitamins (B COMPLEX 1 PO) Instructed patient per Anesthesia Guidelines   cephalexin (Keflex) 500 mg capsule Patient was instructed by Physician and understands   cholecalciferol (VITAMIN D3) 1,000 units tablet Instructed patient per Anesthesia Guidelines   diphenhydrAMINE (BENADRYL) 50 MG tablet Instructed patient per Anesthesia Guidelines   ferrous sulfate 325 (65 Fe) mg tablet Instructed patient per Anesthesia Guidelines   finasteride (PROSCAR) 5 mg tablet Instructed patient per Anesthesia Guidelines   gabapentin (NEURONTIN) 400 mg capsule Instructed patient per Anesthesia Guidelines   morphine (MSIR) 15 mg tablet Instructed patient per Anesthesia Guidelines   sertraline (ZOLOFT) 50 mg tablet Instructed patient per Anesthesia Guidelines  Pre procedure instructions reviewed verbalizes understanding  NPO after MN  Stopping all supplements/vitamins 3/17  Requests daughter be called for surgical time due to phone issues  Diallo catheter in place

## 2021-03-18 ENCOUNTER — ANESTHESIA EVENT (OUTPATIENT)
Dept: PERIOP | Facility: HOSPITAL | Age: 76
End: 2021-03-18
Payer: MEDICARE

## 2021-03-18 NOTE — ANESTHESIA PREPROCEDURE EVALUATION
Procedure:  LITHOTRIPSY EXTRACORPORAL SHOCKWAVE (ESWL) (Right Ureter)    Relevant Problems   /RENAL   (+) Acute kidney injury - Chronic kidney disease stage 3   (+) CKD (chronic kidney disease)   (+) Right hydroureteronephrosis with obstructing calculus      HEMATOLOGY   (+) Anemia of chronic disease   (+) Thrombocytopenia       NEURO/PSYCH   (+) Chronic pain syndrome   (+) Mild episode of recurrent major depressive disorder (HCC)      PULMONARY   (+) Acute respiratory failure with hypoxia   (+) Pneumonia of right lower lobe       LEFT VENTRICLE: Size was normal  Systolic function was normal  Ejection fraction was estimated to be 65 %  There were no regional wall motion abnormalities  Wall thickness was mildly increased  There was mild concentric hypertrophy  No  Physical Exam    Airway    Mallampati score: II  TM Distance: >3 FB  Neck ROM: full     Dental       Cardiovascular      Pulmonary      Other Findings        Anesthesia Plan  ASA Score- 3     Anesthesia Type- general with ASA Monitors  Additional Monitors:   Airway Plan: LMA  Plan Factors-    Chart reviewed  Induction- intravenous  Postoperative Plan-     Informed Consent- Anesthetic plan and risks discussed with patient  I personally reviewed this patient with the CRNA  Discussed and agreed on the Anesthesia Plan with the CRNA  Nicole Harris

## 2021-03-19 ENCOUNTER — ANESTHESIA (OUTPATIENT)
Dept: PERIOP | Facility: HOSPITAL | Age: 76
End: 2021-03-19
Payer: MEDICARE

## 2021-03-19 ENCOUNTER — APPOINTMENT (OUTPATIENT)
Dept: RADIOLOGY | Facility: HOSPITAL | Age: 76
End: 2021-03-19
Payer: MEDICARE

## 2021-03-19 ENCOUNTER — HOSPITAL ENCOUNTER (OUTPATIENT)
Facility: HOSPITAL | Age: 76
Setting detail: OUTPATIENT SURGERY
Discharge: HOME | End: 2021-03-19
Attending: UROLOGY | Admitting: UROLOGY
Payer: MEDICARE

## 2021-03-19 VITALS
TEMPERATURE: 97 F | BODY MASS INDEX: 26.66 KG/M2 | OXYGEN SATURATION: 93 % | HEIGHT: 69 IN | SYSTOLIC BLOOD PRESSURE: 152 MMHG | HEART RATE: 75 BPM | RESPIRATION RATE: 14 BRPM | WEIGHT: 180 LBS | DIASTOLIC BLOOD PRESSURE: 72 MMHG

## 2021-03-19 PROCEDURE — 74018 RADEX ABDOMEN 1 VIEW: CPT

## 2021-03-19 RX ORDER — SODIUM CHLORIDE, SODIUM LACTATE, POTASSIUM CHLORIDE, CALCIUM CHLORIDE 600; 310; 30; 20 MG/100ML; MG/100ML; MG/100ML; MG/100ML
50 INJECTION, SOLUTION INTRAVENOUS CONTINUOUS
Status: DISCONTINUED | OUTPATIENT
Start: 2021-03-19 | End: 2021-03-19 | Stop reason: HOSPADM

## 2021-03-19 RX ORDER — ONDANSETRON 2 MG/ML
4 INJECTION INTRAMUSCULAR; INTRAVENOUS ONCE AS NEEDED
Status: DISCONTINUED | OUTPATIENT
Start: 2021-03-19 | End: 2021-03-19 | Stop reason: HOSPADM

## 2021-03-19 RX ORDER — DEXAMETHASONE SODIUM PHOSPHATE 10 MG/ML
INJECTION, SOLUTION INTRAMUSCULAR; INTRAVENOUS AS NEEDED
Status: DISCONTINUED | OUTPATIENT
Start: 2021-03-19 | End: 2021-03-19

## 2021-03-19 RX ORDER — ONDANSETRON 2 MG/ML
INJECTION INTRAMUSCULAR; INTRAVENOUS AS NEEDED
Status: DISCONTINUED | OUTPATIENT
Start: 2021-03-19 | End: 2021-03-19

## 2021-03-19 RX ORDER — EPHEDRINE SULFATE 50 MG/ML
INJECTION INTRAVENOUS AS NEEDED
Status: DISCONTINUED | OUTPATIENT
Start: 2021-03-19 | End: 2021-03-19

## 2021-03-19 RX ORDER — FENTANYL CITRATE 50 UG/ML
INJECTION, SOLUTION INTRAMUSCULAR; INTRAVENOUS AS NEEDED
Status: DISCONTINUED | OUTPATIENT
Start: 2021-03-19 | End: 2021-03-19

## 2021-03-19 RX ORDER — FENTANYL CITRATE/PF 50 MCG/ML
25 SYRINGE (ML) INJECTION
Status: DISCONTINUED | OUTPATIENT
Start: 2021-03-19 | End: 2021-03-19 | Stop reason: HOSPADM

## 2021-03-19 RX ORDER — PROPOFOL 10 MG/ML
INJECTION, EMULSION INTRAVENOUS AS NEEDED
Status: DISCONTINUED | OUTPATIENT
Start: 2021-03-19 | End: 2021-03-19

## 2021-03-19 RX ORDER — LIDOCAINE HYDROCHLORIDE 10 MG/ML
INJECTION, SOLUTION EPIDURAL; INFILTRATION; INTRACAUDAL; PERINEURAL AS NEEDED
Status: DISCONTINUED | OUTPATIENT
Start: 2021-03-19 | End: 2021-03-19

## 2021-03-19 RX ADMIN — EPHEDRINE SULFATE 10 MG: 50 INJECTION, SOLUTION INTRAVENOUS at 08:40

## 2021-03-19 RX ADMIN — FENTANYL CITRATE 25 MCG: 50 INJECTION INTRAMUSCULAR; INTRAVENOUS at 08:45

## 2021-03-19 RX ADMIN — LIDOCAINE HYDROCHLORIDE 100 MG: 10 INJECTION, SOLUTION EPIDURAL; INFILTRATION; INTRACAUDAL; PERINEURAL at 08:32

## 2021-03-19 RX ADMIN — PROPOFOL 130 MG: 10 INJECTION, EMULSION INTRAVENOUS at 08:32

## 2021-03-19 RX ADMIN — EPHEDRINE SULFATE 10 MG: 50 INJECTION, SOLUTION INTRAVENOUS at 08:43

## 2021-03-19 RX ADMIN — FENTANYL CITRATE 25 MCG: 50 INJECTION INTRAMUSCULAR; INTRAVENOUS at 08:35

## 2021-03-19 RX ADMIN — SODIUM CHLORIDE, SODIUM LACTATE, POTASSIUM CHLORIDE, AND CALCIUM CHLORIDE: .6; .31; .03; .02 INJECTION, SOLUTION INTRAVENOUS at 08:25

## 2021-03-19 RX ADMIN — EPHEDRINE SULFATE 5 MG: 50 INJECTION, SOLUTION INTRAVENOUS at 09:01

## 2021-03-19 RX ADMIN — EPHEDRINE SULFATE 5 MG: 50 INJECTION, SOLUTION INTRAVENOUS at 08:37

## 2021-03-19 RX ADMIN — ONDANSETRON 4 MG: 2 INJECTION INTRAMUSCULAR; INTRAVENOUS at 08:41

## 2021-03-19 RX ADMIN — EPHEDRINE SULFATE 5 MG: 50 INJECTION, SOLUTION INTRAVENOUS at 08:52

## 2021-03-19 RX ADMIN — CEFTRIAXONE 1000 MG: 2 INJECTION, POWDER, FOR SOLUTION INTRAMUSCULAR; INTRAVENOUS at 08:39

## 2021-03-19 RX ADMIN — DEXAMETHASONE SODIUM PHOSPHATE 5 MG: 10 INJECTION, SOLUTION INTRAMUSCULAR; INTRAVENOUS at 08:41

## 2021-03-19 NOTE — INTERVAL H&P NOTE
H&P reviewed  After examining the patient I find no changes in the patients condition since the H&P had been written      Vitals:    03/19/21 0709   BP: 107/62   Pulse: 74   Resp: 20   Temp: (!) 97 3 °F (36 3 °C)   SpO2: 99%

## 2021-03-19 NOTE — ANESTHESIA POSTPROCEDURE EVALUATION
Post-Op Assessment Note    CV Status:  Stable  Pain Score: 0    Pain management: adequate     Mental Status:  Alert and awake   Hydration Status:  Euvolemic   PONV Controlled:  Controlled   Airway Patency:  Patent      Post Op Vitals Reviewed: Yes      Staff: CRNA         No complications documented      BP  153/78    Temp 98 4 °F (36 9 °C) (03/19/21 0914)    Pulse 70 (03/19/21 0914)   Resp 15 (03/19/21 0914)    SpO2 100 % (03/19/21 0914)

## 2021-03-19 NOTE — DISCHARGE INSTRUCTIONS
Lithotripsy   WHAT YOU NEED TO KNOW:   Lithotripsy is a procedure that uses sound waves to break up stones in the kidney, ureter, or bladder  The stone pieces then pass out of your body through your urine  You may have blood in your urine for 1 to 2 days after the procedure  You may also have bruising and discomfort in your back or abdomen  You may have pain whenever you pass pieces of your kidney stone  This may happen over a few weeks  DISCHARGE INSTRUCTIONS:   Call your local emergency number (911 in the [address] [place] [address],[address]) if:   · You have chest pain  Call your doctor if:   · You have trouble thinking clearly  · You have severe lower back pain  · You urinate bright red blood  · You have severe vomiting  · You cannot urinate  · You have a fever and chills  · You feel burning when you urinate  · You feel the urge to urinate often and immediately  · You have questions or concerns about your condition or care  Medicines:   · Medicines  can help decrease pain or prevent an infection  Medicines may also help you pass the stones or prevent more stones from forming  · Take your medicine as directed  Contact your healthcare provider if you think your medicine is not helping or if you have side effects  Tell him or her if you are allergic to any medicine  Keep a list of the medicines, vitamins, and herbs you take  Include the amounts, and when and why you take them  Bring the list or the pill bottles to follow-up visits  Carry your medicine list with you in case of an emergency  Self-care:   · If you have a stent, do not pull on it  This can cause pain and bleeding  · Apply heat  on your lower back for 20 to 30 minutes every 2 hours for as many days as directed  Heat helps decrease pain and muscle spasms  · Drink liquids as directed  You may need to drink more liquid than usual  This will help flush any remaining small pieces of stone  Liquids can also help prevent more stones from forming   Ask how much liquid to drink each day and which liquids are best for you  Do not drink caffeine  · Rest  when you feel it is needed  Slowly start to do more each day  · Eat a variety of healthy foods  Ask if you need to make changes to the foods you eat  Healthy foods include fruits, vegetables, whole-grain breads, low-fat dairy products, beans, and fish  You may need to limit nuts, chocolate, coffee, and certain green leafy vegetables  You may also need to limit meat and salt  Follow up with your doctor as directed: You may need to return to have your stent removed  Write down your questions so you remember to ask them during your visits  © Copyright [address] Information is for End User's use only and may not be sold, redistributed or otherwise used for commercial purposes  All illustrations and images included in CareNotes® are the copyrighted property of [employer]  or [facility] Medina   The above information is an  only  It is not intended as medical advice for individual conditions or treatments  Talk to your doctor, nurse or pharmacist before following any medical regimen to see if it is safe and effective for you

## 2021-03-19 NOTE — OP NOTE
OPERATIVE REPORT  PATIENT NAME: Joslyn Kehr    :  1945  MRN: [id number]  Pt Location:  OR ROOM 12    SURGERY DATE: 3/19/2021    Surgeon(s) and Role:     * Marilou Lozano MD - Primary    Preop Diagnosis:  Calculus of ureter [N20 1]    Post-Op Diagnosis Codes:     * Calculus of ureter [N20 1]    Procedure(s) (LRB):  EXTRACORPORAL SHOCKWAVE LITHOTRIPSY RIGHT UPJ STONE (Right)    Specimen(s):  * No specimens in log *    Estimated Blood Loss:   none    Drains:  Urethral Catheter Latex 18 Fr  (Active)   Site Assessment Clean;Skin intact 21 0743   Collection Container Leg bag 21 0830   Securement Method Leg strap 21 0830   Number of days: 80       Ureteral Drain/Stent Right ureter 6 Fr  (Active)   Number of days: 80       Anesthesia Type:   General    Operative Indications:  Calculus of ureter [N20 1]  This is a 24-year-old male with a history of an obstructing right UPJ 11 mm stone presenting with urosepsis treated with stent insertion  KUB this morning shows the stone at the UPJ  Options, procedures, benefits and risks were discussed and he agreed to the planned procedure  Operative Findings:  Approximately 11 mm stone at the right UPJ  Complications:   None    Procedure and Technique:  The patient was properly identified in the operating room and placed on the lithotripsy table in the supine position  After adequate general anesthesia the stone was imaged with fluoroscopy and placed in the focal point of the lithotripsy unit  Shockwave lithotripsy was performed from power levels 1 through 7 for a total of 3000 shocks  Fragmentation was seen  The patient tolerated the procedure well and left the operating room in good condition     I was present for the entire procedure    Patient Disposition:  PACU     SIGNATURE: Marilou Lozano MD  DATE: 2021  TIME: 9:10 AM

## 2021-03-24 ENCOUNTER — HOSPITAL ENCOUNTER (OUTPATIENT)
Dept: RADIOLOGY | Facility: HOSPITAL | Age: 76
Discharge: HOME | End: 2021-03-24
Attending: UROLOGY
Payer: MEDICARE

## 2021-03-24 ENCOUNTER — TRANSCRIBE ORDERS (OUTPATIENT)
Dept: ADMINISTRATIVE | Facility: HOSPITAL | Age: 76
End: 2021-03-24

## 2021-03-24 DIAGNOSIS — N20.0 URIC ACID NEPHROLITHIASIS: Primary | ICD-10-CM

## 2021-03-24 DIAGNOSIS — N20.0 URIC ACID NEPHROLITHIASIS: ICD-10-CM

## 2021-03-24 PROCEDURE — 74018 RADEX ABDOMEN 1 VIEW: CPT

## 2021-04-01 ENCOUNTER — HOSPITAL ENCOUNTER (OUTPATIENT)
Dept: RADIOLOGY | Facility: HOSPITAL | Age: 76
Discharge: HOME | End: 2021-04-01
Attending: UROLOGY
Payer: MEDICARE

## 2021-04-01 ENCOUNTER — LAB REQUISITION (OUTPATIENT)
Dept: LAB | Facility: HOSPITAL | Age: 76
End: 2021-04-01
Payer: MEDICARE

## 2021-04-01 ENCOUNTER — IMMUNIZATIONS (OUTPATIENT)
Dept: FAMILY MEDICINE CLINIC | Facility: HOSPITAL | Age: 76
End: 2021-04-01

## 2021-04-01 ENCOUNTER — TRANSCRIBE ORDERS (OUTPATIENT)
Dept: ADMINISTRATIVE | Facility: HOSPITAL | Age: 76
End: 2021-04-01

## 2021-04-01 DIAGNOSIS — Z23 ENCOUNTER FOR IMMUNIZATION: Primary | ICD-10-CM

## 2021-04-01 DIAGNOSIS — N20.0 URIC ACID NEPHROLITHIASIS: ICD-10-CM

## 2021-04-01 DIAGNOSIS — N20.0 CALCULUS OF KIDNEY: ICD-10-CM

## 2021-04-01 DIAGNOSIS — N20.0 URIC ACID NEPHROLITHIASIS: Primary | ICD-10-CM

## 2021-04-01 PROCEDURE — 82360 CALCULUS ASSAY QUANT: CPT | Performed by: UROLOGY

## 2021-04-01 PROCEDURE — 74018 RADEX ABDOMEN 1 VIEW: CPT

## 2021-04-01 PROCEDURE — 91301 SARS-COV-2 / COVID-19 MRNA VACCINE (MODERNA) 100 MCG: CPT

## 2021-04-01 PROCEDURE — 0011A SARS-COV-2 / COVID-19 MRNA VACCINE (MODERNA) 100 MCG: CPT

## 2021-04-08 LAB
CALCIUM OXALATE DIHYDRATE MFR STONE IR: 10 %
COLOR STONE: NORMAL
COM MFR STONE: 90 %
COMMENT-STONE3: NORMAL
COMPOSITION: NORMAL
LABORATORY COMMENT REPORT: NORMAL
PHOTO: NORMAL
SIZE STONE: NORMAL MM
SPEC SOURCE SUBJ: NORMAL
STONE ANALYSIS-IMP: NORMAL
WT STONE: 73 MG

## 2021-04-09 DIAGNOSIS — N20.0 KIDNEY STONES: Primary | ICD-10-CM

## 2021-04-14 ENCOUNTER — HOSPITAL ENCOUNTER (OUTPATIENT)
Dept: RADIOLOGY | Facility: HOSPITAL | Age: 76
Discharge: HOME | End: 2021-04-14
Attending: UROLOGY
Payer: MEDICARE

## 2021-04-14 DIAGNOSIS — N20.0 KIDNEY STONES: ICD-10-CM

## 2021-04-14 PROCEDURE — 74018 RADEX ABDOMEN 1 VIEW: CPT

## 2021-04-29 ENCOUNTER — IMMUNIZATIONS (OUTPATIENT)
Dept: FAMILY MEDICINE CLINIC | Facility: HOSPITAL | Age: 76
End: 2021-04-29

## 2021-04-29 DIAGNOSIS — Z23 ENCOUNTER FOR IMMUNIZATION: Primary | ICD-10-CM

## 2021-04-29 PROCEDURE — 0012A SARS-COV-2 / COVID-19 MRNA VACCINE (MODERNA) 100 MCG: CPT

## 2021-04-29 PROCEDURE — 91301 SARS-COV-2 / COVID-19 MRNA VACCINE (MODERNA) 100 MCG: CPT

## 2021-05-09 ENCOUNTER — HOSPITAL ENCOUNTER (EMERGENCY)
Facility: HOSPITAL | Age: 76
Discharge: HOME | End: 2021-05-09
Attending: EMERGENCY MEDICINE
Payer: MEDICARE

## 2021-05-09 VITALS
DIASTOLIC BLOOD PRESSURE: 76 MMHG | OXYGEN SATURATION: 98 % | HEART RATE: 66 BPM | SYSTOLIC BLOOD PRESSURE: 155 MMHG | RESPIRATION RATE: 18 BRPM | TEMPERATURE: 97.5 F | BODY MASS INDEX: 26.58 KG/M2 | WEIGHT: 180 LBS

## 2021-05-09 DIAGNOSIS — N39.0 UTI (URINARY TRACT INFECTION): ICD-10-CM

## 2021-05-09 DIAGNOSIS — T83.010A SUPRAPUBIC CATHETER DYSFUNCTION, INITIAL ENCOUNTER (HCC): Primary | ICD-10-CM

## 2021-05-09 LAB
BACTERIA UR QL AUTO: ABNORMAL /HPF
BILIRUB UR QL STRIP: NEGATIVE
CLARITY UR: ABNORMAL
COLOR UR: YELLOW
GLUCOSE UR STRIP-MCNC: NEGATIVE MG/DL
HGB UR QL STRIP.AUTO: ABNORMAL
KETONES UR STRIP-MCNC: NEGATIVE MG/DL
LEUKOCYTE ESTERASE UR QL STRIP: ABNORMAL
NITRITE UR QL STRIP: POSITIVE
NON-SQ EPI CELLS URNS QL MICRO: ABNORMAL /HPF
PH UR STRIP.AUTO: 7.5 [PH]
PROT UR STRIP-MCNC: ABNORMAL MG/DL
RBC #/AREA URNS AUTO: ABNORMAL /HPF
SP GR UR STRIP.AUTO: 1.01 (ref 1–1.03)
UROBILINOGEN UR QL STRIP.AUTO: 0.2 E.U./DL
WBC #/AREA URNS AUTO: ABNORMAL /HPF

## 2021-05-09 PROCEDURE — 87086 URINE CULTURE/COLONY COUNT: CPT | Performed by: PHYSICIAN ASSISTANT

## 2021-05-09 PROCEDURE — 87077 CULTURE AEROBIC IDENTIFY: CPT | Performed by: PHYSICIAN ASSISTANT

## 2021-05-09 PROCEDURE — 81001 URINALYSIS AUTO W/SCOPE: CPT | Performed by: PHYSICIAN ASSISTANT

## 2021-05-09 PROCEDURE — 99285 EMERGENCY DEPT VISIT HI MDM: CPT | Performed by: PHYSICIAN ASSISTANT

## 2021-05-09 PROCEDURE — 99283 EMERGENCY DEPT VISIT LOW MDM: CPT

## 2021-05-09 PROCEDURE — 87186 SC STD MICRODIL/AGAR DIL: CPT | Performed by: PHYSICIAN ASSISTANT

## 2021-05-09 RX ORDER — CEPHALEXIN 500 MG/1
500 CAPSULE ORAL ONCE
Status: COMPLETED | OUTPATIENT
Start: 2021-05-09 | End: 2021-05-09

## 2021-05-09 RX ORDER — CEPHALEXIN 500 MG/1
500 CAPSULE ORAL EVERY 6 HOURS SCHEDULED
Qty: 20 CAPSULE | Refills: 0 | Status: SHIPPED | OUTPATIENT
Start: 2021-05-09 | End: 2021-05-14

## 2021-05-09 RX ADMIN — CEPHALEXIN 500 MG: 500 CAPSULE ORAL at 10:50

## 2021-05-09 NOTE — ED PROVIDER NOTES
History  Chief Complaint   Patient presents with    Urinary Catheter Problem     64year old male history of chronic indwelling Diallo catheter presents complaining of a blocked urinary catheter  Patient had a lithotripsy done for a renal stone in March of 2021  Patient denies any flank pain today but noticed this morning that his urinary catheter stopped draining  He reports this has happened multiple times before due to urinary tract infections  He denies any fevers, chills, flank pain, chest pain, shortness of breath or any other complaints at this time  Does report lower abdominal discomfort due to bladder distention which she states happens when his urinary catheter gets block  Prior to Admission Medications   Prescriptions Last Dose Informant Patient Reported? Taking?    B Complex Vitamins (B COMPLEX 1 PO)   Yes No   Sig: Take 1 capsule by mouth daily   acetaminophen (TYLENOL) 325 mg tablet   Yes No   Sig: Take 650 mg by mouth every 6 (six) hours as needed   ascorbic acid (VITAMIN C) 500 MG tablet   Yes No   Sig: Take 500 mg by mouth daily   calcium carbonate (TUMS) 500 mg chewable tablet   No No   Sig: Chew 1 tablet (500 mg total) 2 (two) times a day   cephalexin (Keflex) 500 mg capsule   Yes No   Sig: Take 500 mg by mouth every 6 (six) hours   cholecalciferol (VITAMIN D3) 1,000 units tablet   Yes No   Sig: Take 4,000 Units by mouth daily   diphenhydrAMINE (BENADRYL) 50 MG tablet   No No   Sig: Take 1 tablet (50 mg total) by mouth daily at bedtime as needed for sleep   ferrous sulfate 325 (65 Fe) mg tablet   Yes No   Sig: Take 325 mg by mouth daily with breakfast   finasteride (PROSCAR) 5 mg tablet   Yes No   Sig: Take 5 mg by mouth daily   gabapentin (NEURONTIN) 400 mg capsule   No No   Sig: Take 1 capsule (400 mg total) by mouth daily at bedtime   morphine (MSIR) 15 mg tablet   Yes No   Sig: Take 15 mg by mouth every 6 (six) hours   sertraline (ZOLOFT) 50 mg tablet   Yes No   Sig: Take 50 mg by mouth daily        Facility-Administered Medications: None       Past Medical History:   Diagnosis Date    Depression     Diallo catheter in place     Hypertension     Prostate enlargement     Psychiatric disorder     Urinary tract infection        Past Surgical History:   Procedure Laterality Date    ARTHROSCOPY KNEE      BACK SURGERY      L 4 or 5    CATARACT EXTRACTION Bilateral     COLONOSCOPY      CYSTOSCOPY W/ LASER LITHOTRIPSY N/A 8/20/2018    Procedure: LITHOTRISPY HOLMIUM LASER of bladder stones;  Surgeon: Matt Tristan MD;  Location: [facility] MAIN OR;  Service: Urology    EAR SURGERY      KNEE ARTHROSCOPY      TN CYSTOURETHROSCOPY,URETER CATHETER Right 12/29/2020    Procedure: CYSTOSCOPY RETROGRADE PYELOGRAM WITH INSERTION STENT URETERAL;  Surgeon: Matt Tristan MD;  Location: [facility] MAIN OR;  Service: Urology    TN FRAGMENT KIDNEY STONE/ ESWL Right 3/19/2021    Procedure: EXTRACORPORAL SHOCKWAVE LITHOTRIPSY RIGHT UPJ STONE;  Surgeon: Matt Tristan MD;  Location:  MAIN OR;  Service: Urology    TN TRANSURETHRAL ELEC-SURG PROSTATECTOM N/A 8/20/2018    Procedure: Francis Mcburney; TURP;  Surgeon: Matt Tristan MD;  Location: [facility] MAIN OR;  Service: Urology    SHOULDER SURGERY Right 05/31/2019       Family History   Problem Relation Age of Onset    Cancer Mother     Diabetes Father      I have reviewed and agree with the history as documented  E-Cigarette/Vaping    E-Cigarette Use Never User      E-Cigarette/Vaping Substances    Nicotine No     THC No     CBD No     Flavoring No     Other No     Unknown No      Social History     Tobacco Use    Smoking status: Former Smoker     Packs/day: 1 00    Smokeless tobacco: Never Used    Tobacco comment: quit 50 years ago   Substance Use Topics    Alcohol use: Never    Drug use: Never       Review of Systems   Constitutional: Negative for chills, fatigue and fever  HENT: Negative for congestion and sore throat  Eyes: Negative for pain     Respiratory: Negative for cough, chest tightness, shortness of breath and wheezing  Cardiovascular: Negative for chest pain, palpitations and leg swelling  Gastrointestinal: Negative for abdominal pain, constipation, diarrhea, nausea and vomiting  Endocrine: Negative for polyuria  Genitourinary: Positive for difficulty urinating  Musculoskeletal: Negative for arthralgias, back pain, myalgias and neck pain  Skin: Negative for rash  Neurological: Negative for dizziness, syncope, light-headedness and headaches  All other systems reviewed and are negative  Physical Exam  Physical Exam  Vitals signs reviewed  Constitutional:       Appearance: He is well-developed  HENT:      Head: Normocephalic and atraumatic  Mouth/Throat:      Mouth: Mucous membranes are moist    Eyes:      Conjunctiva/sclera: Conjunctivae normal    Neck:      Musculoskeletal: Normal range of motion  Cardiovascular:      Rate and Rhythm: Normal rate and regular rhythm  Heart sounds: Normal heart sounds  Pulmonary:      Effort: Pulmonary effort is normal       Breath sounds: Normal breath sounds  Abdominal:      General: Bowel sounds are normal       Palpations: Abdomen is soft  Tenderness: There is no abdominal tenderness  Comments: Lower abdominal distension and without significant tenderness   Genitourinary:     Comments: Diallo catheter in place  Musculoskeletal: Normal range of motion  Skin:     General: Skin is warm and dry  Capillary Refill: Capillary refill takes less than 2 seconds  Neurological:      General: No focal deficit present  Mental Status: He is alert and oriented to person, place, and time           Vital Signs  ED Triage Vitals [05/09/21 1019]   Temperature Pulse Respirations Blood Pressure SpO2   97 5 °F (36 4 °C) 66 18 155/76 98 %      Temp Source Heart Rate Source Patient Position - Orthostatic VS BP Location FiO2 (%)   Tympanic Monitor Sitting Left arm --      Pain Score 5           Vitals:    05/09/21 1019   BP: 155/76   Pulse: 66   Patient Position - Orthostatic VS: Sitting         Visual Acuity      ED Medications  Medications   cephalexin (KEFLEX) capsule 500 mg (500 mg Oral Given 5/9/21 1050)       Diagnostic Studies  Results Reviewed     Procedure Component Value Units Date/Time    Urine Microscopic [[id number]]  (Abnormal) Collected: 05/09/21 1105    Lab Status: Final result Specimen: Urine, Indwelling Diallo Catheter Updated: 05/09/21 1128     RBC, UA 30-50 /hpf      WBC, UA 20-30 /hpf      Epithelial Cells Moderate /hpf      Bacteria, UA Innumerable /hpf     Urine culture [[id number]] Collected: 05/09/21 1105    Lab Status: In process Specimen: Urine, Indwelling Diallo Catheter Updated: 05/09/21 1128    UA w Reflex to Microscopic w Reflex to Culture [[id number]]  (Abnormal) Collected: 05/09/21 1105    Lab Status: Final result Specimen: Urine, Indwelling Diallo Catheter Updated: 05/09/21 1125     Color, UA Yellow     Clarity, UA Hazy     Specific [place], UA 1 015     pH, UA 7 5     Leukocytes, UA 3+     Nitrite, UA Positive     Protein, UA Trace mg/dl      Glucose, UA Negative mg/dl      Ketones, UA Negative mg/dl      Urobilinogen, UA 0 2 E U /dl      Bilirubin, UA Negative     Blood, UA 3+                 No orders to display              Procedures  Procedures         ED Course                                           MDM  Number of Diagnoses or Management Options  Suprapubic catheter dysfunction, initial encounter [facility]):   UTI (urinary tract infection):   Diagnosis management comments: Discussed case with Dr Seun Cantu  He states the patient has a stent in place that imaging for kidney stone given no flank pain is not necessary  Pain is likely due to bladder distention  Bladder scan greater than 500 mL prior to catheter placement  Catheter was replaced by nursing staff  Patient felt much better after procedure  Repeat exam is benign    Will start Keflex and patient follow-up with urology  Patient is agreeable to plan  Disposition  Final diagnoses:   Suprapubic catheter dysfunction, initial encounter [facility])   UTI (urinary tract infection)     Time reflects when diagnosis was documented in both MDM as applicable and the Disposition within this note     Time User Action Codes Description Comment    5/9/2021 11:03 AM Mylinda Most Add [T83 010A] Suprapubic catheter dysfunction, initial encounter ([facility] [address] )     5/9/2021 11:03 AM Mylinda Most Add [N39 0] UTI (urinary tract infection)       ED Disposition     ED Disposition Condition Date/Time Comment    Discharge Stable Sun May 9, 2021 10:45 AM Tawny Shanks discharge to home/self care  Follow-up Information     Follow up With Specialties Details Why Contact Alyson Pretty MD Urology Schedule an appointment as soon as possible for a visit   [address]            Discharge Medication List as of 5/9/2021 11:08 AM      START taking these medications    Details   !! cephalexin (KEFLEX) 500 mg capsule Take 1 capsule (500 mg total) by mouth every 6 (six) hours for 5 days, Starting Sun 5/9/2021, Until Fri 5/14/2021, Normal       !! - Potential duplicate medications found  Please discuss with provider        CONTINUE these medications which have NOT CHANGED    Details   acetaminophen (TYLENOL) 325 mg tablet Take 650 mg by mouth every 6 (six) hours as needed, Historical Med      ascorbic acid (VITAMIN C) 500 MG tablet Take 500 mg by mouth daily, Historical Med      B Complex Vitamins (B COMPLEX 1 PO) Take 1 capsule by mouth daily, Historical Med      calcium carbonate (TUMS) 500 mg chewable tablet Chew 1 tablet (500 mg total) 2 (two) times a day, Starting Tue 1/12/2021, Normal      !! cephalexin (Keflex) 500 mg capsule Take 500 mg by mouth every 6 (six) hours, Historical Med      cholecalciferol (VITAMIN D3) 1,000 units tablet Take 4,000 Units by mouth daily, Historical Med      diphenhydrAMINE (BENADRYL) 50 MG tablet Take 1 tablet (50 mg total) by mouth daily at bedtime as needed for sleep, Starting Tue 1/12/2021, Normal      ferrous sulfate 325 (65 Fe) mg tablet Take 325 mg by mouth daily with breakfast, Historical Med      finasteride (PROSCAR) 5 mg tablet Take 5 mg by mouth daily, Historical Med      gabapentin (NEURONTIN) 400 mg capsule Take 1 capsule (400 mg total) by mouth daily at bedtime, Starting Tue 1/12/2021, Normal      morphine (MSIR) 15 mg tablet Take 15 mg by mouth every 6 (six) hours, Historical Med      sertraline (ZOLOFT) 50 mg tablet Take 50 mg by mouth daily  , Starting Fri 1/26/2018, Historical Med       !! - Potential duplicate medications found  Please discuss with provider  No discharge procedures on file      PDMP Review       Value Time User    PDMP Reviewed  Yes 1/12/2021  9:16 AM Pita Wilkins PA-C          ED Provider  Electronically Signed by           Randal Rosenberg PA-C  05/09/21 3571

## 2021-05-09 NOTE — DISCHARGE INSTRUCTIONS
Take Keflex as directed  Follow-up with Dr Bailey Kahn  Return to the ER with any significant change or worsening of your symptoms

## 2021-05-11 LAB
BACTERIA UR CULT: ABNORMAL
BACTERIA UR CULT: ABNORMAL

## 2021-05-17 ENCOUNTER — TRANSCRIBE ORDERS (OUTPATIENT)
Dept: ADMINISTRATIVE | Facility: HOSPITAL | Age: 76
End: 2021-05-17

## 2021-05-17 ENCOUNTER — HOSPITAL ENCOUNTER (OUTPATIENT)
Dept: RADIOLOGY | Facility: HOSPITAL | Age: 76
Discharge: HOME | End: 2021-05-17
Attending: UROLOGY
Payer: MEDICARE

## 2021-05-17 DIAGNOSIS — N20.0 KIDNEY STONE: ICD-10-CM

## 2021-05-17 DIAGNOSIS — N20.0 URIC ACID NEPHROLITHIASIS: Primary | ICD-10-CM

## 2021-05-17 PROCEDURE — 74018 RADEX ABDOMEN 1 VIEW: CPT

## 2021-05-19 ENCOUNTER — APPOINTMENT (OUTPATIENT)
Dept: LAB | Facility: HOSPITAL | Age: 76
End: 2021-05-19
Attending: UROLOGY
Payer: MEDICARE

## 2021-05-19 DIAGNOSIS — N20.0 URIC ACID NEPHROLITHIASIS: ICD-10-CM

## 2021-05-19 LAB
ANION GAP SERPL CALCULATED.3IONS-SCNC: 9 MMOL/L (ref 4–13)
BUN SERPL-MCNC: 29 MG/DL (ref 7–25)
CALCIUM SERPL-MCNC: 9.8 MG/DL (ref 8.6–10.5)
CHLORIDE SERPL-SCNC: 103 MMOL/L (ref 98–107)
CO2 SERPL-SCNC: 28 MMOL/L (ref 21–31)
CREAT SERPL-MCNC: 1.47 MG/DL (ref 0.7–1.3)
ERYTHROCYTE [DISTWIDTH] IN BLOOD BY AUTOMATED COUNT: 12.3 % (ref 11.5–14.5)
GFR SERPL CREATININE-BSD FRML MDRD: 45 ML/MIN/1.73SQ M
GLUCOSE P FAST SERPL-MCNC: 75 MG/DL (ref 65–99)
HCT VFR BLD AUTO: 37.4 % (ref 42–47)
HGB BLD-MCNC: 12.5 G/DL (ref 14–18)
MCH RBC QN AUTO: 32.3 PG (ref 26–34)
MCHC RBC AUTO-ENTMCNC: 33.5 G/DL (ref 31–37)
MCV RBC AUTO: 96 FL (ref 81–99)
PLATELET # BLD AUTO: 254 THOUSANDS/UL (ref 149–390)
PMV BLD AUTO: 8.2 FL (ref 8.6–11.7)
POTASSIUM SERPL-SCNC: 4.4 MMOL/L (ref 3.5–5.5)
RBC # BLD AUTO: 3.88 MILLION/UL (ref 4.3–5.9)
SODIUM SERPL-SCNC: 140 MMOL/L (ref 134–143)
WBC # BLD AUTO: 6 THOUSAND/UL (ref 4.8–10.8)

## 2021-05-19 PROCEDURE — 85027 COMPLETE CBC AUTOMATED: CPT

## 2021-05-19 PROCEDURE — 80048 BASIC METABOLIC PNL TOTAL CA: CPT

## 2021-05-19 PROCEDURE — 36415 COLL VENOUS BLD VENIPUNCTURE: CPT

## 2021-05-19 NOTE — PRE-PROCEDURE INSTRUCTIONS
Pre-Surgery Instructions:   Medication Instructions    ascorbic acid (VITAMIN C) 500 MG tablet Instructed patient per Anesthesia Guidelines   B Complex Vitamins (B COMPLEX 1 PO) Instructed patient per Anesthesia Guidelines   diphenhydrAMINE-APAP, sleep, (TYLENOL PM EXTRA STRENGTH PO) Instructed patient to continue taking as prescribed     ferrous sulfate 325 (65 Fe) mg tablet Instructed patient per Anesthesia Guidelines   finasteride (PROSCAR) 5 mg tablet Instructed patient to continue taking as prescribed up to and including DOS with sips of water   gabapentin (NEURONTIN) 400 mg capsule Instructed patient to continue taking as prescribed up to and including DOS with sips of water   morphine (MSIR) 15 mg tablet Instructed patient to continue taking as prescribed up to and including DOS with sips of water   sertraline (ZOLOFT) 50 mg tablet Instructed patient to continue taking as prescribed up to and including DOS with sips of water   Sulfamethoxazole-Trimethoprim (BACTRIM PO) Instructed patient to continue taking as prescribed up to and including DOS with sips of water  Phone interview and preop instructions completed with assistance of pt's daughter Beau Frankel at pt's request    Med list reviewed as above  Per anesthesia guidelines, pt stopped vitamins x 1 week  Also instructed pt not to start any new vitamins/supplements preoperatively and to avoid NSAID's  3 days prior to surgery  Tylenol is acceptable if needed  Pt will shower with regular soap the morning of surgery  Reviewed [facility] current covid visitor restriction policy and pt understands that it may change at any time  All information within "My Surgical Experience" pamphlet reviewed and patient verbalizes understanding and compliance  All questions answered

## 2021-05-21 ENCOUNTER — ANESTHESIA (OUTPATIENT)
Dept: PERIOP | Facility: HOSPITAL | Age: 76
End: 2021-05-21
Payer: MEDICARE

## 2021-05-21 ENCOUNTER — APPOINTMENT (OUTPATIENT)
Dept: RADIOLOGY | Facility: HOSPITAL | Age: 76
End: 2021-05-21
Attending: UROLOGY
Payer: MEDICARE

## 2021-05-21 ENCOUNTER — ANESTHESIA EVENT (OUTPATIENT)
Dept: PERIOP | Facility: HOSPITAL | Age: 76
End: 2021-05-21
Payer: MEDICARE

## 2021-05-21 ENCOUNTER — HOSPITAL ENCOUNTER (OUTPATIENT)
Facility: HOSPITAL | Age: 76
Setting detail: OUTPATIENT SURGERY
Discharge: HOME | End: 2021-05-21
Attending: UROLOGY | Admitting: UROLOGY
Payer: MEDICARE

## 2021-05-21 VITALS
HEIGHT: 69 IN | BODY MASS INDEX: 26.36 KG/M2 | TEMPERATURE: 97.3 F | WEIGHT: 178 LBS | OXYGEN SATURATION: 100 % | HEART RATE: 54 BPM | RESPIRATION RATE: 18 BRPM | DIASTOLIC BLOOD PRESSURE: 76 MMHG | SYSTOLIC BLOOD PRESSURE: 130 MMHG

## 2021-05-21 PROCEDURE — 74018 RADEX ABDOMEN 1 VIEW: CPT

## 2021-05-21 RX ORDER — SODIUM CHLORIDE, SODIUM LACTATE, POTASSIUM CHLORIDE, CALCIUM CHLORIDE 600; 310; 30; 20 MG/100ML; MG/100ML; MG/100ML; MG/100ML
100 INJECTION, SOLUTION INTRAVENOUS CONTINUOUS
Status: DISCONTINUED | OUTPATIENT
Start: 2021-05-21 | End: 2021-05-21 | Stop reason: HOSPADM

## 2021-05-21 RX ORDER — SODIUM CHLORIDE, SODIUM LACTATE, POTASSIUM CHLORIDE, CALCIUM CHLORIDE 600; 310; 30; 20 MG/100ML; MG/100ML; MG/100ML; MG/100ML
INJECTION, SOLUTION INTRAVENOUS CONTINUOUS PRN
Status: DISCONTINUED | OUTPATIENT
Start: 2021-05-21 | End: 2021-05-21

## 2021-05-21 RX ORDER — LIDOCAINE HYDROCHLORIDE 10 MG/ML
INJECTION, SOLUTION EPIDURAL; INFILTRATION; INTRACAUDAL; PERINEURAL AS NEEDED
Status: DISCONTINUED | OUTPATIENT
Start: 2021-05-21 | End: 2021-05-21

## 2021-05-21 RX ORDER — FENTANYL CITRATE/PF 50 MCG/ML
25 SYRINGE (ML) INJECTION
Status: DISCONTINUED | OUTPATIENT
Start: 2021-05-21 | End: 2021-05-21 | Stop reason: HOSPADM

## 2021-05-21 RX ORDER — LIDOCAINE HYDROCHLORIDE 10 MG/ML
0.5 INJECTION, SOLUTION EPIDURAL; INFILTRATION; INTRACAUDAL; PERINEURAL ONCE AS NEEDED
Status: COMPLETED | OUTPATIENT
Start: 2021-05-21 | End: 2021-05-21

## 2021-05-21 RX ORDER — SODIUM CHLORIDE, SODIUM LACTATE, POTASSIUM CHLORIDE, CALCIUM CHLORIDE 600; 310; 30; 20 MG/100ML; MG/100ML; MG/100ML; MG/100ML
125 INJECTION, SOLUTION INTRAVENOUS CONTINUOUS
Status: DISCONTINUED | OUTPATIENT
Start: 2021-05-21 | End: 2021-05-21 | Stop reason: HOSPADM

## 2021-05-21 RX ORDER — EPHEDRINE SULFATE 50 MG/ML
INJECTION INTRAVENOUS AS NEEDED
Status: DISCONTINUED | OUTPATIENT
Start: 2021-05-21 | End: 2021-05-21

## 2021-05-21 RX ORDER — ONDANSETRON 2 MG/ML
INJECTION INTRAMUSCULAR; INTRAVENOUS AS NEEDED
Status: DISCONTINUED | OUTPATIENT
Start: 2021-05-21 | End: 2021-05-21

## 2021-05-21 RX ORDER — FENTANYL CITRATE 50 UG/ML
INJECTION, SOLUTION INTRAMUSCULAR; INTRAVENOUS AS NEEDED
Status: DISCONTINUED | OUTPATIENT
Start: 2021-05-21 | End: 2021-05-21

## 2021-05-21 RX ORDER — ONDANSETRON 2 MG/ML
4 INJECTION INTRAMUSCULAR; INTRAVENOUS ONCE AS NEEDED
Status: DISCONTINUED | OUTPATIENT
Start: 2021-05-21 | End: 2021-05-21 | Stop reason: HOSPADM

## 2021-05-21 RX ORDER — PROPOFOL 10 MG/ML
INJECTION, EMULSION INTRAVENOUS AS NEEDED
Status: DISCONTINUED | OUTPATIENT
Start: 2021-05-21 | End: 2021-05-21

## 2021-05-21 RX ADMIN — EPHEDRINE SULFATE 10 MG: 50 INJECTION, SOLUTION INTRAVENOUS at 11:23

## 2021-05-21 RX ADMIN — CEFTRIAXONE 1000 MG: 1 INJECTION, POWDER, FOR SOLUTION INTRAMUSCULAR; INTRAVENOUS at 11:10

## 2021-05-21 RX ADMIN — SODIUM CHLORIDE, SODIUM LACTATE, POTASSIUM CHLORIDE, AND CALCIUM CHLORIDE: .6; .31; .03; .02 INJECTION, SOLUTION INTRAVENOUS at 11:07

## 2021-05-21 RX ADMIN — FENTANYL CITRATE 25 MCG: 50 INJECTION INTRAMUSCULAR; INTRAVENOUS at 11:12

## 2021-05-21 RX ADMIN — FENTANYL CITRATE 25 MCG: 50 INJECTION INTRAMUSCULAR; INTRAVENOUS at 11:38

## 2021-05-21 RX ADMIN — ONDANSETRON 4 MG: 2 INJECTION INTRAMUSCULAR; INTRAVENOUS at 11:20

## 2021-05-21 RX ADMIN — PHENYLEPHRINE HYDROCHLORIDE 200 MCG: 10 INJECTION INTRAVENOUS at 11:22

## 2021-05-21 RX ADMIN — SODIUM CHLORIDE, SODIUM LACTATE, POTASSIUM CHLORIDE, AND CALCIUM CHLORIDE 125 ML/HR: .6; .31; .03; .02 INJECTION, SOLUTION INTRAVENOUS at 11:03

## 2021-05-21 RX ADMIN — LIDOCAINE HYDROCHLORIDE 80 MG: 10 INJECTION, SOLUTION EPIDURAL; INFILTRATION; INTRACAUDAL; PERINEURAL at 11:14

## 2021-05-21 RX ADMIN — PROPOFOL 150 MG: 10 INJECTION, EMULSION INTRAVENOUS at 11:15

## 2021-05-21 RX ADMIN — LIDOCAINE HYDROCHLORIDE 0.5 ML: 10 INJECTION, SOLUTION EPIDURAL; INFILTRATION; INTRACAUDAL; PERINEURAL at 11:02

## 2021-05-21 NOTE — INTERVAL H&P NOTE
H&P reviewed  After examining the patient I find no changes in the patients condition since the H&P had been written      Vitals:    05/21/21 0951   BP: 126/73   Pulse: 56   Resp: 16   Temp: 97 6 °F (36 4 °C)   SpO2: 96%

## 2021-05-21 NOTE — ANESTHESIA POSTPROCEDURE EVALUATION
Post-Op Assessment Note    CV Status:  Stable  Pain Score: 0    Pain management: adequate     Mental Status:  Sleepy and arousable   Hydration Status:  Euvolemic   PONV Controlled:  None   Airway Patency:  Patent      Post Op Vitals Reviewed: Yes      Staff: CRNA         No complications documented      BP   149/84   Temp   97 7F   Pulse  71   Resp   12   SpO2   99%

## 2021-05-21 NOTE — OP NOTE
OPERATIVE REPORT  PATIENT NAME: Sharon Castellano    :  1945  MRN: [id number]  Pt Location: BE OR ROOM 12    SURGERY DATE: 2021    Surgeon(s) and Role:     * Carol Jalloh MD - Primary    Preop Diagnosis:  Calculus of kidney [N20 0]    Post-Op Diagnosis Codes:     * Calculus of kidney [N20 0]    Procedure(s) (LRB):  LITHOTRIPSY EXTRACORPORAL SHOCKWAVE (ESWL) (Right)    Specimen(s):  * No specimens in log *    Estimated Blood Loss:   None    Drains:  Urethral Catheter Latex;Straight-tip 18 Fr  (Active)   Site Assessment Skin intact 21 1230   Collection Container Leg bag 21 1230   Output (mL) 275 mL 21 1215   Number of days: 12       Ureteral Drain/Stent Right ureter 6 Fr  (Active)   Number of days: 143       Anesthesia Type:   General    Operative Indications:  Calculus of kidney [N20 0]  Residual right renal stones after previous ESWL    Operative Findings:  Several small stones in the renal pelvis in the region of the stent loop    Complications:   None    Procedure and Technique: The stones were imaged and placed in the focal point of the lithotripsy unit  ESWL was performed in gradually increasing power levels for 2500 shocks, at which time the focal point was enlarged to complete the final 500 shocks  No residual calculi were visible on flouroscopy after treatment  The patient tolerated the procedure well     I was present for the entire procedure    Patient Disposition:  PACU     SIGNATURE: Carol Jalloh MD  DATE: May 21, 2021  TIME: 12:42 PM

## 2021-05-24 ENCOUNTER — HOSPITAL ENCOUNTER (OUTPATIENT)
Dept: RADIOLOGY | Facility: HOSPITAL | Age: 76
Discharge: HOME | End: 2021-05-24
Attending: UROLOGY
Payer: MEDICARE

## 2021-05-24 DIAGNOSIS — N20.0 KIDNEY STONE: ICD-10-CM

## 2021-05-24 PROCEDURE — 74018 RADEX ABDOMEN 1 VIEW: CPT

## 2021-06-10 ENCOUNTER — TRANSCRIBE ORDERS (OUTPATIENT)
Dept: ADMINISTRATIVE | Facility: HOSPITAL | Age: 76
End: 2021-06-10

## 2021-06-10 DIAGNOSIS — N20.0 CALCULUS OF KIDNEY: Primary | ICD-10-CM

## 2021-06-28 ENCOUNTER — HOSPITAL ENCOUNTER (OUTPATIENT)
Dept: ULTRASOUND IMAGING | Facility: HOSPITAL | Age: 76
Discharge: HOME | End: 2021-06-28
Attending: UROLOGY
Payer: MEDICARE

## 2021-06-28 DIAGNOSIS — N20.0 CALCULUS OF KIDNEY: ICD-10-CM

## 2021-06-28 PROCEDURE — 76770 US EXAM ABDO BACK WALL COMP: CPT

## 2021-07-03 ENCOUNTER — HOSPITAL ENCOUNTER (EMERGENCY)
Facility: HOSPITAL | Age: 76
Discharge: HOME | End: 2021-07-03
Attending: FAMILY MEDICINE
Payer: MEDICARE

## 2021-07-03 VITALS
TEMPERATURE: 97.7 F | HEART RATE: 56 BPM | SYSTOLIC BLOOD PRESSURE: 170 MMHG | BODY MASS INDEX: 26.36 KG/M2 | OXYGEN SATURATION: 98 % | WEIGHT: 178 LBS | HEIGHT: 69 IN | RESPIRATION RATE: 20 BRPM | DIASTOLIC BLOOD PRESSURE: 77 MMHG

## 2021-07-03 DIAGNOSIS — T83.9XXA PROBLEM WITH URINARY CATHETER (HCC): Primary | ICD-10-CM

## 2021-07-03 PROCEDURE — 99282 EMERGENCY DEPT VISIT SF MDM: CPT | Performed by: FAMILY MEDICINE

## 2021-07-03 PROCEDURE — 99283 EMERGENCY DEPT VISIT LOW MDM: CPT

## 2021-07-03 NOTE — ED PROVIDER NOTES
History  Chief Complaint   Patient presents with    Urinary Catheter Problem       History provided by:  Patient   used: No    This is a 66-year-old male presented to ED with the complain of for Diallo catheter not draining  Patient states that he has a chronic Diallo and typically had replaced every few months  He states the last 1 was 10 days ago however noticed last night that it stopped draining  He states the feels some suprapubic pressure  Denies any dysuria urgency  Denies any chest pain or shortness of breath  Denies any abdominal pain nausea vomiting or diarrhea  Denies any other complaint  Prior to Admission Medications   Prescriptions Last Dose Informant Patient Reported? Taking?    B Complex Vitamins (B COMPLEX 1 PO)   Yes No   Sig: Take 1 capsule by mouth daily   Sulfamethoxazole-Trimethoprim (BACTRIM PO)   Yes No   Sig: Take by mouth   ascorbic acid (VITAMIN C) 500 MG tablet   Yes No   Sig: Take 500 mg by mouth daily   cholecalciferol (VITAMIN D3) 1,000 units tablet   Yes No   Sig: Take 4,000 Units by mouth daily   diphenhydrAMINE-APAP, sleep, (TYLENOL PM EXTRA STRENGTH PO)   Yes No   Sig: Take 2 tablets by mouth daily at bedtime   ferrous sulfate 325 (65 Fe) mg tablet   Yes No   Sig: Take 325 mg by mouth daily with breakfast   finasteride (PROSCAR) 5 mg tablet   Yes No   Sig: Take 5 mg by mouth daily   gabapentin (NEURONTIN) 400 mg capsule   No No   Sig: Take 1 capsule (400 mg total) by mouth daily at bedtime   morphine (MSIR) 15 mg tablet   Yes No   Sig: Take 15 mg by mouth every 6 (six) hours   sertraline (ZOLOFT) 50 mg tablet   Yes No   Sig: Take 50 mg by mouth daily        Facility-Administered Medications: None       Past Medical History:   Diagnosis Date    Chronic pain disorder     Depression     Diallo catheter in place     History of hydronephrosis     Hypertension     Insomnia     Kidney stone     PONV (postoperative nausea and vomiting)     Prostate enlargement     Psychiatric disorder     Spinal stenosis     Urinary tract infection     Vestibular disorder        Past Surgical History:   Procedure Laterality Date    ARTHROSCOPY KNEE      BACK SURGERY      L 4 or 5    CATARACT EXTRACTION Bilateral     COLONOSCOPY      CYSTOSCOPY W/ LASER LITHOTRIPSY N/A 2018    Procedure: LITHOTRISPY HOLMIUM LASER of bladder stones;  Surgeon: Jessica Chairez MD;  Location: [facility] MAIN OR;  Service: Urology    EAR SURGERY      KNEE ARTHROSCOPY      AR CYSTOURETHROSCOPY,URETER CATHETER Right 2020    Procedure: CYSTOSCOPY RETROGRADE PYELOGRAM WITH INSERTION STENT URETERAL;  Surgeon: Jessica Chairez MD;  Location: [facility] MAIN OR;  Service: Urology    [place] ESWL Right 3/19/2021    Procedure: EXTRACORPORAL SHOCKWAVE LITHOTRIPSY RIGHT UPJ STONE;  Surgeon: Jessica Chairez MD;  Location:  MAIN OR;  Service: Urology    AR FRAGMENT KIDNEY STONE/ ESWL Right 2021    Procedure: LITHOTRIPSY EXTRACORPORAL SHOCKWAVE (ESWL); Surgeon: Jessica Chairez MD;  [facility] MAIN OR;  Service: Urology    AR TRANSURETHRAL ELEC-SURG PROSTATECTOM N/A 2018    Procedure: [place]; TURP;  Surgeon: Jessica Chairez MD;  Location: [facility] MAIN OR;  Service: Urology    ROTATOR CUFF REPAIR Bilateral     SHOULDER SURGERY Right 2019       Family History   Problem Relation Age of Onset    Cancer Mother     Diabetes Father      I have reviewed and agree with the history as documented      E-Cigarette/Vaping    E-Cigarette Use Never User      E-Cigarette/Vaping Substances    Nicotine No     THC No     CBD No     Flavoring No     Other No     Unknown No      Social History     Tobacco Use    Smoking status: Former Smoker     Packs/day:  00     Quit date:      Years since quittin 5    Smokeless tobacco: Never Used    Tobacco comment: quit 50 years ago   Vaping Use    Vaping Use: Never used   Substance Use Topics    Alcohol use: Never    Drug use: Never Review of Systems   Constitutional: Negative  HENT: Negative  Respiratory: Negative  Cardiovascular: Negative  Genitourinary: Positive for decreased urine volume  Negative for penile swelling, scrotal swelling and testicular pain  Urinary catheter problem   Musculoskeletal: Negative  Skin: Negative  Neurological: Negative  Psychiatric/Behavioral: Negative  Physical Exam  Physical Exam  Vitals and nursing note reviewed  Constitutional:       Appearance: Normal appearance  HENT:      Head: Normocephalic and atraumatic  Eyes:      Extraocular Movements: Extraocular movements intact  Conjunctiva/sclera: Conjunctivae normal       Pupils: Pupils are equal, round, and reactive to light  Cardiovascular:      Rate and Rhythm: Normal rate and regular rhythm  Pulmonary:      Effort: Pulmonary effort is normal       Breath sounds: Normal breath sounds  Abdominal:      General: Bowel sounds are normal       Palpations: Abdomen is soft  Tenderness: There is no abdominal tenderness  Genitourinary:     Penis: Normal        Testes: Normal    Musculoskeletal:         General: Normal range of motion  Cervical back: Normal range of motion and neck supple  Neurological:      General: No focal deficit present  Mental Status: He is alert and oriented to person, place, and time     Psychiatric:         Mood and Affect: Mood normal          Behavior: Behavior normal          Vital Signs  ED Triage Vitals [07/03/21 0750]   Temperature Pulse Respirations Blood Pressure SpO2   97 7 °F (36 5 °C) (!) 54 20 167/73 98 %      Temp Source Heart Rate Source Patient Position - Orthostatic VS BP Location FiO2 (%)   Temporal Monitor -- Left arm --      Pain Score       --           Vitals:    07/03/21 0750   BP: 167/73   Pulse: (!) 54         Visual Acuity      ED Medications  Medications - No data to display    Diagnostic Studies  Results Reviewed     None                 No orders to display              Procedures  Procedures         ED Course        eighteen Hungarian Diallo catheter was changed in the ED by patient's nurse  No issues during procedure  Diallo catheter is draining                                       MDM    Disposition  Final diagnoses:   Problem with urinary catheter ([address] )     Time reflects when diagnosis was documented in both MDM as applicable and the Disposition within this note     Time User Action Codes Description Comment    7/3/2021  8:10 AM Patrick Read Add [T83  9XXA] Problem with urinary catheter [facility])       ED Disposition     ED Disposition Condition Date/Time Comment    Discharge Stable Sat Jul 3, 2021  8:10 AM Brandon Cates discharge to home/self care  Follow-up Information     Follow up With Specialties Details Why [address], DO Emergency Medicine, Internal Medicine Schedule an appointment as soon as possible for a visit in 2 days If symptoms worsen [facility] [address] [facility]  [phone]            Patient's Medications   Discharge Prescriptions    No medications on file     No discharge procedures on file      PDMP Review       Value Time User    PDMP Reviewed  Yes 1/12/2021  9:16 AM Cheryl Hernandez PA-C          ED Provider  Electronically Signed by           Ranjit De La Torre MD  07/03/21 6932

## 2021-07-18 ENCOUNTER — HOSPITAL ENCOUNTER (EMERGENCY)
Facility: HOSPITAL | Age: 76
Discharge: HOME | End: 2021-07-18
Attending: INTERNAL MEDICINE | Admitting: INTERNAL MEDICINE
Payer: MEDICARE

## 2021-07-18 VITALS
DIASTOLIC BLOOD PRESSURE: 85 MMHG | HEART RATE: 56 BPM | SYSTOLIC BLOOD PRESSURE: 146 MMHG | OXYGEN SATURATION: 96 % | RESPIRATION RATE: 18 BRPM

## 2021-07-18 DIAGNOSIS — T83.9XXA PROBLEM WITH FOLEY CATHETER, INITIAL ENCOUNTER (HCC): Primary | ICD-10-CM

## 2021-07-18 DIAGNOSIS — R33.9 URINARY RETENTION: ICD-10-CM

## 2021-07-18 PROCEDURE — 99282 EMERGENCY DEPT VISIT SF MDM: CPT | Performed by: INTERNAL MEDICINE

## 2021-07-18 PROCEDURE — 99283 EMERGENCY DEPT VISIT LOW MDM: CPT

## 2021-07-18 PROCEDURE — 1124F ACP DISCUSS-NO DSCNMKR DOCD: CPT | Performed by: INTERNAL MEDICINE

## 2021-07-18 NOTE — ED PROVIDER NOTES
History  Chief Complaint   Patient presents with    Urinary Catheter Problem     catheter blocked     Pleasant 66-year-old gentleman presents emergency room complaining that his urinary catheter is not working  Patient has continuous indwelling catheter and states that every 2 weeks or so his catheter becomes blocked and needs to be changed  The patient sees Dr Verenice alfaro in Urology  He does have an appointment with Dr Quyen Seo and for the this following Thursday  Terra Kidd He has had no fever chills or flank pain  Prior to Admission Medications   Prescriptions Last Dose Informant Patient Reported? Taking?    B Complex Vitamins (B COMPLEX 1 PO)   Yes No   Sig: Take 1 capsule by mouth daily   Sulfamethoxazole-Trimethoprim (BACTRIM PO)   Yes No   Sig: Take by mouth   ascorbic acid (VITAMIN C) 500 MG tablet   Yes No   Sig: Take 500 mg by mouth daily   cholecalciferol (VITAMIN D3) 1,000 units tablet   Yes No   Sig: Take 4,000 Units by mouth daily   diphenhydrAMINE-APAP, sleep, (TYLENOL PM EXTRA STRENGTH PO)   Yes No   Sig: Take 2 tablets by mouth daily at bedtime   ferrous sulfate 325 (65 Fe) mg tablet   Yes No   Sig: Take 325 mg by mouth daily with breakfast   finasteride (PROSCAR) 5 mg tablet   Yes No   Sig: Take 5 mg by mouth daily   gabapentin (NEURONTIN) 400 mg capsule   No No   Sig: Take 1 capsule (400 mg total) by mouth daily at bedtime   morphine (MSIR) 15 mg tablet   Yes No   Sig: Take 15 mg by mouth every 6 (six) hours   sertraline (ZOLOFT) 50 mg tablet   Yes No   Sig: Take 50 mg by mouth daily        Facility-Administered Medications: None       Past Medical History:   Diagnosis Date    Chronic pain disorder     Depression     Diallo catheter in place     History of hydronephrosis     Hypertension     Insomnia     Kidney stone     PONV (postoperative nausea and vomiting)     Prostate enlargement     Psychiatric disorder     Spinal stenosis     Urinary tract infection     Vestibular disorder Past Surgical History:   Procedure Laterality Date    ARTHROSCOPY KNEE      BACK SURGERY      L 4 or 5    CATARACT EXTRACTION Bilateral     COLONOSCOPY      CYSTOSCOPY W/ LASER LITHOTRIPSY N/A 2018    Procedure: LITHOTRISPY HOLMIUM LASER of bladder stones;  Surgeon: Maryan Betancourt MD;  Location: [facility] MAIN OR;  Service: Urology    EAR SURGERY      KNEE ARTHROSCOPY      [place] CYSTOURETHROSCOPY,URETER CATHETER Right 2020    Procedure: CYSTOSCOPY RETROGRADE PYELOGRAM WITH INSERTION STENT URETERAL;  Surgeon: Maryan Betancourt MD;  Location: [facility] MAIN OR;  Service: Urology    OH FRAGMENT KIDNEY STONE/ ESWL Right 3/19/2021    Procedure: EXTRACORPORAL SHOCKWAVE LITHOTRIPSY RIGHT UPJ STONE;  Surgeon: Maryan Betancourt MD;  [facility];  Service: Urology    OH FRAGMENT KIDNEY STONE/ ESWL Right 2021    Procedure: LITHOTRIPSY EXTRACORPORAL SHOCKWAVE (ESWL); Surgeon: Maryan Betancourt MD;  [facility];  Service: Urology    OH TRANSURETHRAL ELEC-SURG PROSTATECTOM N/A 2018    Procedure: Barry Justice; TURP;  Surgeon: Maryan Betancourt MD;  Location: [facility] MAIN OR;  Service: Urology    ROTATOR CUFF REPAIR Bilateral     SHOULDER SURGERY Right 2019       Family History   Problem Relation Age of Onset    Cancer Mother     Diabetes Father      I have reviewed and agree with the history as documented  E-Cigarette/Vaping    E-Cigarette Use Never User      E-Cigarette/Vaping Substances    Nicotine No     THC No     CBD No     Flavoring No     Other No     Unknown No      Social History     Tobacco Use    Smoking status: Former Smoker     Packs/day: 1 00     Quit date: 1970     Years since quittin 5    Smokeless tobacco: Never Used    Tobacco comment: quit 50 years ago   Vaping Use    Vaping Use: Never used   Substance Use Topics    Alcohol use: Never    Drug use: Never       Review of Systems   HENT: Negative  Respiratory: Negative  Cardiovascular: Negative      Gastrointestinal: Positive for abdominal distention  Negative for nausea and vomiting  Endocrine: Negative  Genitourinary: Positive for difficulty urinating and urgency  Musculoskeletal: Negative  Skin: Negative  Neurological: Negative  Hematological: Negative  Psychiatric/Behavioral: Negative  Physical Exam  Physical Exam  Vitals and nursing note reviewed  Constitutional:       General: He is not in acute distress  Appearance: Normal appearance  He is normal weight  He is not ill-appearing, toxic-appearing or diaphoretic  HENT:      Head: Normocephalic and atraumatic  Eyes:      Extraocular Movements: Extraocular movements intact  Conjunctiva/sclera: Conjunctivae normal    Cardiovascular:      Rate and Rhythm: Normal rate and regular rhythm  Pulses: Normal pulses  Pulmonary:      Effort: Pulmonary effort is normal       Breath sounds: Normal breath sounds  Abdominal:      General: Abdomen is flat  Bowel sounds are normal  There is distension  Comments: Patient has mild bladder distention some discomfort to palpation   Genitourinary:     Penis: Normal        Testes: Normal    Musculoskeletal:         General: Normal range of motion  Cervical back: Normal range of motion and neck supple  Skin:     General: Skin is warm  Capillary Refill: Capillary refill takes less than 2 seconds  Neurological:      Mental Status: He is alert           Vital Signs  ED Triage Vitals [07/18/21 1354]   Temp Pulse Respirations Blood Pressure SpO2   -- 56 18 146/85 96 %      Temp src Heart Rate Source Patient Position - Orthostatic VS BP Location FiO2 (%)   -- Monitor Lying Left arm --      Pain Score       --           Vitals:    07/18/21 1354   BP: 146/85   Pulse: 56   Patient Position - Orthostatic VS: Lying         Visual Acuity      ED Medications  Medications - No data to display    Diagnostic Studies  Results Reviewed     None                 No orders to display Procedures  Procedures         ED Course                             SBIRT 20yo+      Most Recent Value   SBIRT (24 yo +)   In order to provide better care to our patients, we are screening all of our patients for alcohol and drug use  Would it be okay to ask you these screening questions? Yes Filed at: 07/18/2021 1415   Initial Alcohol Screen: US AUDIT-C    1  How often do you have a drink containing alcohol?  0 Filed at: 07/18/2021 1415   2  How many drinks containing alcohol do you have on a typical day you are drinking? 0 Filed at: 07/18/2021 1415   3a  Male UNDER 65: How often do you have five or more drinks on one occasion? 0 Filed at: 07/18/2021 1415   3b  FEMALE Any Age, or MALE 65+: How often do you have 4 or more drinks on one occassion? 0 Filed at: 07/18/2021 1415   Audit-C Score  0 Filed at: 07/18/2021 1415   ESTEBAN: How many times in the past year have you    Used an illegal drug or used a prescription medication for non-medical reasons? Never Filed at: 07/18/2021 1415                    MDM  Number of Diagnoses or Management Options  Problem with Diallo catheter, initial encounter [facility]): established and improving  Urinary retention: established and improving  Diagnosis management comments: Patient with her current Diallo catheter problems due to obstruction  Diallo catheter was exchanged  There is good urine flow at this time patient produced 600 cc within several minutes he is medically cleared for discharge  He has a follow-up appointment this coming Thursday July 22nd with Dr Mervin Godinez  Disposition  Final diagnoses:   Problem with Diallo catheter, initial encounter [facility])   Urinary retention     Time reflects when diagnosis was documented in both MDM as applicable and the Disposition within this note     Time User Action Codes Description Comment    7/18/2021  2:44 PM Alana Hinson  9XXA] Problem with [facility]iallo catheter[facility]     7/18/2021  2:44 PM Riaz Aliza Pedro [R33 9] Urinary retention       ED Disposition     ED Disposition Condition Date/Time Comment    Discharge Stable Sun Jul 18, 2021  2:44 PM Arturo Calvo discharge to home/self care  Follow-up Information    None         Patient's Medications   Discharge Prescriptions    No medications on file     No discharge procedures on file      PDMP Review       Value Time User    PDMP Reviewed  Yes 1/12/2021  9:16 AM Dhruv Navarrete PA-C          ED Provider  Electronically Signed by           Chetan Hernandez MD  07/18/21 4041

## 2021-07-26 ENCOUNTER — APPOINTMENT (OUTPATIENT)
Dept: LAB | Facility: MEDICAL CENTER | Age: 76
End: 2021-07-26
Payer: MEDICARE

## 2021-07-26 DIAGNOSIS — K57.90 DIVERTICULOSIS: ICD-10-CM

## 2021-07-26 DIAGNOSIS — G30.9 ALZHEIMER'S DISEASE (HCC): ICD-10-CM

## 2021-07-26 DIAGNOSIS — M54.9 BACK PAIN, UNSPECIFIED BACK LOCATION, UNSPECIFIED BACK PAIN LATERALITY, UNSPECIFIED CHRONICITY: ICD-10-CM

## 2021-07-26 DIAGNOSIS — E55.9 VITAMIN D DEFICIENCY: ICD-10-CM

## 2021-07-26 DIAGNOSIS — E78.5 HYPERLIPIDEMIA, UNSPECIFIED HYPERLIPIDEMIA TYPE: ICD-10-CM

## 2021-07-26 DIAGNOSIS — R42 VERTIGO: ICD-10-CM

## 2021-07-26 DIAGNOSIS — F02.80 ALZHEIMER'S DISEASE (HCC): ICD-10-CM

## 2021-07-26 LAB
25(OH)D3 SERPL-MCNC: 47.7 NG/ML (ref 30–100)
ALBUMIN SERPL BCP-MCNC: 3.6 G/DL (ref 3.5–5)
ALP SERPL-CCNC: 106 U/L (ref 46–116)
ALT SERPL W P-5'-P-CCNC: 20 U/L (ref 12–78)
ANION GAP SERPL CALCULATED.3IONS-SCNC: 6 MMOL/L (ref 4–13)
AST SERPL W P-5'-P-CCNC: 21 U/L (ref 5–45)
BILIRUB SERPL-MCNC: 0.49 MG/DL (ref 0.2–1)
BUN SERPL-MCNC: 29 MG/DL (ref 5–25)
CALCIUM SERPL-MCNC: 9.6 MG/DL (ref 8.3–10.1)
CHLORIDE SERPL-SCNC: 108 MMOL/L (ref 100–108)
CHOLEST SERPL-MCNC: 180 MG/DL (ref 50–200)
CO2 SERPL-SCNC: 23 MMOL/L (ref 21–32)
CREAT SERPL-MCNC: 1.52 MG/DL (ref 0.6–1.3)
ERYTHROCYTE [DISTWIDTH] IN BLOOD BY AUTOMATED COUNT: 12.8 % (ref 11.6–15.1)
GFR SERPL CREATININE-BSD FRML MDRD: 43 ML/MIN/1.73SQ M
GLUCOSE P FAST SERPL-MCNC: 98 MG/DL (ref 65–99)
HCT VFR BLD AUTO: 37.7 % (ref 36.5–49.3)
HDLC SERPL-MCNC: 47 MG/DL
HGB BLD-MCNC: 12.2 G/DL (ref 12–17)
LDLC SERPL CALC-MCNC: 100 MG/DL (ref 0–100)
MCH RBC QN AUTO: 32.2 PG (ref 26.8–34.3)
MCHC RBC AUTO-ENTMCNC: 32.4 G/DL (ref 31.4–37.4)
MCV RBC AUTO: 100 FL (ref 82–98)
NONHDLC SERPL-MCNC: 133 MG/DL
PLATELET # BLD AUTO: 251 THOUSANDS/UL (ref 149–390)
PMV BLD AUTO: 10.5 FL (ref 8.9–12.7)
POTASSIUM SERPL-SCNC: 4.4 MMOL/L (ref 3.5–5.3)
PROT SERPL-MCNC: 7.4 G/DL (ref 6.4–8.2)
PSA SERPL-MCNC: <0.1 NG/ML (ref 0–4)
RBC # BLD AUTO: 3.79 MILLION/UL (ref 3.88–5.62)
SODIUM SERPL-SCNC: 137 MMOL/L (ref 136–145)
TRIGL SERPL-MCNC: 163 MG/DL
WBC # BLD AUTO: 6.01 THOUSAND/UL (ref 4.31–10.16)

## 2021-07-26 PROCEDURE — 80061 LIPID PANEL: CPT

## 2021-07-26 PROCEDURE — 36415 COLL VENOUS BLD VENIPUNCTURE: CPT

## 2021-07-26 PROCEDURE — 80053 COMPREHEN METABOLIC PANEL: CPT

## 2021-07-26 PROCEDURE — 82306 VITAMIN D 25 HYDROXY: CPT

## 2021-07-26 PROCEDURE — 85027 COMPLETE CBC AUTOMATED: CPT

## 2021-07-26 PROCEDURE — G0103 PSA SCREENING: HCPCS

## 2021-09-13 ENCOUNTER — HOSPITAL ENCOUNTER (EMERGENCY)
Facility: HOSPITAL | Age: 76
Discharge: HOME | End: 2021-09-14
Attending: EMERGENCY MEDICINE
Payer: MEDICARE

## 2021-09-13 VITALS
OXYGEN SATURATION: 97 % | HEIGHT: 69 IN | RESPIRATION RATE: 16 BRPM | DIASTOLIC BLOOD PRESSURE: 72 MMHG | TEMPERATURE: 98.2 F | WEIGHT: 180 LBS | HEART RATE: 64 BPM | BODY MASS INDEX: 26.66 KG/M2 | SYSTOLIC BLOOD PRESSURE: 141 MMHG

## 2021-09-13 DIAGNOSIS — T83.011A MALFUNCTION OF FOLEY CATHETER, INITIAL ENCOUNTER (HCC): Primary | ICD-10-CM

## 2021-09-13 PROCEDURE — 99283 EMERGENCY DEPT VISIT LOW MDM: CPT

## 2021-09-13 PROCEDURE — 99284 EMERGENCY DEPT VISIT MOD MDM: CPT | Performed by: EMERGENCY MEDICINE

## 2021-09-14 LAB
BACTERIA UR QL AUTO: ABNORMAL /HPF
BILIRUB UR QL STRIP: NEGATIVE
CLARITY UR: ABNORMAL
COLOR UR: YELLOW
GLUCOSE UR STRIP-MCNC: NEGATIVE MG/DL
HGB UR QL STRIP.AUTO: ABNORMAL
KETONES UR STRIP-MCNC: NEGATIVE MG/DL
LEUKOCYTE ESTERASE UR QL STRIP: ABNORMAL
NITRITE UR QL STRIP: POSITIVE
NON-SQ EPI CELLS URNS QL MICRO: ABNORMAL /HPF
PH UR STRIP.AUTO: 7.5 [PH]
PROT UR STRIP-MCNC: ABNORMAL MG/DL
RBC #/AREA URNS AUTO: ABNORMAL /HPF
SP GR UR STRIP.AUTO: 1.01 (ref 1–1.03)
TRI-PHOS CRY URNS QL MICRO: ABNORMAL /HPF
UROBILINOGEN UR QL STRIP.AUTO: 0.2 E.U./DL
WBC #/AREA URNS AUTO: ABNORMAL /HPF

## 2021-09-14 PROCEDURE — 87077 CULTURE AEROBIC IDENTIFY: CPT | Performed by: EMERGENCY MEDICINE

## 2021-09-14 PROCEDURE — 87186 SC STD MICRODIL/AGAR DIL: CPT | Performed by: EMERGENCY MEDICINE

## 2021-09-14 PROCEDURE — 87086 URINE CULTURE/COLONY COUNT: CPT | Performed by: EMERGENCY MEDICINE

## 2021-09-14 PROCEDURE — 81001 URINALYSIS AUTO W/SCOPE: CPT | Performed by: EMERGENCY MEDICINE

## 2021-09-14 RX ORDER — SULFAMETHOXAZOLE AND TRIMETHOPRIM 800; 160 MG/1; MG/1
1 TABLET ORAL ONCE
Status: COMPLETED | OUTPATIENT
Start: 2021-09-14 | End: 2021-09-14

## 2021-09-14 RX ADMIN — SULFAMETHOXAZOLE AND TRIMETHOPRIM 1 TABLET: 800; 160 TABLET ORAL at 01:04

## 2021-09-14 NOTE — DISCHARGE INSTRUCTIONS
RETURN IF WORSE IN ANY WAY:   PAIN,   FEVER OR FLU LIKE SYMPTOMS,   OR NEW AND CONCERNING SYMPTOMS SIGNS OR SYMPTOMS      If you DONT have any antibiotics left at home, [address] [place] [address] Urologist to discuss your urine results and decide how much more antibiotics you will need   PLEASE REVIEW THE WORK UP RESULTS WITH YOUR DOCTOR        PATIENT SURVEY:   Thank you for your visit today  Your satisfaction is very very important to us  Maicol Rosen for choosing [facility] for your ER care  If you get a survey in the mail please rate your service as VERY GOOD (other ratings lower than this are actually detrimental) - This helps our team to continue providing excellent care - Maicol Rosen

## 2021-09-14 NOTE — ED PROVIDER NOTES
History  Chief Complaint   Patient presents with    Urinary Catheter Problem     Patient reports he is due for his catheter to be changed by Dr Lazarus Alexander tomorrow  Patient reports his bag has not emptied since this morning  65 yo M    PMH:   BPH      PT Here for cantor catheter evaluation   States that his cantor is due to be changed by Dr Lazarus Alexander in the morning, but he feels the cantor is blocked, as it has not drained for hours    No fever/chills  No n/v  No headache or dizziness      History provided by:  Patient  Urinary Catheter Problem  Severity:  Mild  Onset quality:  Gradual  Associated symptoms: no abdominal pain, no chest pain, no cough, no fatigue, no fever, no headaches, no myalgias, no nausea, no rash, no shortness of breath, no vomiting and no wheezing        Prior to Admission Medications   Prescriptions Last Dose Informant Patient Reported? Taking?    B Complex Vitamins (B COMPLEX 1 PO)   Yes No   Sig: Take 1 capsule by mouth daily   Sulfamethoxazole-Trimethoprim (BACTRIM PO)   Yes No   Sig: Take by mouth   ascorbic acid (VITAMIN C) 500 MG tablet   Yes No   Sig: Take 500 mg by mouth daily   cholecalciferol (VITAMIN D3) 1,000 units tablet   Yes No   Sig: Take 4,000 Units by mouth daily   diphenhydrAMINE-APAP, sleep, (TYLENOL PM EXTRA STRENGTH PO)   Yes No   Sig: Take 2 tablets by mouth daily at bedtime   ferrous sulfate 325 (65 Fe) mg tablet   Yes No   Sig: Take 325 mg by mouth daily with breakfast   finasteride (PROSCAR) 5 mg tablet   Yes No   Sig: Take 5 mg by mouth daily   gabapentin (NEURONTIN) 400 mg capsule   No No   Sig: Take 1 capsule (400 mg total) by mouth daily at bedtime   morphine (MSIR) 15 mg tablet   Yes No   Sig: Take 15 mg by mouth every 6 (six) hours   sertraline (ZOLOFT) 50 mg tablet   Yes No   Sig: Take 50 mg by mouth daily        Facility-Administered Medications: None       Past Medical History:   Diagnosis Date    Chronic pain disorder     Depression     Cantor catheter in place     History of hydronephrosis     Hypertension     Insomnia     Kidney stone     PONV (postoperative nausea and vomiting)     Prostate enlargement     Psychiatric disorder     Spinal stenosis     Urinary tract infection     Vestibular disorder        Past Surgical History:   Procedure Laterality Date    ARTHROSCOPY KNEE      BACK SURGERY      L 4 or 5    CATARACT EXTRACTION Bilateral     COLONOSCOPY      CYSTOSCOPY W/ LASER LITHOTRIPSY N/A 2018    Procedure: LITHOTRISPY HOLMIUM LASER of bladder stones;  Surgeon: George Guerrero MD;  Location: [facility] MAIN OR;  Service: Urology    EAR SURGERY      KNEE ARTHROSCOPY      [place] CYSTOURETHROSCOPY,URETER CATHETER Right 2020    Procedure: CYSTOSCOPY RETROGRADE PYELOGRAM WITH INSERTION STENT URETERAL;  Surgeon: George Guerrero MD;  Location: [facility] MAIN OR;  Service: Urology    [place] ESWL Right 3/19/2021    Procedure: EXTRACORPORAL SHOCKWAVE LITHOTRIPSY RIGHT UPJ STONE;  Surgeon: George Guerrero MD;  [facility];  Service: Urology    [place] FRAGMENT KIDNEY STONE/ ESWL Right 2021    Procedure: LITHOTRIPSY EXTRACORPORAL SHOCKWAVE (ESWL); Surgeon: George Guerrero MD;  [facility];  Service: Urology    [place] TRANSURETHRAL ELEC-SURG PROSTATECTOM N/A 2018    Procedure: Luis Echevarria; TURP;  Surgeon: George Guerrero MD;  Location: [facility] MAIN OR;  Service: Urology    ROTATOR CUFF REPAIR Bilateral     SHOULDER SURGERY Right 2019       Family History   Problem Relation Age of Onset    Cancer Mother     Diabetes Father      I have reviewed and agree with the history as documented      E-Cigarette/Vaping    E-Cigarette Use Never User      E-Cigarette/Vaping Substances    Nicotine No     THC No     CBD No     Flavoring No     Other No     Unknown No      Social History     Tobacco Use    Smoking status: Former Smoker     Packs/day: 1 00     Quit date: 1970     Years since quittin 7    Smokeless tobacco: Never Used    Tobacco comment: quit 50 years ago   Vaping Use    Vaping Use: Never used   Substance Use Topics    Alcohol use: Never    Drug use: Never       Review of Systems   Constitutional: Negative for chills, diaphoresis, fatigue and fever  Respiratory: Negative for cough, shortness of breath, wheezing and stridor  Cardiovascular: Negative for chest pain, palpitations and leg swelling  Gastrointestinal: Negative for abdominal pain, blood in stool, nausea and vomiting  Genitourinary: Negative for difficulty urinating, dysuria, flank pain and frequency  Acute urinary retention due to cantor malfunction    Musculoskeletal: Negative for arthralgias, back pain, gait problem, joint swelling, myalgias, neck pain and neck stiffness  Skin: Negative for rash and wound  Neurological: Negative for dizziness, light-headedness and headaches  All other systems reviewed and are negative  Physical Exam  Physical Exam  Constitutional:       General: He is not in acute distress  Appearance: Normal appearance  He is well-developed  He is not ill-appearing, toxic-appearing or diaphoretic  HENT:      Head: Normocephalic and atraumatic  Right Ear: External ear normal       Left Ear: External ear normal       Nose: Nose normal       Mouth/Throat:      Pharynx: No oropharyngeal exudate  Eyes:      General: No scleral icterus  Right eye: No discharge  Left eye: No discharge  Conjunctiva/sclera: Conjunctivae normal       Pupils: Pupils are equal, round, and reactive to light  Neck:      Vascular: No JVD  Trachea: No tracheal deviation  Cardiovascular:      Rate and Rhythm: Normal rate and regular rhythm  Heart sounds: Normal heart sounds  No murmur heard  No friction rub  No gallop  Pulmonary:      Effort: Pulmonary effort is normal  No respiratory distress  Breath sounds: Normal breath sounds  No stridor  No wheezing, rhonchi or rales     Chest:      Chest wall: No tenderness  Abdominal:      General: Bowel sounds are normal  There is no distension  Palpations: Abdomen is soft  There is no mass  Tenderness: There is no abdominal tenderness  There is no right CVA tenderness, left CVA tenderness, guarding or rebound  Hernia: No hernia is present  Musculoskeletal:         General: No swelling, tenderness, deformity or signs of injury  Normal range of motion  Cervical back: Normal range of motion and neck supple  No rigidity or tenderness  Right lower leg: No edema  Left lower leg: No edema  Lymphadenopathy:      Cervical: No cervical adenopathy  Skin:     General: Skin is warm  Capillary Refill: Capillary refill takes less than 2 seconds  Coloration: Skin is not jaundiced or pale  Findings: No bruising, erythema, lesion or rash  Neurological:      General: No focal deficit present  Mental Status: He is alert and oriented to person, place, and time  Cranial Nerves: No cranial nerve deficit  Sensory: No sensory deficit  Motor: No weakness or abnormal muscle tone  Coordination: Coordination normal    Psychiatric:         Mood and Affect: Mood normal          Behavior: Behavior normal          Thought Content:  Thought content normal          Judgment: Judgment normal          Vital Signs  ED Triage Vitals [09/13/21 2216]   Temperature Pulse Respirations Blood Pressure SpO2   98 2 °F (36 8 °C) 64 16 141/72 97 %      Temp Source Heart Rate Source Patient Position - Orthostatic VS BP Location FiO2 (%)   Temporal -- -- -- --      Pain Score       8           Vitals:    09/13/21 2216   BP: 141/72   Pulse: 64         Visual Acuity      ED Medications  Medications   sulfamethoxazole-trimethoprim (BACTRIM DS) 800-160 mg per tablet 1 tablet (1 tablet Oral Given 9/14/21 0104)       Diagnostic Studies  Results Reviewed     Procedure Component Value Units Date/Time    Urine Microscopic [[id number]]  (Abnormal) Collected: 09/14/21 0104    Lab Status: Final result Specimen: Urine, Indwelling Cantor Catheter Updated: 09/14/21 0127     RBC, UA 4-10 /hpf      WBC, UA Innumerable /hpf      Epithelial Cells Occasional /hpf      Bacteria, UA Occasional /hpf      Triplep Phos Celia, UA Occasional /hpf     Urine culture [[id number]] Collected: 09/14/21 0104    Lab Status:  In process Specimen: Urine, Indwelling Cantor Catheter Updated: 09/14/21 0127    UA w Reflex to Microscopic w Reflex to Culture [[id number]]  (Abnormal) Collected: 09/14/21 0104    Lab Status: Final result Specimen: Urine, Indwelling Cantor Catheter Updated: 09/14/21 0111     Color, UA Yellow     Clarity, UA Slightly Cloudy     Specific Gravity, UA 1 015     pH, UA 7 5     Leukocytes, UA 3+     Nitrite, UA Positive     Protein, UA Trace mg/dl      Glucose, UA Negative mg/dl      Ketones, UA Negative mg/dl      Urobilinogen, UA 0 2 E U /dl      Bilirubin, UA Negative     Blood, UA 3+                 No orders to display              Procedures  Procedures         ED Course  ED Course as of Sep 14 0618   Mon Sep 13, 2021   2337 PT Here for cantor catheter evaluation   States that his cantor is due to be changed by Dr Nils Lozano in the morning, but he feels the cantor is blocked, as it has not drained for hours    Pt is well appearing  Non toxic  Vs wnl  Will place order to have cantor changed to determine if this resolves pt's symptoms       Tue Sep 14, 2021   0052 Pt has 300 cc of yellow colored urine in the bag  He feels so much better and is ready to manage from home  He states that he usually has several doses of abx surrounding cantor change  I will give dose now  He has an appointment w/ Dr Nils Lozano Wednesday   He has enough abx at home to get him to his appointment w/ Dr Nils Lozano                                               MDM    Disposition  Final diagnoses:   Malfunction of Cantor catheter, initial encounter [facility])     Time reflects when diagnosis was documented in both MDM as applicable and the Disposition within this note     Time User Action Codes Description Comment    9/14/2021 12:54 AM Margoth Haynes Add [T83 [id number]] Malfunction of Diallo catheter, initial encounter [facility])       ED Disposition     ED Disposition Condition Date/Time Comment    Discharge Stable Tue Sep 14, 2021 12:54 AM Martin Tang discharge to home/self care  Follow-up Information    None         Discharge Medication List as of 9/14/2021 12:55 AM      CONTINUE these medications which have NOT CHANGED    Details   ascorbic acid (VITAMIN C) 500 MG tablet Take 500 mg by mouth daily, Historical Med      B Complex Vitamins (B COMPLEX 1 PO) Take 1 capsule by mouth daily, Historical Med      cholecalciferol (VITAMIN D3) 1,000 units tablet Take 4,000 Units by mouth daily, Historical Med      diphenhydrAMINE-APAP, sleep, (TYLENOL PM EXTRA STRENGTH PO) Take 2 tablets by mouth daily at bedtime, Historical Med      ferrous sulfate 325 (65 Fe) mg tablet Take 325 mg by mouth daily with breakfast, Historical Med      finasteride (PROSCAR) 5 mg tablet Take 5 mg by mouth daily, Historical Med      gabapentin (NEURONTIN) 400 mg capsule Take 1 capsule (400 mg total) by mouth daily at bedtime, Starting Tue 1/12/2021, Normal      morphine (MSIR) 15 mg tablet Take 15 mg by mouth every 6 (six) hours, Historical Med      sertraline (ZOLOFT) 50 mg tablet Take 50 mg by mouth daily  , Starting Fri 1/26/2018, Historical Med      Sulfamethoxazole-Trimethoprim (BACTRIM PO) Take by mouth, Historical Med           No discharge procedures on file      PDMP Review       Value Time User    PDMP Reviewed  Yes 1/12/2021  9:16 AM Xiomara Allen PA-C          ED Provider  Electronically Signed by           Kristine Kaufman MD  09/14/21 8670

## 2021-09-17 LAB
BACTERIA UR CULT: ABNORMAL

## 2021-11-13 ENCOUNTER — HOSPITAL ENCOUNTER (EMERGENCY)
Facility: HOSPITAL | Age: 76
Discharge: HOME | End: 2021-11-13
Attending: EMERGENCY MEDICINE
Payer: MEDICARE

## 2021-11-13 VITALS
TEMPERATURE: 97.3 F | RESPIRATION RATE: 18 BRPM | HEART RATE: 86 BPM | SYSTOLIC BLOOD PRESSURE: 169 MMHG | DIASTOLIC BLOOD PRESSURE: 77 MMHG | OXYGEN SATURATION: 95 %

## 2021-11-13 DIAGNOSIS — Z46.6 URINARY CATHETER (FOLEY) CHANGE REQUIRED: ICD-10-CM

## 2021-11-13 DIAGNOSIS — T83.9XXA PROBLEM WITH FOLEY CATHETER, INITIAL ENCOUNTER (HCC): Primary | ICD-10-CM

## 2021-11-13 DIAGNOSIS — N39.0 UTI (URINARY TRACT INFECTION): ICD-10-CM

## 2021-11-13 LAB
BACTERIA UR QL AUTO: ABNORMAL /HPF
BILIRUB UR QL STRIP: NEGATIVE
CLARITY UR: ABNORMAL
COLOR UR: YELLOW
GLUCOSE UR STRIP-MCNC: NEGATIVE MG/DL
HGB UR QL STRIP.AUTO: ABNORMAL
KETONES UR STRIP-MCNC: NEGATIVE MG/DL
LEUKOCYTE ESTERASE UR QL STRIP: ABNORMAL
NITRITE UR QL STRIP: NEGATIVE
NON-SQ EPI CELLS URNS QL MICRO: ABNORMAL /HPF
PH UR STRIP.AUTO: 8.5 [PH]
PROT UR STRIP-MCNC: ABNORMAL MG/DL
RBC #/AREA URNS AUTO: ABNORMAL /HPF
SP GR UR STRIP.AUTO: <=1.005 (ref 1–1.03)
TRI-PHOS CRY URNS QL MICRO: ABNORMAL /HPF
UROBILINOGEN UR QL STRIP.AUTO: 0.2 E.U./DL
WBC #/AREA URNS AUTO: ABNORMAL /HPF

## 2021-11-13 PROCEDURE — 99284 EMERGENCY DEPT VISIT MOD MDM: CPT | Performed by: EMERGENCY MEDICINE

## 2021-11-13 PROCEDURE — 81001 URINALYSIS AUTO W/SCOPE: CPT | Performed by: EMERGENCY MEDICINE

## 2021-11-13 PROCEDURE — 99283 EMERGENCY DEPT VISIT LOW MDM: CPT

## 2021-11-13 RX ORDER — SULFAMETHOXAZOLE AND TRIMETHOPRIM 800; 160 MG/1; MG/1
1 TABLET ORAL ONCE
Status: COMPLETED | OUTPATIENT
Start: 2021-11-13 | End: 2021-11-13

## 2021-11-13 RX ORDER — SULFAMETHOXAZOLE AND TRIMETHOPRIM 800; 160 MG/1; MG/1
1 TABLET ORAL 2 TIMES DAILY
Qty: 14 TABLET | Refills: 0 | Status: SHIPPED | OUTPATIENT
Start: 2021-11-13 | End: 2021-11-20

## 2021-11-13 RX ADMIN — SULFAMETHOXAZOLE AND TRIMETHOPRIM 1 TABLET: 800; 160 TABLET ORAL at 20:25

## 2021-11-29 ENCOUNTER — APPOINTMENT (OUTPATIENT)
Dept: RADIOLOGY | Facility: CLINIC | Age: 76
End: 2021-11-29
Payer: MEDICARE

## 2021-11-29 DIAGNOSIS — M54.6 PAIN IN THORACIC SPINE: ICD-10-CM

## 2021-11-29 DIAGNOSIS — R09.1 PLEURISY WITHOUT EFFUSION: ICD-10-CM

## 2021-11-29 PROCEDURE — 71046 X-RAY EXAM CHEST 2 VIEWS: CPT

## 2021-11-29 PROCEDURE — 72072 X-RAY EXAM THORAC SPINE 3VWS: CPT

## 2022-01-05 ENCOUNTER — LAB REQUISITION (OUTPATIENT)
Dept: LAB | Facility: HOSPITAL | Age: 77
End: 2022-01-05
Payer: MEDICARE

## 2022-01-05 DIAGNOSIS — N39.0 URINARY TRACT INFECTION, SITE NOT SPECIFIED: ICD-10-CM

## 2022-01-05 PROCEDURE — 87186 SC STD MICRODIL/AGAR DIL: CPT | Performed by: UROLOGY

## 2022-01-05 PROCEDURE — 87086 URINE CULTURE/COLONY COUNT: CPT | Performed by: UROLOGY

## 2022-01-05 PROCEDURE — 87077 CULTURE AEROBIC IDENTIFY: CPT | Performed by: UROLOGY

## 2022-01-08 LAB
BACTERIA UR CULT: ABNORMAL
BACTERIA UR CULT: ABNORMAL

## 2022-01-31 ENCOUNTER — HOSPITAL ENCOUNTER (EMERGENCY)
Facility: HOSPITAL | Age: 77
Discharge: HOME | End: 2022-01-31
Attending: EMERGENCY MEDICINE
Payer: MEDICARE

## 2022-01-31 VITALS
OXYGEN SATURATION: 99 % | HEART RATE: 105 BPM | RESPIRATION RATE: 18 BRPM | SYSTOLIC BLOOD PRESSURE: 167 MMHG | DIASTOLIC BLOOD PRESSURE: 92 MMHG | TEMPERATURE: 97 F

## 2022-01-31 DIAGNOSIS — T83.9XXA PROBLEM WITH URINARY CATHETER (HCC): Primary | ICD-10-CM

## 2022-01-31 PROCEDURE — 99283 EMERGENCY DEPT VISIT LOW MDM: CPT

## 2022-01-31 PROCEDURE — 99284 EMERGENCY DEPT VISIT MOD MDM: CPT | Performed by: EMERGENCY MEDICINE

## 2022-01-31 NOTE — ED PROVIDER NOTES
History  Chief Complaint   Patient presents with    Urinary Catheter Problem     Pt presents ambulatory c/o bladder pain  Pt states, "I think my catheter is blocked "     This is a 53-year-old male with urinary retention  Patient has an extensive history of Diallo catheter issues requiring frequent replacement  He has been seen in this emergency department by myself and many other providers multiple times in the last year  He normally receives every other week replacements by his urologist Dr Magda Rivas  Unfortunately, his Diallo started to become clogged earlier today with sediment  He is now having suprapubic distension and discomfort similar to previous episodes  Worse with palpation  Nothing is traditionally made it better other than replacing the Diallo  Denies any fevers, chills, flank pain  Prior to Admission Medications   Prescriptions Last Dose Informant Patient Reported? Taking?    B Complex Vitamins (B COMPLEX 1 PO)   Yes No   Sig: Take 1 capsule by mouth daily   Sulfamethoxazole-Trimethoprim (BACTRIM PO)   Yes No   Sig: Take by mouth   ascorbic acid (VITAMIN C) 500 MG tablet   Yes No   Sig: Take 500 mg by mouth daily   cholecalciferol (VITAMIN D3) 1,000 units tablet   Yes No   Sig: Take 4,000 Units by mouth daily   diphenhydrAMINE-APAP, sleep, (TYLENOL PM EXTRA STRENGTH PO)   Yes No   Sig: Take 2 tablets by mouth daily at bedtime   ferrous sulfate 325 (65 Fe) mg tablet   Yes No   Sig: Take 325 mg by mouth daily with breakfast   finasteride (PROSCAR) 5 mg tablet   Yes No   Sig: Take 5 mg by mouth daily   gabapentin (NEURONTIN) 400 mg capsule   No No   Sig: Take 1 capsule (400 mg total) by mouth daily at bedtime   morphine (MSIR) 15 mg tablet   Yes No   Sig: Take 15 mg by mouth every 6 (six) hours   sertraline (ZOLOFT) 50 mg tablet   Yes No   Sig: Take 50 mg by mouth daily        Facility-Administered Medications: None       Past Medical History:   Diagnosis Date    Chronic pain disorder     Depression     Diallo catheter in place     History of hydronephrosis     Hypertension     Insomnia     Kidney stone     PONV (postoperative nausea and vomiting)     Prostate enlargement     Psychiatric disorder     Spinal stenosis     Urinary tract infection     Vestibular disorder        Past Surgical History:   Procedure Laterality Date    ARTHROSCOPY KNEE      BACK SURGERY      L 4 or 5    CATARACT EXTRACTION Bilateral     COLONOSCOPY      CYSTOSCOPY W/ LASER LITHOTRIPSY N/A 2018    Procedure: LITHOTRISPY HOLMIUM LASER of bladder stones;  Surgeon: Jacky Graham MD;  Location[address] OR;  Service: Urology    EAR SURGERY      KNEE ARTHROSCOPY      [place] CYSTOURETHROSCOPY,URETER CATHETER Right 2020    Procedure: CYSTOSCOPY RETROGRADE PYELOGRAM WITH INSERTION STENT URETERAL;  Surgeon: Jacky Graham MD;  Location: [address] MA[address] OR;  Service: Urology    [place] ESWL Right 3/19/2021    Procedure: EXTRACORPORAL SHOCKWAVE LITHOTRIPSY RIGHT UPJ STONE;  Surgeon: Jacky Graham MD;  [facility];  Service: Urology    NJ FRAGMENT KIDNEY STONE/ ESWL Right 2021    Procedure: LITHOTRIPSY EXTRACORPORAL SHOCKWAVE (ESWL); Surgeon: Jacky Graham MD;  [facility] MA[facility];  Service: Urology    NJ TRANSURETHRAL ELEC-SURG PROSTATECTOM N/A 2018    Procedure: [facility]; TURP;  Surgeon: Jacky Graham MD;  [facility];  Service: Urology    ROTATOR CUFF REPAIR Bilateral     SHOULDER SURGERY Right 2019       Family History   Problem Relation Age of Onset    Cancer Mother     Diabetes Father      I have reviewed and agree with the history as documented      E-Cigarette/Vaping    E-Cigarette Use Never User      E-Cigarette/Vaping Substances    Nicotine No     THC No     CBD No     Flavoring No     Other No     Unknown No      Social History     Tobacco Use    Smoking status: Former Smoker     Packs/day: 1 00     Quit date: 1970     Years since quittin 1    Smokeless tobacco: Never Used    Tobacco comment: quit 50 years ago   Vaping Use    Vaping Use: Never used   Substance Use Topics    Alcohol use: Never    Drug use: Never       Review of Systems   Constitutional: Negative for chills and fever  Eyes: Negative for visual disturbance  Respiratory: Negative for shortness of breath  Cardiovascular: Negative for chest pain  Gastrointestinal: Negative for abdominal distention and abdominal pain  Endocrine: Negative for polyuria  Genitourinary: Positive for decreased urine volume  Negative for dysuria and frequency  Neurological: Negative for dizziness, syncope and weakness  Physical Exam  Physical Exam  Constitutional:       General: He is not in acute distress  Appearance: Normal appearance  He is not ill-appearing, toxic-appearing or diaphoretic  HENT:      Head: Normocephalic and atraumatic  Right Ear: External ear normal       Left Ear: External ear normal    Eyes:      Extraocular Movements: Extraocular movements intact  Conjunctiva/sclera: Conjunctivae normal       Pupils: Pupils are equal, round, and reactive to light  Cardiovascular:      Comments: Good peripheral perfusion, good coloration  Pulmonary:      Effort: Pulmonary effort is normal    Abdominal:      General: There is no distension  Tenderness: There is no guarding  Musculoskeletal:         General: No swelling, deformity or signs of injury  Normal range of motion  Cervical back: Normal range of motion and neck supple  Skin:     General: Skin is warm  Findings: No bruising, lesion or rash  Neurological:      General: No focal deficit present  Mental Status: He is alert and oriented to person, place, and time  Gait: Gait normal    Psychiatric:         Mood and Affect: Mood normal          Behavior: Behavior normal          Thought Content:  Thought content normal          Judgment: Judgment normal          Vital Signs  ED Triage Vitals [01/31/22 1704]   Temperature Pulse Respirations Blood Pressure SpO2   (!) 97 °F (36 1 °C) 105 18 167/92 99 %      Temp Source Heart Rate Source Patient Position - Orthostatic VS BP Location FiO2 (%)   Temporal Monitor Sitting Right arm --      Pain Score       --           Vitals:    01/31/22 1704   BP: 167/92   Pulse: 105   Patient Position - Orthostatic VS: Sitting         Visual Acuity      ED Medications  Medications - No data to display    Diagnostic Studies  Results Reviewed     None                 No orders to display              Procedures  Procedures         ED Course                               SBIRT 20yo+      Most Recent Value   SBIRT (24 yo +)    In order to provide better care to our patients, we are screening all of our patients for alcohol and drug use  Would it be okay to ask you these screening questions? Yes Filed at: 01/31/2022 1738   Initial Alcohol Screen: US AUDIT-C     1  How often do you have a drink containing alcohol? 0 Filed at: 01/31/2022 1738   2  How many drinks containing alcohol do you have on a typical day you are drinking? 0 Filed at: 01/31/2022 1738   3a  Male UNDER 65: How often do you have five or more drinks on one occasion? 0 Filed at: 01/31/2022 1738   3b  FEMALE Any Age, or MALE 65+: How often do you have 4 or more drinks on one occassion? 0 Filed at: 01/31/2022 1738   Audit-C Score 0 Filed at: 01/31/2022 1738   ESTEBAN: How many times in the past year have you    Used an illegal drug or used a prescription medication for non-medical reasons? Never Filed at: 01/31/2022 1738                    MDM  Number of Diagnoses or Management Options  Problem with urinary catheter [facility]): new and requires workup  Diagnosis management comments: This is a 58-year-old man with a clogged Diallo catheter  Diallo replaced  Patient follow-up with Dr Giorgi Ley   Discussed warning signs and symptoms with the patient as well as when to return to the emergency department versus follow up with [facility]  Patient states understanding and agreement with the plan  Patient care delayed due to critical capacity in the emergency department  This note was completed using dictation software  Disposition  Final diagnoses:   Problem with urinary catheter [facility])     Time reflects when diagnosis was documented in both MDM as applicable and the Disposition within this note     Time User Action Codes Description Comment    1/31/2022  5:47 PM Darcy Snare  9XXA] Problem with urinary catheter [facility])       ED Disposition     ED Disposition Condition Date/Time Comment    Discharge Stable Mon Jan 31, 2022  5:47 PM Eva Díaz discharge to home/self care              Follow-up Information     Follow up With Specialties Details Why Contact Dora Marcelo MD Urology In 1 day  [facility]  [phone]            Discharge Medication List as of 1/31/2022  5:49 PM      CONTINUE these medications which have NOT CHANGED    Details   ascorbic acid (VITAMIN C) 500 MG tablet Take 500 mg by mouth daily, Historical Med      B Complex Vitamins (B COMPLEX 1 PO) Take 1 capsule by mouth daily, Historical Med      cholecalciferol (VITAMIN D3) 1,000 units tablet Take 4,000 Units by mouth daily, Historical Med      diphenhydrAMINE-APAP, sleep, (TYLENOL PM EXTRA STRENGTH PO) Take 2 tablets by mouth daily at bedtime, Historical Med      ferrous sulfate 325 (65 Fe) mg tablet Take 325 mg by mouth daily with breakfast, Historical Med      finasteride (PROSCAR) 5 mg tablet Take 5 mg by mouth daily, Historical Med      gabapentin (NEURONTIN) 400 mg capsule Take 1 capsule (400 mg total) by mouth daily at bedtime, Starting Tue 1/12/2021, Normal      morphine (MSIR) 15 mg tablet Take 15 mg by mouth every 6 (six) hours, Historical Med      sertraline (ZOLOFT) 50 mg tablet Take 50 mg by mouth daily  , Starting Fri 1/26/2018, Historical Med      Sulfamethoxazole-Trimethoprim (BACTRIM PO) Take by mouth, Historical Med             No discharge procedures on file      PDMP Review       Value Time User    PDMP Reviewed  Yes 1/12/2021  9:16 AM Kandi Lemon PA-C          ED Provider  Electronically Signed by           Sean Beltrán MD  01/31/22 2041

## 2022-03-01 ENCOUNTER — APPOINTMENT (OUTPATIENT)
Dept: LAB | Facility: MEDICAL CENTER | Age: 77
End: 2022-03-01
Payer: MEDICARE

## 2022-03-01 DIAGNOSIS — N18.9 CHRONIC KIDNEY DISEASE, UNSPECIFIED CKD STAGE: ICD-10-CM

## 2022-03-01 DIAGNOSIS — E78.5 HYPERLIPIDEMIA, UNSPECIFIED HYPERLIPIDEMIA TYPE: ICD-10-CM

## 2022-03-01 DIAGNOSIS — R53.83 OTHER FATIGUE: ICD-10-CM

## 2022-03-01 DIAGNOSIS — M48.00 SPINAL STENOSIS, UNSPECIFIED SPINAL REGION: ICD-10-CM

## 2022-03-01 DIAGNOSIS — I10 HYPERTENSION, UNSPECIFIED TYPE: ICD-10-CM

## 2022-03-01 LAB
ALBUMIN SERPL BCP-MCNC: 3.8 G/DL (ref 3.5–5)
ALP SERPL-CCNC: 105 U/L (ref 46–116)
ALT SERPL W P-5'-P-CCNC: 23 U/L (ref 12–78)
ANION GAP SERPL CALCULATED.3IONS-SCNC: 6 MMOL/L (ref 4–13)
AST SERPL W P-5'-P-CCNC: 21 U/L (ref 5–45)
BILIRUB SERPL-MCNC: 0.43 MG/DL (ref 0.2–1)
BUN SERPL-MCNC: 30 MG/DL (ref 5–25)
CALCIUM SERPL-MCNC: 9.6 MG/DL (ref 8.3–10.1)
CHLORIDE SERPL-SCNC: 107 MMOL/L (ref 100–108)
CO2 SERPL-SCNC: 27 MMOL/L (ref 21–32)
CREAT SERPL-MCNC: 1.67 MG/DL (ref 0.6–1.3)
ERYTHROCYTE [DISTWIDTH] IN BLOOD BY AUTOMATED COUNT: 12.4 % (ref 11.6–15.1)
GFR SERPL CREATININE-BSD FRML MDRD: 38 ML/MIN/1.73SQ M
GLUCOSE P FAST SERPL-MCNC: 94 MG/DL (ref 65–99)
HCT VFR BLD AUTO: 36.7 % (ref 36.5–49.3)
HGB BLD-MCNC: 12 G/DL (ref 12–17)
MCH RBC QN AUTO: 32.5 PG (ref 26.8–34.3)
MCHC RBC AUTO-ENTMCNC: 32.7 G/DL (ref 31.4–37.4)
MCV RBC AUTO: 100 FL (ref 82–98)
PLATELET # BLD AUTO: 254 THOUSANDS/UL (ref 149–390)
PMV BLD AUTO: 10.1 FL (ref 8.9–12.7)
POTASSIUM SERPL-SCNC: 4.4 MMOL/L (ref 3.5–5.3)
PROT SERPL-MCNC: 7.4 G/DL (ref 6.4–8.2)
RBC # BLD AUTO: 3.69 MILLION/UL (ref 3.88–5.62)
SODIUM SERPL-SCNC: 140 MMOL/L (ref 136–145)
WBC # BLD AUTO: 5.77 THOUSAND/UL (ref 4.31–10.16)

## 2022-03-01 PROCEDURE — 36415 COLL VENOUS BLD VENIPUNCTURE: CPT

## 2022-03-01 PROCEDURE — 80053 COMPREHEN METABOLIC PANEL: CPT

## 2022-03-01 PROCEDURE — 85027 COMPLETE CBC AUTOMATED: CPT

## 2022-03-02 PROCEDURE — 87086 URINE CULTURE/COLONY COUNT: CPT | Performed by: UROLOGY

## 2022-03-03 ENCOUNTER — LAB REQUISITION (OUTPATIENT)
Dept: LAB | Facility: HOSPITAL | Age: 77
End: 2022-03-03
Payer: MEDICARE

## 2022-03-03 DIAGNOSIS — N39.0 URINARY TRACT INFECTION, SITE NOT SPECIFIED: ICD-10-CM

## 2022-03-04 LAB — BACTERIA UR CULT: NORMAL

## 2022-04-11 ENCOUNTER — DOCTOR'S OFFICE (OUTPATIENT)
Dept: URBAN - NONMETROPOLITAN AREA CLINIC 1 | Facility: CLINIC | Age: 77
Setting detail: OPHTHALMOLOGY
End: 2022-04-11
Payer: COMMERCIAL

## 2022-04-11 VITALS — HEIGHT: 60 IN

## 2022-04-11 DIAGNOSIS — H35.342: ICD-10-CM

## 2022-04-11 DIAGNOSIS — H43.813: ICD-10-CM

## 2022-04-11 PROBLEM — H26.493 AFTER-CATARACT,OBSCURING VISION; BOTH EYES: Status: ACTIVE | Noted: 2017-07-28

## 2022-04-11 PROBLEM — H01.004 BLEPHARITIS; RIGHT UPPER LID, RIGHT LOWER LID, LEFT UPPER LID, LEFT LOWER LID: Status: ACTIVE | Noted: 2017-07-28

## 2022-04-11 PROBLEM — Z96.1 PRESENCE OF INTRAOCULAR LENS ; BOTH EYES: Status: ACTIVE | Noted: 2017-07-28

## 2022-04-11 PROBLEM — H01.001 BLEPHARITIS; RIGHT UPPER LID, RIGHT LOWER LID, LEFT UPPER LID, LEFT LOWER LID: Status: ACTIVE | Noted: 2017-07-28

## 2022-04-11 PROBLEM — H01.005 BLEPHARITIS; RIGHT UPPER LID, RIGHT LOWER LID, LEFT UPPER LID, LEFT LOWER LID: Status: ACTIVE | Noted: 2017-07-28

## 2022-04-11 PROBLEM — H01.002 BLEPHARITIS; RIGHT UPPER LID, RIGHT LOWER LID, LEFT UPPER LID, LEFT LOWER LID: Status: ACTIVE | Noted: 2017-07-28

## 2022-04-11 PROCEDURE — 92134 CPTRZ OPH DX IMG PST SGM RTA: CPT | Performed by: OPHTHALMOLOGY

## 2022-04-11 PROCEDURE — 92201 OPSCPY EXTND RTA DRAW UNI/BI: CPT | Performed by: OPHTHALMOLOGY

## 2022-04-11 PROCEDURE — 99214 OFFICE O/P EST MOD 30 MIN: CPT | Performed by: OPHTHALMOLOGY

## 2022-04-11 ASSESSMENT — REFRACTION_CURRENTRX
OD_AXIS: 097
OD_CYLINDER: -2.00
OD_VPRISM_DIRECTION: BF
OD_OVR_VA: 20/
OS_ADD: +2.75
OD_ADD: +2.75
OS_VPRISM_DIRECTION: BF
OS_SPHERE: +1.75
OS_CYLINDER: -2.00
OD_SPHERE: +0.50
OS_OVR_VA: 20/
OS_AXIS: 086

## 2022-04-11 ASSESSMENT — REFRACTION_MANIFEST
OD_SPHERE: +0.75
OD_VA1: 20/30+2
OD_CYLINDER: -2.00
OS_SPHERE: +1.75
OS_VA2: 20/30+2
OD_AXIS: 096
OS_VA1: 20/30+2
OS_ADD: +2.50
OD_ADD: +2.50
OS_CYLINDER: -2.00
OS_AXIS: 085
OD_VA2: 20/30+2

## 2022-04-11 ASSESSMENT — SPHEQUIV_DERIVED
OS_SPHEQUIV: 1
OD_SPHEQUIV: -0.25
OS_SPHEQUIV: 0.75
OD_SPHEQUIV: 0.375

## 2022-04-11 ASSESSMENT — REFRACTION_AUTOREFRACTION
OS_SPHERE: +2.25
OD_AXIS: 081
OS_CYLINDER: -2.50
OD_SPHERE: +1.50
OD_CYLINDER: -2.25
OS_AXIS: 073

## 2022-04-11 ASSESSMENT — VISUAL ACUITY
OS_BCVA: 20/25
OD_BCVA: 20/150

## 2022-04-11 ASSESSMENT — CONFRONTATIONAL VISUAL FIELD TEST (CVF)
OS_FINDINGS: FULL
OD_FINDINGS: FULL

## 2022-05-24 ENCOUNTER — HOSPITAL ENCOUNTER (EMERGENCY)
Facility: HOSPITAL | Age: 77
Discharge: HOME | End: 2022-05-25
Attending: EMERGENCY MEDICINE
Payer: MEDICARE

## 2022-05-24 DIAGNOSIS — T83.9XXA PROBLEM WITH URINARY CATHETER (HCC): Primary | ICD-10-CM

## 2022-05-24 PROCEDURE — 99282 EMERGENCY DEPT VISIT SF MDM: CPT

## 2022-05-25 VITALS
WEIGHT: 180 LBS | SYSTOLIC BLOOD PRESSURE: 134 MMHG | BODY MASS INDEX: 26.58 KG/M2 | OXYGEN SATURATION: 98 % | DIASTOLIC BLOOD PRESSURE: 76 MMHG | HEART RATE: 80 BPM | RESPIRATION RATE: 18 BRPM | TEMPERATURE: 98.1 F

## 2022-05-25 PROCEDURE — 99284 EMERGENCY DEPT VISIT MOD MDM: CPT | Performed by: EMERGENCY MEDICINE

## 2022-05-25 NOTE — ED PROVIDER NOTES
History  Chief Complaint   Patient presents with    Urinary Catheter Problem     Pt reports his urinary catheter stopped putting out urine sometime after 1500hrs today      68-year-old male with history of indwelling Diallo presents after no Diallo output since 3:00 p m  Emely Console Denies pain however does feel like his bladder is full  Patient has a Diallo that usually gets changed every 2 weeks  He is due to have it changed in 2 days  Urinary Catheter Problem  Associated symptoms: no abdominal pain, no chest pain, no congestion, no cough, no diarrhea, no fever, no nausea, no rash, no rhinorrhea, no sore throat, no vomiting and no wheezing        Prior to Admission Medications   Prescriptions Last Dose Informant Patient Reported? Taking?    B Complex Vitamins (B COMPLEX 1 PO)   Yes No   Sig: Take 1 capsule by mouth daily   Sulfamethoxazole-Trimethoprim (BACTRIM PO)   Yes No   Sig: Take by mouth   ascorbic acid (VITAMIN C) 500 MG tablet   Yes No   Sig: Take 500 mg by mouth daily   cholecalciferol (VITAMIN D3) 1,000 units tablet   Yes No   Sig: Take 4,000 Units by mouth daily   diphenhydrAMINE-APAP, sleep, (TYLENOL PM EXTRA STRENGTH PO)   Yes No   Sig: Take 2 tablets by mouth daily at bedtime   ferrous sulfate 325 (65 Fe) mg tablet   Yes No   Sig: Take 325 mg by mouth daily with breakfast   finasteride (PROSCAR) 5 mg tablet   Yes No   Sig: Take 5 mg by mouth daily   gabapentin (NEURONTIN) 400 mg capsule   No No   Sig: Take 1 capsule (400 mg total) by mouth daily at bedtime   morphine (MSIR) 15 mg tablet   Yes No   Sig: Take 15 mg by mouth every 6 (six) hours   sertraline (ZOLOFT) 50 mg tablet   Yes No   Sig: Take 50 mg by mouth daily        Facility-Administered Medications: None       Past Medical History:   Diagnosis Date    Chronic pain disorder     Depression     Diallo catheter in place     History of hydronephrosis     Hypertension     Insomnia     Kidney stone     PONV (postoperative nausea and vomiting)  Prostate enlargement     Psychiatric disorder     Spinal stenosis     Urinary tract infection     Vestibular disorder        Past Surgical History:   Procedure Laterality Date    ARTHROSCOPY KNEE      BACK SURGERY      L 4 or 5    CATARACT EXTRACTION Bilateral     COLONOSCOPY      CYSTOSCOPY W/ LASER LITHOTRIPSY N/A 2018    Procedure: LITHOTRISPY HOLMIUM LASER of bladder stones;  Surgeon: Jayne Rodríguez MD;  Location: [address] MA[address] OR;  Service: Urology    EAR SURGERY      KNEE ARTHROSCOPY      [place] CYSTOURETHROSCOPY,URETER CATHETER Right 2020    Procedure: CYSTOSCOPY RETROGRADE PYELOGRAM WITH INSERTION STENT URETERAL;  Surgeon: Jayne Rodríguez MD;  Location: [address] MAIN OR;  Service: Urology    [place] ESWL Right 3/19/2021    Procedure: EXTRACORPORAL SHOCKWAVE LITHOTRIPSY RIGHT UPJ STONE;  Surgeon: Jayne Rodríguez MD;  [facility];  Service: Urology    [place] FRAGMENT KIDNEY STONE/ ESWL Right 2021    Procedure: LITHOTRIPSY EXTRACORPORAL SHOCKWAVE (ESWL); Surgeon: Jayne Rodríguez MD;  [facility];  Service: Urology    [place] TRANSURETHRAL ELEC-SURG PROSTATECTOM N/A 2018    Procedure: [facility]; TURP;  Surgeon: Jayne Rodríguez MD;  [facility] MAIN OR;  Service: Urology    ROTATOR CUFF REPAIR Bilateral     SHOULDER SURGERY Right 2019       Family History   Problem Relation Age of Onset    Cancer Mother     Diabetes Father      I have reviewed and agree with the history as documented      E-Cigarette/Vaping    E-Cigarette Use Never User      E-Cigarette/Vaping Substances    Nicotine No     THC No     CBD No     Flavoring No     Other No     Unknown No      Social History     Tobacco Use    Smoking status: Former Smoker     Packs/day: 1 00     Quit date:      Years since quittin 4    Smokeless tobacco: Never Used    Tobacco comment: quit 50 years ago   Vaping Use    Vaping Use: Never used   Substance Use Topics    Alcohol use: Never    Drug use: Never       Review of Systems   Constitutional: Negative for chills and fever  HENT: Negative for congestion, nosebleeds, rhinorrhea and sore throat  Eyes: Negative for pain and visual disturbance  Respiratory: Negative for cough and wheezing  Cardiovascular: Negative for chest pain and leg swelling  Gastrointestinal: Negative for abdominal distention, abdominal pain, diarrhea, nausea and vomiting  Genitourinary: Negative for dysuria and frequency  Musculoskeletal: Negative for back pain and joint swelling  Skin: Negative for rash and wound  Neurological: Negative for weakness and numbness  Psychiatric/Behavioral: Negative for decreased concentration and suicidal ideas  Physical Exam  Physical Exam  Constitutional:       Appearance: He is well-developed  HENT:      Head: Normocephalic and atraumatic  Eyes:      Conjunctiva/sclera: Conjunctivae normal       Pupils: Pupils are equal, round, and reactive to light  Neck:      Trachea: No tracheal deviation  Cardiovascular:      Rate and Rhythm: Normal rate and regular rhythm  Heart sounds: Normal heart sounds  No murmur heard  Pulmonary:      Effort: Pulmonary effort is normal  No respiratory distress  Breath sounds: Normal breath sounds  No wheezing or rales  Abdominal:      General: Bowel sounds are normal  There is no distension  Palpations: Abdomen is soft  Tenderness: There is no abdominal tenderness  Genitourinary:     Comments: Diallo in place, minimal drainage, no purulence  Musculoskeletal:         General: No deformity  Cervical back: Normal range of motion and neck supple  Skin:     General: Skin is warm and dry  Capillary Refill: Capillary refill takes less than 2 seconds  Neurological:      Mental Status: He is alert and oriented to person, place, and time  Sensory: No sensory deficit     Psychiatric:         Judgment: Judgment normal          Vital Signs  ED Triage Vitals Temperature Pulse Respirations Blood Pressure SpO2   05/24/22 2239 05/24/22 2239 05/24/22 2239 05/24/22 2239 05/24/22 2239   98 1 °F (36 7 °C) (!) 113 22 (!) 205/103 98 %      Temp Source Heart Rate Source Patient Position - Orthostatic VS BP Location FiO2 (%)   05/24/22 2239 05/25/22 0007 05/25/22 0007 05/24/22 2239 --   Temporal Monitor Lying Right arm       Pain Score       05/25/22 0007       No Pain           Vitals:    05/24/22 2239 05/24/22 2329 05/25/22 0007   BP: (!) 205/103  134/76   Pulse: (!) 113 85 80   Patient Position - Orthostatic VS:   Lying         Visual Acuity      ED Medications  Medications - No data to display    Diagnostic Studies  Results Reviewed     None                 No orders to display              Procedures  Procedures         ED Course                                             MDM  Number of Diagnoses or Management Options  Problem with urinary catheter [facility]): new and requires workup  Diagnosis management comments: Clogged Diallo, easily replace, urine clear, no symptoms of infection  Patient is comfortable with outpatient follow-up with his primary urologist       Amount and/or Complexity of Data Reviewed  Review and summarize past medical records: yes  Independent visualization of images, tracings, or specimens: yes    Risk of Complications, Morbidity, and/or Mortality  Presenting problems: high  Diagnostic procedures: minimal  Management options: high        Disposition  Final diagnoses:   Problem with urinary catheter ([address] )     Time reflects when diagnosis was documented in both MDM as applicable and the Disposition within this note     Time User Action Codes Description Comment    5/24/2022 11:29 PM Lazaro Silva Add [T83  9XXA] Problem with urinary catheter [facility])       ED Disposition     ED Disposition   Discharge    Condition   Stable    Date/Time   Tue May 24, 2022 11:30 PM    [address] discharge to home/self care                 Follow-up Information     Follow up With Specialties Details Why Contact Ray Nassar MD Urology Schedule an appointment as soon as possible for a visit   [address] [place] [address] [facility]  [phone]            Discharge Medication List as of 5/24/2022 11:30 PM      CONTINUE these medications which have NOT CHANGED    Details   ascorbic acid (VITAMIN C) 500 MG tablet Take 500 mg by mouth daily, Historical Med      B Complex Vitamins (B COMPLEX 1 PO) Take 1 capsule by mouth daily, Historical Med      cholecalciferol (VITAMIN D3) 1,000 units tablet Take 4,000 Units by mouth daily, Historical Med      diphenhydrAMINE-APAP, sleep, (TYLENOL PM EXTRA STRENGTH PO) Take 2 tablets by mouth daily at bedtime, Historical Med      ferrous sulfate 325 (65 Fe) mg tablet Take 325 mg by mouth daily with breakfast, Historical Med      finasteride (PROSCAR) 5 mg tablet Take 5 mg by mouth daily, Historical Med      gabapentin (NEURONTIN) 400 mg capsule Take 1 capsule (400 mg total) by mouth daily at bedtime, Starting Tue 1/12/2021, Normal      morphine (MSIR) 15 mg tablet Take 15 mg by mouth every 6 (six) hours, Historical Med      sertraline (ZOLOFT) 50 mg tablet Take 50 mg by mouth daily  , Starting Fri 1/26/2018, Historical Med      Sulfamethoxazole-Trimethoprim (BACTRIM PO) Take by mouth, Historical Med             No discharge procedures on file      PDMP Review       Value Time User    PDMP Reviewed  Yes 1/12/2021  9:16 AM Carlita Gonzalez PA-C          ED Provider  Electronically Signed by           Bijal Orozco DO  05/25/22 8357

## 2022-06-23 ENCOUNTER — APPOINTMENT (OUTPATIENT)
Dept: RADIOLOGY | Facility: CLINIC | Age: 77
End: 2022-06-23
Payer: MEDICARE

## 2022-06-23 DIAGNOSIS — N20.0 KIDNEY STONE: ICD-10-CM

## 2022-06-23 PROCEDURE — 74018 RADEX ABDOMEN 1 VIEW: CPT

## 2022-10-12 PROBLEM — J18.9 PNEUMONIA DUE TO INFECTIOUS ORGANISM: Status: RESOLVED | Noted: 2020-12-29 | Resolved: 2022-10-12

## 2022-10-12 PROBLEM — N30.01 ACUTE CYSTITIS WITH HEMATURIA: Status: RESOLVED | Noted: 2019-08-16 | Resolved: 2022-10-12

## 2022-10-17 ENCOUNTER — APPOINTMENT (OUTPATIENT)
Dept: LAB | Facility: MEDICAL CENTER | Age: 77
End: 2022-10-17
Payer: MEDICARE

## 2022-10-17 DIAGNOSIS — M19.90 ARTHRITIS: ICD-10-CM

## 2022-10-17 DIAGNOSIS — I10 HYPERTENSION, UNSPECIFIED TYPE: ICD-10-CM

## 2022-10-17 DIAGNOSIS — R73.9 ELEVATED BLOOD SUGAR: ICD-10-CM

## 2022-10-17 DIAGNOSIS — E78.5 HYPERLIPIDEMIA, UNSPECIFIED HYPERLIPIDEMIA TYPE: ICD-10-CM

## 2022-10-17 LAB
ALBUMIN SERPL BCP-MCNC: 3.6 G/DL (ref 3.5–5)
ALP SERPL-CCNC: 105 U/L (ref 46–116)
ALT SERPL W P-5'-P-CCNC: 41 U/L (ref 12–78)
ANION GAP SERPL CALCULATED.3IONS-SCNC: 4 MMOL/L (ref 4–13)
AST SERPL W P-5'-P-CCNC: 27 U/L (ref 5–45)
BASOPHILS # BLD AUTO: 0.08 THOUSANDS/ΜL (ref 0–0.1)
BASOPHILS NFR BLD AUTO: 1 % (ref 0–1)
BILIRUB SERPL-MCNC: 0.28 MG/DL (ref 0.2–1)
BUN SERPL-MCNC: 28 MG/DL (ref 5–25)
CALCIUM SERPL-MCNC: 9.4 MG/DL (ref 8.3–10.1)
CHLORIDE SERPL-SCNC: 109 MMOL/L (ref 96–108)
CHOLEST SERPL-MCNC: 161 MG/DL
CO2 SERPL-SCNC: 27 MMOL/L (ref 21–32)
CREAT SERPL-MCNC: 1.48 MG/DL (ref 0.6–1.3)
CRP SERPL QL: 4.3 MG/L
EOSINOPHIL # BLD AUTO: 0.42 THOUSAND/ΜL (ref 0–0.61)
EOSINOPHIL NFR BLD AUTO: 5 % (ref 0–6)
ERYTHROCYTE [DISTWIDTH] IN BLOOD BY AUTOMATED COUNT: 12.8 % (ref 11.6–15.1)
EST. AVERAGE GLUCOSE BLD GHB EST-MCNC: 114 MG/DL
GFR SERPL CREATININE-BSD FRML MDRD: 44 ML/MIN/1.73SQ M
GLUCOSE P FAST SERPL-MCNC: 89 MG/DL (ref 65–99)
HBA1C MFR BLD: 5.6 %
HCT VFR BLD AUTO: 38.5 % (ref 36.5–49.3)
HDLC SERPL-MCNC: 44 MG/DL
HGB BLD-MCNC: 12.4 G/DL (ref 12–17)
IMM GRANULOCYTES # BLD AUTO: 0.11 THOUSAND/UL (ref 0–0.2)
IMM GRANULOCYTES NFR BLD AUTO: 1 % (ref 0–2)
LDLC SERPL CALC-MCNC: 88 MG/DL (ref 0–100)
LYMPHOCYTES # BLD AUTO: 3.04 THOUSANDS/ΜL (ref 0.6–4.47)
LYMPHOCYTES NFR BLD AUTO: 34 % (ref 14–44)
MCH RBC QN AUTO: 32.5 PG (ref 26.8–34.3)
MCHC RBC AUTO-ENTMCNC: 32.2 G/DL (ref 31.4–37.4)
MCV RBC AUTO: 101 FL (ref 82–98)
MONOCYTES # BLD AUTO: 0.89 THOUSAND/ΜL (ref 0.17–1.22)
MONOCYTES NFR BLD AUTO: 10 % (ref 4–12)
NEUTROPHILS # BLD AUTO: 4.52 THOUSANDS/ΜL (ref 1.85–7.62)
NEUTS SEG NFR BLD AUTO: 49 % (ref 43–75)
NONHDLC SERPL-MCNC: 117 MG/DL
NRBC BLD AUTO-RTO: 0 /100 WBCS
PLATELET # BLD AUTO: 294 THOUSANDS/UL (ref 149–390)
PMV BLD AUTO: 10.5 FL (ref 8.9–12.7)
POTASSIUM SERPL-SCNC: 4.4 MMOL/L (ref 3.5–5.3)
PROT SERPL-MCNC: 6.9 G/DL (ref 6.4–8.4)
RBC # BLD AUTO: 3.81 MILLION/UL (ref 3.88–5.62)
SODIUM SERPL-SCNC: 140 MMOL/L (ref 135–147)
TRIGL SERPL-MCNC: 143 MG/DL
WBC # BLD AUTO: 9.06 THOUSAND/UL (ref 4.31–10.16)

## 2022-10-17 PROCEDURE — 85025 COMPLETE CBC W/AUTO DIFF WBC: CPT

## 2022-10-17 PROCEDURE — 36415 COLL VENOUS BLD VENIPUNCTURE: CPT

## 2022-10-17 PROCEDURE — 80061 LIPID PANEL: CPT

## 2022-10-17 PROCEDURE — 86140 C-REACTIVE PROTEIN: CPT

## 2022-10-17 PROCEDURE — 80053 COMPREHEN METABOLIC PANEL: CPT

## 2022-10-17 PROCEDURE — 83036 HEMOGLOBIN GLYCOSYLATED A1C: CPT

## 2022-11-02 NOTE — ANESTHESIA PREPROCEDURE EVALUATION
Review of Systems/Medical History  Patient summary reviewed  Chart reviewed      Cardiovascular   Pulmonary       GI/Hepatic       Prostatic disorder, benign prostatic hyperplasia       Endo/Other     GYN       Hematology   Musculoskeletal       Neurology      Comment: Spinal stenosis  Hearing aids bilateral Psychology   Depression ,              Physical Exam    Airway    Mallampati score: II  TM Distance: >3 FB  Neck ROM: full     Dental       Cardiovascular  Rhythm: regular, Rate: normal,     Pulmonary  Breath sounds clear to auscultation,     Other Findings        Anesthesia Plan  ASA Score- 3     Anesthesia Type- spinal and general with ASA Monitors  Additional Monitors:   Airway Plan:     Comment: Possible GA risks discussed  Benefits of spinal anesthesia during Turp discussed with patient and family        Plan Factors-    Induction-     Postoperative Plan- Plan for postoperative opioid use  Planned trial extubation    Informed Consent- Anesthetic plan and risks discussed with patient  I personally reviewed this patient with the CRNA  Discussed and agreed on the Anesthesia Plan with the CRNA  Rosamaria Herman
The patient is a 62y Female complaining of abdominal pain.

## 2023-06-21 DIAGNOSIS — M54.50 LOW BACK PAIN, UNSPECIFIED BACK PAIN LATERALITY, UNSPECIFIED CHRONICITY, UNSPECIFIED WHETHER SCIATICA PRESENT: Primary | ICD-10-CM

## 2023-06-21 RX ORDER — MORPHINE SULFATE 15 MG/1
15 TABLET ORAL EVERY 6 HOURS
Qty: 120 TABLET | Refills: 0 | Status: SHIPPED | OUTPATIENT
Start: 2023-06-21

## 2023-07-17 ENCOUNTER — TELEPHONE (OUTPATIENT)
Dept: OTHER | Facility: OTHER | Age: 78
End: 2023-07-17

## 2023-07-17 NOTE — TELEPHONE ENCOUNTER
Patient's daughter is calling regarding cancelling an appointment. Date/Time:7/17/23 @ 10am    Patient was rescheduled: YES [x] NO []    Patient's daughter requesting call back to reschedule: YES [x] NO []    Pt's daughter called in stating she would like to know if her dad has to bring anything with him to his first appt. She's requesting a call back at [phone].

## 2023-07-27 DIAGNOSIS — N13.8 BENIGN PROSTATIC HYPERPLASIA WITH URINARY OBSTRUCTION: Primary | ICD-10-CM

## 2023-07-27 DIAGNOSIS — N40.1 BENIGN PROSTATIC HYPERPLASIA WITH URINARY OBSTRUCTION: Primary | ICD-10-CM

## 2023-07-27 RX ORDER — FINASTERIDE 5 MG/1
5 TABLET, FILM COATED ORAL DAILY
Qty: 90 TABLET | Refills: 3 | Status: SHIPPED | OUTPATIENT
Start: 2023-07-27

## 2023-07-28 DIAGNOSIS — F03.90 DEMENTIA WITHOUT BEHAVIORAL DISTURBANCE (HCC): Primary | ICD-10-CM

## 2023-07-28 RX ORDER — DONEPEZIL HYDROCHLORIDE 5 MG/1
5 TABLET, FILM COATED ORAL EVERY EVENING
Qty: 90 TABLET | Refills: 3 | Status: SHIPPED | OUTPATIENT
Start: 2023-07-28

## 2023-08-14 DIAGNOSIS — R42 DIZZINESS: Primary | ICD-10-CM

## 2023-08-14 RX ORDER — MECLIZINE HYDROCHLORIDE 25 MG/1
25 TABLET ORAL 3 TIMES DAILY PRN
Qty: 90 TABLET | Refills: 5 | Status: SHIPPED | OUTPATIENT
Start: 2023-08-14

## 2023-08-24 ENCOUNTER — OFFICE VISIT (OUTPATIENT)
Dept: FAMILY MEDICINE CLINIC | Facility: CLINIC | Age: 78
End: 2023-08-24
Payer: MEDICARE

## 2023-08-24 VITALS
HEIGHT: 66 IN | SYSTOLIC BLOOD PRESSURE: 124 MMHG | BODY MASS INDEX: 28.28 KG/M2 | WEIGHT: 176 LBS | TEMPERATURE: 97.6 F | DIASTOLIC BLOOD PRESSURE: 62 MMHG

## 2023-08-24 DIAGNOSIS — M54.16 LUMBAR RADICULOPATHY: ICD-10-CM

## 2023-08-24 DIAGNOSIS — F51.01 PRIMARY INSOMNIA: ICD-10-CM

## 2023-08-24 DIAGNOSIS — Z97.8 CHRONIC INDWELLING FOLEY CATHETER: ICD-10-CM

## 2023-08-24 DIAGNOSIS — N17.9 AKI (ACUTE KIDNEY INJURY) (HCC): Primary | ICD-10-CM

## 2023-08-24 DIAGNOSIS — E55.9 VITAMIN D DEFICIENCY: ICD-10-CM

## 2023-08-24 DIAGNOSIS — R73.9 ELEVATED BLOOD SUGAR: ICD-10-CM

## 2023-08-24 DIAGNOSIS — F32.89 OTHER DEPRESSION: ICD-10-CM

## 2023-08-24 DIAGNOSIS — E53.8 B12 DEFICIENCY: ICD-10-CM

## 2023-08-24 DIAGNOSIS — N18.30 STAGE 3 CHRONIC KIDNEY DISEASE, UNSPECIFIED WHETHER STAGE 3A OR 3B CKD (HCC): ICD-10-CM

## 2023-08-24 DIAGNOSIS — E78.2 MIXED HYPERLIPIDEMIA: ICD-10-CM

## 2023-08-24 DIAGNOSIS — D64.9 CHRONIC ANEMIA: ICD-10-CM

## 2023-08-24 PROBLEM — F32.A DEPRESSION: Status: ACTIVE | Noted: 2023-08-24

## 2023-08-24 PROCEDURE — 99214 OFFICE O/P EST MOD 30 MIN: CPT | Performed by: INTERNAL MEDICINE

## 2023-08-24 RX ORDER — MIRTAZAPINE 30 MG/1
30 TABLET, FILM COATED ORAL
Qty: 90 TABLET | Refills: 3 | Status: SHIPPED | OUTPATIENT
Start: 2023-08-24

## 2023-08-24 NOTE — ASSESSMENT & PLAN NOTE
Chronic pain issue. Degenerative changes and spinal stenosis. Remains on morphine, intermittently not taking it like he should. Kidney function makes anti-inflammatories not recommended. He is on Neurontin as well already. Considered pain management but is not interested.

## 2023-08-24 NOTE — ASSESSMENT & PLAN NOTE
At this time we will check renal function again. Does not see nephrology per se. Avoid nephrotoxins. Blood pressures well controlled.

## 2023-08-24 NOTE — ASSESSMENT & PLAN NOTE
Seems well controlled, but will discontinue sertraline and changed to mirtazapine today based on insomnia issues and chronic pain

## 2023-08-24 NOTE — PROGRESS NOTES
Name: Piedad Hancock      : 1945      MRN: [id number]  Encounter Provider: Roberto Castanon DO  Encounter Date: 2023   Encounter department: [address] PRIMARY CARE    Assessment & Plan     1. Acute kidney injury - Chronic kidney disease stage 3  Assessment & Plan: At this time we will check renal function again. Does not see nephrology per se. Avoid nephrotoxins. Blood pressures well controlled. 2. Chronic indwelling Diallo catheter  Assessment & Plan:  Follows with Dr. Jovi Young. Chronic urinary retention. 3. Other depression  Assessment & Plan:  Seems well controlled, but will discontinue sertraline and changed to mirtazapine today based on insomnia issues and chronic pain    Orders:  -     mirtazapine (REMERON) 30 mg tablet; Take 1 tablet (30 mg total) by mouth daily at bedtime    4. Stage 3 chronic kidney disease, unspecified whether stage 3a or 3b CKD (HCC)  -     CBC and differential; Future  -     Comprehensive metabolic panel; Future    5. Elevated blood sugar  -     Hemoglobin A1C; Future    6. Chronic anemia  -     Iron Panel (Includes Ferritin, Iron Sat%, Iron, and TIBC); Future  -     Protein electrophoresis, serum; Future    7. B12 deficiency  -     Vitamin B12; Future    8. Vitamin D deficiency  -     Vitamin D 25 hydroxy; Future    9. Lumbar radiculopathy  Assessment & Plan:  Chronic pain issue. Degenerative changes and spinal stenosis. Remains on morphine, intermittently not taking it like he should. Kidney function makes anti-inflammatories not recommended. He is on Neurontin as well already. Considered pain management but is not interested. Orders:  -     C-reactive protein; Future  -     Protein electrophoresis, serum; Future    10. Mixed hyperlipidemia  -     Lipid Panel with Direct LDL reflex; Future    11. Primary insomnia  Assessment & Plan:  Changes sertraline to mirtazapine 30 mg at bedtime      BMI Counseling: Body mass index is 28.41 kg/m². The BMI is above normal. Nutrition recommendations include increasing intake of lean protein. Rationale for BMI follow-up plan is due to patient being overweight or obese. Jay Timmons is now 78years of age. He is doing reasonably well. Issues with chronic pain continue. He takes his morphine twice a day sometimes forgetting a pill per his daughter. Daughter is here with him. His hearing is poor, he is due for new hearing aid today. Currently he has had no falls. Denies any chest pain or shortness of breath. Getting along okay. Appetite has been good. Sleep is problematic mostly related to the pain. He wakes up and he has pain going down his right leg. This takes a while to go away. His daughter puts his pills out, but she notes he does not take them the way he is supposed to at times. She states he is getting more forgetful. Has the occasional diarrhea. Review of Systems   Constitutional: Negative for chills and fever. HENT: Negative for rhinorrhea and sore throat. Eyes: Negative for visual disturbance. Respiratory: Negative for cough and shortness of breath. Cardiovascular: Negative for chest pain and leg swelling. Gastrointestinal: Negative for abdominal pain, diarrhea, nausea and vomiting. Genitourinary: Negative for dysuria. Chronic Diallo   Musculoskeletal: Positive for arthralgias, back pain and neck pain. Negative for myalgias. Skin: Negative for rash. Neurological: Positive for dizziness and light-headedness. Negative for headaches. Psychiatric/Behavioral: Negative for confusion. All other systems reviewed and are negative.       Current Outpatient Medications on File Prior to Visit   Medication Sig   • ascorbic acid (VITAMIN C) 500 MG tablet Take 500 mg by mouth daily   • B Complex Vitamins (B COMPLEX 1 PO) Take 1 capsule by mouth daily   • cholecalciferol (VITAMIN D3) 1,000 units tablet Take 4,000 Units by mouth daily   • diphenhydrAMINE-APAP, sleep, (TYLENOL PM EXTRA STRENGTH PO) Take 2 tablets by mouth daily at bedtime   • donepezil (ARICEPT) 5 mg tablet Take 1 tablet (5 mg total) by mouth every evening   • ferrous sulfate 325 (65 Fe) mg tablet Take 325 mg by mouth daily with breakfast   • finasteride (PROSCAR) 5 mg tablet Take 1 tablet (5 mg total) by mouth daily   • gabapentin (NEURONTIN) 400 mg capsule Take 1 capsule (400 mg total) by mouth daily at bedtime   • meclizine (ANTIVERT) 25 mg tablet Take 1 tablet (25 mg total) by mouth 3 (three) times a day as needed for dizziness   • morphine (MSIR) 15 mg tablet Take 1 tablet (15 mg total) by mouth every 6 (six) hours Max Daily Amount: 60 mg   • Sulfamethoxazole-Trimethoprim (BACTRIM PO) Take by mouth   • [DISCONTINUED] sertraline (ZOLOFT) 50 mg tablet Take 50 mg by mouth daily         Objective     /80 (BP Location: Left arm, Patient Position: Standing, Cuff Size: Large)   Temp 97.6 °F (36.4 °C)   Ht 5' 6" (1.676 m)   Wt 79.8 kg (176 lb)   BMI 28.41 kg/m²     Physical Exam  Constitutional:       Appearance: Normal appearance. HENT:      Head: Normocephalic and atraumatic. Nose: No congestion or rhinorrhea. Eyes:      Extraocular Movements: Extraocular movements intact. Pupils: Pupils are equal, round, and reactive to light. Neck:      Vascular: No carotid bruit. Cardiovascular:      Rate and Rhythm: Normal rate and regular rhythm. Heart sounds: No murmur heard. Pulmonary:      Effort: No respiratory distress. Breath sounds: No wheezing. Chest:      Chest wall: No tenderness. Abdominal:      General: There is no distension. Tenderness: There is no abdominal tenderness. Hernia: No hernia is present. Musculoskeletal:         General: No swelling or tenderness. Right lower leg: No edema. Left lower leg: No edema. Comments: Antalgic, short based gait. Walking with a cane.   Marked decreased range of motion of the lumbar spine. Lymphadenopathy:      Cervical: No cervical adenopathy. Skin:     Findings: No rash. Neurological:      General: No focal deficit present. Mental Status: He is alert and oriented to person, place, and time. Sensory: No sensory deficit. Comments: Cognitive impairment, hearing loss is also noted.    Psychiatric:         Mood and Affect: Mood normal.         Behavior: Behavior normal.       Naresh Valentine,

## 2023-08-25 ENCOUNTER — APPOINTMENT (OUTPATIENT)
Age: 78
End: 2023-08-25
Payer: MEDICARE

## 2023-08-25 DIAGNOSIS — R73.9 ELEVATED BLOOD SUGAR: ICD-10-CM

## 2023-08-25 DIAGNOSIS — D64.9 CHRONIC ANEMIA: ICD-10-CM

## 2023-08-25 DIAGNOSIS — E55.9 VITAMIN D DEFICIENCY: ICD-10-CM

## 2023-08-25 DIAGNOSIS — N18.30 STAGE 3 CHRONIC KIDNEY DISEASE, UNSPECIFIED WHETHER STAGE 3A OR 3B CKD (HCC): ICD-10-CM

## 2023-08-25 DIAGNOSIS — E78.2 MIXED HYPERLIPIDEMIA: ICD-10-CM

## 2023-08-25 DIAGNOSIS — E53.8 B12 DEFICIENCY: ICD-10-CM

## 2023-08-25 DIAGNOSIS — M54.16 LUMBAR RADICULOPATHY: ICD-10-CM

## 2023-08-25 LAB
25(OH)D3 SERPL-MCNC: 42 NG/ML (ref 30–100)
ALBUMIN SERPL BCP-MCNC: 3.8 G/DL (ref 3.5–5)
ALP SERPL-CCNC: 80 U/L (ref 34–104)
ALT SERPL W P-5'-P-CCNC: 22 U/L (ref 7–52)
ANION GAP SERPL CALCULATED.3IONS-SCNC: 9 MMOL/L
AST SERPL W P-5'-P-CCNC: 30 U/L (ref 13–39)
BASOPHILS # BLD AUTO: 0.13 THOUSANDS/ÂΜL (ref 0–0.1)
BASOPHILS NFR BLD AUTO: 2 % (ref 0–1)
BILIRUB SERPL-MCNC: 0.4 MG/DL (ref 0.2–1)
BUN SERPL-MCNC: 27 MG/DL (ref 5–25)
CALCIUM SERPL-MCNC: 8.8 MG/DL (ref 8.4–10.2)
CHLORIDE SERPL-SCNC: 105 MMOL/L (ref 96–108)
CHOLEST SERPL-MCNC: 153 MG/DL
CO2 SERPL-SCNC: 26 MMOL/L (ref 21–32)
CREAT SERPL-MCNC: 1.61 MG/DL (ref 0.6–1.3)
CRP SERPL QL: 1.9 MG/L
EOSINOPHIL # BLD AUTO: 0.86 THOUSAND/ÂΜL (ref 0–0.61)
EOSINOPHIL NFR BLD AUTO: 11 % (ref 0–6)
ERYTHROCYTE [DISTWIDTH] IN BLOOD BY AUTOMATED COUNT: 13.3 % (ref 11.6–15.1)
EST. AVERAGE GLUCOSE BLD GHB EST-MCNC: 117 MG/DL
FERRITIN SERPL-MCNC: 625 NG/ML (ref 24–336)
GFR SERPL CREATININE-BSD FRML MDRD: 39 ML/MIN/1.73SQ M
GLUCOSE P FAST SERPL-MCNC: 83 MG/DL (ref 65–99)
HBA1C MFR BLD: 5.7 %
HCT VFR BLD AUTO: 34.9 % (ref 36.5–49.3)
HDLC SERPL-MCNC: 38 MG/DL
HGB BLD-MCNC: 11.2 G/DL (ref 12–17)
IMM GRANULOCYTES # BLD AUTO: 0.04 THOUSAND/UL (ref 0–0.2)
IMM GRANULOCYTES NFR BLD AUTO: 1 % (ref 0–2)
IRON SATN MFR SERPL: 52 % (ref 15–50)
IRON SERPL-MCNC: 124 UG/DL (ref 50–212)
LDLC SERPL CALC-MCNC: 85 MG/DL (ref 0–100)
LYMPHOCYTES # BLD AUTO: 3.57 THOUSANDS/ÂΜL (ref 0.6–4.47)
LYMPHOCYTES NFR BLD AUTO: 42 % (ref 14–44)
MCH RBC QN AUTO: 33.1 PG (ref 26.8–34.3)
MCHC RBC AUTO-ENTMCNC: 32.1 G/DL (ref 31.4–37.4)
MCV RBC AUTO: 103 FL (ref 82–98)
MONOCYTES # BLD AUTO: 0.88 THOUSAND/ÂΜL (ref 0.17–1.22)
MONOCYTES NFR BLD AUTO: 11 % (ref 4–12)
NEUTROPHILS # BLD AUTO: 2.64 THOUSANDS/ÂΜL (ref 1.85–7.62)
NEUTS SEG NFR BLD AUTO: 33 % (ref 43–75)
NRBC BLD AUTO-RTO: 0 /100 WBCS
PLATELET # BLD AUTO: 254 THOUSANDS/UL (ref 149–390)
PMV BLD AUTO: 10.9 FL (ref 8.9–12.7)
POTASSIUM SERPL-SCNC: 4.5 MMOL/L (ref 3.5–5.3)
PROT SERPL-MCNC: 6.2 G/DL (ref 6.4–8.4)
RBC # BLD AUTO: 3.38 MILLION/UL (ref 3.88–5.62)
SODIUM SERPL-SCNC: 140 MMOL/L (ref 135–147)
TIBC SERPL-MCNC: 240 UG/DL (ref 250–450)
TRIGL SERPL-MCNC: 149 MG/DL
UIBC SERPL-MCNC: 116 UG/DL (ref 155–355)
VIT B12 SERPL-MCNC: 355 PG/ML (ref 180–914)
WBC # BLD AUTO: 8.12 THOUSAND/UL (ref 4.31–10.16)

## 2023-08-25 PROCEDURE — 82306 VITAMIN D 25 HYDROXY: CPT

## 2023-08-25 PROCEDURE — 84165 PROTEIN E-PHORESIS SERUM: CPT

## 2023-08-25 PROCEDURE — 83550 IRON BINDING TEST: CPT

## 2023-08-25 PROCEDURE — 83036 HEMOGLOBIN GLYCOSYLATED A1C: CPT

## 2023-08-25 PROCEDURE — 80053 COMPREHEN METABOLIC PANEL: CPT

## 2023-08-25 PROCEDURE — 85025 COMPLETE CBC W/AUTO DIFF WBC: CPT

## 2023-08-25 PROCEDURE — 36415 COLL VENOUS BLD VENIPUNCTURE: CPT

## 2023-08-25 PROCEDURE — 83540 ASSAY OF IRON: CPT

## 2023-08-25 PROCEDURE — 82607 VITAMIN B-12: CPT

## 2023-08-25 PROCEDURE — 80061 LIPID PANEL: CPT

## 2023-08-25 PROCEDURE — 82728 ASSAY OF FERRITIN: CPT

## 2023-08-25 PROCEDURE — 86140 C-REACTIVE PROTEIN: CPT

## 2023-08-28 LAB
ALBUMIN SERPL ELPH-MCNC: 3.65 G/DL (ref 3.2–5.1)
ALBUMIN SERPL ELPH-MCNC: 61.9 % (ref 48–70)
ALPHA1 GLOB SERPL ELPH-MCNC: 0.26 G/DL (ref 0.15–0.47)
ALPHA1 GLOB SERPL ELPH-MCNC: 4.4 % (ref 1.8–7)
ALPHA2 GLOB SERPL ELPH-MCNC: 0.74 G/DL (ref 0.42–1.04)
ALPHA2 GLOB SERPL ELPH-MCNC: 12.5 % (ref 5.9–14.9)
BETA GLOB ABNORMAL SERPL ELPH-MCNC: 0.29 G/DL (ref 0.31–0.57)
BETA1 GLOB SERPL ELPH-MCNC: 4.9 % (ref 4.7–7.7)
BETA2 GLOB SERPL ELPH-MCNC: 5.4 % (ref 3.1–7.9)
BETA2+GAMMA GLOB SERPL ELPH-MCNC: 0.32 G/DL (ref 0.2–0.58)
GAMMA GLOB ABNORMAL SERPL ELPH-MCNC: 0.64 G/DL (ref 0.4–1.66)
GAMMA GLOB SERPL ELPH-MCNC: 10.9 % (ref 6.9–22.3)
IGG/ALB SER: 1.62 {RATIO} (ref 1.1–1.8)
PROT PATTERN SERPL ELPH-IMP: ABNORMAL
PROT SERPL-MCNC: 5.9 G/DL (ref 6.4–8.4)

## 2023-08-28 PROCEDURE — 84165 PROTEIN E-PHORESIS SERUM: CPT | Performed by: PATHOLOGY

## 2023-09-11 DIAGNOSIS — M54.50 LOW BACK PAIN, UNSPECIFIED BACK PAIN LATERALITY, UNSPECIFIED CHRONICITY, UNSPECIFIED WHETHER SCIATICA PRESENT: ICD-10-CM

## 2023-09-11 RX ORDER — MORPHINE SULFATE 15 MG/1
15 TABLET ORAL EVERY 6 HOURS PRN
Qty: 100 TABLET | Refills: 0 | Status: SHIPPED | OUTPATIENT
Start: 2023-09-11

## 2023-12-04 DIAGNOSIS — M54.50 LOW BACK PAIN, UNSPECIFIED BACK PAIN LATERALITY, UNSPECIFIED CHRONICITY, UNSPECIFIED WHETHER SCIATICA PRESENT: ICD-10-CM

## 2023-12-04 RX ORDER — MORPHINE SULFATE 15 MG/1
15 TABLET ORAL EVERY 6 HOURS PRN
Qty: 100 TABLET | Refills: 0 | Status: SHIPPED | OUTPATIENT
Start: 2023-12-04

## 2024-01-17 ENCOUNTER — HOME HEALTH ADMISSION (OUTPATIENT)
Dept: HOME HEALTH SERVICES | Facility: HOME HEALTHCARE | Age: 79
End: 2024-01-17
Payer: MEDICARE

## 2024-01-19 ENCOUNTER — HOME CARE VISIT (OUTPATIENT)
Dept: HOME HEALTH SERVICES | Facility: HOME HEALTHCARE | Age: 79
End: 2024-01-19

## 2024-01-19 NOTE — CASE COMMUNICATION
SOC date for physical therapy 1/22/24.  Telephoned and left voicemail that physical therapist will call to schedule visit.

## 2024-01-22 ENCOUNTER — HOME CARE VISIT (OUTPATIENT)
Dept: HOME HEALTH SERVICES | Facility: HOME HEALTHCARE | Age: 79
End: 2024-01-22
Payer: MEDICARE

## 2024-01-22 VITALS — HEART RATE: 64 BPM | SYSTOLIC BLOOD PRESSURE: 122 MMHG | DIASTOLIC BLOOD PRESSURE: 70 MMHG | OXYGEN SATURATION: 97 %

## 2024-01-22 PROCEDURE — G0151 HHCP-SERV OF PT,EA 15 MIN: HCPCS

## 2024-01-22 PROCEDURE — 400013 VN SOC

## 2024-01-22 PROCEDURE — 10330081 VN NO-PAY CLAIM PROCEDURE

## 2024-01-23 ENCOUNTER — HOME CARE VISIT (OUTPATIENT)
Dept: HOME HEALTH SERVICES | Facility: HOME HEALTHCARE | Age: 79
End: 2024-01-23
Payer: MEDICARE

## 2024-01-23 PROCEDURE — G0153 HHCP-SVS OF S/L PATH,EA 15MN: HCPCS

## 2024-01-24 ENCOUNTER — HOME CARE VISIT (OUTPATIENT)
Dept: HOME HEALTH SERVICES | Facility: HOME HEALTHCARE | Age: 79
End: 2024-01-24
Payer: MEDICARE

## 2024-01-24 VITALS — HEART RATE: 62 BPM | OXYGEN SATURATION: 95 % | SYSTOLIC BLOOD PRESSURE: 124 MMHG | DIASTOLIC BLOOD PRESSURE: 70 MMHG

## 2024-01-24 PROCEDURE — G0152 HHCP-SERV OF OT,EA 15 MIN: HCPCS | Performed by: OCCUPATIONAL THERAPIST

## 2024-01-24 NOTE — CASE COMMUNICATION
[facility] has admitted your patient to Home Health service with the following disciplines:    PT, OT, SLP    Response needed, please respond via In basket    Primary focus of home health care… Neurological.. CVA    Patient stated goals of care.. I want to get back up and walking good.  I want get back into the cellar.      Anticipated visit pattern and next visit date.. [id number]. [id number].  Next visit 1/25/2024 or 1/26/2024    See m edication list - meds in home differ from AVS  Ascorbic acid or B complex vitamins were not on discharge list from rehab facility.  Pt is no longer taking Tylenol pm, ferrous sulfate, gabapentin 400mg, morphine, Bactrim as per med list from hospital and patients daughter reporting those being “Old meds”.    Please clarify vitamin D dosage.  Pt was provided 25 mcg from hospital, but daughter reports he was taking 5,000 units every day.       Significant clinical findings.. Pt demonstrates strength deficits ble, balance deficits, diminished safety awareness leading to difficulty with transfers and ambulation.  Pt requires assistance to determine appropriate DME to ensure safe mobility.  Need for caregiver education.     Potential barriers to goal achievement.. Ability to carry over education provided.      Other pertinent information  Major interaction.. sertraline and m irtazapine  Moderate interaction.. aspirin and sertraline    Thank you for allowing us to participate in the care of your patient.

## 2024-01-25 ENCOUNTER — OFFICE VISIT (OUTPATIENT)
Dept: FAMILY MEDICINE CLINIC | Facility: CLINIC | Age: 79
End: 2024-01-25
Payer: MEDICARE

## 2024-01-25 ENCOUNTER — HOME CARE VISIT (OUTPATIENT)
Dept: HOME HEALTH SERVICES | Facility: HOME HEALTHCARE | Age: 79
End: 2024-01-25
Payer: MEDICARE

## 2024-01-25 VITALS
HEIGHT: 66 IN | DIASTOLIC BLOOD PRESSURE: 72 MMHG | HEART RATE: 78 BPM | SYSTOLIC BLOOD PRESSURE: 124 MMHG | OXYGEN SATURATION: 97 % | WEIGHT: 191 LBS | BODY MASS INDEX: 30.7 KG/M2 | TEMPERATURE: 98.5 F

## 2024-01-25 DIAGNOSIS — M54.16 LUMBAR RADICULOPATHY: ICD-10-CM

## 2024-01-25 DIAGNOSIS — R42 VERTIGO DUE TO PREVIOUS CEREBELLAR INFARCTION: ICD-10-CM

## 2024-01-25 DIAGNOSIS — I63.9 NEW CEREBELLAR INFARCT (HCC): Primary | ICD-10-CM

## 2024-01-25 DIAGNOSIS — Z23 ENCOUNTER FOR IMMUNIZATION: ICD-10-CM

## 2024-01-25 DIAGNOSIS — I69.398 VERTIGO DUE TO PREVIOUS CEREBELLAR INFARCTION: ICD-10-CM

## 2024-01-25 DIAGNOSIS — F51.01 PRIMARY INSOMNIA: ICD-10-CM

## 2024-01-25 DIAGNOSIS — F33.0 MILD EPISODE OF RECURRENT MAJOR DEPRESSIVE DISORDER (HCC): ICD-10-CM

## 2024-01-25 DIAGNOSIS — G89.4 CHRONIC PAIN SYNDROME: ICD-10-CM

## 2024-01-25 DIAGNOSIS — R73.9 ELEVATED BLOOD SUGAR: ICD-10-CM

## 2024-01-25 DIAGNOSIS — E78.2 MIXED HYPERLIPIDEMIA: ICD-10-CM

## 2024-01-25 DIAGNOSIS — Z97.8 CHRONIC INDWELLING FOLEY CATHETER: ICD-10-CM

## 2024-01-25 DIAGNOSIS — N18.30 STAGE 3 CHRONIC KIDNEY DISEASE, UNSPECIFIED WHETHER STAGE 3A OR 3B CKD (HCC): ICD-10-CM

## 2024-01-25 PROBLEM — N13.2 HYDRONEPHROSIS WITH OBSTRUCTING CALCULUS: Status: RESOLVED | Noted: 2020-12-28 | Resolved: 2024-01-25

## 2024-01-25 PROBLEM — E83.42 HYPOMAGNESEMIA: Status: RESOLVED | Noted: 2019-08-30 | Resolved: 2024-01-25

## 2024-01-25 PROBLEM — M79.641 BILATERAL HAND PAIN: Status: RESOLVED | Noted: 2019-09-03 | Resolved: 2024-01-25

## 2024-01-25 PROBLEM — N17.9 AKI (ACUTE KIDNEY INJURY) (HCC): Status: RESOLVED | Noted: 2019-08-16 | Resolved: 2024-01-25

## 2024-01-25 PROBLEM — J96.01 ACUTE RESPIRATORY FAILURE WITH HYPOXIA (HCC): Status: RESOLVED | Noted: 2020-12-27 | Resolved: 2024-01-25

## 2024-01-25 PROBLEM — G93.41 ACUTE METABOLIC ENCEPHALOPATHY: Status: RESOLVED | Noted: 2019-08-16 | Resolved: 2024-01-25

## 2024-01-25 PROBLEM — R78.81 BACTEREMIA: Status: RESOLVED | Noted: 2020-12-29 | Resolved: 2024-01-25

## 2024-01-25 PROBLEM — R79.89 ELEVATED TROPONIN: Chronic | Status: RESOLVED | Noted: 2019-08-21 | Resolved: 2024-01-25

## 2024-01-25 PROBLEM — M79.642 BILATERAL HAND PAIN: Status: RESOLVED | Noted: 2019-09-03 | Resolved: 2024-01-25

## 2024-01-25 PROBLEM — D69.6 THROMBOCYTOPENIA (HCC): Status: RESOLVED | Noted: 2020-12-29 | Resolved: 2024-01-25

## 2024-01-25 PROCEDURE — G0008 ADMIN INFLUENZA VIRUS VAC: HCPCS

## 2024-01-25 PROCEDURE — 90686 IIV4 VACC NO PRSV 0.5 ML IM: CPT

## 2024-01-25 PROCEDURE — 99214 OFFICE O/P EST MOD 30 MIN: CPT | Performed by: INTERNAL MEDICINE

## 2024-01-25 PROCEDURE — G0153 HHCP-SVS OF S/L PATH,EA 15MN: HCPCS

## 2024-01-25 RX ORDER — MECLIZINE HCL 12.5 MG/1
12.5 TABLET ORAL 3 TIMES DAILY PRN
Qty: 60 TABLET | Refills: 3 | Status: SHIPPED | OUTPATIENT
Start: 2024-01-25

## 2024-01-25 NOTE — PROGRESS NOTES
Name: Israel Spencer      : 1945      MRN: [id number]  Encounter Provider: Tomás Edwards DO  Encounter Date: 2024   Encounter department: [facility] PRIMARY CARE    Assessment & Plan     1. New cerebellar infarct (HCC)  Assessment & Plan:  Continues with ataxia an dizziness.  Most the right cerebellar infarct, has a smaller infarcts in the left cerebellar hemisphere.  Has seen neurology.  Remains on aspirin alone as well as moderate intensity statin.  Blood pressure remains controlled.      2. Mild episode of recurrent major depressive disorder (HCC)  Assessment & Plan:  Depression improved on mirtazapine.      3. 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  He had some confusion and agitation while in the emergency room, related to his underlying dementia which is mild, and he is returned to baseline at this point.    Orders:  -     meclizine (ANTIVERT) 12.5 MG tablet; Take 1 tablet (12.5 mg total) by mouth 3 (three) times a day as needed for dizziness    4. Lumbar radiculopathy  Assessment & Plan:  Chronic low back pain related to spinal stenosis.  Gets pain radiating down both gluteal areas.  He is currently off all morphine which she had been on long-term.  He will continue with Tylenol as needed.  He is on gabapentin as well which may be that what sedating him so much.  He had been on 400 twice a days now on 300 twice a day.      5. Stage 3 chronic kidney disease, unspecified whether stage 3a or 3b CKD ([facility])  Assessment & Plan:  Lab Results   Component Value Date    EGFR 39 2023    EGFR 44 10/17/2022    EGFR 38 2022    CREATININE 1.61 (H) 2023    CREATININE 1.48 (H) 10/17/2022    CREATININE 1.67 (H) 2022   Fairly stable without significant decline during this event.      6. Chronic pain syndrome  Assessment & Plan:  Continue on Tylenol alone for now.  Will try lower dose opioid if needed given  his renal function.    Orders:  -     CBC and differential; Future; Expected date: 04/25/2024  -     Comprehensive metabolic panel; Future; Expected date: 04/25/2024    7. Chronic indwelling Diallo catheter  Assessment & Plan:  Follows regularly with Dr. Hernandez.      8. Primary insomnia  Assessment & Plan:  Appetite and sleep seem improved on mirtazapine      9. Mixed hyperlipidemia  -     CBC and differential; Future; Expected date: 04/25/2024  -     Comprehensive metabolic panel; Future; Expected date: 04/25/2024  -     Lipid Panel with Direct LDL reflex; Future; Expected date: 04/25/2024    10. Elevated blood sugar  -     CBC and differential; Future; Expected date: 04/25/2024  -     Comprehensive metabolic panel; Future; Expected date: 04/25/2024  -     Hemoglobin A1C; Future; Expected date: 04/25/2024    11. Encounter for immunization  -     influenza vaccine, quadrivalent, 0.5 mL, preservative-free, for adult and pediatric patients 6 mos+ (AFLURIA, FLUARIX, FLULAVAL, FLUZONE)           Subjective      But is here following an episode of dizziness.  Apparently the worsening usually do with regards to his vertigo.  His daughter thought he may have been becoming septic again as she has done before.  Had trouble getting him out to the ambulance.  Required to be put in a wagon, and wheeled to the front door so the ambulance can get home.  He ultimately ended up in the ER, and with a right now      Review of Systems   Constitutional:  Positive for fatigue. Negative for chills and fever.   HENT:  Positive for hearing loss. Negative for rhinorrhea and sore throat.    Eyes:  Negative for visual disturbance.   Respiratory:  Negative for cough and shortness of breath.    Cardiovascular:  Negative for chest pain and leg swelling.   Gastrointestinal:  Negative for abdominal pain, diarrhea, nausea and vomiting.   Genitourinary:  Negative for dysuria.        Chronic catheter   Musculoskeletal:  Positive for arthralgias, back  pain, gait problem and neck pain. Negative for myalgias.   Skin:  Negative for rash.   Neurological:  Positive for dizziness. Negative for headaches.   Psychiatric/Behavioral:  Positive for agitation and confusion.    All other systems reviewed and are negative.      Current Outpatient Medications on File Prior to Visit   Medication Sig    acetaminophen (TYLENOL) 500 mg tablet Take 1,000 mg by mouth every 8 (eight) hours as needed for mild pain. Indications: Pain    aspirin (ECOTRIN LOW STRENGTH) 81 mg EC tablet Take 81 mg by mouth daily. Indications: Stroke Due To Limited Blood Flow    atorvastatin (LIPITOR) 40 mg tablet Take 40 mg by mouth daily. Indications: High Amount of Triglycerides in the Blood    Cholecalciferol 25 MCG (1000 UT) tablet Take 1,000 Units by mouth daily. Indications: Vitamin D Deficiency    diphenhydrAMINE-APAP, sleep, (TYLENOL PM EXTRA STRENGTH PO) Take 2 tablets by mouth daily at bedtime    donepezil (ARICEPT) 5 mg tablet Take 1 tablet (5 mg total) by mouth every evening    finasteride (PROSCAR) 5 mg tablet Take 1 tablet (5 mg total) by mouth daily    gabapentin (NEURONTIN) 300 mg capsule Take 300 mg by mouth 2 (two) times a day. Indications: Disease of the Peripheral Nerves    mirtazapine (REMERON) 30 mg tablet Take 1 tablet (30 mg total) by mouth daily at bedtime    [DISCONTINUED] cephalexin (KEFLEX) 500 mg capsule Take 500 mg by mouth every 8 (eight) hours. Indications: Infection of Genitals and/or Urinary Tract    [DISCONTINUED] meclizine (ANTIVERT) 25 mg tablet Take 1 tablet (25 mg total) by mouth 3 (three) times a day as needed for dizziness (Patient taking differently: Take 12.5 mg by mouth 3 (three) times a day as needed for dizziness)    [DISCONTINUED] sertraline (ZOLOFT) 50 mg tablet Take 50 mg by mouth daily. Indications: Major Depressive Disorder    [DISCONTINUED] ascorbic acid (VITAMIN C) 500 MG tablet Take 500 mg by mouth daily (Patient not taking: Reported on 1/25/2024)     "[DISCONTINUED] B Complex Vitamins (B COMPLEX 1 PO) Take 1 capsule by mouth daily (Patient not taking: Reported on 1/25/2024)    [DISCONTINUED] calcium polycarbophil (EQ Fiber Therapy) 625 mg tablet Take 625 mg by mouth daily. Indications: soft stools (Patient not taking: Reported on 1/25/2024)    [DISCONTINUED] cholecalciferol (VITAMIN D3) 1,000 units tablet Take 4,000 Units by mouth daily (Patient not taking: Reported on 1/25/2024)    [DISCONTINUED] ferrous sulfate 325 (65 Fe) mg tablet Take 325 mg by mouth daily with breakfast (Patient not taking: Reported on 1/25/2024)    [DISCONTINUED] gabapentin (NEURONTIN) 400 mg capsule Take 1 capsule (400 mg total) by mouth daily at bedtime (Patient not taking: Reported on 1/25/2024)    [DISCONTINUED] morphine (MSIR) 15 mg tablet Take 1 tablet (15 mg total) by mouth every 6 (six) hours as needed for severe pain Max Daily Amount: 60 mg (Patient not taking: Reported on 1/25/2024)    [DISCONTINUED] Sulfamethoxazole-Trimethoprim (BACTRIM PO) Take by mouth (Patient not taking: Reported on 1/25/2024)       Objective     /72 (BP Location: Left arm, Patient Position: Sitting, Cuff Size: Large)   Pulse 78   Temp 98.5 °F (36.9 °C) (Tympanic)   Ht 5' 6\" (1.676 m)   Wt 86.6 kg (191 lb)   SpO2 97%   BMI 30.83 kg/m²     Physical Exam  Constitutional:       Appearance: Normal appearance.      Comments: Quite tired on this appointment.   HENT:      Head: Normocephalic and atraumatic.      Ears:      Comments: Bilateral cerumen impaction     Nose: No congestion or rhinorrhea.   Eyes:      Extraocular Movements: Extraocular movements intact.      Pupils: Pupils are equal, round, and reactive to light.   Neck:      Vascular: No carotid bruit.   Cardiovascular:      Rate and Rhythm: Normal rate and regular rhythm.      Heart sounds: No murmur heard.  Pulmonary:      Effort: No respiratory distress.      Breath sounds: No wheezing.   Chest:      Chest wall: No tenderness. "   Abdominal:      General: There is no distension.      Tenderness: There is no abdominal tenderness.      Hernia: No hernia is present.   Musculoskeletal:         General: No swelling or tenderness.      Right lower leg: No edema.      Left lower leg: No edema.   Lymphadenopathy:      Cervical: No cervical adenopathy.   Skin:     Findings: No rash.   Neurological:      General: No focal deficit present.      Mental Status: He is alert and oriented to person, place, and time.      Sensory: No sensory deficit.      Comments: R gaze nystagmus... hand ataxia'.  Antalgic gait, fairly unsteady.   Psychiatric:         Mood and Affect: Mood normal.         Behavior: Behavior normal.       Tomás Edwards, DO

## 2024-01-25 NOTE — ASSESSMENT & PLAN NOTE
Continues with ataxia an dizziness.  Most the right cerebellar infarct, has a smaller infarcts in the left cerebellar hemisphere.  Has seen neurology.  Remains on aspirin alone as well as moderate intensity statin.  Blood pressure remains controlled.

## 2024-01-25 NOTE — ASSESSMENT & PLAN NOTE
Lab Results   Component Value Date    EGFR 39 08/25/2023    EGFR 44 10/17/2022    EGFR 38 03/01/2022    CREATININE 1.61 (H) 08/25/2023    CREATININE 1.48 (H) 10/17/2022    CREATININE 1.67 (H) 03/01/2022   Fairly stable without significant decline during this event.

## 2024-01-25 NOTE — ASSESSMENT & PLAN NOTE
Continue on Tylenol alone for now.  Will try lower dose opioid if needed given his renal function.

## 2024-01-25 NOTE — ASSESSMENT & PLAN NOTE
Chronic low back pain related to spinal stenosis.  Gets pain radiating down both gluteal areas.  He is currently off all morphine which she had been on long-term.  He will continue with Tylenol as needed.  He is on gabapentin as well which may be that what sedating him so much.  He had been on 400 twice a days now on 300 twice a day.

## 2024-01-25 NOTE — ASSESSMENT & PLAN NOTE
Represcribe his meclizine but at 12.5.  He had some confusion and agitation while in the emergency room, related to his underlying dementia which is mild, and he is returned to baseline at this point.

## 2024-01-26 ENCOUNTER — HOME CARE VISIT (OUTPATIENT)
Dept: HOME HEALTH SERVICES | Facility: HOME HEALTHCARE | Age: 79
End: 2024-01-26
Payer: MEDICARE

## 2024-01-26 VITALS — OXYGEN SATURATION: 96 % | DIASTOLIC BLOOD PRESSURE: 74 MMHG | HEART RATE: 61 BPM | SYSTOLIC BLOOD PRESSURE: 118 MMHG

## 2024-01-26 PROCEDURE — G0151 HHCP-SERV OF PT,EA 15 MIN: HCPCS

## 2024-01-26 NOTE — CASE COMMUNICATION
[facility] OT evaluation 1/24/24    OT to continue 1 wk 3 for update of upper extremity home exercise program for adjustment to resistance aa well as additional review of safety with transfer/mobility for performance of self-care/daily activities.

## 2024-01-29 ENCOUNTER — TELEPHONE (OUTPATIENT)
Dept: FAMILY MEDICINE CLINIC | Facility: CLINIC | Age: 79
End: 2024-01-29

## 2024-01-29 ENCOUNTER — HOME CARE VISIT (OUTPATIENT)
Dept: HOME HEALTH SERVICES | Facility: HOME HEALTHCARE | Age: 79
End: 2024-01-29
Payer: MEDICARE

## 2024-01-29 VITALS — DIASTOLIC BLOOD PRESSURE: 50 MMHG | OXYGEN SATURATION: 94 % | HEART RATE: 67 BPM | SYSTOLIC BLOOD PRESSURE: 92 MMHG

## 2024-01-29 PROCEDURE — G0151 HHCP-SERV OF PT,EA 15 MIN: HCPCS

## 2024-01-29 NOTE — TELEPHONE ENCOUNTER
Hi, this is Kathryn Sanchez calling one of the physical therapists with [facility]. I'm calling in regards to Israel Friedjustin Friedon. He's a patient of Doctor Edwards's date of birth [birth year]. I'm calling on him. I was out to see him today. I just wanted to let someone know that he is having some increased dizziness, tension, like headaches. He's getting tension across his head and when I was out to see him I did check his blood pressure. Now his sitting blood pressure is 112. I believe it was 112. Let me just get the right reading here, but I don't tell you the wrong thing sitting. It was 112 / 66 when I had him stand up when he complained of increased dizziness, increased tension across his head. His blood pressure had dropped to 92 / 50. Now they aren't taking daily blood pressure readings. They don't have a cuff at home. I'm not quite sure that he's getting quite enough hydration during the day, so I'm not really sure exactly why he's dropping. But I want to know what the office know that the doctor know that he is having those symptoms plus a some hypostatic going on. But also he was just starting on a new antibiotic. They had done a culture and they discontinued his Keflex and they gave him Cipro. So he's on Cipro 500 milligrams, he's taking that twice a day. So I also want, I did profile that and the doctor will see that in the Med profile. Also want to let the doctor know that when you get this, can you please give me a call back number is [phone]. Thank you.

## 2024-01-29 NOTE — TELEPHONE ENCOUNTER
Please notify Kathryn from [facility]'s visiting nurses.  All we can do is encourage oral hydration at this point.  He is not on any antihypertensives.  His dizziness is likely coming from his recent stroke, on top of his Ménière's disease.  He feels like he is getting worse he should go to the emergency room.  Would not change any of his medications at this point.

## 2024-01-30 ENCOUNTER — HOME CARE VISIT (OUTPATIENT)
Dept: HOME HEALTH SERVICES | Facility: HOME HEALTHCARE | Age: 79
End: 2024-01-30
Payer: MEDICARE

## 2024-01-30 PROCEDURE — G0153 HHCP-SVS OF S/L PATH,EA 15MN: HCPCS

## 2024-01-31 ENCOUNTER — HOME CARE VISIT (OUTPATIENT)
Dept: HOME HEALTH SERVICES | Facility: HOME HEALTHCARE | Age: 79
End: 2024-01-31
Payer: MEDICARE

## 2024-01-31 VITALS — OXYGEN SATURATION: 97 % | SYSTOLIC BLOOD PRESSURE: 110 MMHG | HEART RATE: 67 BPM | DIASTOLIC BLOOD PRESSURE: 68 MMHG

## 2024-01-31 VITALS — DIASTOLIC BLOOD PRESSURE: 62 MMHG | OXYGEN SATURATION: 95 % | SYSTOLIC BLOOD PRESSURE: 119 MMHG | HEART RATE: 60 BPM

## 2024-01-31 PROCEDURE — G0151 HHCP-SERV OF PT,EA 15 MIN: HCPCS

## 2024-01-31 PROCEDURE — G0152 HHCP-SERV OF OT,EA 15 MIN: HCPCS | Performed by: OCCUPATIONAL THERAPIST

## 2024-02-01 ENCOUNTER — HOME CARE VISIT (OUTPATIENT)
Dept: HOME HEALTH SERVICES | Facility: HOME HEALTHCARE | Age: 79
End: 2024-02-01
Payer: MEDICARE

## 2024-02-01 PROCEDURE — G0153 HHCP-SVS OF S/L PATH,EA 15MN: HCPCS

## 2024-02-02 NOTE — CASE COMMUNICATION
Patient's son's significant other noted Dr Liriano is encouraging patient to perform daily vital sign assessment and log vital signs to monitor blood pressure to assess for low blood pressure as marc notes patient's blood pressure has been lower in the morning.   Caregiver notes recently obtaining a blood pressure cuff to assist with monitoring patient's blood pressure.

## 2024-02-05 ENCOUNTER — HOME CARE VISIT (OUTPATIENT)
Dept: HOME HEALTH SERVICES | Facility: HOME HEALTHCARE | Age: 79
End: 2024-02-05
Payer: MEDICARE

## 2024-02-05 VITALS — DIASTOLIC BLOOD PRESSURE: 68 MMHG | OXYGEN SATURATION: 98 % | HEART RATE: 79 BPM | SYSTOLIC BLOOD PRESSURE: 112 MMHG

## 2024-02-05 PROCEDURE — G0151 HHCP-SERV OF PT,EA 15 MIN: HCPCS

## 2024-02-07 ENCOUNTER — HOME CARE VISIT (OUTPATIENT)
Dept: HOME HEALTH SERVICES | Facility: HOME HEALTHCARE | Age: 79
End: 2024-02-07
Payer: MEDICARE

## 2024-02-07 VITALS — SYSTOLIC BLOOD PRESSURE: 120 MMHG | HEART RATE: 64 BPM | OXYGEN SATURATION: 97 % | DIASTOLIC BLOOD PRESSURE: 82 MMHG

## 2024-02-07 VITALS — DIASTOLIC BLOOD PRESSURE: 78 MMHG | SYSTOLIC BLOOD PRESSURE: 128 MMHG | OXYGEN SATURATION: 96 % | HEART RATE: 66 BPM

## 2024-02-07 DIAGNOSIS — I63.9 NEW CEREBELLAR INFARCT (HCC): Primary | ICD-10-CM

## 2024-02-07 PROCEDURE — G0152 HHCP-SERV OF OT,EA 15 MIN: HCPCS | Performed by: OCCUPATIONAL THERAPIST

## 2024-02-07 PROCEDURE — G0151 HHCP-SERV OF PT,EA 15 MIN: HCPCS

## 2024-02-07 NOTE — CASE COMMUNICATION
Orders been placed in [facility] for him to go to outpatient physical therapy.  Be sure patient's daughter is aware

## 2024-02-07 NOTE — CASE COMMUNICATION
Pt is scheduled for discharge from agency on 2/13/2024.  Discussed transition to outpatient therapy for Physical and Speech Therapy.  If agreeable, can you please place order in chart for both.  Pt would like to go to [facility].  Fax number - [phone].  Thank you!

## 2024-02-08 ENCOUNTER — HOME CARE VISIT (OUTPATIENT)
Dept: HOME HEALTH SERVICES | Facility: HOME HEALTHCARE | Age: 79
End: 2024-02-08
Payer: MEDICARE

## 2024-02-08 PROCEDURE — G0153 HHCP-SVS OF S/L PATH,EA 15MN: HCPCS

## 2024-02-09 ENCOUNTER — HOME CARE VISIT (OUTPATIENT)
Dept: HOME HEALTH SERVICES | Facility: HOME HEALTHCARE | Age: 79
End: 2024-02-09
Payer: MEDICARE

## 2024-02-09 PROCEDURE — G0153 HHCP-SVS OF S/L PATH,EA 15MN: HCPCS

## 2024-02-09 NOTE — CASE COMMUNICATION
No further OT services are planned at this time as patient has achieved OT goals.  Patient notes plans to transition to outpatient therapy following discharge from [facility] PT / all [facility] services.  OT encourages patient continue upper extremity exercise program and  consider outpatient therapy to further [facility]

## 2024-02-10 NOTE — CASE COMMUNICATION
Pt was discharged from home health speech therapy today.  Recommend f/u for cognitive linguistic therapy in the outpatient setting.  Recommendation discussed with pt/family.

## 2024-02-12 ENCOUNTER — HOME CARE VISIT (OUTPATIENT)
Dept: HOME HEALTH SERVICES | Facility: HOME HEALTHCARE | Age: 79
End: 2024-02-12
Payer: MEDICARE

## 2024-02-12 VITALS — SYSTOLIC BLOOD PRESSURE: 112 MMHG | DIASTOLIC BLOOD PRESSURE: 70 MMHG | HEART RATE: 76 BPM | OXYGEN SATURATION: 95 %

## 2024-02-12 PROCEDURE — G0151 HHCP-SERV OF PT,EA 15 MIN: HCPCS

## 2024-04-04 ENCOUNTER — APPOINTMENT (OUTPATIENT)
Age: 79
End: 2024-04-04
Payer: MEDICARE

## 2024-04-04 DIAGNOSIS — E78.2 MIXED HYPERLIPIDEMIA: ICD-10-CM

## 2024-04-04 DIAGNOSIS — G89.4 CHRONIC PAIN SYNDROME: ICD-10-CM

## 2024-04-04 DIAGNOSIS — R73.9 ELEVATED BLOOD SUGAR: ICD-10-CM

## 2024-04-04 LAB
ALBUMIN SERPL BCP-MCNC: 4.1 G/DL (ref 3.5–5)
ALP SERPL-CCNC: 118 U/L (ref 34–104)
ALT SERPL W P-5'-P-CCNC: 50 U/L (ref 7–52)
ANION GAP SERPL CALCULATED.3IONS-SCNC: 10 MMOL/L (ref 4–13)
AST SERPL W P-5'-P-CCNC: 46 U/L (ref 13–39)
BASOPHILS # BLD AUTO: 0.11 THOUSANDS/ÂΜL (ref 0–0.1)
BASOPHILS NFR BLD AUTO: 1 % (ref 0–1)
BILIRUB SERPL-MCNC: 0.39 MG/DL (ref 0.2–1)
BUN SERPL-MCNC: 28 MG/DL (ref 5–25)
CALCIUM SERPL-MCNC: 9.4 MG/DL (ref 8.4–10.2)
CHLORIDE SERPL-SCNC: 106 MMOL/L (ref 96–108)
CHOLEST SERPL-MCNC: 113 MG/DL
CO2 SERPL-SCNC: 25 MMOL/L (ref 21–32)
CREAT SERPL-MCNC: 1.56 MG/DL (ref 0.6–1.3)
EOSINOPHIL # BLD AUTO: 0.3 THOUSAND/ÂΜL (ref 0–0.61)
EOSINOPHIL NFR BLD AUTO: 3 % (ref 0–6)
ERYTHROCYTE [DISTWIDTH] IN BLOOD BY AUTOMATED COUNT: 12.8 % (ref 11.6–15.1)
EST. AVERAGE GLUCOSE BLD GHB EST-MCNC: 120 MG/DL
GFR SERPL CREATININE-BSD FRML MDRD: 41 ML/MIN/1.73SQ M
GLUCOSE P FAST SERPL-MCNC: 88 MG/DL (ref 65–99)
HBA1C MFR BLD: 5.8 %
HCT VFR BLD AUTO: 40.7 % (ref 36.5–49.3)
HDLC SERPL-MCNC: 49 MG/DL
HGB BLD-MCNC: 13.4 G/DL (ref 12–17)
IMM GRANULOCYTES # BLD AUTO: 0.1 THOUSAND/UL (ref 0–0.2)
IMM GRANULOCYTES NFR BLD AUTO: 1 % (ref 0–2)
LDLC SERPL CALC-MCNC: 44 MG/DL (ref 0–100)
LYMPHOCYTES # BLD AUTO: 2.3 THOUSANDS/ÂΜL (ref 0.6–4.47)
LYMPHOCYTES NFR BLD AUTO: 26 % (ref 14–44)
MCH RBC QN AUTO: 33.3 PG (ref 26.8–34.3)
MCHC RBC AUTO-ENTMCNC: 32.9 G/DL (ref 31.4–37.4)
MCV RBC AUTO: 101 FL (ref 82–98)
MONOCYTES # BLD AUTO: 0.92 THOUSAND/ÂΜL (ref 0.17–1.22)
MONOCYTES NFR BLD AUTO: 11 % (ref 4–12)
NEUTROPHILS # BLD AUTO: 4.98 THOUSANDS/ÂΜL (ref 1.85–7.62)
NEUTS SEG NFR BLD AUTO: 58 % (ref 43–75)
NRBC BLD AUTO-RTO: 0 /100 WBCS
PLATELET # BLD AUTO: 287 THOUSANDS/UL (ref 149–390)
PMV BLD AUTO: 11.2 FL (ref 8.9–12.7)
POTASSIUM SERPL-SCNC: 4.6 MMOL/L (ref 3.5–5.3)
PROT SERPL-MCNC: 6.8 G/DL (ref 6.4–8.4)
RBC # BLD AUTO: 4.03 MILLION/UL (ref 3.88–5.62)
SODIUM SERPL-SCNC: 141 MMOL/L (ref 135–147)
TRIGL SERPL-MCNC: 100 MG/DL
WBC # BLD AUTO: 8.71 THOUSAND/UL (ref 4.31–10.16)

## 2024-04-04 PROCEDURE — 83036 HEMOGLOBIN GLYCOSYLATED A1C: CPT

## 2024-04-04 PROCEDURE — 80053 COMPREHEN METABOLIC PANEL: CPT

## 2024-04-04 PROCEDURE — 36415 COLL VENOUS BLD VENIPUNCTURE: CPT

## 2024-04-04 PROCEDURE — 80061 LIPID PANEL: CPT

## 2024-04-04 PROCEDURE — 85025 COMPLETE CBC W/AUTO DIFF WBC: CPT

## 2024-04-08 ENCOUNTER — OFFICE VISIT (OUTPATIENT)
Dept: FAMILY MEDICINE CLINIC | Facility: CLINIC | Age: 79
End: 2024-04-08
Payer: MEDICARE

## 2024-04-08 VITALS
OXYGEN SATURATION: 98 % | SYSTOLIC BLOOD PRESSURE: 122 MMHG | WEIGHT: 194 LBS | HEIGHT: 66 IN | TEMPERATURE: 97.1 F | BODY MASS INDEX: 31.18 KG/M2 | DIASTOLIC BLOOD PRESSURE: 80 MMHG | HEART RATE: 69 BPM

## 2024-04-08 DIAGNOSIS — I63.9 CEREBELLAR INFARCT (HCC): ICD-10-CM

## 2024-04-08 DIAGNOSIS — E78.5 HYPERLIPIDEMIA, UNSPECIFIED HYPERLIPIDEMIA TYPE: ICD-10-CM

## 2024-04-08 DIAGNOSIS — G89.4 CHRONIC PAIN SYNDROME: ICD-10-CM

## 2024-04-08 DIAGNOSIS — Z00.00 MEDICARE ANNUAL WELLNESS VISIT, SUBSEQUENT: Primary | ICD-10-CM

## 2024-04-08 DIAGNOSIS — N18.30 STAGE 3 CHRONIC KIDNEY DISEASE, UNSPECIFIED WHETHER STAGE 3A OR 3B CKD (HCC): ICD-10-CM

## 2024-04-08 DIAGNOSIS — I63.9 NEW CEREBELLAR INFARCT (HCC): ICD-10-CM

## 2024-04-08 PROCEDURE — 99213 OFFICE O/P EST LOW 20 MIN: CPT | Performed by: INTERNAL MEDICINE

## 2024-04-08 PROCEDURE — G0439 PPPS, SUBSEQ VISIT: HCPCS | Performed by: INTERNAL MEDICINE

## 2024-04-08 RX ORDER — ASPIRIN 81 MG/1
81 TABLET ORAL DAILY
Qty: 90 TABLET | Refills: 3 | Status: SHIPPED | OUTPATIENT
Start: 2024-04-08

## 2024-04-08 RX ORDER — GABAPENTIN 300 MG/1
300 CAPSULE ORAL 2 TIMES DAILY
Qty: 180 CAPSULE | Refills: 3 | Status: SHIPPED | OUTPATIENT
Start: 2024-04-08

## 2024-04-08 RX ORDER — ATORVASTATIN CALCIUM 40 MG/1
40 TABLET, FILM COATED ORAL DAILY
Qty: 90 TABLET | Refills: 3 | Status: SHIPPED | OUTPATIENT
Start: 2024-04-08

## 2024-04-08 NOTE — PROGRESS NOTES
Assessment and Plan:     Problem List Items Addressed This Visit       Chronic pain syndrome     Remains on gabapentin alone.         Relevant Medications    gabapentin (NEURONTIN) 300 mg capsule    Stage 3 chronic kidney disease, unspecified whether stage 3a or 3b CKD (HCC)     Lab Results   Component Value Date    EGFR 41 04/04/2024    EGFR 40 (L) 02/22/2024    EGFR 51 (L) 01/02/2024    CREATININE 1.56 (H) 04/04/2024    CREATININE 1.70 (H) 02/22/2024    CREATININE 1.39 (H) 01/02/2024   Continue to avoid nephrotoxins such as nonsteroidal anti-inflammatories.  Gabapentin or Tylenol as needed for pain.         Cerebellar infarct (HCC)     Remains on aspirin alone.  Doing fairly well.  Continues with therapy.         Relevant Medications    aspirin (ECOTRIN LOW STRENGTH) 81 mg EC tablet    Medicare annual wellness visit, subsequent - Primary     Reviewed the AWV questionnaire.  Went through standard need for appropriate screening tests based on risk factors..  Reviewed cognitive issues.  Reviewed living will and DURABLE POWER OF .  Discussed healthy lifestyle, healthy diet.  Reviewed vaccines.  Encourage exercise.  Discussed safety issues within the home and about.          Hyperlipidemia     Remains on moderate intensity statins.         Relevant Medications    atorvastatin (LIPITOR) 40 mg tablet       Depression Screening and Follow-up Plan: Patient was screened for depression during today's encounter. They screened negative with a PHQ-9 score of 0.      Preventive health issues were discussed with patient, and age appropriate screening tests were ordered as noted in patient's After Visit Summary.  Personalized health advice and appropriate referrals for health education or preventive services given if needed, as noted in patient's After Visit Summary.     History of Present Illness:     Patient presents for a Medicare Wellness Visit    Doing reasonably well.  Continues to live with his daughter as he has  for some time.  Recent stroke and continues with physical therapy.  Needs a walker to walk around it, at therapy he has started walking with a cane again.  Has not needed any dizzy medicine.  He is off his chronic pain medications.  Taking only gabapentin at this point.  No recent falls.  Denies depression.    Patient presents for their annual medical wellness visit.  Reviewed medical intake questionnaire.  Discussed living will and DURABLE POWER OF .  Discussed importance of these 2 documents.  Reviewed the various screening modalities the patient was due for.  Reviewed home safety as well as depression and risk of falling.  Through shared medical decision making move forward with testing or blood work as ordered.  The patient presents with only pain in the knees from time to time..        Patient Care Team:  Tomás Edwards DO as PCP - General (Internal Medicine)  [facility]     Review of Systems:     Review of Systems   Constitutional:  Negative for chills and fever.   HENT:  Negative for ear pain, rhinorrhea and sore throat.    Eyes:  Negative for pain, redness and visual disturbance.   Respiratory:  Negative for cough and shortness of breath.    Cardiovascular:  Negative for chest pain and leg swelling.   Gastrointestinal:  Negative for abdominal pain, diarrhea, nausea and vomiting.   Genitourinary:  Negative for dysuria, flank pain, frequency and urgency.   Musculoskeletal:  Positive for arthralgias, back pain, gait problem and myalgias. Negative for neck pain.   Skin:  Negative for rash.   Neurological:  Negative for dizziness, weakness, light-headedness and headaches.   Hematological: Negative.    Psychiatric/Behavioral:  Negative for agitation, confusion and suicidal ideas. The patient is not nervous/anxious.    All other systems reviewed and are negative.       Problem List:     Patient Active Problem List   Diagnosis    Chronic indwelling Diallo catheter    Chronic pain syndrome    Stage 3 chronic  kidney disease, unspecified whether stage 3a or 3b CKD (HCC)    Anemia of chronic disease    Mild episode of recurrent major depressive disorder (HCC)    Lumbar radiculopathy    Primary insomnia    Vertigo due to previous cerebellar infarction    Cerebellar infarct (HCC)    Medicare annual wellness visit, subsequent    Hyperlipidemia      Past Medical and Surgical History:     Past Medical History:   Diagnosis Date    Chronic pain disorder     Depression     Diallo catheter in place     Gram-negative bacteremia     History of hydronephrosis     Hypertension     Insomnia     Kidney stone     PONV (postoperative nausea and vomiting)     Prostate enlargement     Psychiatric disorder     Right hydroureteronephrosis with obstructing calculus     Spinal stenosis     Urinary tract infection     Vestibular disorder      Past Surgical History:   Procedure Laterality Date    ARTHROSCOPY KNEE      BACK SURGERY      L 4 or 5    CATARACT EXTRACTION Bilateral     COLONOSCOPY      CYSTOSCOPY W/ LASER LITHOTRIPSY N/A 8/20/2018    Procedure: LITHOTRISPY HOLMIUM LASER of bladder stones;  Surgeon: Andres Hernandez MD;  Location:  MAIN OR;  Service: Urology    EAR SURGERY      KNEE ARTHROSCOPY      CO CYSTO BLADDER W/URETERAL CATHETERIZATION Right 12/29/2020    Procedure: CYSTOSCOPY RETROGRADE PYELOGRAM WITH INSERTION STENT URETERAL;  Surgeon: Andres Hernandez MD;  Location:  MAIN OR;  Service: Urology    CO LITHOTRIPSY XTRCORP SHOCK WAVE Right 3/19/2021    Procedure: EXTRACORPORAL SHOCKWAVE LITHOTRIPSY RIGHT UPJ STONE;  Surgeon: Andres Hernandez MD;  Location:  MAIN OR;  Service: Urology    CO LITHOTRIPSY XTRCORP SHOCK WAVE Right 5/21/2021    Procedure: LITHOTRIPSY EXTRACORPORAL SHOCKWAVE (ESWL);  Surgeon: Andres Hernandez MD;  Location:  MAIN OR;  Service: Urology    CO TRURL ELECTROSURG RESCJ PROSTATE BLEED COMPLETE N/A 8/20/2018    Procedure: CYSTOSCOPY; TURP;  Surgeon: Andres Hernandez MD;  Location:  MAIN OR;  Service: Urology    ROTATOR  CUFF REPAIR Bilateral     SHOULDER SURGERY Right 2019      Family History:     Family History   Problem Relation Age of Onset    Cancer Mother     Diabetes Father       Social History:     Social History     Socioeconomic History    Marital status:      Spouse name: None    Number of children: None    Years of education: None    Highest education level: None   Occupational History    None   Tobacco Use    Smoking status: Former     Current packs/day: 0.00     Types: Cigarettes     Quit date: 1970     Years since quittin.3    Smokeless tobacco: Never    Tobacco comments:     quit 50 years ago   Vaping Use    Vaping status: Never Used   Substance and Sexual Activity    Alcohol use: Never    Drug use: Never    Sexual activity: Not Currently   Other Topics Concern    None   Social History Narrative    None     Social Determinants of Health     Financial Resource Strain: Low Risk  (2024)    Received from [facility]    Overall Financial Resource Strain (CARDIA)     Difficulty of Paying Living Expenses: Not hard at all   Food Insecurity: No Food Insecurity (2024)    Hunger Vital Sign     Worried About Running Out of Food in the Last Year: Never true     Ran Out of Food in the Last Year: Never true   Transportation Needs: No Transportation Needs (2024)    PRAPARE - Transportation     Lack of Transportation (Medical): No     Lack of Transportation (Non-Medical): No   Physical Activity: Not on file   Stress: Not on file   Social Connections: Not on file   Intimate Partner Violence: Not At Risk (2024)    Received from [facility]    Humiliation, Afraid, Rape, and Kick questionnaire     Fear of Current or Ex-Partner: No     Emotionally Abused: No     Physically Abused: No     Sexually Abused: No   Housing Stability: Unknown (2024)    Housing Stability Vital Sign     Unable to Pay for Housing in the Last Year: No     Number of Places Lived in the Last  Year: Not on file     Unstable Housing in the Last Year: No      Medications and Allergies:     Current Outpatient Medications   Medication Sig Dispense Refill    aspirin (ECOTRIN LOW STRENGTH) 81 mg EC tablet Take 1 tablet (81 mg total) by mouth daily 90 tablet 3    atorvastatin (LIPITOR) 40 mg tablet Take 1 tablet (40 mg total) by mouth daily 90 tablet 3    Cholecalciferol 25 MCG (1000 UT) tablet Take 1,000 Units by mouth daily. Indications: Vitamin D Deficiency      donepezil (ARICEPT) 5 mg tablet Take 1 tablet (5 mg total) by mouth every evening 90 tablet 3    finasteride (PROSCAR) 5 mg tablet Take 1 tablet (5 mg total) by mouth daily 90 tablet 3    gabapentin (NEURONTIN) 300 mg capsule Take 1 capsule (300 mg total) by mouth 2 (two) times a day 180 capsule 3    mirtazapine (REMERON) 30 mg tablet Take 1 tablet (30 mg total) by mouth daily at bedtime 90 tablet 3    acetaminophen (TYLENOL) 500 mg tablet Take 1,000 mg by mouth every 8 (eight) hours as needed for mild pain. Indications: Pain (Patient not taking: Reported on 4/8/2024)      ciprofloxacin (CIPRO) 500 mg tablet Take 500 mg by mouth every 12 (twelve) hours. Indications: Urinary Tract Infection (Patient not taking: Reported on 4/8/2024)      diphenhydrAMINE-APAP, sleep, (TYLENOL PM EXTRA STRENGTH PO) Take 2 tablets by mouth daily at bedtime (Patient not taking: Reported on 4/8/2024)      meclizine (ANTIVERT) 12.5 MG tablet Take 1 tablet (12.5 mg total) by mouth 3 (three) times a day as needed for dizziness (Patient not taking: Reported on 4/8/2024) 60 tablet 3     No current facility-administered medications for this visit.     No Known Allergies   Immunizations:     Immunization History   Administered Date(s) Administered    COVID-19 MODERNA VACC 0.5 ML IM 04/01/2021, 04/01/2021, 04/29/2021, 04/29/2021, 12/08/2021    INFLUENZA 10/25/2022    Influenza Split High Dose Preservative Free IM 10/25/2022    Influenza, injectable, quadrivalent, preservative free  0.5 mL 01/25/2024    Pneumococcal Conjugate 13-Valent 03/09/2016    Pneumococcal Polysaccharide PPV23 09/20/2005, 05/31/2017, 03/07/2018    Td (adult), adsorbed 08/20/2012    Zoster Vaccine Recombinant 09/14/2018, 12/12/2018      Health Maintenance:     There are no preventive care reminders to display for this patient.  There are no preventive care reminders to display for this patient.     Medicare Screening Tests and Risk Assessments:     Israel BOYCE is here for his Subsequent Wellness visit. Last Medicare Wellness visit information reviewed, patient interviewed and updates made to the record today.      Health Risk Assessment:   Patient rates overall health as good. Patient feels that their physical health rating is same. Patient is satisfied with their life. Eyesight was rated as same. Hearing was rated as same. Patient feels that their emotional and mental health rating is same. Patients states they are never, rarely angry. Patient states they are sometimes unusually tired/fatigued. Pain experienced in the last 7 days has been none. Patient states that he has experienced no weight loss or gain in last 6 months.     Depression Screening:   PHQ-9 Score: 0      Fall Risk Screening:   In the past year, patient has experienced: no history of falling in past year      Home Safety:  Patient has trouble with stairs inside or outside of their home. Patient has working smoke alarms and has working carbon monoxide detector. Home safety hazards include: none.     Nutrition:   Current diet is Regular.     Medications:   Patient is currently taking over-the-counter supplements. OTC medications include: see medication list. Patient is not able to manage medications.     Activities of Daily Living (ADLs)/Instrumental Activities of Daily Living (IADLs):   Walk and transfer into and out of bed and chair?: Yes  Dress and groom yourself?: Yes    Bathe or shower yourself?: Yes    Feed yourself? Yes  Do your laundry/housekeeping?:  No  Manage your money, pay your bills and track your expenses?: No  Make your own meals?: No    Do your own shopping?: No    Previous Hospitalizations:   Any hospitalizations or ED visits within the last 12 months?: Yes    How many hospitalizations have you had in the last year?: 1-2    Hospitalization Comments: Stroke and weakness    Advance Care Planning:   Living will: Yes    Durable POA for healthcare: No    Advanced directive: Yes    Advanced directive counseling given: Yes    ACP document given: Yes    Patient declined ACP directive: No    End of Life Decisions reviewed with patient: Yes    Provider agrees with end of life decisions: Yes      Cognitive Screening:   Provider or family/friend/caregiver concerned regarding cognition?: No    PREVENTIVE SCREENINGS      Cardiovascular Screening:    General: Screening Not Indicated and History Lipid Disorder      Diabetes Screening:     General: Screening Current      Colorectal Cancer Screening:     General: Screening Not Indicated      Prostate Cancer Screening:    General: Screening Not Indicated      Osteoporosis Screening:    General: Screening Not Indicated      Abdominal Aortic Aneurysm (AAA) Screening:    Risk factors include: tobacco use        Lung Cancer Screening:     General: Screening Not Indicated      Hepatitis C Screening:    General: Screening Not Indicated    Screening, Brief Intervention, and Referral to Treatment (SBIRT)    Screening  Typical number of drinks in a day: 0  Typical number of drinks in a week: 0  Interpretation: Low risk drinking behavior.    Single Item Drug Screening:  How often have you used an illegal drug (including marijuana) or a prescription medication for non-medical reasons in the past year? never    Single Item Drug Screen Score: 0  Interpretation: Negative screen for possible drug use disorder    Other Counseling Topics:   Car/seat belt/driving safety and sunscreen.     No results found.     Physical Exam:     /80  "(BP Location: Right arm, Patient Position: Sitting, Cuff Size: Large)   Pulse 69   Temp (!) 97.1 °F (36.2 °C) (Tympanic)   Ht 5' 6\" (1.676 m)   Wt 88 kg (194 lb)   SpO2 98%   BMI 31.31 kg/m²     Physical Exam  Vitals and nursing note reviewed.   Constitutional:       General: He is not in acute distress.     Appearance: Normal appearance. He is well-developed.   HENT:      Head: Normocephalic and atraumatic.   Eyes:      Extraocular Movements: Extraocular movements intact.      Conjunctiva/sclera: Conjunctivae normal.   Cardiovascular:      Rate and Rhythm: Normal rate and regular rhythm.      Pulses: Normal pulses.      Heart sounds: Normal heart sounds. No murmur heard.  Pulmonary:      Effort: Pulmonary effort is normal. No respiratory distress.      Breath sounds: Normal breath sounds.   Abdominal:      General: Abdomen is flat.      Palpations: Abdomen is soft.      Tenderness: There is no abdominal tenderness.   Musculoskeletal:         General: No swelling.      Cervical back: Normal range of motion and neck supple.      Comments: Generalized DJD   Skin:     General: Skin is warm and dry.      Capillary Refill: Capillary refill takes less than 2 seconds.   Neurological:      General: No focal deficit present.      Mental Status: He is alert and oriented to person, place, and time.      Sensory: No sensory deficit.      Comments: Slight right pronator drift.  Neurologic gait with shuffling of feet, short base steps.   Psychiatric:         Mood and Affect: Mood normal.         Behavior: Behavior normal.          Tomás Edwards, DO  "

## 2024-04-08 NOTE — PATIENT INSTRUCTIONS
Medicare Preventive Visit Patient Instructions  Thank you for completing your Welcome to Medicare Visit or Medicare Annual Wellness Visit today. Your next wellness visit will be due in one year (4/9/2025).  The screening/preventive services that you may require over the next 5-10 years are detailed below. Some tests may not apply to you based off risk factors and/or age. Screening tests ordered at today's visit but not completed yet may show as past due. Also, please note that scanned in results may not display below.  Preventive Screenings:  Service Recommendations Previous Testing/Comments   Colorectal Cancer Screening  Colonoscopy    Fecal Occult Blood Test (FOBT)/Fecal Immunochemical Test (FIT)  Fecal DNA/Cologuard Test  Flexible Sigmoidoscopy Age: 45-75 years old   Colonoscopy: every 10 years (May be performed more frequently if at higher risk)  OR  FOBT/FIT: every 1 year  OR  Cologuard: every 3 years  OR  Sigmoidoscopy: every 5 years  Screening may be recommended earlier than age 45 if at higher risk for colorectal cancer. Also, an individualized decision between you and your healthcare provider will decide whether screening between the ages of 76-85 would be appropriate. Colonoscopy: Not on file  FOBT/FIT: Not on file  Cologuard: Not on file  Sigmoidoscopy: Not on file          Prostate Cancer Screening Individualized decision between patient and health care provider in men between ages of 55-69   Medicare will cover every 12 months beginning on the day after your 50th birthday PSA: <0.1 ng/mL     Screening Not Indicated     Hepatitis C Screening Once for adults born between 1945 and 1965  More frequently in patients at high risk for Hepatitis C Hep C Antibody: Not on file        Diabetes Screening 1-2 times per year if you're at risk for diabetes or have pre-diabetes Fasting glucose: 88 mg/dL (4/4/2024)  A1C: 5.8 % (4/4/2024)  Screening Current   Cholesterol Screening Once every 5 years if you don't have a  lipid disorder. May order more often based on risk factors. Lipid panel: 04/04/2024  Screening Not Indicated  History Lipid Disorder      Other Preventive Screenings Covered by Medicare:  Abdominal Aortic Aneurysm (AAA) Screening: covered once if your at risk. You're considered to be at risk if you have a family history of AAA or a male between the age of 65-75 who smoking at least 100 cigarettes in your lifetime.  Lung Cancer Screening: covers low dose CT scan once per year if you meet all of the following conditions: (1) Age 55-77; (2) No signs or symptoms of lung cancer; (3) Current smoker or have quit smoking within the last 15 years; (4) You have a tobacco smoking history of at least 20 pack years (packs per day x number of years you smoked); (5) You get a written order from a healthcare provider.  Glaucoma Screening: covered annually if you're considered high risk: (1) You have diabetes OR (2) Family history of glaucoma OR (3)  aged 50 and older OR (4)  American aged 65 and older  Osteoporosis Screening: covered every 2 years if you meet one of the following conditions: (1) Have a vertebral abnormality; (2) On glucocorticoid therapy for more than 3 months; (3) Have primary hyperparathyroidism; (4) On osteoporosis medications and need to assess response to drug therapy.  HIV Screening: covered annually if you're between the age of 15-65. Also covered annually if you are younger than 15 and older than 65 with risk factors for HIV infection. For pregnant patients, it is covered up to 3 times per pregnancy.    Immunizations:  Immunization Recommendations   Influenza Vaccine Annual influenza vaccination during flu season is recommended for all persons aged >= 6 months who do not have contraindications   Pneumococcal Vaccine   * Pneumococcal conjugate vaccine = PCV13 (Prevnar 13), PCV15 (Vaxneuvance), PCV20 (Prevnar 20)  * Pneumococcal polysaccharide vaccine = PPSV23 (Pneumovax) Adults 19-64  yo with certain risk factors or if 65+ yo  If never received any pneumonia vaccine: recommend Prevnar 20 (PCV20)  Give PCV20 if previously received 1 dose of PCV13 or PPSV23   Hepatitis B Vaccine 3 dose series if at intermediate or high risk (ex: diabetes, end stage renal disease, liver disease)   Respiratory syncytial virus (RSV) Vaccine - COVERED BY MEDICARE PART D  * RSVPreF3 (Arexvy) CDC recommends that adults 60 years of age and older may receive a single dose of RSV vaccine using shared clinical decision-making (SCDM)   Tetanus (Td) Vaccine - COST NOT COVERED BY MEDICARE PART B Following completion of primary series, a booster dose should be given every 10 years to maintain immunity against tetanus. Td may also be given as tetanus wound prophylaxis.   Tdap Vaccine - COST NOT COVERED BY MEDICARE PART B Recommended at least once for all adults. For pregnant patients, recommended with each pregnancy.   Shingles Vaccine (Shingrix) - COST NOT COVERED BY MEDICARE PART B  2 shot series recommended in those 19 years and older who have or will have weakened immune systems or those 50 years and older     Health Maintenance Due:  There are no preventive care reminders to display for this patient.  Immunizations Due:      Topic Date Due   • COVID-19 Vaccine (6 - 2023-24 season) 09/01/2023     Advance Directives   What are advance directives?  Advance directives are legal documents that state your wishes and plans for medical care. These plans are made ahead of time in case you lose your ability to make decisions for yourself. Advance directives can apply to any medical decision, such as the treatments you want, and if you want to donate organs.   What are the types of advance directives?  There are many types of advance directives, and each state has rules about how to use them. You may choose a combination of any of the following:  Living will:  This is a written record of the treatment you want. You can also choose  which treatments you do not want, which to limit, and which to stop at a certain time. This includes surgery, medicine, IV fluid, and tube feedings.   Durable power of  for healthcare (DPAHC):  This is a written record that states who you want to make healthcare choices for you when you are unable to make them for yourself. This person, called a proxy, is usually a family member or a friend. You may choose more than 1 proxy.  Do not resuscitate (DNR) order:  A DNR order is used in case your heart stops beating or you stop breathing. It is a request not to have certain forms of treatment, such as CPR. A DNR order may be included in other types of advance directives.  Medical directive:  This covers the care that you want if you are in a coma, near death, or unable to make decisions for yourself. You can list the treatments you want for each condition. Treatment may include pain medicine, surgery, blood transfusions, dialysis, IV or tube feedings, and a ventilator (breathing machine).  Values history:  This document has questions about your views, beliefs, and how you feel and think about life. This information can help others choose the care that you would choose.  Why are advance directives important?  An advance directive helps you control your care. Although spoken wishes may be used, it is better to have your wishes written down. Spoken wishes can be misunderstood, or not followed. Treatments may be given even if you do not want them. An advance directive may make it easier for your family to make difficult choices about your care.   Weight Management   Why it is important to manage your weight:  Being overweight increases your risk of health conditions such as heart disease, high blood pressure, type 2 diabetes, and certain types of cancer. It can also increase your risk for osteoarthritis, sleep apnea, and other respiratory problems. Aim for a slow, steady weight loss. Even a small amount of weight loss  can lower your risk of health problems.  How to lose weight safely:  A safe and healthy way to lose weight is to eat fewer calories and get regular exercise. You can lose up about 1 pound a week by decreasing the number of calories you eat by 500 calories each day.   Healthy meal plan for weight management:  A healthy meal plan includes a variety of foods, contains fewer calories, and helps you stay healthy. A healthy meal plan includes the following:  Eat whole-grain foods more often.  A healthy meal plan should contain fiber. Fiber is the part of grains, fruits, and vegetables that is not broken down by your body. Whole-grain foods are healthy and provide extra fiber in your diet. Some examples of whole-grain foods are whole-wheat breads and pastas, oatmeal, brown rice, and bulgur.  Eat a variety of vegetables every day.  Include dark, leafy greens such as spinach, kale, ernie greens, and mustard greens. Eat yellow and orange vegetables such as carrots, sweet potatoes, and winter squash.   Eat a variety of fruits every day.  Choose fresh or canned fruit (canned in its own juice or light syrup) instead of juice. Fruit juice has very little or no fiber.  Eat low-fat dairy foods.  Drink fat-free (skim) milk or 1% milk. Eat fat-free yogurt and low-fat cottage cheese. Try low-fat cheeses such as mozzarella and other reduced-fat cheeses.  Choose meat and other protein foods that are low in fat.  Choose beans or other legumes such as split peas or lentils. Choose fish, skinless poultry (chicken or turkey), or lean cuts of red meat (beef or pork). Before you cook meat or poultry, cut off any visible fat.   Use less fat and oil.  Try baking foods instead of frying them. Add less fat, such as margarine, sour cream, regular salad dressing and mayonnaise to foods. Eat fewer high-fat foods. Some examples of high-fat foods include french fries, doughnuts, ice cream, and cakes.  Eat fewer sweets.  Limit foods and drinks that  are high in sugar. This includes candy, cookies, regular soda, and sweetened drinks.  Exercise:  Exercise at least 30 minutes per day on most days of the week. Some examples of exercise include walking, biking, dancing, and swimming. You can also fit in more physical activity by taking the stairs instead of the elevator or parking farther away from stores. Ask your healthcare provider about the best exercise plan for you.      © Copyright [employer] 2018 Information is for End User's use only and may not be sold, redistributed or otherwise used for commercial purposes. All illustrations and images included in CareNotes® are the copyrighted property of A.D.A[employer]M., Inc. or [employer]

## 2024-04-08 NOTE — ASSESSMENT & PLAN NOTE
Lab Results   Component Value Date    EGFR 41 04/04/2024    EGFR 40 (L) 02/22/2024    EGFR 51 (L) 01/02/2024    CREATININE 1.56 (H) 04/04/2024    CREATININE 1.70 (H) 02/22/2024    CREATININE 1.39 (H) 01/02/2024   Continue to avoid nephrotoxins such as nonsteroidal anti-inflammatories.  Gabapentin or Tylenol as needed for pain.

## 2024-05-08 PROBLEM — Z00.00 MEDICARE ANNUAL WELLNESS VISIT, SUBSEQUENT: Status: RESOLVED | Noted: 2024-04-08 | Resolved: 2024-05-08

## 2024-09-26 DIAGNOSIS — F03.90 DEMENTIA WITHOUT BEHAVIORAL DISTURBANCE (HCC): ICD-10-CM

## 2024-09-26 RX ORDER — DONEPEZIL HYDROCHLORIDE 5 MG/1
5 TABLET, FILM COATED ORAL EVERY EVENING
Qty: 90 TABLET | Refills: 1 | Status: SHIPPED | OUTPATIENT
Start: 2024-09-26

## 2024-09-30 ENCOUNTER — OFFICE VISIT (OUTPATIENT)
Dept: FAMILY MEDICINE CLINIC | Facility: CLINIC | Age: 79
End: 2024-09-30
Payer: MEDICARE

## 2024-09-30 VITALS
WEIGHT: 182 LBS | OXYGEN SATURATION: 97 % | BODY MASS INDEX: 29.25 KG/M2 | TEMPERATURE: 97 F | DIASTOLIC BLOOD PRESSURE: 82 MMHG | HEART RATE: 59 BPM | SYSTOLIC BLOOD PRESSURE: 128 MMHG | HEIGHT: 66 IN

## 2024-09-30 DIAGNOSIS — G89.4 CHRONIC PAIN SYNDROME: ICD-10-CM

## 2024-09-30 DIAGNOSIS — I63.9 CEREBELLAR INFARCT (HCC): ICD-10-CM

## 2024-09-30 DIAGNOSIS — M54.16 LUMBAR RADICULOPATHY: ICD-10-CM

## 2024-09-30 DIAGNOSIS — Z97.8 CHRONIC INDWELLING FOLEY CATHETER: ICD-10-CM

## 2024-09-30 DIAGNOSIS — R26.9 ABNORMALITY OF GAIT AND MOBILITY: ICD-10-CM

## 2024-09-30 DIAGNOSIS — N18.30 STAGE 3 CHRONIC KIDNEY DISEASE, UNSPECIFIED WHETHER STAGE 3A OR 3B CKD (HCC): ICD-10-CM

## 2024-09-30 DIAGNOSIS — R65.10 SIRS (SYSTEMIC INFLAMMATORY RESPONSE SYNDROME) (HCC): Primary | ICD-10-CM

## 2024-09-30 DIAGNOSIS — N18.32 STAGE 3B CHRONIC KIDNEY DISEASE (CKD) (HCC): ICD-10-CM

## 2024-09-30 PROCEDURE — 99214 OFFICE O/P EST MOD 30 MIN: CPT | Performed by: INTERNAL MEDICINE

## 2024-09-30 PROCEDURE — G2211 COMPLEX E/M VISIT ADD ON: HCPCS | Performed by: INTERNAL MEDICINE

## 2024-09-30 RX ORDER — SULFAMETHOXAZOLE/TRIMETHOPRIM 800-160 MG
1 TABLET ORAL EVERY 12 HOURS SCHEDULED
COMMUNITY

## 2024-09-30 RX ORDER — DIPHENOXYLATE HYDROCHLORIDE AND ATROPINE SULFATE 2.5; .025 MG/1; MG/1
1 TABLET ORAL DAILY
COMMUNITY

## 2024-09-30 NOTE — ASSESSMENT & PLAN NOTE
Currently with some pain in the left knee.  Likely related to a pes bursitis.  Continue Voltaren gel topically.

## 2024-09-30 NOTE — ASSESSMENT & PLAN NOTE
Lab Results   Component Value Date    EGFR 47 (L) 09/25/2024    EGFR 40 (L) 09/24/2024    EGFR 35 (L) 09/23/2024    CREATININE 1.48 (H) 09/25/2024    CREATININE 1.71 (H) 09/24/2024    CREATININE 1.90 (H) 09/23/2024     Fluctuates between stage IIIa and IIIb kidney disease.  Hospitalization led to declining renal function  Orders:    CBC and differential; Future    Comprehensive metabolic panel; Future    Magnesium; Future

## 2024-09-30 NOTE — ASSESSMENT & PLAN NOTE
Recurrent dizziness, continue current medical regimen.  Remains on moderate intensity statin and aspirin.

## 2024-09-30 NOTE — ASSESSMENT & PLAN NOTE
Symptoms resolved.  Recently finished course of ciprofloxacin.  Thought to be related to urinary source.  Still generally weak since discharge from hospitalization.  Requires physical therapy.

## 2024-09-30 NOTE — PROGRESS NOTES
Ambulatory Visit  Name: Israel Spencer      : 1945      MRN: [id number]  Encounter Provider: Tomás Edwards DO  Encounter Date: 2024   Encounter department: [facility] PRIMARY CARE    Assessment & Plan  SIRS (systemic inflammatory response syndrome) (HCC)  Symptoms resolved.  Recently finished course of ciprofloxacin.  Thought to be related to urinary source.  Still generally weak since discharge from hospitalization.  Requires physical therapy.       Abnormality of gait and mobility  Referral to physical therapy, not sure if he has enough Medicare days left given this new situation.  Notably had a recent stroke and use of a fair amount of physical therapy at that time.  Orders:    Ambulatory Referral to Physical Therapy; Future    Stage 3 chronic kidney disease, unspecified whether stage 3a or 3b CKD ([facility])  Lab Results   Component Value Date    EGFR 47 (L) 2024    EGFR 40 (L) 2024    EGFR 35 (L) 2024    CREATININE 1.48 (H) 2024    CREATININE 1.71 (H) 2024    CREATININE 1.90 (H) 2024     Fluctuates between stage IIIa and IIIb kidney disease.  Hospitalization led to declining renal function  Orders:    CBC and differential; Future    Comprehensive metabolic panel; Future    Magnesium; Future    Stage 3b chronic kidney disease (CKD) ([facility])  Lab Results   Component Value Date    EGFR 47 (L) 2024    EGFR 40 (L) 2024    EGFR 35 (L) 2024    CREATININE 1.48 (H) 2024    CREATININE 1.71 (H) 2024    CREATININE 1.90 (H) 2024   Continue to avoid nephrotoxic.  Taking only Tylenol as needed for pain.         Chronic pain syndrome  Currently with some pain in the left knee.  Likely related to a pes bursitis.  Continue Voltaren gel topically.       Lumbar radiculopathy  : Continue current medical therapy voiding use of opiates.  Physical therapy referral.       Cerebellar infarct (HCC)  Recurrent dizziness, continue current medical  "regimen.  Remains on moderate intensity statin and aspirin.       Chronic indwelling Diallo catheter  Continues to follow with urology.          History of Present Illness     's from the hospital.  Status post hospitalization for SIRS, cultures were not done due to shortage of blood cultures.  Urine culture was not done as he has a chronic Diallo catheter.  Is likely the source of his illness.  Had a recent Diallo catheter change, followed by generalized weakness and lactic acidosis.  He is feeling better, still has diarrhea, denies any chest pain or shortness of breath nausea or vomiting.  Appetite has been good.  Sleep has been good.  Answers questions appropriately.  Has usual bilateral knee pain, currently right is worse than the left, causing ambulatory dysfunction.  His weakness also leaves him with ambulatory dysfunction and they are requesting a referral to physical therapy.          Review of Systems   Constitutional:  Negative for chills and fever.   HENT:  Negative for ear pain and sore throat.    Eyes:  Negative for pain and visual disturbance.   Respiratory:  Negative for cough and shortness of breath.    Cardiovascular:  Negative for chest pain and palpitations.   Gastrointestinal:  Positive for diarrhea. Negative for abdominal pain and vomiting.   Genitourinary:  Negative for dysuria and hematuria.   Musculoskeletal:  Positive for arthralgias, back pain and gait problem.        Walking with a cane.   Skin:  Negative for color change and rash.   Neurological:  Positive for dizziness, weakness and light-headedness. Negative for seizures and syncope.   All other systems reviewed and are negative.          Objective     /82 (BP Location: Left arm, Patient Position: Sitting, Cuff Size: Standard)   Pulse 59   Temp (!) 97 °F (36.1 °C) (Temporal)   Ht 5' 6\" (1.676 m)   Wt 82.6 kg (182 lb)   SpO2 97%   BMI 29.38 kg/m²     Physical Exam  Vitals and nursing note reviewed.   Constitutional:       " General: He is not in acute distress.     Appearance: Normal appearance. He is well-developed.      Comments: Age-appropriate   HENT:      Head: Normocephalic and atraumatic.   Eyes:      Conjunctiva/sclera: Conjunctivae normal.   Cardiovascular:      Rate and Rhythm: Normal rate and regular rhythm.      Pulses: Normal pulses.      Heart sounds: Normal heart sounds. No murmur heard.  Pulmonary:      Effort: Pulmonary effort is normal. No respiratory distress.      Breath sounds: Normal breath sounds.   Abdominal:      Palpations: Abdomen is soft.      Tenderness: There is no abdominal tenderness.   Musculoskeletal:         General: Tenderness present. No swelling.      Cervical back: Neck supple.      Comments: Arthritic knees, with generalized tenderness.  Pain over the pes bursa of the left knee   Skin:     General: Skin is warm and dry.      Capillary Refill: Capillary refill takes less than 2 seconds.   Neurological:      General: No focal deficit present.      Mental Status: He is alert and oriented to person, place, and time.      Gait: Gait abnormal.      Comments: Oriented to person and place, short-term memory issues.  Antalgic gait, walks with a cane.   Psychiatric:         Mood and Affect: Mood normal.

## 2024-09-30 NOTE — ASSESSMENT & PLAN NOTE
Referral to physical therapy, not sure if he has enough Medicare days left given this new situation.  Notably had a recent stroke and use of a fair amount of physical therapy at that time.  Orders:    Ambulatory Referral to Physical Therapy; Future

## 2024-10-08 ENCOUNTER — TELEPHONE (OUTPATIENT)
Age: 79
End: 2024-10-08

## 2024-10-08 ENCOUNTER — TELEPHONE (OUTPATIENT)
Dept: FAMILY MEDICINE CLINIC | Facility: CLINIC | Age: 79
End: 2024-10-08

## 2024-10-08 NOTE — TELEPHONE ENCOUNTER
Labs pulled in from [facility] everywhere, please advise.

## 2024-10-08 NOTE — TELEPHONE ENCOUNTER
Notified patient's daughter that I have reviewed patient's blood work and his kidney function is slightly worsened.  He would require hydration.  May want to take him back to the ER for intravenous hydration to see if he feels better.  He has significant decline in his kidney function.  If she does not take them or refuses to go, I would want to repeat his blood work by weeks end.  Let me know what she decides to do.

## 2024-10-08 NOTE — TELEPHONE ENCOUNTER
Spoke to patient in our parking lot as he was trying to get in here.  Generally weak in the parking lot, and with review of his labs prior to him even getting here, he was sent to the emergency room.

## 2024-10-08 NOTE — TELEPHONE ENCOUNTER
Patient's daughter called for lab results. Advised we do not have labs, she said they were drawn at [facility] on Friday 10/4. Patient is very lethargic, shaky and had a slight temperature. Appointment scheduled for 11 today. Patient's daughter is requesting that clinical try to call and get lab results from [facility] so they can be reviewed.

## 2024-10-16 DIAGNOSIS — N40.1 BENIGN PROSTATIC HYPERPLASIA WITH URINARY OBSTRUCTION: ICD-10-CM

## 2024-10-16 DIAGNOSIS — N13.8 BENIGN PROSTATIC HYPERPLASIA WITH URINARY OBSTRUCTION: ICD-10-CM

## 2024-10-16 RX ORDER — FINASTERIDE 5 MG/1
5 TABLET, FILM COATED ORAL DAILY
Qty: 90 TABLET | Refills: 1 | Status: SHIPPED | OUTPATIENT
Start: 2024-10-16

## 2024-11-04 ENCOUNTER — OFFICE VISIT (OUTPATIENT)
Dept: FAMILY MEDICINE CLINIC | Facility: CLINIC | Age: 79
End: 2024-11-04
Payer: MEDICARE

## 2024-11-04 ENCOUNTER — APPOINTMENT (OUTPATIENT)
Age: 79
End: 2024-11-04
Payer: MEDICARE

## 2024-11-04 VITALS
HEIGHT: 66 IN | BODY MASS INDEX: 29.25 KG/M2 | SYSTOLIC BLOOD PRESSURE: 130 MMHG | DIASTOLIC BLOOD PRESSURE: 94 MMHG | WEIGHT: 182 LBS

## 2024-11-04 DIAGNOSIS — A41.9 SEPTICEMIA (HCC): Primary | ICD-10-CM

## 2024-11-04 DIAGNOSIS — Z23 ENCOUNTER FOR IMMUNIZATION: ICD-10-CM

## 2024-11-04 DIAGNOSIS — D50.8 OTHER IRON DEFICIENCY ANEMIA: ICD-10-CM

## 2024-11-04 DIAGNOSIS — M54.16 LUMBAR RADICULOPATHY: ICD-10-CM

## 2024-11-04 DIAGNOSIS — F33.0 MILD EPISODE OF RECURRENT MAJOR DEPRESSIVE DISORDER (HCC): ICD-10-CM

## 2024-11-04 DIAGNOSIS — R26.9 ABNORMALITY OF GAIT AND MOBILITY: ICD-10-CM

## 2024-11-04 DIAGNOSIS — Z97.8 CHRONIC INDWELLING FOLEY CATHETER: ICD-10-CM

## 2024-11-04 DIAGNOSIS — N18.30 STAGE 3 CHRONIC KIDNEY DISEASE, UNSPECIFIED WHETHER STAGE 3A OR 3B CKD (HCC): ICD-10-CM

## 2024-11-04 DIAGNOSIS — I63.9 CEREBELLAR INFARCT (HCC): ICD-10-CM

## 2024-11-04 PROBLEM — R65.10 SIRS (SYSTEMIC INFLAMMATORY RESPONSE SYNDROME) (HCC): Status: RESOLVED | Noted: 2024-09-30 | Resolved: 2024-11-04

## 2024-11-04 LAB
ALBUMIN SERPL BCG-MCNC: 3.3 G/DL (ref 3.5–5)
ALP SERPL-CCNC: 74 U/L (ref 34–104)
ALT SERPL W P-5'-P-CCNC: 25 U/L (ref 7–52)
ANION GAP SERPL CALCULATED.3IONS-SCNC: 11 MMOL/L (ref 4–13)
AST SERPL W P-5'-P-CCNC: 21 U/L (ref 13–39)
BASOPHILS # BLD AUTO: 0.05 THOUSANDS/ΜL (ref 0–0.1)
BASOPHILS NFR BLD AUTO: 0 % (ref 0–1)
BILIRUB SERPL-MCNC: 0.5 MG/DL (ref 0.2–1)
BUN SERPL-MCNC: 22 MG/DL (ref 5–25)
CALCIUM ALBUM COR SERPL-MCNC: 9.7 MG/DL (ref 8.3–10.1)
CALCIUM SERPL-MCNC: 9.1 MG/DL (ref 8.4–10.2)
CHLORIDE SERPL-SCNC: 99 MMOL/L (ref 96–108)
CO2 SERPL-SCNC: 27 MMOL/L (ref 21–32)
CREAT SERPL-MCNC: 1.6 MG/DL (ref 0.6–1.3)
EOSINOPHIL # BLD AUTO: 0.03 THOUSAND/ΜL (ref 0–0.61)
EOSINOPHIL NFR BLD AUTO: 0 % (ref 0–6)
ERYTHROCYTE [DISTWIDTH] IN BLOOD BY AUTOMATED COUNT: 13.1 % (ref 11.6–15.1)
GFR SERPL CREATININE-BSD FRML MDRD: 39 ML/MIN/1.73SQ M
GLUCOSE SERPL-MCNC: 108 MG/DL (ref 65–140)
HCT VFR BLD AUTO: 29.6 % (ref 36.5–49.3)
HGB BLD-MCNC: 9.1 G/DL (ref 12–17)
IMM GRANULOCYTES # BLD AUTO: 0.13 THOUSAND/UL (ref 0–0.2)
IMM GRANULOCYTES NFR BLD AUTO: 1 % (ref 0–2)
LYMPHOCYTES # BLD AUTO: 1.49 THOUSANDS/ΜL (ref 0.6–4.47)
LYMPHOCYTES NFR BLD AUTO: 9 % (ref 14–44)
MAGNESIUM SERPL-MCNC: 1.9 MG/DL (ref 1.9–2.7)
MCH RBC QN AUTO: 31.1 PG (ref 26.8–34.3)
MCHC RBC AUTO-ENTMCNC: 30.7 G/DL (ref 31.4–37.4)
MCV RBC AUTO: 101 FL (ref 82–98)
MONOCYTES # BLD AUTO: 1.36 THOUSAND/ΜL (ref 0.17–1.22)
MONOCYTES NFR BLD AUTO: 8 % (ref 4–12)
NEUTROPHILS # BLD AUTO: 13.91 THOUSANDS/ΜL (ref 1.85–7.62)
NEUTS SEG NFR BLD AUTO: 82 % (ref 43–75)
NRBC BLD AUTO-RTO: 0 /100 WBCS
PLATELET # BLD AUTO: 426 THOUSANDS/UL (ref 149–390)
PMV BLD AUTO: 10.8 FL (ref 8.9–12.7)
POTASSIUM SERPL-SCNC: 4.2 MMOL/L (ref 3.5–5.3)
PROT SERPL-MCNC: 7.2 G/DL (ref 6.4–8.4)
RBC # BLD AUTO: 2.93 MILLION/UL (ref 3.88–5.62)
SODIUM SERPL-SCNC: 137 MMOL/L (ref 135–147)
WBC # BLD AUTO: 16.97 THOUSAND/UL (ref 4.31–10.16)

## 2024-11-04 PROCEDURE — 90662 IIV NO PRSV INCREASED AG IM: CPT | Performed by: INTERNAL MEDICINE

## 2024-11-04 PROCEDURE — 85025 COMPLETE CBC W/AUTO DIFF WBC: CPT

## 2024-11-04 PROCEDURE — 99214 OFFICE O/P EST MOD 30 MIN: CPT | Performed by: INTERNAL MEDICINE

## 2024-11-04 PROCEDURE — G0008 ADMIN INFLUENZA VIRUS VAC: HCPCS | Performed by: INTERNAL MEDICINE

## 2024-11-04 PROCEDURE — 80053 COMPREHEN METABOLIC PANEL: CPT

## 2024-11-04 PROCEDURE — 36415 COLL VENOUS BLD VENIPUNCTURE: CPT

## 2024-11-04 PROCEDURE — 83735 ASSAY OF MAGNESIUM: CPT

## 2024-11-04 RX ORDER — FLUCONAZOLE 100 MG/1
TABLET ORAL
COMMUNITY
Start: 2024-11-02

## 2024-11-04 NOTE — PROGRESS NOTES
Ambulatory Visit  Name: Israel Spencer III      : 1945      MRN: [id number]  Encounter Provider: Tomás Edwards DO  Encounter Date: 2024   Encounter department: [facility] PRIMARY CARE    Assessment & Plan  Septicemia (HCC)  Status post antibiotics, now on Diflucan therapy for Diallo catheter showing significant yeast.     Encounter for immunization  Flu shot today.  Orders:    influenza vaccine, high-dose, PF 0.5 mL (Fluzone High Dose)    Abnormality of gait and mobility  Continues with PT OT and speech therapy.  History of occipital CVA.       Chronic indwelling Diallo catheter  Continue to follow with urology, due for Diallo change in the near future.       Stage 3 chronic kidney disease, unspecified whether stage 3a or 3b CKD (HCC)  Lab Results   Component Value Date    EGFR 42 (L) 10/13/2024    EGFR 40 (L) 10/12/2024    EGFR 40 (L) 10/11/2024    CREATININE 1.64 (H) 10/13/2024    CREATININE 1.70 (H) 10/12/2024    CREATININE 1.71 (H) 10/11/2024     Check labs today as it has been almost a month.  Continue hydration and avoidance of nephrotoxins.  Orders:    CBC and differential; Future    Basic metabolic panel; Future    Cerebellar infarct (HCC)  Chronic dizziness associated with this.  Remains on aspirin and moderate intensity statin.       Mild episode of recurrent major depressive disorder (HCC)  In remission.  She is having some significant decline in cognitive function by exam today.         Lumbar radiculopathy  Chronic pain issues, with ambulatory dysfunction.  Does have chronic pain with some ambulatory dysfunction, using a walker at home.       Other iron deficiency anemia  Chronic kidney disease with associated anemia.  Will check CBC today, ensure no significant anemia given his fatigue.  Orders:    CBC and differential; Future       History of Present Illness     Patient is here after 2 bouts of sepsis due to urinary tract infections.  Recently discharged to [facility]  "rehabilitation center due to generalized weakness brought on by these 2 bouts of infection.  He does have a chronic Diallo which will be getting replaced soon.  He follows with Dr. Hernandez with regards to that.  They also come with his living will issues which we will scanned into the chart today.    Then just seems very fatigued and tired.  Denies any pain worse than usual, denies any shortness of breath.  His son is with him today stating he is in about his usual health.  Just tired since his discharge home on Saturday.  He has no other complaints otherwise.  He seems little more confused than usual.          Review of Systems   Constitutional:  Positive for fatigue. Negative for chills and fever.   HENT:  Negative for ear pain and sore throat.    Eyes:  Negative for pain and visual disturbance.   Respiratory:  Negative for cough and shortness of breath.    Cardiovascular:  Negative for chest pain and palpitations.   Gastrointestinal:  Negative for abdominal pain and vomiting.   Genitourinary:  Negative for dysuria and hematuria.        Chronic Diallo   Musculoskeletal:  Positive for arthralgias and back pain.   Skin:  Positive for pallor. Negative for color change and rash.   Neurological:  Positive for weakness. Negative for seizures and syncope.   Psychiatric/Behavioral:  Positive for confusion.    All other systems reviewed and are negative.          Objective     /94 (BP Location: Left arm, Patient Position: Sitting, Cuff Size: Adult)   Ht 5' 6\" (1.676 m)   Wt 82.6 kg (182 lb)   BMI 29.38 kg/m²     Physical Exam  Vitals and nursing note reviewed.   Constitutional:       General: He is not in acute distress.     Appearance: He is well-developed.      Comments: Appears chronically ill   HENT:      Head: Normocephalic and atraumatic.   Eyes:      Conjunctiva/sclera: Conjunctivae normal.   Cardiovascular:      Rate and Rhythm: Normal rate and regular rhythm.      Heart sounds: No murmur heard.  Pulmonary:    "   Effort: Pulmonary effort is normal. No respiratory distress.      Breath sounds: Normal breath sounds.   Abdominal:      Palpations: Abdomen is soft.      Tenderness: There is no abdominal tenderness.   Musculoskeletal:         General: Tenderness present. No swelling.      Cervical back: Neck supple.      Comments: Tenderness and crepitus in both shoulders   Skin:     General: Skin is warm and dry.      Capillary Refill: Capillary refill takes less than 2 seconds.   Neurological:      General: No focal deficit present.      Mental Status: He is alert. He is disoriented.      Gait: Gait abnormal.   Psychiatric:         Mood and Affect: Mood normal.

## 2024-11-04 NOTE — ASSESSMENT & PLAN NOTE
Chronic pain issues, with ambulatory dysfunction.  Does have chronic pain with some ambulatory dysfunction, using a walker at home.

## 2024-11-05 ENCOUNTER — TELEPHONE (OUTPATIENT)
Dept: FAMILY MEDICINE CLINIC | Facility: CLINIC | Age: 79
End: 2024-11-05

## 2024-11-05 DIAGNOSIS — N18.30 STAGE 3 CHRONIC KIDNEY DISEASE, UNSPECIFIED WHETHER STAGE 3A OR 3B CKD (HCC): Primary | ICD-10-CM

## 2024-11-05 DIAGNOSIS — D50.8 OTHER IRON DEFICIENCY ANEMIA: ICD-10-CM

## 2024-11-05 NOTE — TELEPHONE ENCOUNTER
Notify daughter yes continue with previous dose of iron.  I have also put an order in for 2 weeks repeat blood work into epic.

## 2024-11-05 NOTE — TELEPHONE ENCOUNTER
----- Message from Tomás Edwards DO sent at 11/5/2024 12:39 PM EST -----  Notify family, Elver's kidneys are back to baseline, however he has more anemic, and has an elevated white count.  Could be on guard for fever, or change in mental status and have a low threshold for ER evaluation for recurrent infection.  If he is not taking iron, he should take the counter iron daily.

## 2024-11-05 NOTE — TELEPHONE ENCOUNTER
Patients daughter, Candice, returned call.  I read to her providers message verbatim.  She would like to know if blood work should be repeated once patient is done with antibiotic?    Patient will begin iron tomorrow.  Due to the fact the dosage was not listed, she is going to put him on the dose his was previously on.  If that is not correct, please let daughter know.    Please advise and notify.    Thank you.

## 2024-11-05 NOTE — TELEPHONE ENCOUNTER
[facility] Emergency Department    [address]    [facility] [address]    Phone:  [phone]    Fax:  [phone]                                       Coy Bearden   MRN: [id number]    Department:  [facility] Emergency Department   Date of Visit:  6/2/2018           Patient Information     Date Of Birth          2011        Your diagnoses for this visit were:     Hip pain, left     Inguinal fluid collection        You were seen by Anne Marie Erwin MD.      Follow-up Information     Follow up with Yoseph Dudley MD.    Specialty:  Pediatrics    Contact information:    [address]  [facility] [address] [phone]          Discharge Instructions       Motrin (ibuprofen) or tylenol (acetaminophen) as needed for pain or fever.  Can alternate these types of medicine every 4-6 hrs.    Ice to the area.    Reasons to return: abdominal pain, acting abnormally, confusion, fever > 100.4 despite treatment, difficulty breathing, cyanosis, lethargy, new and concerning rash, decreased urine output, more pain.    Please followup with PCP in 2-3 days.  Come back if not better.    Hydrate and push fluids.              Discharge References/Attachments     HIP STRAIN (ENGLISH)      24 Hour Appointment Hotline       To make an appointment at any [facility], call [phone] ([phone]). If you don't have a family doctor or clinic, we will help you find one. [facility] clinics are conveniently located to serve the needs of you and your family.             Review of your medicines      Our records show that you are taking the medicines listed below. If these are incorrect, please call your family doctor or clinic.        Dose / Directions Last dose taken    acetaminophen 32 mg/mL solution   Commonly known as:  TYLENOL   Dose:  15 mg/kg        Take 15 mg/kg by mouth every 4 hours as needed for fever or mild pain   Refills:  0          ASK your  Left message to call    doctor about these medications        Dose / Directions Last dose taken    * ibuprofen 100 MG/5ML suspension   Commonly known as:  ADVIL/MOTRIN   Dose:  10 mg/kg   What changed:  Another medication with the same name was added. Make sure you understand how and when to take each.   Ask about: Which instructions should I use?        Take 10 mg/kg by mouth every 6 hours as needed for fever or moderate pain   Refills:  0        * ibuprofen 200 MG tablet   Commonly known as:  ADVIL/MOTRIN   Dose:  400 mg   What changed:  You were already taking a medication with the same name, and this prescription was added. Make sure you understand how and when to take each.   Quantity:  30 tablet   Ask about: Which instructions should I use?        Take 2 tablets (400 mg) by mouth every 8 hours as needed for mild pain   Refills:  0        * Notice:  This list has 2 medication(s) that are the same as other medications prescribed for you. Read the directions carefully, and ask your doctor or other care provider to review them with you.            Prescriptions were sent or printed at these locations (1 Prescription)                   Other Prescriptions                Printed at Department/Unit printer (1 of 1)         ibuprofen (ADVIL/MOTRIN) 200 MG tablet                Procedures and tests performed during your visit     UA with Microscopic    US Testicular & Scrotum w Doppler Ltd    XR Pelvis and Hip Left 2 Views      Orders Needing Specimen Collection     None      Pending Results     Date and Time Order Name Status Description    6/2/2018 2254 XR Pelvis and Hip Left 2 Views Preliminary     6/2/2018 2214 US Testicular & Scrotum w Doppler Ltd Preliminary             Pending Culture Results     No orders found for last 3 day(s).            Pending Results Instructions     If you had any lab results that were not finalized at the time of your Discharge, you can call the ED Lab Result RN at [phone]. You will be contacted by this  team for any positive Lab results or changes in treatment. The nurses are available 7 days a week from 10A to 6:30P.  You can leave a message 24 hours per day and they will return your call.        Test Results From Your Hospital Stay        6/2/2018 10:45 PM      Component Results     Component Value Ref Range & Units Status    Color Urine Yellow  Final    Appearance Urine Clear  Final    Glucose Urine Negative NEG^Negative mg/dL Final    Bilirubin Urine Negative NEG^Negative Final    Ketones Urine Negative NEG^Negative mg/dL Final    Specific Gravity Urine 1.030 1.003 - 1.035 Final    Blood Urine Negative NEG^Negative Final    pH Urine 5.0 5.0 - 7.0 pH Final    Protein Albumin Urine Negative NEG^Negative mg/dL Final    Urobilinogen mg/dL 0.0 0.0 - 2.0 mg/dL Final    Nitrite Urine Negative NEG^Negative Final    Leukocyte Esterase Urine Negative NEG^Negative Final    Source Midstream Urine  Final    WBC Urine <1 0 - 5 /HPF Final    RBC Urine 1 0 - 2 /HPF Final    Mucous Urine Present (A) NEG^Negative /LPF Final         6/2/2018 11:13 PM      Narrative     US TESTICULAR AND SCROTUM WITH DOPPLER LIMITED   6/2/2018 11:02 PM     HISTORY: Left testicle and groin pain.     COMPARISON: None.    FINDINGS:  The testicles are normal in size and texture. No testicular  mass. The right testicle measures 1.7 x 1.2 x 1.1 cm. The left  testicle measures 1.5 x 1.1 x 1.0 cm. Color Doppler and Doppler  waveform analysis of the testicles shows normal and symmetric blood  flow. The epididymides appear normal bilaterally. There is a small  left hydrocele. No varicocele on either side. There is an ovoid fluid  collection in the left inguinal region in the area of pain measuring  3.8 x 2.7 x 1.2 cm.        Impression     IMPRESSION:  1. Normal testicles.  2. 3.8 cm fluid collection in the left inguinal region in the area of  pain of uncertain etiology.           6/2/2018 11:33 PM      Narrative     XR PELVIS AND HIP LEFT 2 VIEWS  6/2/2018  11:28 PM      HISTORY: Groin and hip pain.     COMPARISON: None.        Impression     IMPRESSION: No acute fracture or dislocation. The hips are symmetric  in appearance.                Thank you for choosing [facility]       Thank you for choosing [facility] for your care. Our goal is always to provide you with excellent care. Hearing back from our patients is one way we can continue to improve our services. Please take a few minutes to complete the written survey that you may receive in the mail after you visit with us. Thank you!        [email or web address] Information     [employer] lets you send messages to your doctor, view your test results, renew your prescriptions, schedule appointments and more. To sign up, go to [email or web address], contact your [facility] clinic or call [phone] during business hours.            Care EveryWhere ID     This is your Care EveryWhere ID. This could be used by other organizations to access your [facility] medical records  [id number]        Equal Access to Services     MARIA D ENGL[place] AH: Luis Kohli, shanelle monique, hank to, riley baltazar. So [facility] [phone].    ATENCIÓN: Si habla español, tiene a henriquez disposición servicios gratuitos de asistencia lingüística. Llame al [phone].    We comply with applicable federal civil rights laws and [place] laws. We do not discriminate on the basis of race, color, national origin, age, disability, sex, sexual orientation, or gender identity.            After Visit Summary       This is your record. Keep this with you and show to your community pharmacist(s) and doctor(s) at your next visit.

## 2024-11-06 ENCOUNTER — TELEPHONE (OUTPATIENT)
Age: 79
End: 2024-11-06

## 2024-11-06 NOTE — TELEPHONE ENCOUNTER
Leticia ANANDA to pt's daughter, called.  Pt in background of call giving .  Leticia states pt is due to get his catheter changed today.  States he is currently on Diflucan for yeast infection.  Inquiring if it is OK for pt to take Bactrim and Diflucan at the same time.    As per UpToDate Lexidrug there is no drug-drug interactions.  Please review and advise if ok to proceed with both antibiotics at this time.

## 2024-11-12 ENCOUNTER — TELEPHONE (OUTPATIENT)
Age: 79
End: 2024-11-12

## 2024-11-12 NOTE — TELEPHONE ENCOUNTER
Patient called to let Dr. Edwards know that the PT was there today and that he would need a script for [facility] PT in [facility]. He did not have a fax number for them.    Please call patient's daughter (Johanna) when the script is sent.  Thank you.

## 2024-11-18 ENCOUNTER — TELEPHONE (OUTPATIENT)
Dept: FAMILY MEDICINE CLINIC | Facility: CLINIC | Age: 79
End: 2024-11-18

## 2024-11-18 ENCOUNTER — APPOINTMENT (OUTPATIENT)
Age: 79
End: 2024-11-18
Payer: MEDICARE

## 2024-11-18 ENCOUNTER — TELEPHONE (OUTPATIENT)
Age: 79
End: 2024-11-18

## 2024-11-18 DIAGNOSIS — D50.8 OTHER IRON DEFICIENCY ANEMIA: ICD-10-CM

## 2024-11-18 DIAGNOSIS — N18.30 STAGE 3 CHRONIC KIDNEY DISEASE, UNSPECIFIED WHETHER STAGE 3A OR 3B CKD (HCC): ICD-10-CM

## 2024-11-18 LAB
ANION GAP SERPL CALCULATED.3IONS-SCNC: 11 MMOL/L (ref 4–13)
BASOPHILS # BLD AUTO: 0.1 THOUSANDS/ÂΜL (ref 0–0.1)
BASOPHILS NFR BLD AUTO: 1 % (ref 0–1)
BUN SERPL-MCNC: 30 MG/DL (ref 5–25)
CALCIUM SERPL-MCNC: 10.1 MG/DL (ref 8.4–10.2)
CHLORIDE SERPL-SCNC: 100 MMOL/L (ref 96–108)
CO2 SERPL-SCNC: 25 MMOL/L (ref 21–32)
CREAT SERPL-MCNC: 1.74 MG/DL (ref 0.6–1.3)
EOSINOPHIL # BLD AUTO: 0.07 THOUSAND/ÂΜL (ref 0–0.61)
EOSINOPHIL NFR BLD AUTO: 1 % (ref 0–6)
ERYTHROCYTE [DISTWIDTH] IN BLOOD BY AUTOMATED COUNT: 13.7 % (ref 11.6–15.1)
FERRITIN SERPL-MCNC: 947 NG/ML (ref 24–336)
GFR SERPL CREATININE-BSD FRML MDRD: 35 ML/MIN/1.73SQ M
GLUCOSE SERPL-MCNC: 78 MG/DL (ref 65–140)
HCT VFR BLD AUTO: 33.2 % (ref 36.5–49.3)
HGB BLD-MCNC: 10.2 G/DL (ref 12–17)
IMM GRANULOCYTES # BLD AUTO: 0.22 THOUSAND/UL (ref 0–0.2)
IMM GRANULOCYTES NFR BLD AUTO: 2 % (ref 0–2)
IRON SATN MFR SERPL: 11 % (ref 15–50)
IRON SERPL-MCNC: 27 UG/DL (ref 50–212)
LYMPHOCYTES # BLD AUTO: 2.23 THOUSANDS/ÂΜL (ref 0.6–4.47)
LYMPHOCYTES NFR BLD AUTO: 18 % (ref 14–44)
MCH RBC QN AUTO: 31.1 PG (ref 26.8–34.3)
MCHC RBC AUTO-ENTMCNC: 30.7 G/DL (ref 31.4–37.4)
MCV RBC AUTO: 101 FL (ref 82–98)
MONOCYTES # BLD AUTO: 0.92 THOUSAND/ÂΜL (ref 0.17–1.22)
MONOCYTES NFR BLD AUTO: 7 % (ref 4–12)
NEUTROPHILS # BLD AUTO: 9.23 THOUSANDS/ÂΜL (ref 1.85–7.62)
NEUTS SEG NFR BLD AUTO: 71 % (ref 43–75)
NRBC BLD AUTO-RTO: 0 /100 WBCS
PLATELET # BLD AUTO: 560 THOUSANDS/UL (ref 149–390)
PMV BLD AUTO: 10.1 FL (ref 8.9–12.7)
POTASSIUM SERPL-SCNC: 5.1 MMOL/L (ref 3.5–5.3)
RBC # BLD AUTO: 3.28 MILLION/UL (ref 3.88–5.62)
SODIUM SERPL-SCNC: 136 MMOL/L (ref 135–147)
TIBC SERPL-MCNC: 253 UG/DL (ref 250–450)
UIBC SERPL-MCNC: 226 UG/DL (ref 155–355)
WBC # BLD AUTO: 12.77 THOUSAND/UL (ref 4.31–10.16)

## 2024-11-18 PROCEDURE — 83540 ASSAY OF IRON: CPT

## 2024-11-18 PROCEDURE — 82728 ASSAY OF FERRITIN: CPT

## 2024-11-18 PROCEDURE — 36415 COLL VENOUS BLD VENIPUNCTURE: CPT

## 2024-11-18 PROCEDURE — 80048 BASIC METABOLIC PNL TOTAL CA: CPT

## 2024-11-18 PROCEDURE — 83550 IRON BINDING TEST: CPT

## 2024-11-18 PROCEDURE — 85025 COMPLETE CBC W/AUTO DIFF WBC: CPT

## 2024-11-18 NOTE — TELEPHONE ENCOUNTER
Patients son Liu Spencer stopped in the office to ask if you could call him sometime. He has questions regarding his fathers Home care which ended today.  He can be reached at [phone]

## 2024-11-18 NOTE — TELEPHONE ENCOUNTER
Ritu from [facility] called stating pt will be discharged from services today. He will be starting outpatient Physical Therapy.

## 2024-11-18 NOTE — TELEPHONE ENCOUNTER
Spoke with patient's son Liu and Johanna's daughter.  Patient has been released from home health care.  They do have a care provider for him in the mornings, but in the afternoons she will be alone.  They are going to give this a try and see how it goes.  I agree with their plan.

## 2024-11-19 ENCOUNTER — RESULTS FOLLOW-UP (OUTPATIENT)
Dept: FAMILY MEDICINE CLINIC | Facility: CLINIC | Age: 79
End: 2024-11-19

## 2024-11-26 ENCOUNTER — TELEPHONE (OUTPATIENT)
Age: 79
End: 2024-11-26

## 2024-11-26 DIAGNOSIS — M19.90 ARTHRITIS: Primary | ICD-10-CM

## 2024-11-26 RX ORDER — PREDNISONE 10 MG/1
TABLET ORAL
Qty: 20 TABLET | Refills: 0 | Status: SHIPPED | OUTPATIENT
Start: 2024-11-26 | End: 2024-12-05 | Stop reason: ALTCHOICE

## 2024-11-26 NOTE — TELEPHONE ENCOUNTER
Patient's daughter states that his Bursitis has gotten progressively worse. She states he doesn't even want to go to PT due to the pain. Scheduled for an appointment on 12/5, wondering if there is anything they can do in the mean time to assist with the pain. She requests a call back to discuss/advise.

## 2024-11-26 NOTE — PROGRESS NOTES
Spoke with Johanna, patient had significant swelling in his knee and pain.  He had done a lot this week including therapy, and to and from doctors appointments.  He could not make to therapy due to the pain in his knee.  Given his renal function, will try prednisone taper.  I have an appointment with him next week and we will see how things go.

## 2024-12-05 ENCOUNTER — OFFICE VISIT (OUTPATIENT)
Dept: FAMILY MEDICINE CLINIC | Facility: CLINIC | Age: 79
End: 2024-12-05
Payer: MEDICARE

## 2024-12-05 VITALS
OXYGEN SATURATION: 98 % | TEMPERATURE: 97.3 F | SYSTOLIC BLOOD PRESSURE: 116 MMHG | HEIGHT: 66 IN | DIASTOLIC BLOOD PRESSURE: 74 MMHG | BODY MASS INDEX: 28.9 KG/M2 | WEIGHT: 179.8 LBS | HEART RATE: 78 BPM

## 2024-12-05 DIAGNOSIS — G89.29 CHRONIC PAIN OF BOTH KNEES: ICD-10-CM

## 2024-12-05 DIAGNOSIS — R26.9 ABNORMALITY OF GAIT AND MOBILITY: Primary | ICD-10-CM

## 2024-12-05 DIAGNOSIS — N18.30 STAGE 3 CHRONIC KIDNEY DISEASE, UNSPECIFIED WHETHER STAGE 3A OR 3B CKD (HCC): ICD-10-CM

## 2024-12-05 DIAGNOSIS — M25.562 CHRONIC PAIN OF BOTH KNEES: ICD-10-CM

## 2024-12-05 DIAGNOSIS — M25.561 CHRONIC PAIN OF BOTH KNEES: ICD-10-CM

## 2024-12-05 DIAGNOSIS — D63.8 ANEMIA OF CHRONIC DISEASE: ICD-10-CM

## 2024-12-05 DIAGNOSIS — Z97.8 CHRONIC INDWELLING FOLEY CATHETER: ICD-10-CM

## 2024-12-05 PROCEDURE — G2211 COMPLEX E/M VISIT ADD ON: HCPCS | Performed by: INTERNAL MEDICINE

## 2024-12-05 PROCEDURE — 99213 OFFICE O/P EST LOW 20 MIN: CPT | Performed by: INTERNAL MEDICINE

## 2024-12-05 NOTE — ASSESSMENT & PLAN NOTE
Labs due in January.  Orders:    Iron Panel (Includes Ferritin, Iron Sat%, Iron, and TIBC); Future

## 2024-12-05 NOTE — ASSESSMENT & PLAN NOTE
Lab Results   Component Value Date    EGFR 35 11/18/2024    EGFR 39 11/04/2024    EGFR 42 (L) 10/13/2024    CREATININE 1.74 (H) 11/18/2024    CREATININE 1.60 (H) 11/04/2024    CREATININE 1.64 (H) 10/13/2024     Continue to monitor, avoid nonsteroidal anti-inflammatories.  Encourage hydration.  Orders:    CBC and differential; Future    Comprehensive metabolic panel; Future

## 2024-12-05 NOTE — PROGRESS NOTES
Name: Israel Spencer III      : 1945      MRN: [id number]  Encounter Provider: Tomás Edwards DO  Encounter Date: 2024   Encounter department: [facility]  :  Assessment & Plan  Abnormality of gait and mobility  Finishing up therapy.  Would like to renew this in the new year.       Chronic pain of both knees  Just finishing up course of steroids.  With orthopedics, may be able to withdraw some fluid and/or consider Hyalgan or the like.  Orders:    Ambulatory Referral to Orthopedic Surgery; Future    Stage 3 chronic kidney disease, unspecified whether stage 3a or 3b CKD (HCC)  Lab Results   Component Value Date    EGFR 35 2024    EGFR 39 2024    EGFR 42 (L) 10/13/2024    CREATININE 1.74 (H) 2024    CREATININE 1.60 (H) 2024    CREATININE 1.64 (H) 10/13/2024     Continue to monitor, avoid nonsteroidal anti-inflammatories.  Encourage hydration.  Orders:    CBC and differential; Future    Comprehensive metabolic panel; Future    Anemia of chronic disease  Labs due in January.  Orders:    Iron Panel (Includes Ferritin, Iron Sat%, Iron, and TIBC); Future    Chronic indwelling Diallo catheter  Continues to follow with urology.  Recent bout of sepsis associated with urinary infection.              History of Present Illness     Doing much better.  Still walks very slowly.  Pain in both knees persist.  Recent course of steroids did help him quite a bit, but he finished the taper today.  He has no fever no chills no sweats.  No recent falls.  Has crepitus in both knees.  It has been 3 weeks since his blood work and he looks much stronger.  Last evaluation he was just getting out of the hospital and remained very weak.  These with therapy.      Review of Systems   Constitutional:  Negative for chills and fever.   HENT:  Negative for congestion and sore throat.    Eyes:  Negative for pain and visual disturbance.   Respiratory:  Negative for cough and shortness of  "breath.    Cardiovascular:  Negative for chest pain and palpitations.   Gastrointestinal:  Negative for abdominal pain and vomiting.   Genitourinary:  Negative for dysuria and hematuria.   Musculoskeletal:  Positive for arthralgias, back pain and gait problem.        Uses a walker   Skin:  Negative for color change and rash.   Neurological:  Positive for dizziness. Negative for seizures and syncope.        Chronic dizziness   Psychiatric/Behavioral:  Positive for confusion and decreased concentration.    All other systems reviewed and are negative.         Objective   /74 (BP Location: Left arm, Patient Position: Sitting, Cuff Size: Large)   Pulse 78   Temp (!) 97.3 °F (36.3 °C) (Tympanic)   Ht 5' 6\" (1.676 m)   Wt 81.6 kg (179 lb 12.8 oz)   SpO2 98%   BMI 29.02 kg/m²      Physical Exam  Vitals and nursing note reviewed.   Constitutional:       General: He is not in acute distress.     Appearance: Normal appearance. He is well-developed.   HENT:      Head: Normocephalic and atraumatic.   Eyes:      Conjunctiva/sclera: Conjunctivae normal.   Cardiovascular:      Rate and Rhythm: Normal rate and regular rhythm.      Pulses: Normal pulses.      Heart sounds: Murmur heard.   Pulmonary:      Effort: Pulmonary effort is normal. No respiratory distress.      Breath sounds: Normal breath sounds.   Abdominal:      Palpations: Abdomen is soft.      Tenderness: There is no abdominal tenderness.   Musculoskeletal:         General: Swelling and tenderness present.      Cervical back: Neck supple.      Right lower leg: Edema present.      Left lower leg: Edema present.      Comments: Advanced degenerative joint disease, with joint effusions.   Skin:     General: Skin is warm and dry.      Capillary Refill: Capillary refill takes less than 2 seconds.   Neurological:      General: No focal deficit present.      Mental Status: He is alert and oriented to person, place, and time.   Psychiatric:         Mood and Affect: " Mood normal.

## 2024-12-05 NOTE — ASSESSMENT & PLAN NOTE
Just finishing up course of steroids.  With orthopedics, may be able to withdraw some fluid and/or consider Hyalgan or the like.  Orders:    Ambulatory Referral to Orthopedic Surgery; Future

## 2024-12-10 ENCOUNTER — TELEPHONE (OUTPATIENT)
Age: 79
End: 2024-12-10

## 2024-12-10 DIAGNOSIS — M25.561 CHRONIC PAIN OF BOTH KNEES: ICD-10-CM

## 2024-12-10 DIAGNOSIS — G89.29 CHRONIC PAIN OF BOTH KNEES: ICD-10-CM

## 2024-12-10 DIAGNOSIS — M19.90 ARTHRITIS: Primary | ICD-10-CM

## 2024-12-10 DIAGNOSIS — M25.562 CHRONIC PAIN OF BOTH KNEES: ICD-10-CM

## 2024-12-10 RX ORDER — PREDNISONE 10 MG/1
TABLET ORAL
Qty: 20 TABLET | Refills: 0 | Status: SHIPPED | OUTPATIENT
Start: 2024-12-10

## 2024-12-10 NOTE — TELEPHONE ENCOUNTER
Daughter called asking if a prescription for prednisone can be sent to [facility] pharmacy for patient. States he woke up with a flare in hi left knee. She was able to book appointment with Ortho at [facility], but it is not until Monday. Please call daughter if able to call in prescription, she states it is ok to leave detailed VM.

## 2025-01-04 ENCOUNTER — APPOINTMENT (OUTPATIENT)
Dept: LAB | Facility: HOSPITAL | Age: 80
End: 2025-01-04
Payer: MEDICARE

## 2025-01-04 DIAGNOSIS — D63.8 ANEMIA OF CHRONIC DISEASE: ICD-10-CM

## 2025-01-04 DIAGNOSIS — N18.30 STAGE 3 CHRONIC KIDNEY DISEASE, UNSPECIFIED WHETHER STAGE 3A OR 3B CKD (HCC): ICD-10-CM

## 2025-01-04 LAB
ALBUMIN SERPL BCG-MCNC: 3.7 G/DL (ref 3.5–5)
ALP SERPL-CCNC: 89 U/L (ref 34–104)
ALT SERPL W P-5'-P-CCNC: 16 U/L (ref 7–52)
ANION GAP SERPL CALCULATED.3IONS-SCNC: 9 MMOL/L (ref 4–13)
AST SERPL W P-5'-P-CCNC: 16 U/L (ref 13–39)
BASOPHILS # BLD AUTO: 0.04 THOUSANDS/ΜL (ref 0–0.1)
BASOPHILS NFR BLD AUTO: 0 % (ref 0–1)
BILIRUB SERPL-MCNC: 0.36 MG/DL (ref 0.2–1)
BUN SERPL-MCNC: 32 MG/DL (ref 5–25)
CALCIUM SERPL-MCNC: 9.5 MG/DL (ref 8.4–10.2)
CHLORIDE SERPL-SCNC: 100 MMOL/L (ref 96–108)
CO2 SERPL-SCNC: 26 MMOL/L (ref 21–32)
CREAT SERPL-MCNC: 2.34 MG/DL (ref 0.6–1.3)
EOSINOPHIL # BLD AUTO: 0.03 THOUSAND/ΜL (ref 0–0.61)
EOSINOPHIL NFR BLD AUTO: 0 % (ref 0–6)
ERYTHROCYTE [DISTWIDTH] IN BLOOD BY AUTOMATED COUNT: 16.2 % (ref 11.6–15.1)
FERRITIN SERPL-MCNC: 945 NG/ML (ref 24–336)
GFR SERPL CREATININE-BSD FRML MDRD: 25 ML/MIN/1.73SQ M
GLUCOSE P FAST SERPL-MCNC: 96 MG/DL (ref 65–99)
HCT VFR BLD AUTO: 31.9 % (ref 36.5–49.3)
HGB BLD-MCNC: 9.6 G/DL (ref 12–17)
IMM GRANULOCYTES # BLD AUTO: 0.24 THOUSAND/UL (ref 0–0.2)
IMM GRANULOCYTES NFR BLD AUTO: 2 % (ref 0–2)
IRON SATN MFR SERPL: 12 % (ref 15–50)
IRON SERPL-MCNC: 29 UG/DL (ref 50–212)
LYMPHOCYTES # BLD AUTO: 1.34 THOUSANDS/ΜL (ref 0.6–4.47)
LYMPHOCYTES NFR BLD AUTO: 12 % (ref 14–44)
MCH RBC QN AUTO: 29.3 PG (ref 26.8–34.3)
MCHC RBC AUTO-ENTMCNC: 30.1 G/DL (ref 31.4–37.4)
MCV RBC AUTO: 97 FL (ref 82–98)
MONOCYTES # BLD AUTO: 0.87 THOUSAND/ΜL (ref 0.17–1.22)
MONOCYTES NFR BLD AUTO: 8 % (ref 4–12)
NEUTROPHILS # BLD AUTO: 8.95 THOUSANDS/ΜL (ref 1.85–7.62)
NEUTS SEG NFR BLD AUTO: 78 % (ref 43–75)
NRBC BLD AUTO-RTO: 0 /100 WBCS
PLATELET # BLD AUTO: 480 THOUSANDS/UL (ref 149–390)
PMV BLD AUTO: 9.2 FL (ref 8.9–12.7)
POTASSIUM SERPL-SCNC: 4.7 MMOL/L (ref 3.5–5.3)
PROT SERPL-MCNC: 7.3 G/DL (ref 6.4–8.4)
RBC # BLD AUTO: 3.28 MILLION/UL (ref 3.88–5.62)
SODIUM SERPL-SCNC: 135 MMOL/L (ref 135–147)
TIBC SERPL-MCNC: 232.4 UG/DL (ref 250–450)
TRANSFERRIN SERPL-MCNC: 166 MG/DL (ref 203–362)
UIBC SERPL-MCNC: 203 UG/DL (ref 155–355)
WBC # BLD AUTO: 11.47 THOUSAND/UL (ref 4.31–10.16)

## 2025-01-04 PROCEDURE — 83550 IRON BINDING TEST: CPT

## 2025-01-04 PROCEDURE — 85025 COMPLETE CBC W/AUTO DIFF WBC: CPT

## 2025-01-04 PROCEDURE — 83540 ASSAY OF IRON: CPT

## 2025-01-04 PROCEDURE — 82728 ASSAY OF FERRITIN: CPT

## 2025-01-04 PROCEDURE — 80053 COMPREHEN METABOLIC PANEL: CPT

## 2025-01-04 PROCEDURE — 36415 COLL VENOUS BLD VENIPUNCTURE: CPT

## 2025-01-06 ENCOUNTER — RESULTS FOLLOW-UP (OUTPATIENT)
Dept: FAMILY MEDICINE CLINIC | Facility: CLINIC | Age: 80
End: 2025-01-06

## 2025-01-06 DIAGNOSIS — D63.8 ANEMIA OF CHRONIC DISEASE: ICD-10-CM

## 2025-01-06 DIAGNOSIS — N18.30 STAGE 3 CHRONIC KIDNEY DISEASE, UNSPECIFIED WHETHER STAGE 3A OR 3B CKD (HCC): Primary | ICD-10-CM

## 2025-01-08 NOTE — TELEPHONE ENCOUNTER
Patient's daughter called stating the first available appt is made for Nephrology 2/6/25.    Asking if Dr. Edwards is ok with that date. If so, is there anything in the meantime the daughter can do for the anemia    Please advise

## 2025-01-08 NOTE — TELEPHONE ENCOUNTER
Daughter returned call.  Relayed provider's comments and recommendation.  Daughter expressed understanding, and states patient is taking iron pill daily and has not been experiencing constipation.

## 2025-01-29 DIAGNOSIS — N18.30 STAGE 3 CHRONIC KIDNEY DISEASE, UNSPECIFIED WHETHER STAGE 3A OR 3B CKD (HCC): Primary | ICD-10-CM

## 2025-01-30 ENCOUNTER — TELEPHONE (OUTPATIENT)
Dept: NEPHROLOGY | Facility: CLINIC | Age: 80
End: 2025-01-30

## 2025-01-30 NOTE — TELEPHONE ENCOUNTER
I called and left a VM for daughter regarding Israel completing blood and urine studies prior to upcoming appt on 02/06/2025

## 2025-02-04 ENCOUNTER — APPOINTMENT (OUTPATIENT)
Age: 80
End: 2025-02-04
Payer: MEDICARE

## 2025-02-04 DIAGNOSIS — N18.30 STAGE 3 CHRONIC KIDNEY DISEASE, UNSPECIFIED WHETHER STAGE 3A OR 3B CKD (HCC): ICD-10-CM

## 2025-02-04 LAB
25(OH)D3 SERPL-MCNC: 59.4 NG/ML (ref 30–100)
ALBUMIN SERPL BCG-MCNC: 3.9 G/DL (ref 3.5–5)
ALP SERPL-CCNC: 109 U/L (ref 34–104)
ALT SERPL W P-5'-P-CCNC: 16 U/L (ref 7–52)
ANION GAP SERPL CALCULATED.3IONS-SCNC: 13 MMOL/L (ref 4–13)
AST SERPL W P-5'-P-CCNC: 20 U/L (ref 13–39)
BASOPHILS # BLD AUTO: 0.07 THOUSANDS/ΜL (ref 0–0.1)
BASOPHILS NFR BLD AUTO: 1 % (ref 0–1)
BILIRUB SERPL-MCNC: 0.37 MG/DL (ref 0.2–1)
BUN SERPL-MCNC: 36 MG/DL (ref 5–25)
CALCIUM SERPL-MCNC: 9.9 MG/DL (ref 8.4–10.2)
CHLORIDE SERPL-SCNC: 102 MMOL/L (ref 96–108)
CO2 SERPL-SCNC: 25 MMOL/L (ref 21–32)
CREAT SERPL-MCNC: 2.37 MG/DL (ref 0.6–1.3)
EOSINOPHIL # BLD AUTO: 0.06 THOUSAND/ΜL (ref 0–0.61)
EOSINOPHIL NFR BLD AUTO: 1 % (ref 0–6)
ERYTHROCYTE [DISTWIDTH] IN BLOOD BY AUTOMATED COUNT: 17.9 % (ref 11.6–15.1)
GFR SERPL CREATININE-BSD FRML MDRD: 24 ML/MIN/1.73SQ M
GLUCOSE P FAST SERPL-MCNC: 99 MG/DL (ref 65–99)
HCT VFR BLD AUTO: 36.7 % (ref 36.5–49.3)
HGB BLD-MCNC: 11 G/DL (ref 12–17)
IMM GRANULOCYTES # BLD AUTO: 0.1 THOUSAND/UL (ref 0–0.2)
IMM GRANULOCYTES NFR BLD AUTO: 1 % (ref 0–2)
LYMPHOCYTES # BLD AUTO: 2.43 THOUSANDS/ΜL (ref 0.6–4.47)
LYMPHOCYTES NFR BLD AUTO: 23 % (ref 14–44)
MAGNESIUM SERPL-MCNC: 2.2 MG/DL (ref 1.9–2.7)
MCH RBC QN AUTO: 29.8 PG (ref 26.8–34.3)
MCHC RBC AUTO-ENTMCNC: 30 G/DL (ref 31.4–37.4)
MCV RBC AUTO: 100 FL (ref 82–98)
MONOCYTES # BLD AUTO: 0.82 THOUSAND/ΜL (ref 0.17–1.22)
MONOCYTES NFR BLD AUTO: 8 % (ref 4–12)
NEUTROPHILS # BLD AUTO: 7.23 THOUSANDS/ΜL (ref 1.85–7.62)
NEUTS SEG NFR BLD AUTO: 66 % (ref 43–75)
NRBC BLD AUTO-RTO: 0 /100 WBCS
PHOSPHATE SERPL-MCNC: 3.8 MG/DL (ref 2.3–4.1)
PLATELET # BLD AUTO: 421 THOUSANDS/UL (ref 149–390)
PMV BLD AUTO: 9.8 FL (ref 8.9–12.7)
POTASSIUM SERPL-SCNC: 4.4 MMOL/L (ref 3.5–5.3)
PROT SERPL-MCNC: 7.5 G/DL (ref 6.4–8.4)
PTH-INTACT SERPL-MCNC: 54 PG/ML (ref 12–88)
RBC # BLD AUTO: 3.69 MILLION/UL (ref 3.88–5.62)
SODIUM SERPL-SCNC: 140 MMOL/L (ref 135–147)
WBC # BLD AUTO: 10.71 THOUSAND/UL (ref 4.31–10.16)

## 2025-02-04 PROCEDURE — 36415 COLL VENOUS BLD VENIPUNCTURE: CPT

## 2025-02-04 PROCEDURE — 85025 COMPLETE CBC W/AUTO DIFF WBC: CPT

## 2025-02-04 PROCEDURE — 83970 ASSAY OF PARATHORMONE: CPT

## 2025-02-04 PROCEDURE — 80053 COMPREHEN METABOLIC PANEL: CPT

## 2025-02-04 PROCEDURE — 82306 VITAMIN D 25 HYDROXY: CPT

## 2025-02-04 PROCEDURE — 84100 ASSAY OF PHOSPHORUS: CPT

## 2025-02-04 PROCEDURE — 83735 ASSAY OF MAGNESIUM: CPT

## 2025-02-05 ENCOUNTER — RESULTS FOLLOW-UP (OUTPATIENT)
Dept: OTHER | Facility: HOSPITAL | Age: 80
End: 2025-02-05

## 2025-02-06 ENCOUNTER — CONSULT (OUTPATIENT)
Dept: NEPHROLOGY | Facility: CLINIC | Age: 80
End: 2025-02-06
Payer: MEDICARE

## 2025-02-06 VITALS
DIASTOLIC BLOOD PRESSURE: 76 MMHG | SYSTOLIC BLOOD PRESSURE: 120 MMHG | OXYGEN SATURATION: 100 % | TEMPERATURE: 97.1 F | WEIGHT: 175 LBS | HEART RATE: 78 BPM | HEIGHT: 69 IN | BODY MASS INDEX: 25.92 KG/M2

## 2025-02-06 DIAGNOSIS — Z97.8 CHRONIC INDWELLING FOLEY CATHETER: ICD-10-CM

## 2025-02-06 DIAGNOSIS — R53.1 GENERALIZED WEAKNESS: ICD-10-CM

## 2025-02-06 DIAGNOSIS — D63.8 ANEMIA OF CHRONIC DISEASE: ICD-10-CM

## 2025-02-06 DIAGNOSIS — R01.1 MURMUR: ICD-10-CM

## 2025-02-06 DIAGNOSIS — N18.4 CHRONIC KIDNEY DISEASE, STAGE 4 (SEVERE) (HCC): Primary | ICD-10-CM

## 2025-02-06 PROBLEM — N18.32 STAGE 3B CHRONIC KIDNEY DISEASE (CKD) (HCC): Status: ACTIVE | Noted: 2025-02-06

## 2025-02-06 PROCEDURE — 99204 OFFICE O/P NEW MOD 45 MIN: CPT | Performed by: INTERNAL MEDICINE

## 2025-02-06 RX ORDER — FERROUS SULFATE 325(65) MG
325 TABLET, DELAYED RELEASE (ENTERIC COATED) ORAL
COMMUNITY

## 2025-02-06 NOTE — PROGRESS NOTES
[facility]  Consultation - Nephrology    Israel Spencer III 79 y.o. male MRN: [id number]      A/P:      1. Chronic kidney disease, stage 4 (severe) ([facility])  -     Ambulatory Referral to Nephrology  -     Urinalysis with microscopic; Future; Expected date: 05/13/2025  -     Uric acid; Future; Expected date: 05/13/2025  -     Albumin / creatinine urine ratio; Future; Expected date: 05/13/2025  -     Comprehensive metabolic panel; Future; Expected date: 05/13/2025  -     CBC and differential; Future; Expected date: 05/13/2025  -     Magnesium; Future; Expected date: 05/13/2025  -     Phosphorus; Future; Expected date: 05/13/2025  -     CBC and Platelet; Future; Expected date: 03/06/2025  2. Generalized weakness  -     US kidney and bladder; Future; Expected date: 02/06/2025  -     Basic metabolic panel; Future; Expected date: 03/06/2025  3. Anemia of chronic disease  -     Ambulatory Referral to Nephrology  -     CBC and Platelet; Future; Expected date: 03/06/2025  -     Iron Panel (Includes Ferritin, Iron Sat%, Iron, and TIBC); Future; Expected date: 03/08/2025  4. Murmur  -     Echo complete w/ contrast if indicated; Future; Expected date: 02/06/2025  5. Chronic indwelling Diallo catheter       I have reviewed pertinent labs and imaging with patient.      CKD 4  Etiology CKD 2/2 age related eGFR loss, volume depletion ,vascular disease, obstructive uropathy and Bactrim effect. Baseline Cr previously 1.6-1.7. Evidence of CKD since 2019. More recently, Cr dominic to 2.3 in January and remained stable at 2.3 in February. Cr 1.7 in November. Potential difference related to decline in fluid intake from 2 quart to 1 quart per day.    Volume status appears euvolemic to mildly hypovolemic.  Normotensive.  Reviewed CKD stages, Cr and eGFR trends and eGFR meaning in simple terms.    Cr baseline historically 1.6-1.7 mg/dl. Cr 1.74 on 11/18/24 and dominic to 2.3 on 1/4/25. Hgb in 9 range in  January. Cr repeated 1 month later and unchanged at 2.3.   Not currently on RAASI/diuretics/SGLT2I, given advanced age and risk for RUTHANN/dehydration/infection, would not advise escalation of therapy.    Advise to increase hydration to 2 quarts of fluid per day.  Fu chemistry in 1 month and in 3 months prior to next visit.   Evaluate appropriateness of LESTER/IV iron depending on hgb/iron trends respectively.    2.Generalized weakness  Potential etiologies included iron deficiency anemia, CKD. But advise to exclude cardiac etiology and repeat echocardiogram. Hgb improved to 11.0 and currently on oral iron supplement.   Recheck chemistry, iron studies and CBC in 1 month.   Can consider LESTER/Iron supplement as warranted.    3. Anemia renal disease  Does not meet criteria for LESTER at present, hgb 11.0. Consider if hgb <9.0.  Started on oral iron supplement in January, Tsat 12%. Recheck next month, if remain refractory, can consider IV iron supplementation.    4. Murmur/weakness  Heart sounds slightly distant, advise echocardiogram to reeval.  Reports symptoms of progressive weakness/fatigue, not currently explained by anemia. CKD can contribute to symptoms but advise to check echocardiogram to ensure no other etiology.  Last echocardiogram 1/2/24.    5. Chronic indwelling cantor  Continue urology fu and bactrim pre/post exchange q 2 weeks. Bactrim can cause increase in Creatinine level to impaired excretion.      The patient and any family members present were counseled today on risks benefits and alternatives of any medications, and were agreeable to the treatment plan.  They were counseled on diagnostic testing if necessary, and projected outcomes as are available and medically indicated.  All questions were asked and answered during the encounter. If more questions are to arise the patient will call the office. Alarm and return precautions discussed and accepted.       Thank you for allowing us to participate in the care of  your patient.        History of Present Illness   Physician Requesting Consult: No att. providers found    HPI: Israel Spencer III is a 79 y.o. year old male who presents for CKD evaluation in the [place] office, accompanied by his daughter. In between November and January,  Cr dominic from 1.7 to 2.3. Over several months, progressively more weak and tired, which limits his activities.  1/4/25 hgb 9.6 but improved to 11.0 2/4/24. He started taking iron supplement for iron deficiency in January.  Patient takes Bactrim pre and post cantor catheter exchange every 2 weeks. Denies any infections /weight changes.    Used to drink 64 ounces of water a day and now only doing 32 ounces a day because he is less active.  Denies hx cancer/autoimmune disease. Denies hematuria.  Reports hx nepthorlithiasis requiring procedure.     He has bladder stones. He has hx recurrent UTI in the past and follows with Dr Hernandez for chronic cantor management.    Denies tobacco/drug use/alcohol   Denies HIV/ hepatitis/TB.  Denies hx gout.    Denies NSAID use, takes tylenol.     Constitutional ROS- Denies fatigue, fever, chills, night sweats, weight changes.  HEENT ROS- Denies headaches or history of trauma, blurred vision..  Endocrine ROS- No history diabetes mellitus or thyroid disease.  Cardiovascular ROS- Denies chest pain, palpitation, dyspnea exertion, orthopnea, claudication.  Pulmonary ROS-  Denies cough, hemoptysis, shortness of breath.  GI ROS- intermittent diarrhea.   Hematological ROS- Denies history of easy bruising, blood clots, bleeding or blood transfusions.  Genitourinary ROS- HX stones.   Lymphatic ROS- Denies lymphadenopathy.  Musculoskeletal ROS- left knee tapped   Dermatological ROS- Denies rash, wounds, ulcers, itching, jaundice.  Psychiatric ROS- Denies hallucinations, disorientation.  Neurological ROS- December 2023.     Historical Information   Past Medical History:   Diagnosis Date    Allergic     Arthritis     Chronic  kidney disease     Chronic pain disorder     Depression     Diallo catheter in place     Gram-negative bacteremia 12/29/2020    History of hydronephrosis     HL (hearing loss)     Hypertension     Insomnia     Kidney stone     PONV (postoperative nausea and vomiting)     Prostate enlargement     Psychiatric disorder     Right hydroureteronephrosis with obstructing calculus 12/28/2020    Spinal stenosis     Stroke (HCC)     Urinary tract infection     Vestibular disorder     Visual impairment      Past Surgical History:   Procedure Laterality Date    ARTHROSCOPY KNEE      BACK SURGERY      L 4 or 5    CATARACT EXTRACTION Bilateral     COLONOSCOPY      CYSTOSCOPY W/ LASER LITHOTRIPSY N/A 08/20/2018    Procedure: LITHOTRISPY HOLMIUM LASER of bladder stones;  Surgeon: Andres Hernandez MD;  Location:  MAIN OR;  Service: Urology    EAR SURGERY      EYE SURGERY      KNEE ARTHROSCOPY      ME CYSTOURETHROSCOPY W/URETERAL CATHETERIZATION Right 12/29/2020    Procedure: CYSTOSCOPY RETROGRADE PYELOGRAM WITH INSERTION STENT URETERAL;  Surgeon: Andres Hernandez MD;  Location:  MAIN OR;  Service: Urology    ME LITHOTRIPSY XTRCORP SHOCK WAVE Right 03/19/2021    Procedure: EXTRACORPORAL SHOCKWAVE LITHOTRIPSY RIGHT UPJ STONE;  Surgeon: Andres Hernandez MD;  Location:  MAIN OR;  Service: Urology    ME LITHOTRIPSY XTRCORP SHOCK WAVE Right 05/21/2021    Procedure: LITHOTRIPSY EXTRACORPORAL SHOCKWAVE (ESWL);  Surgeon: Andres Hernandez MD;  Location:  MAIN OR;  Service: Urology    ME TRURL ELECTROSURG RESCJ PROSTATE BLEED COMPLETE N/A 08/20/2018    Procedure: CYSTOSCOPY; TURP;  Surgeon: Andres Hernandez MD;  Location:  MAIN OR;  Service: Urology    ROTATOR CUFF REPAIR Bilateral     SHOULDER SURGERY Right 05/31/2019     Social History   Social History     Substance and Sexual Activity   Alcohol Use Never     Social History     Substance and Sexual Activity   Drug Use Never     Social History     Tobacco Use   Smoking Status Former    Current  packs/day: 0.00    Types: Cigarettes    Quit date: 1970    Years since quittin.1    Passive exposure: Past   Smokeless Tobacco Never   Tobacco Comments    quit 50 years ago     Family History   Problem Relation Age of Onset    Cancer Mother     Diabetes Father        Meds/Allergies   all current active meds have been reviewed, current meds:     Current Outpatient Medications:     Acetaminophen (TYLENOL PO), 500 mg 2 (two) times a day, Disp: , Rfl:     aspirin (ECOTRIN LOW STRENGTH) 81 mg EC tablet, Take 1 tablet (81 mg total) by mouth daily, Disp: 90 tablet, Rfl: 3    atorvastatin (LIPITOR) 40 mg tablet, Take 1 tablet (40 mg total) by mouth daily, Disp: 90 tablet, Rfl: 3    Cholecalciferol 25 MCG (1000 UT) tablet, Take 1,000 Units by mouth daily. Indications: Vitamin D Deficiency, Disp: , Rfl:     ferrous sulfate 325 (65 FE) MG EC tablet, Take 325 mg by mouth 3 (three) times a day with meals, Disp: , Rfl:     finasteride (PROSCAR) 5 mg tablet, take 1 tablet by mouth once daily, Disp: 90 tablet, Rfl: 1    multivitamin (THERAGRAN) TABS, Take 1 tablet by mouth daily CENTRUM, Disp: , Rfl:     Omega-3 Fatty Acids (OMEGA-3 FISH OIL PO), Take by mouth daily OMEGA XL, Disp: , Rfl:     sulfamethoxazole-trimethoprim (BACTRIM DS) 800-160 mg per tablet, Take 1 tablet by mouth as needed Only for cath change every 2 weeks. AS NEEDED, Disp: , Rfl:      No Known Allergies    Objective     Vitals:    25 1516   BP: 120/76   Pulse: 78   Temp: (!) 97.1 °F (36.2 °C)   SpO2: 100%       General Appearance:    No acute distress. Cooperative. Appears stated age.   Head:    Normocephalic. Atraumatic.    Eyes:    Lids, conjunctiva normal. No scleral icterus.   Ears:    Normal external ears.   Nose:   Nares normal. No drainage.   Mouth:   Lips, tongue normal. Mucosa normal. Phonation normal.   Neck:   Supple. Symmetrical.   Back:     Symmetric. No CVA tenderness.   Lungs:     Normal respiratory effort. Clear to auscultation  bilaterally.   Chest wall:    No tenderness or deformity.   Heart:    Regular rate and rhythm. Normal S1 and S2. No JVD. No edema.   Abdomen:     Soft. Non-tender. Bowel sounds active.   Genitourinary:      Extremities:   Extremities normal. Atraumatic. No cyanosis.   Skin:   Warm and dry. No pallor, jaundice, rash, ecchymoses.   Neurologic:   Alert and oriented to person, place, time. No focal deficit.         Current Weight: Weight - Scale: 79.4 kg (175 lb)    First Weight:    Wt Readings from Last 3 Encounters:   02/07/25 79.4 kg (175 lb)   02/06/25 79.4 kg (175 lb)   12/05/24 81.6 kg (179 lb 12.8 oz)        Lab Results:  I have personally reviewed pertinent labs.    2/4/25   Na 140, K 4.4, Cl 102, TCO2 25, BUN 36, Cr 2.37, glucose 99, Ca 9.9, eGFR 24 ml/min.      Radiology review:  9/27/23  Renal US   Right kidney: No hydronephrosis. Preservation of normal echogenicity and   parenchymal thickness. Multifocal nephrolithiasis with stones noted at the upper   pole, midpole, and lower pole. The largest stone measures 1 cm at the mid to   lower pole. The right kidney measures 9.6 cm in length.     Left kidney: Within normal limits. No hydronephrosis. Preservation of normal   echogenicity and parenchymal thickness. No evidence of stone. The left kidney   measures 10.3 cm in length.         Juli Everett,       This consultation note was produced in part using a dictation device which may document imprecise wording from author's original intent.

## 2025-02-06 NOTE — PATIENT INSTRUCTIONS
Encourage fluid intake to 50-60 ounces per day, your kidney function worsened to 24%. Check kidney ultrasound to exclude blockages.   Follow up in 3 months.   Check blood work in Month to assess improvement.   Advise echocardiogram to evaluate his fatigue further. Heart sounds a little distant.

## 2025-03-10 ENCOUNTER — APPOINTMENT (OUTPATIENT)
Age: 80
End: 2025-03-10
Payer: MEDICARE

## 2025-03-10 DIAGNOSIS — N18.4 CHRONIC KIDNEY DISEASE, STAGE 4 (SEVERE) (HCC): ICD-10-CM

## 2025-03-10 DIAGNOSIS — D63.8 ANEMIA OF CHRONIC DISEASE: ICD-10-CM

## 2025-03-10 DIAGNOSIS — R53.1 GENERALIZED WEAKNESS: ICD-10-CM

## 2025-03-10 LAB
ANION GAP SERPL CALCULATED.3IONS-SCNC: 7 MMOL/L (ref 4–13)
BUN SERPL-MCNC: 36 MG/DL (ref 5–25)
CALCIUM SERPL-MCNC: 9.3 MG/DL (ref 8.4–10.2)
CHLORIDE SERPL-SCNC: 105 MMOL/L (ref 96–108)
CO2 SERPL-SCNC: 27 MMOL/L (ref 21–32)
CREAT SERPL-MCNC: 1.79 MG/DL (ref 0.6–1.3)
ERYTHROCYTE [DISTWIDTH] IN BLOOD BY AUTOMATED COUNT: 17.4 % (ref 11.6–15.1)
FERRITIN SERPL-MCNC: 767 NG/ML (ref 24–336)
GFR SERPL CREATININE-BSD FRML MDRD: 34 ML/MIN/1.73SQ M
GLUCOSE P FAST SERPL-MCNC: 97 MG/DL (ref 65–99)
HCT VFR BLD AUTO: 34.2 % (ref 36.5–49.3)
HGB BLD-MCNC: 10.6 G/DL (ref 12–17)
IRON SATN MFR SERPL: 20 % (ref 15–50)
IRON SERPL-MCNC: 54 UG/DL (ref 50–212)
MCH RBC QN AUTO: 31 PG (ref 26.8–34.3)
MCHC RBC AUTO-ENTMCNC: 31 G/DL (ref 31.4–37.4)
MCV RBC AUTO: 100 FL (ref 82–98)
PLATELET # BLD AUTO: 400 THOUSANDS/UL (ref 149–390)
PMV BLD AUTO: 9.9 FL (ref 8.9–12.7)
POTASSIUM SERPL-SCNC: 4.6 MMOL/L (ref 3.5–5.3)
RBC # BLD AUTO: 3.42 MILLION/UL (ref 3.88–5.62)
SODIUM SERPL-SCNC: 139 MMOL/L (ref 135–147)
TIBC SERPL-MCNC: 268.8 UG/DL (ref 250–450)
TRANSFERRIN SERPL-MCNC: 192 MG/DL (ref 203–362)
UIBC SERPL-MCNC: 215 UG/DL (ref 155–355)
WBC # BLD AUTO: 10.05 THOUSAND/UL (ref 4.31–10.16)

## 2025-03-10 PROCEDURE — 36415 COLL VENOUS BLD VENIPUNCTURE: CPT

## 2025-03-10 PROCEDURE — 80048 BASIC METABOLIC PNL TOTAL CA: CPT

## 2025-03-10 PROCEDURE — 85027 COMPLETE CBC AUTOMATED: CPT

## 2025-03-10 PROCEDURE — 82728 ASSAY OF FERRITIN: CPT

## 2025-03-10 PROCEDURE — 83540 ASSAY OF IRON: CPT

## 2025-03-10 PROCEDURE — 83550 IRON BINDING TEST: CPT

## 2025-03-14 ENCOUNTER — RESULTS FOLLOW-UP (OUTPATIENT)
Dept: NEPHROLOGY | Facility: CLINIC | Age: 80
End: 2025-03-14

## 2025-03-17 NOTE — TELEPHONE ENCOUNTER
Daughter, Candice, returning a call. I reviewed above message per provider. She verbalized understanding of all. She reports patient has been feeling better and looking better. States he has increased his fluids and has been eating his meals as advised at office visit. She feels that a lot of issues during time of consult were due to dehydration as patient had recently lost his dog and was depressed, not eating or drinking much at that time. She reports he is doing better, and feels the labs have improved because he is eating/drinking again. She is asking for next steps. Please advise, daughter is requesting a call back. Thank you.

## 2025-03-17 NOTE — TELEPHONE ENCOUNTER
I called and left a VM for Johanna to call the office back to discuss the following:      ----- Message from Juli Everett DO sent at 3/14/2025  2:05 PM EDT -----  Pleaes call patient, kidney function has improved to 34%. His iron studies have improved as well. His blood count is 10.6 and does not need any bone marrow stimulating agents for this.

## 2025-03-19 ENCOUNTER — OFFICE VISIT (OUTPATIENT)
Dept: FAMILY MEDICINE CLINIC | Facility: CLINIC | Age: 80
End: 2025-03-19
Payer: MEDICARE

## 2025-03-19 VITALS
SYSTOLIC BLOOD PRESSURE: 118 MMHG | HEART RATE: 81 BPM | OXYGEN SATURATION: 98 % | HEIGHT: 69 IN | TEMPERATURE: 98.6 F | BODY MASS INDEX: 26.51 KG/M2 | DIASTOLIC BLOOD PRESSURE: 78 MMHG | WEIGHT: 179 LBS

## 2025-03-19 DIAGNOSIS — Z97.8 CHRONIC INDWELLING FOLEY CATHETER: ICD-10-CM

## 2025-03-19 DIAGNOSIS — I63.9 NEW CEREBELLAR INFARCT (HCC): ICD-10-CM

## 2025-03-19 DIAGNOSIS — Z99.89 USES WALKER: ICD-10-CM

## 2025-03-19 DIAGNOSIS — M19.90 ARTHRITIS: ICD-10-CM

## 2025-03-19 DIAGNOSIS — R42 VERTIGO DUE TO PREVIOUS CEREBELLAR INFARCTION: ICD-10-CM

## 2025-03-19 DIAGNOSIS — E78.2 MIXED HYPERLIPIDEMIA: ICD-10-CM

## 2025-03-19 DIAGNOSIS — F02.B0 MODERATE LATE ONSET ALZHEIMER'S DEMENTIA WITHOUT BEHAVIORAL DISTURBANCE, PSYCHOTIC DISTURBANCE, MOOD DISTURBANCE, OR ANXIETY (HCC): ICD-10-CM

## 2025-03-19 DIAGNOSIS — N18.30 STAGE 3 CHRONIC KIDNEY DISEASE, UNSPECIFIED WHETHER STAGE 3A OR 3B CKD (HCC): Primary | ICD-10-CM

## 2025-03-19 DIAGNOSIS — G30.1 MODERATE LATE ONSET ALZHEIMER'S DEMENTIA WITHOUT BEHAVIORAL DISTURBANCE, PSYCHOTIC DISTURBANCE, MOOD DISTURBANCE, OR ANXIETY (HCC): ICD-10-CM

## 2025-03-19 DIAGNOSIS — R26.9 ABNORMALITY OF GAIT AND MOBILITY: ICD-10-CM

## 2025-03-19 DIAGNOSIS — I69.398 VERTIGO DUE TO PREVIOUS CEREBELLAR INFARCTION: ICD-10-CM

## 2025-03-19 DIAGNOSIS — R53.83 OTHER FATIGUE: ICD-10-CM

## 2025-03-19 DIAGNOSIS — R73.9 ELEVATED BLOOD SUGAR: ICD-10-CM

## 2025-03-19 PROCEDURE — G2211 COMPLEX E/M VISIT ADD ON: HCPCS | Performed by: INTERNAL MEDICINE

## 2025-03-19 PROCEDURE — 99214 OFFICE O/P EST MOD 30 MIN: CPT | Performed by: INTERNAL MEDICINE

## 2025-03-19 RX ORDER — ASPIRIN 81 MG/1
81 TABLET ORAL DAILY
Qty: 90 TABLET | Refills: 3 | Status: SHIPPED | OUTPATIENT
Start: 2025-03-19

## 2025-03-19 NOTE — ASSESSMENT & PLAN NOTE
Remains on aspirin.  Occasional meclizine.  Orders:  •  aspirin (ECOTRIN LOW STRENGTH) 81 mg EC tablet; Take 1 tablet (81 mg total) by mouth daily

## 2025-03-19 NOTE — PROGRESS NOTES
"Name: Israel Spencer III      : 1945      MRN: [id number]  Encounter Provider: Tomás Edwards DO  Encounter Date: 3/19/2025   Encounter department: [facility]  :  Assessment & Plan  Stage 3 chronic kidney disease, unspecified whether stage 3a or 3b CKD (HCC)  Lab Results   Component Value Date    EGFR 34 03/10/2025    EGFR 24 2025    EGFR 25 2025    CREATININE 1.79 (H) 03/10/2025    CREATININE 2.37 (H) 2025    CREATININE 2.34 (H) 2025   Continue to avoid nephrotoxins.  Has Bactrim at home, needs to use this sparingly apparently when he has Diallo changes.  Does have a chronic Diallo in place.  This was provided by his urologist.       Moderate late onset Alzheimer's dementia without behavioral disturbance, psychotic disturbance, mood disturbance, or anxiety (HCC)  Declining cognitive function.  Has no behavior abnormalities at this time.  Does occasionally have some depression but denies it today.       Mixed hyperlipidemia  Continue current medical regimen, remains on moderate intensity statin.  Orders:  •  Lipid Panel with Direct LDL reflex; Future  •  TSH, 3rd generation; Future    Abnormality of gait and mobility  Continues to use a walker, this is been part of his routine for several years now.       Uses walker  Continues walker to avoid falling.       Chronic indwelling Diallo catheter  Continue to follow with urology.       Elevated blood sugar  Continue periodic monitoring of the A1c.  Orders:  •  Comprehensive metabolic panel; Future  •  CBC and differential; Future  •  Hemoglobin A1C; Future    Arthritis  Advanced arthritis with chronic pain.  Currently getting injections into his knees, both the \"gel shots\" as well as steroids per the caregiver.       Other fatigue  Chronically fatigued and tired, likely related to multiple medical issues.  Orders:  •  TSH, 3rd generation; Future    New cerebellar infarct (HCC)  Prior history of cerebellar " "infarct and remains on aspirin.       Vertigo due to previous cerebellar infarction  Remains on aspirin.  Occasional meclizine.  Orders:  •  aspirin (ECOTRIN LOW STRENGTH) 81 mg EC tablet; Take 1 tablet (81 mg total) by mouth daily          Falls Plan of Care: balance, strength, and gait training instructions were provided. Recommended assistive device to help with gait and balance. Medications that increase falls were reviewed. Home safety education provided.       History of Present Illness   Elver is looking progressively a little more feeble as he approaches 80nd birthday.  No recent falls but is very unsteady.  Has chronic dizziness related to his cerebellar stroke.  Takes occasional meclizine.  Reviewed medications and updated.  Bowels and bladder been normal for him.  He is eating and drinking well per his current caregiver for this day.  There is someone with him every minute of the day.  Denies any depression at this time.  Remains pleasantly confused.  Aware his birthday is coming up.  Aware of the year, not necessarily the day.      Review of Systems   Constitutional:  Negative for fever.   HENT:  Negative for congestion and sore throat.    Respiratory:  Negative for cough and shortness of breath.    Cardiovascular:  Negative for chest pain and palpitations.   Gastrointestinal:  Negative for abdominal pain and vomiting.   Genitourinary:  Negative for dysuria and hematuria.   Musculoskeletal:  Positive for arthralgias, back pain, gait problem and myalgias.   Skin:  Negative for color change and rash.   Neurological:  Negative for dizziness and syncope.   Psychiatric/Behavioral:  Positive for confusion.    All other systems reviewed and are negative.      Objective   /78 (BP Location: Left arm, Patient Position: Sitting, Cuff Size: Standard)   Pulse 81   Temp 98.6 °F (37 °C) (Tympanic)   Ht 5' 9\" (1.753 m)   Wt 81.2 kg (179 lb)   SpO2 98%   BMI 26.43 kg/m²      Physical Exam  Vitals and nursing " note reviewed.   Constitutional:       General: He is not in acute distress.     Appearance: Normal appearance. He is well-developed.   HENT:      Head: Normocephalic and atraumatic.   Eyes:      Conjunctiva/sclera: Conjunctivae normal.   Cardiovascular:      Rate and Rhythm: Normal rate and regular rhythm.      Pulses: Normal pulses.      Heart sounds: Murmur heard.   Pulmonary:      Effort: Pulmonary effort is normal. No respiratory distress.      Breath sounds: Normal breath sounds.   Abdominal:      Palpations: Abdomen is soft.      Tenderness: There is no abdominal tenderness.   Musculoskeletal:         General: No swelling.      Cervical back: Neck supple.      Comments: Advanced DJD, walks with walker   Skin:     General: Skin is warm and dry.      Capillary Refill: Capillary refill takes less than 2 seconds.      Coloration: Skin is pale.   Neurological:      General: No focal deficit present.      Mental Status: He is alert and oriented to person, place, and time.   Psychiatric:         Mood and Affect: Mood normal.

## 2025-03-19 NOTE — ASSESSMENT & PLAN NOTE
Lab Results   Component Value Date    EGFR 34 03/10/2025    EGFR 24 02/04/2025    EGFR 25 01/04/2025    CREATININE 1.79 (H) 03/10/2025    CREATININE 2.37 (H) 02/04/2025    CREATININE 2.34 (H) 01/04/2025   Continue to avoid nephrotoxins.  Has Bactrim at home, needs to use this sparingly apparently when he has Diallo changes.  Does have a chronic Diallo in place.  This was provided by his urologist.

## 2025-03-19 NOTE — ASSESSMENT & PLAN NOTE
Declining cognitive function.  Has no behavior abnormalities at this time.  Does occasionally have some depression but denies it today.

## 2025-03-19 NOTE — ASSESSMENT & PLAN NOTE
Continue current medical regimen, remains on moderate intensity statin.  Orders:  •  Lipid Panel with Direct LDL reflex; Future  •  TSH, 3rd generation; Future

## 2025-05-01 ENCOUNTER — APPOINTMENT (OUTPATIENT)
Age: 80
End: 2025-05-01
Payer: MEDICARE

## 2025-05-01 DIAGNOSIS — R53.83 OTHER FATIGUE: ICD-10-CM

## 2025-05-01 DIAGNOSIS — N18.4 CHRONIC KIDNEY DISEASE, STAGE 4 (SEVERE) (HCC): ICD-10-CM

## 2025-05-01 DIAGNOSIS — R73.9 ELEVATED BLOOD SUGAR: ICD-10-CM

## 2025-05-01 DIAGNOSIS — E78.2 MIXED HYPERLIPIDEMIA: ICD-10-CM

## 2025-05-01 LAB
ALBUMIN SERPL BCG-MCNC: 3.8 G/DL (ref 3.5–5)
ALP SERPL-CCNC: 108 U/L (ref 34–104)
ALT SERPL W P-5'-P-CCNC: 16 U/L (ref 7–52)
ANION GAP SERPL CALCULATED.3IONS-SCNC: 11 MMOL/L (ref 4–13)
AST SERPL W P-5'-P-CCNC: 18 U/L (ref 13–39)
BACTERIA UR QL AUTO: ABNORMAL /HPF
BASOPHILS # BLD AUTO: 0.08 THOUSANDS/ÂΜL (ref 0–0.1)
BASOPHILS NFR BLD AUTO: 1 % (ref 0–1)
BILIRUB SERPL-MCNC: 0.42 MG/DL (ref 0.2–1)
BILIRUB UR QL STRIP: NEGATIVE
BUN SERPL-MCNC: 31 MG/DL (ref 5–25)
CALCIUM SERPL-MCNC: 9.3 MG/DL (ref 8.4–10.2)
CHLORIDE SERPL-SCNC: 105 MMOL/L (ref 96–108)
CHOLEST SERPL-MCNC: 102 MG/DL (ref ?–200)
CLARITY UR: ABNORMAL
CO2 SERPL-SCNC: 24 MMOL/L (ref 21–32)
COLOR UR: ABNORMAL
CREAT SERPL-MCNC: 1.85 MG/DL (ref 0.6–1.3)
CREAT UR-MCNC: 66.7 MG/DL
CREAT UR-MCNC: 66.7 MG/DL
EOSINOPHIL # BLD AUTO: 0.15 THOUSAND/ÂΜL (ref 0–0.61)
EOSINOPHIL NFR BLD AUTO: 2 % (ref 0–6)
ERYTHROCYTE [DISTWIDTH] IN BLOOD BY AUTOMATED COUNT: 15.2 % (ref 11.6–15.1)
EST. AVERAGE GLUCOSE BLD GHB EST-MCNC: 123 MG/DL
GFR SERPL CREATININE-BSD FRML MDRD: 33 ML/MIN/1.73SQ M
GLUCOSE P FAST SERPL-MCNC: 86 MG/DL (ref 65–99)
GLUCOSE UR STRIP-MCNC: NEGATIVE MG/DL
HBA1C MFR BLD: 5.9 %
HCT VFR BLD AUTO: 38.2 % (ref 36.5–49.3)
HDLC SERPL-MCNC: 46 MG/DL
HGB BLD-MCNC: 11.9 G/DL (ref 12–17)
HGB UR QL STRIP.AUTO: ABNORMAL
IMM GRANULOCYTES # BLD AUTO: 0.09 THOUSAND/UL (ref 0–0.2)
IMM GRANULOCYTES NFR BLD AUTO: 1 % (ref 0–2)
KETONES UR STRIP-MCNC: NEGATIVE MG/DL
LDLC SERPL CALC-MCNC: 30 MG/DL (ref 0–100)
LEUKOCYTE ESTERASE UR QL STRIP: ABNORMAL
LYMPHOCYTES # BLD AUTO: 2.26 THOUSANDS/ÂΜL (ref 0.6–4.47)
LYMPHOCYTES NFR BLD AUTO: 23 % (ref 14–44)
MAGNESIUM SERPL-MCNC: 2 MG/DL (ref 1.9–2.7)
MCH RBC QN AUTO: 32.2 PG (ref 26.8–34.3)
MCHC RBC AUTO-ENTMCNC: 31.2 G/DL (ref 31.4–37.4)
MCV RBC AUTO: 103 FL (ref 82–98)
MICROALBUMIN UR-MCNC: 40.9 MG/L
MICROALBUMIN/CREAT 24H UR: 61 MG/G CREATININE (ref 0–30)
MONOCYTES # BLD AUTO: 0.78 THOUSAND/ÂΜL (ref 0.17–1.22)
MONOCYTES NFR BLD AUTO: 8 % (ref 4–12)
NEUTROPHILS # BLD AUTO: 6.59 THOUSANDS/ÂΜL (ref 1.85–7.62)
NEUTS SEG NFR BLD AUTO: 65 % (ref 43–75)
NITRITE UR QL STRIP: NEGATIVE
NON-SQ EPI CELLS URNS QL MICRO: ABNORMAL /HPF
NRBC BLD AUTO-RTO: 0 /100 WBCS
PH UR STRIP.AUTO: 6.5 [PH]
PHOSPHATE SERPL-MCNC: 3.1 MG/DL (ref 2.3–4.1)
PLATELET # BLD AUTO: 338 THOUSANDS/UL (ref 149–390)
PMV BLD AUTO: 10.9 FL (ref 8.9–12.7)
POTASSIUM SERPL-SCNC: 4.5 MMOL/L (ref 3.5–5.3)
PROT SERPL-MCNC: 6.5 G/DL (ref 6.4–8.4)
PROT UR STRIP-MCNC: ABNORMAL MG/DL
PROT UR-MCNC: 24.4 MG/DL
PROT/CREAT UR: 0.4 MG/G{CREAT}
RBC # BLD AUTO: 3.7 MILLION/UL (ref 3.88–5.62)
RBC #/AREA URNS AUTO: ABNORMAL /HPF
SODIUM SERPL-SCNC: 140 MMOL/L (ref 135–147)
SP GR UR STRIP.AUTO: 1.01 (ref 1–1.03)
TRIGL SERPL-MCNC: 132 MG/DL (ref ?–150)
TSH SERPL DL<=0.05 MIU/L-ACNC: 3.11 UIU/ML (ref 0.45–4.5)
URATE SERPL-MCNC: 5.5 MG/DL (ref 3.5–8.5)
UROBILINOGEN UR STRIP-ACNC: <2 MG/DL
WBC # BLD AUTO: 9.95 THOUSAND/UL (ref 4.31–10.16)
WBC #/AREA URNS AUTO: ABNORMAL /HPF
WBC CLUMPS # UR AUTO: PRESENT /UL

## 2025-05-01 PROCEDURE — 84100 ASSAY OF PHOSPHORUS: CPT

## 2025-05-01 PROCEDURE — 83735 ASSAY OF MAGNESIUM: CPT

## 2025-05-01 PROCEDURE — 84443 ASSAY THYROID STIM HORMONE: CPT

## 2025-05-01 PROCEDURE — 85025 COMPLETE CBC W/AUTO DIFF WBC: CPT

## 2025-05-01 PROCEDURE — 80053 COMPREHEN METABOLIC PANEL: CPT

## 2025-05-01 PROCEDURE — 83036 HEMOGLOBIN GLYCOSYLATED A1C: CPT

## 2025-05-01 PROCEDURE — 36415 COLL VENOUS BLD VENIPUNCTURE: CPT

## 2025-05-01 PROCEDURE — 80061 LIPID PANEL: CPT

## 2025-05-01 PROCEDURE — 84550 ASSAY OF BLOOD/URIC ACID: CPT

## 2025-05-02 ENCOUNTER — RESULTS FOLLOW-UP (OUTPATIENT)
Dept: FAMILY MEDICINE CLINIC | Facility: CLINIC | Age: 80
End: 2025-05-02

## 2025-05-06 ENCOUNTER — APPOINTMENT (OUTPATIENT)
Age: 80
End: 2025-05-06
Payer: MEDICARE

## 2025-05-06 DIAGNOSIS — N39.0 UTI (URINARY TRACT INFECTION): ICD-10-CM

## 2025-05-06 DIAGNOSIS — N39.0 UTI (URINARY TRACT INFECTION): Primary | ICD-10-CM

## 2025-05-06 PROCEDURE — 87077 CULTURE AEROBIC IDENTIFY: CPT

## 2025-05-06 PROCEDURE — 87086 URINE CULTURE/COLONY COUNT: CPT

## 2025-05-06 PROCEDURE — 87186 SC STD MICRODIL/AGAR DIL: CPT

## 2025-05-08 LAB
BACTERIA UR CULT: ABNORMAL
BACTERIA UR CULT: ABNORMAL

## 2025-05-09 ENCOUNTER — TELEPHONE (OUTPATIENT)
Age: 80
End: 2025-05-09

## 2025-05-09 ENCOUNTER — TELEPHONE (OUTPATIENT)
Dept: OTHER | Facility: HOSPITAL | Age: 80
End: 2025-05-09

## 2025-05-09 ENCOUNTER — RESULTS FOLLOW-UP (OUTPATIENT)
Age: 80
End: 2025-05-09

## 2025-05-09 DIAGNOSIS — N39.0 UTI (URINARY TRACT INFECTION): Primary | ICD-10-CM

## 2025-05-09 RX ORDER — CIPROFLOXACIN 500 MG/1
500 TABLET, FILM COATED ORAL EVERY 12 HOURS SCHEDULED
Qty: 14 TABLET | Refills: 0 | Status: SHIPPED | OUTPATIENT
Start: 2025-05-09 | End: 2025-05-16

## 2025-05-09 NOTE — TELEPHONE ENCOUNTER
Daughter  called she want to know  if their  was a script sent  over to the  pharmacy yet. She  would  like  a call back to let her  know.

## 2025-05-09 NOTE — TELEPHONE ENCOUNTER
Called and spoke with patients daughter.  Aware sent in ciprofloxacin 500mg every 12 hour for 7 days, if he gets any worse call the office or proceed to the ER. Denies any issue with this therapy before.  Medication could cause tendon issue as an uncommon side effect.

## 2025-05-09 NOTE — TELEPHONE ENCOUNTER
Please call patient I sent in ciprofloxacin 500mg every 12 hour for 7 days, if he gets any worse call the office or proceed to the ER.Please ask of any issue with this therapy before.  Medication could cause tendon issue as an uncommon side effect.

## 2025-05-09 NOTE — RESULT ENCOUNTER NOTE
I called and spoke with Israel's daughter regarding his UC results. She states the only symptom he is experiencing at present is a slight increase in confusion. She states he seems a little more confused than usual. She states when he has a UTI he does not usually experience any symptoms but confusion and then as the infection progresses he sometimes develops a fever/chills.     She states he uses [facility] as his pharmacy.

## 2025-05-10 DIAGNOSIS — N40.1 BENIGN PROSTATIC HYPERPLASIA WITH URINARY OBSTRUCTION: ICD-10-CM

## 2025-05-10 DIAGNOSIS — N13.8 BENIGN PROSTATIC HYPERPLASIA WITH URINARY OBSTRUCTION: ICD-10-CM

## 2025-05-11 RX ORDER — FINASTERIDE 5 MG/1
5 TABLET, FILM COATED ORAL DAILY
Qty: 90 TABLET | Refills: 1 | Status: SHIPPED | OUTPATIENT
Start: 2025-05-11

## 2025-05-15 DIAGNOSIS — E78.5 HYPERLIPIDEMIA, UNSPECIFIED HYPERLIPIDEMIA TYPE: ICD-10-CM

## 2025-05-15 RX ORDER — ATORVASTATIN CALCIUM 40 MG/1
40 TABLET, FILM COATED ORAL DAILY
Qty: 90 TABLET | Refills: 1 | Status: SHIPPED | OUTPATIENT
Start: 2025-05-15

## 2025-05-21 NOTE — ASSESSMENT & PLAN NOTE
Follows with urology. With Bactrim pre-/post exchange every 2 weeks. Has appointment with urology today.  Recent UTI with pseudomonas completed course of ciprofloxacin.  Standing orders placed for UA with reflex for daughter to bring to lab if suspects UTI (confusion, lethargy, foul smelling urine, or cloudy urine are his usual symptoms)Orders:    UA w Reflex to Microscopic w Reflex to Culture; Standing

## 2025-05-21 NOTE — PROGRESS NOTES
Name: Israle Spencer III      : 1945      MRN: [id number]  Encounter Provider: UBALDO Haines  Encounter Date: 2025   Encounter department: [facility] [place]  :  Assessment & Plan  Chronic kidney disease, stage 4 (severe) (Formerly [facility])  Lab Results   Component Value Date    EGFR 33 2025    EGFR 34 03/10/2025    EGFR 24 2025    CREATININE 1.85 (H) 2025    CREATININE 1.79 (H) 03/10/2025    CREATININE 2.37 (H) 2025   Baseline creatinine 1.6-1.7 mg/dL  Follow-up with Dr. Everett  Creatinine 1.85 mg/dL, GFR 33 mL/min, 2025.  Etiology age-related nephron loss, volume depletion, vascular disease, obstructive uropathy and Bactrim effect.  UA with trace blood, large leukocytes, trace protein, 4-10 RBC and innumerable WBC, UACR 61 mg/g, UPCR 0.4, 2025.  US KUB ordered, not yet completed.  Previous US KUB noting multiple right nephrolithiasis, no left nephrolithiasis.  No hydronephrosis.  2023.  Volume status examines euvolemic  Continue NSAIDs and nephrotoxins.  Stable well hydrated.  No indication for RAAS or SGLT2.Orders:    CBC and differential; Future    Basic metabolic panel; Future    Chronic indwelling Diallo catheter  Follows with urology. With Bactrim pre-/post exchange every 2 weeks. Has appointment with urology today.  Recent UTI with pseudomonas completed course of ciprofloxacin.  Standing orders placed for UA with reflex for daughter to bring to lab if suspects UTI (confusion, lethargy, foul smelling urine, or cloudy urine are his usual symptoms)Orders:    UA w Reflex to Microscopic w Reflex to Culture; Standing    Anemia of chronic disease  Hemoglobin 11.9 g/dL, 2025.  Iron studies appropriate, 3/10/2025.Orders:    CBC and differential; Future    Basic metabolic panel; Future    Cardiac murmur  Previous cardiac murmur noted on exam, echo was ordered by Dr. Everett  but not yet completed.Daughter wants to  wait on ECHO at this time.      Frequent UTI    Orders:    UA w Reflex to Microscopic w Reflex to Culture; Standing        History of Present Illness   HPI  Israel Spencer III is a 80 y.o. male who presents to [place] office for follow-up of stage IV CKD, anemia, weakness and cardiac murmur.  PMH includes lumbar radiculopathy, Alzheimer dementia, cerebellar infarct, vertigo and hyperlipidemia.  Denies recent hospitalizations, emergency department visits or illness.  States overall has been feeling well.        Review of Systems   Constitutional:  Negative for activity change, appetite change, chills, fatigue and fever.   HENT:  Negative for ear pain and sore throat.    Eyes:  Negative for pain and visual disturbance.   Respiratory:  Negative for cough and shortness of breath.    Cardiovascular:  Negative for chest pain, palpitations and leg swelling.   Gastrointestinal:  Negative for abdominal pain, constipation, diarrhea, nausea and vomiting.   Endocrine: Negative.    Genitourinary:  Negative for decreased urine volume, dysuria, flank pain, frequency and hematuria.        Chronic cantor, appointment for exchange with urology today   Musculoskeletal:  Negative for arthralgias and back pain.   Skin:  Negative for color change and rash.   Neurological:  Negative for dizziness, seizures, syncope, weakness, light-headedness and headaches.   Hematological: Negative.    Psychiatric/Behavioral: Negative.     All other systems reviewed and are negative.         Objective   There were no vitals taken for this visit.     Physical Exam  Vitals and nursing note reviewed.   Constitutional:       General: He is not in acute distress.     Appearance: Normal appearance. He is well-developed and normal weight.   HENT:      Head: Normocephalic and atraumatic.      Right Ear: External ear normal.      Left Ear: External ear normal.      Nose: Nose normal.      Mouth/Throat:      Mouth: Mucous membranes are moist.      Pharynx:  Oropharynx is clear.     Eyes:      Extraocular Movements: Extraocular movements intact.      Conjunctiva/sclera: Conjunctivae normal.      Pupils: Pupils are equal, round, and reactive to light.       Cardiovascular:      Rate and Rhythm: Normal rate and regular rhythm.      Heart sounds: Normal heart sounds. No murmur heard.  Pulmonary:      Effort: Pulmonary effort is normal. No respiratory distress.      Breath sounds: Normal breath sounds.   Abdominal:      Palpations: Abdomen is soft.      Tenderness: There is no abdominal tenderness.     Musculoskeletal:         General: No swelling.      Cervical back: Neck supple.      Right lower leg: No edema.      Left lower leg: No edema.      Comments: Wheelchair bound     Skin:     General: Skin is warm and dry.      Capillary Refill: Capillary refill takes less than 2 seconds.     Neurological:      General: No focal deficit present.      Mental Status: He is alert and oriented to person, place, and time.     Psychiatric:         Mood and Affect: Mood normal.         Behavior: Behavior normal.

## 2025-05-21 NOTE — ASSESSMENT & PLAN NOTE
Lab Results   Component Value Date    EGFR 33 05/01/2025    EGFR 34 03/10/2025    EGFR 24 02/04/2025    CREATININE 1.85 (H) 05/01/2025    CREATININE 1.79 (H) 03/10/2025    CREATININE 2.37 (H) 02/04/2025   Baseline creatinine 1.6-1.7 mg/dL  Follow-up with Dr. Everett  Creatinine 1.85 mg/dL, GFR 33 mL/min, 5/1/2025.  Etiology age-related nephron loss, volume depletion, vascular disease, obstructive uropathy and Bactrim effect.  UA with trace blood, large leukocytes, trace protein, 4-10 RBC and innumerable WBC, UACR 61 mg/g, UPCR 0.4, 5/1/2025.  US KUB ordered, not yet completed.  Previous US KUB noting multiple right nephrolithiasis, no left nephrolithiasis.  No hydronephrosis.  9/27/2023.  Volume status examines euvolemic  Continue NSAIDs and nephrotoxins.  Stable well hydrated.  No indication for RAAS or SGLT2.Orders:    CBC and differential; Future    Basic metabolic panel; Future

## 2025-05-21 NOTE — ASSESSMENT & PLAN NOTE
Hemoglobin 11.9 g/dL, 5/1/2025.  Iron studies appropriate, 3/10/2025.Orders:    CBC and differential; Future    Basic metabolic panel; Future

## 2025-05-22 ENCOUNTER — OFFICE VISIT (OUTPATIENT)
Dept: NEPHROLOGY | Facility: CLINIC | Age: 80
End: 2025-05-22

## 2025-05-22 VITALS
HEIGHT: 69 IN | TEMPERATURE: 97.9 F | OXYGEN SATURATION: 97 % | DIASTOLIC BLOOD PRESSURE: 60 MMHG | HEART RATE: 68 BPM | BODY MASS INDEX: 26.51 KG/M2 | WEIGHT: 179 LBS | SYSTOLIC BLOOD PRESSURE: 88 MMHG

## 2025-05-22 DIAGNOSIS — Z97.8 CHRONIC INDWELLING FOLEY CATHETER: ICD-10-CM

## 2025-05-22 DIAGNOSIS — N18.4 CHRONIC KIDNEY DISEASE, STAGE 4 (SEVERE) (HCC): Primary | ICD-10-CM

## 2025-05-22 DIAGNOSIS — N39.0 FREQUENT UTI: ICD-10-CM

## 2025-05-22 DIAGNOSIS — D63.8 ANEMIA OF CHRONIC DISEASE: ICD-10-CM

## 2025-05-22 DIAGNOSIS — R01.1 CARDIAC MURMUR: ICD-10-CM

## 2025-05-22 NOTE — PATIENT INSTRUCTIONS
Pleasure seeing you today.     Your kidney function is stable with a creatinine of 1.85 mg/dL and a GFR of 33 mL/min.  Please continue to avoid NSAIDs such as Advil, Motrin, Aleve, ibuprofen and naproxen.  Please continue to follow 2 g sodium restriction.    Please continue checking blood pressure at home 3-4 times per week and keep a record of readings.  If blood pressure upper number is consistently less than 100 or greater than 150 please contact the office.  Please also call the office if you are experiencing increased headaches, dizziness, lightheadedness, chest pain or blurred vision.  Please continue to maintain a 2 g sodium restriction.    Please continue taking all medications as previously ordered.    Please return for follow-up appointment in 4 months with lab work completed prior to that visit.

## 2025-06-19 DIAGNOSIS — I69.398 VERTIGO DUE TO PREVIOUS CEREBELLAR INFARCTION: ICD-10-CM

## 2025-06-19 DIAGNOSIS — R42 VERTIGO DUE TO PREVIOUS CEREBELLAR INFARCTION: ICD-10-CM

## 2025-06-19 RX ORDER — ASPIRIN 81 MG/1
81 TABLET ORAL DAILY
Qty: 100 TABLET | Refills: 3 | Status: SHIPPED | OUTPATIENT
Start: 2025-06-19

## 2025-07-21 ENCOUNTER — OFFICE VISIT (OUTPATIENT)
Dept: FAMILY MEDICINE CLINIC | Facility: CLINIC | Age: 80
End: 2025-07-21
Payer: MEDICARE

## 2025-07-21 VITALS
TEMPERATURE: 98.6 F | DIASTOLIC BLOOD PRESSURE: 78 MMHG | WEIGHT: 179 LBS | HEIGHT: 69 IN | SYSTOLIC BLOOD PRESSURE: 110 MMHG | BODY MASS INDEX: 26.51 KG/M2 | OXYGEN SATURATION: 98 % | HEART RATE: 77 BPM

## 2025-07-21 DIAGNOSIS — R26.9 ABNORMALITY OF GAIT AND MOBILITY: ICD-10-CM

## 2025-07-21 DIAGNOSIS — Z97.8 CHRONIC INDWELLING FOLEY CATHETER: ICD-10-CM

## 2025-07-21 DIAGNOSIS — E78.2 MIXED HYPERLIPIDEMIA: ICD-10-CM

## 2025-07-21 DIAGNOSIS — G30.1 MODERATE LATE ONSET ALZHEIMER'S DEMENTIA WITHOUT BEHAVIORAL DISTURBANCE, PSYCHOTIC DISTURBANCE, MOOD DISTURBANCE, OR ANXIETY (HCC): ICD-10-CM

## 2025-07-21 DIAGNOSIS — M25.562 CHRONIC PAIN OF BOTH KNEES: ICD-10-CM

## 2025-07-21 DIAGNOSIS — Z99.89 USES WALKER: ICD-10-CM

## 2025-07-21 DIAGNOSIS — N18.4 CHRONIC KIDNEY DISEASE, STAGE 4 (SEVERE) (HCC): ICD-10-CM

## 2025-07-21 DIAGNOSIS — M25.561 CHRONIC PAIN OF BOTH KNEES: ICD-10-CM

## 2025-07-21 DIAGNOSIS — F02.B0 MODERATE LATE ONSET ALZHEIMER'S DEMENTIA WITHOUT BEHAVIORAL DISTURBANCE, PSYCHOTIC DISTURBANCE, MOOD DISTURBANCE, OR ANXIETY (HCC): ICD-10-CM

## 2025-07-21 DIAGNOSIS — Z00.00 MEDICARE ANNUAL WELLNESS VISIT, SUBSEQUENT: Primary | ICD-10-CM

## 2025-07-21 DIAGNOSIS — G89.29 CHRONIC PAIN OF BOTH KNEES: ICD-10-CM

## 2025-07-21 DIAGNOSIS — M54.16 LUMBAR RADICULOPATHY: ICD-10-CM

## 2025-07-21 PROCEDURE — 99214 OFFICE O/P EST MOD 30 MIN: CPT | Performed by: INTERNAL MEDICINE

## 2025-07-21 PROCEDURE — G0439 PPPS, SUBSEQ VISIT: HCPCS | Performed by: INTERNAL MEDICINE

## 2025-07-21 NOTE — PROGRESS NOTES
Name: Israel Spencer III      : 1945      MRN: [id number]  Encounter Provider: Tomás Edwards DO  Encounter Date: 2025   Encounter department: [facility]  :  Assessment & Plan  Medicare annual wellness visit, subsequent  Reviewed the AWV questionnaire.  Went through standard need for appropriate screening tests based on risk factors..  Reviewed cognitive issues.  Reviewed living will and DURABLE POWER OF .  Discussed healthy lifestyle, healthy diet.  Reviewed vaccines.  Encourage exercise.  Discussed safety issues within the home and about.        Abnormality of gait and mobility  Use encouraged use of walker.  No longer in therapy.       Uses walker         Chronic indwelling Diallo catheter  Follows regularly with urology.       Chronic kidney disease, stage 4 (severe) ([facility])  Lab Results   Component Value Date    EGFR 33 2025    EGFR 34 03/10/2025    EGFR 24 2025    CREATININE 1.85 (H) 2025    CREATININE 1.79 (H) 03/10/2025    CREATININE 2.37 (H) 2025            Moderate late onset Alzheimer's dementia without behavioral disturbance, psychotic disturbance, mood disturbance, or anxiety ([facility])  Now has someone with him .  Still lives with his daughter.       Lumbar radiculopathy  Chronic and severe.       Chronic pain of both knees  Chronic and severe requiring walker use.       Mixed hyperlipidemia  Remains on moderate intensity statin.          Preventive health issues were discussed with patient, and age appropriate screening tests were ordered as noted in patient's After Visit Summary. Personalized health advice and appropriate referrals for health education or preventive services given if needed, as noted in patient's After Visit Summary.    History of Present Illness     Patient presents for their annual medical wellness visit.  Reviewed medical intake questionnaire.  Discussed living will and DURABLE POWER OF .  Discussed  importance of these 2 documents.  Reviewed the various screening modalities the patient was due for.  Reviewed home safety as well as depression and risk of falling.  Through shared medical decision making move forward with testing or blood work as ordered.  The patient presents with his usual pain.  His daughter is staying home with him the majority of the time now to aid in his care.  He needs help getting in and out of bed.  Requires help with bathing.  Using a walker at this point which is a safe stool for him.  No falls at home at this point.       Patient Care Team:  Tomás Edwards DO as PCP - General (Internal Medicine)  [facility]  Juli Everett DO (Nephrology)  Juli Everett DO (Nephrology)  UBALDO Haines as Nurse Practitioner (Nephrology)    Review of Systems   Constitutional:  Negative for chills and fever.   HENT:  Negative for ear pain and sore throat.    Eyes:  Negative for pain and visual disturbance.   Respiratory:  Negative for cough and shortness of breath.    Cardiovascular:  Negative for chest pain and palpitations.   Gastrointestinal:  Negative for abdominal pain and vomiting.   Genitourinary:  Negative for dysuria and hematuria.   Musculoskeletal:  Positive for arthralgias, back pain and gait problem.   Skin:  Negative for color change and rash.   Neurological:  Positive for weakness. Negative for seizures and syncope.   Psychiatric/Behavioral:  Positive for confusion. The patient is nervous/anxious.    All other systems reviewed and are negative.    Medical History Reviewed by provider this encounter:       Annual Wellness Visit Questionnaire   Israel is here for his Subsequent Wellness visit. Last Medicare Wellness visit information reviewed, patient interviewed, no change since last AWV.     Health Risk Assessment:   Patient rates overall health as good. Patient feels that their physical health rating is same. Patient is satisfied with their life. Eyesight was  rated as same. Hearing was rated as slightly better. Patient feels that their emotional and mental health rating is same. Patients states they are never, rarely angry. Patient states they are often unusually tired/fatigued. Pain experienced in the last 7 days has been none. Patient states that he has experienced no weight loss or gain in last 6 months.     Fall Risk Screening:   In the past year, patient has experienced: history of falling in past year    Number of falls: 1  Injured during fall?: No    Feels unsteady when standing or walking?: Yes    Worried about falling?: No      Home Safety:  Patient has trouble with stairs inside or outside of their home. Patient has working smoke alarms and has working carbon monoxide detector.     Nutrition:   Current diet is Regular.     Medications:   Patient is currently taking over-the-counter supplements. OTC medications include: see medication list. Patient is not able to manage medications.     Activities of Daily Living (ADLs)/Instrumental Activities of Daily Living (IADLs):   Walk and transfer into and out of bed and chair?: No  Dress and groom yourself?: No    Bathe or shower yourself?: No    Feed yourself? Yes  Do your laundry/housekeeping?: No  Manage your money, pay your bills and track your expenses?: No  Make your own meals?: No    Do your own shopping?: No    Previous Hospitalizations:   Any hospitalizations or ED visits within the last 12 months?: No      Advance Care Planning:   Living will: No    Durable POA for healthcare: Yes    Advanced directive: Yes    Advanced directive counseling given: Yes    ACP document given: Yes    Patient declined ACP directive: Yes    End of Life Decisions reviewed with patient: Yes    Provider agrees with end of life decisions: Yes      Cognitive Screening:   Provider or family/friend/caregiver concerned regarding cognition?: Yes    Cognition Comments: Moderate dementia    Preventive Screenings      Cardiovascular Screening:     General: Screening Not Indicated and History Lipid Disorder      Diabetes Screening:     General: Screening Current      Colorectal Cancer Screening:     General: Screening Not Indicated      Prostate Cancer Screening:    General: Screening Not Indicated      Osteoporosis Screening:    General: Screening Not Indicated      Abdominal Aortic Aneurysm (AAA) Screening:    Risk factors include: tobacco use        General: Screening Not Indicated      Lung Cancer Screening:     General: Screening Not Indicated      Hepatitis C Screening:    General: Screening Not Indicated    Hep C Screening Accepted: No     Cardiovascular Risk Assessment:  Patient does not have underlying ASCVD and their cardiovascular risk was assessed today. Their cardiovascular risk factors include: hyperlipidemia, overweight/obesity and CKD/proteinuria.     The ASCVD Risk score (Hanny ALLEN, et al., 2019) failed to calculate for the following reasons:    The 2019 ASCVD risk score is only valid for ages 40 to 79    Risk score cannot be calculated because patient has a medical history suggesting prior/existing ASCVD    Time spent assessing cardiovascular risk: 6 minutes.     Screening, Brief Intervention, and Referral to Treatment (SBIRT)     Screening  Typical number of drinks in a day: 0  Typical number of drinks in a week: 0  Interpretation: Low risk drinking behavior.    Single Item Drug Screening:  How often have you used an illegal drug (including marijuana) or a prescription medication for non-medical reasons in the past year? never    Single Item Drug Screen Score: 0  Interpretation: Negative screen for possible drug use disorder    Other Counseling Topics:   Car/seat belt/driving safety, sunscreen and regular weightbearing exercise.     Social Drivers of Health     Financial Resource Strain: Low Risk  (10/8/2024)    Received from [facility]    Overall Financial Resource Strain (CARDIA)    • Difficulty of Paying Living  "Expenses: Not hard at all   Food Insecurity: No Food Insecurity (7/21/2025)    Nursing - Inadequate Food Risk Classification    • Worried About Running Out of Food in the Last Year: Never true    • Ran Out of Food in the Last Year: Never true   Transportation Needs: No Transportation Needs (7/21/2025)    PRAPARE - Transportation    • Lack of Transportation (Medical): No    • Lack of Transportation (Non-Medical): No   Housing Stability: Low Risk  (7/21/2025)    Housing Stability Vital Sign    • Unable to Pay for Housing in the Last Year: No    • Number of Times Moved in the Last Year: 1    • Homeless in the Last Year: No   Utilities: Not At Risk (7/21/2025)    [facility] Utilities    • Threatened with loss of utilities: No     No results found.    Objective   /78 (BP Location: Left arm, Patient Position: Sitting, Cuff Size: Standard)   Pulse 77   Temp 98.6 °F (37 °C) (Tympanic)   Ht 5' 9\" (1.753 m)   Wt 81.2 kg (179 lb)   SpO2 98%   BMI 26.43 kg/m²     Physical Exam  Vitals and nursing note reviewed.   Constitutional:       General: He is not in acute distress.     Appearance: Normal appearance. He is well-developed.   HENT:      Head: Normocephalic and atraumatic.     Eyes:      Extraocular Movements: Extraocular movements intact.      Conjunctiva/sclera: Conjunctivae normal.      Pupils: Pupils are equal, round, and reactive to light.       Cardiovascular:      Rate and Rhythm: Normal rate and regular rhythm.      Pulses: Normal pulses.      Heart sounds: No murmur heard.  Pulmonary:      Effort: Pulmonary effort is normal. No respiratory distress.      Breath sounds: Normal breath sounds.   Abdominal:      Palpations: Abdomen is soft.      Tenderness: There is no abdominal tenderness.     Musculoskeletal:         General: No swelling.      Cervical back: Neck supple.      Comments: Bilateral knee pain, low back pain and neck pain.     Skin:     General: Skin is warm and dry.      Capillary Refill: Capillary " refill takes less than 2 seconds.     Neurological:      General: No focal deficit present.      Mental Status: He is alert.      Motor: Weakness present.      Gait: Gait abnormal.      Comments: Oriented x 2, antalgic gait and walks with a cane.   Psychiatric:         Mood and Affect: Mood normal.

## 2025-07-21 NOTE — ASSESSMENT & PLAN NOTE
Lab Results   Component Value Date    EGFR 33 05/01/2025    EGFR 34 03/10/2025    EGFR 24 02/04/2025    CREATININE 1.85 (H) 05/01/2025    CREATININE 1.79 (H) 03/10/2025    CREATININE 2.37 (H) 02/04/2025

## 2025-07-21 NOTE — PATIENT INSTRUCTIONS
Medicare Preventive Visit Patient Instructions  Thank you for completing your Welcome to Medicare Visit or Medicare Annual Wellness Visit today. Your next wellness visit will be due in one year (7/22/2026).  The screening/preventive services that you may require over the next 5-10 years are detailed below. Some tests may not apply to you based off risk factors and/or age. Screening tests ordered at today's visit but not completed yet may show as past due. Also, please note that scanned in results may not display below.  Preventive Screenings:  Service Recommendations Previous Testing/Comments   Colorectal Cancer Screening  Colonoscopy    Fecal Occult Blood Test (FOBT)/Fecal Immunochemical Test (FIT)  Fecal DNA/Cologuard Test  Flexible Sigmoidoscopy Age: 45-75 years old   Colonoscopy: every 10 years (May be performed more frequently if at higher risk)  OR  FOBT/FIT: every 1 year  OR  Cologuard: every 3 years  OR  Sigmoidoscopy: every 5 years  Screening may be recommended earlier than age 45 if at higher risk for colorectal cancer. Also, an individualized decision between you and your healthcare provider will decide whether screening between the ages of 76-85 would be appropriate. Colonoscopy: Not on file  FOBT/FIT: Not on file  Cologuard: Not on file  Sigmoidoscopy: Not on file          Prostate Cancer Screening Individualized decision between patient and health care provider in men between ages of 55-69   Medicare will cover every 12 months beginning on the day after your 50th birthday PSA: <0.1 ng/mL     Screening Not Indicated     Hepatitis C Screening Once for adults born between 1945 and 1965  More frequently in patients at high risk for Hepatitis C Hep C Antibody: Not on file        Diabetes Screening 1-2 times per year if you're at risk for diabetes or have pre-diabetes Fasting glucose: 86 mg/dL (5/1/2025)  A1C: 5.9 % (5/1/2025)  Screening Current   Cholesterol Screening Once every 5 years if you don't have a  lipid disorder. May order more often based on risk factors. Lipid panel: 05/01/2025  Screening Not Indicated  History Lipid Disorder      Other Preventive Screenings Covered by Medicare:  Abdominal Aortic Aneurysm (AAA) Screening: covered once if your at risk. You're considered to be at risk if you have a family history of AAA or a male between the age of 65-75 who smoking at least 100 cigarettes in your lifetime.  Lung Cancer Screening: covers low dose CT scan once per year if you meet all of the following conditions: (1) Age 55-77; (2) No signs or symptoms of lung cancer; (3) Current smoker or have quit smoking within the last 15 years; (4) You have a tobacco smoking history of at least 20 pack years (packs per day x number of years you smoked); (5) You get a written order from a healthcare provider.  Glaucoma Screening: covered annually if you're considered high risk: (1) You have diabetes OR (2) Family history of glaucoma OR (3)  aged 50 and older OR (4)  American aged 65 and older  Osteoporosis Screening: covered every 2 years if you meet one of the following conditions: (1) Have a vertebral abnormality; (2) On glucocorticoid therapy for more than 3 months; (3) Have primary hyperparathyroidism; (4) On osteoporosis medications and need to assess response to drug therapy.  HIV Screening: covered annually if you're between the age of 15-65. Also covered annually if you are younger than 15 and older than 65 with risk factors for HIV infection. For pregnant patients, it is covered up to 3 times per pregnancy.    Immunizations:  Immunization Recommendations   Influenza Vaccine Annual influenza vaccination during flu season is recommended for all persons aged >= 6 months who do not have contraindications   Pneumococcal Vaccine   * Pneumococcal conjugate vaccine = PCV13 (Prevnar 13), PCV15 (Vaxneuvance), PCV20 (Prevnar 20)  * Pneumococcal polysaccharide vaccine = PPSV23 (Pneumovax) Adults 19-64  yo with certain risk factors or if 65+ yo  If never received any pneumonia vaccine: recommend Prevnar 20 (PCV20)  Give PCV20 if previously received 1 dose of PCV13 or PPSV23   Hepatitis B Vaccine 3 dose series if at intermediate or high risk (ex: diabetes, end stage renal disease, liver disease)   Respiratory syncytial virus (RSV) Vaccine - COVERED BY MEDICARE PART D  * RSVPreF3 (Arexvy) CDC recommends that adults 60 years of age and older may receive a single dose of RSV vaccine using shared clinical decision-making (SCDM)   Tetanus (Td) Vaccine - COST NOT COVERED BY MEDICARE PART B Following completion of primary series, a booster dose should be given every 10 years to maintain immunity against tetanus. Td may also be given as tetanus wound prophylaxis.   Tdap Vaccine - COST NOT COVERED BY MEDICARE PART B Recommended at least once for all adults. For pregnant patients, recommended with each pregnancy.   Shingles Vaccine (Shingrix) - COST NOT COVERED BY MEDICARE PART B  2 shot series recommended in those 19 years and older who have or will have weakened immune systems or those 50 years and older     Health Maintenance Due:  There are no preventive care reminders to display for this patient.  Immunizations Due:      Topic Date Due   • COVID-19 Vaccine (6 - 2024-25 season) 09/01/2024   • Influenza Vaccine (1) 09/01/2025     Advance Directives   What are advance directives?  Advance directives are legal documents that state your wishes and plans for medical care. These plans are made ahead of time in case you lose your ability to make decisions for yourself. Advance directives can apply to any medical decision, such as the treatments you want, and if you want to donate organs.   What are the types of advance directives?  There are many types of advance directives, and each state has rules about how to use them. You may choose a combination of any of the following:  Living will:  This is a written record of the  treatment you want. You can also choose which treatments you do not want, which to limit, and which to stop at a certain time. This includes surgery, medicine, IV fluid, and tube feedings.   Durable power of  for healthcare (DPAHC):  This is a written record that states who you want to make healthcare choices for you when you are unable to make them for yourself. This person, called a proxy, is usually a family member or a friend. You may choose more than 1 proxy.  Do not resuscitate (DNR) order:  A DNR order is used in case your heart stops beating or you stop breathing. It is a request not to have certain forms of treatment, such as CPR. A DNR order may be included in other types of advance directives.  Medical directive:  This covers the care that you want if you are in a coma, near death, or unable to make decisions for yourself. You can list the treatments you want for each condition. Treatment may include pain medicine, surgery, blood transfusions, dialysis, IV or tube feedings, and a ventilator (breathing machine).  Values history:  This document has questions about your views, beliefs, and how you feel and think about life. This information can help others choose the care that you would choose.  Why are advance directives important?  An advance directive helps you control your care. Although spoken wishes may be used, it is better to have your wishes written down. Spoken wishes can be misunderstood, or not followed. Treatments may be given even if you do not want them. An advance directive may make it easier for your family to make difficult choices about your care.   Fall Prevention    Fall prevention  includes ways to make your home and other areas safer. It also includes ways you can move more carefully to prevent a fall. Health conditions that cause changes in your blood pressure, vision, or muscle strength and coordination may increase your risk for falls. Medicines may also increase your risk  for falls if they make you dizzy, weak, or sleepy.   Fall prevention tips:   Stand or sit up slowly.    Use assistive devices as directed.    Wear shoes that fit well and have soles that .    Wear a personal alarm.    Stay active.    Manage your medical conditions.    Home Safety Tips:  Add items to prevent falls in the bathroom.    Keep paths clear.    Install bright lights in your home.    Keep items you use often on shelves within reach.    Paint or place reflective tape on the edges of your stairs.    Weight Management   Why it is important to manage your weight:  Being overweight increases your risk of health conditions such as heart disease, high blood pressure, type 2 diabetes, and certain types of cancer. It can also increase your risk for osteoarthritis, sleep apnea, and other respiratory problems. Aim for a slow, steady weight loss. Even a small amount of weight loss can lower your risk of health problems.  How to lose weight safely:  A safe and healthy way to lose weight is to eat fewer calories and get regular exercise. You can lose up about 1 pound a week by decreasing the number of calories you eat by 500 calories each day.   Healthy meal plan for weight management:  A healthy meal plan includes a variety of foods, contains fewer calories, and helps you stay healthy. A healthy meal plan includes the following:  Eat whole-grain foods more often.  A healthy meal plan should contain fiber. Fiber is the part of grains, fruits, and vegetables that is not broken down by your body. Whole-grain foods are healthy and provide extra fiber in your diet. Some examples of whole-grain foods are whole-wheat breads and pastas, oatmeal, brown rice, and bulgur.  Eat a variety of vegetables every day.  Include dark, leafy greens such as spinach, kale, ernie greens, and mustard greens. Eat yellow and orange vegetables such as carrots, sweet potatoes, and winter squash.   Eat a variety of fruits every day.  Choose  fresh or canned fruit (canned in its own juice or light syrup) instead of juice. Fruit juice has very little or no fiber.  Eat low-fat dairy foods.  Drink fat-free (skim) milk or 1% milk. Eat fat-free yogurt and low-fat cottage cheese. Try low-fat cheeses such as mozzarella and other reduced-fat cheeses.  Choose meat and other protein foods that are low in fat.  Choose beans or other legumes such as split peas or lentils. Choose fish, skinless poultry (chicken or turkey), or lean cuts of red meat (beef or pork). Before you cook meat or poultry, cut off any visible fat.   Use less fat and oil.  Try baking foods instead of frying them. Add less fat, such as margarine, sour cream, regular salad dressing and mayonnaise to foods. Eat fewer high-fat foods. Some examples of high-fat foods include french fries, doughnuts, ice cream, and cakes.  Eat fewer sweets.  Limit foods and drinks that are high in sugar. This includes candy, cookies, regular soda, and sweetened drinks.  Exercise:  Exercise at least 30 minutes per day on most days of the week. Some examples of exercise include walking, biking, dancing, and swimming. You can also fit in more physical activity by taking the stairs instead of the elevator or parking farther away from stores. Ask your healthcare provider about the best exercise plan for you.    © Copyright [employer] 2018 Information is for End User's use only and may not be sold, redistributed or otherwise used for commercial purposes. All illustrations and images included in CareNotes® are the copyrighted property of [employer] or [facility]

## 2025-08-18 DIAGNOSIS — E78.5 HYPERLIPIDEMIA, UNSPECIFIED HYPERLIPIDEMIA TYPE: ICD-10-CM

## 2025-08-18 DIAGNOSIS — N13.8 BENIGN PROSTATIC HYPERPLASIA WITH URINARY OBSTRUCTION: ICD-10-CM

## 2025-08-18 DIAGNOSIS — N40.1 BENIGN PROSTATIC HYPERPLASIA WITH URINARY OBSTRUCTION: ICD-10-CM

## 2025-08-18 RX ORDER — ATORVASTATIN CALCIUM 40 MG/1
40 TABLET, FILM COATED ORAL DAILY
Qty: 90 TABLET | Refills: 1 | Status: SHIPPED | OUTPATIENT
Start: 2025-08-18

## 2025-08-18 RX ORDER — FINASTERIDE 5 MG/1
5 TABLET, FILM COATED ORAL DAILY
Qty: 90 TABLET | Refills: 1 | Status: SHIPPED | OUTPATIENT
Start: 2025-08-18

## (undated) DEVICE — SYRINGE 20ML LL

## (undated) DEVICE — STERILE SURGICAL LUBRICANT,  TUBE: Brand: SURGILUBE

## (undated) DEVICE — TOWEL SURG XR DETECT GREEN STRL RFD

## (undated) DEVICE — GLOVE SRG BIOGEL 7.5

## (undated) DEVICE — BAG DECANTER

## (undated) DEVICE — GROUNDING PAD UNIVERSAL SLW

## (undated) DEVICE — BAG DRAINAGE UROCATCHER 4 SKYTRON

## (undated) DEVICE — SYRINGE,TOOMEY,IRRIGATION,70CC,STERILE: Brand: MEDLINE

## (undated) DEVICE — SPECIMEN CONTAINER: Brand: CARDINAL HEALTH

## (undated) DEVICE — RESECTOSCOPE LOOP ELECTRODE 27 FR 27050F/6

## (undated) DEVICE — BAG URINE DRAINAGE 4000ML CONTINUOUS IRR

## (undated) DEVICE — 4-PORT MANIFOLD: Brand: NEPTUNE 2

## (undated) DEVICE — CATH URETERAL 5FR X 70 CM FLEX TIP POLYUR BARD

## (undated) DEVICE — Device

## (undated) DEVICE — EVACUATOR BLADDER ELLIK DISP STRL

## (undated) DEVICE — FIBER LASER HOLIUM 365 MICRON

## (undated) DEVICE — PACK CUSTOM GU CYSTO PACK RF

## (undated) DEVICE — GUIDEWIRE STRGHT TIP 0.038 IN SOLO PLUS

## (undated) DEVICE — MEDI-VAC NON-CONDUCTIVE SUCTION TUBING 6MM X 1.8M (6FT.) L: Brand: CARDINAL HEALTH

## (undated) DEVICE — CATH FOLEY 24FR 30ML 3 WAY SILICONE ELASTIMER

## (undated) DEVICE — DRAPE C-ARM X-RAY

## (undated) DEVICE — GLOVE SRG BIOGEL ECLIPSE 8

## (undated) DEVICE — TRANSPOSAL ULTRAFLEX DUO/QUAD ULTRA CART MANIFOLD

## (undated) DEVICE — 1071 S-DRP URO STLE-GAMA 10/BX,4X/C: Brand: STERI-DRAPE™